# Patient Record
Sex: FEMALE | Race: WHITE | HISPANIC OR LATINO | Employment: FULL TIME | URBAN - METROPOLITAN AREA
[De-identification: names, ages, dates, MRNs, and addresses within clinical notes are randomized per-mention and may not be internally consistent; named-entity substitution may affect disease eponyms.]

---

## 2017-01-16 ENCOUNTER — APPOINTMENT (OUTPATIENT)
Dept: MAMMOGRAPHY | Facility: CLINIC | Age: 62
End: 2017-01-16
Payer: COMMERCIAL

## 2017-01-25 ENCOUNTER — HOSPITAL ENCOUNTER (OUTPATIENT)
Dept: ULTRASOUND IMAGING | Facility: HOSPITAL | Age: 62
Discharge: HOME | End: 2017-01-25
Payer: COMMERCIAL

## 2017-01-25 ENCOUNTER — ALLSCRIPTS OFFICE VISIT (OUTPATIENT)
Dept: OTHER | Facility: OTHER | Age: 62
End: 2017-01-25

## 2017-01-25 DIAGNOSIS — M25.50 PAIN IN JOINT: ICD-10-CM

## 2017-01-25 DIAGNOSIS — R10.11 RUQ PAIN: ICD-10-CM

## 2017-01-25 PROCEDURE — 76705 ECHO EXAM OF ABDOMEN: CPT

## 2017-01-30 ENCOUNTER — TRANSCRIBE ORDERS (OUTPATIENT)
Dept: LAB | Facility: OTHER | Age: 62
End: 2017-01-30

## 2017-01-30 ENCOUNTER — APPOINTMENT (OUTPATIENT)
Dept: LAB | Facility: OTHER | Age: 62
End: 2017-01-30
Payer: COMMERCIAL

## 2017-01-30 DIAGNOSIS — M25.50 PAIN IN JOINT: ICD-10-CM

## 2017-01-30 LAB
ALBUMIN SERPL BCP-MCNC: 3.6 G/DL (ref 3.5–5)
ALP SERPL-CCNC: 102 U/L (ref 46–116)
ALT SERPL W P-5'-P-CCNC: 45 U/L (ref 12–78)
AMYLASE SERPL-CCNC: 44 IU/L (ref 25–115)
ANION GAP SERPL CALCULATED.3IONS-SCNC: 8 MMOL/L (ref 4–13)
AST SERPL W P-5'-P-CCNC: 23 U/L (ref 5–45)
BASOPHILS # BLD AUTO: 0.04 THOUSANDS/ΜL (ref 0–0.1)
BASOPHILS NFR BLD AUTO: 1 % (ref 0–1)
BILIRUB SERPL-MCNC: 0.46 MG/DL (ref 0.2–1)
BUN SERPL-MCNC: 17 MG/DL (ref 5–25)
CALCIUM SERPL-MCNC: 8.8 MG/DL (ref 8.3–10.1)
CHLORIDE SERPL-SCNC: 106 MMOL/L (ref 100–108)
CO2 SERPL-SCNC: 25 MMOL/L (ref 21–32)
CREAT SERPL-MCNC: 0.75 MG/DL (ref 0.6–1.3)
EOSINOPHIL # BLD AUTO: 0.12 THOUSAND/ΜL (ref 0–0.61)
EOSINOPHIL NFR BLD AUTO: 2 % (ref 0–6)
ERYTHROCYTE [DISTWIDTH] IN BLOOD BY AUTOMATED COUNT: 14.2 % (ref 11.6–15.1)
GFR SERPL CREATININE-BSD FRML MDRD: >60 ML/MIN/1.73SQ M
GLUCOSE SERPL-MCNC: 97 MG/DL (ref 65–140)
HCT VFR BLD AUTO: 41.7 % (ref 34.8–46.1)
HGB BLD-MCNC: 13.6 G/DL (ref 11.5–15.4)
LIPASE SERPL-CCNC: 78 U/L (ref 73–393)
LYMPHOCYTES # BLD AUTO: 2.17 THOUSANDS/ΜL (ref 0.6–4.47)
LYMPHOCYTES NFR BLD AUTO: 37 % (ref 14–44)
MCH RBC QN AUTO: 26.9 PG (ref 26.8–34.3)
MCHC RBC AUTO-ENTMCNC: 32.6 G/DL (ref 31.4–37.4)
MCV RBC AUTO: 82 FL (ref 82–98)
MONOCYTES # BLD AUTO: 0.38 THOUSAND/ΜL (ref 0.17–1.22)
MONOCYTES NFR BLD AUTO: 7 % (ref 4–12)
NEUTROPHILS # BLD AUTO: 3.16 THOUSANDS/ΜL (ref 1.85–7.62)
NEUTS SEG NFR BLD AUTO: 53 % (ref 43–75)
NRBC BLD AUTO-RTO: 0 /100 WBCS
PLATELET # BLD AUTO: 271 THOUSANDS/UL (ref 149–390)
PMV BLD AUTO: 10.2 FL (ref 8.9–12.7)
POTASSIUM SERPL-SCNC: 4 MMOL/L (ref 3.5–5.3)
PROT SERPL-MCNC: 7.2 G/DL (ref 6.4–8.2)
RBC # BLD AUTO: 5.06 MILLION/UL (ref 3.81–5.12)
SODIUM SERPL-SCNC: 139 MMOL/L (ref 136–145)
WBC # BLD AUTO: 5.89 THOUSAND/UL (ref 4.31–10.16)

## 2017-01-30 PROCEDURE — 82150 ASSAY OF AMYLASE: CPT

## 2017-01-30 PROCEDURE — 80053 COMPREHEN METABOLIC PANEL: CPT

## 2017-01-30 PROCEDURE — 36415 COLL VENOUS BLD VENIPUNCTURE: CPT

## 2017-01-30 PROCEDURE — 83690 ASSAY OF LIPASE: CPT

## 2017-01-30 PROCEDURE — 85025 COMPLETE CBC W/AUTO DIFF WBC: CPT

## 2017-01-30 PROCEDURE — 86430 RHEUMATOID FACTOR TEST QUAL: CPT

## 2017-01-30 PROCEDURE — 86617 LYME DISEASE ANTIBODY: CPT

## 2017-01-31 LAB
B BURGDOR IGG PATRN SER IB-IMP: NEGATIVE
B BURGDOR IGM PATRN SER IB-IMP: NEGATIVE
B BURGDOR18KD IGG SER QL IB: ABNORMAL
B BURGDOR23KD IGG SER QL IB: ABNORMAL
B BURGDOR23KD IGM SER QL IB: ABNORMAL
B BURGDOR28KD IGG SER QL IB: PRESENT
B BURGDOR30KD IGG SER QL IB: ABNORMAL
B BURGDOR39KD IGG SER QL IB: ABNORMAL
B BURGDOR39KD IGM SER QL IB: ABNORMAL
B BURGDOR41KD IGG SER QL IB: ABNORMAL
B BURGDOR41KD IGM SER QL IB: PRESENT
B BURGDOR45KD IGG SER QL IB: PRESENT
B BURGDOR58KD IGG SER QL IB: PRESENT
B BURGDOR66KD IGG SER QL IB: PRESENT
B BURGDOR93KD IGG SER QL IB: ABNORMAL
RHEUMATOID FACT SER QL LA: NEGATIVE

## 2017-02-02 ENCOUNTER — ALLSCRIPTS OFFICE VISIT (OUTPATIENT)
Dept: OTHER | Facility: OTHER | Age: 62
End: 2017-02-02

## 2017-02-13 ENCOUNTER — GENERIC CONVERSION - ENCOUNTER (OUTPATIENT)
Dept: OTHER | Facility: OTHER | Age: 62
End: 2017-02-13

## 2017-03-02 ENCOUNTER — ALLSCRIPTS OFFICE VISIT (OUTPATIENT)
Dept: OTHER | Facility: OTHER | Age: 62
End: 2017-03-02

## 2017-05-03 ENCOUNTER — ALLSCRIPTS OFFICE VISIT (OUTPATIENT)
Dept: OTHER | Facility: OTHER | Age: 62
End: 2017-05-03

## 2017-05-30 ENCOUNTER — ALLSCRIPTS OFFICE VISIT (OUTPATIENT)
Dept: OTHER | Facility: OTHER | Age: 62
End: 2017-05-30

## 2017-06-30 ENCOUNTER — ALLSCRIPTS OFFICE VISIT (OUTPATIENT)
Dept: OTHER | Facility: OTHER | Age: 62
End: 2017-06-30

## 2017-06-30 DIAGNOSIS — Z00.00 ENCOUNTER FOR GENERAL ADULT MEDICAL EXAMINATION WITHOUT ABNORMAL FINDINGS: ICD-10-CM

## 2017-06-30 DIAGNOSIS — M25.512 PAIN IN LEFT SHOULDER: ICD-10-CM

## 2017-06-30 DIAGNOSIS — G43.909 MIGRAINE WITHOUT STATUS MIGRAINOSUS, NOT INTRACTABLE: ICD-10-CM

## 2017-06-30 DIAGNOSIS — Z86.39 PERSONAL HISTORY OF OTHER ENDOCRINE, NUTRITIONAL AND METABOLIC DISEASE: ICD-10-CM

## 2017-06-30 DIAGNOSIS — M25.50 PAIN IN JOINT: ICD-10-CM

## 2017-07-03 ENCOUNTER — APPOINTMENT (OUTPATIENT)
Dept: RADIOLOGY | Facility: MEDICAL CENTER | Age: 62
End: 2017-07-03
Payer: COMMERCIAL

## 2017-07-03 DIAGNOSIS — G43.909 MIGRAINE WITHOUT STATUS MIGRAINOSUS, NOT INTRACTABLE: ICD-10-CM

## 2017-07-03 DIAGNOSIS — M25.512 PAIN IN LEFT SHOULDER: ICD-10-CM

## 2017-07-03 DIAGNOSIS — M25.50 PAIN IN JOINT: ICD-10-CM

## 2017-07-03 PROCEDURE — 73030 X-RAY EXAM OF SHOULDER: CPT

## 2017-07-03 PROCEDURE — 72040 X-RAY EXAM NECK SPINE 2-3 VW: CPT

## 2017-07-06 ENCOUNTER — GENERIC CONVERSION - ENCOUNTER (OUTPATIENT)
Dept: OTHER | Facility: OTHER | Age: 62
End: 2017-07-06

## 2017-07-06 ENCOUNTER — LAB REQUISITION (OUTPATIENT)
Dept: LAB | Facility: HOSPITAL | Age: 62
End: 2017-07-06
Payer: COMMERCIAL

## 2017-07-06 DIAGNOSIS — J02.9 ACUTE PHARYNGITIS: ICD-10-CM

## 2017-07-06 LAB — S PYO AG THROAT QL: NEGATIVE

## 2017-07-06 PROCEDURE — 87070 CULTURE OTHR SPECIMN AEROBIC: CPT | Performed by: PHYSICAL MEDICINE & REHABILITATION

## 2017-07-07 ENCOUNTER — GENERIC CONVERSION - ENCOUNTER (OUTPATIENT)
Dept: OTHER | Facility: OTHER | Age: 62
End: 2017-07-07

## 2017-07-07 ENCOUNTER — APPOINTMENT (OUTPATIENT)
Dept: PHYSICAL THERAPY | Facility: CLINIC | Age: 62
End: 2017-07-07
Payer: COMMERCIAL

## 2017-07-07 PROCEDURE — 97162 PT EVAL MOD COMPLEX 30 MIN: CPT

## 2017-07-07 PROCEDURE — 97110 THERAPEUTIC EXERCISES: CPT

## 2017-07-08 LAB — BACTERIA THROAT CULT: NORMAL

## 2017-07-10 ENCOUNTER — APPOINTMENT (OUTPATIENT)
Dept: PHYSICAL THERAPY | Facility: CLINIC | Age: 62
End: 2017-07-10
Payer: COMMERCIAL

## 2017-07-10 PROCEDURE — 97110 THERAPEUTIC EXERCISES: CPT

## 2017-07-10 PROCEDURE — 97014 ELECTRIC STIMULATION THERAPY: CPT

## 2017-07-10 PROCEDURE — 97140 MANUAL THERAPY 1/> REGIONS: CPT

## 2017-07-14 ENCOUNTER — APPOINTMENT (OUTPATIENT)
Dept: PHYSICAL THERAPY | Facility: CLINIC | Age: 62
End: 2017-07-14
Payer: COMMERCIAL

## 2017-07-14 PROCEDURE — 97014 ELECTRIC STIMULATION THERAPY: CPT

## 2017-07-14 PROCEDURE — 97110 THERAPEUTIC EXERCISES: CPT

## 2017-07-14 PROCEDURE — 97140 MANUAL THERAPY 1/> REGIONS: CPT

## 2017-07-17 ENCOUNTER — APPOINTMENT (OUTPATIENT)
Dept: PHYSICAL THERAPY | Facility: CLINIC | Age: 62
End: 2017-07-17
Payer: COMMERCIAL

## 2017-07-17 PROCEDURE — 97140 MANUAL THERAPY 1/> REGIONS: CPT

## 2017-07-17 PROCEDURE — 97014 ELECTRIC STIMULATION THERAPY: CPT

## 2017-07-17 PROCEDURE — 97110 THERAPEUTIC EXERCISES: CPT

## 2017-07-21 ENCOUNTER — APPOINTMENT (OUTPATIENT)
Dept: PHYSICAL THERAPY | Facility: CLINIC | Age: 62
End: 2017-07-21
Payer: COMMERCIAL

## 2017-07-21 PROCEDURE — 97110 THERAPEUTIC EXERCISES: CPT

## 2017-07-21 PROCEDURE — 97140 MANUAL THERAPY 1/> REGIONS: CPT

## 2017-07-21 PROCEDURE — 97014 ELECTRIC STIMULATION THERAPY: CPT

## 2017-07-24 ENCOUNTER — APPOINTMENT (OUTPATIENT)
Dept: NUTRITION | Facility: HOSPITAL | Age: 62
End: 2017-07-24
Payer: COMMERCIAL

## 2017-07-24 ENCOUNTER — APPOINTMENT (OUTPATIENT)
Dept: PHYSICAL THERAPY | Facility: CLINIC | Age: 62
End: 2017-07-24
Payer: COMMERCIAL

## 2017-07-24 DIAGNOSIS — E66.3 SEVERELY OVERWEIGHT: ICD-10-CM

## 2017-07-24 PROCEDURE — 97140 MANUAL THERAPY 1/> REGIONS: CPT

## 2017-07-24 PROCEDURE — 97014 ELECTRIC STIMULATION THERAPY: CPT

## 2017-07-24 PROCEDURE — 97110 THERAPEUTIC EXERCISES: CPT

## 2017-07-24 PROCEDURE — 97802 MEDICAL NUTRITION INDIV IN: CPT

## 2017-07-28 ENCOUNTER — APPOINTMENT (OUTPATIENT)
Dept: PHYSICAL THERAPY | Facility: CLINIC | Age: 62
End: 2017-07-28
Payer: COMMERCIAL

## 2017-07-28 PROCEDURE — 97140 MANUAL THERAPY 1/> REGIONS: CPT

## 2017-07-28 PROCEDURE — 97014 ELECTRIC STIMULATION THERAPY: CPT

## 2017-07-28 PROCEDURE — 97110 THERAPEUTIC EXERCISES: CPT

## 2017-07-31 ENCOUNTER — APPOINTMENT (OUTPATIENT)
Dept: PHYSICAL THERAPY | Facility: CLINIC | Age: 62
End: 2017-07-31
Payer: COMMERCIAL

## 2017-07-31 PROCEDURE — 97140 MANUAL THERAPY 1/> REGIONS: CPT

## 2017-07-31 PROCEDURE — 97110 THERAPEUTIC EXERCISES: CPT

## 2017-08-03 ENCOUNTER — TRANSCRIBE ORDERS (OUTPATIENT)
Dept: ADMINISTRATIVE | Facility: HOSPITAL | Age: 62
End: 2017-08-03

## 2017-08-03 DIAGNOSIS — E04.2 NONTOXIC MULTINODULAR GOITER: Primary | ICD-10-CM

## 2017-08-04 ENCOUNTER — APPOINTMENT (OUTPATIENT)
Dept: LAB | Facility: MEDICAL CENTER | Age: 62
End: 2017-08-04
Payer: COMMERCIAL

## 2017-08-04 ENCOUNTER — TRANSCRIBE ORDERS (OUTPATIENT)
Dept: ADMINISTRATIVE | Facility: HOSPITAL | Age: 62
End: 2017-08-04

## 2017-08-04 ENCOUNTER — HOSPITAL ENCOUNTER (OUTPATIENT)
Dept: RADIOLOGY | Facility: MEDICAL CENTER | Age: 62
Discharge: HOME | End: 2017-08-04
Payer: COMMERCIAL

## 2017-08-04 DIAGNOSIS — E04.2 NONTOXIC MULTINODULAR GOITER: ICD-10-CM

## 2017-08-04 DIAGNOSIS — Z86.39 PERSONAL HISTORY OF OTHER ENDOCRINE, NUTRITIONAL AND METABOLIC DISEASE: ICD-10-CM

## 2017-08-04 DIAGNOSIS — Z00.00 ENCOUNTER FOR GENERAL ADULT MEDICAL EXAMINATION WITHOUT ABNORMAL FINDINGS: ICD-10-CM

## 2017-08-04 DIAGNOSIS — Z86.39 PERSONAL HISTORY OF NUTRITIONAL DEFICIENCY: ICD-10-CM

## 2017-08-04 DIAGNOSIS — Z00.8 ENCOUNTER FOR OTHER GENERAL EXAMINATION: Primary | ICD-10-CM

## 2017-08-04 LAB
ANION GAP SERPL CALCULATED.3IONS-SCNC: 8 MMOL/L (ref 4–13)
BUN SERPL-MCNC: 14 MG/DL (ref 5–25)
CALCIUM SERPL-MCNC: 9.1 MG/DL (ref 8.3–10.1)
CHLORIDE SERPL-SCNC: 105 MMOL/L (ref 100–108)
CHOLEST SERPL-MCNC: 171 MG/DL (ref 50–200)
CO2 SERPL-SCNC: 27 MMOL/L (ref 21–32)
CREAT SERPL-MCNC: 0.67 MG/DL (ref 0.6–1.3)
EST. AVERAGE GLUCOSE BLD GHB EST-MCNC: 117 MG/DL
GFR SERPL CREATININE-BSD FRML MDRD: 94 ML/MIN/1.73SQ M
GLUCOSE P FAST SERPL-MCNC: 91 MG/DL (ref 65–99)
HBA1C MFR BLD: 5.7 % (ref 4.2–6.3)
HDLC SERPL-MCNC: 42 MG/DL (ref 40–60)
LDLC SERPL CALC-MCNC: 69 MG/DL (ref 0–100)
POTASSIUM SERPL-SCNC: 4 MMOL/L (ref 3.5–5.3)
SODIUM SERPL-SCNC: 140 MMOL/L (ref 136–145)
TRIGL SERPL-MCNC: 302 MG/DL

## 2017-08-04 PROCEDURE — 76536 US EXAM OF HEAD AND NECK: CPT

## 2017-08-04 PROCEDURE — 80048 BASIC METABOLIC PNL TOTAL CA: CPT

## 2017-08-04 PROCEDURE — 36415 COLL VENOUS BLD VENIPUNCTURE: CPT

## 2017-08-04 PROCEDURE — 80061 LIPID PANEL: CPT

## 2017-08-04 PROCEDURE — 83036 HEMOGLOBIN GLYCOSYLATED A1C: CPT

## 2017-08-08 ENCOUNTER — GENERIC CONVERSION - ENCOUNTER (OUTPATIENT)
Dept: OTHER | Facility: OTHER | Age: 62
End: 2017-08-08

## 2017-08-11 ENCOUNTER — ALLSCRIPTS OFFICE VISIT (OUTPATIENT)
Dept: OTHER | Facility: OTHER | Age: 62
End: 2017-08-11

## 2017-08-11 DIAGNOSIS — M25.512 PAIN IN LEFT SHOULDER: ICD-10-CM

## 2017-08-11 DIAGNOSIS — E04.1 NONTOXIC SINGLE THYROID NODULE: ICD-10-CM

## 2017-08-14 ENCOUNTER — TRANSCRIBE ORDERS (OUTPATIENT)
Dept: ADMINISTRATIVE | Facility: HOSPITAL | Age: 62
End: 2017-08-14

## 2017-08-14 ENCOUNTER — ALLSCRIPTS OFFICE VISIT (OUTPATIENT)
Dept: OTHER | Facility: OTHER | Age: 62
End: 2017-08-14

## 2017-08-14 DIAGNOSIS — M25.512 LEFT SHOULDER PAIN, UNSPECIFIED CHRONICITY: Primary | ICD-10-CM

## 2017-08-16 ENCOUNTER — GENERIC CONVERSION - ENCOUNTER (OUTPATIENT)
Dept: OTHER | Facility: OTHER | Age: 62
End: 2017-08-16

## 2017-08-18 ENCOUNTER — HOSPITAL ENCOUNTER (OUTPATIENT)
Dept: MRI IMAGING | Facility: HOSPITAL | Age: 62
Discharge: HOME | End: 2017-08-18
Attending: ORTHOPAEDIC SURGERY
Payer: COMMERCIAL

## 2017-08-18 DIAGNOSIS — M25.512 PAIN IN LEFT SHOULDER: ICD-10-CM

## 2017-08-18 PROCEDURE — 73221 MRI JOINT UPR EXTREM W/O DYE: CPT

## 2017-08-21 ENCOUNTER — ALLSCRIPTS OFFICE VISIT (OUTPATIENT)
Dept: OTHER | Facility: OTHER | Age: 62
End: 2017-08-21

## 2017-08-28 ENCOUNTER — GENERIC CONVERSION - ENCOUNTER (OUTPATIENT)
Dept: OTHER | Facility: OTHER | Age: 62
End: 2017-08-28

## 2017-09-11 ENCOUNTER — ALLSCRIPTS OFFICE VISIT (OUTPATIENT)
Dept: OTHER | Facility: OTHER | Age: 62
End: 2017-09-11

## 2017-09-11 DIAGNOSIS — R41.3 OTHER AMNESIA: ICD-10-CM

## 2017-09-11 DIAGNOSIS — M75.42 IMPINGEMENT SYNDROME OF LEFT SHOULDER: ICD-10-CM

## 2017-09-25 ENCOUNTER — APPOINTMENT (OUTPATIENT)
Dept: PHYSICAL THERAPY | Facility: CLINIC | Age: 62
End: 2017-09-25
Payer: COMMERCIAL

## 2017-09-25 ENCOUNTER — GENERIC CONVERSION - ENCOUNTER (OUTPATIENT)
Dept: OTHER | Facility: OTHER | Age: 62
End: 2017-09-25

## 2017-09-25 DIAGNOSIS — M75.42 IMPINGEMENT SYNDROME OF LEFT SHOULDER: ICD-10-CM

## 2017-09-25 PROCEDURE — 97140 MANUAL THERAPY 1/> REGIONS: CPT

## 2017-09-25 PROCEDURE — G8990 OTHER PT/OT CURRENT STATUS: HCPCS

## 2017-09-25 PROCEDURE — 97162 PT EVAL MOD COMPLEX 30 MIN: CPT

## 2017-09-25 PROCEDURE — 97110 THERAPEUTIC EXERCISES: CPT

## 2017-09-25 PROCEDURE — G8991 OTHER PT/OT GOAL STATUS: HCPCS

## 2017-09-28 ENCOUNTER — APPOINTMENT (OUTPATIENT)
Dept: PHYSICAL THERAPY | Facility: CLINIC | Age: 62
End: 2017-09-28
Payer: COMMERCIAL

## 2017-09-28 PROCEDURE — 97140 MANUAL THERAPY 1/> REGIONS: CPT

## 2017-09-28 PROCEDURE — 97014 ELECTRIC STIMULATION THERAPY: CPT

## 2017-09-28 PROCEDURE — 97110 THERAPEUTIC EXERCISES: CPT

## 2017-10-03 ENCOUNTER — APPOINTMENT (OUTPATIENT)
Dept: PHYSICAL THERAPY | Facility: CLINIC | Age: 62
End: 2017-10-03
Payer: COMMERCIAL

## 2017-10-06 ENCOUNTER — APPOINTMENT (OUTPATIENT)
Dept: PHYSICAL THERAPY | Facility: CLINIC | Age: 62
End: 2017-10-06
Payer: COMMERCIAL

## 2017-10-13 ENCOUNTER — APPOINTMENT (OUTPATIENT)
Dept: PHYSICAL THERAPY | Facility: CLINIC | Age: 62
End: 2017-10-13
Payer: COMMERCIAL

## 2017-10-13 PROCEDURE — 97110 THERAPEUTIC EXERCISES: CPT

## 2017-10-13 PROCEDURE — 97140 MANUAL THERAPY 1/> REGIONS: CPT

## 2017-10-20 ENCOUNTER — APPOINTMENT (OUTPATIENT)
Dept: PHYSICAL THERAPY | Facility: CLINIC | Age: 62
End: 2017-10-20
Payer: COMMERCIAL

## 2017-10-20 PROCEDURE — 97110 THERAPEUTIC EXERCISES: CPT

## 2017-10-20 PROCEDURE — 97140 MANUAL THERAPY 1/> REGIONS: CPT

## 2017-10-27 ENCOUNTER — APPOINTMENT (OUTPATIENT)
Dept: PHYSICAL THERAPY | Facility: CLINIC | Age: 62
End: 2017-10-27
Payer: COMMERCIAL

## 2017-10-27 PROCEDURE — 97140 MANUAL THERAPY 1/> REGIONS: CPT

## 2017-10-27 PROCEDURE — 97110 THERAPEUTIC EXERCISES: CPT

## 2017-10-30 ENCOUNTER — APPOINTMENT (OUTPATIENT)
Dept: LAB | Facility: AMBULARY SURGERY CENTER | Age: 62
End: 2017-10-30
Payer: COMMERCIAL

## 2017-10-30 ENCOUNTER — TRANSCRIBE ORDERS (OUTPATIENT)
Dept: ADMINISTRATIVE | Facility: HOSPITAL | Age: 62
End: 2017-10-30

## 2017-10-30 DIAGNOSIS — R41.3 MEMORY LOSS: Primary | ICD-10-CM

## 2017-10-30 DIAGNOSIS — J30.9 ALLERGIC RHINITIS, UNSPECIFIED CHRONICITY, UNSPECIFIED SEASONALITY, UNSPECIFIED TRIGGER: ICD-10-CM

## 2017-10-30 DIAGNOSIS — E04.1 NONTOXIC SINGLE THYROID NODULE: ICD-10-CM

## 2017-10-30 LAB
T4 FREE SERPL-MCNC: 1.04 NG/DL (ref 0.76–1.46)
TSH SERPL DL<=0.05 MIU/L-ACNC: 1.69 UIU/ML (ref 0.36–3.74)

## 2017-10-30 PROCEDURE — 84439 ASSAY OF FREE THYROXINE: CPT

## 2017-10-30 PROCEDURE — 84443 ASSAY THYROID STIM HORMONE: CPT

## 2017-10-30 PROCEDURE — 36415 COLL VENOUS BLD VENIPUNCTURE: CPT

## 2017-11-01 ENCOUNTER — GENERIC CONVERSION - ENCOUNTER (OUTPATIENT)
Dept: OTHER | Facility: OTHER | Age: 62
End: 2017-11-01

## 2017-11-02 ENCOUNTER — ALLSCRIPTS OFFICE VISIT (OUTPATIENT)
Dept: OTHER | Facility: OTHER | Age: 62
End: 2017-11-02

## 2017-11-02 DIAGNOSIS — N39.0 URINARY TRACT INFECTION: ICD-10-CM

## 2017-11-03 NOTE — PROGRESS NOTES
Assessment  1  Acute suppurative otitis media (382 00) (H66 009)   2  History of allergic rhinitis (V12 69) (Z87 09)   3  Allergic rhinitis (477 9) (J30 9)   4  Acute UTI (599 0) (N39 0)   5  Overweight (278 02) (E66 3)    Plan  Acute UTI    · Amoxicillin 500 MG Oral Capsule; TAKE 1 CAPSULE TWICE DAILY UNTIL GONE   · Montelukast Sodium 10 MG Oral Tablet (Singulair); take 1 tablet by mouth once daily as  directed   · (1) URINALYSIS (will reflex a microscopy if leukocytes, occult blood, protein or nitrites are not within  normal limits); Status:Active; Requested [id number];    · (1) URINE CULTURE; Source:Urine, Clean Catch; Status:Active; Requested [id number]; Allergic rhinitis    · Complete PFT with [facility]; Status:Hold For - Scheduling; Requested for:2017;    · Follow-up visit in 2 weeks Evaluation and Treatment  Follow-up  Status: Hold For - Scheduling   Requested for: 92IIK2560    Discussion/Summary  Discussion Summary:   She has multiple issues  Regarding the otitis media she was given amoxicillin  She is going to continue her nasal spray and Claritin  She is going to add Singulair to the regimen  her shortness of breath she is going to have pulmonary function test   did ask about hepatitis a and B and she is going to take care that at her own office  the suprapubic discomfort she is going to see her gynecologist   will see her back here in several weeks after she has had her pulmonary function tests done  Chief Complaint  Chief Complaint Free Text Note Form: Problems are 1  Allergic rhinitis 2  Upper respiratory infection 3  Suprapubic pain 4  Weight gain      History of Present Illness  HPI: She has several issues going on  The most pertinent issue is that she has a sinusitis and otitis media  tried Claritin and fluticasone  has also been having some suprapubic discomfort discomfort  She recently saw her gynecologist but these to return  is seeing a therapist for her weight gain  Review of Systems  Complete-Female:   Constitutional: feeling poorly,-- chills-- and-- feeling tired, but-- as noted in HPI-- and-- no fever  ENT: earache, but-- no hearing loss  Cardiovascular: No complaints of slow heart rate, no fast heart rate, no chest pain, no palpitations, no leg claudication, no lower extremity edema  Respiratory: wheezing,-- shortness of breath during exertion-- and-- She has intermittent shortness of breath  She feels she probably has wheezing, but-- as noted in HPI  Gastrointestinal: She has weight gain, but-- No complaints of abdominal pain, no constipation, no nausea or vomiting, no diarrhea, no bloody stools  Genitourinary: She, but-- as noted in HPI  Musculoskeletal: arthralgias-- and-- joint stiffness  Has suprapubic discomfort   Integumentary: No complaints of skin rash or lesions, no itching, no skin wounds, no breast pain or lump  Neurological: No complaints of headache, no confusion, no convulsions, no numbness, no dizziness or fainting, no tingling, no limb weakness, no difficulty walking  Psychiatric: Not suicidal, no sleep disturbance, no anxiety or depression, no change in personality, no emotional problems  Endocrine: No complaints of proptosis, no hot flashes, no muscle weakness, no deepening of the voice, no feelings of weakness  Hematologic/Lymphatic: No complaints of swollen glands, no swollen glands in the neck, does not bleed easily, does not bruise easily  Active Problems  1  Acute pharyngitis, unspecified etiology (462) (J02 9)   2  Acute sinusitis (461 9) (J01 90)   3  Adult BMI > 30 (V85 30)   4  Arthralgia of hip, left (719 45) (M25 552)   5  Arthralgia Of The Head / Neck / Trunk (719 48)   6  Arthritis of left knee (716 96) (M17 12)   7  Common migraine without aura (346 10) (G43 009)   8  Contusion of shoulder, left (923 00) (S40 012A)   9  Dry eyes, bilateral (375 15) (H04 123)   10   DVT of leg (deep venous thrombosis) (453 40) (I82 409)   11  Encounter for gynecological examination without abnormal finding (V72 31) (Z01 419)   12  Encounter for mammogram to establish baseline mammogram (V76 12) (Z12 31)   13  Esophagitis, reflux (530 11) (K21 0)   14  Fall down steps, initial encounter (E880 9) (W10 8XXA)   15  History of hyperlipidemia (V12 29) (Z86 39)   16  Joint pain (719 40) (M25 50)   17  Left shoulder pain (719 41) (M25 512)   18  Memory difficulty (780 93) (R41 3)   19  Migraine, unspecified, not intractable, without status migrainosus (346 90) (G43 909)   20  Mild intermittent asthma (493 90) (J45 20)   21  Obesity (BMI 30 0-34 9) (278 00) (E66 9)   22  Osteopenia of multiple sites (733 90) (M85 89)   23  Overweight (278 02) (E66 3)   24  Posterior tibial tendinitis (726 72) (M76 829)   25  Postoperative visit (V58 49) (Z48 89)   26  Prediabetes (790 29) (R73 03)   27  Subacromial impingement of left shoulder (726 19) (M75 42)   28  Thyroid nodule (241 0) (E04 1)   29  Thyromegaly (240 9) (E04 9)    Past Medical History  1  History of allergic rhinitis (V12 69) (Z87 09)   2  Denied: History of depression   3  History of knee replacement, total (V43 65) (Z96 659)   4  Denied: History of substance abuse   5  History of Pes planus, unspecified laterality (734) (M21 40)  Active Problems And Past Medical History Reviewed: The active problems and past medical history were reviewed and updated today  Surgical History  1  History of Appendectomy   2  History of  Section   3  History of  Section   4  History of Cholecystectomy   5  History of Hysteroscopy With Resection For Intrauterine Polyp Removal   6  History of Knee Surgery   7  History of Laparoscopy With Total Hysterectomy   8  History of Salpingo-oophorectomy Bilateral  Surgical History Reviewed: The surgical history was reviewed and updated today  Family History  Mother    1  Family history of Endometrial cancer   2   Denied: Family history of mental disorder   3  Denied: Family history of substance abuse  Father    4  Family history of diabetes mellitus (V18 0) (Z83 3)   5  Denied: Family history of mental disorder   6  Denied: Family history of substance abuse   7  Family history of Heart disease  Daughter    6  Family history of Growth hormone insufficiency  Brother    9  Family history of malignant neoplasm of thyroid (V16 8) (Z80 8)   10  Family history of type 2 diabetes mellitus (V18 0) (Z83 3)  Maternal Aunt    6  Family history of Alzheimer's disease (V17 2) (Z82 0)   12  Family history of malignant neoplasm of thyroid (V16 8) (Z80 8)  Paternal Aunt    15  Family history of diabetes mellitus (V18 0) (Z83 3)   14  Family history of Hashimoto thyroiditis (V18 19) (Z83 49)   15  Family history of Thyroid Surgery Total Thyroidectomy  Maternal Uncle    16  Family history of Brain tumor   17  Family history of Alzheimer's disease (V17 2) (Z82 0)  Family History    18  Family history of Cancer  Family History Reviewed: The family history was reviewed and updated today  Social History   · Being A Social Drinker   · Former smoker (P99 25) (V98 673)  Social History Reviewed: The social history was reviewed and updated today  The social history was reviewed and is unchanged  Current Meds   1  ALPRAZolam 0 25 MG Oral Tablet; TAKE 1 TABLET 3 TIMES DAILY AS NEEDED; Therapy: 31ERG8539 to (Evaluate:10Adv5255); Last Rx:30Jun2017 Ordered   2  Benadryl 25 MG CAPS; TAKE 1 CAPSULE DAILY  AS NEEDED; Therapy: (Recorded:15Jun2015) to Recorded   3  Claritin TABS; Therapy: [phone]) to Recorded   4  DiazePAM 5 MG Oral Tablet; Take 1 tablet once daily; Therapy: 33RTC1299 to (Evaluate:25Scn6492); Last Rx:27Mar2017 Ordered   5  Evening Primrose OIL; Therapy: (Daiva Cyphers) to Recorded   6  Fioricet TABS; TAKE 1 TABLET 3 TIMES DAILY AS NEEDED FOR HEADACHE; Therapy: (Recorded:10Nov2014) to Recorded   7   Fluticasone Propionate 50 MCG/ACT Nasal Suspension; USE 1 TO 2 SPRAYS IN EACH NOSTRIL   ONCE DAILY; Therapy: 99UVY9184 to (Last Rx:89Tjt3300)  Requested for: 18UMO3435 Ordered   8  Magnesium 200 MG Oral Tablet; Therapy: (Recorded:80Auo5904) to Recorded   9  Pantoprazole Sodium 40 MG Oral Tablet Delayed Release; TAKE ONE (1) TABLET(S) TWICE DAILY; Therapy: 21GIZ5115 to (Last Rx:27Mar2017)  Requested for: 27Mar2017 Ordered   10  Pantoprazole Sodium 40 MG Oral Tablet Delayed Release; TAKE ONE (1) TABLET(S) TWICE DAILY; Therapy: 28RIS4400 to (Evaluate:42Jfu8626)  Requested for: 01MRO4166; Last Rx:27Mar2017    Ordered   11  Restasis 0 05 % Ophthalmic Emulsion; INSTILL 1 DROP IN [facility] EYE TWICE DAILY; Therapy: (Recorded:36Wkk1924) to Recorded   12  Ventolin  (90 Base) MCG/ACT Inhalation Aerosol Solution; INHALE 1 TO 2 PUFFS EVERY 4    TO 6 HOURS AS NEEDED  Requested for: 62Cfx8069; Last Rx:25Urb4603 Ordered   13  ZOLMitriptan 5 MG Oral Tablet; TAKE 1 TABLET AT ONSET OF MIGRAINE  MAY REPEAT ONCE AFTER    2 HOURS  MAX 10 MG/DAY; Therapy: 27Apr2017 to (Last Charlotte Grandchild)  Requested for: 30Jun2017 Ordered  Medication List Reviewed: The medication list was reviewed and updated today  Allergies  1  Naproxen TABS  2  Seasonal    Vitals  Vital Signs    Recorded: 11FDQ6035 05:33PM   Heart Rate 76   Systolic 045   Diastolic 68   Height 5 ft 5 in   Weight 199 lb    BMI Calculated 33 12   BSA Calculated 1 97   O2 Saturation 98     Physical Exam    Constitutional   General appearance: No acute distress, well appearing and well nourished  -- She is afebrile  She is moderately overweight  Eyes   Conjunctiva and lids: No swelling, erythema or discharge  Pupils and irises: Equal, round and reactive to light  Ears, Nose, Mouth, and Throat   External inspection of ears and nose: Normal     Otoscopic examination: Abnormal  -- She has erythema and bulging of the left  Nasal mucosa, septum, and turbinates: Normal without edema or erythema  Oropharynx: Abnormal  -- Mildly hyperemic  Pulmonary   Respiratory effort: No increased work of breathing or signs of respiratory distress  Auscultation of lungs: Clear to auscultation  -- No wheezes  Cardiovascular   Palpation of heart: Normal PMI, no thrills  Auscultation of heart: Normal rate and rhythm, normal S1 and S2, without murmurs  Examination of extremities for edema and/or varicosities: Abnormal  -- Positive venous varicosities  Carotid pulses: Normal     Abdomen   Abdomen: Abnormal  -- Moderately overweight  Liver and spleen: No hepatomegaly or splenomegaly  Lymphatic   Palpation of lymph nodes in neck: No lymphadenopathy  Musculoskeletal   Gait and station: Normal     Digits and nails: Normal without clubbing or cyanosis  Inspection/palpation of joints, bones, and muscles: Normal     Skin   Skin and subcutaneous tissue: Normal without rashes or lesions  Neurologic   Cranial nerves: Cranial nerves 2-12 intact  Reflexes: 2+ and symmetric  Sensation: No sensory loss  Psychiatric   Orientation to person, place, and time: Normal     Mood and affect: Normal          Health Management  Health Maintenance   COLONOSCOPY; every 1 year; Last 86JPE3082; Next Due: 81AVW9868; Overdue    Future Appointments    Date/Time Provider Specialty Site   11/13/2017 01:50 PM MARLO Smith  Orthopedic Surgery [facility]   08/13/2018 09:20 AM MARLO Cueva   Endocrinology [facility] ENDOCRINOLOGY   12/22/2017 09:00 AM Hannah March MD [facility]   11/27/2017 04:15 PM Emma Porter DO Internal Medicine [facility]     Signatures   Electronically signed by : Jaya Contreras DO; Nov 2 2017  7:43PM EST                       (Author)

## 2017-11-04 ENCOUNTER — HOSPITAL ENCOUNTER (OUTPATIENT)
Dept: MRI IMAGING | Facility: HOSPITAL | Age: 62
Discharge: HOME | End: 2017-11-04
Attending: PSYCHIATRY & NEUROLOGY
Payer: COMMERCIAL

## 2017-11-04 DIAGNOSIS — R41.3 MEMORY LOSS: ICD-10-CM

## 2017-11-04 PROCEDURE — 70551 MRI BRAIN STEM W/O DYE: CPT

## 2017-11-07 ENCOUNTER — GENERIC CONVERSION - ENCOUNTER (OUTPATIENT)
Dept: OTHER | Facility: OTHER | Age: 62
End: 2017-11-07

## 2017-11-09 ENCOUNTER — APPOINTMENT (OUTPATIENT)
Dept: LAB | Facility: HOSPITAL | Age: 62
End: 2017-11-09
Payer: COMMERCIAL

## 2017-11-09 DIAGNOSIS — N39.0 URINARY TRACT INFECTION: ICD-10-CM

## 2017-11-09 LAB
BILIRUB UR QL STRIP: NEGATIVE
CLARITY UR: CLEAR
COLOR UR: YELLOW
GLUCOSE UR STRIP-MCNC: NEGATIVE MG/DL
HGB UR QL STRIP.AUTO: NEGATIVE
KETONES UR STRIP-MCNC: NEGATIVE MG/DL
LEUKOCYTE ESTERASE UR QL STRIP: NEGATIVE
NITRITE UR QL STRIP: NEGATIVE
PH UR STRIP.AUTO: 6 [PH] (ref 4.5–8)
PROT UR STRIP-MCNC: NEGATIVE MG/DL
SP GR UR STRIP.AUTO: 1.02 (ref 1–1.03)
UROBILINOGEN UR QL STRIP.AUTO: 0.2 E.U./DL

## 2017-11-09 PROCEDURE — 87086 URINE CULTURE/COLONY COUNT: CPT

## 2017-11-09 PROCEDURE — 81003 URINALYSIS AUTO W/O SCOPE: CPT

## 2017-11-10 ENCOUNTER — GENERIC CONVERSION - ENCOUNTER (OUTPATIENT)
Dept: OTHER | Facility: OTHER | Age: 62
End: 2017-11-10

## 2017-11-10 LAB — BACTERIA UR CULT: NORMAL

## 2017-11-13 ENCOUNTER — GENERIC CONVERSION - ENCOUNTER (OUTPATIENT)
Dept: OTHER | Facility: OTHER | Age: 62
End: 2017-11-13

## 2017-11-13 ENCOUNTER — ALLSCRIPTS OFFICE VISIT (OUTPATIENT)
Dept: OTHER | Facility: OTHER | Age: 62
End: 2017-11-13

## 2017-11-14 DIAGNOSIS — Z12.31 ENCOUNTER FOR SCREENING MAMMOGRAM FOR MALIGNANT NEOPLASM OF BREAST: ICD-10-CM

## 2017-11-14 NOTE — PROGRESS NOTES
Assessment    1  Subacromial impingement of left shoulder (726 19) (M75 42)    Plan  Subacromial impingement of left shoulder    · Follow-up PRN Evaluation and Treatment  Follow-up  Status: Complete  Done:13Nov2017 02:28PM    Discussion/Summary    The patient looks well and is functioning with only minimal symptoms at this time, aggressive intervention is not required but I did review again the use of a subacromial injection if her symptoms become worse  We will see her back here on an as-needed basis for an injection if she requires  The patient was counseled regarding diagnostic results,-- prognosis,-- risks and benefits of treatment options  The patient has the current Goals: Pain-free function left shoulder  The patent has the current Barriers: No barriers  Patient is able to Self-Care  Educational resources provided: Corticosteroid injection handout if she requires 1 in the future  Possible side effects of new medications were reviewed with the patient/guardian today  The treatment plan was reviewed with the patient/guardian  The patient/guardian understands and agrees with the treatment plan     Self Referrals: No      History of Present Illness  Patient returns with her left shoulder subacromial impingement syndrome  She is doing well, has only mild symptoms in certain positions but at rest she is functioning without pain      Active Problems  1  Acute pharyngitis, unspecified etiology (462) (J02 9)   2  Acute sinusitis (461 9) (J01 90)   3  Acute suppurative otitis media (382 00) (H66 009)   4  Acute UTI (599 0) (N39 0)   5  Adult BMI > 30 (V85 30)   6  Allergic rhinitis (477 9) (J30 9)   7  Arthralgia of hip, left (719 45) (M25 552)   8  Arthralgia Of The Head / Neck / Trunk (719 48)   9  Arthritis of left knee (716 96) (M17 12)   10  Common migraine without aura (346 10) (G43 009)   11  Contusion of shoulder, left (923 00) (S40 012A)   12  Dry eyes, bilateral (375 15) (H04 123)   13   DVT of leg (deep venous thrombosis) (453 40) (I82 409)   14  Encounter for gynecological examination without abnormal finding (V72 31) (Z01 419)   15  Encounter for mammogram to establish baseline mammogram (V76 12) (Z12 31)   16  Esophagitis, reflux (530 11) (K21 0)   17  Fall down steps, initial encounter (E880 9) (W10 8XXA)   18  History of hyperlipidemia (V12 29) (Z86 39)   19  Joint pain (719 40) (M25 50)   20  Left shoulder pain (719 41) (M25 512)   21  Memory difficulty (780 93) (R41 3)   22  Migraine, unspecified, not intractable, without status migrainosus (346 90) (G43 909)   23  Mild intermittent asthma (493 90) (J45 20)   24  Obesity (BMI 30 0-34 9) (278 00) (E66 9)   25  Osteopenia of multiple sites (733 90) (M85 89)   26  Overweight (278 02) (E66 3)   27  Posterior tibial tendinitis (726 72) (M76 829)   28  Postoperative visit (V58 49) (Z48 89)   29  Prediabetes (790 29) (R73 03)   30  Subacromial impingement of left shoulder (726 19) (M75 42)   31  Thyroid nodule (241 0) (E04 1)   32   Thyromegaly (240 9) (E04 9)    Past Medical History   · History of allergic rhinitis (V12 69) (Z87 09)   · Denied: History of depression   · History of knee replacement, total (V43 65) (Z96 659)   · Denied: History of substance abuse   · History of Pes planus, unspecified laterality (734) (M21 40)    Surgical History     · History of Appendectomy   · History of  Section   · History of  Section   · History of Cholecystectomy   · History of Hysteroscopy With Resection For Intrauterine Polyp Removal   · History of Knee Surgery   · History of Laparoscopy With Total Hysterectomy   · History of Salpingo-oophorectomy Bilateral    Family History  Mother    · Family history of Endometrial cancer   · Denied: Family history of mental disorder   · Denied: Family history of substance abuse  Father    · Family history of diabetes mellitus (V18 0) (Z83 3)   · Denied: Family history of mental disorder   · Denied: Family history of substance abuse   · Family history of Heart disease  Daughter    · Family history of Growth hormone insufficiency  Brother    · Family history of malignant neoplasm of thyroid (V16 8) (Z80 8)   · Family history of type 2 diabetes mellitus (V18 0) (Z83 3)  Maternal Aunt    · Family history of Alzheimer's disease (V17 2) (Z82 0)   · Family history of malignant neoplasm of thyroid (V16 8) (Z80 8)  Paternal Aunt    · Family history of diabetes mellitus (V18 0) (Z83 3)   · Family history of Hashimoto thyroiditis (V18 19) (Z83 49)   · Family history of Thyroid Surgery Total Thyroidectomy  Maternal Uncle    · Family history of Brain tumor   · Family history of Alzheimer's disease (V17 2) (Z82 0)  Family History    · Family history of Cancer    Social History     · Being A Social Drinker   · Former smoker (P14 93) (O81 393)    Current Meds   1  ALPRAZolam 0 25 MG Oral Tablet (Xanax); TAKE 1 TABLET 3 TIMES DAILY AS NEEDED; Therapy: 48NLQ6471 to (Evaluate:88Rdt9911); Last Rx:30Jun2017 Ordered   2  Benadryl 25 MG CAPS (DiphenhydrAMINE HCl); TAKE 1 CAPSULE DAILY  AS NEEDED; Therapy: (Recorded:15Jun2015) to Recorded   3  Claritin TABS; Therapy: (450 45 295) to Recorded   4  DiazePAM 5 MG Oral Tablet; Take 1 tablet once daily; Therapy: 52IUL5054 to (Evaluate:98Avz0547); Last Rx:27Mar2017 Ordered   5  Evening Primrose OIL; Therapy: (Elier Buckner) to Recorded   6  Fioricet TABS (Butalbital-APAP-Caffeine); TAKE 1 TABLET 3 TIMES DAILY AS NEEDED FOR HEADACHE; Therapy: (Recorded:10Nov2014) to Recorded   7  Fluticasone Propionate 50 MCG/ACT Nasal Suspension; USE 1 TO 2 SPRAYS IN EACH NOSTRIL ONCE DAILY; Therapy: 71THM2956 to (Last Rx:02Jov6960)  Requested for: 75QDH0512 Ordered   8  Magnesium 200 MG Oral Tablet; Therapy: (Recorded:04Apr2016) to Recorded   9  Montelukast Sodium 10 MG Oral Tablet (Singulair); take 1 tablet by mouth once daily as directed;  Therapy: 81NJJ0682 to (Evaluate:01Jan2018)  Requested for: 04UFI2108; Last Rx:02Nov2017 Ordered   10  Pantoprazole Sodium 40 MG Oral Tablet Delayed Release; TAKE ONE (1) TABLET(S)  TWICE DAILY; Therapy: 67NPH7020 to (Last Rx:27Mar2017)  Requested for: 27Mar2017 Ordered   11  Pantoprazole Sodium 40 MG Oral Tablet Delayed Release; TAKE ONE (1) TABLET(S)  TWICE DAILY; Therapy: 98YJA7755 to (Evaluate:35Eqi9202)  Requested for: 87MBY3068; Last  Rx:27Mar2017 Ordered   12  Restasis 0 05 % Ophthalmic Emulsion; INSTILL 1 DROP IN [facility] EYE TWICE DAILY; Therapy: (Recorded:15Jun2015) to Recorded   13  Ventolin  (90 Base) MCG/ACT Inhalation Aerosol Solution; INHALE 1 TO 2  PUFFS EVERY 4 TO 6 HOURS AS NEEDED  Requested for: 12Sep2016; Last  Rx:12Sep2016 Ordered   14  ZOLMitriptan 5 MG Oral Tablet (Zomig); TAKE 1 TABLET AT ONSET OF MIGRAINE  MAY  REPEAT ONCE AFTER 2 HOURS  MAX 10 MG/DAY; Therapy: 80TRE3316 to (Last Rx:30Jun2017)  Requested for: 30Jun2017 Ordered    Allergies  1  Naproxen TABS  2  Seasonal    Vitals   Recorded: 97GEK7781 02:09PM   Heart Rate 67   Systolic 641, Sitting   Diastolic 74, Sitting   Weight 199 lb 6 oz   BMI Calculated 33 18   BSA Calculated 1 98     Physical Exam    not the bicipital groove not the deltoid ROM: equivalent both sides  Motor: Normal 5/5 abduction-- and-- 5/5 external rotation  Special Tests: equivocal Painful Arc,-- equivocal Beltrán test-- and-- equivocal Neer test, but-- negative Drop Arm test,-- negative Empty Can test,-- negative Lift Off test,-- negative Yergason's test,-- negative Speed's test,-- negative Belly Press test-- and-- negative Cross Body Adduction test  not the coracoid Left Shoulder Appearance[de-identified] no AC joint hypertrophy,-- no AC joint step-off,-- no belly of the biceps christa deformity,-- no deltoid atrophy-- and-- no trapezius atrophy  Tenderness: not the AC joint-- and-- not the axillary  Future Appointments    Date/Time Provider Specialty Site   08/13/2018 09:20 MARLO Bashir   Endocrinology [facility] 12/22/2017 09:00 AM Sangita Reddy MD Neurology [facility]   11/27/2017 04:15 PM Camron Cantu DO Internal Medicine [facility]       Signatures   Electronically signed by : MARLO Hernandez ; Nov 13 2017  2:29PM EST                       (Author)

## 2017-11-20 ENCOUNTER — ALLSCRIPTS OFFICE VISIT (OUTPATIENT)
Dept: OTHER | Facility: OTHER | Age: 62
End: 2017-11-20

## 2017-11-23 NOTE — PROGRESS NOTES
Assessment    1  Atrophic vulvitis (616 10) (N76 2)   2  Vulvovaginal candidiasis (112 1) (B37 3)    Plan  Atrophic vulvitis    · Clobetasol Propionate 0 05 % External Cream; Apply to affected area twice daily forone week then once daily for one week   Rx By: Tess Grady; Dispense: 0 Days ; #:1 X 15 GM Tube; Refill: 0;Atrophic vulvitis; YAMILEX = N; Verified Transmission to [facility]/PHARMACY #[id number] Last Updated By: System, SureScripts; 11/20/2017 12:40:57 PM  Vulvovaginal candidiasis    · Fluconazole 150 MG Oral Tablet (Diflucan); TAKE 1 TABLET NOW, AND REPEAT IN4 DAYS   Rx By: Tess Grady; Dispense: 2 Days ; #:2 Tablet; Refill: 0;Vulvovaginal candidiasis; YAMILEX = N; Verified Transmission to [facility]/PHARMACY #[id number] Last Updated By: System, SureScripts; 11/20/2017 12:14:03 PM    Discussion/Summary  Discussion Summary:   On exam surgeon mucosa is erythematous and atrophic  Scant white discharge  At this time will treat with Diflucan due to recent antibiotic use  Also recommend beginning vaginal estrogen pea-sized amount 2 times per week to treat vaginal atrophy if symptoms worsen call back for further evaluation  Counseling Documentation With Imm: The patient was counseled regarding prognosis,-- risks and benefits of treatment options  total time of encounter was 15 minutes-- and-- 9 minutes was spent counseling  Goals and Barriers: The patient has the current Goals: Treatment pain  The patent has the current Barriers: Would like to avoid medication for treatment  Patient's Capacity to Self-Care: Patient is able to Self-Care  Medication SE Review and Pt Understands Tx: Possible side effects of new medications were reviewed with the patient/guardian today  The treatment plan was reviewed with the patient/guardian   The patient/guardian understands and agrees with the treatment plan   Self Referrals:   Self Referrals: No      Chief Complaint  Chief Complaint Free Text Note Form: Patient c/o bladder pain, denies UTI sxs, had neg UA and culture 11/8/17 with Dr Hunter Martinez  History of Present Illness  HPI: 57year old female with months long symptoms of bladder irritation  just recently finished ABX for treatment of ear infection  Review of Systems   Female ROS: bladder pain,-- urinary frequency-- and-- feelings of urinary urgency, but-- no urinary loss of control  Focused-Female:  Constitutional: No fever, no chills, feels well, no tiredness, no recent weight gain or loss  ENT: no ear ache, no loss of hearing, no nosebleeds or nasal discharge, no sore throat or hoarseness  Cardiovascular: no complaints of slow or fast heart rate, no chest pain, no palpitations, no leg claudication or lower extremity edema  Respiratory: no complaints of shortness of breath, no wheezing, no dyspnea on exertion, no orthopnea or PND  Breasts: no complaints of breast pain, breast lump or nipple discharge  Gastrointestinal: no complaints of abdominal pain, no constipation, no nausea or diarrhea, no vomiting, no bloody stools  Genitourinary: no complaints of dysuria, no incontinence, no pelvic pain, no dysmenorrhea, no vaginal discharge or abnormal vaginal bleeding  Musculoskeletal: no complaints of arthralgia, no myalgia, no joint swelling or stiffness, no limb pain or swelling  Integumentary: no complaints of skin rash or lesion, no itching or dry skin, no skin wounds  Neurological: no complaints of headache, no confusion, no numbness or tingling, no dizziness or fainting  Active Problems  1  Acute pharyngitis, unspecified etiology (462) (J02 9)   2  Acute sinusitis (461 9) (J01 90)   3  Acute suppurative otitis media (382 00) (H66 009)   4  Acute UTI (599 0) (N39 0)   5  Adult BMI > 30 (V85 30)   6  Allergic rhinitis (477 9) (J30 9)   7  Arthralgia of hip, left (719 45) (M25 552)   8  Arthralgia Of The Head / Neck / Trunk (719 48)   9  Arthritis of left knee (716 96) (M17 12)   10   Common migraine without aura (346 10) (G43 009)   11  Contusion of shoulder, left (923 00) (S40 012A)   12  Dry eyes, bilateral (375 15) (H04 123)   13  DVT of leg (deep venous thrombosis) (453 40) (I82 409)   14  Encounter for gynecological examination without abnormal finding (V72 31) (Z01 419)   15  Encounter for mammogram to establish baseline mammogram (V76 12) (Z12 31)   16  Esophagitis, reflux (530 11) (K21 0)   17  Fall down steps, initial encounter (E880 9) (W10 8XXA)   18  History of hyperlipidemia (V12 29) (Z86 39)   19  Joint pain (719 40) (M25 50)   20  Left shoulder pain (719 41) (M25 512)   21  Memory difficulty (780 93) (R41 3)   22  Migraine, unspecified, not intractable, without status migrainosus (346 90) (G43 909)   23  Mild intermittent asthma (493 90) (J45 20)   24  Obesity (BMI 30 0-34 9) (278 00) (E66 9)   25  Osteopenia of multiple sites (733 90) (M85 89)   26  Overweight (278 02) (E66 3)   27  Posterior tibial tendinitis (726 72) (M76 829)   28  Postoperative visit (V58 49) (Z48 89)   29  Prediabetes (790 29) (R73 03)   30  Subacromial impingement of left shoulder (726 19) (M75 42)   31  Thyroid nodule (241 0) (E04 1)   32  Thyromegaly (240 9) (E04 9)    Past Medical History  1  History of allergic rhinitis (V12 69) (Z87 09)   2  Denied: History of depression   3  History of knee replacement, total (V43 65) (Z96 659)   4  Denied: History of substance abuse   5  History of Pes planus, unspecified laterality (734) (M21 40)    Surgical History    1  History of Appendectomy   2  History of  Section   3  History of  Section   4  History of Cholecystectomy   5  History of Hysteroscopy With Resection For Intrauterine Polyp Removal   6  History of Knee Surgery   7  History of Laparoscopy With Total Hysterectomy   8  History of Salpingo-oophorectomy Bilateral    Family History  Mother    1  Family history of Endometrial cancer   2  Denied: Family history of mental disorder   3   Denied: Family history of substance abuse  Father    4  Family history of diabetes mellitus (V18 0) (Z83 3)   5  Denied: Family history of mental disorder   6  Denied: Family history of substance abuse   7  Family history of Heart disease  Daughter    6  Family history of Growth hormone insufficiency  Brother    9  Family history of malignant neoplasm of thyroid (V16 8) (Z80 8)   10  Family history of type 2 diabetes mellitus (V18 0) (Z83 3)  Maternal Aunt    6  Family history of Alzheimer's disease (V17 2) (Z82 0)   12  Family history of malignant neoplasm of thyroid (V16 8) (Z80 8)  Paternal Aunt    15  Family history of diabetes mellitus (V18 0) (Z83 3)   14  Family history of Hashimoto thyroiditis (V18 19) (Z83 49)   15  Family history of Thyroid Surgery Total Thyroidectomy  Maternal Uncle    16  Family history of Brain tumor   17  Family history of Alzheimer's disease (V17 2) (Z82 0)  Family History    18  Family history of Cancer    Social History     · Being A Social Drinker   · Former smoker (L60 44) (G11 602)    Current Meds   1  ALPRAZolam 0 25 MG Oral Tablet; TAKE 1 TABLET 3 TIMES DAILY AS NEEDED; Therapy: 99YLT0881 to (Evaluate:41Gru9009); Last Rx:30Jun2017 Ordered   2  Benadryl 25 MG CAPS; TAKE 1 CAPSULE DAILY  AS NEEDED; Therapy: (Recorded:15Jun2015) to Recorded   3  Claritin TABS; Therapy: [phone]) to Recorded   4  DiazePAM 5 MG Oral Tablet; Take 1 tablet once daily; Therapy: 67ICA5673 to (Evaluate:44Enf1887); Last Rx:27Mar2017 Ordered   5  Evening Primrose OIL; Therapy: (Rob Viramontes) to Recorded   6  Fioricet TABS; TAKE 1 TABLET 3 TIMES DAILY AS NEEDED FOR HEADACHE; Therapy: (Recorded:10Nov2014) to Recorded   7  Fluticasone Propionate 50 MCG/ACT Nasal Suspension; USE 1 TO 2 SPRAYS IN EACH NOSTRIL ONCE DAILY; Therapy: 02VCG9066 to (Last Rx:62Uaz2367)  Requested for: 19VIR1373 Ordered   8  Magnesium 200 MG Oral Tablet; Therapy: (Recorded:04Apr2016) to Recorded   9   Montelukast Sodium 10 MG Oral Tablet; take 1 tablet by mouth once daily as directed; Therapy: 40GLK8499 to (Evaluate:01Jan2018)  Requested for: 84CHX1561; Last Rx:02Nov2017 Ordered   10  Pantoprazole Sodium 40 MG Oral Tablet Delayed Release; TAKE ONE (1) TABLET(S)  TWICE DAILY; Therapy: 07FLV5261 to (Last Rx:27Mar2017)  Requested for: 27Mar2017 Ordered   11  Pantoprazole Sodium 40 MG Oral Tablet Delayed Release; TAKE ONE (1) TABLET(S)  TWICE DAILY; Therapy: 44HBE3486 to (Evaluate:33Qlw0897)  Requested for: 62NDG2447; Last  Rx:27Mar2017 Ordered   12  Restasis 0 05 % Ophthalmic Emulsion; INSTILL 1 DROP IN [facility] EYE TWICE DAILY; Therapy: (Recorded:15Jun2015) to Recorded   13  Ventolin  (90 Base) MCG/ACT Inhalation Aerosol Solution; INHALE 1 TO 2 PUFFS  EVERY 4 TO 6 HOURS AS NEEDED  Requested for: 73Rlj9332; Last Rx:12Sep2016  Ordered   14  ZOLMitriptan 5 MG Oral Tablet; TAKE 1 TABLET AT ONSET OF MIGRAINE  MAY REPEAT  ONCE AFTER 2 HOURS  MAX 10 MG/DAY; Therapy: 16ERR1749 to (Last Rx:30Jun2017)  Requested for: 30Jun2017 Ordered    Allergies  1  Naproxen TABS  2  Seasonal    Vitals  Vital Signs    Recorded: 16TIJ7337 95:36PQ   Systolic 806   Diastolic 80   Height 5 ft 5 in   Weight 194 lb    BMI Calculated 32 28   BSA Calculated 1 95   LMP TLH BSO       Physical Exam   Genitourinary  External genitalia: Normal and no lesions appreciated  Examination of the external genitalia showed no vulvitis  No lesions were seen on the external genitalia  The labia majora were normal  The labia minora were normal   Vagina: Abnormal   Vagina: atrophy,-- erythema,-- tenderness,-- dry mucosa-- and-- cystocele, but-- no rectocele  Urethral meatus: Normal    Bladder: Normal, soft, non-tender and no prolapse or masses appreciated  Cervix: Surgically absent  Uterus: Surgically absent  Adnexa/parametria: Surgically absent  Health Management  Health Maintenance   COLONOSCOPY; every 1 year; Last 09DMY5352; Next Due: 34UXL9495;  Overdue    Future Appointments    Date/Time Provider Specialty Site   08/13/2018 09:20 MARLO Orellana   Endocrinology [facility] ENDOCRINOLOGY   12/22/2017 09:00 AM Mckay Schumacher MD [facility]   11/27/2017 04:15 PM Bhanu Theodore DO Internal Medicine [address]       Signatures   Electronically signed by : Christiano Trinh MD; Nov 22 2017 12:41PM EST                       (Author)

## 2017-12-07 ENCOUNTER — GENERIC CONVERSION - ENCOUNTER (OUTPATIENT)
Dept: OTHER | Facility: OTHER | Age: 62
End: 2017-12-07

## 2017-12-22 ENCOUNTER — ALLSCRIPTS OFFICE VISIT (OUTPATIENT)
Dept: OTHER | Facility: OTHER | Age: 62
End: 2017-12-22

## 2017-12-22 ENCOUNTER — GENERIC CONVERSION - ENCOUNTER (OUTPATIENT)
Dept: OTHER | Facility: OTHER | Age: 62
End: 2017-12-22

## 2017-12-23 NOTE — PROGRESS NOTES
Assessment   1  Memory difficulty (780 93) (R41 3)    Plan   Memory difficulty    · Follow-up visit in 6 months Evaluation and Treatment  Follow-up  Status: Hold For -    Scheduling  Requested for: 68Etf4529   Ordered; For: Memory difficulty; Ordered By: Lucy Tello Performed:  Due: 19GKT5099    Discussion/Summary   Discussion Summary:    Ed Carrington does not demonstrate any evidence of dementia  Suspect many her family members diagnosis of dementia was incorrect and may be related to chronic substance abuse  She does have early changes on MRI and anticipate monitoring her closely  I will see her back in follow-up in 6 months  Chief Complaint   Chief Complaint Free Text Note Form: Patient returns in follow up for memory difficulty and migraine  History of Present Illness   HPI: Ed Carrington returns in follow-up today  She admits to being very stressed at work  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  She denies any new neurologic symptoms  She reports that her brother recently told her that he was diagnosed with Alzheimer's disease but had a history of substance abuse  She reports that many of her family members who were diagnosed with Alzheimer's disease also had substance abuse and remained functional for 10 or 15 years after diagnosis      Review of Systems   Neurological ROS:      Constitutional: no fever, no chills, no recent weight gain, no recent weight loss, no complaints of feeling tired, no changes in appetite  HEENT:  no sinus problems, not feeling congested, no blurred vision, no dryness of the eyes, no eye pain, no hearing loss, no tinnitus, no mouth sores, no sore throat, no hoarseness, no dysphagia, no masses, no bleeding  Cardiovascular:  no chest pain or pressure, no palpitations present, the heart rate was not rapid or irregular, no swelling in the arms or legs, no poor circulation        Respiratory:  no unusual or persistant cough, no shortness of breath with or without exertion  Gastrointestinal:  no nausea, no vomiting, no diarrhea, no abdominal pain, no changes in bowel habits, no melena, no loss of bowel control  Genitourinary:  no incontinence, no feelings of urinary urgency, no increase in frequency, no urinary hesitancy, no dysuria, no hematuria  Musculoskeletal: myalgias-- and-- head/neck/back pain  Integumentary  no masses, no rash, no skin lesions, no livedo reticularis  Psychiatric: anxiety  Endocrine  no unusual weight loss or gain, no excessive urination, no excessive thirst, no hair loss or gain, no hot or cold intolerance, no menstrual period change or irregularity, no loss of sexual ability or drive, no erection difficulty, no nipple discharge  Hematologic/Lymphatic:  no unusual bleeding, no tendency for easy bruising, no clotting skin or lumps  Neurological General:  no headache, no nausea or vomiting, no lightheadedness, no convulsions, no blackouts, no syncope, no trauma, no photopsia, no increased sleepiness, no trouble falling asleep, no snoring, no awakening at night  Neurological Mental Status:  no confusion, no mood swings, no alteration or loss of consciousness, no difficulty expressing/understanding speech, no memory problems  Neurological Cranial Nerves: taste or smell loss/changes  Neurological Motor findings include:  no tremor, no twitching, no cramping(pre/post exercise), no atrophy  Neurological Coordination:  no unsteadiness, no vertigo or dizziness, no clumsiness, no problems reaching for objects  Neurological Sensory:  no numbness, no pain, no tingling, does not fall when eyes closed or taking a shower  Neurological Gait:  no difficulty walking, not falling to one side, no sensation of being pushed, has not had falls  ROS Reviewed:    ROS reviewed  Active Problems   1   Acute pharyngitis, unspecified etiology (462) (J02 9) 2  Acute sinusitis (461 9) (J01 90)   3  Acute suppurative otitis media (382 00) (H66 009)   4  Acute UTI (599 0) (N39 0)   5  Adult BMI > 30 (V85 30)   6  Allergic rhinitis (477 9) (J30 9)   7  Arthralgia of hip, left (719 45) (M25 552)   8  Arthralgia Of The Head / Neck / Trunk (719 48)   9  Arthritis of left knee (716 96) (M17 12)   10  Atrophic vulvitis (616 10) (N76 2)   11  Common migraine without aura (346 10) (G43 009)   12  Contusion of shoulder, left (923 00) (S40 012A)   13  Dry eyes, bilateral (375 15) (H04 123)   14  DVT of leg (deep venous thrombosis) (453 40) (I82 409)   15  Encounter for gynecological examination without abnormal finding (V72 31) (Z01 419)   16  Encounter for mammogram to establish baseline mammogram (V76 12) (Z12 31)   17  Esophagitis, reflux (530 11) (K21 0)   18  Fall down steps, initial encounter (E880 9) (W10 8XXA)   19  History of hyperlipidemia (V12 29) (Z86 39)   20  Joint pain (719 40) (M25 50)   21  Left shoulder pain (719 41) (M25 512)   22  Memory difficulty (780 93) (R41 3)   23  Migraine, unspecified, not intractable, without status migrainosus (346 90) (G43 909)   24  Mild intermittent asthma (493 90) (J45 20)   25  Obesity (BMI 30 0-34 9) (278 00) (E66 9)   26  Osteopenia of multiple sites (733 90) (M85 89)   27  Overweight (278 02) (E66 3)   28  Posterior tibial tendinitis (726 72) (M76 829)   29  Postoperative visit (V58 49) (Z48 89)   30  Prediabetes (790 29) (R73 03)   31  Subacromial impingement of left shoulder (726 19) (M75 42)   32  Thyroid nodule (241 0) (E04 1)   33  Thyromegaly (240 9) (E04 9)   34  Vulvovaginal candidiasis (112 1) (B37 3)    Past Medical History   1  History of allergic rhinitis (V12 69) (Z87 09)   2  Denied: History of depression   3  History of knee replacement, total (V43 65) (Z96 659)   4  Denied: History of substance abuse   5   History of Pes planus, unspecified laterality (734) (M21 40)  Active Problems And Past Medical History Reviewed: The active problems and past medical history were reviewed and updated today  Surgical History   1  History of Appendectomy   2  History of  Section   3  History of  Section   4  History of Cholecystectomy   5  History of Hysteroscopy With Resection For Intrauterine Polyp Removal   6  History of Knee Surgery   7  History of Laparoscopy With Total Hysterectomy   8  History of Salpingo-oophorectomy Bilateral  Surgical History Reviewed: The surgical history was reviewed and updated today  Family History   Mother    1  Family history of Endometrial cancer   2  Denied: Family history of mental disorder   3  Denied: Family history of substance abuse  Father    4  Family history of diabetes mellitus (V18 0) (Z83 3)   5  Denied: Family history of mental disorder   6  Denied: Family history of substance abuse   7  Family history of Heart disease  Daughter    6  Family history of Growth hormone insufficiency  Brother    9  Family history of malignant neoplasm of thyroid (V16 8) (Z80 8)   10  Family history of type 2 diabetes mellitus (V18 0) (Z83 3)  Maternal Aunt    6  Family history of Alzheimer's disease (V17 2) (Z82 0)   12  Family history of malignant neoplasm of thyroid (V16 8) (Z80 8)  Paternal Aunt    15  Family history of diabetes mellitus (V18 0) (Z83 3)   14  Family history of Hashimoto thyroiditis (V18 19) (Z83 49)   15  Family history of Thyroid Surgery Total Thyroidectomy  Maternal Uncle    16  Family history of Brain tumor   17  Family history of Alzheimer's disease (V17 2) (Z82 0)  Family History    18  Family history of Cancer  Family History Reviewed: The family history was reviewed and updated today  Social History    · Being A Social Drinker   · Employed   · Former smoker (O50 14) (T22 872)   ·    · No illicit drug use  Social History Reviewed: The social history was reviewed and updated today  Current Meds    1   ALPRAZolam 0 25 MG Oral Tablet; TAKE 1 TABLET 3 TIMES DAILY AS NEEDED; Therapy: 22SOK4129 to (Evaluate:10Jdk6387); Last Rx:30Jun2017 Ordered   2  Benadryl 25 MG CAPS; TAKE 1 CAPSULE DAILY  AS NEEDED; Therapy: (Recorded:15Jun2015) to Recorded   3  Claritin TABS; TAKE 1 TABLET DAILY AS NEEDED; Therapy: (Julio Cesar Rinaldi) to Recorded   4  Clobetasol Propionate 0 05 % External Cream; Apply to affected area twice daily for one     week then once daily for one week; Therapy: 31KVN6874 to (Last Rx:20Nov2017)  Requested for: 20Nov2017 Ordered   5  DiazePAM 5 MG Oral Tablet; Take 1 tablet once daily; Therapy: 38PBG2871 to (Evaluate:23Sep2017) Recorded   6  Evening Primrose OIL; USE AS DIRECTED; Therapy: (Julio Cesar Rinaldi) to Recorded   7  Fioricet TABS; TAKE 1 TABLET 3 TIMES DAILY AS NEEDED FOR HEADACHE; Therapy: (Recorded:10Nov2014) to Recorded   8  Fish Oil CAPS; take 1 capsule daily; Therapy: (Julio Cesar Rinaldi) to Recorded   9  Fluticasone Propionate 50 MCG/ACT Nasal Suspension; USE 1 TO 2 SPRAYS IN EACH     NOSTRIL ONCE DAILY; Therapy: 37KTF0515 to (Last Rx:38Gtd6105)  Requested for: 14EQL1140 Ordered   10  Magnesium 200 MG Oral Tablet; TAKE 1 TABLET DAILY AS DIRECTED; Therapy: (Julio Cesar Rinaldi) to Recorded   11  Montelukast Sodium 10 MG Oral Tablet; take 1 tablet by mouth once daily as directed; Therapy: 46RDR6798 to (Evaluate:01Jan2018)  Requested for: 89KLD2181; Last      Rx:02Nov2017 Ordered   12  Pantoprazole Sodium 40 MG Oral Tablet Delayed Release; TAKE ONE (1) TABLET(S)      TWICE DAILY; Therapy: 40YXJ0804 to (Gali East)  Requested for: 71Pzj5149 Recorded   13  Restasis 0 05 % Ophthalmic Emulsion; INSTILL 1 DROP IN [facility] EYE TWICE DAILY; Therapy: (Recorded:15Jun2015) to Recorded   14  Ventolin  (90 Base) MCG/ACT Inhalation Aerosol Solution; INHALE 1 TO 2 PUFFS      EVERY 4 TO 6 HOURS AS NEEDED  Requested for: 62Vkn1603; Last Rx:12Sep2016      Ordered   15  ZOLMitriptan 5 MG Oral Tablet; TAKE 1 TABLET AT ONSET OF MIGRAINE  MAY REPEAT      ONCE AFTER 2 HOURS  MAX 10 MG/DAY; Therapy: 27Apr2017 to (Last Campbell Quinn)  Requested for: 30Jun2017 Ordered  Medication List Reviewed: The medication list was reviewed and updated today  Allergies   1  Naproxen TABS  2  Seasonal    Vitals   Signs   Recorded: 22Dec2017 09:23AM   Heart Rate: 68  Systolic: 560, LUE, Sitting  Diastolic: 72, LUE, Sitting  Height: 5 ft 3 5 in  Weight: 198 lb 2 oz  BMI Calculated: 34 55  BSA Calculated: 1 94  Pain Scale: 0    Physical Exam        Constitutional      General appearance: No acute distress, well appearing and well nourished  HEENT/NECK: Head is atraumatic normocephalic, neck is supple     NEUROLOGIC:     Mental Status: Awake and alert without aphasia  She scored a 30/30 on MOCA     Cranial Nerves: Extraocular movements are full  Face is symmetrical     Coordination:  Gait is stable          Results/Data   MRI of the brain demonstrated no active disease  Neuroquant analysis demonstrated ânot path a pneumonic however changes in the parenchyma trending toward inframedial temporal dementiaâ         Future Appointments      Date/Time Provider Specialty Site   08/13/2018 09:20 MARLO Mercado   Endocrinology [address] [facility] ENDOCRINOLOGY     Signatures    Electronically signed by : Gianfranco Whitfield MD; Dec 22 2017  9:56AM EST                       (Author)

## 2018-01-09 NOTE — RESULT NOTES
Discussion/Summary   thyroid function tests within normal range      Verified Results  (1) TSH 30Oct2017 07:50AM Gema Willett   TW Order Number: [id number]     Test Name Result Flag Reference   TSH 1 690 uIU/mL  0 358-3 740   Patients undergoing fluorescein dye angiography may retain small amounts of fluorescein in the body for 48-72 hours post procedure  Samples containing fluorescein can produce falsely depressed TSH values  If the patient had this procedure,a specimen should be resubmitted post fluorescein clearance  The recommended reference ranges for TSH during pregnancy are as follows:  First trimester 0 1 to 2 5 uIU/mL  Second trimester  0 2 to 3 0 uIU/mL  Third trimester 0 3 to 3 0 uIU/m     (1) T4, FREE 30Oct2017 07:50AM Gema Willett   TW Order Number: [id number]     Test Name Result Flag Reference   T4,FREE 1 04 ng/dL  0 76-1 46   Specimen collection should occur prior to Sulfasalazine administration due to the potential for falsely elevated results

## 2018-01-10 NOTE — MISCELLANEOUS
Message  I left a message for the patient with the results of her cervical spine and shoulder film      Plan  Mild intermittent asthma    · Fluticasone Propionate 50 MCG/ACT Nasal Suspension; USE 1 TO 2 SPRAYS  IN EACH NOSTRIL ONCE DAILY    Signatures   Electronically signed by : Razia Cheng DO; Jul 6 2017  3:28PM EST                       (Author)
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
## 2018-01-10 NOTE — RESULT NOTES
Message   Labs okay to start new direction(alternate)  Please schedule a new start visit 12 week follow-up     Verified Results  (1) COMPREHENSIVE METABOLIC PANEL [id number] 58:44FT Stivne Murphy     Test Name Result Flag Reference   GLUCOSE,RANDM 89 mg/dL     If the patient is fasting, the ADA then defines impaired fasting glucose as > 100 mg/dL and diabetes as > or equal to 123 mg/dL  SODIUM 143 mmol/L  136-145   POTASSIUM 3 9 mmol/L  3 5-5 3   CHLORIDE 108 mmol/L  100-108   CARBON DIOXIDE 25 mmol/L  21-32   ANION GAP (CALC) 10 mmol/L  4-13   BLOOD UREA NITROGEN 12 mg/dL  5-25   CREATININE 0 71 mg/dL  0 60-1 30   Standardized to IDMS reference method   CALCIUM 8 4 mg/dL  8 3-10 1   BILI, TOTAL 0 37 mg/dL  0 20-1 00   ALK PHOSPHATAS 94 U/L     ALT (SGPT) 60 U/L  12-78   AST(SGOT) 32 U/L  5-45   ALBUMIN 3 5 g/dL  3 5-5 0   TOTAL PROTEIN 7 0 g/dL  6 4-8 2   eGFR Non-African American      >60 0 ml/min/1 73sq m   - Patient Instructions: This is a fasting blood test  Water,black tea or black  coffee only after 9:00pm the night before test Drink 2 glasses of water the morning of test   National Kidney Disease Education Program recommendations are as follows:  GFR calculation is accurate only with a steady state creatinine  Chronic Kidney disease less than 60 ml/min/1 73 sq  meters  Kidney failure less than 15 ml/min/1 73 sq  meters  (1) INSULIN, FASTING [id number] 08:45AM Jammcardtoni Murphy     Test Name Result Flag Reference   INSULIN, FASTING 13 6 mU/L  3 0-25 0     (1) HEMOGLOBIN A1C [id number] 08:45AM Jammcardtoni Murphy     Test Name Result Flag Reference   HEMOGLOBIN A1C 5 6 %  4 2-6 3   EST  AVG   GLUCOSE 114 mg/dl

## 2018-01-10 NOTE — CONSULTS
Therapy  Rehabilitation Services Referral:   Patient Status: routine  Diagnosis: Left subacromial impingement syndrome  Rehabilitation Services: evaluate and treat patient as needed and initiate a home exercise program  Exercise/Treatment: AROM/PROM, stretching, shoulder, eccentric, stabilization and strengthening/PRE  Program: home program    Frequency: 1-3 times per week, for 8 weeks  Please send progress report  I hereby certify that the services indicated above are medically necessary        Future Appointments    Signatures   Electronically signed by : MARLO Romo ; Sep 11 2017  2:34PM EST                       (Author)

## 2018-01-11 NOTE — MISCELLANEOUS
Message  Results of labs given to patient  She didn't  the phone  I left a message  She narrates to work harder on her triglycerides   She will call me tomorrow if she has any questions      Signatures   Electronically signed by : Aicha Castano DO; Dec 22 2016  6:58PM EST                       (Author)

## 2018-01-12 VITALS
SYSTOLIC BLOOD PRESSURE: 126 MMHG | BODY MASS INDEX: 32.35 KG/M2 | DIASTOLIC BLOOD PRESSURE: 80 MMHG | HEART RATE: 68 BPM | HEIGHT: 65 IN | WEIGHT: 194.13 LBS | OXYGEN SATURATION: 98 %

## 2018-01-12 VITALS
DIASTOLIC BLOOD PRESSURE: 73 MMHG | WEIGHT: 195.5 LBS | HEART RATE: 67 BPM | SYSTOLIC BLOOD PRESSURE: 113 MMHG | BODY MASS INDEX: 32.53 KG/M2

## 2018-01-12 VITALS
BODY MASS INDEX: 32.18 KG/M2 | SYSTOLIC BLOOD PRESSURE: 105 MMHG | HEART RATE: 73 BPM | DIASTOLIC BLOOD PRESSURE: 65 MMHG | WEIGHT: 193.38 LBS

## 2018-01-12 VITALS
WEIGHT: 191 LBS | HEIGHT: 65 IN | DIASTOLIC BLOOD PRESSURE: 74 MMHG | BODY MASS INDEX: 31.82 KG/M2 | SYSTOLIC BLOOD PRESSURE: 112 MMHG

## 2018-01-12 VITALS
HEART RATE: 81 BPM | SYSTOLIC BLOOD PRESSURE: 132 MMHG | BODY MASS INDEX: 32.78 KG/M2 | HEIGHT: 64 IN | DIASTOLIC BLOOD PRESSURE: 76 MMHG | WEIGHT: 192 LBS

## 2018-01-12 VITALS
BODY MASS INDEX: 33.46 KG/M2 | OXYGEN SATURATION: 99 % | HEIGHT: 64 IN | SYSTOLIC BLOOD PRESSURE: 120 MMHG | DIASTOLIC BLOOD PRESSURE: 70 MMHG | TEMPERATURE: 98 F | HEART RATE: 70 BPM | WEIGHT: 196 LBS

## 2018-01-12 NOTE — RESULT NOTES
Verified Results  (1) URINALYSIS (will reflex a microscopy if leukocytes, occult blood, protein or nitrites are not within normal limits) [id number] 06:37PM Verito Reyes Order Number: [id number]     Test Name Result Flag Reference   COLOR Yellow     CLARITY Clear     SPECIFIC GRAVITY UA 1 017  1 003-1 030   PH UA 6 0  4 5-8 0   LEUKOCYTE ESTERASE UA Negative  Negative   NITRITE UA Negative  Negative   PROTEIN UA Negative mg/dl  Negative   GLUCOSE UA Negative mg/dl  Negative   KETONES UA Negative mg/dl  Negative   UROBILINOGEN UA 0 2 E U /dl  0 2, 1 0 E U /dl   BILIRUBIN UA Negative  Negative   BLOOD UA Negative  Negative

## 2018-01-12 NOTE — RESULT NOTES
Discussion/Summary   stable      Verified Results  US THYROID [id number] 07:47AM Danyel Tejada     Test Name Result Flag Reference   US THYROID (Report)     THYROID ULTRASOUND     INDICATION: Follow-up nodule  COMPARISON: 11/14/2016  TECHNIQUE:  Ultrasound of the thyroid was performed with a high frequency linear transducer in transverse and sagittal planes including volumetric imaging sweeps as well as traditional still imaging technique  FINDINGS:   Normal homogeneous smooth echotexture  Right gland: 4 7 x 1 4 x 1 1 cm  No dominant nodules  Left gland: 4 4 x 1 0 x 1 1 cm  Stable 6 x 8 x 5 mm left lower pole nodule is noted  Isthmus: The isthmus is 0 3 cm in AP dimension  IMPRESSION:      Stable subcentimeter left lobe nodule  Workstation performed: [id number]     Signed by:    Scott Bishop MD   8/8/17

## 2018-01-12 NOTE — MISCELLANEOUS
Message  Discuss results of MRI with and that who requested that I also discussed some with her   I spoke with Vasyl Dean reports that he has not noticed any change in her cognitively over the years and she is doing her job well and has little suspicion for any active underlying dementing process   Agreed that we will plan to repeat the MRI as recommended and 9-12 months      Signatures   Electronically signed by : Earl Pitt MD; Nov 8 2017 12:58PM EST                       (Author)

## 2018-01-13 VITALS
WEIGHT: 194 LBS | DIASTOLIC BLOOD PRESSURE: 80 MMHG | HEIGHT: 65 IN | SYSTOLIC BLOOD PRESSURE: 122 MMHG | BODY MASS INDEX: 32.32 KG/M2

## 2018-01-13 VITALS
BODY MASS INDEX: 33.18 KG/M2 | SYSTOLIC BLOOD PRESSURE: 115 MMHG | WEIGHT: 199.38 LBS | HEART RATE: 67 BPM | DIASTOLIC BLOOD PRESSURE: 74 MMHG

## 2018-01-13 VITALS
SYSTOLIC BLOOD PRESSURE: 128 MMHG | DIASTOLIC BLOOD PRESSURE: 80 MMHG | BODY MASS INDEX: 31.82 KG/M2 | HEIGHT: 65 IN | RESPIRATION RATE: 16 BRPM | WEIGHT: 191 LBS | HEART RATE: 70 BPM

## 2018-01-14 VITALS
WEIGHT: 192.38 LBS | SYSTOLIC BLOOD PRESSURE: 110 MMHG | HEART RATE: 70 BPM | DIASTOLIC BLOOD PRESSURE: 68 MMHG | HEIGHT: 64 IN | BODY MASS INDEX: 32.84 KG/M2 | TEMPERATURE: 98.6 F | OXYGEN SATURATION: 98 %

## 2018-01-14 VITALS
DIASTOLIC BLOOD PRESSURE: 74 MMHG | BODY MASS INDEX: 31.86 KG/M2 | OXYGEN SATURATION: 97 % | HEART RATE: 85 BPM | HEIGHT: 65 IN | SYSTOLIC BLOOD PRESSURE: 118 MMHG | WEIGHT: 191.25 LBS | TEMPERATURE: 98.2 F

## 2018-01-14 VITALS
SYSTOLIC BLOOD PRESSURE: 100 MMHG | OXYGEN SATURATION: 98 % | BODY MASS INDEX: 33.15 KG/M2 | DIASTOLIC BLOOD PRESSURE: 68 MMHG | HEART RATE: 76 BPM | WEIGHT: 199 LBS | HEIGHT: 65 IN

## 2018-01-14 VITALS
BODY MASS INDEX: 32.36 KG/M2 | HEIGHT: 65 IN | SYSTOLIC BLOOD PRESSURE: 102 MMHG | WEIGHT: 194.25 LBS | DIASTOLIC BLOOD PRESSURE: 70 MMHG | HEART RATE: 64 BPM

## 2018-01-14 VITALS
SYSTOLIC BLOOD PRESSURE: 120 MMHG | BODY MASS INDEX: 33.02 KG/M2 | HEART RATE: 72 BPM | WEIGHT: 192.38 LBS | DIASTOLIC BLOOD PRESSURE: 70 MMHG

## 2018-01-14 NOTE — CONSULTS
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  Stop bang 1/8  She stated she was taking Phentermine before and discontinued it because of palpations  History of Present Illness  Free Text HPI: Obesity-  Severity: Mild  Onset: Past few years, since 2010  Modifiers:Has tried physician directed weight loss with meal replacements and phentermine, but was not sustained, worsened w/ prolonged immobilization after knee surgery at the end of 2014   Associated Symptom: worsening joint pain, worsening reflux  Past Medical History    1  History of Ankle Sprain (845 00)   2  History of DVT of leg (deep venous thrombosis) (453 40) (I82 409)   3  History of Endometrial thickening on ultra sound (793 5) (R93 8)   4  History of gastroesophageal reflux (GERD) (V12 79) (Z87 19)   5  History of Left knee pain (719 46) (M25 562)   6  History of Primary osteoarthritis of left knee (715 16) (M17 12)   7  History of Simple endometrial hyperplasia without atypia (621 31) (N85 01)   8  History of Superficial thrombophlebitis of leg (451 0) (I80 00)  Active Problems And Past Medical History Reviewed: The active problems and past medical history were reviewed and updated today  Assessment    1  Weight gain (783 1) (R63 5)   2  Thyroid nodule (241 0) (E04 1)   3  Esophagitis, reflux (530 11) (K21 0)   4  Adult BMI > 30 (V85 30) (E66 8)    Plan   1  (1) COMPREHENSIVE METABOLIC PANEL; Status:Active; Requested for:04Apr2016;    2  (1) HEMOGLOBIN A1C; Status:Canceled;    3  (1) INSULIN, FASTING; Status:Canceled; Discussion/Summary  Discussion Summary:   55 yo female w/ thyroid nodule, anxiety, GERD and obesity here to pursue medical weight management to improve weight and health      Obesity class 1:  -discussed options of conservative vs ADRIANNA program +/- meal replacement  -initial focus of 5-10% weight loss over 3-6 mos for improved health  -screening labs-need CMP    GERD:  -on PPI    Migraines/HA:  -prn meds    Anxiety:  -prn meds    Knee pain:  -weight loss may help    Thyroid nodule:  -hormone leves WNL  -to see endocrinology      RTC for NS visit(alternate) if labs ok, and 3 mos follow up w/ me     Review of Systems  Focused-Female:   ENT: sore throat  Cardiovascular: palpitations, but no chest pain  Respiratory: cough, but no shortness of breath  Gastrointestinal: no abdominal pain  Genitourinary: no dysuria  Musculoskeletal: arthralgias  Integumentary: no rashes  Neurological: headache  Other Symptoms: Psych: Denies depression, mild anxiety  Active Problems    1  Acute bronchitis (466 0) (J20 9)   2  Acute knee pain, left (719 46) (M25 562)   3  Acute pain of right hip (719 45) (M25 551)   4  Acute reaction to stress (308 9) (F43 0)   5  Arthralgia of hip, left (719 45) (M25 552)   6  Arthralgia Of The Head / Neck / Trunk (719 48)   7  Arthritis of left knee (716 96) (M19 90)   8  Asthma (493 90) (J45 909)   9  Bigeminy (427 89) (I49 9)   10  Bronchitis (490) (J40)   11  Bursitis of right hip (726 5) (M70 71)   12  Chest pain (786 50) (R07 9)   13  Chronic LBP (724 2,338 29) (M54 5,G89 29)   14  Dry eyes, bilateral (375 15) (H04 123)   15  Encounter for cervical Pap smear with pelvic exam (V76 2,V72 31) (Z01 419)   16  Endometrial polyp (621 0) (N84 0)   17  Enthesopathy Of The Left Knee (726 60)   18  Esophagitis, reflux (530 11) (K21 0)   19  Flat foot (734) (M21 40)   20  History of knee replacement, total (V43 65) (Z96 659)   21  Intermittent Hydroarthrosis Of Knee (719 06)   22  Left knee pain (719 46) (M25 562)   23  Left lumbar radiculitis (724 4) (M54 16)   24  Migraine headache (346 90) (G43 909)   25  Neck pain (723 1) (M54 2)   26  Orthopedic aftercare for joint replacement (V54 81) (Z47 1)   27  Other muscle spasm (728 85) (M62 838)   28  Overweight (278 02) (E66 3)   29  Pelvic cramping (625 9) (N94 9)   30   Pes planus, unspecified laterality (734) (M21 40)   31  Pharyngitis (462) (J02 9)   32  Posterior tibial tendinitis (726 72) (M76 829)   33  Preoperative clearance (V72 84) (Z01 818)   34  Shoulder joint pain (719 41) (M25 519)   35  Strain of neck muscle, initial encounter (847 0) (S16 1XXA)   36  Thyroid nodule (241 0) (E04 1)   37  Thyromegaly (240 9) (E04 9)    Surgical History    1  History of Appendectomy   2  History of  Section   3  History of  Section   4  History of Cholecystectomy   5  History of Knee Surgery  Surgical History Reviewed: The surgical history was reviewed and updated today  Family History    1  Family history of Endometrial cancer    2  Family history of Heart disease    3  Family history of Cancer  Family History Reviewed: The family history was reviewed and updated today  Social History    · Being A Social Drinker  Social History Reviewed: The social history was reviewed and updated today  Current Meds   1  ALPRAZolam 0 25 MG Oral Tablet; TAKE 1 TABLET 3 TIMES DAILY AS NEEDED; Therapy: 42TXD8551 to (Evaluate:2016); Last Rx:2016 Ordered   2  Benadryl 25 MG Oral Capsule; TAKE 1 CAPSULE DAILY  AS NEEDED; Therapy: (Recorded:2015) to Recorded   3  Claritin 10 MG Oral Tablet; TAKE 1 TABLET DAILY; Therapy: (Recorded:2015) to Recorded   4  Diazepam 5 MG Oral Tablet; Take 1 tablet once daily; Therapy: 96USI7115 to (Evaluate:26Yaj5242); Last Rx:69Fgi7883 Ordered   5  Fioricet TABS; TAKE 1 TABLET 3 TIMES DAILY AS NEEDED FOR HEADACHE; Therapy: (Recorded:2014) to Recorded   6  Magnesium 200 MG Oral Tablet; Therapy: (Recorded:2016) to Recorded   7  Methocarbamol 500 MG Oral Tablet; 1 QID PRN; Therapy: 44HHK5422 to (Last Rx:21Ktb3076)  Requested for: 39IGF8642 Ordered   8  Pantoprazole Sodium 40 MG Oral Tablet Delayed Release; TAKE 1 TABLET DAILY;    Therapy: 13LEI5874 to (Evaluate:2017)  Requested for: 54RSU1545; Last Rx:30Jan2016 Ordered   9  Pantoprazole Sodium 40 MG Oral Tablet Delayed Release; TAKE 1 TABLET DAILY; Therapy: 76FRO7117 to (Zena Elder)  Requested for: 95CSJ8989; Last   Rx:30Jan2016 Ordered   10  Restasis 0 05 % Ophthalmic Emulsion; INSTILL 1 DROP IN [facility] EYE TWICE DAILY; Therapy: (Recorded:38Mbp4976) to Recorded   11  Valium 5 MG Oral Tablet; TAKE 1 TABLET  AS NEEDED  For Muscle Spasm; Therapy: (476 1626) to Recorded   12  Ventolin  (90 Base) MCG/ACT Inhalation Aerosol Solution; INHALE 1 TO 2 PUFFS    EVERY 4 TO 6 HOURS AS NEEDED  Requested for: 02ANH6836; Last Rx:11Nov2014    Ordered   13  ZOLMitriptan 5 MG Oral Tablet Dispersible; DISSOLVE 1 TAB ON TONGUE AND    SWALLOW AT ONSET OF MIGRAINE  MAY REPEAT ONCE AFTER 2 HOURS  MAX 10    MG/DAY; Therapy: 79BEF2857 to (Last Rx:30Oct2015)  Requested for: 30Oct2015 Ordered  Medication List Reviewed: The medication list was reviewed and updated today  Allergies    1  Naproxen TABS    Vitals  Vital Signs [Data Includes: Current Encounter]    Recorded: 04Apr2016 01:37PM   Temperature 97 2 F    Heart Rate 72    Respiration 16    Systolic 792    Diastolic 78    Height 5 ft 4 in    Weight 186 lb 6 4 oz    BMI Calculated 32    BSA Calculated 1 91    Waist Circumference  39   Neck Circumference  14     Physical Exam    Constitutional   General appearance: No acute distress, well appearing and well nourished  well developed and obese  Eyes No conjunctival pallor  Ears, Nose, Mouth, and Throat Moist oral mucosa  Pulmonary   Respiratory effort: No increased work of breathing or signs of respiratory distress  Auscultation of lungs: Clear to auscultation  Cardiovascular   Auscultation of heart: Normal rate and rhythm, normal S1 and S2, without murmurs  Abdomen   Abdomen: Non-tender, no masses  The abdomen was soft and nontender     Musculoskeletal   Gait and station: Normal     Psychiatric   Orientation to person, place, and time: Normal     Mood and affect: Normal          Results/Data  Encounter Results   STOP BANG Questionnaire 04Apr2016 02:02PM User, Ahs     Test Name Result Flag Reference   STOP BANG Questionnaire Risk of EUNICE Low Risk     Snoring: No  Tired: No  Observed: No  Blood Pressure: No  BMI: No  Age: Yes  Neck Circumference: No  Gender: No   STOP BANG Questionnaire EUNICE Total Score 1     Snoring: No  Tired: No  Observed: No  Blood Pressure: No  BMI: No  Age: Yes  Neck Circumference: No  Gender: No       Future Appointments    Date/Time Provider Specialty Site   05/16/2016 01:30 PM Raymon Gardner MD Gynecological Oncology [facility]     Signatures   Electronically signed by : MARLO Sagastume ; Apr 4 2016  2:33PM EST                       (Author)    Electronically signed by : MARLO Sagastume ; Apr 4 2016  3:03PM EST                       (Author)

## 2018-01-14 NOTE — MISCELLANEOUS
Message  Message Free Text Note Form: I spoke to Toy Delaney today regarding the HNT Program  she expressed interest for herself and her daughter  I will contact her when I schedule an information session  Active Problems    1  Acute pharyngitis, unspecified etiology (462) (J02 9)   2  Adult BMI > 30 (V85 30) (E66 8)   3  Allergic rhinitis (477 9) (J30 9)   4  Arthralgia of hip, left (719 45) (M25 552)   5  Arthralgia Of The Head / Neck / Trunk (719 48)   6  Arthritis of left knee (716 96) (M17 12)   7  Contusion of shoulder, left (923 00) (S40 012A)   8  Dry eyes, bilateral (375 15) (H04 123)   9  DVT of leg (deep venous thrombosis) (453 40) (I82 409)   10  Encounter for cervical Pap smear with pelvic exam (V76 2,V72 31) (Z01 419)   11  Encounter for mammogram to establish baseline mammogram (V76 12) (Z12 31)   12  History of Endometrial polyp (621 0) (N84 0)   13  Esophagitis, reflux (530 11) (K21 0)   14  Fall down steps, initial encounter (E880 9) (W10 8XXA)   15  History of hyperlipidemia (V12 29) (Z86 39)   16  Joint pain (719 40) (M25 50)   17  Left shoulder pain (719 41) (M25 512)   18  Migraine, unspecified, not intractable, without status migrainosus (346 90) (G43 909)   19  Mild intermittent asthma (493 90) (J45 20)   20  Obesity (BMI 30 0-34 9) (278 00) (E66 9)   21  Osteopenia of multiple sites (733 90) (M85 89)   22  Overweight (278 02) (E66 3)   23  Posterior tibial tendinitis (726 72) (M76 829)   24  Postoperative visit (V58 49) (Z48 89)   25  Prediabetes (790 29) (R73 03)   26  Thyroid nodule (241 0) (E04 1)   27  Thyromegaly (240 9) (E04 9)    Current Meds   1  ALPRAZolam 0 25 MG Oral Tablet (Xanax); TAKE 1 TABLET 3 TIMES DAILY AS NEEDED; Therapy: 65XGE2939 to (Evaluate:61Utu7655); Last Rx:30Jun2017 Ordered   2  Benadryl 25 MG CAPS (DiphenhydrAMINE HCl); TAKE 1 CAPSULE DAILY  AS NEEDED; Therapy: (Recorded:15Jun2015) to Recorded   3  DiazePAM 5 MG Oral Tablet;  Take 1 tablet once daily; Therapy: 52PEM9701 to (Evaluate:55Zyd2835); Last Rx:27Mar2017 Ordered   4  Fioricet TABS (Butalbital-APAP-Caffeine); TAKE 1 TABLET 3 TIMES DAILY AS NEEDED   FOR HEADACHE; Therapy: (Recorded:10Nov2014) to Recorded   5  Fluticasone Propionate 50 MCG/ACT Nasal Suspension; USE 1 TO 2 SPRAYS IN EACH   NOSTRIL ONCE DAILY; Therapy: 20RHR4530 to (Last Rx:62Uok6168)  Requested for: 61UVR6727 Ordered   6  Magnesium 200 MG Oral Tablet; Therapy: (Recorded:65Rff2168) to Recorded   7  Pantoprazole Sodium 40 MG Oral Tablet Delayed Release; TAKE ONE (1) TABLET(S)   TWICE DAILY; Therapy: 31WHL5320 to (Last Rx:27Mar2017)  Requested for: 27Mar2017 Ordered   8  Pantoprazole Sodium 40 MG Oral Tablet Delayed Release; TAKE ONE (1) TABLET(S)   TWICE DAILY; Therapy: 24AUF5495 to (Evaluate:23Sep2017)  Requested for: 18DZN0186; Last   Rx:27Mar2017 Ordered   9  Restasis 0 05 % Ophthalmic Emulsion; INSTILL 1 DROP IN [facility] EYE TWICE DAILY; Therapy: (Recorded:15Jun2015) to Recorded   10  Ventolin  (90 Base) MCG/ACT Inhalation Aerosol Solution; INHALE 1 TO 2 PUFFS    EVERY 4 TO 6 HOURS AS NEEDED  Requested for: 39Hjt1535; Last Rx:12Sep2016    Ordered   11  ZOLMitriptan 5 MG Oral Tablet (Zomig); TAKE 1 TABLET AT ONSET OF MIGRAINE  MAY    REPEAT ONCE AFTER 2 HOURS  MAX 10 MG/DAY; Therapy: 96DFD1055 to (Last Rx:30Jun2017)  Requested for: 30Jun2017 Ordered    Allergies    1   Naproxen TABS    2  Seasonal    Signatures   Electronically signed by : Pily Plummer, ; Aug 16 2017  3:06PM EST                       (Author)

## 2018-01-17 NOTE — MISCELLANEOUS
Message    To whom it may concern,    Bob Macdonald had a knee replacement with Dr Michel Abdul in 2014  Dr Michel Abdul only requires premedication with antibiotics prior to dental procedures for 2 years  Per Dr Clementina Peñaloza does not require premedication for the left total knee arthroplasty  ThanksGage PA-C under the supervision of Nura Guillen MD        Signatures   Electronically signed by : Gage John, [facility];  Aug 28 2017  4:18PM EST                       (Author)

## 2018-01-17 NOTE — RESULT NOTES
Message  Dear     We have been trying to reach out to you regarding your recent lab work     Please call the office at [phone] to discuss your recent labs and scheduling for the [facility] Program      Thank You    Sincerely,   Sujata Zhong [facility]          Signatures   Electronically signed by : Leta Marrero, ; Dec 22 2016 11:27AM EST                       (Author)

## 2018-01-18 NOTE — PROGRESS NOTES
Assessment    1  Chest pain (786 50) (R07 9)   2  Esophagitis, reflux (530 11) (K21 0)    Plan  Acute reaction to stress    · ALPRAZolam 0 25 MG Oral Tablet (Xanax); TAKE 1 TABLET 3 TIMES DAILY AS  NEEDED  Chest pain    · EKG/ECG- POC; Status:Active; Requested GFR:[id number];   Esophagitis, reflux    · Pantoprazole Sodium 40 MG Oral Tablet Delayed Release; TAKE 1 TABLET DAILY   · Pantoprazole Sodium 40 MG Oral Tablet Delayed Release; TAKE 1 TABLET DAILY    Discussion/Summary    Chest pain- her symptoms are likely related to esophageal reflux and esophageal spasm  nonetheless, It is recommended that the patient have a cardiac workup and be referred to Dr He Melissa who she has seen in the past  Dr Maribel Dozier is going to speak with Dr He Melissa and have his office contact her to make her an appointment  Esophageal reflux--we will change the patient's PPI from omeprazole to pantoprazole 40 mg daily  The case was discussed with the patient's ,  Juan Samayoa, via telephone by Dr Maribel Dozier  The case is discussed with Dr Maribel Dozier who examined the patient and agrees with the assessment and plan  Chief Complaint  chest pain/heartburn      History of Present Illness  HPI: patient comes in reporting an episode that occurred yesterday while she was sitting in a doctor's office waiting for her mother-in-law  She developed a pain in the Center of her chest that radiated up toward her throat and into her jaw on both sides  she does suffer from reflux and esophagitis and takes omeprazole daily  he felt like her usual reflux symptoms but more severe  When this occurred she took some over-the-counter "Thelma-Elbert", that she carries with her for such symptoms  she did gain some relief from this medication  Since this occurred yesterday she has had repeated episodes  She has used antacids including Gaviscon and Pepcid a  c which gave her some relief   This morning she was able to eat some oatmeal and when these symptoms recurred she had 2 slices of toast     She reports increased stress at her job and she has been using more of her alprazolam than usual  She also reports a history of bigeminy for which she has gotten palpitations from in the past  This condition has since gone away  She denies any associated nausea, diaphoresis, shortness of breath, dizziness or vision changes  Her symptoms were at rest while she was sitting in the chair in a waiting room  She denies any history of diabetes, hypertension or CAD  Chest Pain (Brief): The patient is being seen for an initial evaluation of chest pain  Symptoms:  chest pain, but no shortness of breath, no cough, no fever, no orthopnea, no peripheral edema and no palpitations  Associated symptoms:  anxiety and heartburn, but no sweating, no dizziness, no lightheadedness, no syncope, no nausea, no vomiting, no leg pain, no leg swelling, no fatigue, no weakness and no weight gain  Gastroesophageal Reflux Disease (Follow-Up): The patient reports doing poorly  Interval symptoms:  worsened heartburn, worsened chest pain, denies abdominal pain, worsened acid regurgitation and denies dysphagia  No associated symptoms are reported  Medications:  the patient is adherent to her medication regimen  Review of Systems    Constitutional: No fever, no chills, feels well, no tiredness, no recent weight gain or loss  Cardiovascular: chest pain, but the heart rate was not slow, no intermittent leg claudication, the heart rate was not fast, no palpitations and no lower extremity edema  Respiratory: no complaints of shortness of breath, no wheezing, no dyspnea on exertion, no orthopnea or PND  Gastrointestinal: no abdominal pain, no nausea, no vomiting, no constipation, no diarrhea and no blood in stools  ROS reviewed  Active Problems    1  Acute bronchitis (466 0) (J20 9)   2  Acute knee pain, left (129 46) (M25 562)   3   Acute pain of right hip (249 45) (M25 551)   4  Acute reaction to stress (308 9) (F43 0)   5  Arthralgia of hip, left (719 45) (M25 552)   6  Arthralgia Of Shoulder Region (719 41)   7  Arthralgia Of The Head / Neck / Trunk (719 48)   8  Arthritis of left knee (716 96) (M19 90)   9  Asthma (493 90) (J45 909)   10  Bigeminy (427 89) (I49 9)   11  Bronchitis (490) (J40)   12  Bursitis of right hip (726 5) (M70 71)   13  Chest pain (786 50) (R07 9)   14  Chronic LBP (724 2,338 29) (M54 5,G89 29)   15  Dry eyes, bilateral (375 15) (H04 123)   16  Encounter for cervical Pap smear with pelvic exam (V76 2,V72 31) (Z12 4)   17  Endometrial polyp (621 0) (N84 0)   18  Enthesopathy Of The Left Knee (726 60)   19  Esophagitis, reflux (530 11) (K21 0)   20  Flat foot (734) (M21 40)   21  History of knee replacement, total (V43 65) (Z96 659)   22  Intermittent Hydroarthrosis Of Knee (719 06)   23  Left knee pain (719 46) (M25 562)   24  Left lumbar radiculitis (724 4) (M54 16)   25  Migraine headache (346 90) (G43 909)   26  Neck pain (723 1) (M54 2)   27  Orthopedic aftercare for joint replacement (V54 81) (Z47 1)   28  Other muscle spasm (728 85) (M62 838)   29  Pelvic cramping (625 9) (N94 9)   30  Pes planus, unspecified laterality (734) (M21 40)   31  Pharyngitis (462) (J02 9)   32  Posterior tibial tendinitis (726 72) (M76 829)   33  Preoperative clearance (V72 84) (Z01 818)   34  Strain of neck muscle, initial encounter (847 0) (W63 9KQP)    Past Medical History  Active Problems And Past Medical History Reviewed: The active problems and past medical history were reviewed and updated today  Social History    · Being A Social Drinker   · Never A Smoker   · Denied: History of Tobacco Use  The social history was reviewed and updated today  Family History  Family History Reviewed: The family history was reviewed and updated today  Current Meds   1  ALPRAZolam 0 25 MG Oral Tablet; TAKE 1 TABLET 3 TIMES DAILY AS NEEDED;    Therapy: 01JSZ2705 to (Evaluate:13Oct2015); Last Rx:[id number] Ordered   2  Benadryl 25 MG Oral Capsule (DiphenhydrAMINE HCl); TAKE 1 CAPSULE DAILY  AS   NEEDED; Therapy: (Recorded:15Jun2015) to Recorded   3  Claritin 10 MG Oral Tablet; TAKE 1 TABLET DAILY; Therapy: (Recorded:15Jun2015) to Recorded   4  Fioricet TABS (APAP-Butalbital-Caffeine); TAKE 1 TABLET 3 TIMES DAILY AS NEEDED   FOR HEADACHE; Therapy: (Recorded:10Nov2014) to Recorded   5  Methocarbamol 500 MG Oral Tablet; 1 QID PRN; Therapy: 12ZYM9064 to (Last Rx:97Jkj8929)  Requested for: 83ADB0349 Ordered   6  Promethazine-Codeine 6 25-10 MG/5ML Oral Syrup; TAKE 5 ML EVERY 4 HOURS AS   NEEDED; Therapy: 52GGC3246 to (Evaluate:62Yea2165); Last Rx:[id number] Ordered   7  Restasis 0 05 % Ophthalmic Emulsion; INSTILL 1 DROP IN [facility] EYE TWICE DAILY; Therapy: (Recorded:15Jun2015) to Recorded   8  Valium 5 MG Oral Tablet (Diazepam); TAKE 1 TABLET  AS NEEDED  For Muscle Spasm; Therapy: (476 1626) to Recorded   9  Ventolin  (90 Base) MCG/ACT Inhalation Aerosol Solution; INHALE 1 TO 2 PUFFS   EVERY 4 TO 6 HOURS AS NEEDED  Requested for: 27GOZ2292; Last Rx:11Nov2014   Ordered   10  ZOLMitriptan 5 MG Oral Tablet Dispersible; DISSOLVE 1 TAB ON TONGUE AND    SWALLOW AT ONSET OF MIGRAINE  MAY REPEAT ONCE AFTER 2 HOURS  MAX 10    MG/DAY; Therapy: 07REX5624 to (Last Rx:30Oct2015)  Requested for: 30Oct2015 Ordered    The medication list was reviewed and updated today  Allergies    1  Naproxen TABS    Vitals   Recorded: 71ECQ0855 87:79TL   Systolic 412   Diastolic 78   Weight 160 lb 2 08 oz     Physical Exam    Constitutional   General appearance: No acute distress, well appearing and well nourished  Eyes   Conjunctiva and lids: No swelling, erythema or discharge  Pupils and irises: Equal, round and reactive to light      Ears, Nose, Mouth, and Throat   External inspection of ears and nose: Normal     Otoscopic examination: Tympanic membranes translucent with normal light reflex  Canals patent without erythema  Nasal mucosa, septum, and turbinates: Normal without edema or erythema  Oropharynx: Normal with no erythema, edema, exudate or lesions  Pulmonary   Respiratory effort: No increased work of breathing or signs of respiratory distress  Auscultation of lungs: Clear to auscultation  Cardiovascular   Auscultation of heart: Normal rate and rhythm, normal S1 and S2, without murmurs  Examination of extremities for edema and/or varicosities: Normal     Abdomen   Abdomen: Non-tender, no masses  Liver and spleen: No hepatomegaly or splenomegaly  Lymphatic   Palpation of lymph nodes in neck: No lymphadenopathy  Results/Data    QRS: unchanged from 11/2014 Q-waves are present in lead(s) III  Comparison to prior ECGs: read and reviewed by dr Aki Saavedra no interval change        Future Appointments    Date/Time Provider Specialty Site   02/08/2016 09:00 AM Alysa Magaña  Internal Medicine [address]   05/16/2016 01:30 PM Adithya Meléndez MD Gynecological Oncology [facility]     Signatures   Electronically signed by : Nubia De La Torre, [facility]; Jan 30 2016  2:33PM EST                       (Author)    Electronically signed by : MARLO Egan ; Feb 1 2016  4:50PM EST

## 2018-01-18 NOTE — MISCELLANEOUS
Message  Return to work or school:  02/02/2017   She is able to return to work on  02/20/2017   She is not able to return to work until 02/20/2017     Please excuse patient from 02/13/2017 to 02/17/2017  Dr Charlie Lemus        Signatures   Electronically signed by : Arleth Mckinley DO; Feb 20 2017  1:40PM EST                       (Author)

## 2018-01-22 VITALS
HEIGHT: 64 IN | BODY MASS INDEX: 33.83 KG/M2 | SYSTOLIC BLOOD PRESSURE: 134 MMHG | HEART RATE: 68 BPM | WEIGHT: 198.13 LBS | DIASTOLIC BLOOD PRESSURE: 72 MMHG

## 2018-01-24 VITALS
BODY MASS INDEX: 32.99 KG/M2 | WEIGHT: 198 LBS | DIASTOLIC BLOOD PRESSURE: 70 MMHG | HEART RATE: 70 BPM | SYSTOLIC BLOOD PRESSURE: 110 MMHG | HEIGHT: 65 IN

## 2018-01-27 NOTE — RESULT NOTES
Verified Results  * MRI BRAIN WO CONTRAST [id number] 12:55PM [facility]     Test Name Result Flag Reference   MRI BRAIN WO CONTRAST (Report)     MRI BRAIN WITHOUT CONTRAST, NeuroQuant IMAGING     INDICATION: Deterioration in the memory, headache     COMPARISON:  CT 6/15/2013     TECHNIQUE: Sagittal T1, axial T2, axial [place], axial T1, axial FLAIR, axial diffusion imaging  Sagittal 3D volumetric imaging processed by Angie sanchez [facility] Morphology and Age Related Atrophy reports  IMAGE QUALITY: Diagnostic  FINDINGS:     BRAIN PARENCHYMA: No acute disease  There is no acute ischemic comment cranial mass, mass effect or edema  Scattered parenchymal changes consistent with a mild degree of somewhat precocious chronic small vessel disease  No hemosiderin deposition  QUANTITATIVE:      Exam Quality: Adequate for volumetric analysis  Hippocampus     Total hippocampal volume (cc):  7 13    Percentile for similar age:   32th      Lateral ventricular volume (cc):   9 2    Percentile for similar age:   6th      Inferior lateral vent volume (cc):  2 50    Percentile for similar age:   78th        Quantitative conclusions:      Hippocampi:             Diminished volume      Lateral Ventricle Volume:   Normal Volume     Temporal Horn Volume:    Increased volume     Concordance between qualitative and quantitative hippocampal volume assessment: Concordant  Change in brain volumes: No previous volumetric study for comparison     Mean hippocampal volume loss among normal elderly: 0 7% per year, (-0 3 to 1 7; Cecil 2008; also Reji 2010)  VENTRICLES: Temporal horns are prominent  SELLA AND PITUITARY GLAND: Normal      ORBITS: Normal      PARANASAL SINUSES: Normal      VASCULATURE: Evaluation of the major intracranial vasculature demonstrates appropriate flow voids  CALVARIUM AND SKULL BASE: Normal      EXTRACRANIAL SOFT TISSUES: Normal            IMPRESSION:     1   No acute disease  2  NeuroQuant analysis was performed: Although not pathognomonic, changes in the parenchyma are trending toward the inferomedial temporal lobe type dementia  Repeat neuro Quant MR in 9-12 months is suggested to evaluate for further trending  Workstation performed: [id number]     Signed by:    Kristen Lubin MD   11/6/17

## 2018-02-03 ENCOUNTER — TELEPHONE (OUTPATIENT)
Dept: INTERNAL MEDICINE CLINIC | Facility: CLINIC | Age: 63
End: 2018-02-03

## 2018-02-03 DIAGNOSIS — J45.21 MILD INTERMITTENT ASTHMA WITH ACUTE EXACERBATION: Primary | ICD-10-CM

## 2018-02-03 RX ORDER — FLUTICASONE PROPIONATE 220 UG/1
1 AEROSOL, METERED RESPIRATORY (INHALATION) 2 TIMES DAILY
Qty: 1 INHALER | Refills: 0 | Status: SHIPPED | OUTPATIENT
Start: 2018-02-03 | End: 2018-03-31 | Stop reason: SDUPTHER

## 2018-02-03 NOTE — TELEPHONE ENCOUNTER
PT  JUSTA  CALLED  SAID  SHE  IS  SOB  TODAY  WANTS  TO KNOW  IF  SHE  SHOULD  GO  TO ER  TODAY   ITS    [address] [facility] [place]  [phone]

## 2018-02-12 DIAGNOSIS — J45.909 MODERATE ASTHMA, UNSPECIFIED WHETHER COMPLICATED, UNSPECIFIED WHETHER PERSISTENT: Primary | ICD-10-CM

## 2018-02-12 RX ORDER — ALBUTEROL SULFATE 90 UG/1
AEROSOL, METERED RESPIRATORY (INHALATION)
Qty: 18 INHALER | Refills: 0 | Status: SHIPPED | OUTPATIENT
Start: 2018-02-12 | End: 2019-01-17 | Stop reason: SDUPTHER

## 2018-02-22 DIAGNOSIS — Z86.69 HX OF MIGRAINE HEADACHES: Primary | ICD-10-CM

## 2018-02-22 RX ORDER — ZOLMITRIPTAN 5 MG/1
5 TABLET, FILM COATED ORAL DAILY PRN
Qty: 8 TABLET | Refills: 0 | Status: SHIPPED | OUTPATIENT
Start: 2018-02-22 | End: 2018-02-23 | Stop reason: SDUPTHER

## 2018-02-23 DIAGNOSIS — Z86.69 HX OF MIGRAINE HEADACHES: ICD-10-CM

## 2018-02-23 RX ORDER — ZOLMITRIPTAN 5 MG/1
5 TABLET, FILM COATED ORAL DAILY PRN
Qty: 8 TABLET | Refills: 0 | Status: SHIPPED | OUTPATIENT
Start: 2018-02-23 | End: 2018-09-07 | Stop reason: SDUPTHER

## 2018-03-01 DIAGNOSIS — J45.21 MILD INTERMITTENT ASTHMA WITH ACUTE EXACERBATION: ICD-10-CM

## 2018-03-02 ENCOUNTER — TELEPHONE (OUTPATIENT)
Dept: INTERNAL MEDICINE CLINIC | Facility: CLINIC | Age: 63
End: 2018-03-02

## 2018-03-02 RX ORDER — FLUTICASONE PROPIONATE 220 UG/1
1 AEROSOL, METERED RESPIRATORY (INHALATION) 2 TIMES DAILY
Qty: 12 INHALER | Refills: 0 | OUTPATIENT
Start: 2018-03-02

## 2018-03-05 ENCOUNTER — TELEPHONE (OUTPATIENT)
Dept: INTERNAL MEDICINE CLINIC | Facility: CLINIC | Age: 63
End: 2018-03-05

## 2018-03-05 NOTE — TELEPHONE ENCOUNTER
Pt called asking if Nina could please call her about her meds being refilled  She would not give me any info, insisted on talking to a nurse  I told her I would send a note back  Please call pt at [phone]

## 2018-03-06 ENCOUNTER — APPOINTMENT (EMERGENCY)
Dept: CT IMAGING | Facility: HOSPITAL | Age: 63
End: 2018-03-06
Payer: COMMERCIAL

## 2018-03-06 ENCOUNTER — APPOINTMENT (EMERGENCY)
Dept: ULTRASOUND IMAGING | Facility: HOSPITAL | Age: 63
End: 2018-03-06
Payer: COMMERCIAL

## 2018-03-06 ENCOUNTER — HOSPITAL ENCOUNTER (EMERGENCY)
Facility: HOSPITAL | Age: 63
Discharge: HOME | End: 2018-03-06
Attending: EMERGENCY MEDICINE | Admitting: EMERGENCY MEDICINE
Payer: COMMERCIAL

## 2018-03-06 VITALS
RESPIRATION RATE: 20 BRPM | SYSTOLIC BLOOD PRESSURE: 142 MMHG | DIASTOLIC BLOOD PRESSURE: 70 MMHG | BODY MASS INDEX: 35.1 KG/M2 | OXYGEN SATURATION: 97 % | TEMPERATURE: 97.5 F | WEIGHT: 201.28 LBS | HEART RATE: 71 BPM

## 2018-03-06 DIAGNOSIS — J45.909 ALLERGIC BRONCHITIS: ICD-10-CM

## 2018-03-06 DIAGNOSIS — J30.2 SEASONAL ALLERGIES: ICD-10-CM

## 2018-03-06 DIAGNOSIS — R06.00 DYSPNEA: Primary | ICD-10-CM

## 2018-03-06 LAB
ALBUMIN SERPL BCP-MCNC: 3.8 G/DL (ref 3.5–5)
ALP SERPL-CCNC: 115 U/L (ref 46–116)
ALT SERPL W P-5'-P-CCNC: 56 U/L (ref 12–78)
ANION GAP SERPL CALCULATED.3IONS-SCNC: 8 MMOL/L (ref 4–13)
AST SERPL W P-5'-P-CCNC: 30 U/L (ref 5–45)
BASOPHILS # BLD AUTO: 0.08 THOUSANDS/ΜL (ref 0–0.1)
BASOPHILS NFR BLD AUTO: 1 % (ref 0–1)
BILIRUB SERPL-MCNC: 0.3 MG/DL (ref 0.2–1)
BUN SERPL-MCNC: 13 MG/DL (ref 5–25)
CALCIUM SERPL-MCNC: 9.3 MG/DL (ref 8.3–10.1)
CHLORIDE SERPL-SCNC: 104 MMOL/L (ref 100–108)
CHOLEST SERPL-MCNC: 186 MG/DL (ref 50–200)
CO2 SERPL-SCNC: 28 MMOL/L (ref 21–32)
CREAT SERPL-MCNC: 0.72 MG/DL (ref 0.6–1.3)
EOSINOPHIL # BLD AUTO: 0.16 THOUSAND/ΜL (ref 0–0.61)
EOSINOPHIL NFR BLD AUTO: 2 % (ref 0–6)
ERYTHROCYTE [DISTWIDTH] IN BLOOD BY AUTOMATED COUNT: 14.1 % (ref 11.6–15.1)
GFR SERPL CREATININE-BSD FRML MDRD: 89 ML/MIN/1.73SQ M
GLUCOSE SERPL-MCNC: 94 MG/DL (ref 65–140)
HCT VFR BLD AUTO: 41.7 % (ref 34.8–46.1)
HDLC SERPL-MCNC: 46 MG/DL (ref 40–60)
HGB BLD-MCNC: 13.6 G/DL (ref 11.5–15.4)
INR PPP: 0.98 (ref 0.86–1.16)
LYMPHOCYTES # BLD AUTO: 2.4 THOUSANDS/ΜL (ref 0.6–4.47)
LYMPHOCYTES NFR BLD AUTO: 35 % (ref 14–44)
MCH RBC QN AUTO: 27.2 PG (ref 26.8–34.3)
MCHC RBC AUTO-ENTMCNC: 32.6 G/DL (ref 31.4–37.4)
MCV RBC AUTO: 83 FL (ref 82–98)
MONOCYTES # BLD AUTO: 0.47 THOUSAND/ΜL (ref 0.17–1.22)
MONOCYTES NFR BLD AUTO: 7 % (ref 4–12)
NEUTROPHILS # BLD AUTO: 3.72 THOUSANDS/ΜL (ref 1.85–7.62)
NEUTS SEG NFR BLD AUTO: 54 % (ref 43–75)
NONHDLC SERPL-MCNC: 140 MG/DL
NRBC BLD AUTO-RTO: 0 /100 WBCS
NT-PROBNP SERPL-MCNC: 124 PG/ML
PLATELET # BLD AUTO: 267 THOUSANDS/UL (ref 149–390)
PMV BLD AUTO: 9.5 FL (ref 8.9–12.7)
POTASSIUM SERPL-SCNC: 3.6 MMOL/L (ref 3.5–5.3)
PROT SERPL-MCNC: 7.4 G/DL (ref 6.4–8.2)
PROTHROMBIN TIME: 13.2 SECONDS (ref 12.1–14.4)
RBC # BLD AUTO: 5 MILLION/UL (ref 3.81–5.12)
SODIUM SERPL-SCNC: 140 MMOL/L (ref 136–145)
TROPONIN I SERPL-MCNC: <0.02 NG/ML
TSH SERPL DL<=0.05 MIU/L-ACNC: 1.58 UIU/ML (ref 0.36–3.74)
WBC # BLD AUTO: 6.86 THOUSAND/UL (ref 4.31–10.16)

## 2018-03-06 PROCEDURE — 83718 ASSAY OF LIPOPROTEIN: CPT | Performed by: EMERGENCY MEDICINE

## 2018-03-06 PROCEDURE — 85025 COMPLETE CBC W/AUTO DIFF WBC: CPT | Performed by: EMERGENCY MEDICINE

## 2018-03-06 PROCEDURE — 82465 ASSAY BLD/SERUM CHOLESTEROL: CPT | Performed by: EMERGENCY MEDICINE

## 2018-03-06 PROCEDURE — 93971 EXTREMITY STUDY: CPT | Performed by: SURGERY

## 2018-03-06 PROCEDURE — 99285 EMERGENCY DEPT VISIT HI MDM: CPT

## 2018-03-06 PROCEDURE — 84484 ASSAY OF TROPONIN QUANT: CPT | Performed by: EMERGENCY MEDICINE

## 2018-03-06 PROCEDURE — 71275 CT ANGIOGRAPHY CHEST: CPT

## 2018-03-06 PROCEDURE — 36415 COLL VENOUS BLD VENIPUNCTURE: CPT | Performed by: EMERGENCY MEDICINE

## 2018-03-06 PROCEDURE — 85610 PROTHROMBIN TIME: CPT | Performed by: EMERGENCY MEDICINE

## 2018-03-06 PROCEDURE — 93005 ELECTROCARDIOGRAM TRACING: CPT

## 2018-03-06 PROCEDURE — 80053 COMPREHEN METABOLIC PANEL: CPT | Performed by: EMERGENCY MEDICINE

## 2018-03-06 PROCEDURE — 93971 EXTREMITY STUDY: CPT

## 2018-03-06 PROCEDURE — 83880 ASSAY OF NATRIURETIC PEPTIDE: CPT | Performed by: EMERGENCY MEDICINE

## 2018-03-06 PROCEDURE — 84443 ASSAY THYROID STIM HORMONE: CPT | Performed by: EMERGENCY MEDICINE

## 2018-03-06 RX ORDER — MONTELUKAST SODIUM 10 MG/1
10 TABLET ORAL
Qty: 30 TABLET | Refills: 0 | Status: SHIPPED | OUTPATIENT
Start: 2018-03-06 | End: 2020-06-25 | Stop reason: SDUPTHER

## 2018-03-06 RX ORDER — DIPHENHYDRAMINE HYDROCHLORIDE 50 MG/ML
INJECTION INTRAMUSCULAR; INTRAVENOUS
Status: COMPLETED
Start: 2018-03-06 | End: 2018-03-06

## 2018-03-06 RX ORDER — LORATADINE 10 MG/1
10 TABLET ORAL DAILY
Qty: 30 TABLET | Refills: 0 | Status: SHIPPED | OUTPATIENT
Start: 2018-03-06 | End: 2020-07-15 | Stop reason: CLARIF

## 2018-03-06 RX ORDER — ALBUTEROL SULFATE 90 UG/1
2 AEROSOL, METERED RESPIRATORY (INHALATION) EVERY 4 HOURS PRN
Qty: 1 INHALER | Refills: 0 | Status: SHIPPED | OUTPATIENT
Start: 2018-03-06 | End: 2018-03-09 | Stop reason: ALTCHOICE

## 2018-03-06 RX ADMIN — DIPHENHYDRAMINE HYDROCHLORIDE 50 MG: 50 INJECTION, SOLUTION INTRAMUSCULAR; INTRAVENOUS at 14:50

## 2018-03-06 RX ADMIN — IOHEXOL 85 ML: 350 INJECTION, SOLUTION INTRAVENOUS at 14:42

## 2018-03-06 NOTE — ED NOTES
Discharge instructions reviewed, patient has no new complaints or concerns at time of discharge  Patient ambulated out of department, steady gait, vss  Patient accompanied out of department by her        Eliazar Blum RN  03/06/18 [id number]

## 2018-03-06 NOTE — ED PROVIDER NOTES
History  Chief Complaint   Patient presents with    Shortness of Breath     Pt c/o SOB x 3 days  Pt has hx of asthma and DVT's     57year old female patient presents to the ED with cc of dyspnea for two weeks, worsening, present at rest and with any exertion  She states chest discomfort as well  She currently is speaking in full uninterrupted sentences without pausing to breathe  She is utilizing no accessory muscles  The patient has clear equal breath sounds bilaterally  Patient does have a history of DVT, was on Coumadin for three years, taking off the bed hematology oncology at [facility]  Currently my differential diagnosis for the patient fluids but is not limited to acute coronary syndrome, pulmonary embolism which is of high suspicion with the patient's history, the patient has two weeks of shortness of breath, to the point where I am going to send the patient CT scan prior to doing labs seven like an early diagnosis with early intervention as she has already had two weeks of symptoms in a overtly concerned for this is the diagnosis, pneumonia, viral syndrome  History provided by:  Patient   used: No    Shortness of Breath   Severity:  Moderate  Onset quality:  Gradual  Timing:  Constant  Progression:  Worsening  Chronicity:  New  Context: activity    Relieved by:  Nothing  Worsened by:  Nothing  Ineffective treatments:  None tried  Associated symptoms: chest pain    Associated symptoms: no cough, no diaphoresis, no headaches and no neck pain    Risk factors: no hx of cancer, no hx of PE/DVT, no obesity and no recent surgery        Prior to Admission Medications   Prescriptions Last Dose Informant Patient Reported? Taking? ALPRAZolam (XANAX) 0 25 mg tablet   Yes No   Sig: Take 0 25 mg by mouth daily at bedtime as needed for anxiety  Butalbital-APAP-Caffeine (FIORICET PO)   Yes No   Sig: Take 1-2 tablets by mouth daily as needed       Ergocalciferol (VITAMIN D2) 2000 UNITS TABS   Yes No   Sig: Take 2,000 Units by mouth daily  Magnesium 400 MG CAPS   Yes No   Sig: Take 2 capsules by mouth daily  Pt not taking    Multiple Vitamin (MULTIVITAMIN) capsule   Yes No   Sig: Take 1 capsule by mouth daily  Omega-3 Fatty Acids (OMEGA 3 PO)   Yes No   Sig: Take 2 capsules by mouth 2 (two) times a day  ZOLMitriptan (ZOMIG) 5 MG tablet   No No   Sig: Take 1 tablet (5 mg total) by mouth daily as needed (1 daily p r n  headache)   albuterol (VENTOLIN HFA) 90 mcg/act inhaler   No No   Si-2 puffs 4 times a day as needed   cycloSPORINE (RESTASIS) 0 05 % ophthalmic emulsion   Yes No   Sig: Administer 1 drop to both eyes 2 (two) times a day  diazepam (VALIUM) 5 mg tablet   Yes No   Sig: Take 5 mg by mouth every 8 (eight) hours as needed Indications: Muscle Spasm  diphenhydrAMINE (BENADRYL) 25 mg tablet   Yes No   Sig: Benadryl 25 MG Oral Capsule  TAKE 1 CAPSULE DAILY  AS NEEDED   Refills: 0      , M D ; Active   fluticasone (FLOVENT HFA) 220 mcg/act inhaler   No No   Sig: Inhale 1 puff 2 (two) times a day   loteprednol etabonate (LOTEMAX) 0 5 % ophthalmic suspension   Yes No   Sig: Administer 1 drop to both eyes 3 (three) times a day as needed  methocarbamol (ROBAXIN) 500 mg tablet   Yes No   Sig: Take 500 mg by mouth 2 (two) times a day as needed  mometasone (NASONEX) 50 mcg/act nasal spray   Yes No   Si spray into each nostril daily  pantoprazole (PROTONIX) 40 mg tablet   Yes No   Sig: Take 40 mg by mouth daily        Facility-Administered Medications: None       Past Medical History:   Diagnosis Date    Acid reflux     Anesthesia complication     HYPOTENSION    Arthritis     Asthma     Bigeminy     history    DVT of leg (deep venous thrombosis) ([facility] [address] )     left    Female pelvic pain     Intermittent palpitations     Irregular heart beat     Migraines     Thyroid nodule     Trigeminy     history    Wears glasses        Past Surgical History: Procedure Laterality Date    APPENDECTOMY       SECTION      X 2    CHOLECYSTECTOMY      COLONOSCOPY      DILATION AND CURETTAGE OF UTERUS      JOINT REPLACEMENT      left TKR    KNEE SURGERY Left     X 3    WY ESOPHAGOGASTRODUODENOSCOPY TRANSORAL DIAGNOSTIC N/A 3/14/2016    Procedure: ESOPHAGOGASTRODUODENOSCOPY (EGD); Surgeon: Sia Manriquez MD;  Location: [facility]; Service: Gastroenterology    WY LAPAROSCOPY W TOT HYSTERECTUTERUS <=[address] TUBE/OVARY Bilateral 2016    Procedure: HYSTERECTOMY LAPAROSCOPIC TOTAL ,BSO;  Surgeon: Juany Fernandez MD;  Location: [facility];  Service: Gynecology Oncology    REPLACEMENT TOTAL KNEE  2014    WISDOM TOOTH EXTRACTION         History reviewed  No pertinent family history  I have reviewed and agree with the history as documented  Social History   Substance Use Topics    Smoking status: Former Smoker     Quit date: 1978    Smokeless tobacco: Never Used      Comment: as teenager    Alcohol use Yes      Comment: socially        Review of Systems   Constitutional: Negative for diaphoresis  Respiratory: Positive for shortness of breath  Negative for cough  Cardiovascular: Positive for chest pain  Musculoskeletal: Negative for neck pain  Neurological: Negative for headaches  All other systems reviewed and are negative        Physical Exam  ED Triage Vitals   Temperature Pulse Respirations Blood Pressure SpO2   18 1310 18 1301 18 1301 18 1301 18 1301   97 5 °F (36 4 °C) 63 22 146/69 100 %      Temp Source Heart Rate Source Patient Position - Orthostatic VS BP Location FiO2 (%)   18 1310 18 1301 18 1301 18 1301 --   Oral Monitor Sitting Right arm       Pain Score       18 1301       No Pain           Orthostatic Vital Signs  Vitals:    18 1301 18 1453   BP: 146/69 142/70   Pulse: 63 71   Patient Position - Orthostatic VS: Sitting Lying       Physical Exam Constitutional: She is oriented to person, place, and time  She appears well-developed and well-nourished  HENT:   Head: Normocephalic and atraumatic  Right Ear: External ear normal    Left Ear: External ear normal    Eyes: Conjunctivae and EOM are normal    Neck: No JVD present  No tracheal deviation present  No thyromegaly present  Cardiovascular: Normal rate  Pulmonary/Chest: Effort normal and breath sounds normal  No stridor  Abdominal: Soft  She exhibits no distension and no mass  There is no tenderness  There is no guarding  No hernia  Musculoskeletal: Normal range of motion  She exhibits no edema, tenderness or deformity  Lymphadenopathy:     She has no cervical adenopathy  Neurological: She is alert and oriented to person, place, and time  Skin: Skin is warm  No rash noted  No erythema  No pallor  Psychiatric: She has a normal mood and affect  Her behavior is normal    Nursing note and vitals reviewed  ED Medications  Medications   iohexol (OMNIPAQUE) 350 MG/ML injection (MULTI-DOSE) 85 mL (85 mL Intravenous Given 3/6/18 1442)   diphenhydrAMINE (BENADRYL) 50 mg/mL injection **AcuDose Override Pull** (50 mg  Given 3/6/18 1450)       Diagnostic Studies  Results Reviewed     Procedure Component Value Units Date/Time    BNP [[id number]]  (Normal) Collected:  03/06/18 1339    Lab Status:  Final result Specimen:  Blood from Arm, Left Updated:  03/06/18 1409     NT-proBNP 124 pg/mL     TSH, 3rd generation with T4 reflex [[id number]]  (Normal) Collected:  03/06/18 1339    Lab Status:  Final result Specimen:  Blood from Arm, Left Updated:  03/06/18 1409     TSH 3RD GENERATON 1 581 uIU/mL     Narrative:         Patients undergoing fluorescein dye angiography may retain small amounts of fluorescein in the body for 48-72 hours post procedure  Samples containing fluorescein can produce falsely depressed TSH values   If the patient had this procedure,a specimen should be resubmitted post fluorescein clearance  The recommended reference ranges for TSH during pregnancy are as follows:  First trimester 0 1 to 2 5 uIU/mL  Second trimester  0 2 to 3 0 uIU/mL  Third trimester 0 3 to 3 0 uIU/m      Non-Fasting Lipid Panel with out Triglycerides [[id number]] Collected:  03/06/18 1339    Lab Status:  Final result Specimen:  Blood from Arm, Left Updated:  03/06/18 1409     Cholesterol 186 mg/dL      HDL, Direct 46 mg/dL      Non-HDL-Chol (CHOL-HDL) 140 mg/dl     Narrative:         Cholesterol:         Desirable         <200 mg/dl      Borderline High   200-239 mg/dl      High              >239 mg/dl  HDL Cholesterol:        High    >59 mg/dL      Low     <41 mg/dL  NON-HDL-CHOLESTEROL:          Target Range  <=130mg/dl    Troponin I [[id number]]  (Normal) Collected:  03/06/18 1339    Lab Status:  Final result Specimen:  Blood from Arm, Left Updated:  03/06/18 1404     Troponin I <0 02 ng/mL     Narrative:         [employer] Chemistry analyzer 99% cutoff is > 0 04 ng/mL in network labs    o cTnI 99% cutoff is useful only when applied to patients in the clinical setting of myocardial ischemia  o cTnI 99% cutoff should be interpreted in the context of clinical history, ECG findings and possibly cardiac imaging to establish correct diagnosis  o cTnI 99% cutoff may be suggestive but clearly not indicative of a coronary event without the clinical setting of myocardial ischemia      Comprehensive metabolic panel [[id number]] Collected:  03/06/18 1339    Lab Status:  Final result Specimen:  Blood from Arm, Left Updated:  03/06/18 1402     Sodium 140 mmol/L      Potassium 3 6 mmol/L      Chloride 104 mmol/L      CO2 28 mmol/L      Anion Gap 8 mmol/L      BUN 13 mg/dL      Creatinine 0 72 mg/dL      Glucose 94 mg/dL      Calcium 9 3 mg/dL      AST 30 U/L      ALT 56 U/L      Alkaline Phosphatase 115 U/L      Total Protein 7 4 g/dL      Albumin 3 8 g/dL      Total Bilirubin 0 30 mg/dL      eGFR 89 ml/min/1 73sq m     Narrative: National Kidney Disease Education Program recommendations are as follows:  GFR calculation is accurate only with a steady state creatinine  Chronic Kidney disease less than 60 ml/min/1 73 sq  meters  Kidney failure less than 15 ml/min/1 73 sq  meters  Protime-INR [[id number]]  (Normal) Collected:  03/06/18 1339    Lab Status:  Final result Specimen:  Blood from Arm, Left Updated:  03/06/18 1356     Protime 13 2 seconds      INR 0 98    CBC and differential [[id number]]  (Normal) Collected:  03/06/18 1339    Lab Status:  Final result Specimen:  Blood from Arm, Left Updated:  03/06/18 1346     WBC 6 86 Thousand/uL      RBC 5 00 Million/uL      Hemoglobin 13 6 g/dL      Hematocrit 41 7 %      MCV 83 fL      MCH 27 2 pg      MCHC 32 6 g/dL      RDW 14 1 %      MPV 9 5 fL      Platelets 707 Thousands/uL      nRBC 0 /100 WBCs      Neutrophils Relative 54 %      Lymphocytes Relative 35 %      Monocytes Relative 7 %      Eosinophils Relative 2 %      Basophils Relative 1 %      Neutrophils Absolute 3 72 Thousands/µL      Lymphocytes Absolute 2 40 Thousands/µL      Monocytes Absolute 0 47 Thousand/µL      Eosinophils Absolute 0 16 Thousand/µL      Basophils Absolute 0 08 Thousands/µL                  VAS lower limb venous duplex study, unilateral/limited   Final Result by Sravan Calderon MD (03/06 1605)      CTA ED chest PE study   Final Result by Pretty Gordon MD (03/06 1535)      1  No evidence of pulmonary embolism  Clear lungs  2   Aberrant origin of the right subclavian artery  3   Fatty liver suggested                    Workstation performed: [id number]                    Procedures  Procedures       Phone Contacts  ED Phone Contact    ED Course  ED Course                                MDM  Number of Diagnoses or Management Options  Allergic bronchitis: new and requires workup  Dyspnea: new and requires workup  Seasonal allergies: new and requires workup     Amount and/or Complexity of Data Reviewed  Clinical lab tests: ordered and reviewed  Tests in the radiology section of CPT®: reviewed and ordered  Decide to obtain previous medical records or to obtain history from someone other than the patient: yes  Review and summarize past medical records: yes    Patient Progress  Patient progress: stable    CritCare Time    Disposition  Final diagnoses:   Dyspnea   Seasonal allergies   Allergic bronchitis     Time reflects when diagnosis was documented in both MDM as applicable and the Disposition within this note     Time User Action Codes Description Comment    3/6/2018  3:45 PM Lawayne Ang Add [R06 00] Dyspnea     3/6/2018  3:46 PM Lawayne Ang Add [J30 2] Seasonal allergies     3/6/2018  3:47 PM Lawayne Ang Add [J45 909] Allergic bronchitis       ED Disposition     ED Disposition Condition Comment    Discharge  [facility] discharge to home/self care  Condition at discharge: Stable        Follow-up Information     Follow up With Specialties Details Why Contact Lucho Lyman, DO Internal Medicine  As needed [address]  [phone]          Discharge Medication List as of 3/6/2018  3:50 PM      START taking these medications    Details   !! albuterol (PROVENTIL HFA,VENTOLIN HFA) 90 mcg/act inhaler Inhale 2 puffs every 4 (four) hours as needed for wheezing, Starting Tue 3/6/2018, Normal      loratadine (CLARITIN) 10 mg tablet Take 1 tablet (10 mg total) by mouth daily, Starting Tue 3/6/2018, Normal      montelukast (SINGULAIR) 10 mg tablet Take 1 tablet (10 mg total) by mouth daily at bedtime, Starting Tue 3/6/2018, Normal       !! - Potential duplicate medications found  Please discuss with provider  CONTINUE these medications which have NOT CHANGED    Details   !! albuterol (VENTOLIN HFA) 90 mcg/act inhaler 1-2 puffs 4 times a day as needed, Normal      ALPRAZolam (XANAX) 0 25 mg tablet Take 0 25 mg by mouth daily at bedtime as needed for anxiety  , Until Discontinued, Historical Med      Butalbital-APAP-Caffeine (FIORICET PO) Take 1-2 tablets by mouth daily as needed  , Until Discontinued, Historical Med      cycloSPORINE (RESTASIS) 0 05 % ophthalmic emulsion Administer 1 drop to both eyes 2 (two) times a day , Until Discontinued, Historical Med      diazepam (VALIUM) 5 mg tablet Take 5 mg by mouth every 8 (eight) hours as needed Indications: Muscle Spasm , Until Discontinued, Historical Med      diphenhydrAMINE (BENADRYL) 25 mg tablet Benadryl 25 MG Oral Capsule  TAKE 1 CAPSULE DAILY  AS NEEDED   Refills: 0      , M D ; Active, Historical Med      Ergocalciferol (VITAMIN D2) 2000 UNITS TABS Take 2,000 Units by mouth daily  , Until Discontinued, Historical Med      fluticasone (FLOVENT HFA) 220 mcg/act inhaler Inhale 1 puff 2 (two) times a day, Starting Sat 2/3/2018, Normal      loteprednol etabonate (LOTEMAX) 0 5 % ophthalmic suspension Administer 1 drop to both eyes 3 (three) times a day as needed  , Until Discontinued, Historical Med      Magnesium 400 MG CAPS Take 2 capsules by mouth daily  Pt not taking , Until Discontinued, Historical Med      methocarbamol (ROBAXIN) 500 mg tablet Take 500 mg by mouth 2 (two) times a day as needed  , Until Discontinued, Historical Med      mometasone (NASONEX) 50 mcg/act nasal spray 1 spray into each nostril daily  , Until Discontinued, Historical Med      Multiple Vitamin (MULTIVITAMIN) capsule Take 1 capsule by mouth daily  , Until Discontinued, Historical Med      Omega-3 Fatty Acids (OMEGA 3 PO) Take 2 capsules by mouth 2 (two) times a day   , Until Discontinued, Historical Med      pantoprazole (PROTONIX) 40 mg tablet Take 40 mg by mouth daily  , Until Discontinued, Historical Med      ZOLMitriptan (ZOMIG) 5 MG tablet Take 1 tablet (5 mg total) by mouth daily as needed (1 daily p r n  headache), Starting Fri 2/23/2018, Print       !! - Potential duplicate medications found  Please discuss with provider          No discharge procedures on file      ED Provider  Electronically Signed by           Nathaniel Varela,   03/09/18 [address] [facility], DO  03/09/18 1799

## 2018-03-06 NOTE — DISCHARGE INSTRUCTIONS
Allergies, Ambulatory Care   GENERAL INFORMATION:   Allergies  are an immune system reaction to a substance called an allergen  Your immune system sees the allergen as harmful and attacks it  Common symptoms include the following:   · Sneezing and runny, itchy, or stuffy nose    · Swollen, watery, or itchy eyes    · Itchy skin, mouth, ears, or throat    · Swelling, pain, or itch at the site of an insect sting  Seek immediate care for the following symptoms:   · Trouble swallowing or your throat or tongue is swollen    · Wheezing or trouble breathing    · Dizziness or feeling faint    · Chest pain or your heart is fluttering  Treatment for allergies  may include medicines to slow a serious allergic reaction  You may be given medicines that help decrease itching, sneezing, and swelling or help your nose feel less stuffy  Your healthcare provider may give you several different medicines to help decrease swelling, redness, and itching  Medicines may be given as pills, shots, or put directly on your skin  Nasal sprays or eye drops may also be used  Desensitization treatment may get your body used to allergens you cannot avoid  Your healthcare provider will give you a shot that contains a small amount of an allergen, giving a little more each time until your body gets used to it  Your healthcare provider will watch you closely and treat any allergic reaction you have  Your reaction to the allergen may be less serious after this treatment  Ask your healthcare provider how long you need to get the shots  Manage allergies:   · Use nasal rinses  Healthcare providers may suggest that you rinse your nasal passages with a saline solution  Daily rinsing may help clear your nose of allergens  · Do not smoke  Your allergy symptoms may decrease if you are not around smoke  If you smoke, it is never too late to quit  Ask your healthcare provider for information about how to stop if you need help quitting      · Carry medical alert identification  You may want to wear medical alert jewelry or carry a card that says you have an allergy  Ask your healthcare provider where to get medical alert identification  Prevent allergic reactions:   · Avoid seasonal allergic reactions  Do not go outside when pollen counts are high  Your symptoms may be better if you go outside only in the morning or evening  Use your air conditioner and change air filters often  · Dust and vacuum your home often  to avoid allergic reactions to dust, fur, or mold  You may want to wear a mask when you vacuum  Keep pets in certain rooms and bathe them often  Use a dehumidifier (machine that decreases moisture) to help prevent mold  · Do not use products that contain latex  if you have a latex allergy  Use nonlatex gloves if you work in healthcare or in food preparation  Always tell healthcare providers if you have a latex allergy  · Avoid insect stings  Stay away from areas or activities that increase your risk for being stung  These include trash cans, gardening, and picnics  Do not wear bright clothing or strong scents when you will be outside  Follow up with your healthcare provider as directed:  Write down your questions so you remember to ask them during your visits  CARE AGREEMENT:   You have the right to help plan your care  Learn about your health condition and how it may be treated  Discuss treatment options with your caregivers to decide what care you want to receive  You always have the right to refuse treatment  The above information is an  only  It is not intended as medical advice for individual conditions or treatments  Talk to your doctor, nurse or pharmacist before following any medical regimen to see if it is safe and effective for you  © 2014 [address] is for End User's use only and may not be sold, redistributed or otherwise used for commercial purposes   All illustrations and images included in [facility] are the copyrighted property of A D A M , Inc  or Joss De Leon  Asthma, Ambulatory Care   GENERAL INFORMATION:   Asthma  is a lung disease that makes breathing difficult  Chronic inflammation and reactions to triggers narrow the airways in your lungs  Asthma can become life-threatening if it is not managed  Common symptoms include the following:   · Coughing     · Wheezing     · Shortness of breath     · Chest tightness  Seek immediate care for the following symptoms:   · Severe shortness of breath    · Blue or gray lips or nails    · Skin around your neck and ribs pulls in with each breath    · Shortness of breath, even after you take your short-term medicine as directed     · Peak flow numbers in the red zone of your asthma action plan  Treatment for asthma  will depend on how severe it is  Medicine may decrease inflammation, open airways, and make it easier to breathe  Medicines may be inhaled, taken as a pill, or injected  Short-term medicines relieve your symptoms quickly  Long-term medicines are used to prevent future attacks  You may also need medicine to help control your allergies  Manage and prevent future asthma attacks:   · Follow your asthma action pan  This is a written plan that you and your healthcare provider create  It explains which medicine you need and when to change doses if necessary  It also explains how you can monitor symptoms and use a peak flow meter  The meter measures how well your lungs are working  · Manage other health conditions , such as allergies, acid reflux, and sleep apnea  · Identify and avoid triggers  These may include pets, dust mites, mold, and cockroaches  · Do not smoke and avoid others who smoke  If you smoke, it is never too late to quit  Ask your healthcare provider if you need help quitting  · Ask about a flu vaccine  The flu can make your asthma worse  You may need a yearly flu shot    Follow up with your healthcare provider as directed: You will need to return to make sure your medicine is working and your symptoms are controlled  You may be referred to an asthma or allergy specialist  Pk Mae may be asked to keep a record of your peak flow values and bring it with you to your appointments  Write down your questions so you remember to ask them during your visits  CARE AGREEMENT:   You have the right to help plan your care  Learn about your health condition and how it may be treated  Discuss treatment options with your caregivers to decide what care you want to receive  You always have the right to refuse treatment  The above information is an  only  It is not intended as medical advice for individual conditions or treatments  Talk to your doctor, nurse or pharmacist before following any medical regimen to see if it is safe and effective for you  © 2014 [address] is for End User's use only and may not be sold, redistributed or otherwise used for commercial purposes  All illustrations and images included in CareNotes® are the copyrighted property of A [employer] A [employer]  or Joss De Leon

## 2018-03-07 LAB
ATRIAL RATE: 65 BPM
P AXIS: 40 DEGREES
PR INTERVAL: 194 MS
QRS AXIS: 12 DEGREES
QRSD INTERVAL: 82 MS
QT INTERVAL: 428 MS
QTC INTERVAL: 445 MS
T WAVE AXIS: 21 DEGREES
VENTRICULAR RATE: 65 BPM

## 2018-03-07 PROCEDURE — 93010 ELECTROCARDIOGRAM REPORT: CPT | Performed by: INTERNAL MEDICINE

## 2018-03-09 ENCOUNTER — OFFICE VISIT (OUTPATIENT)
Dept: INTERNAL MEDICINE CLINIC | Facility: CLINIC | Age: 63
End: 2018-03-09
Payer: COMMERCIAL

## 2018-03-09 VITALS
OXYGEN SATURATION: 98 % | HEART RATE: 70 BPM | BODY MASS INDEX: 33.63 KG/M2 | WEIGHT: 197 LBS | DIASTOLIC BLOOD PRESSURE: 74 MMHG | SYSTOLIC BLOOD PRESSURE: 121 MMHG | HEIGHT: 64 IN

## 2018-03-09 DIAGNOSIS — R06.00 EXERTIONAL DYSPNEA: ICD-10-CM

## 2018-03-09 DIAGNOSIS — F41.9 ANXIETY: Primary | ICD-10-CM

## 2018-03-09 PROCEDURE — 99213 OFFICE O/P EST LOW 20 MIN: CPT | Performed by: INTERNAL MEDICINE

## 2018-03-09 RX ORDER — DIPHENHYDRAMINE HCL 25 MG
CAPSULE ORAL
COMMUNITY

## 2018-03-09 RX ORDER — ALPRAZOLAM 0.25 MG/1
0.25 TABLET ORAL
Qty: 30 TABLET | Refills: 2 | Status: SHIPPED | OUTPATIENT
Start: 2018-03-09 | End: 2018-12-31 | Stop reason: SDUPTHER

## 2018-03-09 RX ORDER — ESCITALOPRAM OXALATE 10 MG/1
5 TABLET ORAL DAILY
Qty: 30 TABLET | Refills: 5 | Status: SHIPPED | OUTPATIENT
Start: 2018-03-09 | End: 2018-07-09

## 2018-03-09 NOTE — PROGRESS NOTES
Assessment/Plan:  She has shortness of breath on exertion  She may be having an anginal equivalent  If she had a CT scan which did not show any pulmonary emboli  She had an ultrasound of her legs which were negative  She needs a stress test but is unable to walk a treadmill because of severe knee injury in the past   Will schedule a Lexiscan  I suggested that she consider something such as Lexapro low dose to see if that smooth out her anxiety  She will think about that  I will see her back here and a month  No problem-specific Assessment & Plan notes found for this encounter  Diagnoses and all orders for this visit:    Anxiety  -     ALPRAZolam (XANAX) 0 25 mg tablet; Take 1 tablet (0 25 mg total) by mouth daily at bedtime as needed for anxiety  -     escitalopram (LEXAPRO) 10 mg tablet; Take 0 5 tablets (5 mg total) by mouth daily    Exertional dyspnea  -     NM myocardial perfusion spect (rx stress and/or rest); Future    Other orders  -     diphenhydrAMINE (BENADRYL) 25 mg capsule; Take by mouth          Subjective:  She has not been feeling well  She has been having episodes of shortness of breath  It sometimes exertional     There is an element of anxiety  She does see a therapist     She has not had recent stress test   She does have intermittent wheezing  She uses an inhaler with partial relief  She has a lot of stress with her job  Patient ID: Sandra Singh is a 57 y o  female  HPI    The following portions of the patient's history were reviewed and updated as appropriate: allergies, current medications, past family history, past medical history, past social history, past surgical history and problem list     Review of Systems   Constitutional:        She is under a tremendous amount of stress  It is mostly work related  Eyes: Negative for pain and redness  Endocrine: Negative  Genitourinary: Negative      Neurological:        Is seeing a neurologist for Elaine thinks her memory isn't quite as good as it used to be  She   Psychiatric/Behavioral:        She does have some anxiety  Objective:     Physical Exam   Constitutional: She is oriented to person, place, and time  She appears well-developed and well-nourished  HENT:   Head: Normocephalic and atraumatic  Eyes: Conjunctivae and EOM are normal    Neck: Normal range of motion  Pulmonary/Chest: Effort normal  No respiratory distress  Neurological: She is alert and oriented to person, place, and time  She has normal reflexes

## 2018-03-13 ENCOUNTER — TELEPHONE (OUTPATIENT)
Dept: OBGYN CLINIC | Facility: CLINIC | Age: 63
End: 2018-03-13

## 2018-03-13 NOTE — TELEPHONE ENCOUNTER
Dr Isaiah Herrmann called this afternoon stating she's experiencing extreme pelvic pain and cramping  In addition, she has a little mass on her genitalia area and would like to discuss with you  Please advise  Pt's cell phone number is [phone]

## 2018-03-14 NOTE — TELEPHONE ENCOUNTER
Please call to schedule appt for next week (requests lunchtime) for vulvar biopsy/evaluation    She can be double booked for 0369

## 2018-03-19 ENCOUNTER — PROCEDURE VISIT (OUTPATIENT)
Dept: OBGYN CLINIC | Age: 63
End: 2018-03-19
Payer: COMMERCIAL

## 2018-03-19 VITALS
WEIGHT: 197 LBS | SYSTOLIC BLOOD PRESSURE: 138 MMHG | DIASTOLIC BLOOD PRESSURE: 76 MMHG | HEIGHT: 65 IN | BODY MASS INDEX: 32.82 KG/M2

## 2018-03-19 DIAGNOSIS — R10.2 SUPRAPUBIC ABDOMINAL PAIN: Primary | ICD-10-CM

## 2018-03-19 DIAGNOSIS — R10.2 SUPRAPUBIC ABDOMINAL PAIN: ICD-10-CM

## 2018-03-19 DIAGNOSIS — N90.89 VULVAR LUMP: Primary | ICD-10-CM

## 2018-03-19 DIAGNOSIS — N90.89 VULVAR LESION: ICD-10-CM

## 2018-03-19 PROBLEM — R41.3 MEMORY DIFFICULTY: Status: ACTIVE | Noted: 2017-09-11

## 2018-03-19 PROBLEM — E66.811 OBESITY (BMI 30.0-34.9): Status: ACTIVE | Noted: 2017-08-11

## 2018-03-19 PROBLEM — R73.03 PREDIABETES: Status: ACTIVE | Noted: 2017-08-11

## 2018-03-19 PROBLEM — E66.9 OBESITY (BMI 30.0-34.9): Status: ACTIVE | Noted: 2017-08-11

## 2018-03-19 PROBLEM — J45.20 MILD INTERMITTENT ASTHMA: Status: ACTIVE | Noted: 2017-05-30

## 2018-03-19 PROCEDURE — 87086 URINE CULTURE/COLONY COUNT: CPT | Performed by: OBSTETRICS & GYNECOLOGY

## 2018-03-19 PROCEDURE — 11400 EXC TR-EXT B9+MARG 0.5 CM<: CPT | Performed by: OBSTETRICS & GYNECOLOGY

## 2018-03-19 PROCEDURE — 56605 BIOPSY OF VULVA/PERINEUM: CPT | Performed by: OBSTETRICS & GYNECOLOGY

## 2018-03-19 PROCEDURE — 88305 TISSUE EXAM BY PATHOLOGIST: CPT | Performed by: PATHOLOGY

## 2018-03-19 PROCEDURE — 81001 URINALYSIS AUTO W/SCOPE: CPT | Performed by: OBSTETRICS & GYNECOLOGY

## 2018-03-19 PROCEDURE — 88344 IMHCHEM/IMCYTCHM EA MLT ANTB: CPT | Performed by: PATHOLOGY

## 2018-03-19 PROCEDURE — 99214 OFFICE O/P EST MOD 30 MIN: CPT | Performed by: OBSTETRICS & GYNECOLOGY

## 2018-03-19 RX ORDER — BUTALBITAL, ACETAMINOPHEN AND CAFFEINE 50; 325; 40 MG/1; MG/1; MG/1
TABLET ORAL
Qty: 100 TABLET | Refills: 1 | OUTPATIENT
Start: 2018-03-19

## 2018-03-19 RX ORDER — PHENAZOPYRIDINE HYDROCHLORIDE 100 MG/1
100 TABLET, FILM COATED ORAL 3 TIMES DAILY PRN
Qty: 10 TABLET | Refills: 0 | Status: SHIPPED | OUTPATIENT
Start: 2018-03-19 | End: 2018-07-09

## 2018-03-19 NOTE — PROGRESS NOTES
Assessment/Plan:    Suprapubic abdominal pain  S/p OWEN-BSO  No evidence of pelvic floor prolapse  Consider UTI, interstitial cystitis, abdominal wall/pelvic floor muscle weakness  May need urology referral to evaluate for IC  At this time no GI symptoms  Diagnoses and all orders for this visit:    Suprapubic abdominal pain  -     UA w Reflex to Microscopic w Reflex to Culture  -     Ambulatory referral to Physical Therapy; Future  -     phenazopyridine (PYRIDIUM) 100 mg tablet; Take 1 tablet (100 mg total) by mouth 3 (three) times a day as needed for bladder spasms    Vulvar lesion  Comments:  Removed  Will call with pathology results  Subjective:      Patient ID: Deepa De Leon is a 57 y o  female  PT here today for pelvic pain pt had to leave work on Thursday, She states that it is a constant pain and has urine frequency , Pt also states that she has a small pea size lump inside vagina    1  Pain - started last week  Suprapubic as well as bilateral LQ  No change in bladder or bowel habits  No change in symptoms after flatus, BM or emptying of bladder  So severe needed to leave work  After ibuprofen and Excedrin minimal improvement  2  Vulvar lesion -  Present for 1-2 weeks  Would like it removed  Not painful  Pelvic Pain   The patient's primary symptoms include pelvic pain  This is a new problem  The current episode started in the past 7 days  The problem occurs constantly  The problem has been unchanged  The pain is severe  The problem affects both sides  She is not pregnant  Associated symptoms include abdominal pain, diarrhea (1 week ago) and joint pain  Pertinent negatives include no back pain, chills, constipation, discolored urine, dysuria, fever, flank pain, frequency, hematuria, nausea, rash, sore throat, urgency or vomiting  Nothing aggravates the symptoms  She has tried NSAIDs for the symptoms  The treatment provided no relief  She is not sexually active   She is postmenopausal  Her past medical history is significant for a  section  Difficulty Urinating    Pertinent negatives include no chills, flank pain, frequency, hematuria, nausea, urgency or vomiting  The following portions of the patient's history were reviewed and updated as appropriate:   She  has a past medical history of Acid reflux; Anesthesia complication; Arthritis; Asthma; Bigeminy; DVT of leg (deep venous thrombosis) ([facility] [address] ) (); Female pelvic pain; Intermittent palpitations; Irregular heart beat; Migraines; Thyroid nodule; Trigeminy; and Wears glasses  She   Patient Active Problem List    Diagnosis Date Noted    Suprapubic abdominal pain 2018    Memory difficulty 2017    Obesity (BMI 30 0-34 9) 2017    Prediabetes 2017    Mild intermittent asthma 2017    Osteopenia of multiple sites 2016    Arthralgia of hip, left 2014    Migraine, unspecified, not intractable, without status migrainosus 11/10/2014    DVT of leg (deep venous thrombosis) ([facility] [address] ) 2014    Arthritis of left knee 10/03/2014    Pain in joint involving other specified sites 2013     She  has a past surgical history that includes Replacement total knee (); Appendectomy; Knee surgery (Left);  section; Joint replacement; Dilation and curettage of uterus; Colonoscopy; Irwin tooth extraction; Cholecystectomy; pr laparoscopy w tot hysterectuterus <=250 gram  w tube/ovary (Bilateral, 2016); and pr esophagogastroduodenoscopy transoral diagnostic (N/A, 3/14/2016)  Her family history is not on file  She  reports that she quit smoking about 39 years ago  She has never used smokeless tobacco  She reports that she drinks alcohol  She reports that she does not use drugs    Current Outpatient Prescriptions   Medication Sig Dispense Refill    albuterol (VENTOLIN HFA) 90 mcg/act inhaler 1-2 puffs 4 times a day as needed 18 Inhaler 0    ALPRAZolam (XANAX) 0 25 mg tablet Take 1 tablet (0 25 mg total) by mouth daily at bedtime as needed for anxiety 30 tablet 2    Butalbital-APAP-Caffeine (FIORICET PO) Take 1-2 tablets by mouth daily as needed   cycloSPORINE (RESTASIS) 0 05 % ophthalmic emulsion Administer 1 drop to both eyes 2 (two) times a day   diazepam (VALIUM) 5 mg tablet Take 5 mg by mouth every 8 (eight) hours as needed Indications: Muscle Spasm   diphenhydrAMINE (BENADRYL) 25 mg capsule Take by mouth      diphenhydrAMINE (BENADRYL) 25 mg tablet Benadryl 25 MG Oral Capsule  TAKE 1 CAPSULE DAILY  AS NEEDED   Refills: 0      , M D ; Active      Ergocalciferol (VITAMIN D2) 2000 UNITS TABS Take 2,000 Units by mouth daily   escitalopram (LEXAPRO) 10 mg tablet Take 0 5 tablets (5 mg total) by mouth daily 30 tablet 5    fluticasone (FLOVENT HFA) 220 mcg/act inhaler Inhale 1 puff 2 (two) times a day 1 Inhaler 0    loratadine (CLARITIN) 10 mg tablet Take 1 tablet (10 mg total) by mouth daily 30 tablet 0    loteprednol etabonate (LOTEMAX) 0 5 % ophthalmic suspension Administer 1 drop to both eyes 3 (three) times a day as needed   Magnesium 400 MG CAPS Take 2 capsules by mouth daily  Pt not taking       mometasone (NASONEX) 50 mcg/act nasal spray 1 spray into each nostril daily   montelukast (SINGULAIR) 10 mg tablet Take 1 tablet (10 mg total) by mouth daily at bedtime 30 tablet 0    Multiple Vitamin (MULTIVITAMIN) capsule Take 1 capsule by mouth daily   Omega-3 Fatty Acids (OMEGA 3 PO) Take 2 capsules by mouth 2 (two) times a day   pantoprazole (PROTONIX) 40 mg tablet Take 40 mg by mouth daily   phenazopyridine (PYRIDIUM) 100 mg tablet Take 1 tablet (100 mg total) by mouth 3 (three) times a day as needed for bladder spasms 10 tablet 0    ZOLMitriptan (ZOMIG) 5 MG tablet Take 1 tablet (5 mg total) by mouth daily as needed (1 daily p r n  headache) 8 tablet 0     No current facility-administered medications for this visit  Current Outpatient Prescriptions on File Prior to Visit   Medication Sig    albuterol (VENTOLIN HFA) 90 mcg/act inhaler 1-2 puffs 4 times a day as needed    ALPRAZolam (XANAX) 0 25 mg tablet Take 1 tablet (0 25 mg total) by mouth daily at bedtime as needed for anxiety    Butalbital-APAP-Caffeine (FIORICET PO) Take 1-2 tablets by mouth daily as needed   cycloSPORINE (RESTASIS) 0 05 % ophthalmic emulsion Administer 1 drop to both eyes 2 (two) times a day   diazepam (VALIUM) 5 mg tablet Take 5 mg by mouth every 8 (eight) hours as needed Indications: Muscle Spasm   diphenhydrAMINE (BENADRYL) 25 mg capsule Take by mouth    diphenhydrAMINE (BENADRYL) 25 mg tablet Benadryl 25 MG Oral Capsule  TAKE 1 CAPSULE DAILY  AS NEEDED   Refills: 0      , M D ; Active    Ergocalciferol (VITAMIN D2) 2000 UNITS TABS Take 2,000 Units by mouth daily   escitalopram (LEXAPRO) 10 mg tablet Take 0 5 tablets (5 mg total) by mouth daily    fluticasone (FLOVENT HFA) 220 mcg/act inhaler Inhale 1 puff 2 (two) times a day    loratadine (CLARITIN) 10 mg tablet Take 1 tablet (10 mg total) by mouth daily    loteprednol etabonate (LOTEMAX) 0 5 % ophthalmic suspension Administer 1 drop to both eyes 3 (three) times a day as needed   Magnesium 400 MG CAPS Take 2 capsules by mouth daily  Pt not taking     mometasone (NASONEX) 50 mcg/act nasal spray 1 spray into each nostril daily   montelukast (SINGULAIR) 10 mg tablet Take 1 tablet (10 mg total) by mouth daily at bedtime    Multiple Vitamin (MULTIVITAMIN) capsule Take 1 capsule by mouth daily   Omega-3 Fatty Acids (OMEGA 3 PO) Take 2 capsules by mouth 2 (two) times a day   pantoprazole (PROTONIX) 40 mg tablet Take 40 mg by mouth daily   ZOLMitriptan (ZOMIG) 5 MG tablet Take 1 tablet (5 mg total) by mouth daily as needed (1 daily p r n  headache)     No current facility-administered medications on file prior to visit        She is allergic to naproxen       Review of Systems   Constitutional: Negative for activity change, appetite change, chills, fatigue and fever  HENT: Negative for rhinorrhea, sneezing and sore throat  Eyes: Negative for visual disturbance  Respiratory: Negative for cough, shortness of breath and wheezing  Cardiovascular: Negative for chest pain, palpitations and leg swelling  Gastrointestinal: Positive for abdominal pain and diarrhea (1 week ago)  Negative for abdominal distention, constipation, nausea and vomiting  Genitourinary: Positive for pelvic pain  Negative for difficulty urinating, dysuria, flank pain, frequency, hematuria and urgency  Musculoskeletal: Positive for joint pain  Negative for back pain  Skin: Negative for rash  Neurological: Negative for syncope and light-headedness  Objective:      /76   Ht 5' 5" (1 651 m)   Wt 89 4 kg (197 lb)   BMI 32 78 kg/m²            Physical Exam   Abdominal: Soft  Normal appearance  There is tenderness in the right lower quadrant, suprapubic area and left lower quadrant  There is no rigidity, no rebound and no guarding  Genitourinary: Vagina normal    Genitourinary Comments: Vulvar lesion - perinuem base of right labia majora  3mm in size, no erythema    Vagina - no prolapse  Bladder - tender           Biopsy  Date/Time: 3/19/2018 12:33 PM  Performed by: Zaira Childs  Authorized by: Zaira Childs     Procedure Details - Lesion Biopsy: Body area: Anogenital    Anogenital location:  Vulva    Vaginal Lesion: No      Initial size (mm):  3    Malignancy: benign lesion       Anesthesia: 1% lidocaine with epinephrine  Skin cleansed with betadine  Specimen sent to pathology      Counseling / Coordination of Care  Total floor / unit time spent today 30 minutes  Greater than 50% of total time was spent with the patient and / or family counseling and / or coordination of care    A description of the counseling / coordination of care: causes of suprapubic pain, diagnostic tests, possible treatments  Removal of lesion, care for wound

## 2018-03-19 NOTE — ASSESSMENT & PLAN NOTE
S/p OWEN-BSO  No evidence of pelvic floor prolapse  Consider UTI, interstitial cystitis, abdominal wall/pelvic floor muscle weakness  May need urology referral to evaluate for IC  At this time no GI symptoms

## 2018-03-19 NOTE — PATIENT INSTRUCTIONS
Interstitial Cystitis   WHAT YOU NEED TO KNOW:   Interstitial cystitis (IC) is also called painful bladder syndrome  IC is a condition that causes pain in your bladder and pelvic area  You may also have ulcers in your bladder  The cause of IC is unknown  DISCHARGE INSTRUCTIONS:   Contact your healthcare provider if:   · Your symptoms get worse, or you develop new symptoms  · You have questions or concerns about your condition or care  Medicines:   · Medicines  may be given to decrease symptoms such as pain, urinary urgency, and frequency  · Take your medicine as directed  Contact your healthcare provider if you think your medicine is not helping or if you have side effects  Tell him of her if you are allergic to any medicine  Keep a list of the medicines, vitamins, and herbs you take  Include the amounts, and when and why you take them  Bring the list or the pill bottles to follow-up visits  Carry your medicine list with you in case of an emergency  Manage your symptoms:   · Do Kegel exercises as directed  Kegel exercises will help strengthen the muscles that control bowel movements and urination  Ask your healthcare provider for more information on Kegel exercises  Tighten your pelvic muscles slowly  It may feel like you are trying to hold back urine or gas  Hold these muscles and count to 3  Relax, tighten them quickly, and release  Repeat the cycle 10 times  Do 10 sets of Kegel exercises, 5 times a day  Do not hold your breath when you do Kegel exercises  Keep your stomach, back, and leg muscles relaxed  · Do not smoke  Smoking may worsen your symptoms  Nicotine and other chemicals in cigarettes and cigars can also cause lung damage  Ask your healthcare provider for information if you currently smoke and need help to quit  E-cigarettes or smokeless tobacco still contain nicotine  Talk to your healthcare provider before you use these products  · Train your bladder to urinate less often  This can be done by going to the bathroom at scheduled times  Try to hold your urine when you feel the urge to go  For example, hold your urine for 5 minutes when you feel the urge to go  As that becomes easier, hold your urine for 10 minutes  · Manage stress  Stress may worsen symptoms  Your healthcare provider may also recommend that you find ways to manage stress, such as relaxation techniques  Follow up with your healthcare provider as directed:  Write down your questions so you remember to ask them during your visits  © 2017 [facility] Information is for End User's use only and may not be sold, redistributed or otherwise used for commercial purposes  All illustrations and images included in CareNotes® are the copyrighted property of A D A M [employer]  or Joss De Leon  The above information is an  only  It is not intended as medical advice for individual conditions or treatments  Talk to your doctor, nurse or pharmacist before following any medical regimen to see if it is safe and effective for you

## 2018-03-20 LAB
BACTERIA UR QL AUTO: ABNORMAL /HPF
BILIRUB UR QL STRIP: NEGATIVE
CLARITY UR: CLEAR
COLOR UR: YELLOW
GLUCOSE UR STRIP-MCNC: NEGATIVE MG/DL
HGB UR QL STRIP.AUTO: NEGATIVE
HYALINE CASTS #/AREA URNS LPF: ABNORMAL /LPF
KETONES UR STRIP-MCNC: NEGATIVE MG/DL
LEUKOCYTE ESTERASE UR QL STRIP: NEGATIVE
NITRITE UR QL STRIP: NEGATIVE
NON-SQ EPI CELLS URNS QL MICRO: ABNORMAL /HPF
PH UR STRIP.AUTO: 5.5 [PH] (ref 4.5–8)
PROT UR STRIP-MCNC: NEGATIVE MG/DL
RBC #/AREA URNS AUTO: ABNORMAL /HPF
SP GR UR STRIP.AUTO: 1.02 (ref 1–1.03)
UROBILINOGEN UR QL STRIP.AUTO: 0.2 E.U./DL
WBC #/AREA URNS AUTO: ABNORMAL /HPF

## 2018-03-21 ENCOUNTER — TELEPHONE (OUTPATIENT)
Dept: OBGYN CLINIC | Age: 63
End: 2018-03-21

## 2018-03-21 DIAGNOSIS — R10.2 PELVIC PAIN: Primary | ICD-10-CM

## 2018-03-21 LAB
BACTERIA UR CULT: ABNORMAL
BACTERIA UR CULT: ABNORMAL

## 2018-03-21 NOTE — TELEPHONE ENCOUNTER
Dr Ozzy Klein - Dr Leroy Hopper called the office stating she is still experiencing pelvic pain and wants to know if maybe she should have an ultrasound  If you could please call her she would appreciate it  She did not even go to work today  Best contact # is [phone]

## 2018-03-21 NOTE — TELEPHONE ENCOUNTER
Patient is requesting ultrasound  Since she has had a hysterectomy w BSO  I would recommend a CT scan with contrast     Please order- for pelvic pain  She will need a creatinine if none recently

## 2018-03-22 NOTE — TELEPHONE ENCOUNTER
Per Dr Chris Templeton orders,  CT of pelvis  with contrast & Bun/creatinine ordered  Pt called to inform -states "had a CMP on 3/6/18, which includes a BUN /creatinine  Results were WNL  Pt therefore, declines the bldwk at this time, stating, " I am also a hard stick " routed to [employer] for prior auth

## 2018-03-23 ENCOUNTER — TELEPHONE (OUTPATIENT)
Dept: OBGYN CLINIC | Facility: CLINIC | Age: 63
End: 2018-03-23

## 2018-03-23 ENCOUNTER — HOSPITAL ENCOUNTER (OUTPATIENT)
Dept: CT IMAGING | Facility: HOSPITAL | Age: 63
Discharge: HOME | End: 2018-03-23
Attending: OBSTETRICS & GYNECOLOGY
Payer: COMMERCIAL

## 2018-03-23 DIAGNOSIS — R10.2 PELVIC PAIN: Primary | ICD-10-CM

## 2018-03-23 DIAGNOSIS — R10.2 PELVIC PAIN: ICD-10-CM

## 2018-03-23 PROCEDURE — 74176 CT ABD & PELVIS W/O CONTRAST: CPT

## 2018-03-23 RX ORDER — BUTALBITAL, ACETAMINOPHEN AND CAFFEINE 50; 325; 40 MG/1; MG/1; MG/1
TABLET ORAL
Qty: 100 TABLET | OUTPATIENT
Start: 2018-03-23

## 2018-03-23 RX ADMIN — IOHEXOL 50 ML: 240 INJECTION, SOLUTION INTRATHECAL; INTRAVASCULAR; INTRAVENOUS; ORAL at 14:00

## 2018-03-23 NOTE — TELEPHONE ENCOUNTER
Order placed in epic for ct scan abd/ pelvis wo contrast  pts instructions for be do  not require  Bowel prep    Only damian brink gi doc     States bowel prep not needed

## 2018-03-23 NOTE — TELEPHONE ENCOUNTER
Spoke with pt re ct navarro    Due to severe allergic reaction to dye on recent lung scan, she is req ct scan be done wo contrast  Radiology says she needs a 24 hr prep prior which includes prednisone, but pt allergic to prednisone    She took of  Work next week, but would like to have done before wed  Pt req response from [facility] asap

## 2018-03-23 NOTE — TELEPHONE ENCOUNTER
Please call lizy at radiology pt needs special prep/ meds ordered for her dye allergy prior to apt call [phone] or [phone]

## 2018-03-23 NOTE — TELEPHONE ENCOUNTER
Please order the CT without contrast   IF unable to take predinisone will have to proceed without it  For 24hour prer did they give her instructions? NPO? Clears? Bowel prep? Does she need a script for a bowel prep? Either way please order

## 2018-03-25 DIAGNOSIS — J45.21 MILD INTERMITTENT ASTHMA WITH ACUTE EXACERBATION: ICD-10-CM

## 2018-03-25 RX ORDER — FLUTICASONE PROPIONATE 220 UG/1
1 AEROSOL, METERED RESPIRATORY (INHALATION) 2 TIMES DAILY
Qty: 1 INHALER | Refills: 0 | Status: CANCELLED | OUTPATIENT
Start: 2018-03-25

## 2018-03-27 ENCOUNTER — TELEPHONE (OUTPATIENT)
Dept: OBGYN CLINIC | Age: 63
End: 2018-03-27

## 2018-03-31 DIAGNOSIS — J45.21 MILD INTERMITTENT ASTHMA WITH ACUTE EXACERBATION: ICD-10-CM

## 2018-04-02 RX ORDER — FLUTICASONE PROPIONATE 220 UG/1
1 AEROSOL, METERED RESPIRATORY (INHALATION) 2 TIMES DAILY
Qty: 12 INHALER | Refills: 0 | Status: SHIPPED | OUTPATIENT
Start: 2018-04-02 | End: 2018-04-29 | Stop reason: SDUPTHER

## 2018-04-05 DIAGNOSIS — K21.9 GASTROESOPHAGEAL REFLUX DISEASE WITHOUT ESOPHAGITIS: Primary | ICD-10-CM

## 2018-04-05 RX ORDER — PANTOPRAZOLE SODIUM 40 MG/1
TABLET, DELAYED RELEASE ORAL
Qty: 60 TABLET | Refills: 1 | Status: SHIPPED | OUTPATIENT
Start: 2018-04-05 | End: 2019-06-03 | Stop reason: SDUPTHER

## 2018-04-29 DIAGNOSIS — J45.21 MILD INTERMITTENT ASTHMA WITH ACUTE EXACERBATION: ICD-10-CM

## 2018-04-30 RX ORDER — FLUTICASONE PROPIONATE 220 UG/1
1 AEROSOL, METERED RESPIRATORY (INHALATION) 2 TIMES DAILY
Qty: 12 INHALER | Refills: 0 | Status: SHIPPED | OUTPATIENT
Start: 2018-04-30 | End: 2019-01-17 | Stop reason: SDUPTHER

## 2018-05-23 ENCOUNTER — TELEPHONE (OUTPATIENT)
Dept: INTERNAL MEDICINE CLINIC | Facility: CLINIC | Age: 63
End: 2018-05-23

## 2018-05-23 DIAGNOSIS — R06.02 EXERTIONAL SHORTNESS OF BREATH: Primary | ICD-10-CM

## 2018-05-23 NOTE — TELEPHONE ENCOUNTER
Pressure stockings 20-30 mm compression open toe knee highs  Usually she gets them from [employer]  Need 2 pair  Please call pt [phone]  Pt has a NM stress test open order, but she would is wondering if she should have a regular stress test   The nuclear last time she needed benadril 50 mg IV  Lips and nose was itchey  Should she have nuclear done again  Talk to her about this also when you call

## 2018-05-24 DIAGNOSIS — R60.9 EDEMA, UNSPECIFIED TYPE: Primary | ICD-10-CM

## 2018-06-05 DIAGNOSIS — M25.50 ARTHRALGIA, UNSPECIFIED JOINT: Primary | ICD-10-CM

## 2018-06-05 RX ORDER — DIAZEPAM 5 MG/1
5 TABLET ORAL EVERY 8 HOURS PRN
Qty: 30 TABLET | Refills: 0 | Status: SHIPPED | OUTPATIENT
Start: 2018-06-05 | End: 2018-07-27

## 2018-07-09 ENCOUNTER — OFFICE VISIT (OUTPATIENT)
Dept: NEUROLOGY | Facility: CLINIC | Age: 63
End: 2018-07-09
Payer: COMMERCIAL

## 2018-07-09 VITALS
BODY MASS INDEX: 33.97 KG/M2 | HEIGHT: 64 IN | DIASTOLIC BLOOD PRESSURE: 76 MMHG | WEIGHT: 199 LBS | HEART RATE: 66 BPM | SYSTOLIC BLOOD PRESSURE: 114 MMHG

## 2018-07-09 DIAGNOSIS — G43.009 MIGRAINE WITHOUT AURA AND WITHOUT STATUS MIGRAINOSUS, NOT INTRACTABLE: ICD-10-CM

## 2018-07-09 DIAGNOSIS — M54.2 CERVICALGIA: ICD-10-CM

## 2018-07-09 DIAGNOSIS — R41.3 MEMORY DIFFICULTY: Primary | ICD-10-CM

## 2018-07-09 PROCEDURE — 99213 OFFICE O/P EST LOW 20 MIN: CPT | Performed by: PSYCHIATRY & NEUROLOGY

## 2018-07-09 RX ORDER — METHOCARBAMOL 500 MG/1
500 TABLET, FILM COATED ORAL AS NEEDED
COMMUNITY
End: 2018-11-17 | Stop reason: SDUPTHER

## 2018-07-09 NOTE — PROGRESS NOTES
Digna Mena is a 57 y o  female with history of memory difficulty, migraine headaches and neck pain    Assessment:  1  Memory difficulty    2  Migraine without aura and without status migrainosus, not intractable    3  Cervicalgia        Plan:  Initiate physical therapy  Re-evaluate 3 months    Discussion:  Papi Hankins reports that she is quite fatigued in is a result did not want to participate in the [address] test today  She is having issues with neck pain and occasional headaches  Will initiate physical therapy and re-evaluate her in a few months      Subjective:    HPI  Papi Hankins reports that she does not feel that she has changed much cognitively  She does find that when she is stressed and fatigued she has more difficulty focusing  She states that family members sometimes report that she repeats things but this does not happen frequently  More recently she has been having increased neck pain symptoms radiate into her shoulders and medial scapular border area  She denies any radicular pain in the upper extremities  She finds that when her neck is bothering her she is more prone to getting headaches  She does not get the severe migraine headaches that she experienced in the perimenstrual   When she was younger very often  She usually finds that Excedrin nerve Fioricet gets rid of her headache but sometimes she does rely on the Zomig      Past Medical History:   Diagnosis Date    Acid reflux     Allergic rhinitis     Last Assessed: 11/2/2017    Anesthesia complication     HYPOTENSION    Arthritis     Asthma     Bigeminy     history    DVT of leg (deep venous thrombosis) ([facility] [address] ) 2001    left    Female pelvic pain     Intermittent palpitations     Irregular heart beat     Migraines     Palpitations     Pes planus     unspecified laterality;  Last Assessed: 4/25/2014    Thyroid nodule     Trigeminy     history    Wears glasses        Family History:  Family History   Problem Relation Age of Onset  Endometrial cancer Mother     Migraines Mother     Diabetes Father     Heart disease Father     Heart attack Father     Thyroid cancer Brother     Diabetes type II Brother     Growth hormone deficiency Daughter     Alzheimer's disease Maternal Aunt     Thyroid cancer Maternal Aunt     Diabetes Paternal Aunt     Hashimoto's thyroiditis Paternal Aunt         Total Thyroidectomy    Other Maternal Uncle         Brain Tumor    Alzheimer's disease Maternal Uncle     Cancer Family        Past Surgical History:  Past Surgical History:   Procedure Laterality Date    APPENDECTOMY       SECTION      X 2    CHOLECYSTECTOMY      COLONOSCOPY      DILATION AND CURETTAGE OF UTERUS      JOINT REPLACEMENT      left TKR    KNEE SURGERY Left     X 3    IL ESOPHAGOGASTRODUODENOSCOPY TRANSORAL DIAGNOSTIC N/A 3/14/2016    Procedure: ESOPHAGOGASTRODUODENOSCOPY (EGD); Surgeon: Patrizia Briones MD;  [facility]; Service: Gastroenterology    [place] LAPAROSCOPY W TOT HYSTERECTUTERUS <=[address] TUBE/OVARY Bilateral 2016    Procedure: HYSTERECTOMY LAPAROSCOPIC TOTAL ,BSO;  Surgeon: Michela Gamez MD;  [facility];  Service: Gynecology Oncology    REPLACEMENT TOTAL KNEE  2014    WISDOM TOOTH EXTRACTION         Social History:   reports that she quit smoking about 40 years ago  She has never used smokeless tobacco  She reports that she drinks alcohol  She reports that she does not use drugs      Allergies:  Naproxen      Current Outpatient Prescriptions:     albuterol (VENTOLIN HFA) 90 mcg/act inhaler, 1-2 puffs 4 times a day as needed, Disp: 18 Inhaler, Rfl: 0    ALPRAZolam (XANAX) 0 25 mg tablet, Take 1 tablet (0 25 mg total) by mouth daily at bedtime as needed for anxiety, Disp: 30 tablet, Rfl: 2    butalbital-acetaminophen-caffeine (FIORICET,ESGIC) -40 mg per tablet, Take 1 tablet by mouth daily as needed, Disp: , Rfl: 3    clobetasol (TEMOVATE) 0 05 % cream, Apply topically, Disp: , Rfl:     cycloSPORINE (RESTASIS) 0 05 % ophthalmic emulsion, Apply 1 drop to eye 2 (two) times a day, Disp: , Rfl:     diazepam (VALIUM) 5 mg tablet, Take 1 tablet (5 mg total) by mouth every 8 (eight) hours as needed (as needed) for up to 30 days, Disp: 30 tablet, Rfl: 0    diphenhydrAMINE (BENADRYL) 25 mg capsule, Take by mouth daily at bedtime as needed  , Disp: , Rfl:     Ergocalciferol (VITAMIN D2) 2000 UNITS TABS, Take 2,000 Units by mouth daily  , Disp: , Rfl:     Evening Primrose Oil 1000 MG CAPS, Take 1 capsule by mouth daily, Disp: , Rfl:     FLOVENT  MCG/ACT inhaler, INHALE 1 PUFF 2 (TWO) TIMES A DAY, Disp: 12 Inhaler, Rfl: 0    fluticasone (FLONASE) 50 mcg/act nasal spray, 2 sprays into each nostril daily, Disp: , Rfl:     loratadine (CLARITIN) 10 mg tablet, Take 1 tablet (10 mg total) by mouth daily, Disp: 30 tablet, Rfl: 0    loteprednol etabonate (LOTEMAX) 0 5 % ophthalmic suspension, Administer 1 drop to both eyes 3 (three) times a day as needed  , Disp: , Rfl:     Magnesium 200 MG TABS, Take 1 tablet by mouth daily, Disp: , Rfl:     methocarbamol (ROBAXIN) 500 mg tablet, Take 500 mg by mouth as needed for muscle spasms, Disp: , Rfl:     montelukast (SINGULAIR) 10 mg tablet, Take 1 tablet (10 mg total) by mouth daily at bedtime, Disp: 30 tablet, Rfl: 0    Multiple Vitamin (MULTIVITAMIN) capsule, Take 1 capsule by mouth daily  , Disp: , Rfl:     Omega-3 Fatty Acids (OMEGA 3 PO), Take 2 capsules by mouth 2 (two) times a day   , Disp: , Rfl:     pantoprazole (PROTONIX) 40 mg tablet, TAKE 1 TABLET TWICE DAILY, Disp: 60 tablet, Rfl: 1    ZOLMitriptan (ZOMIG) 5 MG tablet, Take 1 tablet (5 mg total) by mouth daily as needed (1 daily p r n  headache), Disp: 8 tablet, Rfl: 0    I have reviewed the past medical, social and family history, current medications, allergies, vitals, review of systems and updated this information as appropriate today     Objective:    Vitals:  Blood pressure 114/76, pulse 66, height 5' 4" (1 626 m), weight 90 3 kg (199 lb), not currently breastfeeding  Physical Exam    Neurological Exam  GENERAL:  Well-developed well-nourished woman in no acute distress  HEENT/NECK: Head is atraumatic normocephalic, neck is supple with restricted range of motion more prominently to left than right rotation  Tenderness is noted in the lateral cervical regions with spasm noted in the trapezius areas bilaterally  CARDIOVASCULAR:  No Carotid bruit  NEUROLOGIC:  Mental Status: Awake and alert without aphasia  Cranial Nerves: Extraocular movements are full  Face is symmetrical  Motor:  No drift is noted on arm extension  Coordination:  Finger-to-nose testing is performed accurately  Romberg is negative  Gait is stable            ROS:    Review of Systems   Constitutional: Negative  HENT: Negative  Eyes: Negative  Respiratory: Negative  Cardiovascular: Negative  Gastrointestinal: Negative  Endocrine: Negative  Genitourinary: Negative  Musculoskeletal: Positive for joint swelling and neck pain  Skin: Negative  Allergic/Immunologic: Negative  Neurological: Positive for headaches  Hematological: Negative  Psychiatric/Behavioral: Positive for confusion

## 2018-07-20 ENCOUNTER — APPOINTMENT (OUTPATIENT)
Dept: PHYSICAL THERAPY | Facility: CLINIC | Age: 63
End: 2018-07-20
Payer: COMMERCIAL

## 2018-07-27 ENCOUNTER — OFFICE VISIT (OUTPATIENT)
Dept: OBGYN CLINIC | Facility: MEDICAL CENTER | Age: 63
End: 2018-07-27
Payer: COMMERCIAL

## 2018-07-27 ENCOUNTER — EVALUATION (OUTPATIENT)
Dept: PHYSICAL THERAPY | Facility: CLINIC | Age: 63
End: 2018-07-27
Payer: COMMERCIAL

## 2018-07-27 ENCOUNTER — APPOINTMENT (OUTPATIENT)
Dept: RADIOLOGY | Facility: MEDICAL CENTER | Age: 63
End: 2018-07-27
Payer: COMMERCIAL

## 2018-07-27 VITALS — BODY MASS INDEX: 33.97 KG/M2 | HEIGHT: 64 IN | WEIGHT: 199 LBS

## 2018-07-27 DIAGNOSIS — M17.11 PRIMARY OSTEOARTHRITIS OF RIGHT KNEE: ICD-10-CM

## 2018-07-27 DIAGNOSIS — M54.2 CERVICALGIA: Primary | ICD-10-CM

## 2018-07-27 DIAGNOSIS — M25.561 RIGHT KNEE PAIN, UNSPECIFIED CHRONICITY: ICD-10-CM

## 2018-07-27 DIAGNOSIS — G89.29 CHRONIC PAIN OF RIGHT KNEE: Primary | ICD-10-CM

## 2018-07-27 DIAGNOSIS — M25.461 EFFUSION OF RIGHT KNEE: ICD-10-CM

## 2018-07-27 DIAGNOSIS — Z00.00 HEALTH CARE MAINTENANCE: ICD-10-CM

## 2018-07-27 DIAGNOSIS — M25.561 CHRONIC PAIN OF RIGHT KNEE: Primary | ICD-10-CM

## 2018-07-27 PROCEDURE — G8979 MOBILITY GOAL STATUS: HCPCS | Performed by: PHYSICAL THERAPIST

## 2018-07-27 PROCEDURE — 99213 OFFICE O/P EST LOW 20 MIN: CPT | Performed by: ORTHOPAEDIC SURGERY

## 2018-07-27 PROCEDURE — G8978 MOBILITY CURRENT STATUS: HCPCS | Performed by: PHYSICAL THERAPIST

## 2018-07-27 PROCEDURE — 97161 PT EVAL LOW COMPLEX 20 MIN: CPT | Performed by: PHYSICAL THERAPIST

## 2018-07-27 PROCEDURE — 73560 X-RAY EXAM OF KNEE 1 OR 2: CPT

## 2018-07-27 PROCEDURE — 97014 ELECTRIC STIMULATION THERAPY: CPT | Performed by: PHYSICAL THERAPIST

## 2018-07-27 PROCEDURE — 97140 MANUAL THERAPY 1/> REGIONS: CPT | Performed by: PHYSICAL THERAPIST

## 2018-07-27 PROCEDURE — 20610 DRAIN/INJ JOINT/BURSA W/O US: CPT | Performed by: ORTHOPAEDIC SURGERY

## 2018-07-27 RX ORDER — BETAMETHASONE SODIUM PHOSPHATE AND BETAMETHASONE ACETATE 3; 3 MG/ML; MG/ML
12 INJECTION, SUSPENSION INTRA-ARTICULAR; INTRALESIONAL; INTRAMUSCULAR; SOFT TISSUE
Status: COMPLETED | OUTPATIENT
Start: 2018-07-27 | End: 2018-07-27

## 2018-07-27 RX ORDER — LIDOCAINE HYDROCHLORIDE 10 MG/ML
2 INJECTION, SOLUTION INFILTRATION; PERINEURAL
Status: COMPLETED | OUTPATIENT
Start: 2018-07-27 | End: 2018-07-27

## 2018-07-27 RX ORDER — BUPIVACAINE HYDROCHLORIDE 2.5 MG/ML
2 INJECTION, SOLUTION INFILTRATION; PERINEURAL
Status: COMPLETED | OUTPATIENT
Start: 2018-07-27 | End: 2018-07-27

## 2018-07-27 RX ORDER — DIAZEPAM 5 MG/1
5 TABLET ORAL EVERY 8 HOURS PRN
COMMUNITY
End: 2018-08-16 | Stop reason: SDUPTHER

## 2018-07-27 RX ADMIN — BUPIVACAINE HYDROCHLORIDE 2 ML: 2.5 INJECTION, SOLUTION INFILTRATION; PERINEURAL at 13:29

## 2018-07-27 RX ADMIN — LIDOCAINE HYDROCHLORIDE 2 ML: 10 INJECTION, SOLUTION INFILTRATION; PERINEURAL at 13:29

## 2018-07-27 RX ADMIN — BETAMETHASONE SODIUM PHOSPHATE AND BETAMETHASONE ACETATE 12 MG: 3; 3 INJECTION, SUSPENSION INTRA-ARTICULAR; INTRALESIONAL; INTRAMUSCULAR; SOFT TISSUE at 13:29

## 2018-07-27 NOTE — PROGRESS NOTES
PT Evaluation     Today's date: 2018  Patient name: Radha Perry  : 1955  MRN: [id number]  Referring provider: Sivakumar De Santiago MD  Dx:   Encounter Diagnosis     ICD-10-CM    1  Cervicalgia M54 2 Ambulatory referral to Physical Therapy                  Assessment  Impairments: abnormal or restricted ROM, activity intolerance, impaired physical strength, lacks appropriate home exercise program, pain with function, poor posture  and poor body mechanics    Assessment details: Radha Perry is a 57 y o  female who presents with pain, decreased strength, decreased ROM and decreased joint mobility  Due to these impairments, Patient has difficulty performing a/iadls, recreational activities and work-related activities  Patient's clinical presentation is consistent with their referring diagnosis of cervicalgia  Patient would benefit from skilled physical therapy to address their aforementioned impairments, improve their level of function and to improve their overall quality of life  Understanding of Dx/Px/POC: excellent  Goals  ST-3 WEEKS  1  Decrease pain by 2 points on VAS at its worst   2   Increase ROM by > 5 deg in all deficients planes  3   Increase core/postural strength by 1/2 MMT grade in all deficient planes  LT-6 WEEKS  1  Patient to be independent with a/iadls  2  Increase functional activities for leisure and home activities to previous LOF    3  Independent with HEP and/or fitness program     Plan  Patient would benefit from: skilled physical therapy  Planned modality interventions: cryotherapy, thermotherapy: hydrocollator packs and unattended electrical stimulation  Planned therapy interventions: behavior modification, body mechanics training, aquatic therapy, home exercise program, joint mobilization, manual therapy, neuromuscular re-education, patient education, postural training, strengthening, stretching, therapeutic activities, therapeutic exercise and massage  Frequency: 3x week (2-3x week)  Duration in visits: 3  Duration in weeks: 12  Plan of Care beginning date: 2018  Plan of Care expiration date: 10/25/2018  Treatment plan discussed with: patient        Subjective Evaluation    History of Present Illness  Date of onset: 2018  Mechanism of injury: Neck pain and headaches recently increased over the past few months  Patient relates pain increase to work related activities and stress  Patient notes an ongoing history of neck and back pain over the years for which she has received PT and massage therapy in the past with good management of symptoms  She was recently seen by neurologist and is now referred for out patient PT  Quality of life: good    Pain  Current pain ratin  At best pain rating: 3  At worst pain ratin  Location: Bilateral trap, paracerv, scapular area with headaches  Quality: burning, cramping, pressure, radiating and throbbing  Aggravating factors: sitting, overhead activity and lifting (computer work)  Progression: no change    Hand dominance: right    Treatments  Previous treatment: physical therapy and massage  Patient Goals  Patient goals for therapy: decreased pain, increased motion, independence with ADLs/IADLs and increased strength          Objective     Postural Observations  Seated posture: fair  Standing posture: fair    Additional Postural Observation Details  Forward head with anterior rounding of both shoulders  Left shoulder elevated compared to right  Palpation     Additional Palpation Details  Moderate tightness with tenderness bilateral trapezius, paracervicals, suboccipital and scapular musculature      Neurological Testing     Additional Neurological Details  Intact to light touch bilateral upper extremities    Active Range of Motion   Cervical/Thoracic Spine   Cervical    Flexion: WFL and with pain  Extension: WFL  Left lateral flexion: 25 degrees with pain  Right lateral flexion: 20 degrees with pain  Left rotation: 50 degrees with pain  Right rotation: 45 degrees with pain    Strength/Myotome Testing     Additional Strength Details  Strength WFLs bilateral upper extremities      Tests     Additional Tests Details  NDI: 30%      Flowsheet Rows      Most Recent Value   PT/OT G-Codes   Assessment Type  Evaluation   G code set  Mobility: Walking & Moving Around   Mobility: Walking and Moving Around Current Status ()  CJ   Mobility: Walking and Moving Around Goal Status ()  CI          Precautions: LBP, B knee pain, Left TKR    Daily Treatment Diary     Manual  7/27            cervical 10            Upper trap 10            Arm pulls 10                                          Exercise Diary                                                                                                                                                                                                                                                                                      Modalities  7/27            MH/ES trap/scap HT 10'

## 2018-07-27 NOTE — PROGRESS NOTES
Assessment:  1  Chronic pain of right knee  XR knee 1 or 2 vw right   2  Primary osteoarthritis of right knee     3  Effusion of right knee     4  Health care maintenance  Ambulatory referral for Orthotics       Plan:  Right knee x-rays show OA, aspiration with cortisone injection performed today  The cortisone injection can be repeated every 3 months as needed  WBAT  Activities as tolerated  ICE and elevate as needed  Post-injection protocol advised  Referral for orthotics with Gisselle Bean     To do next visit:  Return if symptoms worsen or fail to improve  Scribe Attestation    I,:   Luis Presley am acting as a scribe while in the presence of the attending physician :        I,:   Kade Lagunas MD personally performed the services described in this documentation    as scribed in my presence :              Subjective: Miah Rocha is a 57 y o  female who presents for evaluation of her right knee due to pain and varying stiffness  Her pain is diffuse throughout her knee  She uses a [address] for ambulatory assistance  Review of systems negative unless otherwise specified in HPI      Past Medical History:   Diagnosis Date    Acid reflux     Allergic rhinitis     Last Assessed: 2017    Anesthesia complication     HYPOTENSION    Arthritis     Asthma     Bigeminy     history    DVT of leg (deep venous thrombosis) ([facility] [address] )     left    Female pelvic pain     Intermittent palpitations     Irregular heart beat     Migraines     Palpitations     Pes planus     unspecified laterality;  Last Assessed: 2014    Thyroid nodule     Trigeminy     history    Wears glasses        Past Surgical History:   Procedure Laterality Date    APPENDECTOMY       SECTION      X 2    CHOLECYSTECTOMY      COLONOSCOPY      DILATION AND CURETTAGE OF UTERUS      JOINT REPLACEMENT      left TKR    KNEE SURGERY Left     X 3    NM ESOPHAGOGASTRODUODENOSCOPY TRANSORAL DIAGNOSTIC N/A 3/14/2016 Procedure: ESOPHAGOGASTRODUODENOSCOPY (EGD); Surgeon: Kaitlin Roberts MD;  [facility];   Service: Gastroenterology    [place] LAPAROSCOPY W TOT HYSTERECTUTERUS <=[address] [place] TUBE/OVARY Bilateral 7/8/2016    Procedure: HYSTERECTOMY LAPAROSCOPIC TOTAL ,BSO;  Surgeon: Deepa Caruso MD;  [facility];  Service: Gynecology Oncology    REPLACEMENT TOTAL KNEE  2014    WISDOM TOOTH EXTRACTION         Family History   Problem Relation Age of Onset    Endometrial cancer Mother     Migraines Mother     Diabetes Father     Heart disease Father     Heart attack Father     Thyroid cancer Brother     Diabetes type II Brother     Growth hormone deficiency Daughter     Alzheimer's disease Maternal Aunt     Thyroid cancer Maternal Aunt     Diabetes Paternal Aunt     Hashimoto's thyroiditis Paternal Aunt         Total Thyroidectomy    Other Maternal Uncle         Brain Tumor    Alzheimer's disease Maternal Uncle     Cancer Family        Social History     Occupational History    Pediatrics      Social History Main Topics    Smoking status: Former Smoker     Quit date: 6/20/1978    Smokeless tobacco: Never Used      Comment: as teenager    Alcohol use Yes      Comment: socially    Drug use: No    Sexual activity: Not Currently         Current Outpatient Prescriptions:     albuterol (VENTOLIN HFA) 90 mcg/act inhaler, 1-2 puffs 4 times a day as needed, Disp: 18 Inhaler, Rfl: 0    ALPRAZolam (XANAX) 0 25 mg tablet, Take 1 tablet (0 25 mg total) by mouth daily at bedtime as needed for anxiety, Disp: 30 tablet, Rfl: 2    butalbital-acetaminophen-caffeine (FIORICET,ESGIC) -40 mg per tablet, Take 1 tablet by mouth daily as needed, Disp: , Rfl: 3    clobetasol (TEMOVATE) 0 05 % cream, Apply topically, Disp: , Rfl:     cycloSPORINE (RESTASIS) 0 05 % ophthalmic emulsion, Apply 1 drop to eye 2 (two) times a day, Disp: , Rfl:     diazepam (VALIUM) 5 mg tablet, Take 5 mg by mouth every 8 (eight) hours as needed for anxiety, Disp: , Rfl:     diphenhydrAMINE (BENADRYL) 25 mg capsule, Take by mouth daily at bedtime as needed  , Disp: , Rfl:     Ergocalciferol (VITAMIN D2) 2000 UNITS TABS, Take 2,000 Units by mouth daily  , Disp: , Rfl:     Evening Primrose Oil 1000 MG CAPS, Take 1 capsule by mouth daily, Disp: , Rfl:     FLOVENT  MCG/ACT inhaler, INHALE 1 PUFF 2 (TWO) TIMES A DAY, Disp: 12 Inhaler, Rfl: 0    fluticasone (FLONASE) 50 mcg/act nasal spray, 2 sprays into each nostril daily, Disp: , Rfl:     loratadine (CLARITIN) 10 mg tablet, Take 1 tablet (10 mg total) by mouth daily, Disp: 30 tablet, Rfl: 0    loteprednol etabonate (LOTEMAX) 0 5 % ophthalmic suspension, Administer 1 drop to both eyes 3 (three) times a day as needed  , Disp: , Rfl:     Magnesium 200 MG TABS, Take 1 tablet by mouth daily, Disp: , Rfl:     methocarbamol (ROBAXIN) 500 mg tablet, Take 500 mg by mouth as needed for muscle spasms, Disp: , Rfl:     montelukast (SINGULAIR) 10 mg tablet, Take 1 tablet (10 mg total) by mouth daily at bedtime, Disp: 30 tablet, Rfl: 0    Multiple Vitamin (MULTIVITAMIN) capsule, Take 1 capsule by mouth daily  , Disp: , Rfl:     Omega-3 Fatty Acids (OMEGA 3 PO), Take 2 capsules by mouth 2 (two) times a day   , Disp: , Rfl:     pantoprazole (PROTONIX) 40 mg tablet, TAKE 1 TABLET TWICE DAILY, Disp: 60 tablet, Rfl: 1    ZOLMitriptan (ZOMIG) 5 MG tablet, Take 1 tablet (5 mg total) by mouth daily as needed (1 daily p r n  headache), Disp: 8 tablet, Rfl: 0    Allergies   Allergen Reactions    Naproxen      Heart palpitations            Vitals:       Objective:          Physical Exam                    Right Knee Exam     Tenderness   The patient is experiencing tenderness in the medial joint line (patellar facets)  Range of Motion   Extension: 0   Flexion: 110 (with stiffness and crepitus)     Muscle Strength     The patient has normal right knee strength      Tests   Lachman:  Anterior - negative      Drawer:       Anterior - negative    Posterior - negative  Varus: negative  Valgus: negative    Other   Erythema: absent  Sensation: normal    Comments:    Valgus alignment  No palpable masses posteriorly  Trace effusion  Mild swelling  NVID          Left Knee Exam     Other   Erythema: absent  Sensation: normal  Swelling: none  Effusion: no effusion present    Comments:    Neutral alignment  Well healed anterior incision            Diagnostics, reviewed and taken today if performed as documented: The attending physician has personally reviewed the pertinent films in PACS and interpretation is as follows:  Right knee: no fracture or dislocation, moderate medial joint space narrowing with mild to moderate patellofemoral DJD with subchondral sclerosis and osteophyte formation present       Procedures, if performed today:  Large joint arthrocentesis  Date/Time: 7/27/2018 1:29 PM  Consent given by: patient  Site marked: site marked  Supporting Documentation  Indications: pain   Procedure Details  Location: knee - R knee  Preparation: Patient was prepped and draped in the usual sterile fashion  Needle size: 22 G  Ultrasound guidance: no  Medications administered: 2 mL bupivacaine 0 25 %; 2 mL lidocaine 1 %; 12 mg betamethasone acetate-betamethasone sodium phosphate 6 (3-3) mg/mL    Aspirate amount: 5 mL  Aspirate: clear and yellow  Patient tolerance: patient tolerated the procedure well with no immediate complications  Dressing:  Sterile dressing applied

## 2018-07-30 ENCOUNTER — OFFICE VISIT (OUTPATIENT)
Dept: PHYSICAL THERAPY | Facility: CLINIC | Age: 63
End: 2018-07-30
Payer: COMMERCIAL

## 2018-07-30 DIAGNOSIS — M54.2 CERVICALGIA: Primary | ICD-10-CM

## 2018-07-30 PROCEDURE — 97014 ELECTRIC STIMULATION THERAPY: CPT | Performed by: PHYSICAL THERAPIST

## 2018-07-30 PROCEDURE — 97140 MANUAL THERAPY 1/> REGIONS: CPT | Performed by: PHYSICAL THERAPIST

## 2018-07-30 NOTE — PROGRESS NOTES
Daily Note     Today's date: 2018  Patient name: Ajith Abarca  : 1955  MRN: [id number]  Referring provider: Tonja Pacheco MD  Dx:   Encounter Diagnosis     ICD-10-CM    1  Cervicalgia M54 2        Start Time: 46  Stop Time: 830  Total time in clinic (min): 45 minutes    Subjective: Reports she is able to turn her head a little more  Attributes muscle tightness to stress  Objective: See treatment diary below    Continue to work on posture  Precautions: LBP, B knee pain, Left TKR    Daily Treatment Diary     Manual             cervical 10 10           Upper trap 10 10           Arm pulls/MFR 10 10                                         Exercise Diary                                                                                                                                                                                                                                                                                      Modalities             MH/ES trap/scap HT 10' 10                              Assessment: Tolerated treatment well  Patient would benefit from continued PT      Plan: Continue per plan of care

## 2018-08-01 ENCOUNTER — OFFICE VISIT (OUTPATIENT)
Dept: PHYSICAL THERAPY | Facility: CLINIC | Age: 63
End: 2018-08-01
Payer: COMMERCIAL

## 2018-08-01 DIAGNOSIS — M54.2 CERVICALGIA: Primary | ICD-10-CM

## 2018-08-01 DIAGNOSIS — E04.1 NONTOXIC SINGLE THYROID NODULE: ICD-10-CM

## 2018-08-01 PROCEDURE — 97140 MANUAL THERAPY 1/> REGIONS: CPT | Performed by: PHYSICAL THERAPIST

## 2018-08-01 PROCEDURE — 97110 THERAPEUTIC EXERCISES: CPT | Performed by: PHYSICAL THERAPIST

## 2018-08-01 PROCEDURE — 97014 ELECTRIC STIMULATION THERAPY: CPT | Performed by: PHYSICAL THERAPIST

## 2018-08-01 NOTE — PROGRESS NOTES
Daily Note     Today's date: 2018  Patient name: Jackie Griffin  : 1955  MRN: [id number]  Referring provider: Jeanne Rosales MD  Dx:   Encounter Diagnosis     ICD-10-CM    1  Cervicalgia M54 2                   Subjective: Patient reports mobility in cervical area has improved especially with driving  Pain scale 4/10  Objective: See treatment diary below    Precautions: LBP, B knee pain, Left TKR    Daily Treatment Diary     Manual            cervical 10 10 10          Upper trap 10 10 10          Arm pulls/MFR 10 10 10                                        Exercise Diary              shld shrugs   20                                                                                                                                                                                                                                                                     Modalities            MH/ES trap/scap HT 10' 10 10                             Assessment: Tolerated treatment well  Patient would benefit from continued PT      Plan: Continue per plan of care  Progress treatment as tolerated

## 2018-08-06 ENCOUNTER — OFFICE VISIT (OUTPATIENT)
Dept: PHYSICAL THERAPY | Facility: CLINIC | Age: 63
End: 2018-08-06
Payer: COMMERCIAL

## 2018-08-06 VITALS — SYSTOLIC BLOOD PRESSURE: 122 MMHG | HEART RATE: 70 BPM | DIASTOLIC BLOOD PRESSURE: 68 MMHG

## 2018-08-06 DIAGNOSIS — M54.2 CERVICALGIA: Primary | ICD-10-CM

## 2018-08-06 PROCEDURE — 97110 THERAPEUTIC EXERCISES: CPT | Performed by: PHYSICAL THERAPIST

## 2018-08-06 PROCEDURE — 97014 ELECTRIC STIMULATION THERAPY: CPT | Performed by: PHYSICAL THERAPIST

## 2018-08-06 PROCEDURE — 97140 MANUAL THERAPY 1/> REGIONS: CPT | Performed by: PHYSICAL THERAPIST

## 2018-08-06 NOTE — PROGRESS NOTES
Daily Note     Today's date: 2018  Patient name: Eliceo Arreola  : 1955  MRN: [id number]  Referring provider: Lissy Vega MD  Dx:   Encounter Diagnosis     ICD-10-CM    1  Cervicalgia M54 2                   Subjective: No new complaints over the weekend  Objective: See treatment diary below    Still has muscle spasms over the para-cervicals and upper traps  Precautions: LBP, B knee pain, Left TKR    Daily Treatment Diary     Manual           cervical 10 10 10 10         Upper trap 10 10 10 10         Arm pulls/MFR 10 10 10 10                                       Exercise Diary             shld shrugs   20 20         Cable Post Row    15         Cybex:             Post row    20/20                                                                                                                                                                                                                             Modalities           MH/ES trap/scap HT 10' 10 10 10                                Assessment: Tolerated treatment well  Patient would benefit from continued PT and began TE today in gym to increase strength in cervical and scapular areas and improve posture       Plan: Continue per plan of care  Progress treatment as tolerated

## 2018-08-10 ENCOUNTER — OFFICE VISIT (OUTPATIENT)
Dept: PHYSICAL THERAPY | Facility: CLINIC | Age: 63
End: 2018-08-10
Payer: COMMERCIAL

## 2018-08-10 DIAGNOSIS — M54.2 CERVICALGIA: Primary | ICD-10-CM

## 2018-08-10 PROCEDURE — 97014 ELECTRIC STIMULATION THERAPY: CPT | Performed by: PHYSICAL THERAPIST

## 2018-08-10 PROCEDURE — 97110 THERAPEUTIC EXERCISES: CPT | Performed by: PHYSICAL THERAPIST

## 2018-08-10 PROCEDURE — 97140 MANUAL THERAPY 1/> REGIONS: CPT | Performed by: PHYSICAL THERAPIST

## 2018-08-10 NOTE — PROGRESS NOTES
Daily Note     Today's date: 8/10/2018  Patient name: Vamshi Hoyos  : 1955  MRN: [id number]  Referring provider: Pavithra Canchola MD  Dx:   Encounter Diagnosis     ICD-10-CM    1  Cervicalgia M54 2                   Subjective: Continues to report she attributes most cervical pain to stress  Objective: See treatment diary below    Cervical rotation has improved especially left  Lateral bending also has increased  Precautions: LBP, B knee pain, Left TKR    Daily Treatment Diary     Manual  7/27 7/30 8/1 8/6 8/10        cervical 10 10 10 10 10        Upper trap 10 10 10 10 10        Arm pulls/MFR 10 10 10 10 10                                      Exercise Diary    8/1 8/6 8/10        shld shrugs   20 20 20        Cable Post Row    15/30 15/30        Cybex:             Post row    20 25/20                                                                                                                                                                                                                            Modalities  7/27 7/30 8/1 8/6 8/10        MH/ES trap/scap HT 10' 10 10 10 10                             Assessment: Tolerated treatment well  Patient exhibited good technique with therapeutic exercises and would benefit from continued PT      Plan: Continue per plan of care  Progress treatment as tolerated  Will continue to increase scapular and cervical exercises as she can tolerate

## 2018-08-13 ENCOUNTER — OFFICE VISIT (OUTPATIENT)
Dept: PHYSICAL THERAPY | Facility: CLINIC | Age: 63
End: 2018-08-13
Payer: COMMERCIAL

## 2018-08-13 DIAGNOSIS — M54.2 CERVICALGIA: Primary | ICD-10-CM

## 2018-08-13 PROCEDURE — 97014 ELECTRIC STIMULATION THERAPY: CPT | Performed by: PHYSICAL THERAPIST

## 2018-08-13 PROCEDURE — 97110 THERAPEUTIC EXERCISES: CPT | Performed by: PHYSICAL THERAPIST

## 2018-08-13 PROCEDURE — 97140 MANUAL THERAPY 1/> REGIONS: CPT | Performed by: PHYSICAL THERAPIST

## 2018-08-13 NOTE — PROGRESS NOTES
Daily Note     Today's date: 2018  Patient name: Xavi Martinez  : 1955  MRN: [id number]  Referring provider: Heide Arredondo MD  Dx:   Encounter Diagnosis     ICD-10-CM    1  Cervicalgia M54 2        Start Time: 1350  Stop Time: 6024  Total time in clinic (min): 62 minutes    Subjective: Patient notes left shoulder pain over the weekend following performing yoga exercises  Today presents with trap and scapular pain and soreness with mild left shoulder pain  6/10 -8/10      Objective: See treatment diary below  Precautions: LBP, B knee pain, Left TKR    Daily Treatment Diary     Manual  7/27 7/30 8/1 8/6 8/10 8/13       cervical 10 10 10 10 10 10       Upper trap 10 10 10 10 10 10       Arm pulls/MFR 10 10 10 10 10 10                                     Exercise Diary    8/1 8/6 8/10 8/13       shld shrugs   20 20 20 20       Cable Post Row    15/30 15/30 15/30       Cybex:             Post row    2020 25/20 25/20                                                                                                                                                                                                                           Modalities  7/27 7/30 8/1 8/6 8/10 8/13       MH/ES trap/scap HT 10' 10 10 10 10 10                                Assessment: Tolerated treatment well  Patient would benefit from continued PT Responds well to manual therapy with decreased pain post       Plan: Continue per plan of care

## 2018-08-16 ENCOUNTER — OFFICE VISIT (OUTPATIENT)
Dept: INTERNAL MEDICINE CLINIC | Facility: CLINIC | Age: 63
End: 2018-08-16
Payer: COMMERCIAL

## 2018-08-16 VITALS
BODY MASS INDEX: 33.73 KG/M2 | WEIGHT: 197.6 LBS | DIASTOLIC BLOOD PRESSURE: 74 MMHG | HEART RATE: 64 BPM | OXYGEN SATURATION: 98 % | HEIGHT: 64 IN | SYSTOLIC BLOOD PRESSURE: 130 MMHG

## 2018-08-16 DIAGNOSIS — M62.838 MUSCLE SPASM: ICD-10-CM

## 2018-08-16 DIAGNOSIS — Z00.00 HEALTHCARE MAINTENANCE: ICD-10-CM

## 2018-08-16 DIAGNOSIS — R00.2 PALPITATIONS: ICD-10-CM

## 2018-08-16 DIAGNOSIS — E04.1 THYROID NODULE: ICD-10-CM

## 2018-08-16 DIAGNOSIS — R06.02 SHORTNESS OF BREATH: Primary | ICD-10-CM

## 2018-08-16 PROCEDURE — 99213 OFFICE O/P EST LOW 20 MIN: CPT | Performed by: INTERNAL MEDICINE

## 2018-08-16 RX ORDER — DIAZEPAM 5 MG/1
5 TABLET ORAL EVERY 8 HOURS PRN
Qty: 60 TABLET | Refills: 0 | Status: SHIPPED | OUTPATIENT
Start: 2018-08-16 | End: 2019-05-06 | Stop reason: SDUPTHER

## 2018-08-16 NOTE — PROGRESS NOTES
Assessment/Plan:  Have skin some concerns about cardiac status  She will complete her workup by cysts getting a stress test in seen the cardiologist     She is going to have an A1c and lipid profile done  She was having some problems with interstitial cystitis  She stills feels some discomfort and will see her gynecologist     She has a lot of muscle a paravertebral musculature  She has used Valium in the past   I gave her 60 to use 1 1 or 2 times per day  No refills  She is advised not to take Xanax while taking Valium  She should not take the Valium for prolonged periods and should not drive or operate machinery or drink alcohol  I will see her back here in 4 months or so  In the meantime she will follow up with endocrinology  No results found for this or any previous visit (from the past 1008 hour(s))  1  Shortness of breath  Echo stress test w contrast if indicated    Ambulatory referral to Cardiology   2  Palpitations  Echo stress test w contrast if indicated    Ambulatory referral to Cardiology   3  Muscle spasm  diazepam (VALIUM) 5 mg tablet   4  Healthcare maintenance  Lipid panel    HEMOGLOBIN A1C W/ EAG ESTIMATION       Orders Placed This Encounter   Procedures    Lipid panel    HEMOGLOBIN A1C W/ EAG ESTIMATION    Ambulatory referral to Cardiology    Echo stress test w contrast if indicated         Subjective:  She is in for follow-up  Has multiple problems including neck pain cervical spine discomfort  She had a history of dyspnea on exertion shortness of breath and intermittent palpitations  She did not yet do her stress test   Needs to do so  Also has allergic rhinitis  Has a lot of muscle spasm  Regarding her thyroid nodule she is going to an endocrinologist      Patient ID: Katie Garcia is a 57 y o  female      HPI as above    The following portions of the patient's history were reviewed and updated as appropriate:   She has a past medical history of Acid reflux; Allergic rhinitis; Anesthesia complication; Arthritis; Asthma; Bigeminy; DVT of leg (deep venous thrombosis) ([address] ) (); Female pelvic pain; Intermittent palpitations; Irregular heart beat; Migraines; Palpitations; Pes planus; Thyroid nodule; Trigeminy; and Wears glasses  ,   does not have any pertinent problems on file  ,   has a past surgical history that includes Replacement total knee (); Appendectomy; Knee surgery (Left);  section; Joint replacement; Dilation and curettage of uterus; Colonoscopy; [place] tooth extraction; Cholecystectomy; pr laparoscopy w tot hysterectuterus <=250 gram  w tube/ovary (Bilateral, 2016); and pr esophagogastroduodenoscopy transoral diagnostic (N/A, 3/14/2016)  ,  family history includes Alzheimer's disease in her maternal aunt and maternal uncle; Cancer in her family; Diabetes in her father and paternal aunt; Diabetes type II in her brother; Endometrial cancer in her mother; Growth hormone deficiency in her daughter; Hashimoto's thyroiditis in her paternal aunt; Heart attack in her father; Heart disease in her father; Migraines in her mother; Other in her maternal uncle; Thyroid cancer in her brother and maternal aunt  ,   reports that she quit smoking about 40 years ago  She has never used smokeless tobacco  She reports that she drinks alcohol  She reports that she does not use drugs  ,  is allergic to naproxen       Current Outpatient Prescriptions:     albuterol (VENTOLIN HFA) 90 mcg/act inhaler, 1-2 puffs 4 times a day as needed, Disp: 18 Inhaler, Rfl: 0    ALPRAZolam (XANAX) 0 25 mg tablet, Take 1 tablet (0 25 mg total) by mouth daily at bedtime as needed for anxiety, Disp: 30 tablet, Rfl: 2    butalbital-acetaminophen-caffeine (FIORICET,ESGIC) -40 mg per tablet, Take 1 tablet by mouth daily as needed, Disp: , Rfl: 3    clobetasol (TEMOVATE) 0 05 % cream, Apply topically, Disp: , Rfl:     cycloSPORINE (RESTASIS) 0 05 % ophthalmic emulsion, Apply 1 drop to eye 2 (two) times a day, Disp: , Rfl:     diazepam (VALIUM) 5 mg tablet, Take 1 tablet (5 mg total) by mouth every 8 (eight) hours as needed for anxiety, Disp: 60 tablet, Rfl: 0    diphenhydrAMINE (BENADRYL) 25 mg capsule, Take by mouth daily at bedtime as needed  , Disp: , Rfl:     Ergocalciferol (VITAMIN D2) 2000 UNITS TABS, Take 2,000 Units by mouth daily  , Disp: , Rfl:     Evening Primrose Oil 1000 MG CAPS, Take 1 capsule by mouth daily, Disp: , Rfl:     FLOVENT  MCG/ACT inhaler, INHALE 1 PUFF 2 (TWO) TIMES A DAY, Disp: 12 Inhaler, Rfl: 0    fluticasone (FLONASE) 50 mcg/act nasal spray, 2 sprays into each nostril daily, Disp: , Rfl:     loratadine (CLARITIN) 10 mg tablet, Take 1 tablet (10 mg total) by mouth daily, Disp: 30 tablet, Rfl: 0    loteprednol etabonate (LOTEMAX) 0 5 % ophthalmic suspension, Administer 1 drop to both eyes 3 (three) times a day as needed  , Disp: , Rfl:     Magnesium 200 MG TABS, Take 1 tablet by mouth daily, Disp: , Rfl:     methocarbamol (ROBAXIN) 500 mg tablet, Take 500 mg by mouth as needed for muscle spasms, Disp: , Rfl:     montelukast (SINGULAIR) 10 mg tablet, Take 1 tablet (10 mg total) by mouth daily at bedtime, Disp: 30 tablet, Rfl: 0    Multiple Vitamin (MULTIVITAMIN) capsule, Take 1 capsule by mouth daily  , Disp: , Rfl:     Omega-3 Fatty Acids (OMEGA 3 PO), Take 2 capsules by mouth 2 (two) times a day   , Disp: , Rfl:     pantoprazole (PROTONIX) 40 mg tablet, TAKE 1 TABLET TWICE DAILY, Disp: 60 tablet, Rfl: 1    ZOLMitriptan (ZOMIG) 5 MG tablet, Take 1 tablet (5 mg total) by mouth daily as needed (1 daily p r n  headache), Disp: 8 tablet, Rfl: 0    Review of Systems   Constitutional: Positive for activity change and fatigue  Negative for appetite change, chills, diaphoresis, fever and unexpected weight change     HENT: Negative for congestion, ear pain, hearing loss, mouth sores, nosebleeds, postnasal drip, sinus pain, sinus pressure, sore throat and trouble swallowing  Eyes: Negative for pain, discharge and visual disturbance  Respiratory: Negative for apnea, cough, chest tightness, shortness of breath and wheezing  Cardiovascular: Negative for chest pain, palpitations and leg swelling  Gastrointestinal: Negative for abdominal pain, anal bleeding, blood in stool, constipation, diarrhea, nausea and vomiting  Endocrine: Negative for polydipsia and polyphagia  Has a thyroid nodule  Genitourinary: Negative for decreased urine volume, dysuria, flank pain, frequency, hematuria and urgency  Musculoskeletal: Positive for arthralgias, back pain, gait problem, neck pain and neck stiffness  Negative for joint swelling and myalgias  Skin: Negative for rash and wound  Allergic/Immunologic: Negative for environmental allergies and food allergies  Neurological: Negative for dizziness, tremors, seizures, syncope, speech difficulty, light-headedness, numbness and headaches  Hematological: Negative for adenopathy  Does not bruise/bleed easily  Psychiatric/Behavioral: Positive for sleep disturbance  Negative for agitation, confusion, hallucinations and suicidal ideas  The patient is not nervous/anxious  Objective:  /74 (BP Location: Right arm, Patient Position: Sitting)   Pulse 64   Ht 5' 4" (1 626 m)   Wt 89 6 kg (197 lb 9 6 oz)   SpO2 98%   BMI 33 92 kg/m²      Physical Exam   Constitutional: She appears well-developed  No distress  Moderately overweight  HENT:   Head: Normocephalic  Right Ear: External ear normal    Left Ear: External ear normal    Nose: Nose normal    Mouth/Throat: Oropharynx is clear and moist  No oropharyngeal exudate  Eyes: Conjunctivae and EOM are normal  Pupils are equal, round, and reactive to light  Right eye exhibits no discharge  Left eye exhibits no discharge  Neck: Normal range of motion  Neck supple  No thyromegaly present     Cardiovascular: Normal rate, regular rhythm, normal heart sounds and intact distal pulses  Exam reveals no gallop and no friction rub  No murmur heard  Pulmonary/Chest: Effort normal and breath sounds normal  No respiratory distress  She has no wheezes  She has no rales  Abdominal: Soft  Bowel sounds are normal  She exhibits no distension and no mass  There is no tenderness  There is no rebound and no guarding  Moderately overweight  Musculoskeletal: Normal range of motion  She exhibits no edema, tenderness or deformity  Tenderness and muscle spasm in the paracervical musculature  Lymphadenopathy:     She has no cervical adenopathy  Neurological: She is alert  She has normal reflexes  No cranial nerve deficit  Coordination normal    Skin: Skin is warm and dry  No rash noted  No erythema  Psychiatric: She has a normal mood and affect  Her behavior is normal  Judgment and thought content normal    Nursing note and vitals reviewed

## 2018-08-17 ENCOUNTER — OFFICE VISIT (OUTPATIENT)
Dept: PHYSICAL THERAPY | Facility: CLINIC | Age: 63
End: 2018-08-17
Payer: COMMERCIAL

## 2018-08-17 ENCOUNTER — OFFICE VISIT (OUTPATIENT)
Dept: ENDOCRINOLOGY | Facility: CLINIC | Age: 63
End: 2018-08-17
Payer: COMMERCIAL

## 2018-08-17 VITALS
WEIGHT: 198 LBS | DIASTOLIC BLOOD PRESSURE: 80 MMHG | HEIGHT: 64 IN | SYSTOLIC BLOOD PRESSURE: 132 MMHG | BODY MASS INDEX: 33.8 KG/M2 | HEART RATE: 67 BPM

## 2018-08-17 DIAGNOSIS — M54.2 CERVICALGIA: Primary | ICD-10-CM

## 2018-08-17 DIAGNOSIS — R73.03 PREDIABETES: Primary | ICD-10-CM

## 2018-08-17 DIAGNOSIS — E04.1 THYROID NODULE: ICD-10-CM

## 2018-08-17 PROCEDURE — 97014 ELECTRIC STIMULATION THERAPY: CPT | Performed by: PHYSICAL THERAPIST

## 2018-08-17 PROCEDURE — 99213 OFFICE O/P EST LOW 20 MIN: CPT | Performed by: NURSE PRACTITIONER

## 2018-08-17 PROCEDURE — 97140 MANUAL THERAPY 1/> REGIONS: CPT | Performed by: PHYSICAL THERAPIST

## 2018-08-17 PROCEDURE — 97110 THERAPEUTIC EXERCISES: CPT | Performed by: PHYSICAL THERAPIST

## 2018-08-17 NOTE — PROGRESS NOTES
Established Patient Progress Note       Chief Complaint   Patient presents with    Thyroid Problem        History of Present Illness:     Zaynab Hurt is a 57 y o  female with a history of thyroid nodules and prediabetes  For the thyroid nodule she did not complete US for this year  Previous US showed small stable left thyroid nodule  She denies neck pain, dysphonia, dysphagia, or dyspnea  She denies symptoms of hypo or hyperthyroidism  She reports family history of thyroid cancer  For the prediabetes she is not currently on medication  Patient Active Problem List   Diagnosis    Arthralgia of hip, left    Pain in joint involving other specified sites    Arthritis of left knee    DVT of leg (deep venous thrombosis) ([facility] [address] )    Memory difficulty    Migraine, unspecified, not intractable, without status migrainosus    Mild intermittent asthma    Obesity (BMI 30 0-34  9)    Osteopenia of multiple sites    Prediabetes    Suprapubic abdominal pain    Chronic pain of right knee    Primary osteoarthritis of right knee    Effusion of right knee    Shortness of breath    Thyroid nodule      Past Medical History:   Diagnosis Date    Acid reflux     Allergic rhinitis     Last Assessed: 2017    Anesthesia complication     HYPOTENSION    Arthritis     Asthma     Bigeminy     history    DVT of leg (deep venous thrombosis) ([facility] [address] )     left    Female pelvic pain     Intermittent palpitations     Irregular heart beat     Migraines     Palpitations     Pes planus     unspecified laterality;  Last Assessed: 2014    Thyroid nodule     Trigeminy     history    Wears glasses       Past Surgical History:   Procedure Laterality Date    APPENDECTOMY       SECTION      X 2    CHOLECYSTECTOMY      COLONOSCOPY      DILATION AND CURETTAGE OF UTERUS      JOINT REPLACEMENT      left TKR    KNEE SURGERY Left     X 3    NE ESOPHAGOGASTRODUODENOSCOPY TRANSORAL DIAGNOSTIC N/A 3/14/2016    Procedure: ESOPHAGOGASTRODUODENOSCOPY (EGD); Surgeon: Jabari Chavez MD;  Location: [facility];   Service: Gastroenterology    PA LAPAROSCOPY W TOT HYSTERECTUTERUS <=250 Sharalyn Pummel TUBE/OVARY Bilateral 7/8/2016    Procedure: HYSTERECTOMY LAPAROSCOPIC TOTAL ,BSO;  Surgeon: Archie Sampson MD;  Location: [facility];  Service: Gynecology Oncology    REPLACEMENT TOTAL KNEE  2014    WISDOM TOOTH EXTRACTION        Family History   Problem Relation Age of Onset    Endometrial cancer Mother     Migraines Mother     Diabetes Father     Heart disease Father     Heart attack Father     Thyroid cancer Brother     Diabetes type II Brother     Growth hormone deficiency Daughter     Alzheimer's disease Maternal Aunt     Thyroid cancer Maternal Aunt     Diabetes Paternal Aunt     Hashimoto's thyroiditis Paternal Aunt         Total Thyroidectomy    Other Maternal Uncle         Brain Tumor    Alzheimer's disease Maternal Uncle     Cancer Family      Social History   Substance Use Topics    Smoking status: Former Smoker     Quit date: 6/20/1978    Smokeless tobacco: Never Used      Comment: as teenager    Alcohol use Yes      Comment: socially     Allergies   Allergen Reactions    Naproxen      Heart palpitations       Current Outpatient Prescriptions:     albuterol (VENTOLIN HFA) 90 mcg/act inhaler, 1-2 puffs 4 times a day as needed, Disp: 18 Inhaler, Rfl: 0    ALPRAZolam (XANAX) 0 25 mg tablet, Take 1 tablet (0 25 mg total) by mouth daily at bedtime as needed for anxiety, Disp: 30 tablet, Rfl: 2    butalbital-acetaminophen-caffeine (FIORICET,ESGIC) -40 mg per tablet, Take 1 tablet by mouth daily as needed, Disp: , Rfl: 3    clobetasol (TEMOVATE) 0 05 % cream, Apply topically, Disp: , Rfl:     cycloSPORINE (RESTASIS) 0 05 % ophthalmic emulsion, Apply 1 drop to eye 2 (two) times a day, Disp: , Rfl:     diazepam (VALIUM) 5 mg tablet, Take 1 tablet (5 mg total) by mouth every 8 (eight) hours as needed for anxiety, Disp: 60 tablet, Rfl: 0    diphenhydrAMINE (BENADRYL) 25 mg capsule, Take by mouth daily at bedtime as needed  , Disp: , Rfl:     Ergocalciferol (VITAMIN D2) 2000 UNITS TABS, Take 2,000 Units by mouth daily  , Disp: , Rfl:     Evening Primrose Oil 1000 MG CAPS, Take 1 capsule by mouth daily, Disp: , Rfl:     FLOVENT  MCG/ACT inhaler, INHALE 1 PUFF 2 (TWO) TIMES A DAY, Disp: 12 Inhaler, Rfl: 0    fluticasone (FLONASE) 50 mcg/act nasal spray, 2 sprays into each nostril daily, Disp: , Rfl:     loratadine (CLARITIN) 10 mg tablet, Take 1 tablet (10 mg total) by mouth daily, Disp: 30 tablet, Rfl: 0    loteprednol etabonate (LOTEMAX) 0 5 % ophthalmic suspension, Administer 1 drop to both eyes 3 (three) times a day as needed  , Disp: , Rfl:     Magnesium 200 MG TABS, Take 1 tablet by mouth daily, Disp: , Rfl:     methocarbamol (ROBAXIN) 500 mg tablet, Take 500 mg by mouth as needed for muscle spasms, Disp: , Rfl:     montelukast (SINGULAIR) 10 mg tablet, Take 1 tablet (10 mg total) by mouth daily at bedtime, Disp: 30 tablet, Rfl: 0    Multiple Vitamin (MULTIVITAMIN) capsule, Take 1 capsule by mouth daily  , Disp: , Rfl:     Omega-3 Fatty Acids (OMEGA 3 PO), Take 2 capsules by mouth 2 (two) times a day   , Disp: , Rfl:     pantoprazole (PROTONIX) 40 mg tablet, TAKE 1 TABLET TWICE DAILY, Disp: 60 tablet, Rfl: 1    ZOLMitriptan (ZOMIG) 5 MG tablet, Take 1 tablet (5 mg total) by mouth daily as needed (1 daily p r n  headache), Disp: 8 tablet, Rfl: 0    Review of Systems   Constitutional: Negative for chills and fever  HENT: Negative for sore throat and trouble swallowing  Eyes: Negative for visual disturbance  Respiratory: Negative for shortness of breath  Cardiovascular: Negative for chest pain and palpitations  Gastrointestinal: Negative for abdominal pain, constipation and diarrhea     Endocrine: Negative for cold intolerance, heat intolerance, polydipsia, polyphagia and polyuria  Genitourinary: Negative for frequency  Musculoskeletal: Negative for arthralgias and myalgias  Skin: Negative for rash  Neurological: Negative for dizziness and tremors  Hematological: Negative for adenopathy  Psychiatric/Behavioral: Negative for sleep disturbance  All other systems reviewed and are negative  Physical Exam:  Body mass index is 33 99 kg/m²  /80   Pulse 67   Ht 5' 4" (1 626 m)   Wt 89 8 kg (198 lb)   BMI 33 99 kg/m²    Wt Readings from Last 3 Encounters:   08/17/18 89 8 kg (198 lb)   08/16/18 89 6 kg (197 lb 9 6 oz)   07/27/18 90 3 kg (199 lb)       Physical Exam   Constitutional: She is oriented to person, place, and time  She appears well-developed and well-nourished  No distress  HENT:   Head: Normocephalic and atraumatic  Eyes: Conjunctivae are normal  Pupils are equal, round, and reactive to light  Neck: Normal range of motion  Neck supple  No thyromegaly present  Cardiovascular: Normal rate, regular rhythm and normal heart sounds  Pulmonary/Chest: Effort normal and breath sounds normal  No respiratory distress  She has no wheezes  She has no rales  Abdominal: Soft  Bowel sounds are normal  She exhibits no distension  There is no tenderness  Musculoskeletal: Normal range of motion  She exhibits no edema  Neurological: She is alert and oriented to person, place, and time  Skin: Skin is warm and dry  Psychiatric: She has a normal mood and affect  Vitals reviewed        Labs:     Component      Latest Ref Rng & Units 8/4/2017   Hemoglobin A1C      4 2 - 6 3 % 5 7     Component      Latest Ref Rng & Units 3/6/2018   TSH 3RD GENERATON      0 358 - 3 740 uIU/mL 1 581       THYROID ULTRASOUND 8/04/17     INDICATION: Follow-up nodule      COMPARISON: 11/14/2016      TECHNIQUE:   Ultrasound of the thyroid was performed with a high frequency linear transducer in transverse and sagittal planes including volumetric imaging sweeps as well as traditional still imaging technique      FINDINGS:  Normal homogeneous smooth echotexture      Right gland:  4 7 x 1 4 x 1 1 cm  No dominant nodules           Left gland:  4 4 x 1 0 x 1 1 cm  Stable 6 x 8 x 5 mm left lower pole nodule is noted           Isthmus: The isthmus is 0 3 cm in AP dimension           IMPRESSION:     Stable subcentimeter left lobe nodule  Impression & Plan:    Problem List Items Addressed This Visit     Prediabetes - Primary     She will be going soon for A1C and fasting glucose ordered by PCP  Educated on importance of diet, exercise, and weight loss  Thyroid nodule     Overdue for thyroid US, order placed  Check TSH and T4 to monitor thyroid function  Relevant Orders    US thyroid    T4, free    TSH, 3rd generation          Orders Placed This Encounter   Procedures    US thyroid     Standing Status:   Future     Standing Expiration Date:   8/17/2019     Scheduling Instructions:      No prep required  Please bring your physician order, insurance cards, a form of photo ID and a list of your medications with you  Arrive 15 minutes prior to your appointment time in order to register  To schedule appointment please call Central Scheduling at [phone]  Order Specific Question:   Reason for Exam:     Answer:   thyroid nodule    T4, free    TSH, 3rd generation     This is a patient instruction: This test is non-fasting  Please drink two glasses of water morning of bloodwork  Discussed with the patient and all questioned fully answered  She will call me if any problems arise  Follow-up appointment in 1 year       Counseled patient on diagnostic results, prognosis, risk and benefit of treatment options, instruction for management, importance of treatment compliance, Risk  factor reduction and impressions      Tess Sanchez

## 2018-08-17 NOTE — PROGRESS NOTES
Daily Note     Today's date: 2018  Patient name: Jackie Griffin  : 1955  MRN: [id number]  Referring provider: Jeanne Rosales MD  Dx:   Encounter Diagnosis     ICD-10-CM    1  Cervicalgia M54 2        Start Time: 820  Stop Time:   Total time in clinic (min): 59 minutes    Subjective: Patient notes migraine headache yesterday, had to leave work secondary to pain  Objective: See treatment diary below  Precautions: LBP, B knee pain, Left TKR    Daily Treatment Diary     Manual  7/27 7/30 8/1 8/6 8/10 8/13 8/17      cervical 10 10 10 10 10 10 10      Upper trap 10 10 10 10 10 10 10      Arm pulls/MFR 10 10 10 10 10        SO      10 10                       Exercise Diary    8/1 8/6 8/10 8/13 8/17      shld shrugs   20 20 20 20 20      Cable Post Row    15/30 15/30 15/30 15/30      Cybex:             Post row    20 25/20 25/20                                                                                                                                                                                                                           Modalities  7/27 7/30 8/1 8/6 8/10 8/13 8/17      MH/ES trap/scap HT 10' 10 10 10 10 10 10                                   Assessment: Tolerated treatment well  Patient would benefit from continued PT Decreased pain post manual therapy  Modified TE secondary to pain  Plan: Continue per plan of care

## 2018-08-20 ENCOUNTER — TRANSCRIBE ORDERS (OUTPATIENT)
Dept: ADMINISTRATIVE | Facility: HOSPITAL | Age: 63
End: 2018-08-20

## 2018-08-20 ENCOUNTER — EVALUATION (OUTPATIENT)
Dept: PHYSICAL THERAPY | Facility: CLINIC | Age: 63
End: 2018-08-20
Payer: COMMERCIAL

## 2018-08-20 DIAGNOSIS — M79.671 RIGHT FOOT PAIN: Primary | ICD-10-CM

## 2018-08-20 DIAGNOSIS — M76.829 POSTERIOR TIBIALIS MUSCLE DYSFUNCTION: ICD-10-CM

## 2018-08-20 DIAGNOSIS — Z12.39 SCREENING BREAST EXAMINATION: Primary | ICD-10-CM

## 2018-08-20 PROCEDURE — L3010 FOOT LONGITUDINAL ARCH SUPPO: HCPCS | Performed by: PHYSICAL THERAPIST

## 2018-08-20 PROCEDURE — G8978 MOBILITY CURRENT STATUS: HCPCS | Performed by: PHYSICAL THERAPIST

## 2018-08-20 PROCEDURE — 97161 PT EVAL LOW COMPLEX 20 MIN: CPT | Performed by: PHYSICAL THERAPIST

## 2018-08-20 PROCEDURE — G8979 MOBILITY GOAL STATUS: HCPCS | Performed by: PHYSICAL THERAPIST

## 2018-08-20 NOTE — PROGRESS NOTES
PT Evaluation     Today's date: 2018  Patient name: Mitesh Álvarez  : 1955  MRN: [id number]  Referring provider: Justa Caro MD  Dx: No diagnosis found  Assessment  Impairments: pain with function    Assessment details: Pt should benefit from custom orthotics due to post tib dysfunction and restricted LOF due to pain     Prognosis: good    Goals  Fit orthotics  Decrease pain    Plan  Patient would benefit from: orthotics  Planned therapy interventions: orthotic fitting/training  Frequency: 1x week  Duration in visits: 1  Treatment plan discussed with: patient        Subjective Evaluation    History of Present Illness  Mechanism of injury: Hx of L plantar fasciitis  Chronic R foot pain, worsened after TKA (with subsequent wt gain)  Pain is in R MLA and 1st MTP  Had custom orthotics made a few yrs ago with good success but needs them replaced  Presently in PT for neck pain    Recurrent probem    Quality of life: good    Pain  Current pain rating: 3  At best pain ratin  At worst pain ratin    Patient Goals  Patient goals for therapy: decreased pain          Objective  Severe pes planus  + Too many toes  Gait: antalgic, ER   calcaneal valgus bilat: 10 deg ev  Inversion noted with heel raise  Unable to perform single leg heel raise  Hallux DF PROM wnl  TTP met heads    Casted for custom orthotics    Precautions: none    Daily Treatment Diary     Manual                                                                                   Exercise Diary                                                                                                                                                                                                                                                                                      Modalities

## 2018-08-22 ENCOUNTER — TRANSCRIBE ORDERS (OUTPATIENT)
Dept: ADMINISTRATIVE | Facility: HOSPITAL | Age: 63
End: 2018-08-22

## 2018-08-22 ENCOUNTER — OFFICE VISIT (OUTPATIENT)
Dept: PHYSICAL THERAPY | Facility: CLINIC | Age: 63
End: 2018-08-22
Payer: COMMERCIAL

## 2018-08-22 ENCOUNTER — APPOINTMENT (OUTPATIENT)
Dept: LAB | Facility: CLINIC | Age: 63
End: 2018-08-22
Payer: COMMERCIAL

## 2018-08-22 DIAGNOSIS — M54.2 CERVICALGIA: Primary | ICD-10-CM

## 2018-08-22 DIAGNOSIS — E04.1 NONTOXIC UNINODULAR GOITER: Primary | ICD-10-CM

## 2018-08-22 DIAGNOSIS — Z00.00 ROUTINE GENERAL MEDICAL EXAMINATION AT A HEALTH CARE FACILITY: ICD-10-CM

## 2018-08-22 DIAGNOSIS — E04.1 NONTOXIC UNINODULAR GOITER: ICD-10-CM

## 2018-08-22 DIAGNOSIS — Z00.00 ROUTINE GENERAL MEDICAL EXAMINATION AT A HEALTH CARE FACILITY: Primary | ICD-10-CM

## 2018-08-22 LAB
CHOLEST SERPL-MCNC: 203 MG/DL (ref 50–200)
EST. AVERAGE GLUCOSE BLD GHB EST-MCNC: 120 MG/DL
HBA1C MFR BLD: 5.8 % (ref 4.2–6.3)
HDLC SERPL-MCNC: 43 MG/DL (ref 40–60)
LDLC SERPL CALC-MCNC: 90 MG/DL (ref 0–100)
NONHDLC SERPL-MCNC: 160 MG/DL
T4 FREE SERPL-MCNC: 1.01 NG/DL (ref 0.76–1.46)
TRIGL SERPL-MCNC: 350 MG/DL
TSH SERPL DL<=0.05 MIU/L-ACNC: 2 UIU/ML (ref 0.36–3.74)

## 2018-08-22 PROCEDURE — 80061 LIPID PANEL: CPT

## 2018-08-22 PROCEDURE — 84439 ASSAY OF FREE THYROXINE: CPT | Performed by: NURSE PRACTITIONER

## 2018-08-22 PROCEDURE — 84443 ASSAY THYROID STIM HORMONE: CPT | Performed by: NURSE PRACTITIONER

## 2018-08-22 PROCEDURE — 97110 THERAPEUTIC EXERCISES: CPT | Performed by: PHYSICAL THERAPIST

## 2018-08-22 PROCEDURE — 83036 HEMOGLOBIN GLYCOSYLATED A1C: CPT

## 2018-08-22 PROCEDURE — 97140 MANUAL THERAPY 1/> REGIONS: CPT | Performed by: PHYSICAL THERAPIST

## 2018-08-22 PROCEDURE — 36415 COLL VENOUS BLD VENIPUNCTURE: CPT

## 2018-08-22 PROCEDURE — 97014 ELECTRIC STIMULATION THERAPY: CPT | Performed by: PHYSICAL THERAPIST

## 2018-08-22 NOTE — PROGRESS NOTES
Daily Note     Today's date: 2018  Patient name: Tree Kaminski  : 1955  MRN: [id number]  Referring provider: Margarito Alexander MD  Dx:   Encounter Diagnosis     ICD-10-CM    1  Cervicalgia M54 2                   Subjective: Reports she is having more stress at work which is causing more stiffness and spasms in the cervical and trap areas  She did report having a migraine yesterday and took meds to help sleep last night  Objective: See treatment diary below    Cervical ROM has improved especially in rotation  Scapular strength is increasing with the Domingo    Precautions: LBP, B knee pain, Left TKR    Daily Treatment Diary     Manual  7/27 7/30 8/1 8/6 8/10 8/13 8/17 8/22     cervical 10 10 10 10 10 10 10 10     Upper trap 10 10 10 10 10 10 10 10     Arm pulls/MFR 10 10 10 10 10        SO      10 10 10                      Exercise Diary    8/1 8/6 8/10 8/13 8/17 8/22     shld shrugs   20 20 20 20 20 20     Cable Post Row    15/30 15/30 15/30 15/30 15/30     Cybex:             Post row    2020 25/20 2520  25                                                                                                                                                                                                                         Modalities  7/27 7/30 8/1 8/6 8/10 8/13 8/17 8/22     MH/ES trap/scap HT 10' 10 10 10 10 10 10 10                        Assessment: Tolerated treatment well  Patient exhibited good technique with therapeutic exercises and would benefit from continued PT      Plan: Continue per plan of care  Progress treatment as tolerated

## 2018-08-23 ENCOUNTER — TELEPHONE (OUTPATIENT)
Dept: ENDOCRINOLOGY | Facility: CLINIC | Age: 63
End: 2018-08-23

## 2018-08-23 NOTE — PROGRESS NOTES
Daily Note     Today's date: 2018  Patient name: Mercy Bowden  : 1955  MRN: [id number]  Referring provider: Angel Powers MD  Dx:   Encounter Diagnosis     ICD-10-CM    1  Cervicalgia M54 2        Start Time: 0800  Stop Time: 4450  Total time in clinic (min): 50 minutes    Subjective: Moderate tightness in cervical area, 6/10 per patient  No headache today  "Getting better "      Objective: See treatment diary below      Assessment: Tolerated treatment well  Patient would benefit from continued PT      Plan: Continue per plan of care   Prep for discharge to self care over the next 2 visits  Precautions: LBP, B knee pain, Left TKR    Daily Treatment Diary     Manual  7/27 7/30 8/1 8/6 8/10 8/13 8/17 8/22 8/24    cervical 10 10 10 10 10 10 10 10 10    Upper trap 10 10 10 10 10 10 10 10 10    Arm pulls/MFR 10 10 10 10 10        SO      10 10 10 10                     Exercise Diary    8/1 8/6 8/10 8/13 8/17 8/22 8/24    shld shrugs   20 20 20 20 20 20 20    Cable Post Row    15/30 15/30 15/30 15/30 15/30 15/30    Cybex:             Post row     2520 25/20  25 25                                                                                                                                                                                                                        Modalities  7/27 7/30 8/1 8/6 8/10 8/13 8/17 8/22 8/24    MH/ES trap/scap HT 10' 10 10 10 10 10 10 10 10

## 2018-08-24 ENCOUNTER — OFFICE VISIT (OUTPATIENT)
Dept: PHYSICAL THERAPY | Facility: CLINIC | Age: 63
End: 2018-08-24
Payer: COMMERCIAL

## 2018-08-24 DIAGNOSIS — M54.2 CERVICALGIA: Primary | ICD-10-CM

## 2018-08-24 PROCEDURE — 97110 THERAPEUTIC EXERCISES: CPT | Performed by: PHYSICAL THERAPIST

## 2018-08-24 PROCEDURE — 97014 ELECTRIC STIMULATION THERAPY: CPT | Performed by: PHYSICAL THERAPIST

## 2018-08-24 PROCEDURE — 97140 MANUAL THERAPY 1/> REGIONS: CPT | Performed by: PHYSICAL THERAPIST

## 2018-08-27 ENCOUNTER — OFFICE VISIT (OUTPATIENT)
Dept: PHYSICAL THERAPY | Facility: CLINIC | Age: 63
End: 2018-08-27
Payer: COMMERCIAL

## 2018-08-27 DIAGNOSIS — M54.2 CERVICALGIA: Primary | ICD-10-CM

## 2018-08-27 PROCEDURE — 97140 MANUAL THERAPY 1/> REGIONS: CPT | Performed by: PHYSICAL THERAPIST

## 2018-08-27 PROCEDURE — 97110 THERAPEUTIC EXERCISES: CPT | Performed by: PHYSICAL THERAPIST

## 2018-08-27 PROCEDURE — 97014 ELECTRIC STIMULATION THERAPY: CPT | Performed by: PHYSICAL THERAPIST

## 2018-08-27 NOTE — PROGRESS NOTES
Daily Note     Today's date: 2018  Patient name: Radha Perry  : 1955  MRN: [id number]  Referring provider: Sivakumar De Santiago MD  Dx:   Encounter Diagnosis     ICD-10-CM    1  Cervicalgia M54 2        Start Time: 0700  Stop Time: 0800  Total time in clinic (min): 60 minutes    Subjective: 6/10 cerv pain today  Stress is a big contributor per patient  Objective: See treatment diary below      Assessment: Tolerated treatment well  Patient responds well to manual therapy  Decreased pain post treatment  Plan: Potential discharge next visit    Precautions: LBP, B knee pain, Left TKR    Daily Treatment Diary     Manual  7/27 7/30 8/1 8/6 8/10 8/13 8/17 8/22 8/24 8/27   cervical 10 10 10 10 10 10 10 10 10 10   Upper trap 10 10 10 10 10 10 10 10 10 10   Arm pulls/MFR 10 10 10 10 10        SO      10 10 10 10 10                    Exercise Diary    8/1 8/6 8/10 8/13 8/17 8/22 8/24 8/27   shld shrugs   20 20 20 20 20 20 20 20   Cable Post Row    15/30 15/30 15/30 15/30 15/30 1530 x   Cybex:             Post row    20 25/20 25/20  25 2530 x                                                                                                                                                                                                                       Modalities   8/10 8/13 8/17 8/22 8/24 8/27   MH/ES trap/scap HT 10' 10 10 10 10 10 10 10 10 10

## 2018-08-30 NOTE — PROGRESS NOTES
Daily Note/Discharge    Today's date: 2018  Patient name: Radha Perry  : 1955  MRN: [id number]  Referring provider: Sivakumar De Santiago MD  Dx:   Encounter Diagnosis     ICD-10-CM    1  Cervicalgia M54 2        Start Time: 830  Stop Time:   Total time in clinic (min): 55 minutes    Subjective: Variable neck and shoulder pain that seems to be dictated by stress  Notes relief with PT treatments that returns with life stresses and activity  Objective: See treatment diary below      Assessment: Tolerated treatment well  Patient with maximum benefit from current PT  Treatments manage pain however temporary in nature  She is independent in self stretching and belongs to a gym where she will continue with strengthening exercises  PT goals met  Goals from Initial Evaluation  ST-3 WEEKS  1  Decrease pain by 2 points on VAS at its worst  - met  2  Increase ROM by > 5 deg in all deficients planes  - met  3  Increase core/postural strength by 1/2 MMT grade in all deficient planes  - met    LT-6 WEEKS  1  Patient to be independent with a/iadls  - met  2  Increase functional activities for leisure and home activities to previous LOF  - met  3   Independent with HEP and/or fitness program  - met          Plan: Discharge PT to self care and HEP   Precautions: LBP, B knee pain, Left TKR    Daily Treatment Diary     Manual  8/31 7/30 8/1 8/6 8/10 8/13 8/17 8/22 8/24 8/27   cervical 10 10 10 10 10 10 10 10 10 10   Upper trap 10 10 10 10 10 10 10 10 10 10   Arm pulls/MFR 10 10 10 10 10        SO      10 10 10 10 10                    Exercise Diary  8/31  8/1 8/6 8/10 8/13 8/17 8/22 8/24 8/27   shld shrugs 20  20 20 20 20 20 20 20 20   Cable Post Row x   15/30 15/30 15/30 1530 15 1530 x   Cybex:             Post row x   20/20 25/20 25/20  25/ x Modalities  7/27 7/30 8/1 8/6 8/10 8/13 8/17 8/22 8/24 8/27   MH/ES trap/scap HT 10' 10 10 10 10 10 10 10 10 10

## 2018-08-31 ENCOUNTER — OFFICE VISIT (OUTPATIENT)
Dept: PHYSICAL THERAPY | Facility: CLINIC | Age: 63
End: 2018-08-31
Payer: COMMERCIAL

## 2018-08-31 DIAGNOSIS — M54.2 CERVICALGIA: Primary | ICD-10-CM

## 2018-08-31 PROCEDURE — 97110 THERAPEUTIC EXERCISES: CPT | Performed by: PHYSICAL THERAPIST

## 2018-08-31 PROCEDURE — G8980 MOBILITY D/C STATUS: HCPCS | Performed by: PHYSICAL THERAPIST

## 2018-08-31 PROCEDURE — 97140 MANUAL THERAPY 1/> REGIONS: CPT | Performed by: PHYSICAL THERAPIST

## 2018-08-31 PROCEDURE — G8979 MOBILITY GOAL STATUS: HCPCS | Performed by: PHYSICAL THERAPIST

## 2018-08-31 PROCEDURE — 97014 ELECTRIC STIMULATION THERAPY: CPT | Performed by: PHYSICAL THERAPIST

## 2018-09-07 ENCOUNTER — OFFICE VISIT (OUTPATIENT)
Dept: CARDIOLOGY CLINIC | Facility: CLINIC | Age: 63
End: 2018-09-07
Payer: COMMERCIAL

## 2018-09-07 ENCOUNTER — OFFICE VISIT (OUTPATIENT)
Dept: PHYSICAL THERAPY | Facility: CLINIC | Age: 63
End: 2018-09-07

## 2018-09-07 VITALS
DIASTOLIC BLOOD PRESSURE: 86 MMHG | WEIGHT: 199.8 LBS | OXYGEN SATURATION: 98 % | BODY MASS INDEX: 34.11 KG/M2 | HEIGHT: 64 IN | HEART RATE: 68 BPM | SYSTOLIC BLOOD PRESSURE: 116 MMHG

## 2018-09-07 DIAGNOSIS — M79.671 RIGHT FOOT PAIN: Primary | ICD-10-CM

## 2018-09-07 DIAGNOSIS — M76.829 POSTERIOR TIBIALIS MUSCLE DYSFUNCTION: ICD-10-CM

## 2018-09-07 DIAGNOSIS — Z86.69 HX OF MIGRAINE HEADACHES: ICD-10-CM

## 2018-09-07 DIAGNOSIS — E78.1 HYPERTRIGLYCERIDEMIA: Primary | ICD-10-CM

## 2018-09-07 DIAGNOSIS — R06.02 SHORTNESS OF BREATH: ICD-10-CM

## 2018-09-07 DIAGNOSIS — R00.2 PALPITATIONS: ICD-10-CM

## 2018-09-07 PROCEDURE — 93000 ELECTROCARDIOGRAM COMPLETE: CPT | Performed by: INTERNAL MEDICINE

## 2018-09-07 PROCEDURE — 99243 OFF/OP CNSLTJ NEW/EST LOW 30: CPT | Performed by: INTERNAL MEDICINE

## 2018-09-07 RX ORDER — ZOLMITRIPTAN 5 MG/1
5 TABLET, FILM COATED ORAL DAILY PRN
Qty: 8 TABLET | Refills: 0 | Status: SHIPPED | OUTPATIENT
Start: 2018-09-07 | End: 2018-11-20 | Stop reason: SDUPTHER

## 2018-09-07 NOTE — LETTER
September 7, 2018     Kennethosbaldo Mebrendon, DO  [address] [address]    Patient: Digna Lesch   YOB: 1955   Date of Visit: 9/7/2018       Dear Dr Mcghee Fly: Thank you for referring Digna Lesch to me for evaluation  Below are my notes for this consultation  If you have questions, please do not hesitate to call me  I look forward to following your patient along with you  Sincerely,        Sajan Sanchez MD        CC: No Recipients  Sajan Sanchez MD  9/7/2018  4:03 PM  Sign at close encounter                                             Cardiology Consultation     Digna Lesch  [id number]  1955  [facility] [address]    1  Shortness of breath  Ambulatory referral to Cardiology    POCT ECG   2  Palpitations  Ambulatory referral to Cardiology    POCT ECG       C/C:SHORTNESS OF BREATH AND PREOP EVALUATION PRIOR TO CATARACT SURGERY    HPI: 59-YEAR-OLD FEMALE PATIENT WITH PAST MEDICAL HISTORY of DVT (2001),obesity,  Hypertriglyceridemia is here for preop evaluation prior to cataract surgery  Patient had episode of shortness of breath in March which lasted for few days which prompted her to go to ER  She had CT angiogram of the chest which was unremarkable  Patient's symptoms improved after she valve and rule in ER and was discharged  He has not made any recurrence of  Shortness of breath since then  Patient is active and works with pediatrician at [employer]  She denies having any exertional chest tightness  She has history of frequent PVCs and palpitations in the past when she was taking phentermine but the  Palpitations have resolved  Patient does have mild lower extremity edema which is chronic, left leg worse than right leg  She denies having any orthopnea paroxysmal nocturnal dyspnea    Patient did have exercise stress echocardiogram in 2016 which was normal     Triglyceride was significantly elevated at 350, LDL was normal    Patient Active Problem List   Diagnosis    Arthralgia of hip, left    Pain in joint involving other specified sites    Arthritis of left knee    DVT of leg (deep venous thrombosis) ([facility] [address] )    Memory difficulty    Migraine, unspecified, not intractable, without status migrainosus    Mild intermittent asthma    Obesity (BMI 30 0-34  9)    Osteopenia of multiple sites    Prediabetes    Suprapubic abdominal pain    Chronic pain of right knee    Primary osteoarthritis of right knee    Effusion of right knee    Shortness of breath    Thyroid nodule     Past Medical History:   Diagnosis Date    Acid reflux     Allergic rhinitis     Last Assessed: 11/2/2017    Anesthesia complication     HYPOTENSION    Arthritis     Asthma     Bigeminy     history    [facility]     DVT of leg (deep venous thrombosis) ([facility] [address] ) 2001    left    Female pelvic pain     Hyperlipidemia     Intermittent palpitations     Irregular heart beat     Migraines     Palpitations     Pes planus     unspecified laterality;  Last Assessed: 4/25/2014    Thyroid nodule     Trigeminy     history    Wears glasses      Social History     Social History    Marital status: /Civil Union     Spouse name: N/A    Number of children: N/A    Years of education: N/A     Occupational History    Pediatrics      Social History Main Topics    Smoking status: Former Smoker     Quit date: 6/20/1978    Smokeless tobacco: Never Used      Comment: as teenager    Alcohol use Yes      Comment: socially    Drug use: No    Sexual activity: Not Currently     Other Topics Concern    Not on file     Social History Narrative    No narrative on file      Family History   Problem Relation Age of Onset    Endometrial cancer Mother     Migraines Mother     Diabetes Father     Heart disease Father     Heart attack Father     Thyroid cancer Brother     Diabetes type II Brother     Growth hormone deficiency Daughter     Alzheimer's disease Maternal Aunt     Thyroid cancer Maternal Aunt     Diabetes Paternal Aunt     Hashimoto's thyroiditis Paternal Aunt         Total Thyroidectomy    Other Maternal Uncle         Brain Tumor    Alzheimer's disease Maternal Uncle     Cancer Family      Past Surgical History:   Procedure Laterality Date    APPENDECTOMY       SECTION      X 2    CHOLECYSTECTOMY      COLONOSCOPY      DILATION AND CURETTAGE OF UTERUS      JOINT REPLACEMENT      left TKR    KNEE SURGERY Left     X 3    IA ESOPHAGOGASTRODUODENOSCOPY TRANSORAL DIAGNOSTIC N/A 3/14/2016    Procedure: ESOPHAGOGASTRODUODENOSCOPY (EGD); Surgeon: Binu Jackson MD;  Location: [facility]; Service: Gastroenterology    IA LAPAROSCOPY W TOT HYSTERECTUTERUS <=250 [address] [place] TUBE/OVARY Bilateral 2016    Procedure: HYSTERECTOMY LAPAROSCOPIC TOTAL ,BSO;  Surgeon: Joseluis Krishna MD;  Location: [facility];  Service: Gynecology Oncology    REPLACEMENT TOTAL KNEE  2014    WISDOM TOOTH EXTRACTION         Current Outpatient Prescriptions:     albuterol (VENTOLIN HFA) 90 mcg/act inhaler, 1-2 puffs 4 times a day as needed, Disp: 18 Inhaler, Rfl: 0    ALPRAZolam (XANAX) 0 25 mg tablet, Take 1 tablet (0 25 mg total) by mouth daily at bedtime as needed for anxiety, Disp: 30 tablet, Rfl: 2    butalbital-acetaminophen-caffeine (FIORICET,ESGIC) -40 mg per tablet, Take 1 tablet by mouth daily as needed, Disp: , Rfl: 3    cycloSPORINE (RESTASIS) 0 05 % ophthalmic emulsion, Apply 1 drop to eye 2 (two) times a day, Disp: , Rfl:     diazepam (VALIUM) 5 mg tablet, Take 1 tablet (5 mg total) by mouth every 8 (eight) hours as needed for anxiety, Disp: 60 tablet, Rfl: 0    Ergocalciferol (VITAMIN D2) 2000 UNITS TABS, Take 2,000 Units by mouth daily    , Disp: , Rfl:     Evening Primrose Oil 1000 MG CAPS, Take 1 capsule by mouth daily, Disp: , Rfl:   loratadine (CLARITIN) 10 mg tablet, Take 1 tablet (10 mg total) by mouth daily, Disp: 30 tablet, Rfl: 0    loteprednol etabonate (LOTEMAX) 0 5 % ophthalmic suspension, Administer 1 drop to both eyes 3 (three) times a day as needed  , Disp: , Rfl:     Magnesium 200 MG TABS, Take 1 tablet by mouth daily, Disp: , Rfl:     methocarbamol (ROBAXIN) 500 mg tablet, Take 500 mg by mouth as needed for muscle spasms, Disp: , Rfl:     Multiple Vitamin (MULTIVITAMIN) capsule, Take 1 capsule by mouth daily  , Disp: , Rfl:     Omega-3 Fatty Acids (OMEGA 3 PO), Take 2 capsules by mouth 2 (two) times a day   , Disp: , Rfl:     pantoprazole (PROTONIX) 40 mg tablet, TAKE 1 TABLET TWICE DAILY, Disp: 60 tablet, Rfl: 1    ZOLMitriptan (ZOMIG) 5 MG tablet, Take 1 tablet (5 mg total) by mouth daily as needed (1 daily p r n  headache), Disp: 8 tablet, Rfl: 0    clobetasol (TEMOVATE) 0 05 % cream, Apply topically, Disp: , Rfl:     diphenhydrAMINE (BENADRYL) 25 mg capsule, Take by mouth daily at bedtime as needed  , Disp: , Rfl:     FLOVENT  MCG/ACT inhaler, INHALE 1 PUFF 2 (TWO) TIMES A DAY (Patient not taking: Reported on 9/7/2018 ), Disp: 12 Inhaler, Rfl: 0    fluticasone (FLONASE) 50 mcg/act nasal spray, 2 sprays into each nostril daily, Disp: , Rfl:     montelukast (SINGULAIR) 10 mg tablet, Take 1 tablet (10 mg total) by mouth daily at bedtime (Patient not taking: Reported on 9/7/2018 ), Disp: 30 tablet, Rfl: 0  Allergies   Allergen Reactions    Naproxen      Heart palpitations     Vitals:    09/07/18 1526   BP: 116/86   Pulse: 68   SpO2: 98%   Weight: 90 6 kg (199 lb 12 8 oz)   Height: 5' 4" (1 626 m)       Labs:  Appointment on 08/22/2018   Component Date Value    Cholesterol 08/22/2018 203*    Triglycerides 08/22/2018 350*    HDL, Direct 08/22/2018 43     LDL Calculated 08/22/2018 90     Non-HDL-Chol (CHOL-HDL) 08/22/2018 160     Hemoglobin A1C 08/22/2018 5 8     EAG 08/22/2018 120    Office Visit on 08/17/2018   Component Date Value    Free T4 08/22/2018 1 01     TSH 3RD GENERATON 08/22/2018 2 000      Imaging: No results found  Review of Systems:  Review of Systems   Constitutional: Positive for fatigue  Negative for diaphoresis  HENT: Negative for congestion and facial swelling  Eyes: Negative for photophobia and visual disturbance  Respiratory: Positive for shortness of breath  Negative for chest tightness  Cardiovascular: Negative for chest pain, palpitations and leg swelling  Gastrointestinal: Negative for abdominal pain and nausea  Endocrine: Negative for cold intolerance and heat intolerance  Musculoskeletal: Negative for arthralgias and myalgias  Skin: Negative for pallor and rash  Neurological: Negative for dizziness and tremors  Psychiatric/Behavioral: Negative for sleep disturbance  The patient is not nervous/anxious  Physical Exam:  Physical Exam   Constitutional: She is oriented to person, place, and time  She appears well-developed and well-nourished  HENT:   Head: Normocephalic and atraumatic  Eyes: Conjunctivae and EOM are normal  Pupils are equal, round, and reactive to light  Neck: Normal range of motion  Neck supple  No JVD present  No thyromegaly present  Cardiovascular: Normal rate, regular rhythm, S1 normal, S2 normal, normal heart sounds and intact distal pulses  Exam reveals no gallop and no friction rub  No murmur heard  Pulses:       Carotid pulses are 2+ on the right side, and 2+ on the left side  1+ edema, left leg trace edema right leg   Pulmonary/Chest: Effort normal and breath sounds normal  No respiratory distress  She has no wheezes  She has no rales  Abdominal: Soft  Bowel sounds are normal  She exhibits no distension  There is no tenderness  There is no rebound and no guarding  Musculoskeletal: Normal range of motion  She exhibits no edema  Neurological: She is alert and oriented to person, place, and time   She has normal reflexes  No cranial nerve deficit  Skin: Skin is warm and dry  Psychiatric: She has a normal mood and affect  EKG:  Normal sinus rhythm, normal    Discussion/Summary:   1  preop evaluation:   if the cataract surgeries done in the local anesthesia patient should be at low risk for cardiac events     2  Shortness of breath   I would get Lexiscan stress test to rule out ischemia  Patient had left knee replacement and may not be able to run on the treadmill      Patient has uncontrolled hyperlipidemia including triglycerides more than 300  She has probable family history of premature coronary disease in her father  3  Hypertriglyceridemia :    Patient would like to try diet and exercise prior to getting on medications  I will recheck triglycerides in 6 months, if it is still elevated I will consider adding low-dose statins     4  PVCs/palpitations   resolved     5   DVT /left lower extremity edema   patient will continue to wear compression stockings     follow up with me in 6 months with lipid panel

## 2018-09-07 NOTE — PROGRESS NOTES
Cardiology Consultation     Tarah Saxena  [id number]  1955  [address] [facility] [address]    1  Shortness of breath  Ambulatory referral to Cardiology    POCT ECG   2  Palpitations  Ambulatory referral to Cardiology    POCT ECG       C/C:SHORTNESS OF BREATH AND PREOP EVALUATION PRIOR TO CATARACT SURGERY    HPI: 59-YEAR-OLD FEMALE PATIENT WITH PAST MEDICAL HISTORY of DVT (2001),obesity,  Hypertriglyceridemia is here for preop evaluation prior to cataract surgery  Patient had episode of shortness of breath in March which lasted for few days which prompted her to go to ER  She had CT angiogram of the chest which was unremarkable  Patient's symptoms improved after she valve and rule in ER and was discharged  He has not made any recurrence of  Shortness of breath since then  Patient is active and works with pediatrician at [employer]  She denies having any exertional chest tightness  She has history of frequent PVCs and palpitations in the past when she was taking phentermine but the  Palpitations have resolved  Patient does have mild lower extremity edema which is chronic, left leg worse than right leg  She denies having any orthopnea paroxysmal nocturnal dyspnea  Patient did have exercise stress echocardiogram in 2016 which was normal     Triglyceride was significantly elevated at 350, LDL was normal    Patient Active Problem List   Diagnosis    Arthralgia of hip, left    Pain in joint involving other specified sites    Arthritis of left knee    [facility] thrombosis) [facility] [address] [facility]    Memory difficulty    Migraine, unspecified, not intractable, without status migrainosus    Mild intermittent asthma    Obesity (BMI 30 0-34  9)    Osteopenia of multiple sites    Prediabetes    Suprapubic abdominal pain    Chronic pain of right knee    Primary osteoarthritis of right knee    Effusion of right knee    Shortness of breath    Thyroid nodule     Past Medical History:   Diagnosis Date    Acid reflux     Allergic rhinitis     Last Assessed: 2017    Anesthesia complication     HYPOTENSION    Arthritis     Asthma     Bigeminy     history    [facility]     DVT of leg (deep venous thrombosis) ([facility] [address] [facility]     left    Female pelvic pain     Hyperlipidemia     Intermittent palpitations     Irregular heart beat     Migraines     Palpitations     Pes planus     unspecified laterality;  Last Assessed: 2014    Thyroid nodule     Trigeminy     history    Wears glasses      Social History     Social History    Marital status: /Civil Union     Spouse name: N/A    Number of children: N/A    Years of education: N/A     Occupational History    Pediatrics      Social History Main Topics    Smoking status: Former Smoker     Quit date: 1978    Smokeless tobacco: Never Used      Comment: as teenager    Alcohol use Yes      Comment: socially    Drug use: No    Sexual activity: Not Currently     Other Topics Concern    Not on file     Social History Narrative    No narrative on file      Family History   Problem Relation Age of Onset    Endometrial cancer Mother     Migraines Mother     Diabetes Father     Heart disease Father     Heart attack Father     Thyroid cancer Brother     Diabetes type II Brother     Growth hormone deficiency Daughter     Alzheimer's disease Maternal Aunt     Thyroid cancer Maternal Aunt     Diabetes Paternal Aunt     Hashimoto's thyroiditis Paternal Aunt         Total Thyroidectomy    Other Maternal Uncle         Brain Tumor    Alzheimer's disease Maternal Uncle     Cancer Family      Past Surgical History:   Procedure Laterality Date    APPENDECTOMY       SECTION      X 2    CHOLECYSTECTOMY      COLONOSCOPY      DILATION AND CURETTAGE OF UTERUS      JOINT REPLACEMENT left TKR    KNEE SURGERY Left     X 3    PA ESOPHAGOGASTRODUODENOSCOPY TRANSORAL DIAGNOSTIC N/A 3/14/2016    Procedure: ESOPHAGOGASTRODUODENOSCOPY (EGD); Surgeon: Geoffrey Moncada MD;  Location: [facility]; Service: Gastroenterology    PA LAPAROSCOPY W TOT HYSTERECTUTERUS <=250 Lyndol Tamra TUBE/OVARY Bilateral 7/8/2016    Procedure: HYSTERECTOMY LAPAROSCOPIC TOTAL ,BSO;  Surgeon: Hermilo Garcia MD;  Location: [facility];  Service: Gynecology Oncology    REPLACEMENT TOTAL KNEE  2014    WISDOM TOOTH EXTRACTION         Current Outpatient Prescriptions:     albuterol (VENTOLIN HFA) 90 mcg/act inhaler, 1-2 puffs 4 times a day as needed, Disp: 18 Inhaler, Rfl: 0    ALPRAZolam (XANAX) 0 25 mg tablet, Take 1 tablet (0 25 mg total) by mouth daily at bedtime as needed for anxiety, Disp: 30 tablet, Rfl: 2    butalbital-acetaminophen-caffeine (FIORICET,ESGIC) -40 mg per tablet, Take 1 tablet by mouth daily as needed, Disp: , Rfl: 3    cycloSPORINE (RESTASIS) 0 05 % ophthalmic emulsion, Apply 1 drop to eye 2 (two) times a day, Disp: , Rfl:     diazepam (VALIUM) 5 mg tablet, Take 1 tablet (5 mg total) by mouth every 8 (eight) hours as needed for anxiety, Disp: 60 tablet, Rfl: 0    Ergocalciferol (VITAMIN D2) 2000 UNITS TABS, Take 2,000 Units by mouth daily  , Disp: , Rfl:     Evening Primrose Oil 1000 MG CAPS, Take 1 capsule by mouth daily, Disp: , Rfl:     loratadine (CLARITIN) 10 mg tablet, Take 1 tablet (10 mg total) by mouth daily, Disp: 30 tablet, Rfl: 0    loteprednol etabonate (LOTEMAX) 0 5 % ophthalmic suspension, Administer 1 drop to both eyes 3 (three) times a day as needed  , Disp: , Rfl:     Magnesium 200 MG TABS, Take 1 tablet by mouth daily, Disp: , Rfl:     methocarbamol (ROBAXIN) 500 mg tablet, Take 500 mg by mouth as needed for muscle spasms, Disp: , Rfl:     Multiple Vitamin (MULTIVITAMIN) capsule, Take 1 capsule by mouth daily  , Disp: , Rfl:     Omega-3 Fatty Acids (OMEGA 3 PO), Take 2 capsules by mouth 2 (two) times a day   , Disp: , Rfl:     pantoprazole (PROTONIX) 40 mg tablet, TAKE 1 TABLET TWICE DAILY, Disp: 60 tablet, Rfl: 1    ZOLMitriptan (ZOMIG) 5 MG tablet, Take 1 tablet (5 mg total) by mouth daily as needed (1 daily p r n  headache), Disp: 8 tablet, Rfl: 0    clobetasol (TEMOVATE) 0 05 % cream, Apply topically, Disp: , Rfl:     diphenhydrAMINE (BENADRYL) 25 mg capsule, Take by mouth daily at bedtime as needed  , Disp: , Rfl:     FLOVENT  MCG/ACT inhaler, INHALE 1 PUFF 2 (TWO) TIMES A DAY (Patient not taking: Reported on 9/7/2018 ), Disp: 12 Inhaler, Rfl: 0    fluticasone (FLONASE) 50 mcg/act nasal spray, 2 sprays into each nostril daily, Disp: , Rfl:     montelukast (SINGULAIR) 10 mg tablet, Take 1 tablet (10 mg total) by mouth daily at bedtime (Patient not taking: Reported on 9/7/2018 ), Disp: 30 tablet, Rfl: 0  Allergies   Allergen Reactions    Naproxen      Heart palpitations     Vitals:    09/07/18 1526   BP: 116/86   Pulse: 68   SpO2: 98%   Weight: 90 6 kg (199 lb 12 8 oz)   Height: 5' 4" (1 626 m)       Labs:  Appointment on 08/22/2018   Component Date Value    Cholesterol 08/22/2018 203*    Triglycerides 08/22/2018 350*    HDL, Direct 08/22/2018 43     LDL Calculated 08/22/2018 90     Non-HDL-Chol (CHOL-HDL) 08/22/2018 160     Hemoglobin A1C 08/22/2018 5 8     EAG 08/22/2018 120    Office Visit on 08/17/2018   Component Date Value    Free T4 08/22/2018 1 01     TSH 3RD GENERATON 08/22/2018 2 000      Imaging: No results found  Review of Systems:  Review of Systems   Constitutional: Positive for fatigue  Negative for diaphoresis  HENT: Negative for congestion and facial swelling  Eyes: Negative for photophobia and visual disturbance  Respiratory: Positive for shortness of breath  Negative for chest tightness  Cardiovascular: Negative for chest pain, palpitations and leg swelling  Gastrointestinal: Negative for abdominal pain and nausea  Endocrine: Negative for cold intolerance and heat intolerance  Musculoskeletal: Negative for arthralgias and myalgias  Skin: Negative for pallor and rash  Neurological: Negative for dizziness and tremors  Psychiatric/Behavioral: Negative for sleep disturbance  The patient is not nervous/anxious  Physical Exam:  Physical Exam   Constitutional: She is oriented to person, place, and time  She appears well-developed and well-nourished  HENT:   Head: Normocephalic and atraumatic  Eyes: Conjunctivae and EOM are normal  Pupils are equal, round, and reactive to light  Neck: Normal range of motion  Neck supple  No JVD present  No thyromegaly present  Cardiovascular: Normal rate, regular rhythm, S1 normal, S2 normal, normal heart sounds and intact distal pulses  Exam reveals no gallop and no friction rub  No murmur heard  Pulses:       Carotid pulses are 2+ on the right side, and 2+ on the left side  1+ edema, left leg trace edema right leg   Pulmonary/Chest: Effort normal and breath sounds normal  No respiratory distress  She has no wheezes  She has no rales  Abdominal: Soft  Bowel sounds are normal  She exhibits no distension  There is no tenderness  There is no rebound and no guarding  Musculoskeletal: Normal range of motion  She exhibits no edema  Neurological: She is alert and oriented to person, place, and time  She has normal reflexes  No cranial nerve deficit  Skin: Skin is warm and dry  Psychiatric: She has a normal mood and affect  EKG:  Normal sinus rhythm, normal    Discussion/Summary:   1  preop evaluation:   if the cataract surgeries done in the local anesthesia patient should be at low risk for cardiac events     2  Shortness of breath   I would get Lexiscan stress test to rule out ischemia  Patient had left knee replacement and may not be able to run on the treadmill      Patient has uncontrolled hyperlipidemia including triglycerides more than 300    She has probable family history of premature coronary disease in her father  3  Hypertriglyceridemia :    Patient would like to try diet and exercise prior to getting on medications  I will recheck triglycerides in 6 months, if it is still elevated I will consider adding low-dose statins     4  PVCs/palpitations   resolved     5   DVT /left lower extremity edema   patient will continue to wear compression stockings     follow up with me in 6 months with lipid panel

## 2018-09-07 NOTE — TELEPHONE ENCOUNTER
Pt is having bad migraines, and is on call this weekend  would like a weeks supply rx for zomig sent to [facility]     And then would like a 90 day sent rx through her mail order    Please advise

## 2018-09-10 ENCOUNTER — HOSPITAL ENCOUNTER (OUTPATIENT)
Dept: RADIOLOGY | Facility: MEDICAL CENTER | Age: 63
Discharge: HOME | End: 2018-09-10
Payer: COMMERCIAL

## 2018-09-10 ENCOUNTER — HOSPITAL ENCOUNTER (OUTPATIENT)
Dept: RADIOLOGY | Facility: MEDICAL CENTER | Age: 63
End: 2018-09-10
Payer: COMMERCIAL

## 2018-09-10 DIAGNOSIS — E04.1 THYROID NODULE: ICD-10-CM

## 2018-09-10 PROCEDURE — 76536 US EXAM OF HEAD AND NECK: CPT

## 2018-09-11 ENCOUNTER — TELEPHONE (OUTPATIENT)
Dept: ENDOCRINOLOGY | Facility: CLINIC | Age: 63
End: 2018-09-11

## 2018-09-24 DIAGNOSIS — S09.90XS HEADACHES DUE TO OLD HEAD INJURY: Primary | ICD-10-CM

## 2018-09-24 DIAGNOSIS — G44.309 HEADACHES DUE TO OLD HEAD INJURY: Primary | ICD-10-CM

## 2018-09-27 RX ORDER — BUTALBITAL, ACETAMINOPHEN AND CAFFEINE 50; 325; 40 MG/1; MG/1; MG/1
TABLET ORAL
Qty: 60 TABLET | Refills: 0 | Status: SHIPPED | OUTPATIENT
Start: 2018-09-27 | End: 2018-11-17 | Stop reason: SDUPTHER

## 2018-10-08 ENCOUNTER — TELEPHONE (OUTPATIENT)
Dept: CARDIOLOGY CLINIC | Facility: CLINIC | Age: 63
End: 2018-10-08

## 2018-10-08 NOTE — TELEPHONE ENCOUNTER
DR Aguila Roles WOULD LIKE TO SPEAK TO DR BLUNT ABOUT WHAT KIND OF ASTHMA MED SHE CAN TAKE BEFORE HER ECHO AND STRESS TEST  PLEASE ASK DR BLUNT TO CALL   [place] [id number]

## 2018-10-12 ENCOUNTER — HOSPITAL ENCOUNTER (OUTPATIENT)
Dept: NON INVASIVE DIAGNOSTICS | Facility: CLINIC | Age: 63
Discharge: HOME | End: 2018-10-12
Payer: COMMERCIAL

## 2018-10-12 DIAGNOSIS — R06.02 SHORTNESS OF BREATH: ICD-10-CM

## 2018-10-12 PROCEDURE — 93306 TTE W/DOPPLER COMPLETE: CPT

## 2018-10-15 PROCEDURE — 93306 TTE W/DOPPLER COMPLETE: CPT | Performed by: INTERNAL MEDICINE

## 2018-10-16 ENCOUNTER — TELEPHONE (OUTPATIENT)
Dept: INTERNAL MEDICINE CLINIC | Facility: CLINIC | Age: 63
End: 2018-10-16

## 2018-10-16 NOTE — TELEPHONE ENCOUNTER
Patient is having left sided sinus pain, body aches, post nasal drip  No cough   Can dr call in and antibiotic for the patient  Having cataract surgery towards the end of month    Please call her at [phone]

## 2018-10-16 NOTE — TELEPHONE ENCOUNTER
Patient called back  Said that it's to late now-it's getting dark out now and she doesn't want to go anywhere  She stated our phones have to many prompts to reach a person and she will just write herself an antibiotic

## 2018-10-19 ENCOUNTER — HOSPITAL ENCOUNTER (OUTPATIENT)
Dept: NON INVASIVE DIAGNOSTICS | Facility: CLINIC | Age: 63
Discharge: HOME | End: 2018-10-19
Payer: COMMERCIAL

## 2018-10-19 DIAGNOSIS — R06.02 SHORTNESS OF BREATH: ICD-10-CM

## 2018-10-19 LAB
ARRHY DURING EX: NORMAL
CHEST PAIN STATEMENT: NORMAL
MAX DIASTOLIC BP: 60 MMHG
MAX HEART RATE: 122 BPM
MAX PREDICTED HEART RATE: 156 BPM
MAX. SYSTOLIC BP: 146 MMHG
PROTOCOL NAME: NORMAL
REASON FOR TERMINATION: NORMAL
TARGET HR FORMULA: NORMAL
TEST INDICATION: NORMAL
TIME IN EXERCISE PHASE: NORMAL

## 2018-10-19 PROCEDURE — 93016 CV STRESS TEST SUPVJ ONLY: CPT | Performed by: INTERNAL MEDICINE

## 2018-10-19 PROCEDURE — 93018 CV STRESS TEST I&R ONLY: CPT | Performed by: INTERNAL MEDICINE

## 2018-10-19 PROCEDURE — 78452 HT MUSCLE IMAGE SPECT MULT: CPT

## 2018-10-19 PROCEDURE — A9502 TC99M TETROFOSMIN: HCPCS

## 2018-10-19 PROCEDURE — 93017 CV STRESS TEST TRACING ONLY: CPT

## 2018-10-19 PROCEDURE — 78452 HT MUSCLE IMAGE SPECT MULT: CPT | Performed by: INTERNAL MEDICINE

## 2018-10-19 RX ADMIN — REGADENOSON 0.4 MG: 0.08 INJECTION, SOLUTION INTRAVENOUS at 09:05

## 2018-10-22 ENCOUNTER — APPOINTMENT (EMERGENCY)
Dept: ULTRASOUND IMAGING | Facility: HOSPITAL | Age: 63
End: 2018-10-22
Payer: COMMERCIAL

## 2018-10-22 ENCOUNTER — HOSPITAL ENCOUNTER (EMERGENCY)
Facility: HOSPITAL | Age: 63
Discharge: HOME | End: 2018-10-22
Attending: EMERGENCY MEDICINE | Admitting: EMERGENCY MEDICINE
Payer: COMMERCIAL

## 2018-10-22 ENCOUNTER — TELEPHONE (OUTPATIENT)
Dept: INTERNAL MEDICINE CLINIC | Facility: CLINIC | Age: 63
End: 2018-10-22

## 2018-10-22 VITALS
SYSTOLIC BLOOD PRESSURE: 127 MMHG | RESPIRATION RATE: 18 BRPM | WEIGHT: 196 LBS | OXYGEN SATURATION: 97 % | TEMPERATURE: 97.7 F | HEIGHT: 64 IN | HEART RATE: 66 BPM | BODY MASS INDEX: 33.46 KG/M2 | DIASTOLIC BLOOD PRESSURE: 59 MMHG

## 2018-10-22 DIAGNOSIS — I82.401 ACUTE DEEP VEIN THROMBOSIS (DVT) OF RIGHT LOWER EXTREMITY (HCC): Primary | ICD-10-CM

## 2018-10-22 LAB
ALBUMIN SERPL BCP-MCNC: 3.8 G/DL (ref 3.5–5)
ALP SERPL-CCNC: 131 U/L (ref 46–116)
ALT SERPL W P-5'-P-CCNC: 69 U/L (ref 12–78)
ANION GAP SERPL CALCULATED.3IONS-SCNC: 8 MMOL/L (ref 4–13)
APTT PPP: 28 SECONDS (ref 24–36)
AST SERPL W P-5'-P-CCNC: 40 U/L (ref 5–45)
BACTERIA UR QL AUTO: ABNORMAL /HPF
BASOPHILS # BLD AUTO: 0.06 THOUSANDS/ΜL (ref 0–0.1)
BASOPHILS NFR BLD AUTO: 1 % (ref 0–1)
BILIRUB SERPL-MCNC: 0.3 MG/DL (ref 0.2–1)
BILIRUB UR QL STRIP: NEGATIVE
BUN SERPL-MCNC: 15 MG/DL (ref 5–25)
CALCIUM SERPL-MCNC: 9.2 MG/DL (ref 8.3–10.1)
CHLORIDE SERPL-SCNC: 106 MMOL/L (ref 100–108)
CLARITY UR: CLEAR
CO2 SERPL-SCNC: 30 MMOL/L (ref 21–32)
COLOR UR: YELLOW
CREAT SERPL-MCNC: 0.77 MG/DL (ref 0.6–1.3)
DEPRECATED D DIMER PPP: 2297 NG/ML (FEU) (ref 0–424)
EOSINOPHIL # BLD AUTO: 0.2 THOUSAND/ΜL (ref 0–0.61)
EOSINOPHIL NFR BLD AUTO: 3 % (ref 0–6)
ERYTHROCYTE [DISTWIDTH] IN BLOOD BY AUTOMATED COUNT: 14 % (ref 11.6–15.1)
GFR SERPL CREATININE-BSD FRML MDRD: 82 ML/MIN/1.73SQ M
GLUCOSE SERPL-MCNC: 91 MG/DL (ref 65–140)
GLUCOSE UR STRIP-MCNC: NEGATIVE MG/DL
HCT VFR BLD AUTO: 45.6 % (ref 34.8–46.1)
HGB BLD-MCNC: 14.8 G/DL (ref 11.5–15.4)
HGB UR QL STRIP.AUTO: NEGATIVE
IMM GRANULOCYTES # BLD AUTO: 0.04 THOUSAND/UL (ref 0–0.2)
IMM GRANULOCYTES NFR BLD AUTO: 1 % (ref 0–2)
INR PPP: 0.99 (ref 0.86–1.17)
KETONES UR STRIP-MCNC: NEGATIVE MG/DL
LEUKOCYTE ESTERASE UR QL STRIP: ABNORMAL
LYMPHOCYTES # BLD AUTO: 2.05 THOUSANDS/ΜL (ref 0.6–4.47)
LYMPHOCYTES NFR BLD AUTO: 28 % (ref 14–44)
MCH RBC QN AUTO: 27.1 PG (ref 26.8–34.3)
MCHC RBC AUTO-ENTMCNC: 32.5 G/DL (ref 31.4–37.4)
MCV RBC AUTO: 84 FL (ref 82–98)
MONOCYTES # BLD AUTO: 0.57 THOUSAND/ΜL (ref 0.17–1.22)
MONOCYTES NFR BLD AUTO: 8 % (ref 4–12)
NEUTROPHILS # BLD AUTO: 4.51 THOUSANDS/ΜL (ref 1.85–7.62)
NEUTS SEG NFR BLD AUTO: 59 % (ref 43–75)
NITRITE UR QL STRIP: NEGATIVE
NON-SQ EPI CELLS URNS QL MICRO: ABNORMAL /HPF
NRBC BLD AUTO-RTO: 0 /100 WBCS
PH UR STRIP.AUTO: 6 [PH] (ref 4.5–8)
PLATELET # BLD AUTO: 268 THOUSANDS/UL (ref 149–390)
PMV BLD AUTO: 9.3 FL (ref 8.9–12.7)
POTASSIUM SERPL-SCNC: 3.8 MMOL/L (ref 3.5–5.3)
PROT SERPL-MCNC: 7.4 G/DL (ref 6.4–8.2)
PROT UR STRIP-MCNC: NEGATIVE MG/DL
PROTHROMBIN TIME: 13 SECONDS (ref 11.8–14.2)
RBC # BLD AUTO: 5.46 MILLION/UL (ref 3.81–5.12)
RBC #/AREA URNS AUTO: ABNORMAL /HPF
SODIUM SERPL-SCNC: 144 MMOL/L (ref 136–145)
SP GR UR STRIP.AUTO: 1.01 (ref 1–1.03)
UROBILINOGEN UR QL STRIP.AUTO: 0.2 E.U./DL
WBC # BLD AUTO: 7.43 THOUSAND/UL (ref 4.31–10.16)
WBC #/AREA URNS AUTO: ABNORMAL /HPF

## 2018-10-22 PROCEDURE — 93970 EXTREMITY STUDY: CPT

## 2018-10-22 PROCEDURE — 80053 COMPREHEN METABOLIC PANEL: CPT | Performed by: EMERGENCY MEDICINE

## 2018-10-22 PROCEDURE — 85025 COMPLETE CBC W/AUTO DIFF WBC: CPT | Performed by: EMERGENCY MEDICINE

## 2018-10-22 PROCEDURE — 99284 EMERGENCY DEPT VISIT MOD MDM: CPT

## 2018-10-22 PROCEDURE — 81001 URINALYSIS AUTO W/SCOPE: CPT | Performed by: EMERGENCY MEDICINE

## 2018-10-22 PROCEDURE — 85610 PROTHROMBIN TIME: CPT | Performed by: EMERGENCY MEDICINE

## 2018-10-22 PROCEDURE — 93970 EXTREMITY STUDY: CPT | Performed by: SURGERY

## 2018-10-22 PROCEDURE — 36415 COLL VENOUS BLD VENIPUNCTURE: CPT | Performed by: EMERGENCY MEDICINE

## 2018-10-22 PROCEDURE — 85730 THROMBOPLASTIN TIME PARTIAL: CPT | Performed by: EMERGENCY MEDICINE

## 2018-10-22 PROCEDURE — 96374 THER/PROPH/DIAG INJ IV PUSH: CPT

## 2018-10-22 PROCEDURE — 85379 FIBRIN DEGRADATION QUANT: CPT | Performed by: EMERGENCY MEDICINE

## 2018-10-22 RX ORDER — MORPHINE SULFATE 4 MG/ML
4 INJECTION, SOLUTION INTRAMUSCULAR; INTRAVENOUS ONCE
Status: COMPLETED | OUTPATIENT
Start: 2018-10-22 | End: 2018-10-22

## 2018-10-22 RX ORDER — OXYCODONE HYDROCHLORIDE AND ACETAMINOPHEN 5; 325 MG/1; MG/1
1 TABLET ORAL EVERY 6 HOURS PRN
Qty: 11 TABLET | Refills: 0 | Status: SHIPPED | OUTPATIENT
Start: 2018-10-22 | End: 2018-10-25 | Stop reason: SDUPTHER

## 2018-10-22 RX ADMIN — RIVAROXABAN 15 MG: 15 TABLET, FILM COATED ORAL at 11:18

## 2018-10-22 RX ADMIN — MORPHINE SULFATE 4 MG: 4 INJECTION INTRAVENOUS at 09:35

## 2018-10-22 NOTE — ED PROVIDER NOTES
Pt Name: Araceli Ko  MRN: [id number]  [place] 1955  Age/Sex: 57 y o  female  Date of evaluation: 10/22/2018  PCP: Galo Quispe, [address] [place] [address]    Chief Complaint   Patient presents with    Leg Pain     pain that begins to medial right ankle and shoots up medial lower leg that began on Sat          HPI    57 y o  female presenting with right lower leg pain times 6 days  Patient states that she had massage on Tuesday and had some pain in the right calf that she noticed shortly after that  The pain worsened but then improved over the next couple of days but got substantially worse on Saturday  Pain is sharp, near the right medial ankle and throughout the posterior calf, worse with walking and better at rest   Patient states that she she has been taking Tylenol with some relief of pain all the Tylenol she took prior to arrival has not helped  She also notes trying her inserts for plantar fasciitis well as support hose with no relief  Over the past week, patient was treated with Keflex for sinus infection that has resolved and has also notes some shortness of breath, cough, congestion which is felt to be viral after initial workup, she notes that she has been lying in bed and been less active over the last several days due to this  Patient has a history of a DVT in the left lower extremity, was treated with warfarin without was since stopped as the clot was felt to be provoked by bad circulation due to several prior knee surgeries  She denies trauma, fever, twisting her ankle, nausea, vomiting, diarrhea shortness of breath, other symptoms      HPI      Past Medical and Surgical History    Past Medical History:   Diagnosis Date    Acid reflux     Allergic rhinitis     Last Assessed: 11/2/2017    Anesthesia complication     HYPOTENSION    Arthritis     Asthma     Bigeminy     history    DVT (deep venous thrombosis) ([facility])     DVT of leg (deep venous thrombosis) ([facility] [address] ) 2001    left    Female pelvic pain     Hyperlipidemia     Intermittent palpitations     Irregular heart beat     Migraines     Palpitations     Pes planus     unspecified laterality; Last Assessed: 2014    Thyroid nodule     Trigeminy     history    Wears glasses        Past Surgical History:   Procedure Laterality Date    APPENDECTOMY       SECTION      X 2    CHOLECYSTECTOMY      COLONOSCOPY      DILATION AND CURETTAGE OF UTERUS      JOINT REPLACEMENT      left TKR    KNEE SURGERY Left     X 3    VA ESOPHAGOGASTRODUODENOSCOPY TRANSORAL DIAGNOSTIC N/A 3/14/2016    Procedure: ESOPHAGOGASTRODUODENOSCOPY (EGD); Surgeon: Douglas Mir MD;  Location: [facility]; Service: Gastroenterology    VA LAPAROSCOPY W TOT HYSTERECTUTERUS <=250 [address] TUBE/OVARY Bilateral 2016    Procedure: HYSTERECTOMY LAPAROSCOPIC TOTAL ,BSO;  Surgeon: Naa Green MD;  Location: [facility];  Service: Gynecology Oncology    REPLACEMENT TOTAL KNEE  2014    WISDOM TOOTH EXTRACTION         Family History   Problem Relation Age of Onset    Endometrial cancer Mother     Migraines Mother     Diabetes Father     Heart disease Father     Heart attack Father     Thyroid cancer Brother     Diabetes type II Brother     Growth hormone deficiency Daughter     Alzheimer's disease Maternal Aunt     Thyroid cancer Maternal Aunt     Diabetes Paternal Aunt     Hashimoto's thyroiditis Paternal Aunt         Total Thyroidectomy    Other Maternal Uncle         Brain Tumor    Alzheimer's disease Maternal Uncle     Cancer Family        Social History   Substance Use Topics    Smoking status: Former Smoker     Quit date: 1978    Smokeless tobacco: Never Used      Comment: as teenager    Alcohol use Yes      Comment: socially           Allergies    Allergies   Allergen Reactions    Naproxen      Heart palpitations       Home Medications    Prior to Admission medications    Medication Sig Start Date End Date Taking? Authorizing Provider   albuterol (VENTOLIN HFA) 90 mcg/act inhaler 1-2 puffs 4 times a day as needed 2/12/18   Ellis Slipper, DO   ALPRAZolam [employer]) 0 25 mg tablet Take 1 tablet (0 25 mg total) by mouth daily at bedtime as needed for anxiety 3/9/18   Ellis Slipper, DO   butalbital-acetaminophen-caffeine (FIORICET,ESGIC) -40 mg per tablet TAKE 1 OR 2 TABLETS EVERY DAY AS NEEDED 9/27/18   Ellis Slipper, DO   clobetasol (TEMOVATE) 0 05 % cream Apply topically 11/20/17   Historical Provider, MD   cycloSPORINE (RESTASIS) 0 05 % ophthalmic emulsion Apply 1 drop to eye 2 (two) times a day    Historical Provider, MD   diazepam (VALIUM) 5 mg tablet Take 1 tablet (5 mg total) by mouth every 8 (eight) hours as needed for anxiety 8/16/18   Ellis Slipper, DO   diphenhydrAMINE (BENADRYL) 25 mg capsule Take by mouth daily at bedtime as needed      Historical Provider, MD   Ergocalciferol (VITAMIN D2) 2000 UNITS TABS Take 2,000 Units by mouth daily  Historical Provider, MD   Evening Primrose Oil 1000 MG CAPS Take 1 capsule by mouth daily    Historical Provider, MD   FLOVENT  MCG/ACT inhaler INHALE 1 PUFF 2 (TWO) TIMES A DAY  Patient not taking: Reported on 9/7/2018 4/30/18   Lenny García MD   fluticasone [facility]) 50 mcg/act nasal spray 2 sprays into each nostril daily 12/14/16   Historical Provider, MD   loratadine (CLARITIN) 10 mg tablet Take 1 tablet (10 mg total) by mouth daily 3/6/18   Gracie Duster,    loteprednol etabonate (LOTEMAX) 0 5 % ophthalmic suspension Administer 1 drop to both eyes 3 (three) times a day as needed      Historical Provider, MD   Magnesium 200 MG TABS Take 1 tablet by mouth daily    Historical Provider, MD   methocarbamol (ROBAXIN) 500 mg tablet Take 500 mg by mouth as needed for muscle spasms    Historical Provider, MD   montelukast (SINGULAIR) 10 mg tablet Take 1 tablet (10 mg total) by mouth daily at bedtime  Patient not taking: Reported on 9/7/2018  3/6/18   Edel Hardy DO Angel Luis   Multiple Vitamin (MULTIVITAMIN) capsule Take 1 capsule by mouth daily  Historical Provider, MD   Omega-3 Fatty Acids (OMEGA 3 PO) Take 2 capsules by mouth 2 (two) times a day  Historical Provider, MD   pantoprazole (PROTONIX) 40 mg tablet TAKE 1 TABLET TWICE DAILY 4/5/18   Ra Dyson DO   ZOLMitriptan (ZOMIG) 5 MG tablet Take 1 tablet (5 mg total) by mouth daily as needed (1 daily p r n  headache) 9/7/18   Ra Dyson DO           Review of Systems    Review of Systems   Constitutional: Positive for chills  Negative for activity change and fever  HENT: Positive for congestion  Negative for drooling and facial swelling  Eyes: Negative for pain, discharge and visual disturbance  Respiratory: Positive for cough and shortness of breath  Negative for apnea, chest tightness and wheezing  Cardiovascular: Negative for chest pain and leg swelling  Gastrointestinal: Negative for abdominal pain, constipation, diarrhea, nausea and vomiting  Genitourinary: Negative for difficulty urinating, dysuria and urgency  Musculoskeletal: Positive for myalgias  Negative for arthralgias, back pain and gait problem  Skin: Negative for color change and rash  Neurological: Negative for dizziness, speech difficulty, weakness and headaches  Psychiatric/Behavioral: Negative for agitation, behavioral problems and confusion  All other systems reviewed and negative  Physical Exam      ED Triage Vitals [10/22/18 0841]   Temperature Pulse Respirations Blood Pressure SpO2   97 7 °F (36 5 °C) 80 18 153/67 99 %      Temp Source Heart Rate Source Patient Position - Orthostatic VS BP Location FiO2 (%)   Oral Monitor Sitting Right arm --      Pain Score       7               Physical Exam   Constitutional: She is oriented to person, place, and time  She appears well-developed and well-nourished  HENT:   Head: Normocephalic and atraumatic     Nose: Nose normal    Mouth/Throat: Oropharynx is clear and moist    Eyes: Pupils are equal, round, and reactive to light  Conjunctivae and EOM are normal    Neck: Normal range of motion  Neck supple  Cardiovascular: Normal rate, regular rhythm, normal heart sounds and intact distal pulses  Pulmonary/Chest: Effort normal and breath sounds normal  No respiratory distress  She has no wheezes  She has no rales  Abdominal: Soft  She exhibits no distension  There is no tenderness  There is no rebound and no guarding  Musculoskeletal: Normal range of motion  She exhibits no edema or deformity  Neurological: She is alert and oriented to person, place, and time  Skin: Skin is warm and dry  No rash noted  No erythema  Psychiatric: She has a normal mood and affect   Her behavior is normal  Judgment and thought content normal             Diagnostic Results      Labs:    Results for orders placed or performed during the hospital encounter of 10/22/18   CBC and differential   Result Value Ref Range    WBC 7 43 4 31 - 10 16 Thousand/uL    RBC 5 46 (H) 3 81 - 5 12 Million/uL    Hemoglobin 14 8 11 5 - 15 4 g/dL    Hematocrit 45 6 34 8 - 46 1 %    MCV 84 82 - 98 fL    MCH 27 1 26 8 - 34 3 pg    MCHC 32 5 31 4 - 37 4 g/dL    RDW 14 0 11 6 - 15 1 %    MPV 9 3 8 9 - 12 7 fL    Platelets 296 920 - 400 Thousands/uL    nRBC 0 /100 WBCs    Neutrophils Relative 59 43 - 75 %    Immat GRANS % 1 0 - 2 %    Lymphocytes Relative 28 14 - 44 %    Monocytes Relative 8 4 - 12 %    Eosinophils Relative 3 0 - 6 %    Basophils Relative 1 0 - 1 %    Neutrophils Absolute 4 51 1 85 - 7 62 Thousands/µL    Immature Grans Absolute 0 04 0 00 - 0 20 Thousand/uL    Lymphocytes Absolute 2 05 0 60 - 4 47 Thousands/µL    Monocytes Absolute 0 57 0 17 - 1 22 Thousand/µL    Eosinophils Absolute 0 20 0 00 - 0 61 Thousand/µL    Basophils Absolute 0 06 0 00 - 0 10 Thousands/µL   Protime-INR   Result Value Ref Range    Protime 13 0 11 8 - 14 2 seconds    INR 0 99 0 86 - 1 17   APTT   Result Value Ref Range PTT 28 24 - 36 seconds   Comprehensive metabolic panel   Result Value Ref Range    Sodium 144 136 - 145 mmol/L    Potassium 3 8 3 5 - 5 3 mmol/L    Chloride 106 100 - 108 mmol/L    CO2 30 21 - 32 mmol/L    ANION GAP 8 4 - 13 mmol/L    BUN 15 5 - 25 mg/dL    Creatinine 0 77 0 60 - 1 30 mg/dL    Glucose 91 65 - 140 mg/dL    Calcium 9 2 8 3 - 10 1 mg/dL    AST 40 5 - 45 U/L    ALT 69 12 - 78 U/L    Alkaline Phosphatase 131 (H) 46 - 116 U/L    Total Protein 7 4 6 4 - 8 2 g/dL    Albumin 3 8 3 5 - 5 0 g/dL    Total Bilirubin 0 30 0 20 - 1 00 mg/dL    eGFR 82 ml/min/1 73sq m   D-Dimer   Result Value Ref Range    D-Dimer, Quant 2,297 (H) 0 - 424 ng/ml (FEU)   Urinalysis with microscopic   Result Value Ref Range    Clarity, UA Clear     Color, UA Yellow     Specific [place], UA 1 010 1 003 - 1 030    pH, UA 6 0 4 5 - 8 0    Glucose, UA Negative Negative mg/dl    Ketones, UA Negative Negative mg/dl    Blood, UA Negative Negative    Protein, UA Negative Negative mg/dl    Nitrite, UA Negative Negative    Bilirubin, UA Negative Negative    Urobilinogen, UA 0 2 0 2, 1 0 E U /dl E U /dl    Leukocytes, UA Small (A) Negative    WBC, UA 1-2 (A) None Seen, 0-5, 5-55, 5-65 /hpf    RBC, UA 0-1 (A) None Seen, 0-5 /hpf    Bacteria, UA Occasional None Seen, Occasional /hpf    Epithelial Cells Occasional None Seen, Occasional /hpf       All labs reviewed and utilized in the medical decision making process    Radiology:    VAS lower limb venous duplex study, complete bilateral    (Results Pending)       All radiology studies independently viewed by me and interpreted by the radiologist     Procedure    Procedures    CritCare Time      ED Course of Care and Re-Assessments      Discussed case with internal medicine to confirm out patient Xarelto pathway for DVT    Patient offered admission versus outpatient treatment sterile to opted for the latter after discussion of risks and benefits, to include discussion of risk of bleeding, as well as difficulty reversing Xarelto verses alternate anticoagulants  Patient opted for the latter,  noting that she has plenty of help at home between her , brother, other family members  Patient's  called from the ER and updated at her request, primary care doctor also contacted updated per patient request   Medications   morphine (PF) 4 mg/mL injection 4 mg (4 mg Intravenous Given 10/22/18 4572)   rivaroxaban ([place] M[place]am) tablet 15 mg (15 mg Oral Given 10/22/18 0258)           FINAL IMPRESSION    Final diagnoses:   Acute deep vein thrombosis (DVT) of right lower extremity [facility])         DISPOSITION/PLAN    69-year-old female with history and symptoms above  Vital signs reassuring, examination concerning for tenderness to palpation right lower extremity  Although no swelling noted, is based on pain as well as history of prior DVT in left lower extremity, some concern for clot at this point  Lab sent returned remarkable for elevated D-dimer, DVT ultrasound positive for DVT  After discussion of risks and benefits, patient opted for care at home with Xarelto, started on a medication in the ER and given short course of Percocet for breakthrough pain as she is currently taking Tylenol and unable to take NSAIDs  Discharged strict return precautions, follow up primary care doctor  Primary care doctor also contacted to help ensure follow-up  Time reflects when diagnosis was documented in both MDM as applicable and the Disposition within this note     Time User Action Codes Description Comment    10/22/2018 10:56 AM Taya WOOD Add [I82 [id number]] Acute deep vein thrombosis (DVT) of right lower extremity [facility])       ED Disposition     ED Disposition Condition Comment    Discharge  [facility] discharge to home/self care      Condition at discharge: Good        Follow-up Information     Follow up With Specialties Details Why Contact Info Additional [address], DO Internal Medicine Go in 1 day To recheck your symptoms [address] [facility] [address] Occupational [facility] Call today At [phone] to schedule close followup and discuss your DVT [address] [facility] [address] [facility], [address] [place] [address], [address], [place], [address]            PATIENT REFERRED TO:    Galo Quispe, DO  [address] [facility] [address]    Go in 1 day  To recheck your symptoms    [address] [facility] [address]  [address] [facility] [address]  Call today  At [phone] to schedule close followup and discuss your DVT      DISCHARGE MEDICATIONS:    Discharge Medication List as of 10/22/2018 11:15 AM      START taking these medications    Details   oxyCODONE-acetaminophen (PERCOCET) 5-325 mg per tablet Take 1 tablet by mouth every 6 (six) hours as needed for moderate pain for up to 11 doses Max Daily Amount: 4 tablets, Starting Mon 10/22/2018, Print      rivaroxaban (XARELTO) 15 & 20 MG starter pack Take 15 mg by mouth twice daily for 21 days, then 20 mg once daily thereafter , Print         CONTINUE these medications which have NOT CHANGED    Details   albuterol (VENTOLIN HFA) 90 mcg/act inhaler 1-2 puffs 4 times a day as needed, Normal      ALPRAZolam (XANAX) 0 25 mg tablet Take 1 tablet (0 25 mg total) by mouth daily at bedtime as needed for anxiety, Starting Fri 3/9/2018, Print      butalbital-acetaminophen-caffeine (FIORICET,ESGIC) -40 mg per tablet TAKE 1 OR 2 TABLETS EVERY DAY AS NEEDED, Normal      clobetasol (TEMOVATE) 0 05 % cream Apply topically, Starting Mon 11/20/2017, Historical Med      cycloSPORINE (RESTASIS) 0 05 % ophthalmic emulsion Apply 1 drop to eye 2 (two) times a day, Historical Med      diazepam (VALIUM) 5 mg tablet Take 1 tablet (5 mg total) by mouth every 8 (eight) hours as needed for anxiety, Starting u 8/16/2018, Print diphenhydrAMINE (BENADRYL) 25 mg capsule Take by mouth daily at bedtime as needed  , Historical Med      Ergocalciferol (VITAMIN D2) 2000 UNITS TABS Take 2,000 Units by mouth daily  , Historical Med      Evening Primrose Oil 1000 MG CAPS Take 1 capsule by mouth daily, Historical Med      FLOVENT  MCG/ACT inhaler INHALE 1 PUFF 2 (TWO) TIMES A DAY, Starting Mon 4/30/2018, Normal      fluticasone (FLONASE) 50 mcg/act nasal spray 2 sprays into each nostril daily, Starting Wed 12/14/2016, Historical Med      loratadine (CLARITIN) 10 mg tablet Take 1 tablet (10 mg total) by mouth daily, Starting Tue 3/6/2018, Normal      loteprednol etabonate (LOTEMAX) 0 5 % ophthalmic suspension Administer 1 drop to both eyes 3 (three) times a day as needed , Historical Med      Magnesium 200 MG TABS Take 1 tablet by mouth daily, Historical Med      methocarbamol (ROBAXIN) 500 mg tablet Take 500 mg by mouth as needed for muscle spasms, Historical Med      montelukast (SINGULAIR) 10 mg tablet Take 1 tablet (10 mg total) by mouth daily at bedtime, Starting Tue 3/6/2018, Normal      Multiple Vitamin (MULTIVITAMIN) capsule Take 1 capsule by mouth daily  , Historical Med      Omega-3 Fatty Acids (OMEGA 3 PO) Take 2 capsules by mouth 2 (two) times a day   , Historical Med      pantoprazole (PROTONIX) 40 mg tablet TAKE 1 TABLET TWICE DAILY, Normal      ZOLMitriptan (ZOMIG) 5 MG tablet Take 1 tablet (5 mg total) by mouth daily as needed (1 daily p r n  headache), Starting Fri 9/7/2018, Print             No discharge procedures on file           MD Elisa Martinez MD  10/22/18 3095

## 2018-10-22 NOTE — DISCHARGE INSTRUCTIONS
Deep Venous Thrombosis   WHAT YOU NEED TO KNOW:   Deep venous thrombosis (DVT) is a blood clot that forms in a deep vein of the body  The deep veins in the legs, thighs, and hips are the most common sites for DVT  DVT can also occur in a deep vein within your arms  The clot prevents the normal flow of blood in the vein  The blood backs up and causes pain and swelling  The DVT can break into smaller pieces and travel to your lungs and cause a blockage called a pulmonary embolism  A pulmonary embolism can become life-threatening  DISCHARGE INSTRUCTIONS:   Call 911 for any of the following:   · You feel lightheaded, short of breath, and have chest pain  · You cough up blood  Return to the emergency department if:   · Your symptoms get worse  · You develop new DVT symptoms in another leg or arm  Contact your healthcare provider if:   · Your gums or nose bleed  · You see blood in your urine or bowel movements  · Your bowel movements are black or darker than normal     · You have questions or concerns about your conditions or care  Medicines:   · Blood thinners  help prevent the DVT from getting bigger and prevent new clots from forming  Examples of blood thinners include heparin, rivaroxaban, apixiban, and warfarin  The following are general safety guidelines to follow while you are taking a blood thinner:     ¨ Watch for bleeding and bruising  Watch for bleeding from your gums or nose  Watch for blood in your urine and bowel movements  Use a soft washcloth on your skin, and a soft toothbrush to brush your teeth  This can keep your skin and gums from bleeding  If you shave, use an electric shaver  Do not play contact sports  ¨ Tell your dentist and other healthcare providers that you take a blood thinner  Wear a bracelet or necklace that says you take this medicine  ¨ Do not start or stop any medicines unless your healthcare provider tells you to   Many medicines cannot be used with blood thinners  ¨ Tell your healthcare provider right away if you forget to take the blood thinner , or if you take too much  ¨ Warfarin  is a blood thinner that you may need to take  The following are additional things you should be aware of if you take warfarin:    § Foods and medicines can affect the amount of warfarin in your blood  Do not make major changes to your diet  Warfarin works best when you eat about the same amount of vitamin K every day  Vitamin K is found in green leafy vegetables and certain other foods  Ask for more information about what to eat or not to eat  § You will need to see your healthcare provider for follow-up visits  You will need regular blood tests to decide how much warfarin you need  · Take your medicine as directed  Contact your healthcare provider if you think your medicine is not helping or if you have side effects  Tell him or her if you are allergic to any medicine  Keep a list of the medicines, vitamins, and herbs you take  Include the amounts, and when and why you take them  Bring the list or the pill bottles to follow-up visits  Carry your medicine list with you in case of an emergency  Manage your DVT:   · Wear pressure stockings  The stockings are tight and put pressure on your legs  This improves blood flow and helps prevent clots  Wear the stockings during the day  Do not wear them when you sleep  · Elevate your legs  above the level of your heart as often as you can  This will help decrease swelling and pain  Prop your legs on pillows or blankets to keep them elevated comfortably  · Exercise regularly  to help increase your blood flow  Walking is a good low-impact exercise  Talk to your healthcare provider about the best exercise plan for you  · Change body positions often  If you travel by car or work at a desk, move and stretch in your seat several times each hour  In an airplane, get up and walk every hour   If you are bedridden, ask for help to change your position every 1 to 2 hours  · Maintain a healthy weight  Ask your healthcare provider how much you should weigh  Ask him to help you create a weight loss plan if you are overweight  · Do not smoke  Nicotine and other chemicals in cigarettes and cigars can damage blood vessels and make it more difficult to manage your DVT  Ask your healthcare provider for information if you currently smoke and need help to quit  E-cigarettes or smokeless tobacco still contain nicotine  Talk to your healthcare provider before you use these products  Follow up with your healthcare provider as directed:  Write down your questions so you remember to ask them during your visits  © 2017 [employer] [employer] Information is for End User's use only and may not be sold, redistributed or otherwise used for commercial purposes  All illustrations and images included in CareNotes® are the copyrighted property of [employer] , [employer]  or [employer]  The above information is an  only  It is not intended as medical advice for individual conditions or treatments  Talk to your doctor, nurse or pharmacist before following any medical regimen to see if it is safe and effective for you

## 2018-10-23 ENCOUNTER — OFFICE VISIT (OUTPATIENT)
Dept: INTERNAL MEDICINE CLINIC | Facility: CLINIC | Age: 63
End: 2018-10-23
Payer: COMMERCIAL

## 2018-10-23 VITALS
WEIGHT: 195 LBS | HEART RATE: 70 BPM | SYSTOLIC BLOOD PRESSURE: 108 MMHG | HEIGHT: 64 IN | DIASTOLIC BLOOD PRESSURE: 68 MMHG | BODY MASS INDEX: 33.29 KG/M2 | OXYGEN SATURATION: 98 %

## 2018-10-23 DIAGNOSIS — Z23 ENCOUNTER FOR IMMUNIZATION: ICD-10-CM

## 2018-10-23 DIAGNOSIS — I82.441 ACUTE DEEP VEIN THROMBOSIS (DVT) OF TIBIAL VEIN OF RIGHT LOWER EXTREMITY (HCC): Primary | ICD-10-CM

## 2018-10-23 PROCEDURE — 99213 OFFICE O/P EST LOW 20 MIN: CPT | Performed by: INTERNAL MEDICINE

## 2018-10-23 PROCEDURE — 90471 IMMUNIZATION ADMIN: CPT | Performed by: INTERNAL MEDICINE

## 2018-10-23 PROCEDURE — 90682 RIV4 VACC RECOMBINANT DNA IM: CPT | Performed by: INTERNAL MEDICINE

## 2018-10-23 NOTE — PROGRESS NOTES
Assessment/Plan:  She has a DVT of the right leg below the knee  It involves the posterior tibial and the staff in his  It appears to be fresh  The emergency room elected to place her on anticoagulation in the form of Xarelto  Since she is already on Xarelto and this is her 2nd DVT she will continue Xarelto 15 milligrams b i d  For 21 days then switch over to 20 milligrams once per day  She will have a repeat ultrasound in 3 weeks and see me at that time  Decision is in at that time as to whether to continue the Xarelto or not  She was warned about side effects of the medication that she should go to the emergency room if any trauma or signs of bleeding  Recent Results (from the past 1008 hour(s))   Stress strip    Collection Time: 10/19/18  9:03 AM   Result Value Ref Range    Protocol Name LEXISCAN-SIT     Time In Exercise Phase 00:03:00     MAX   SYSTOLIC  mmHg    Max Diastolic Bp 60 mmHg    Max Heart Rate 122 BPM    Max Predicted Heart Rate 156 BPM    Reason for Termination Protocol Complete     Test Indication SOB     Target Hr Formular (220 - Age)*85%     Arrhy During Ex none     ECG Interp Before Ex      ECG Interp during Ex      Ex Summary Comment      Chest Pain Statement none     Overall Hr Response To Exercise      Overall BP Response To Exercise     CBC and differential    Collection Time: 10/22/18  9:37 AM   Result Value Ref Range    WBC 7 43 4 31 - 10 16 Thousand/uL    RBC 5 46 (H) 3 81 - 5 12 Million/uL    Hemoglobin 14 8 11 5 - 15 4 g/dL    Hematocrit 45 6 34 8 - 46 1 %    MCV 84 82 - 98 fL    MCH 27 1 26 8 - 34 3 pg    MCHC 32 5 31 4 - 37 4 g/dL    RDW 14 0 11 6 - 15 1 %    MPV 9 3 8 9 - 12 7 fL    Platelets 897 405 - 582 Thousands/uL    nRBC 0 /100 WBCs    Neutrophils Relative 59 43 - 75 %    Immat GRANS % 1 0 - 2 %    Lymphocytes Relative 28 14 - 44 %    Monocytes Relative 8 4 - 12 %    Eosinophils Relative 3 0 - 6 %    Basophils Relative 1 0 - 1 %    Neutrophils Absolute 4 51 1 85 - 7 62 Thousands/µL    Immature Grans Absolute 0 04 0 00 - 0 20 Thousand/uL    Lymphocytes Absolute 2 05 0 60 - 4 47 Thousands/µL    Monocytes Absolute 0 57 0 17 - 1 22 Thousand/µL    Eosinophils Absolute 0 20 0 00 - 0 61 Thousand/µL    Basophils Absolute 0 06 0 00 - 0 10 Thousands/µL   Protime-INR    Collection Time: 10/22/18  9:37 AM   Result Value Ref Range    Protime 13 0 11 8 - 14 2 seconds    INR 0 99 0 86 - 1 17   APTT    Collection Time: 10/22/18  9:37 AM   Result Value Ref Range    PTT 28 24 - 36 seconds   Comprehensive metabolic panel    Collection Time: 10/22/18  9:37 AM   Result Value Ref Range    Sodium 144 136 - 145 mmol/L    Potassium 3 8 3 5 - 5 3 mmol/L    Chloride 106 100 - 108 mmol/L    CO2 30 21 - 32 mmol/L    ANION GAP 8 4 - 13 mmol/L    BUN 15 5 - 25 mg/dL    Creatinine 0 77 0 60 - 1 30 mg/dL    Glucose 91 65 - 140 mg/dL    Calcium 9 2 8 3 - 10 1 mg/dL    AST 40 5 - 45 U/L    ALT 69 12 - 78 U/L    Alkaline Phosphatase 131 (H) 46 - 116 U/L    Total Protein 7 4 6 4 - 8 2 g/dL    Albumin 3 8 3 5 - 5 0 g/dL    Total Bilirubin 0 30 0 20 - 1 00 mg/dL    eGFR 82 ml/min/1 73sq m   D-Dimer    Collection Time: 10/22/18  9:37 AM   Result Value Ref Range    D-Dimer, Quant 2,297 (H) 0 - 424 ng/ml (FEU)   Urinalysis with microscopic    Collection Time: 10/22/18  9:39 AM   Result Value Ref Range    Clarity, UA Clear     Color, UA Yellow     Specific [place], UA 1 010 1 003 - 1 030    pH, UA 6 0 4 5 - 8 0    Glucose, UA Negative Negative mg/dl    Ketones, UA Negative Negative mg/dl    Blood, UA Negative Negative    Protein, UA Negative Negative mg/dl    Nitrite, UA Negative Negative    Bilirubin, UA Negative Negative    Urobilinogen, UA 0 2 0 2, 1 0 E U /dl E U /dl    Leukocytes, UA Small (A) Negative    WBC, UA 1-2 (A) None Seen, 0-5, 5-55, 5-65 /hpf    RBC, UA 0-1 (A) None Seen, 0-5 /hpf    Bacteria, UA Occasional None Seen, Occasional /hpf    Epithelial Cells Occasional None Seen, Occasional /hpf 1  Acute deep vein thrombosis (DVT) of tibial vein of right lower extremity (HCC)  VAS lower limb venous duplex study, unilateral/limited   2  Encounter for immunization  influenza vaccine, [id number], quadrivalent, recombinant, PF, 0 5 mL, for patients 18 yr+ ([facility])       Orders Placed This Encounter   Procedures    influenza vaccine, [id number], quadrivalent, recombinant, PF, 0 5 mL, for patients 18 yr+ ([facility])         Subjective:  Right leg pain   Patient ID: Braeden Aparicio is a 57 y o  female  HPI she has a history of DVT of the left leg  She had some right leg discomfort and then yesterday had a lot of right lower leg discomfort  She went to the emergency room  An ultrasound revealed DVT of the posterior tibial in saphenous vein below the knee from the calf down  She was placed on Xarelto  She still has discomfort  No chest pain or shortness of breath    The following portions of the patient's history were reviewed and updated as appropriate:   She has a past medical history of Acid reflux; Allergic rhinitis; Anesthesia complication; Arthritis; Asthma; Bigeminy; DVT (deep venous thrombosis) ([facility] [address] ); DVT of leg (deep venous thrombosis) ([facility] [address] ) (); Female pelvic pain; Hyperlipidemia; Intermittent palpitations; Irregular heart beat; Migraines; Palpitations; Pes planus; Thyroid nodule; Trigeminy; and Wears glasses  ,   does not have any pertinent problems on file  ,   has a past surgical history that includes Replacement total knee (); Appendectomy; Knee surgery (Left);  section; Joint replacement; Dilation and curettage of uterus; Colonoscopy; [place] tooth extraction; Cholecystectomy; pr laparoscopy w tot hysterectuterus <=250 gram  w tube/ovary (Bilateral, 2016); and pr esophagogastroduodenoscopy transoral diagnostic (N/A, 3/14/2016)  ,  family history includes Alzheimer's disease in her maternal aunt and maternal uncle;  Cancer in her family; Diabetes in her father and paternal aunt; Diabetes type II in her brother; Endometrial cancer in her mother; Growth hormone deficiency in her daughter; Hashimoto's thyroiditis in her paternal aunt; Heart attack in her father; Heart disease in her father; Migraines in her mother; Other in her maternal uncle; Thyroid cancer in her brother and maternal aunt  ,   reports that she quit smoking about 40 years ago  She has never used smokeless tobacco  She reports that she drinks alcohol  She reports that she does not use drugs  ,  is allergic to naproxen       Current Outpatient Prescriptions:     albuterol (VENTOLIN HFA) 90 mcg/act inhaler, 1-2 puffs 4 times a day as needed, Disp: 18 Inhaler, Rfl: 0    ALPRAZolam (XANAX) 0 25 mg tablet, Take 1 tablet (0 25 mg total) by mouth daily at bedtime as needed for anxiety, Disp: 30 tablet, Rfl: 2    butalbital-acetaminophen-caffeine (FIORICET,ESGIC) -40 mg per tablet, TAKE 1 OR 2 TABLETS EVERY DAY AS NEEDED, Disp: 60 tablet, Rfl: 0    clobetasol (TEMOVATE) 0 05 % cream, Apply topically, Disp: , Rfl:     cycloSPORINE (RESTASIS) 0 05 % ophthalmic emulsion, Apply 1 drop to eye 2 (two) times a day, Disp: , Rfl:     diazepam (VALIUM) 5 mg tablet, Take 1 tablet (5 mg total) by mouth every 8 (eight) hours as needed for anxiety, Disp: 60 tablet, Rfl: 0    diphenhydrAMINE (BENADRYL) 25 mg capsule, Take by mouth daily at bedtime as needed  , Disp: , Rfl:     Ergocalciferol (VITAMIN D2) 2000 UNITS TABS, Take 2,000 Units by mouth daily    , Disp: , Rfl:     Evening Primrose Oil 1000 MG CAPS, Take 1 capsule by mouth daily, Disp: , Rfl:     FLOVENT  MCG/ACT inhaler, INHALE 1 PUFF 2 (TWO) TIMES A DAY, Disp: 12 Inhaler, Rfl: 0    fluticasone (FLONASE) 50 mcg/act nasal spray, 2 sprays into each nostril daily, Disp: , Rfl:     loratadine (CLARITIN) 10 mg tablet, Take 1 tablet (10 mg total) by mouth daily, Disp: 30 tablet, Rfl: 0    loteprednol etabonate (LOTEMAX) 0 5 % ophthalmic suspension, Administer 1 drop to both eyes 3 (three) times a day as needed  , Disp: , Rfl:     Magnesium 200 MG TABS, Take 1 tablet by mouth daily, Disp: , Rfl:     methocarbamol (ROBAXIN) 500 mg tablet, Take 500 mg by mouth as needed for muscle spasms, Disp: , Rfl:     montelukast (SINGULAIR) 10 mg tablet, Take 1 tablet (10 mg total) by mouth daily at bedtime, Disp: 30 tablet, Rfl: 0    Multiple Vitamin (MULTIVITAMIN) capsule, Take 1 capsule by mouth daily  , Disp: , Rfl:     Omega-3 Fatty Acids (OMEGA 3 PO), Take 2 capsules by mouth 2 (two) times a day   , Disp: , Rfl:     oxyCODONE-acetaminophen (PERCOCET) 5-325 mg per tablet, Take 1 tablet by mouth every 6 (six) hours as needed for moderate pain for up to 11 doses Max Daily Amount: 4 tablets, Disp: 11 tablet, Rfl: 0    pantoprazole (PROTONIX) 40 mg tablet, TAKE 1 TABLET TWICE DAILY, Disp: 60 tablet, Rfl: 1    rivaroxaban (XARELTO) 15 & 20 MG starter pack, Take 15 mg by mouth twice daily for 21 days, then 20 mg once daily thereafter , Disp: 51 each, Rfl: 0    ZOLMitriptan (ZOMIG) 5 MG tablet, Take 1 tablet (5 mg total) by mouth daily as needed (1 daily p r n  headache), Disp: 8 tablet, Rfl: 0    Review of Systems   Constitutional: Positive for fatigue  HENT: Negative  Respiratory: Negative for cough, choking, chest tightness and shortness of breath  Cardiovascular: Negative for chest pain and leg swelling  She has no leg swelling  Musculoskeletal:        She has discomfort in the media 0 anterior aspect of the right lower leg  Also some calf tenderness  Objective:  /68   Pulse 70   Ht 5' 4" (1 626 m)   Wt 88 5 kg (195 lb)   SpO2 98%   BMI 33 47 kg/m²      Physical Exam   Constitutional: She appears well-nourished  Moderately overweight  Blood pressure is 110/70  HENT:   Head: Normocephalic  Eyes: Pupils are equal, round, and reactive to light  Neck: Normal range of motion  Cardiovascular: Normal rate    Exam reveals no gallop and no friction rub  No murmur heard  Pulmonary/Chest: Effort normal and breath sounds normal    Abdominal: Soft     Musculoskeletal:   She has a little bit of tenderness in the right calf and medial aspect of the right mid lower leg

## 2018-10-24 ENCOUNTER — TELEPHONE (OUTPATIENT)
Dept: INTERNAL MEDICINE CLINIC | Facility: CLINIC | Age: 63
End: 2018-10-24

## 2018-10-24 NOTE — TELEPHONE ENCOUNTER
Patient is having cataract surgery on Monday 10/29 at [facility]  Pre-op visit is scheduled for Friday at 3:45 pm   [facility] would like a copy of the Pre-op note, H&P and the EKG faxed to them at [phone] and to the [address] [place] [address] at [phone] and a copy of the pre-op note to be given to the patient  All information will need to be faxed before Monday  If there is a problem that this cannot be done, please advise them at [phone] because they will have to cancel and reschedule

## 2018-10-25 ENCOUNTER — TELEPHONE (OUTPATIENT)
Dept: INTERNAL MEDICINE CLINIC | Facility: CLINIC | Age: 63
End: 2018-10-25

## 2018-10-25 ENCOUNTER — CONSULT (OUTPATIENT)
Dept: INTERNAL MEDICINE CLINIC | Facility: CLINIC | Age: 63
End: 2018-10-25
Payer: COMMERCIAL

## 2018-10-25 VITALS
HEIGHT: 64 IN | OXYGEN SATURATION: 96 % | DIASTOLIC BLOOD PRESSURE: 68 MMHG | BODY MASS INDEX: 33.47 KG/M2 | HEART RATE: 75 BPM | SYSTOLIC BLOOD PRESSURE: 118 MMHG

## 2018-10-25 DIAGNOSIS — I82.4Z1 ACUTE DEEP VEIN THROMBOSIS (DVT) OF DISTAL VEIN OF RIGHT LOWER EXTREMITY (HCC): Primary | ICD-10-CM

## 2018-10-25 PROCEDURE — 99213 OFFICE O/P EST LOW 20 MIN: CPT | Performed by: INTERNAL MEDICINE

## 2018-10-25 RX ORDER — OXYCODONE HYDROCHLORIDE AND ACETAMINOPHEN 5; 325 MG/1; MG/1
1 TABLET ORAL EVERY 6 HOURS PRN
Qty: 11 TABLET | Refills: 0 | Status: SHIPPED | OUTPATIENT
Start: 2018-10-25 | End: 2018-11-17 | Stop reason: ALTCHOICE

## 2018-10-25 NOTE — TELEPHONE ENCOUNTER
Yes P, she should keep pressure on and continue her medication    Let me know if it is more than just a small amount of bleeding

## 2018-10-25 NOTE — TELEPHONE ENCOUNTER
Pt cld stating she saw Dr Kobe Melendez today but had a cut that reopened and is worried about the amount of bleeding for a little cut  So she is wondering if she should continue taking RIVAROXABAN  Please call pt and advise   [phone]

## 2018-10-26 ENCOUNTER — OFFICE VISIT (OUTPATIENT)
Dept: OBGYN CLINIC | Facility: MEDICAL CENTER | Age: 63
End: 2018-10-26
Payer: COMMERCIAL

## 2018-10-26 VITALS
WEIGHT: 193 LBS | SYSTOLIC BLOOD PRESSURE: 133 MMHG | DIASTOLIC BLOOD PRESSURE: 74 MMHG | HEIGHT: 64 IN | HEART RATE: 75 BPM | BODY MASS INDEX: 32.95 KG/M2

## 2018-10-26 DIAGNOSIS — M76.821 POSTERIOR TIBIAL TENDINITIS OF RIGHT LOWER EXTREMITY: ICD-10-CM

## 2018-10-26 DIAGNOSIS — M25.471 PAIN AND SWELLING OF RIGHT ANKLE: ICD-10-CM

## 2018-10-26 DIAGNOSIS — I80.9 THROMBOPHLEBITIS: Primary | ICD-10-CM

## 2018-10-26 DIAGNOSIS — M25.571 PAIN AND SWELLING OF RIGHT ANKLE: ICD-10-CM

## 2018-10-26 DIAGNOSIS — Z86.718 HISTORY OF DEEP VENOUS THROMBOSIS (DVT) OF DISTAL VEIN OF RIGHT LOWER EXTREMITY: ICD-10-CM

## 2018-10-26 PROCEDURE — 99213 OFFICE O/P EST LOW 20 MIN: CPT | Performed by: ORTHOPAEDIC SURGERY

## 2018-10-26 NOTE — PROGRESS NOTES
Assessment/Plan:  She has a superficial phlebitis of the right saphenous vein below the mid calf  She has a deep vein thrombosis of the tibial vein posteriorly  She has discomfort which is extraordinary considering the limited area of thrombosis  On questioning whether there could be some bony abnormality and I have asked her to see her orthopedist which she will arrange for this week  Otherwise the plan is to keep her on anticoagulation repeat her ultrasound in about 3 weeks and see her at that time  Recent Results (from the past 1008 hour(s))   Stress strip    Collection Time: 10/19/18  9:03 AM   Result Value Ref Range    Protocol Name LEXISCAN-SIT     Time In Exercise Phase 00:03:00     MAX   SYSTOLIC  mmHg    Max Diastolic Bp 60 mmHg    Max Heart Rate 122 BPM    Max Predicted Heart Rate 156 BPM    Reason for Termination Protocol Complete     Test Indication SOB     Target Hr Formular (220 - Age)*85%     Arrhy During Ex none     ECG Interp Before Ex      ECG Interp during Ex      Ex Summary Comment      Chest Pain Statement none     Overall Hr Response To Exercise      Overall BP Response To Exercise     CBC and differential    Collection Time: 10/22/18  9:37 AM   Result Value Ref Range    WBC 7 43 4 31 - 10 16 Thousand/uL    RBC 5 46 (H) 3 81 - 5 12 Million/uL    Hemoglobin 14 8 11 5 - 15 4 g/dL    Hematocrit 45 6 34 8 - 46 1 %    MCV 84 82 - 98 fL    MCH 27 1 26 8 - 34 3 pg    MCHC 32 5 31 4 - 37 4 g/dL    RDW 14 0 11 6 - 15 1 %    MPV 9 3 8 9 - 12 7 fL    Platelets 175 784 - 603 Thousands/uL    nRBC 0 /100 WBCs    Neutrophils Relative 59 43 - 75 %    Immat GRANS % 1 0 - 2 %    Lymphocytes Relative 28 14 - 44 %    Monocytes Relative 8 4 - 12 %    Eosinophils Relative 3 0 - 6 %    Basophils Relative 1 0 - 1 %    Neutrophils Absolute 4 51 1 85 - 7 62 Thousands/µL    Immature Grans Absolute 0 04 0 00 - 0 20 Thousand/uL    Lymphocytes Absolute 2 05 0 60 - 4 47 Thousands/µL    Monocytes Absolute 0  57 0 17 - 1 22 Thousand/µL    Eosinophils Absolute 0 20 0 00 - 0 61 Thousand/µL    Basophils Absolute 0 06 0 00 - 0 10 Thousands/µL   Protime-INR    Collection Time: 10/22/18  9:37 AM   Result Value Ref Range    Protime 13 0 11 8 - 14 2 seconds    INR 0 99 0 86 - 1 17   APTT    Collection Time: 10/22/18  9:37 AM   Result Value Ref Range    PTT 28 24 - 36 seconds   Comprehensive metabolic panel    Collection Time: 10/22/18  9:37 AM   Result Value Ref Range    Sodium 144 136 - 145 mmol/L    Potassium 3 8 3 5 - 5 3 mmol/L    Chloride 106 100 - 108 mmol/L    CO2 30 21 - 32 mmol/L    ANION GAP 8 4 - 13 mmol/L    BUN 15 5 - 25 mg/dL    Creatinine 0 77 0 60 - 1 30 mg/dL    Glucose 91 65 - 140 mg/dL    Calcium 9 2 8 3 - 10 1 mg/dL    AST 40 5 - 45 U/L    ALT 69 12 - 78 U/L    Alkaline Phosphatase 131 (H) 46 - 116 U/L    Total Protein 7 4 6 4 - 8 2 g/dL    Albumin 3 8 3 5 - 5 0 g/dL    Total Bilirubin 0 30 0 20 - 1 00 mg/dL    eGFR 82 ml/min/1 73sq m   D-Dimer    Collection Time: 10/22/18  9:37 AM   Result Value Ref Range    D-Dimer, Quant 2,297 (H) 0 - 424 ng/ml (FEU)   Urinalysis with microscopic    Collection Time: 10/22/18  9:39 AM   Result Value Ref Range    Clarity, UA Clear     Color, UA Yellow     Specific [place], UA 1 010 1 003 - 1 030    pH, UA 6 0 4 5 - 8 0    Glucose, UA Negative Negative mg/dl    Ketones, UA Negative Negative mg/dl    Blood, UA Negative Negative    Protein, UA Negative Negative mg/dl    Nitrite, UA Negative Negative    Bilirubin, UA Negative Negative    Urobilinogen, UA 0 2 0 2, 1 0 E U /dl E U /dl    Leukocytes, UA Small (A) Negative    WBC, UA 1-2 (A) None Seen, 0-5, 5-55, 5-65 /hpf    RBC, UA 0-1 (A) None Seen, 0-5 /hpf    Bacteria, UA Occasional None Seen, Occasional /hpf    Epithelial Cells Occasional None Seen, Occasional /hpf       1   Acute deep vein thrombosis (DVT) of distal vein of right lower extremity (HCC)  oxyCODONE-acetaminophen (PERCOCET) 5-325 mg per tablet       No orders of the defined types were placed in this encounter  Subjective:  Right leg pain  Patient ID: Mary Beth Hinojosa is a 57 y o  female  HPI she has decided not to have her eye surgery done for several weeks  She has a superficial phlebitis of the saphenous vein in the right lower leg and a deep vein thrombosis of the tibial vein from the calf below  She has no extension above the knee  She complains of pain to palpation  The following portions of the patient's history were reviewed and updated as appropriate:   She has a past medical history of Acid reflux; Allergic rhinitis; Anesthesia complication; Arthritis; Asthma; Bigeminy; DVT (deep venous thrombosis) ([facility] [address] ); DVT of leg (deep venous thrombosis) ([facility] [address] ) (); Female pelvic pain; Hyperlipidemia; Intermittent palpitations; Irregular heart beat; Migraines; Palpitations; Pes planus; Thyroid nodule; Trigeminy; and Wears glasses  ,   does not have any pertinent problems on file  ,   has a past surgical history that includes Replacement total knee (); Appendectomy; Knee surgery (Left);  section; Joint replacement; Dilation and curettage of uterus; Colonoscopy; [place] tooth extraction; Cholecystectomy; pr laparoscopy w tot hysterectuterus <=250 gram  w tube/ovary (Bilateral, 2016); and pr esophagogastroduodenoscopy transoral diagnostic (N/A, 3/14/2016)  ,  family history includes Alzheimer's disease in her maternal aunt and maternal uncle; Cancer in her family; Diabetes in her father and paternal aunt; Diabetes type II in her brother; Endometrial cancer in her mother; Growth hormone deficiency in her daughter; Hashimoto's thyroiditis in her paternal aunt; Heart attack in her father; Heart disease in her father; Migraines in her mother; Other in her maternal uncle; Thyroid cancer in her brother and maternal aunt  ,   reports that she quit smoking about 40 years ago  She has never used smokeless tobacco  She reports that she drinks alcohol  She reports that she does not use drugs  ,  is allergic to naproxen       Current Outpatient Prescriptions:     albuterol (VENTOLIN HFA) 90 mcg/act inhaler, 1-2 puffs 4 times a day as needed, Disp: 18 Inhaler, Rfl: 0    ALPRAZolam (XANAX) 0 25 mg tablet, Take 1 tablet (0 25 mg total) by mouth daily at bedtime as needed for anxiety, Disp: 30 tablet, Rfl: 2    butalbital-acetaminophen-caffeine (FIORICET,ESGIC) -40 mg per tablet, TAKE 1 OR 2 TABLETS EVERY DAY AS NEEDED, Disp: 60 tablet, Rfl: 0    clobetasol (TEMOVATE) 0 05 % cream, Apply topically, Disp: , Rfl:     cycloSPORINE (RESTASIS) 0 05 % ophthalmic emulsion, Apply 1 drop to eye 2 (two) times a day, Disp: , Rfl:     diazepam (VALIUM) 5 mg tablet, Take 1 tablet (5 mg total) by mouth every 8 (eight) hours as needed for anxiety, Disp: 60 tablet, Rfl: 0    diphenhydrAMINE (BENADRYL) 25 mg capsule, Take by mouth daily at bedtime as needed  , Disp: , Rfl:     Ergocalciferol (VITAMIN D2) 2000 UNITS TABS, Take 2,000 Units by mouth daily  , Disp: , Rfl:     Evening Primrose Oil 1000 MG CAPS, Take 1 capsule by mouth daily, Disp: , Rfl:     FLOVENT  MCG/ACT inhaler, INHALE 1 PUFF 2 (TWO) TIMES A DAY, Disp: 12 Inhaler, Rfl: 0    fluticasone (FLONASE) 50 mcg/act nasal spray, 2 sprays into each nostril daily, Disp: , Rfl:     loratadine (CLARITIN) 10 mg tablet, Take 1 tablet (10 mg total) by mouth daily, Disp: 30 tablet, Rfl: 0    loteprednol etabonate (LOTEMAX) 0 5 % ophthalmic suspension, Administer 1 drop to both eyes 3 (three) times a day as needed  , Disp: , Rfl:     Magnesium 200 MG TABS, Take 1 tablet by mouth daily, Disp: , Rfl:     methocarbamol (ROBAXIN) 500 mg tablet, Take 500 mg by mouth as needed for muscle spasms, Disp: , Rfl:     montelukast (SINGULAIR) 10 mg tablet, Take 1 tablet (10 mg total) by mouth daily at bedtime, Disp: 30 tablet, Rfl: 0    Multiple Vitamin (MULTIVITAMIN) capsule, Take 1 capsule by mouth daily  , Disp: , Rfl:   Omega-3 Fatty Acids (OMEGA 3 PO), Take 2 capsules by mouth 2 (two) times a day   , Disp: , Rfl:     oxyCODONE-acetaminophen (PERCOCET) 5-325 mg per tablet, Take 1 tablet by mouth every 6 (six) hours as needed for moderate pain for up to 11 doses Max Daily Amount: 4 tablets, Disp: 11 tablet, Rfl: 0    pantoprazole (PROTONIX) 40 mg tablet, TAKE 1 TABLET TWICE DAILY, Disp: 60 tablet, Rfl: 1    rivaroxaban (XARELTO) 15 & 20 MG starter pack, Take 15 mg by mouth twice daily for 21 days, then 20 mg once daily thereafter , Disp: 51 each, Rfl: 0    ZOLMitriptan (ZOMIG) 5 MG tablet, Take 1 tablet (5 mg total) by mouth daily as needed (1 daily p r n  headache), Disp: 8 tablet, Rfl: 0    Review of Systems   Constitutional: Positive for appetite change  She complains of considerable pain in the right leg both in the dorsal aspect as well as the medial aspect of the right lower leg  Musculoskeletal:        She has palpable tenderness in the dorsal aspect of the right lower leg  The intact peripheral pulses are present  She also has discomfort posteriorly  There is no real swelling of the leg               Objective:  /68 (BP Location: Left arm, Patient Position: Sitting)   Pulse 75   Ht 5' 4" (1 626 m)   SpO2 96%   BMI 33 47 kg/m²      Physical Exam

## 2018-10-26 NOTE — PROGRESS NOTES
62 y o female presents for right lower leg pain  Patient was seen in the ED on 10/22/2018 was diagnosed with thrombophlebitis as well as DVT in the great saphenous as well as the posterior tibial veins  She has since been placed on Xarelto 50 mg b i d  She had seen a primary care physician since her ED visit, but due to excessive pain she was recommended for orthopedic evaluation  Patient states that her plantar fasciitis has been exacerbated and she has tenderness palpation over the medial aspect of the ankle  She has not taken any anti-inflammatories as of yet because she is cautioned against them  She has no complaints regarding her left knee during this visit  Review of Systems  Review of systems negative unless otherwise specified in HPI    Past Medical History  Past Medical History:   Diagnosis Date    Acid reflux     Allergic rhinitis     Last Assessed: 2017    Anesthesia complication     HYPOTENSION    Arthritis     Asthma     Bigeminy     history    [facility]     DVT of leg (deep venous thrombosis) ([facility] [address] )     left    Female pelvic pain     Hyperlipidemia     Intermittent palpitations     Irregular heart beat     Migraines     Palpitations     Pes planus     unspecified laterality; Last Assessed: 2014    Thyroid nodule     Trigeminy     history    Wears glasses        Past Surgical History  Past Surgical History:   Procedure Laterality Date    APPENDECTOMY       SECTION      X 2    CHOLECYSTECTOMY      COLONOSCOPY      DILATION AND CURETTAGE OF UTERUS      JOINT REPLACEMENT      left TKR    KNEE SURGERY Left     X 3    AK ESOPHAGOGASTRODUODENOSCOPY TRANSORAL DIAGNOSTIC N/A 3/14/2016    Procedure: ESOPHAGOGASTRODUODENOSCOPY (EGD); Surgeon: Genie Newton MD;  Location: [facility];   Service: Gastroenterology    AK LAPAROSCOPY W TOT HYSTERECTUTERUS <=250 Eleonore Deann TUBE/OVARY Bilateral 2016    Procedure: HYSTERECTOMY LAPAROSCOPIC TOTAL ,BSO;  Surgeon: Adelia Diaz MD;  Location: [facility];  Service: Gynecology Oncology    REPLACEMENT TOTAL KNEE  2014    WISDOM TOOTH EXTRACTION         Current Medications  Current Outpatient Prescriptions on File Prior to Visit   Medication Sig Dispense Refill    albuterol (VENTOLIN HFA) 90 mcg/act inhaler 1-2 puffs 4 times a day as needed 18 Inhaler 0    ALPRAZolam (XANAX) 0 25 mg tablet Take 1 tablet (0 25 mg total) by mouth daily at bedtime as needed for anxiety 30 tablet 2    butalbital-acetaminophen-caffeine (FIORICET,ESGIC) -40 mg per tablet TAKE 1 OR 2 TABLETS EVERY DAY AS NEEDED 60 tablet 0    clobetasol (TEMOVATE) 0 05 % cream Apply topically      cycloSPORINE (RESTASIS) 0 05 % ophthalmic emulsion Apply 1 drop to eye 2 (two) times a day      diazepam (VALIUM) 5 mg tablet Take 1 tablet (5 mg total) by mouth every 8 (eight) hours as needed for anxiety 60 tablet 0    diphenhydrAMINE (BENADRYL) 25 mg capsule Take by mouth daily at bedtime as needed        Ergocalciferol (VITAMIN D2) 2000 UNITS TABS Take 2,000 Units by mouth daily   Evening Primrose Oil 1000 MG CAPS Take 1 capsule by mouth daily      FLOVENT  MCG/ACT inhaler INHALE 1 PUFF 2 (TWO) TIMES A DAY 12 Inhaler 0    fluticasone (FLONASE) 50 mcg/act nasal spray 2 sprays into each nostril daily      loratadine (CLARITIN) 10 mg tablet Take 1 tablet (10 mg total) by mouth daily 30 tablet 0    loteprednol etabonate (LOTEMAX) 0 5 % ophthalmic suspension Administer 1 drop to both eyes 3 (three) times a day as needed   Magnesium 200 MG TABS Take 1 tablet by mouth daily      methocarbamol (ROBAXIN) 500 mg tablet Take 500 mg by mouth as needed for muscle spasms      montelukast (SINGULAIR) 10 mg tablet Take 1 tablet (10 mg total) by mouth daily at bedtime 30 tablet 0    Multiple Vitamin (MULTIVITAMIN) capsule Take 1 capsule by mouth daily        Omega-3 Fatty Acids (OMEGA 3 PO) Take 2 capsules by mouth 2 (two) times a day   oxyCODONE-acetaminophen (PERCOCET) 5-325 mg per tablet Take 1 tablet by mouth every 6 (six) hours as needed for moderate pain for up to 11 doses Max Daily Amount: 4 tablets 11 tablet 0    pantoprazole (PROTONIX) 40 mg tablet TAKE 1 TABLET TWICE DAILY 60 tablet 1    rivaroxaban (XARELTO) 15 & 20 MG starter pack Take 15 mg by mouth twice daily for 21 days, then 20 mg once daily thereafter  51 each 0    ZOLMitriptan (ZOMIG) 5 MG tablet Take 1 tablet (5 mg total) by mouth daily as needed (1 daily p r n  headache) 8 tablet 0     No current facility-administered medications on file prior to visit          Recent Labs [facility])    0  Lab Value Date/Time   HCT 45 6 10/22/2018 0937   HCT 42 6 08/20/2015 0828   HGB 14 8 10/22/2018 0937   HGB 14 2 08/20/2015 0828   WBC 7 43 10/22/2018 0937   WBC 5 6 08/20/2015 0828   INR 0 99 10/22/2018 0937   INR 1 05 11/10/2014 1148   ESR 9 12/20/2016 0845   ESR 7 08/20/2015 0828   GLUCOSE 88 08/20/2015 0828   HGBA1C 5 8 08/22/2018 0810   HGBA1C 5 3 08/20/2015 0828         Physical exam  · General: Awake, Alert, Oriented  · Eyes: Pupils equal, round and reactive to light  · Heart: regular rate and rhythm  · Lungs: No audible wheezing  · Abdomen: soft  left Ankle  · Mild edema appreciated on the medial aspect of the lower leg tracking down the medial malleolus  · Tenderness palpation in the distribution of the posterior tibial tendon  · Patient able to actively chief full range of motion in plantar flexion, dorsiflexion, inversion and eversion about the ankle  · Patient has warmth appreciated over the distribution of the great saphenous vein  · Tenderness to palpation over the plantar fascia  · Right lower extremity warm well-perfused    Procedure  No procedures performed during this visit    Imaging  No new radiographs obtained during this visit    49-year-old female with right lower leg pain secondary to edema and thrombophlebitis  · Weightbearing as tolerated to the right lower extremity in a Cam boot walker to allow for rest of the ankle  · Local modalities:  Rest, ice, compression, elevation  · Patient may resume the use compressive stockings to the right lower extremity to assist in edema control  · She was instructed to use anti-inflammatories with the caveot that any GI discomfort should prompt her to proceed with caution  · Patient to continue her Xarelto as prescribed  · She is instructed to follow up in 4 weeks for continued evaluation of right lower extremity pain  · Patient understands and agrees with the plan above

## 2018-11-08 ENCOUNTER — TELEPHONE (OUTPATIENT)
Dept: INTERNAL MEDICINE CLINIC | Facility: CLINIC | Age: 63
End: 2018-11-08

## 2018-11-08 DIAGNOSIS — I82.4Y1 ACUTE DEEP VEIN THROMBOSIS (DVT) OF PROXIMAL VEIN OF RIGHT LOWER EXTREMITY (HCC): ICD-10-CM

## 2018-11-08 NOTE — TELEPHONE ENCOUNTER
She's on anti coagulant & has some questions and wants call back from either the covering Dr for Dr Alfred Carr or his nurse    She didn't want to give any further info, wanted to give it to the Dr or nurse  Jaylon Burris And wants a call back LORENA Burris Please  Jaylon Burris

## 2018-11-12 ENCOUNTER — HOSPITAL ENCOUNTER (OUTPATIENT)
Dept: ULTRASOUND IMAGING | Facility: HOSPITAL | Age: 63
Discharge: HOME | End: 2018-11-12
Attending: INTERNAL MEDICINE
Payer: COMMERCIAL

## 2018-11-12 DIAGNOSIS — I82.441 ACUTE DEEP VEIN THROMBOSIS (DVT) OF TIBIAL VEIN OF RIGHT LOWER EXTREMITY (HCC): ICD-10-CM

## 2018-11-12 PROCEDURE — 93971 EXTREMITY STUDY: CPT | Performed by: SURGERY

## 2018-11-12 PROCEDURE — 93971 EXTREMITY STUDY: CPT

## 2018-11-14 DIAGNOSIS — N76.2 ATROPHIC VULVITIS: Primary | ICD-10-CM

## 2018-11-14 RX ORDER — CLOBETASOL PROPIONATE 0.5 MG/G
CREAM TOPICAL
Qty: 30 G | Refills: 0 | Status: SHIPPED | OUTPATIENT
Start: 2018-11-14 | End: 2019-04-01 | Stop reason: SDUPTHER

## 2018-11-17 ENCOUNTER — TELEPHONE (OUTPATIENT)
Dept: INTERNAL MEDICINE CLINIC | Facility: CLINIC | Age: 63
End: 2018-11-17

## 2018-11-17 ENCOUNTER — OFFICE VISIT (OUTPATIENT)
Dept: INTERNAL MEDICINE CLINIC | Facility: CLINIC | Age: 63
End: 2018-11-17
Payer: COMMERCIAL

## 2018-11-17 VITALS
WEIGHT: 196 LBS | DIASTOLIC BLOOD PRESSURE: 70 MMHG | HEART RATE: 73 BPM | BODY MASS INDEX: 33.64 KG/M2 | SYSTOLIC BLOOD PRESSURE: 130 MMHG | OXYGEN SATURATION: 97 %

## 2018-11-17 DIAGNOSIS — Z86.718 HISTORY OF DEEP VENOUS THROMBOSIS (DVT) OF DISTAL VEIN OF RIGHT LOWER EXTREMITY: Primary | ICD-10-CM

## 2018-11-17 DIAGNOSIS — H25.9 AGE-RELATED CATARACT OF BOTH EYES, UNSPECIFIED AGE-RELATED CATARACT TYPE: ICD-10-CM

## 2018-11-17 DIAGNOSIS — M17.12 ARTHRITIS OF LEFT KNEE: ICD-10-CM

## 2018-11-17 DIAGNOSIS — I82.4Y1 ACUTE DEEP VEIN THROMBOSIS (DVT) OF PROXIMAL VEIN OF RIGHT LOWER EXTREMITY (HCC): ICD-10-CM

## 2018-11-17 DIAGNOSIS — G44.309 HEADACHES DUE TO OLD HEAD INJURY: ICD-10-CM

## 2018-11-17 DIAGNOSIS — S09.90XS HEADACHES DUE TO OLD HEAD INJURY: ICD-10-CM

## 2018-11-17 PROCEDURE — 99214 OFFICE O/P EST MOD 30 MIN: CPT | Performed by: INTERNAL MEDICINE

## 2018-11-17 RX ORDER — BUTALBITAL, ACETAMINOPHEN AND CAFFEINE 50; 325; 40 MG/1; MG/1; MG/1
1-2 TABLET ORAL DAILY PRN
Qty: 60 TABLET | Refills: 0 | Status: SHIPPED | OUTPATIENT
Start: 2018-11-17 | End: 2019-01-11

## 2018-11-17 RX ORDER — METHOCARBAMOL 750 MG/1
750 TABLET, FILM COATED ORAL AS NEEDED
Qty: 100 TABLET | Refills: 2 | Status: SHIPPED | OUTPATIENT
Start: 2018-11-17 | End: 2019-06-21 | Stop reason: ALTCHOICE

## 2018-11-17 NOTE — PROGRESS NOTES
Assessment/Plan:  The patient has bilateral cataracts  She has no significant cardiopulmonary complaints at the present time  She had a recent negative stress test     Regarding her DVT of her right calf she is currently anticoagulated  She is taking Xarelto 20 mg per day  If there is no contraindication from a surgical standpoint she tells me that she will continue this through her surgery  I will see her in approximately 1 month  At that time discussed the duration of the anticoagulation  Of note is that her recent repeat ultrasound showed just a superficial phlebitis of the greater saphenous vein on the right  Recent Results (from the past 1008 hour(s))   Stress strip    Collection Time: 10/19/18  9:03 AM   Result Value Ref Range    Protocol Name LEXISCAN-SIT     Time In Exercise Phase 00:03:00     MAX   SYSTOLIC  mmHg    Max Diastolic Bp 60 mmHg    Max Heart Rate 122 BPM    Max Predicted Heart Rate 156 BPM    Reason for Termination Protocol Complete     Test Indication SOB     Target Hr Formular (220 - Age)*85%     Arrhy During Ex none     ECG Interp Before Ex      ECG Interp during Ex      Ex Summary Comment      Chest Pain Statement none     Overall Hr Response To Exercise      Overall BP Response To Exercise     CBC and differential    Collection Time: 10/22/18  9:37 AM   Result Value Ref Range    WBC 7 43 4 31 - 10 16 Thousand/uL    RBC 5 46 (H) 3 81 - 5 12 Million/uL    Hemoglobin 14 8 11 5 - 15 4 g/dL    Hematocrit 45 6 34 8 - 46 1 %    MCV 84 82 - 98 fL    MCH 27 1 26 8 - 34 3 pg    MCHC 32 5 31 4 - 37 4 g/dL    RDW 14 0 11 6 - 15 1 %    MPV 9 3 8 9 - 12 7 fL    Platelets 199 239 - 099 Thousands/uL    nRBC 0 /100 WBCs    Neutrophils Relative 59 43 - 75 %    Immat GRANS % 1 0 - 2 %    Lymphocytes Relative 28 14 - 44 %    Monocytes Relative 8 4 - 12 %    Eosinophils Relative 3 0 - 6 %    Basophils Relative 1 0 - 1 %    Neutrophils Absolute 4 51 1 85 - 7 62 Thousands/µL    Immature Grans Absolute 0 04 0 00 - 0 20 Thousand/uL    Lymphocytes Absolute 2 05 0 60 - 4 47 Thousands/µL    Monocytes Absolute 0 57 0 17 - 1 22 Thousand/µL    Eosinophils Absolute 0 20 0 00 - 0 61 Thousand/µL    Basophils Absolute 0 06 0 00 - 0 10 Thousands/µL   Protime-INR    Collection Time: 10/22/18  9:37 AM   Result Value Ref Range    Protime 13 0 11 8 - 14 2 seconds    INR 0 99 0 86 - 1 17   APTT    Collection Time: 10/22/18  9:37 AM   Result Value Ref Range    PTT 28 24 - 36 seconds   Comprehensive metabolic panel    Collection Time: 10/22/18  9:37 AM   Result Value Ref Range    Sodium 144 136 - 145 mmol/L    Potassium 3 8 3 5 - 5 3 mmol/L    Chloride 106 100 - 108 mmol/L    CO2 30 21 - 32 mmol/L    ANION GAP 8 4 - 13 mmol/L    BUN 15 5 - 25 mg/dL    Creatinine 0 77 0 60 - 1 30 mg/dL    Glucose 91 65 - 140 mg/dL    Calcium 9 2 8 3 - 10 1 mg/dL    AST 40 5 - 45 U/L    ALT 69 12 - 78 U/L    Alkaline Phosphatase 131 (H) 46 - 116 U/L    Total Protein 7 4 6 4 - 8 2 g/dL    Albumin 3 8 3 5 - 5 0 g/dL    Total Bilirubin 0 30 0 20 - 1 00 mg/dL    eGFR 82 ml/min/1 73sq m   D-Dimer    Collection Time: 10/22/18  9:37 AM   Result Value Ref Range    D-Dimer, Quant 2,297 (H) 0 - 424 ng/ml (FEU)   Urinalysis with microscopic    Collection Time: 10/22/18  9:39 AM   Result Value Ref Range    Clarity, UA Clear     Color, UA Yellow     Specific [place], UA 1 010 1 003 - 1 030    pH, UA 6 0 4 5 - 8 0    Glucose, UA Negative Negative mg/dl    Ketones, UA Negative Negative mg/dl    Blood, UA Negative Negative    Protein, UA Negative Negative mg/dl    Nitrite, UA Negative Negative    Bilirubin, UA Negative Negative    Urobilinogen, UA 0 2 0 2, 1 0 E U /dl E U /dl    Leukocytes, UA Small (A) Negative    WBC, UA 1-2 (A) None Seen, 0-5, 5-55, 5-65 /hpf    RBC, UA 0-1 (A) None Seen, 0-5 /hpf    Bacteria, UA Occasional None Seen, Occasional /hpf    Epithelial Cells Occasional None Seen, Occasional /hpf       1   Headaches due to old head injury butalbital-acetaminophen-caffeine (FIORICET,ESGIC) -40 mg per tablet    methocarbamol (ROBAXIN) 750 mg tablet   2  Acute deep vein thrombosis (DVT) of proximal vein of right lower extremity (HCC)  rivaroxaban (XARELTO) 20 mg tablet       No orders of the defined types were placed in this encounter  Subjective:  Cataracts of both eyes  Patient ID: Elier Bonilla is a 70 y o  female  HPI she comes in for evaluation prior to anticipated surgery regarding cataracts of both eyes  She was blood to have surgery several weeks ago but she developed a DVT of her right posterior tibial vein  She was treated with Xarelto  She remains on 20 mg of Xarelto  The patient's past medical history is positive for surgery of her right knee    She has also had a cholecystectomy, hysterectomy and a total left knee replacement  She has had an appendectomy as well  Her other medical problems include 1  Asthma 2  History of DVT of the left leg 3  Chronic intermittent headaches   4  Allergic rhinitis 5  Cervical spine discomfort       The patient has no significant cardiac history and recently had a negative stress test     The following portions of the patient's history were reviewed and updated as appropriate:   She has a past medical history of Acid reflux; Allergic rhinitis; Anesthesia complication; Arthritis; Asthma; Bigeminy; DVT (deep venous thrombosis) ([facility] [address] ); DVT of leg (deep venous thrombosis) ([facility] [address] ) (); Female pelvic pain; Hyperlipidemia; Intermittent palpitations; Irregular heart beat; Migraines; Palpitations; Pes planus; Thyroid nodule; Trigeminy; and Wears glasses  ,   does not have any pertinent problems on file  ,   has a past surgical history that includes Replacement total knee (); Appendectomy; Knee surgery (Left);  section; Joint replacement; Dilation and curettage of uterus; Colonoscopy; McLeod tooth extraction;  Cholecystectomy; pr laparoscopy w tot hysterectuterus <=250 gram  w tube/ovary (Bilateral, 7/8/2016); and pr esophagogastroduodenoscopy transoral diagnostic (N/A, 3/14/2016)  ,  family history includes Alzheimer's disease in her maternal aunt and maternal uncle; Cancer in her family; Diabetes in her father and paternal aunt; Diabetes type II in her brother; Endometrial cancer in her mother; Growth hormone deficiency in her daughter; Hashimoto's thyroiditis in her paternal aunt; Heart attack in her father; Heart disease in her father; Migraines in her mother; Other in her maternal uncle; Thyroid cancer in her brother and maternal aunt  ,   reports that she quit smoking about 40 years ago  She has never used smokeless tobacco  She reports that she drinks alcohol  She reports that she does not use drugs  ,  is allergic to naproxen       Current Outpatient Prescriptions:     albuterol (VENTOLIN HFA) 90 mcg/act inhaler, 1-2 puffs 4 times a day as needed, Disp: 18 Inhaler, Rfl: 0    ALPRAZolam (XANAX) 0 25 mg tablet, Take 1 tablet (0 25 mg total) by mouth daily at bedtime as needed for anxiety, Disp: 30 tablet, Rfl: 2    butalbital-acetaminophen-caffeine (FIORICET,ESGIC) -40 mg per tablet, Take 1-2 tablets by mouth daily as needed (Headache), Disp: 60 tablet, Rfl: 0    clobetasol (TEMOVATE) 0 05 % cream, Apply to affected area once daily prn, Disp: 30 g, Rfl: 0    cycloSPORINE (RESTASIS) 0 05 % ophthalmic emulsion, Apply 1 drop to eye 2 (two) times a day, Disp: , Rfl:     diazepam (VALIUM) 5 mg tablet, Take 1 tablet (5 mg total) by mouth every 8 (eight) hours as needed for anxiety, Disp: 60 tablet, Rfl: 0    diphenhydrAMINE (BENADRYL) 25 mg capsule, Take by mouth daily at bedtime as needed  , Disp: , Rfl:     Ergocalciferol (VITAMIN D2) 2000 UNITS TABS, Take 2,000 Units by mouth daily    , Disp: , Rfl:     Evening Primrose Oil 1000 MG CAPS, Take 1 capsule by mouth daily, Disp: , Rfl:     FLOVENT  MCG/ACT inhaler, INHALE 1 PUFF 2 (TWO) TIMES A DAY, Disp: 12 Inhaler, Rfl: 0    fluticasone (FLONASE) 50 mcg/act nasal spray, 2 sprays into each nostril daily, Disp: , Rfl:     loratadine (CLARITIN) 10 mg tablet, Take 1 tablet (10 mg total) by mouth daily, Disp: 30 tablet, Rfl: 0    loteprednol etabonate (LOTEMAX) 0 5 % ophthalmic suspension, Administer 1 drop to both eyes 3 (three) times a day as needed  , Disp: , Rfl:     Magnesium 200 MG TABS, Take 1 tablet by mouth daily, Disp: , Rfl:     methocarbamol (ROBAXIN) 750 mg tablet, Take 1 tablet (750 mg total) by mouth as needed for muscle spasms, Disp: 100 tablet, Rfl: 2    montelukast (SINGULAIR) 10 mg tablet, Take 1 tablet (10 mg total) by mouth daily at bedtime, Disp: 30 tablet, Rfl: 0    Multiple Vitamin (MULTIVITAMIN) capsule, Take 1 capsule by mouth daily  , Disp: , Rfl:     pantoprazole (PROTONIX) 40 mg tablet, TAKE 1 TABLET TWICE DAILY, Disp: 60 tablet, Rfl: 1    rivaroxaban (XARELTO) 20 mg tablet, Take 1 tablet (20 mg total) by mouth daily with breakfast, Disp: 21 tablet, Rfl: 0    ZOLMitriptan (ZOMIG) 5 MG tablet, Take 1 tablet (5 mg total) by mouth daily as needed (1 daily p r n  headache), Disp: 8 tablet, Rfl: 0    Review of Systems   Constitutional: Positive for fatigue  Negative for activity change, appetite change, chills, diaphoresis, fever and unexpected weight change  HENT: Negative for congestion, ear pain, hearing loss, mouth sores, nosebleeds, postnasal drip, sinus pain, sinus pressure, sore throat and trouble swallowing  Eyes: Negative for pain, discharge and visual disturbance  She has bilateral cataracts  Respiratory: Negative for apnea, cough, chest tightness, shortness of breath and wheezing  She has a history of asthma stable at the present time  Cardiovascular: Negative for chest pain, palpitations and leg swelling  Gastrointestinal: Negative for abdominal pain, anal bleeding, blood in stool, constipation, diarrhea, nausea and vomiting          She has had weight gain    Endocrine: Negative for polydipsia and polyphagia  Genitourinary: Negative for decreased urine volume, dysuria, flank pain, frequency, hematuria and urgency  Musculoskeletal: Negative for arthralgias, back pain, gait problem, joint swelling and myalgias  Skin: Negative for rash and wound  Allergic/Immunologic: Negative for environmental allergies and food allergies  Neurological: Negative for dizziness, tremors, seizures, syncope, speech difficulty, light-headedness, numbness and headaches  Hematological: Negative for adenopathy  Does not bruise/bleed easily  She has a recent DVT of the right posterior tibial be low the knee  She has a superficial phlebitis of the greater saphenous vein as well  She is on Xarelto 20 mg per day  Psychiatric/Behavioral: Negative for agitation, confusion, hallucinations, sleep disturbance and suicidal ideas  The patient is not nervous/anxious  Objective:  /70 (BP Location: Left arm, Patient Position: Sitting)   Pulse 73   Wt 88 9 kg (196 lb)   SpO2 97%   BMI 33 64 kg/m²      Physical Exam   Constitutional: She appears well-developed  No distress  She is mildly overweight  HENT:   Head: Normocephalic  Right Ear: External ear normal    Left Ear: External ear normal    Nose: Nose normal    Mouth/Throat: Oropharynx is clear and moist  No oropharyngeal exudate  Eyes: Pupils are equal, round, and reactive to light  Conjunctivae and EOM are normal  Right eye exhibits no discharge  Left eye exhibits no discharge  She has bilateral cataracts  Neck: Normal range of motion  Neck supple  No thyromegaly present  Cardiovascular: Normal rate, regular rhythm, normal heart sounds and intact distal pulses  Exam reveals no gallop and no friction rub  No murmur heard  Pulmonary/Chest: Effort normal and breath sounds normal  No respiratory distress  She has no wheezes  She has no rales  Abdominal: Soft   Bowel sounds are normal  She exhibits no distension and no mass  There is no tenderness  There is no rebound and no guarding  She is moderately overweight  Musculoskeletal: Normal range of motion  She exhibits no edema, tenderness or deformity  She has scarring about the left knee  She has no significant tenderness of the right calf  Lymphadenopathy:     She has no cervical adenopathy  Neurological: She is alert  She has normal reflexes  No cranial nerve deficit  Coordination normal    Skin: Skin is warm and dry  No rash noted  No erythema  Psychiatric: She has a normal mood and affect  Her behavior is normal  Judgment and thought content normal    Nursing note and vitals reviewed

## 2018-11-20 ENCOUNTER — TELEPHONE (OUTPATIENT)
Dept: INTERNAL MEDICINE CLINIC | Facility: CLINIC | Age: 63
End: 2018-11-20

## 2018-11-20 DIAGNOSIS — Z86.69 HX OF MIGRAINE HEADACHES: ICD-10-CM

## 2018-11-20 RX ORDER — ZOLMITRIPTAN 5 MG/1
TABLET, FILM COATED ORAL
Qty: 8 TABLET | Refills: 0 | Status: SHIPPED | OUTPATIENT
Start: 2018-11-20 | End: 2019-04-11 | Stop reason: SDUPTHER

## 2018-11-21 ENCOUNTER — OFFICE VISIT (OUTPATIENT)
Dept: OBGYN CLINIC | Facility: MEDICAL CENTER | Age: 63
End: 2018-11-21
Payer: COMMERCIAL

## 2018-11-21 ENCOUNTER — TELEPHONE (OUTPATIENT)
Dept: OBGYN CLINIC | Facility: CLINIC | Age: 63
End: 2018-11-21

## 2018-11-21 ENCOUNTER — TELEPHONE (OUTPATIENT)
Dept: OBGYN CLINIC | Age: 63
End: 2018-11-21

## 2018-11-21 VITALS
BODY MASS INDEX: 33.46 KG/M2 | SYSTOLIC BLOOD PRESSURE: 128 MMHG | HEIGHT: 64 IN | DIASTOLIC BLOOD PRESSURE: 74 MMHG | WEIGHT: 196 LBS

## 2018-11-21 DIAGNOSIS — L30.9 DERMATITIS OF VULVA: ICD-10-CM

## 2018-11-21 DIAGNOSIS — N76.0 INFECTION OF LABIA: Primary | ICD-10-CM

## 2018-11-21 PROCEDURE — 99213 OFFICE O/P EST LOW 20 MIN: CPT | Performed by: OBSTETRICS & GYNECOLOGY

## 2018-11-21 NOTE — TELEPHONE ENCOUNTER
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  She uses clobetesol

## 2018-11-21 NOTE — PROGRESS NOTES
Assessment/Plan: This 52-year-old patient has noticed an about last 24 hr a rash in her inguinal area bilaterally outside the vulva  There are no diagnoses linked to this encounter  Subjective:     Patient ID: Jose Moreno is a 70 y o  female  HPI this 52-year-old patient has noticed a rash which is irritating in the inguinal area bilaterally  She has used clobetasol with him some relief  She will have cataract surgery in 5 days  Review of Systems      Objective:     Physical Exam  there is a bright red rash bilaterally outside the labia on both sides

## 2018-11-21 NOTE — PATIENT INSTRUCTIONS
This 53-year-old patient was told that her rash appears to be Monilia  I did prescribe Mycolog cream to be applied 3 times a day 30 g with the 2 refills  She will call in 72 hr to tell us if there is improvement

## 2018-11-21 NOTE — TELEPHONE ENCOUNTER
Pt is calling she states she has swelling and pain in left inguinal area and left labia majora  pelvic area  She states she has cataract surgery on Monday and wants to speak with someone today regarding her symptoms  As per pt this is urgent she is anticoagulants

## 2018-11-30 ENCOUNTER — OFFICE VISIT (OUTPATIENT)
Dept: OBGYN CLINIC | Facility: MEDICAL CENTER | Age: 63
End: 2018-11-30
Payer: COMMERCIAL

## 2018-11-30 VITALS
HEIGHT: 64 IN | SYSTOLIC BLOOD PRESSURE: 139 MMHG | DIASTOLIC BLOOD PRESSURE: 81 MMHG | WEIGHT: 196 LBS | TEMPERATURE: 98.8 F | HEART RATE: 66 BPM | BODY MASS INDEX: 33.46 KG/M2

## 2018-11-30 DIAGNOSIS — M25.571 PAIN AND SWELLING OF RIGHT ANKLE: Primary | ICD-10-CM

## 2018-11-30 DIAGNOSIS — M25.471 PAIN AND SWELLING OF RIGHT ANKLE: Primary | ICD-10-CM

## 2018-11-30 DIAGNOSIS — M76.821 POSTERIOR TIBIAL TENDINITIS OF RIGHT LOWER EXTREMITY: ICD-10-CM

## 2018-11-30 PROCEDURE — 99213 OFFICE O/P EST LOW 20 MIN: CPT | Performed by: ORTHOPAEDIC SURGERY

## 2018-11-30 RX ORDER — BESIFLOXACIN 6 MG/ML
SUSPENSION OPHTHALMIC
Refills: 1 | COMMUNITY
Start: 2018-11-25 | End: 2019-01-11 | Stop reason: ALTCHOICE

## 2018-11-30 RX ORDER — KETOROLAC TROMETHAMINE 5 MG/ML
SOLUTION OPHTHALMIC
Refills: 2 | COMMUNITY
Start: 2018-09-14 | End: 2019-06-21 | Stop reason: ALTCHOICE

## 2018-11-30 RX ORDER — PREDNISOLONE ACETATE 10 MG/ML
SUSPENSION/ DROPS OPHTHALMIC
Refills: 1 | COMMUNITY
Start: 2018-09-14 | End: 2019-06-21 | Stop reason: ALTCHOICE

## 2018-11-30 NOTE — PROGRESS NOTES
70 y o female presents for continuing treatment of her right lower extermity pain and dysfunction  She had a known DVT in her right leg that she is currently on xarelto for as treatment  She underwent a repeat doppler study since previous visit  She has pain currently in the area of her posterior tibial tendon and the area of the GSV where she has been diagnosed with thrombophlebitis as well as the plantar aspect of her foot in the area of the plantar fascia  She knows that she has a diagnosis of plantar fasciitis and has braces at home that she doesn't always wear  Review of Systems  Review of systems negative unless otherwise specified in HPI    Past Medical History  Past Medical History:   Diagnosis Date    Acid reflux     Allergic rhinitis     Last Assessed: 2017    Anesthesia complication     HYPOTENSION    Arthritis     Asthma     Bigeminy     history    [facility]VT [facility]deep venous thrombosis) ([facility])     DVT of leg (deep venous thrombosis) ([facility] [address] )     left    Female pelvic pain     Hyperlipidemia     Intermittent palpitations     Irregular heart beat     Migraines     Palpitations     Pes planus     unspecified laterality; Last Assessed: 2014    Thyroid nodule     Trigeminy     history    Wears glasses        Past Surgical History  Past Surgical History:   Procedure Laterality Date    APPENDECTOMY      CATARACT EXTRACTION       SECTION      X 2    CHOLECYSTECTOMY      COLONOSCOPY      DILATION AND CURETTAGE OF UTERUS      JOINT REPLACEMENT      left TKR    KNEE SURGERY Left     X 3    HI ESOPHAGOGASTRODUODENOSCOPY TRANSORAL DIAGNOSTIC N/A 3/14/2016    Procedure: ESOPHAGOGASTRODUODENOSCOPY (EGD); Surgeon: Genie Newton MD;  Location: [facility];   Service: Gastroenterology    HI LAPAROSCOPY W TOT HYSTERECTUTERUS <=250 Eleonore Deann TUBE/OVARY Bilateral 2016    Procedure: HYSTERECTOMY LAPAROSCOPIC TOTAL ,BSO;  Surgeon: Kings Lindsay MD;  Location: [facility];  Service: Gynecology Oncology    REPLACEMENT TOTAL KNEE  2014    WISDOM TOOTH EXTRACTION         Current Medications  Current Outpatient Prescriptions on File Prior to Visit   Medication Sig Dispense Refill    albuterol (VENTOLIN HFA) 90 mcg/act inhaler 1-2 puffs 4 times a day as needed 18 Inhaler 0    ALPRAZolam (XANAX) 0 25 mg tablet Take 1 tablet (0 25 mg total) by mouth daily at bedtime as needed for anxiety 30 tablet 2    butalbital-acetaminophen-caffeine (FIORICET,ESGIC) -40 mg per tablet Take 1-2 tablets by mouth daily as needed (Headache) 60 tablet 0    clobetasol (TEMOVATE) 0 05 % cream Apply to affected area once daily prn 30 g 0    cycloSPORINE (RESTASIS) 0 05 % ophthalmic emulsion Apply 1 drop to eye 2 (two) times a day      diazepam (VALIUM) 5 mg tablet Take 1 tablet (5 mg total) by mouth every 8 (eight) hours as needed for anxiety 60 tablet 0    diphenhydrAMINE (BENADRYL) 25 mg capsule Take by mouth daily at bedtime as needed        Ergocalciferol (VITAMIN D2) 2000 UNITS TABS Take 2,000 Units by mouth daily   Evening Primrose Oil 1000 MG CAPS Take 1 capsule by mouth daily      FLOVENT  MCG/ACT inhaler INHALE 1 PUFF 2 (TWO) TIMES A DAY 12 Inhaler 0    fluticasone (FLONASE) 50 mcg/act nasal spray 2 sprays into each nostril daily      loratadine (CLARITIN) 10 mg tablet Take 1 tablet (10 mg total) by mouth daily 30 tablet 0    loteprednol etabonate (LOTEMAX) 0 5 % ophthalmic suspension Administer 1 drop to both eyes 3 (three) times a day as needed   Magnesium 200 MG TABS Take 1 tablet by mouth daily      methocarbamol (ROBAXIN) 750 mg tablet Take 1 tablet (750 mg total) by mouth as needed for muscle spasms 100 tablet 2    montelukast (SINGULAIR) 10 mg tablet Take 1 tablet (10 mg total) by mouth daily at bedtime 30 tablet 0    Multiple Vitamin (MULTIVITAMIN) capsule Take 1 capsule by mouth daily        pantoprazole (PROTONIX) 40 mg tablet TAKE 1 TABLET TWICE DAILY 60 tablet 1  rivaroxaban (XARELTO) 20 mg tablet Take 1 tablet (20 mg total) by mouth daily with breakfast 21 tablet 0    ZOLMitriptan (ZOMIG) 5 MG tablet TAKE 1 TABLET BY MOUTH DAILY AS NEEDED FOR HEADACHE 8 tablet 0     No current facility-administered medications on file prior to visit          Recent Labs [facility])    0  Lab Value Date/Time   HCT 45 6 10/22/2018 0937   HCT 42 6 08/20/2015 0828   HGB 14 8 10/22/2018 0937   HGB 14 2 08/20/2015 0828   WBC 7 43 10/22/2018 0937   WBC 5 6 08/20/2015 0828   INR 0 99 10/22/2018 0937   INR 1 05 11/10/2014 1148   ESR 9 12/20/2016 0845   ESR 7 08/20/2015 0828   GLUCOSE 88 08/20/2015 0828   HGBA1C 5 8 08/22/2018 0810   HGBA1C 5 3 08/20/2015 0310         Physical exam  General: Awake, Alert, Oriented  HEENT: No scleral injection, no evidence of facial trauma  Heart: Extremities warm and well perfused  Lungs: No audible wheezing  ·   right Ankle  · Tenderness along posterior tibial tendon sheath/muscle body   · Loss of arch in foot  · Tenderness along plantar fascia at plantar aspect of foot  · Extremity well perufsed    Procedure  None    Imaging  None    A/P:63F with R ankle PTTD and plantar fasciitis with continuing symptoms  Recommend arch support orthotics/braces that she already has at home   May do morning stretches for plantar fasciitis, does not desire injection today  Follow-up ADRIN    Manuel Dewey  11/30/18

## 2018-12-14 ENCOUNTER — OFFICE VISIT (OUTPATIENT)
Dept: INTERNAL MEDICINE CLINIC | Facility: CLINIC | Age: 63
End: 2018-12-14
Payer: COMMERCIAL

## 2018-12-14 VITALS
WEIGHT: 197.2 LBS | HEART RATE: 73 BPM | BODY MASS INDEX: 33.67 KG/M2 | DIASTOLIC BLOOD PRESSURE: 72 MMHG | HEIGHT: 64 IN | SYSTOLIC BLOOD PRESSURE: 142 MMHG | OXYGEN SATURATION: 98 %

## 2018-12-14 DIAGNOSIS — E66.3 OVERWEIGHT: ICD-10-CM

## 2018-12-14 DIAGNOSIS — I82.441 ACUTE DEEP VEIN THROMBOSIS (DVT) OF TIBIAL VEIN OF RIGHT LOWER EXTREMITY (HCC): Primary | ICD-10-CM

## 2018-12-14 PROCEDURE — 99213 OFFICE O/P EST LOW 20 MIN: CPT | Performed by: INTERNAL MEDICINE

## 2018-12-14 NOTE — PROGRESS NOTES
Assessment/Plan:  The DVT of the tibial vein resolved  She has had 2 months of anticoagulation  Probably she should have 3 months of anticoagulation however will get an opinion from Hematology  The good news is that this was below the knee  It was however unprovoked  Therefore will ask Hematology for an opinion regarding duration of anticoagulation  I will see her back in a month    No results found for this or any previous visit (from the past 1008 hour(s))  1  Acute deep vein thrombosis (DVT) of tibial vein of right lower extremity [facility])  Coagulation Profile    Ambulatory referral to Hematology / Oncology   2  Overweight  Ambulatory referral to Weight Management       Orders Placed This Encounter   Procedures    Ambulatory referral to Hematology / Oncology    Ambulatory referral to Weight Management         Subjective:  She has a tibial DVT  She had a previous DVT of the left leg after surgery  Therefore she has had 2 DVTs 1 above the knee the other below the knee  Repeat ultrasound showed resolution of the tibial DVT  She was placed by the emergency room on anticoagulation  She remains on it  She has been on it for 2 months  Patient ID: Lashell Khoury is a 70 y o  female  HPI    The following portions of the patient's history were reviewed and updated as appropriate:   She has a past medical history of Acid reflux; Allergic rhinitis; Anesthesia complication; Arthritis; Asthma; Bigeminy; DVT (deep venous thrombosis) ([address] ); DVT of leg (deep venous thrombosis) ([address] ) (); Female pelvic pain; Hyperlipidemia; Intermittent palpitations; Irregular heart beat; Migraines; Palpitations; Pes planus; Thyroid nodule; Trigeminy; and Wears glasses  ,   does not have any pertinent problems on file  ,   has a past surgical history that includes Replacement total knee (); Appendectomy; Knee surgery (Left);   section; Joint replacement; Dilation and curettage of uterus; Colonoscopy; [place] tooth extraction; Cholecystectomy; pr laparoscopy w tot hysterectuterus <=250 gram  w tube/ovary (Bilateral, 7/8/2016); pr esophagogastroduodenoscopy transoral diagnostic (N/A, 3/14/2016); and Cataract extraction  ,  family history includes Alzheimer's disease in her maternal aunt and maternal uncle; Cancer in her family; Diabetes in her father and paternal aunt; Diabetes type II in her brother; Endometrial cancer in her mother; Growth hormone deficiency in her daughter; Hashimoto's thyroiditis in her paternal aunt; Heart attack in her father; Heart disease in her father; Migraines in her mother; Other in her maternal uncle; Thyroid cancer in her brother and maternal aunt  ,   reports that she quit smoking about 40 years ago  She has never used smokeless tobacco  She reports that she drinks alcohol  She reports that she does not use drugs  ,  is allergic to naproxen       Current Outpatient Prescriptions:     albuterol (VENTOLIN HFA) 90 mcg/act inhaler, 1-2 puffs 4 times a day as needed, Disp: 18 Inhaler, Rfl: 0    ALPRAZolam (XANAX) 0 25 mg tablet, Take 1 tablet (0 25 mg total) by mouth daily at bedtime as needed for anxiety, Disp: 30 tablet, Rfl: 2    BESIVANCE 0 6 % SUSP, INSTILL 1 DROP 4 TIMES A DAY START 3 DAYS BEFORE SURGERY TO SURGICAL EYE, Disp: , Rfl: 1    butalbital-acetaminophen-caffeine (FIORICET,ESGIC) -40 mg per tablet, Take 1-2 tablets by mouth daily as needed (Headache), Disp: 60 tablet, Rfl: 0    clobetasol (TEMOVATE) 0 05 % cream, Apply to affected area once daily prn, Disp: 30 g, Rfl: 0    cycloSPORINE (RESTASIS) 0 05 % ophthalmic emulsion, Apply 1 drop to eye 2 (two) times a day, Disp: , Rfl:     diazepam (VALIUM) 5 mg tablet, Take 1 tablet (5 mg total) by mouth every 8 (eight) hours as needed for anxiety, Disp: 60 tablet, Rfl: 0    diphenhydrAMINE (BENADRYL) 25 mg capsule, Take by mouth daily at bedtime as needed  , Disp: , Rfl:     Ergocalciferol (VITAMIN D2) 2000 UNITS TABS, Take 2,000 Units by mouth daily  , Disp: , Rfl:     Evening Primrose Oil 1000 MG CAPS, Take 1 capsule by mouth daily, Disp: , Rfl:     FLOVENT  MCG/ACT inhaler, INHALE 1 PUFF 2 (TWO) TIMES A DAY, Disp: 12 Inhaler, Rfl: 0    fluticasone (FLONASE) 50 mcg/act nasal spray, 2 sprays into each nostril daily, Disp: , Rfl:     ketorolac (ACULAR) 0 5 % ophthalmic solution, INSERT 1 DROP OPHTHALMICALLY 4 TIMES A DAY IN SURGICAL EYE START 3 DAYS PRIOR SURGERY THIS REPLACES, Disp: , Rfl: 2    loratadine (CLARITIN) 10 mg tablet, Take 1 tablet (10 mg total) by mouth daily, Disp: 30 tablet, Rfl: 0    loteprednol etabonate (LOTEMAX) 0 5 % ophthalmic suspension, Administer 1 drop to both eyes 3 (three) times a day as needed  , Disp: , Rfl:     Magnesium 200 MG TABS, Take 1 tablet by mouth daily, Disp: , Rfl:     methocarbamol (ROBAXIN) 750 mg tablet, Take 1 tablet (750 mg total) by mouth as needed for muscle spasms, Disp: 100 tablet, Rfl: 2    montelukast (SINGULAIR) 10 mg tablet, Take 1 tablet (10 mg total) by mouth daily at bedtime, Disp: 30 tablet, Rfl: 0    Multiple Vitamin (MULTIVITAMIN) capsule, Take 1 capsule by mouth daily  , Disp: , Rfl:     pantoprazole (PROTONIX) 40 mg tablet, TAKE 1 TABLET TWICE DAILY, Disp: 60 tablet, Rfl: 1    prednisoLONE acetate (PRED FORTE) 1 % ophthalmic suspension, INSERT 1 DROP OPHTHALMICALLY 4 TIMES A DAY IN SURGICAL EYE, START AFTER SURGERY THIS REPLACES DUREZO, Disp: , Rfl: 1    rivaroxaban (XARELTO) 20 mg tablet, Take 1 tablet (20 mg total) by mouth daily with breakfast, Disp: 21 tablet, Rfl: 0    ZOLMitriptan (ZOMIG) 5 MG tablet, TAKE 1 TABLET BY MOUTH DAILY AS NEEDED FOR HEADACHE, Disp: 8 tablet, Rfl: 0    Review of Systems   Constitutional: Negative for appetite change and fatigue  HENT: Negative  Respiratory: Negative  Negative for choking and shortness of breath  Cardiovascular: Negative for chest pain and leg swelling           Objective:  /72 (BP Location: Left arm, Patient Position: Sitting)   Pulse 73   Ht 5' 4" (1 626 m)   Wt 89 4 kg (197 lb 3 2 oz)   SpO2 98%   BMI 33 85 kg/m²      Physical Exam   Constitutional: She appears well-developed  Moderately overweight  Cardiovascular: Normal rate and regular rhythm  Pulmonary/Chest: Effort normal and breath sounds normal  No respiratory distress  Abdominal:   She is moderately overweight  Musculoskeletal: She exhibits edema  She has a trace of edema of the right ankle  No tenderness

## 2018-12-21 ENCOUNTER — APPOINTMENT (OUTPATIENT)
Dept: LAB | Facility: MEDICAL CENTER | Age: 63
End: 2018-12-21
Payer: COMMERCIAL

## 2018-12-21 DIAGNOSIS — Z00.00 HEALTHCARE MAINTENANCE: ICD-10-CM

## 2018-12-21 DIAGNOSIS — R10.2 SUPRAPUBIC ABDOMINAL PAIN: ICD-10-CM

## 2018-12-21 DIAGNOSIS — I82.441 ACUTE DEEP VEIN THROMBOSIS (DVT) OF TIBIAL VEIN OF RIGHT LOWER EXTREMITY (HCC): ICD-10-CM

## 2018-12-21 DIAGNOSIS — E78.1 HYPERTRIGLYCERIDEMIA: ICD-10-CM

## 2018-12-21 LAB
APTT PPP: 31 SECONDS (ref 26–38)
BACTERIA UR QL AUTO: ABNORMAL /HPF
BILIRUB UR QL STRIP: NEGATIVE
CLARITY UR: CLEAR
COLOR UR: ABNORMAL
FIBRINOGEN PPP-MCNC: 383 MG/DL (ref 227–495)
GLUCOSE UR STRIP-MCNC: NEGATIVE MG/DL
HGB UR QL STRIP.AUTO: NEGATIVE
HYALINE CASTS #/AREA URNS LPF: ABNORMAL /LPF
INR PPP: 1.06 (ref 0.86–1.17)
KETONES UR STRIP-MCNC: NEGATIVE MG/DL
LEUKOCYTE ESTERASE UR QL STRIP: NEGATIVE
NITRITE UR QL STRIP: NEGATIVE
NON-SQ EPI CELLS URNS QL MICRO: ABNORMAL /HPF
PH UR STRIP.AUTO: 6 [PH] (ref 4.5–8)
PLATELET # BLD AUTO: 261 THOUSANDS/UL (ref 149–390)
PMV BLD AUTO: 10.8 FL (ref 8.9–12.7)
PROT UR STRIP-MCNC: ABNORMAL MG/DL
PROTHROMBIN TIME: 13.9 SECONDS (ref 11.8–14.2)
RBC #/AREA URNS AUTO: ABNORMAL /HPF
SP GR UR STRIP.AUTO: 1.03 (ref 1–1.03)
THROMBIN TIME: 16.8 SECONDS (ref 14.7–18.4)
UROBILINOGEN UR QL STRIP.AUTO: 0.2 E.U./DL
WBC #/AREA URNS AUTO: ABNORMAL /HPF

## 2018-12-21 PROCEDURE — 85384 FIBRINOGEN ACTIVITY: CPT

## 2018-12-21 PROCEDURE — 85049 AUTOMATED PLATELET COUNT: CPT

## 2018-12-21 PROCEDURE — 85670 THROMBIN TIME PLASMA: CPT

## 2018-12-21 PROCEDURE — 36415 COLL VENOUS BLD VENIPUNCTURE: CPT

## 2018-12-21 PROCEDURE — 85730 THROMBOPLASTIN TIME PARTIAL: CPT

## 2018-12-21 PROCEDURE — 81001 URINALYSIS AUTO W/SCOPE: CPT

## 2018-12-21 PROCEDURE — 87077 CULTURE AEROBIC IDENTIFY: CPT

## 2018-12-21 PROCEDURE — 85610 PROTHROMBIN TIME: CPT

## 2018-12-21 PROCEDURE — 87086 URINE CULTURE/COLONY COUNT: CPT

## 2018-12-23 LAB
BACTERIA UR CULT: ABNORMAL
BACTERIA UR CULT: ABNORMAL

## 2018-12-24 DIAGNOSIS — N39.0 URINARY TRACT INFECTION WITHOUT HEMATURIA, SITE UNSPECIFIED: Primary | ICD-10-CM

## 2018-12-24 DIAGNOSIS — N30.00 ACUTE CYSTITIS WITHOUT HEMATURIA: Primary | ICD-10-CM

## 2018-12-24 RX ORDER — NITROFURANTOIN 25; 75 MG/1; MG/1
100 CAPSULE ORAL 2 TIMES DAILY
Qty: 14 CAPSULE | Refills: 0 | Status: SHIPPED | OUTPATIENT
Start: 2018-12-24 | End: 2018-12-31

## 2018-12-24 RX ORDER — SULFAMETHOXAZOLE AND TRIMETHOPRIM 800; 160 MG/1; MG/1
1 TABLET ORAL EVERY 12 HOURS SCHEDULED
Qty: 6 TABLET | Refills: 0 | Status: SHIPPED | OUTPATIENT
Start: 2018-12-24 | End: 2018-12-27

## 2018-12-24 NOTE — TELEPHONE ENCOUNTER
Script changed to Bactrim DS BID x 3 days  Called in to [facility] aware

## 2018-12-24 NOTE — TELEPHONE ENCOUNTER
Pt is concerned that the medication she is on for her cataracts (Besivance) is contraindicated with Macrobid  Is there anything else that can be prescribed?

## 2018-12-24 NOTE — TELEPHONE ENCOUNTER
----- Message from Molina Troncoso MD sent at 12/24/2018 10:24 AM EST -----  Please call Gabriele- her culture shows very low colony count of staph  Is she having any symptoms? If she is having symptoms can prescribe macrobid 100bid

## 2018-12-24 NOTE — TELEPHONE ENCOUNTER
Called pt -pt is" having some burning with urination " macrobid 100 mg bid x 7 days ordered in epic -per Dr Baldemar Dc  -ordred in epic  Pt will wait until wed to  medication  (she has a post-op appt for cataract surgery & would like to talk to her opthalmologist prior to taking med

## 2018-12-28 ENCOUNTER — OFFICE VISIT (OUTPATIENT)
Dept: OBGYN CLINIC | Facility: CLINIC | Age: 63
End: 2018-12-28
Payer: COMMERCIAL

## 2018-12-28 ENCOUNTER — APPOINTMENT (OUTPATIENT)
Dept: RADIOLOGY | Facility: CLINIC | Age: 63
End: 2018-12-28
Payer: COMMERCIAL

## 2018-12-28 VITALS
HEIGHT: 64 IN | SYSTOLIC BLOOD PRESSURE: 97 MMHG | BODY MASS INDEX: 33.63 KG/M2 | WEIGHT: 197 LBS | HEART RATE: 69 BPM | DIASTOLIC BLOOD PRESSURE: 65 MMHG

## 2018-12-28 DIAGNOSIS — M79.671 PAIN IN RIGHT FOOT: ICD-10-CM

## 2018-12-28 DIAGNOSIS — M79.671 PAIN IN RIGHT FOOT: Primary | ICD-10-CM

## 2018-12-28 DIAGNOSIS — M25.571 RIGHT ANKLE PAIN, UNSPECIFIED CHRONICITY: ICD-10-CM

## 2018-12-28 DIAGNOSIS — M76.829 PTTD (POSTERIOR TIBIAL TENDON DYSFUNCTION): ICD-10-CM

## 2018-12-28 PROCEDURE — 73600 X-RAY EXAM OF ANKLE: CPT

## 2018-12-28 PROCEDURE — 73630 X-RAY EXAM OF FOOT: CPT

## 2018-12-28 PROCEDURE — 99213 OFFICE O/P EST LOW 20 MIN: CPT | Performed by: ORTHOPAEDIC SURGERY

## 2018-12-28 RX ORDER — SULFAMETHOXAZOLE AND TRIMETHOPRIM 800; 160 MG/1; MG/1
1 TABLET ORAL EVERY 12 HOURS SCHEDULED
Qty: 6 TABLET | Refills: 0 | Status: CANCELLED | OUTPATIENT
Start: 2018-12-28 | End: 2018-12-31

## 2018-12-28 NOTE — PATIENT INSTRUCTIONS
Acquired Adult Flatfoot Deformity     What is acquired adult flatfoot deformity? Acquired adult flatfoot deformity (AAFD) is a progressive flattening of the arch of the foot that occurs as the posterior tibial tendon becomes insufficient  It has many other names such posterior tibial tendon dysfunction, posterior tibial tendon insufficiency and dorsolateral peritalar subluxation  This problem may progress from early stages with pain along the posterior tibial tendon to advanced deformity and arthritis throughout the hindfoot and ankle  What are the symptoms of AAFD? Patients often experience pain and/or deformity at the ankle or hindfoot  When the posterior tibial tendon does not work properly, a number of changes can occur to the foot and ankle  In the earlier stages, symptoms often include pain and tenderness along the posterior tibial tendon behind the inside of the ankle  As the tendon progressively fails, deformity of the foot and ankle may occur  This deformity can include progressive flattening of the arch, shifting of the heel so that it no longer is aligned underneath the rest of the leg, rotation and deformity of the forefoot, tightening of the heel cord, development of arthritis, and deformity of the ankle joint  At certain stages of this disorder, pain may shift from the inside to the outside aspect of the ankle as the heel shifts outward and structures are pinched laterally     What causes AAFD?   Posterior tibial tendon dysfunction is the most common cause of acquired adult flatfoot deformity  There is often no specific event that starts the problem, such as a sudden tendon injury  More commonly, the tendon becomes injured from cumulative wear and tear  Posterior tibial tendon dysfunction occurs more commonly in patients who already have a flat foot for other reasons   As the arch flattens, more stress is placed on the posterior tibial tendon and also on the ligaments on the inside of the foot and ankle  The result is a progressive disorder    Anatomy   The posterior tibialis muscle originates on the bones of the leg (tibia and fibula)  This muscle then passes behind the medial (inside) aspect of the ankle and attaches to the medial midfoot as the posterior tibial tendon  The posterior tibial tendon serves to invert (roll inward) the foot and maintain the arch of the foot  This tendon plays a central role in maintaining the normal alignment of the foot and also in enabling normal gait (walking)  In addition to tendons running across the ankle and foot joints, a number of ligaments span and stabilize these joints  The ligaments at the medial ankle can become stretched and contribute to the progressive flattening of the arch  Several muscles and tendons around the ankle and foot act to counter-balance the action of the posterior tibial tendon  Under normal circumstances, the result is a balanced ankle and foot with normal motion  When the posterior tibial tendon fails, the other muscles and tendons become relatively over-powering  These muscles then contribute to the progressive deformity seen with this disorder    How is AAFD diagnosed? The diagnosis of posterior tibial tendon dysfunction and AAFD is usually made from a combination of symptoms, physical exam and x-ray imaging  The location of pain, shape of the foot, flexibility of the hindfoot joints and gait all may help your physician make the diagnosis and also assess how advanced the problem is    What are treatment options?    Treatment depends very much upon a patients symptoms, functional goals, degree and specifics of deformity, and the presence of arthritis  Some patients get better without surgery  Rest and immobilization, orthotics, braces and physical therapy all may be appropriate   With early-stage disease that involves pain along the tendon, immobilization with a boot for a period of time can relieve stress on the tendon and reduce the inflammation and pain  Once these symptoms have resolved, patients are often transitioned into an orthotic that supports the inside aspect of the hindfoot  For patients with more significant deformity, a larger ankle brace may be necessary  If surgery is necessary, a number of different procedures may be considered  The specifics of the planned surgery depend upon the stage of the disorder and the patients specific goals  Procedures may include ligament and muscle lengthening, removal of the inflamed tendon lining, tendon transfers, cutting and realigning bones, placement of implants to realign the foot and joint fusions  In general, early stage disease may be treated with tendon and ligament (soft-tissue) procedures with the addition of osteotomies to realign the foot  Later stage disease with either a rigidly fixed deformity or with arthritis is often treated with fusion procedures  If you are considering surgery, your doctor will speak with about the specifics of the planned procedure    How long is recovery?   Anticipated recovery after treatment for AAFD varies considerably depending on the treatment  Non-operative treatments usually involve use of a boot until symptoms subside and then an orthotic or brace  Almost all surgical treatments require a period of immobilization and restricted weightbearing that can range from several weeks to several months  More involved procedures that include a tendon transfer, osteotomy or fusion may require a longer period of recovery    Some studies have shown good outcomes with non-surgical treatment of early-stage AAFD  In the appropriate patient using a brace and structured physical therapy, studies have shown success without surgery in a high percentage of patients  Non-surgical treatments for more advanced stages of AAFD may slow the progression of the disorder and limit symptoms       Modern surgical approaches typically involve a combination of procedures to realign the bone deformity, lengthen contracted muscles, substitute for the deficient posterior tibial tendon or perform joint fusions  Most modern surgical treatments exceed 80 percent success rate    Potential Complications  In addition to standard surgical risks such as infection, bleeding and nerve injury, additional risks may accompany particular procedures  For example, procedures that require bone healing (osteotomy, fusion) can result in a failure to properly heal the bones  The overall complication rates for these procedures are low     Frequently Asked Jori Sarah  ? If I am being treated without surgery, will I have to wear the orthotic or brace for the rest of my life? Traditionally, it has been thought that orthotics and/or braces will need to be used by a patient for the rest of his or her life  This device serves to supplement for the dysfunctional posterior tibial tendon and help realign the foot  Some studies, though, have suggested that adequate bracing and strengthening may allow the posterior tibial tendon to heal and therefore avoid permanent brace use  This may be the case in select situations

## 2018-12-28 NOTE — PROGRESS NOTES
MARLO Cortes  Attending, Orthopaedic Surgery  Foot and [facility] [address] [facility]        ORTHOPAEDIC FOOT AND ANKLE CLINIC VISIT     Assessment:     Encounter Diagnoses   Name Primary?  Pain in right foot Yes    Right ankle pain, unspecified chronicity     PTTD (posterior tibial tendon dysfunction)               Plan:   · The patient verbalized understanding of exam findings and treatment plan  We engaged in the shared decision-making process and treatment options were discussed at length with the patient  Surgical and conservative management discussed today along with risks and benefits  · She was referred to physical therapy for a UCBL orthosis only  · Activity to tolerance  · She may take OTCs for pain control  · I will see her back in the office to evaluated progress with the use of the UCBL orthosis  · We discussed that she has tried multiple braces and orthotics with no improvements in her symptoms  This means she will likely need a flatfoot reconstruction which would involve a 5 and 1 flatfoot correction surgery  Return in about 2 months (around 2/28/2019) for Recheck right ankle  [place] History of Present Illness:   Chief Complaint:   Chief Complaint   Patient presents with   [address] is a 70 y o  female who is being seen for right medial ankle pain x 3 months  She states she has seen multiple physicians and tried multiple bracing  Pain is localized at medial ankle with minimal radiating and described as sharp and severe  Patient denies numbness, tingling or radicular pain  Denies history of neuropathy  Patient does not smoke, does not have diabetes and does not take blood thinners  Patient denies family history of anesthesia complications and has not had any complications with anesthesia       Pain/symptom timing:  Worse during the day when active  Pain/symptom context:  Worse with activites and work  Pain/symptom modifying factors:  Rest makes better, activities make worse  Pain/symptom associated signs/symptoms: none    Prior treatment   · NSAIDsYes    · Injections No   · Bracing/Orthotics Yes   · Physical Therapy No     Orthopedic Surgical History:   No previous surgery of the right foot / ankle  Past Medical, Surgical and Social History:  Past Medical History:  has a past medical history of Acid reflux; Allergic rhinitis; Anesthesia complication; Arthritis; Asthma; Bigeminy; DVT (deep venous thrombosis) ([facility] ); DVT of leg (deep venous thrombosis) ([facility] ) (); Female pelvic pain; Hyperlipidemia; Intermittent palpitations; Irregular heart beat; Migraines; Palpitations; Pes planus; Thyroid nodule; Trigeminy; and Wears glasses  Problem List:  does not have any pertinent problems on file  Past Surgical History:  has a past surgical history that includes Replacement total knee (); Appendectomy; Knee surgery (Left);  section; Joint replacement; Dilation and curettage of uterus; Colonoscopy; Majestic tooth extraction; Cholecystectomy; pr laparoscopy w tot hysterectuterus <=250 gram  w tube/ovary (Bilateral, 2016); pr esophagogastroduodenoscopy transoral diagnostic (N/A, 3/14/2016); and Cataract extraction  Family History: family history includes Alzheimer's disease in her maternal aunt and maternal uncle; Cancer in her family; Diabetes in her father and paternal aunt; Diabetes type II in her brother; Endometrial cancer in her mother; Growth hormone deficiency in her daughter; Hashimoto's thyroiditis in her paternal aunt; Heart attack in her father; Heart disease in her father; Migraines in her mother; Other in her maternal uncle; Thyroid cancer in her brother and maternal aunt  Social History:  reports that she quit smoking about 40 years ago  She has never used smokeless tobacco  She reports that she drinks alcohol  She reports that she does not use drugs    Current Medications: has a current medication list which includes the following prescription(s): albuterol, alprazolam, besivance, butalbital-acetaminophen-caffeine, clobetasol, cyclosporine, diazepam, diphenhydramine, vitamin d2, evening primrose oil, flovent hfa, fluticasone, ketorolac, loratadine, loteprednol etabonate, magnesium, methocarbamol, montelukast, multivitamin, nitrofurantoin, pantoprazole, prednisolone acetate, rivaroxaban, and zolmitriptan  Allergies: is allergic to naproxen  Review of Systems:  General- denies fever/chills  HEENT- denies hearing loss or sore throat  Eyes- denies eye pain or visual disturbances, denies red eyes  Respiratory- denies cough or SOB  Cardio- denies chest pain or palpitations  GI- denies abdominal pain  Endocrine- denies urinary frequency  Urinary- denies pain with urination  Musculoskeletal- Negative except noted above  Skin- denies rashes or wounds  Neurological- denies dizziness or headache  Psychiatric- denies anxiety or difficulty concentrating    Physical Exam:   BP 97/65 (BP Location: Left arm, Patient Position: Sitting, Cuff Size: Adult)   Pulse 69   Ht 5' 4" (1 626 m)   Wt 89 4 kg (197 lb)   BMI 33 81 kg/m²   General/Constitutional: No apparent distress: well-nourished and well developed  Eyes: normal ocular motion  Lymphatic: No appreciable lymphadenopathy  Respiratory: Non-labored breathing  Vascular: No edema, swelling or tenderness, except as noted in detailed exam   Integumentary: No impressive skin lesions present, except as noted in detailed exam   Neuro: No ataxia or tremors noted  Psych: Normal mood and affect, oriented to person, place and time  Appropriate affect  Musculoskeletal: Normal, except as noted in detailed exam and in HPI      Examination    Right    Gait Normal   Musculoskeletal Tender to palpation at medial ankle , ptt    Skin Normal        Nails Normal    Range of Motion  15 degrees dorsiflexion, 40 degrees plantarflexion  Subtalar motion: normal , valgus hind foot fully correctable, pes planus foot structure    Stability Stable    Muscle Strength 5/5 tibialis anterior  5/5 gastrocnemius-soleus  3/5 posterior tibialis  5/5 peroneal/eversion strength  5/5 EHL  5/5 FHL    Neurologic Normal    Sensation Intact to light touch throughout sural, saphenous, superficial peroneal, deep peroneal and medial/lateral plantar nerve distributions  Pearl-Milly 5 07 filament (10g) testing deferred  Cardiovascular Brisk capillary refill < 2 seconds,intact DP and PT pulses    Special Tests Single Limb Heel Raise:  Unable to perform heel raise  Anterior Drawer: Negative  Imaging Studies:   3 views of the right foot and 2 views of the ankl were taken, reviewed and interpreted independently that demonstrate Mearys Line 17 6 with talar head uncovering of 40%  Reviewed by me personally  Scribe Attestation    I,:   Miriam Cordon am acting as a scribe while in the presence of the attending physician :        I,:   Georgette Oloms MD personally performed the services described in this documentation    as scribed in my presence :            Newt Adas Lachman, MD  Foot & Ankle Surgery   Department of [address] [place] [address]      I personally performed the service  Newt Adas Lachman, MD

## 2018-12-31 DIAGNOSIS — F41.9 ANXIETY: ICD-10-CM

## 2018-12-31 RX ORDER — ALPRAZOLAM 0.25 MG/1
TABLET ORAL
Qty: 30 TABLET | Refills: 1 | Status: SHIPPED | OUTPATIENT
Start: 2018-12-31 | End: 2019-03-13 | Stop reason: SDUPTHER

## 2019-01-07 ENCOUNTER — EVALUATION (OUTPATIENT)
Dept: PHYSICAL THERAPY | Facility: CLINIC | Age: 64
End: 2019-01-07
Payer: COMMERCIAL

## 2019-01-07 DIAGNOSIS — M76.829 PTTD (POSTERIOR TIBIAL TENDON DYSFUNCTION): ICD-10-CM

## 2019-01-07 PROCEDURE — L3010 FOOT LONGITUDINAL ARCH SUPPO: HCPCS | Performed by: PHYSICAL THERAPIST

## 2019-01-07 PROCEDURE — 97164 PT RE-EVAL EST PLAN CARE: CPT | Performed by: PHYSICAL THERAPIST

## 2019-01-07 PROCEDURE — G8978 MOBILITY CURRENT STATUS: HCPCS | Performed by: PHYSICAL THERAPIST

## 2019-01-07 PROCEDURE — G8979 MOBILITY GOAL STATUS: HCPCS | Performed by: PHYSICAL THERAPIST

## 2019-01-07 NOTE — PROGRESS NOTES
Re-evaluation  This pt presents for replacement of custom orthotics  She lost the last pair that was made (only wore them a few times but when she did wear them she felt discomfort in her arch)  She feels that a softer orthotic would be more tolerable  She has used a Eleazar brace in the past with good success  She is wondering if she should go back to using it (did not bring it with her)  Recent Dx of DVT  Presently taking Xarelto    Present pain is 4/10  Objective:  Severe pes planus R > L with mild med ankle/foot swelling  PROM wfl  Gait: antalgic, excessive STJ pronation  RCSP R 10 deg ev  Unable to perform single leg heel raise    Pt should benefit from custom orthotics and HEP to improve gait and LOF    Casted for custom orthotics    Plan:  Fit orthotics,  IP in HEP (stretching, balance, strengthening)

## 2019-01-11 ENCOUNTER — OFFICE VISIT (OUTPATIENT)
Dept: NEUROLOGY | Facility: CLINIC | Age: 64
End: 2019-01-11
Payer: COMMERCIAL

## 2019-01-11 VITALS
WEIGHT: 198.2 LBS | DIASTOLIC BLOOD PRESSURE: 66 MMHG | SYSTOLIC BLOOD PRESSURE: 128 MMHG | HEIGHT: 64 IN | HEART RATE: 66 BPM | BODY MASS INDEX: 33.84 KG/M2

## 2019-01-11 DIAGNOSIS — M54.2 CERVICALGIA: ICD-10-CM

## 2019-01-11 DIAGNOSIS — R41.3 MEMORY DIFFICULTY: Primary | ICD-10-CM

## 2019-01-11 DIAGNOSIS — G43.009 MIGRAINE WITHOUT AURA AND WITHOUT STATUS MIGRAINOSUS, NOT INTRACTABLE: ICD-10-CM

## 2019-01-11 PROCEDURE — 99213 OFFICE O/P EST LOW 20 MIN: CPT | Performed by: PSYCHIATRY & NEUROLOGY

## 2019-01-11 RX ORDER — BUTALBITAL, ACETAMINOPHEN AND CAFFEINE 50; 325; 40 MG/1; MG/1; MG/1
1 TABLET ORAL EVERY 4 HOURS PRN
Qty: 20 TABLET | Refills: 0 | Status: SHIPPED | OUTPATIENT
Start: 2019-01-11 | End: 2022-03-21 | Stop reason: SDUPTHER

## 2019-01-11 NOTE — PROGRESS NOTES
Edmundo Waddell is a 70 y o  female returns in follow-up today with history of memory difficulty, migraine and neck pain    Assessment:  1  Memory difficulty    2  Migraine without aura and without status migrainosus, not intractable    3  Cervicalgia        Plan:  Physical therapy  Follow-up 3 months    Discussion:  Ayesha's cognitive issues have remained stable  She continues to have issues with neck pain, stiffness and spasm  Have recommended physical therapy  She continues to have migraine headaches and finds that Zomig makes her sleepy  She had been taking Excedrin but since she is on Xarelto she cannot use this because of aspirin  She has been using Fioricet  We did discuss concerns regarding medication overuse headache and to limit the use of this  I will re-evaluate her after physical therapy      Subjective:    NAHID  Coretta Cheadle reports overall she feels her memory issues have been stable  She reports no problems with her job  She states that she does note difficulty remembering names but she states this has been a chronic issue  She continues to have neck pain symptoms lateralized more to the left  She states at times her trapezius areas are in spasm  She occasionally uses Valium at night  During the day she uses Robaxin and gets some relief  She has no radicular symptoms  She continues to have intermittent migraine headaches  She states sometimes the headaches begin in the neck and she is not sure whether the neck is causing her headaches or vice versa  She reports that Zomig makes her sleepy so she avoids using this when she is working  She states she had been using Excedrin but developed a DVT and is currently on Xarelto  She has been using Fioricet more  She states that she has periods of time where she has frequent headaches and then may go for weeks without a headache  She has numbness in the bottom of the right foot was told she had tarsal tunnel syndrome    She is trying a lift in her shoe to see if this will help  She is not interested in surgery she was told it may take 9 months to recover      Past Medical History:   Diagnosis Date    Acid reflux     Allergic rhinitis     Last Assessed: 2017    Anesthesia complication     HYPOTENSION    Arthritis     Asthma     Bigeminy     history    [facility]VT [facility] thrombosis) ([facility] ) 10/23/2018    [facility]VT of leg (deep [facility] thrombosis) ([facility] )     left    Female pelvic pain     Hyperlipidemia     Intermittent palpitations     Irregular heart beat     Migraines     Palpitations     Pes planus     unspecified laterality; Last Assessed: 2014    Thyroid nodule     Trigeminy     history    Wears glasses        Family History:  Family History   Problem Relation Age of Onset    Endometrial cancer Mother     Migraines Mother     Diabetes Father     Heart disease Father     Heart attack Father     Thyroid cancer Brother     Diabetes type II Brother     Growth hormone deficiency Daughter     Alzheimer's disease Maternal Aunt     Thyroid cancer Maternal Aunt     Diabetes Paternal Aunt     Hashimoto's thyroiditis Paternal Aunt         Total Thyroidectomy    Other Maternal Uncle         Brain Tumor    Alzheimer's disease Maternal Uncle     Cancer Family        Past Surgical History:  Past Surgical History:   Procedure Laterality Date    APPENDECTOMY      CATARACT EXTRACTION       SECTION      X 2    CHOLECYSTECTOMY      COLONOSCOPY      DILATION AND CURETTAGE OF UTERUS      JOINT REPLACEMENT      left TKR    KNEE SURGERY Left     X 3    CO ESOPHAGOGASTRODUODENOSCOPY TRANSORAL DIAGNOSTIC N/A 3/14/2016    Procedure: ESOPHAGOGASTRODUODENOSCOPY (EGD); Surgeon: Junior Gamble MD;  Location: [facility];   Service: Gastroenterology    CO LAPAROSCOPY W TOT HYSTERECTUTERUS <=250 Bonnetta Gazella TUBE/OVARY Bilateral 2016    Procedure: HYSTERECTOMY LAPAROSCOPIC TOTAL ,BSO;  Surgeon: Radha John MD;  Location: [facility];  Service: Gynecology Oncology    REPLACEMENT TOTAL KNEE  2014    WISDOM TOOTH EXTRACTION         Social History:   reports that she quit smoking about 40 years ago  She has never used smokeless tobacco  She reports that she drinks alcohol  She reports that she does not use drugs  Allergies:  Naproxen and Seasonal ic [cholestatin]      Current Outpatient Prescriptions:     albuterol (VENTOLIN HFA) 90 mcg/act inhaler, 1-2 puffs 4 times a day as needed, Disp: 18 Inhaler, Rfl: 0    ALPRAZolam (XANAX) 0 25 mg tablet, TAKE 1 TABLET BY MOUTH DAILY AT BEDTIME AS NEEDED FOR ANXIETY, Disp: 30 tablet, Rfl: 1    butalbital-acetaminophen-caffeine (FIORICET,ESGIC) -40 mg per tablet, Take 1-2 tablets by mouth daily as needed (Headache), Disp: 60 tablet, Rfl: 0    clobetasol (TEMOVATE) 0 05 % cream, Apply to affected area once daily prn, Disp: 30 g, Rfl: 0    diazepam (VALIUM) 5 mg tablet, Take 1 tablet (5 mg total) by mouth every 8 (eight) hours as needed for anxiety, Disp: 60 tablet, Rfl: 0    diphenhydrAMINE (BENADRYL) 25 mg capsule, Take by mouth daily at bedtime as needed  , Disp: , Rfl:     Ergocalciferol (VITAMIN D2) 2000 UNITS TABS, Take 2,000 Units by mouth daily  , Disp: , Rfl:     Evening Primrose Oil 1000 MG CAPS, Take 1 capsule by mouth daily, Disp: , Rfl:     FLOVENT  MCG/ACT inhaler, INHALE 1 PUFF 2 (TWO) TIMES A DAY (Patient taking differently: Inhale 1 puff 2 (two) times a day as needed  ), Disp: 12 Inhaler, Rfl: 0    fluticasone (FLONASE) 50 mcg/act nasal spray, 2 sprays into each nostril daily as needed  , Disp: , Rfl:     ketorolac (ACULAR) 0 5 % ophthalmic solution, INSERT 1 DROP OPHTHALMICALLY 3 TIMES A DAY IN SURGICAL EYE START 3 DAYS PRIOR SURGERY THIS REPLACES, Disp: , Rfl: 2    loteprednol etabonate (LOTEMAX) 0 5 % ophthalmic suspension, Administer 1 drop to both eyes 3 (three) times a day as needed  , Disp: , Rfl:     methocarbamol (ROBAXIN) 750 mg tablet, Take 1 tablet (750 mg total) by mouth as needed for muscle spasms, Disp: 100 tablet, Rfl: 2    Multiple Vitamin (MULTIVITAMIN) capsule, Take 1 capsule by mouth daily  , Disp: , Rfl:     pantoprazole (PROTONIX) 40 mg tablet, TAKE 1 TABLET TWICE DAILY, Disp: 60 tablet, Rfl: 1    prednisoLONE acetate (PRED FORTE) 1 % ophthalmic suspension, INSERT 1 DROP OPHTHALMICALLY daily  IN SURGICAL EYE, START AFTER SURGERY THIS REPLACES DUREZO, Disp: , Rfl: 1    rivaroxaban (XARELTO) 20 mg tablet, Take 1 tablet (20 mg total) by mouth daily with breakfast, Disp: 21 tablet, Rfl: 0    ZOLMitriptan (ZOMIG) 5 MG tablet, TAKE 1 TABLET BY MOUTH DAILY AS NEEDED FOR HEADACHE, Disp: 8 tablet, Rfl: 0    cycloSPORINE (RESTASIS) 0 05 % ophthalmic emulsion, Apply 1 drop to eye 2 (two) times a day, Disp: , Rfl:     loratadine (CLARITIN) 10 mg tablet, Take 1 tablet (10 mg total) by mouth daily (Patient not taking: Reported on 1/11/2019 ), Disp: 30 tablet, Rfl: 0    Magnesium 200 MG TABS, Take 1 tablet by mouth daily, Disp: , Rfl:     montelukast (SINGULAIR) 10 mg tablet, Take 1 tablet (10 mg total) by mouth daily at bedtime (Patient not taking: Reported on 1/11/2019 ), Disp: 30 tablet, Rfl: 0    I have reviewed the past medical, social and family history, current medications, allergies, vitals, review of systems and updated this information as appropriate today     Objective:    Vitals:  Blood pressure 128/66, pulse 66, height 5' 4" (1 626 m), weight 89 9 kg (198 lb 3 2 oz), not currently breastfeeding  Physical Exam    Neurological Exam  GENERAL:  Well-developed well-nourished woman in no acute distress  HEENT/NECK: Head is atraumatic normocephalic, neck is supple with restricted range of motion left greater than right to lateral rotation  There is tenderness along the Erb's point region on the left as well as the left lateral cervical region  CARDIOVASCULAR:  No Carotid bruit  NEUROLOGIC:  Mental Status: Awake and alert without aphasia  She scored 27/30 on [place]  Cranial Nerves: Extraocular movements are full  Face is symmetrical  Motor:  No drift is noted on arm extension  Coordination:  Finger-to-nose testing is performed accurately  Romberg is negative  Gait is stable  Reflexes:  1/4 in the biceps and brachioradialis regions, 1-in the triceps  ROS:    Review of Systems   Constitutional: Negative  Negative for appetite change and fever  HENT: Positive for tinnitus  Negative for hearing loss, trouble swallowing and voice change  Eyes: Positive for pain (right)  Negative for photophobia  History of dry eye   Respiratory: Negative  Negative for shortness of breath  Cardiovascular: Negative  Negative for palpitations  Gastrointestinal: Negative  Negative for nausea and vomiting  Endocrine: Negative  Negative for cold intolerance and heat intolerance  Genitourinary: Negative  Negative for dysuria, frequency and urgency  Musculoskeletal: Positive for neck pain and neck stiffness  Negative for myalgias  Skin: Negative  Negative for rash  Neurological: Positive for numbness (right foot and toes) and headaches  Negative for dizziness, tremors, seizures, syncope, facial asymmetry, speech difficulty, weakness and light-headedness  Hematological: Negative  Does not bruise/bleed easily  Psychiatric/Behavioral: Negative  Negative for confusion, hallucinations and sleep disturbance  All other systems reviewed and are negative

## 2019-01-14 ENCOUNTER — TELEPHONE (OUTPATIENT)
Dept: NEUROLOGY | Facility: CLINIC | Age: 64
End: 2019-01-14

## 2019-01-14 NOTE — TELEPHONE ENCOUNTER
fyi:  Pt called to f/u on fioricet rx  Pt was seen 1/11/19 and fioricet was ordered  It looks like rx was sent to print  Per pt, she was not given any script  Amy Evans @[facility] unable to locate the rx      Rx called in ([address] [place] [address] pharmacist) to [facility] pharmacy per pt's request

## 2019-01-17 ENCOUNTER — OFFICE VISIT (OUTPATIENT)
Dept: INTERNAL MEDICINE CLINIC | Facility: CLINIC | Age: 64
End: 2019-01-17
Payer: COMMERCIAL

## 2019-01-17 VITALS
HEIGHT: 64 IN | WEIGHT: 199.2 LBS | DIASTOLIC BLOOD PRESSURE: 80 MMHG | SYSTOLIC BLOOD PRESSURE: 120 MMHG | HEART RATE: 64 BPM | RESPIRATION RATE: 16 BRPM | BODY MASS INDEX: 34.01 KG/M2

## 2019-01-17 DIAGNOSIS — I82.441 ACUTE DEEP VEIN THROMBOSIS (DVT) OF TIBIAL VEIN OF RIGHT LOWER EXTREMITY (HCC): Primary | ICD-10-CM

## 2019-01-17 DIAGNOSIS — J45.21 MILD INTERMITTENT ASTHMA WITH ACUTE EXACERBATION: ICD-10-CM

## 2019-01-17 DIAGNOSIS — Z12.39 SCREENING FOR BREAST CANCER: ICD-10-CM

## 2019-01-17 DIAGNOSIS — Z86.718 HISTORY OF DEEP VENOUS THROMBOSIS (DVT) OF DISTAL VEIN OF RIGHT LOWER EXTREMITY: ICD-10-CM

## 2019-01-17 DIAGNOSIS — J45.909 MODERATE ASTHMA, UNSPECIFIED WHETHER COMPLICATED, UNSPECIFIED WHETHER PERSISTENT: ICD-10-CM

## 2019-01-17 PROCEDURE — 99213 OFFICE O/P EST LOW 20 MIN: CPT | Performed by: INTERNAL MEDICINE

## 2019-01-17 RX ORDER — ALBUTEROL SULFATE 90 UG/1
AEROSOL, METERED RESPIRATORY (INHALATION)
Qty: 18 INHALER | Refills: 0 | Status: SHIPPED | OUTPATIENT
Start: 2019-01-17 | End: 2020-03-23 | Stop reason: SDUPTHER

## 2019-01-17 RX ORDER — FLUTICASONE PROPIONATE 220 UG/1
1 AEROSOL, METERED RESPIRATORY (INHALATION) 2 TIMES DAILY
Qty: 12 INHALER | Refills: 0 | Status: SHIPPED | OUTPATIENT
Start: 2019-01-17 | End: 2019-04-11 | Stop reason: SDUPTHER

## 2019-01-17 NOTE — PROGRESS NOTES
Assessment/Plan:  Regarding the DVT the question is how long she should be treated  It was below the knee but unprovoked  She has been on Xarelto since it was started by the emergency room  I have decided to get her another opinion from Hematology as to whether we can stop the Xarelto which I think we can or whether is since this was unprovoked she should continue some sort of anticoagulation  In any case I am going to see her back in 2 months  In the meantime she will see Hematology for another opinion       Of note is that she had numerous lesions on the skin of her face including 1 on the bridge of her nose on the left side  She is going to see her dermatologist as soon as possible  The importance of this was stressed to the patient  In addition she was advised to get a mammogram as soon as possible as she is more than a year since her last 1       Recent Results (from the past 1008 hour(s))   Platelet count    Collection Time: 12/21/18 11:08 AM   Result Value Ref Range    Platelets 710 007 - 314 Thousands/uL    MPV 10 8 8 9 - 12 7 fL   Protime-INR    Collection Time: 12/21/18 11:08 AM   Result Value Ref Range    Protime 13 9 11 8 - 14 2 seconds    INR 1 06 0 86 - 1 17   APTT    Collection Time: 12/21/18 11:08 AM   Result Value Ref Range    PTT 31 26 - 38 seconds   Thrombin time    Collection Time: 12/21/18 11:08 AM   Result Value Ref Range    Thrombin Time 16 8 14 7 - 18 4 seconds   Fibrinogen    Collection Time: 12/21/18 11:08 AM   Result Value Ref Range    Fibrinogen 383 227 - 495 mg/dL   Urine culture    Collection Time: 12/21/18 11:38 AM   Result Value Ref Range    Urine Culture 60,000-69,000 cfu/ml Lactobacillus species (A)     Urine Culture 5846-0570 cfu/ml Staphylococcus species (A)        Susceptibility    Lactobacillus species - JOSH     ZID Performed Yes     Urinalysis with microscopic    Collection Time: 12/21/18 11:38 AM   Result Value Ref Range    Clarity, UA Clear     Color, UA Dk Yellow Specific [place], UA 1 030 1 003 - 1 030    pH, UA 6 0 4 5 - 8 0    Glucose, UA Negative Negative mg/dl    Ketones, UA Negative Negative mg/dl    Blood, UA Negative Negative    Protein, UA Trace (A) Negative mg/dl    Nitrite, UA Negative Negative    Bilirubin, UA Negative Negative    Urobilinogen, UA 0 2 0 2, 1 0 E U /dl E U /dl    Leukocytes, UA Negative Negative    WBC, UA None Seen None Seen, 0-5, 5-55, 5-65 /hpf    RBC, UA None Seen None Seen, 0-5 /hpf    Hyaline Casts, UA 3-5 (A) None Seen /lpf    Bacteria, UA Occasional None Seen, Occasional /hpf    Epithelial Cells Occasional None Seen, Occasional /hpf       1  Acute deep vein thrombosis (DVT) of tibial vein of right lower extremity (HCC)     2  Moderate asthma, unspecified whether complicated, unspecified whether persistent  albuterol (VENTOLIN HFA) 90 mcg/act inhaler   3  Mild intermittent asthma with acute exacerbation  fluticasone (FLOVENT HFA) 220 mcg/act inhaler   4  History of deep venous thrombosis (DVT) of distal vein of right lower extremity     5  Screening for breast cancer  Mammo screening bilateral w 3d & cad       Orders Placed This Encounter   Procedures    Mammo screening bilateral w 3d & cad         Subjective:  She has no further calf pain  She has a DVT of the right leg below the knee  She was placed by the emergency room on Xarelto and has remain on that for last 3 months  This DVT though below-the-knee was unprovoked  She has had previous DVTs on the left leg which were provoked  Patient ID: Jose Escalante is a 70 y o  female  HPI    The following portions of the patient's history were reviewed and updated as appropriate:   She has a past medical history of Acid reflux; Allergic rhinitis; Anesthesia complication; Arthritis; Asthma; Bigeminy; DVT (deep venous thrombosis) ([facility] ) (10/23/2018); DVT of leg (deep venous thrombosis) ([facility] ) (2001); Female pelvic pain; Hyperlipidemia;  Intermittent palpitations; Irregular heart beat; Migraines; Palpitations; Pes planus; Thyroid nodule; Trigeminy; and Wears glasses  ,   does not have any pertinent problems on file  ,   has a past surgical history that includes Replacement total knee (2014); Appendectomy; Knee surgery (Left);  section; Joint replacement; Dilation and curettage of uterus; Colonoscopy; [place] tooth extraction; Cholecystectomy; pr laparoscopy w tot hysterectuterus <=250 gram  w tube/ovary (Bilateral, 2016); pr esophagogastroduodenoscopy transoral diagnostic (N/A, 3/14/2016); and Cataract extraction  ,  family history includes Alzheimer's disease in her maternal aunt and maternal uncle; Cancer in her family; Diabetes in her father and paternal aunt; Diabetes type II in her brother; Endometrial cancer in her mother; Growth hormone deficiency in her daughter; Hashimoto's thyroiditis in her paternal aunt; Heart attack in her father; Heart disease in her father; Migraines in her mother; Other in her maternal uncle; Thyroid cancer in her brother and maternal aunt  ,   reports that she quit smoking about 40 years ago  Her smoking use included Cigarettes  She smoked 0 50 packs per day  She has never used smokeless tobacco  She reports that she drinks alcohol  She reports that she does not use drugs  ,  is allergic to naproxen and seasonal ic [cholestatin]       Current Outpatient Prescriptions:     albuterol (VENTOLIN HFA) 90 mcg/act inhaler, 1-2 puffs 4 times a day as needed, Disp: 18 Inhaler, Rfl: 0    ALPRAZolam (XANAX) 0 25 mg tablet, TAKE 1 TABLET BY MOUTH DAILY AT BEDTIME AS NEEDED FOR ANXIETY, Disp: 30 tablet, Rfl: 1    butalbital-acetaminophen-caffeine (FIORICET,ESGIC) -40 mg per tablet, Take 1 tablet by mouth every 4 (four) hours as needed for headaches, Disp: 20 tablet, Rfl: 0    diazepam (VALIUM) 5 mg tablet, Take 1 tablet (5 mg total) by mouth every 8 (eight) hours as needed for anxiety, Disp: 60 tablet, Rfl: 0    diphenhydrAMINE (BENADRYL) 25 mg capsule, Take by mouth daily at bedtime as needed  , Disp: , Rfl:     Ergocalciferol (VITAMIN D2) 2000 UNITS TABS, Take 2,000 Units by mouth daily  , Disp: , Rfl:     Evening Primrose Oil 1000 MG CAPS, Take 1 capsule by mouth daily, Disp: , Rfl:     fluticasone (FLOVENT HFA) 220 mcg/act inhaler, Inhale 1 puff 2 (two) times a day, Disp: 12 Inhaler, Rfl: 0    loteprednol etabonate (LOTEMAX) 0 5 % ophthalmic suspension, Administer 1 drop to both eyes 3 (three) times a day as needed  , Disp: , Rfl:     methocarbamol (ROBAXIN) 750 mg tablet, Take 1 tablet (750 mg total) by mouth as needed for muscle spasms, Disp: 100 tablet, Rfl: 2    Multiple Vitamin (MULTIVITAMIN) capsule, Take 1 capsule by mouth daily  , Disp: , Rfl:     pantoprazole (PROTONIX) 40 mg tablet, TAKE 1 TABLET TWICE DAILY, Disp: 60 tablet, Rfl: 1    rivaroxaban (XARELTO) 20 mg tablet, Take 1 tablet (20 mg total) by mouth daily with breakfast, Disp: 21 tablet, Rfl: 0    ZOLMitriptan (ZOMIG) 5 MG tablet, TAKE 1 TABLET BY MOUTH DAILY AS NEEDED FOR HEADACHE, Disp: 8 tablet, Rfl: 0    clobetasol (TEMOVATE) 0 05 % cream, Apply to affected area once daily prn (Patient not taking: Reported on 1/17/2019 ), Disp: 30 g, Rfl: 0    cycloSPORINE (RESTASIS) 0 05 % ophthalmic emulsion, Apply 1 drop to eye 2 (two) times a day, Disp: , Rfl:     fluticasone (FLONASE) 50 mcg/act nasal spray, 2 sprays into each nostril daily as needed  , Disp: , Rfl:     ketorolac (ACULAR) 0 5 % ophthalmic solution, INSERT 1 DROP OPHTHALMICALLY 3 TIMES A DAY IN SURGICAL EYE START 3 DAYS PRIOR SURGERY THIS REPLACES, Disp: , Rfl: 2    loratadine (CLARITIN) 10 mg tablet, Take 1 tablet (10 mg total) by mouth daily (Patient not taking: Reported on 1/11/2019 ), Disp: 30 tablet, Rfl: 0    Magnesium 200 MG TABS, Take 1 tablet by mouth daily, Disp: , Rfl:     montelukast (SINGULAIR) 10 mg tablet, Take 1 tablet (10 mg total) by mouth daily at bedtime (Patient not taking: Reported on 1/11/2019 ), Disp: 30 tablet, Rfl: 0    prednisoLONE acetate (PRED FORTE) 1 % ophthalmic suspension, INSERT 1 DROP OPHTHALMICALLY daily  IN SURGICAL EYE, START AFTER SURGERY THIS REPLACES SUMAN, Disp: , Rfl: 1    Review of Systems   Constitutional: Negative for activity change, appetite change, chills, diaphoresis, fatigue, fever and unexpected weight change  HENT: Negative for congestion, ear pain, hearing loss, mouth sores, nosebleeds, postnasal drip, sinus pain, sinus pressure, sore throat and trouble swallowing  Eyes: Negative for pain, discharge and visual disturbance  Respiratory: Negative for apnea, cough, chest tightness, shortness of breath and wheezing  Cardiovascular: Negative for chest pain, palpitations and leg swelling  Gastrointestinal: Negative for abdominal pain, anal bleeding, blood in stool, constipation, diarrhea, nausea and vomiting  Endocrine: Negative for polydipsia and polyphagia  Genitourinary: Negative for decreased urine volume, dysuria, flank pain, frequency, hematuria and urgency  Musculoskeletal: Negative for arthralgias, back pain, gait problem, joint swelling and myalgias  Skin: Negative for rash and wound  Allergic/Immunologic: Negative for environmental allergies and food allergies  Neurological: Negative for dizziness, tremors, seizures, syncope, speech difficulty, light-headedness, numbness and headaches  Hematological: Negative for adenopathy  Does not bruise/bleed easily  Psychiatric/Behavioral: Negative for agitation, confusion, hallucinations, sleep disturbance and suicidal ideas  The patient is not nervous/anxious  Objective:  /80 (BP Location: Left arm, Patient Position: Sitting, Cuff Size: Standard)   Pulse 64   Resp 16   Ht 5' 4" (1 626 m)   Wt 90 4 kg (199 lb 3 2 oz)   BMI 34 19 kg/m²      Physical Exam   Constitutional: She appears well-developed and well-nourished  No distress  HENT:   Head: Normocephalic     Right Ear: External ear normal    Left Ear: External ear normal    Nose: Nose normal    Mouth/Throat: Oropharynx is clear and moist  No oropharyngeal exudate  Eyes: Pupils are equal, round, and reactive to light  Conjunctivae and EOM are normal  Right eye exhibits no discharge  Left eye exhibits no discharge  Neck: Normal range of motion  Neck supple  No thyromegaly present  Cardiovascular: Normal rate, regular rhythm, normal heart sounds and intact distal pulses  Exam reveals no gallop and no friction rub  No murmur heard  Pulmonary/Chest: Effort normal and breath sounds normal  No respiratory distress  She has no wheezes  She has no rales  Abdominal: Soft  Bowel sounds are normal  She exhibits no distension and no mass  There is no tenderness  There is no rebound and no guarding  Musculoskeletal: Normal range of motion  She exhibits edema  She exhibits no tenderness or deformity  She has a trace of peripheral edema  She has deformity of the left knee  Lymphadenopathy:     She has no cervical adenopathy  Neurological: She is alert  She has normal reflexes  No cranial nerve deficit  Coordination normal    Skin: Skin is warm and dry  No rash noted  No erythema  Psychiatric: She has a normal mood and affect  Her behavior is normal  Judgment and thought content normal    Nursing note and vitals reviewed

## 2019-01-18 ENCOUNTER — EVALUATION (OUTPATIENT)
Dept: PHYSICAL THERAPY | Facility: CLINIC | Age: 64
End: 2019-01-18
Payer: COMMERCIAL

## 2019-01-18 ENCOUNTER — TELEPHONE (OUTPATIENT)
Dept: BARIATRICS | Facility: CLINIC | Age: 64
End: 2019-01-18

## 2019-01-18 DIAGNOSIS — M54.2 CERVICALGIA: Primary | ICD-10-CM

## 2019-01-18 PROCEDURE — 97110 THERAPEUTIC EXERCISES: CPT | Performed by: PHYSICAL THERAPIST

## 2019-01-18 PROCEDURE — 97014 ELECTRIC STIMULATION THERAPY: CPT | Performed by: PHYSICAL THERAPIST

## 2019-01-18 PROCEDURE — 97162 PT EVAL MOD COMPLEX 30 MIN: CPT | Performed by: PHYSICAL THERAPIST

## 2019-01-18 PROCEDURE — G8984 CARRY CURRENT STATUS: HCPCS | Performed by: PHYSICAL THERAPIST

## 2019-01-18 PROCEDURE — G8985 CARRY GOAL STATUS: HCPCS | Performed by: PHYSICAL THERAPIST

## 2019-01-18 PROCEDURE — 97140 MANUAL THERAPY 1/> REGIONS: CPT | Performed by: PHYSICAL THERAPIST

## 2019-01-18 NOTE — PROGRESS NOTES
PT Evaluation     Today's date: 2019  Patient name: Araceli Ko  : 1955  MRN: [id number]  Referring provider: Abigail Joyce MD  Dx:   Encounter Diagnosis     ICD-10-CM    1  Cervicalgia M54 2 Ambulatory referral to Physical Therapy       Start Time: 840  Stop Time: 8690  Total time in clinic (min): 70 minutes    Assessment  Assessment details: 71 y/o WF with the diagnosis of cervical pain  She reports increased cervical pain and spasm over the past few months which she attributes to increased computer work  She has a history of cervical DJD/OA in the cervical area  She is now referred for PT  Patient presents with spasms in bilateral traps and para-cervicals, decreased cervical ROM, weakness, and poor posture  Impairments: abnormal or restricted ROM, impaired physical strength, lacks appropriate home exercise program, pain with function, poor posture  and poor body mechanics  Understanding of Dx/Px/POC: excellent   Prognosis: good    Goals  Short Term Goals: 2-3 weeks    1  Decrease pain 2-3 points on pain scale in cervical area  2  Increase ROM by 5% in all cervical motions that are limited  3  Increase strength by  5-1 grade in all deficient areas  Long Term Goals:  4-8 weeks    1  Increase functional ability to perform iadls and adls independently  2   Return to previous level of activity without restrictions  3   Independent HEP    Plan  Plan details: Will perform land PT 1x/week and aquatic PT 1x/week  Will work on flexibility, strengthening, postural education, and spinal and scapular stabilization      Planned modality interventions: unattended electrical stimulation and hydrotherapy  Planned therapy interventions: aquatic therapy, postural training, flexibility, functional ROM exercises, home exercise program, therapeutic exercise, stretching, strengthening, manual therapy and joint mobilization  Frequency: 2x week  Duration in weeks: 12  Treatment plan discussed with: patient        Subjective Evaluation    History of Present Illness  Mechanism of injury: 71 y/o WF who reports increased cervical pain and spasm over the past few months which she attributes to increased computer work  She has a history of cervical DJD/OA in the cervical area  She is now referred for PT  Recurrent probem    Quality of life: good    Pain  Current pain ratin  At best pain ratin  At worst pain ratin  Quality: radiating, throbbing, discomfort, sharp and tight  Relieving factors: medications, rest and change in position  Aggravating factors: overhead activity and lifting  Progression: worsening    Treatments  Previous treatment: physical therapy, massage and medication  Current treatment: medication and physical therapy  Patient Goals  Patient goals for therapy: decreased pain, increased motion, increased strength, independence with ADLs/IADLs and return to sport/leisure activities  Patient goal: Improve posture         Objective     Postural Observations  Seated posture: poor  Standing posture: poor  Correction of posture: makes symptoms better        Palpation   Left   Muscle spasm in the levator scapulae and upper trapezius  Tenderness of the scalenes and sternocleidomastoid  Right   Muscle spasm in the levator scapulae and upper trapezius  Tenderness of the scalenes and sternocleidomastoid       Neurological Testing     Sensation   Cervical/Thoracic   Left   Intact: light touch, pin prick and sharp/dull discrimination    Right   Intact: light touch, pin prick and sharp/dull discrimination    Active Range of Motion   Cervical/Thoracic Spine   Cervical    Flexion: WFL  Extension: 15 degrees   Left lateral flexion: 20 degrees   Right lateral flexion: 15 degrees   Left rotation: 45 degrees   Right rotation: 45 degrees     Strength/Myotome Testing   Cervical Spine   Neck extension: 3  Neck flexion: 3    Additional Strength Details  Bilateral UE strength 3/5  Cervical core-(-)3/5      Flowsheet Rows      Most Recent Value   PT/OT G-Codes   Current Score  52   Projected Score  65   Assessment Type  Evaluation   G code set  Carrying, Moving & Handling Objects   Carrying, Moving and Handling Objects Current Status ()  CK   Carrying, Moving and Handling Objects Goal Status ()  CJ          Precautions: Standard, Left Knee TKR, DJD/OA  Daily Treatment Diary     Manual  1/18 2019            MFR 10            MT 10            Postural stretching 5                                          Exercise Diary  1/18 2019            Corner Pushup 15            Cervical iso 20            Cervical ROM                                                                                                                                                                                                   HEP Postural ed 15                                          Modalities  1/18/ 2019            Heat/estim 15

## 2019-01-23 ENCOUNTER — OFFICE VISIT (OUTPATIENT)
Dept: PHYSICAL THERAPY | Facility: CLINIC | Age: 64
End: 2019-01-23
Payer: COMMERCIAL

## 2019-01-23 DIAGNOSIS — M54.2 CERVICALGIA: Primary | ICD-10-CM

## 2019-01-23 PROCEDURE — 97110 THERAPEUTIC EXERCISES: CPT | Performed by: PHYSICAL THERAPIST

## 2019-01-23 PROCEDURE — 97014 ELECTRIC STIMULATION THERAPY: CPT | Performed by: PHYSICAL THERAPIST

## 2019-01-23 PROCEDURE — 97140 MANUAL THERAPY 1/> REGIONS: CPT | Performed by: PHYSICAL THERAPIST

## 2019-01-23 NOTE — PROGRESS NOTES
Daily Note     Today's date: 2019  Patient name: Jeniffer Cabrera  : 1955  MRN: [id number]  Referring provider: Hayley Clinton MD  Dx:   Encounter Diagnosis     ICD-10-CM    1  Cervicalgia M54 2        Start Time: 0700  Stop Time: 0800  Total time in clinic (min): 60 minutes    Subjective: Patient complains of severe pain since last visit  Most pain is reported in anterior cervical and pectoral area from stretching  Objective: See treatment diary below  Precautions: Standard, Left Knee TKR, DJD/OA  Daily Treatment Diary     Manual             MFR 10 10           MT 10 10           Postural stretching 5 5                                         Exercise Diary             Corner Pushup 15 15           Cervical iso 20 20           Cervical ROM  5                        Deltoid Row  15/20                                                                                                                                                                       HEP Postural ed 15                                          Modalities             Heat/estim 15 15                                             Assessment: Tolerated treatment well  Patient would benefit from continued PT      Plan: Continue per plan of care  Progress treatment as tolerated

## 2019-01-25 ENCOUNTER — APPOINTMENT (OUTPATIENT)
Dept: PHYSICAL THERAPY | Facility: CLINIC | Age: 64
End: 2019-01-25
Payer: COMMERCIAL

## 2019-01-28 ENCOUNTER — OFFICE VISIT (OUTPATIENT)
Dept: PHYSICAL THERAPY | Facility: CLINIC | Age: 64
End: 2019-01-28
Payer: COMMERCIAL

## 2019-01-28 ENCOUNTER — CONSULT (OUTPATIENT)
Dept: HEMATOLOGY ONCOLOGY | Facility: CLINIC | Age: 64
End: 2019-01-28
Payer: COMMERCIAL

## 2019-01-28 ENCOUNTER — OFFICE VISIT (OUTPATIENT)
Dept: PHYSICAL THERAPY | Facility: CLINIC | Age: 64
End: 2019-01-28

## 2019-01-28 VITALS
RESPIRATION RATE: 16 BRPM | BODY MASS INDEX: 33.72 KG/M2 | OXYGEN SATURATION: 98 % | WEIGHT: 197.5 LBS | DIASTOLIC BLOOD PRESSURE: 84 MMHG | SYSTOLIC BLOOD PRESSURE: 132 MMHG | HEART RATE: 74 BPM | HEIGHT: 64 IN

## 2019-01-28 DIAGNOSIS — M76.829 PTTD (POSTERIOR TIBIAL TENDON DYSFUNCTION): ICD-10-CM

## 2019-01-28 DIAGNOSIS — R42 DIZZINESS: Primary | ICD-10-CM

## 2019-01-28 DIAGNOSIS — Z91.89 AT HIGH RISK FOR DEEP VENOUS THROMBOSIS: ICD-10-CM

## 2019-01-28 DIAGNOSIS — M79.606 PAIN OF LOWER EXTREMITY, UNSPECIFIED LATERALITY: ICD-10-CM

## 2019-01-28 DIAGNOSIS — Z86.718 HISTORY OF DEEP VENOUS THROMBOSIS (DVT) OF DISTAL VEIN OF LEFT LOWER EXTREMITY: ICD-10-CM

## 2019-01-28 DIAGNOSIS — Z86.718 HISTORY OF DEEP VENOUS THROMBOSIS (DVT) OF DISTAL VEIN OF RIGHT LOWER EXTREMITY: Primary | ICD-10-CM

## 2019-01-28 DIAGNOSIS — Z91.89 AT HIGH RISK FOR BLEEDING: ICD-10-CM

## 2019-01-28 DIAGNOSIS — I82.403 RECURRENT ACUTE DEEP VEIN THROMBOSIS (DVT) OF BOTH LOWER EXTREMITIES (HCC): ICD-10-CM

## 2019-01-28 DIAGNOSIS — M54.2 CERVICALGIA: ICD-10-CM

## 2019-01-28 PROCEDURE — 99243 OFF/OP CNSLTJ NEW/EST LOW 30: CPT | Performed by: INTERNAL MEDICINE

## 2019-01-28 PROCEDURE — 97113 AQUATIC THERAPY/EXERCISES: CPT | Performed by: PHYSICAL THERAPIST

## 2019-01-28 NOTE — LETTER
2019     Sarah Padgett DO  [address] [address]    Patient: Edmundo Waddell   YOB: 1955   Date of Visit: 2019       Dear Dr Leidy Guevara: Thank you for referring Edmundo Waddell to me for evaluation  Below are my notes for this consultation  If you have questions, please do not hesitate to call me  I look forward to following your patient along with you  Sincerely,        Matt Henderson MD PhD        CC: No Recipients  Matt Henderson MD PhD  2019  7:32 PM  Sign at close encounter  [address] / [facility] NOTE    Primary Care Provider: Sarah Padgett DO  Referring Provider:    MRN: [id number]  : 1955    Reason for Encounter:  Chief Complaint   Patient presents with   [address] [place] [address] Patient Consult - Hx of DVT's         Hematology / Oncology History:     Edmundo Waddell is a 70 y o  female who came in  To [address] [place] [address] with hematology    1, left lower ext DVT  - , unprovoked, was on coumadin x 3 years    2, right lower ext DVT  - 10/22/2018, unprovoked, on xarelto      Interval History:     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, who knee surgeries, with history of obesity, multiple hip and knee surgeries, two deliveries, thyroid nodule, referred to Hematology for DVT as summarized above  Patient reported overall at baseline health status, no constitutional symptoms  Has been on Xarelto for about 3 months, doing well no bleeding  For both DVT events, there is no clear trigger identified  Patient has no history of malignancy, chronic inflammation, autoimmune disease, nonsmoker, not on hormone replacement surgery, no recent travel surgeries, no family history of thrombosis, no prior history of bleeding disorders, no uterine fibroid    Patient had a repeat Doppler a month after on Xarelto, showed new DVT resolved  Nonsmoker  Again working as a physician            Problem list:     Patient Active Problem List   Diagnosis    Arthralgia of hip, left    Pain in joint involving other specified sites    Arthritis of left knee    DVT of leg (deep venous thrombosis) ([facility] )    Memory difficulty    Migraine, unspecified, not intractable, without status migrainosus    Mild intermittent asthma    Obesity (BMI 30 0-34  9)    Osteopenia of multiple sites    Prediabetes    Suprapubic abdominal pain    Chronic pain of right knee    Primary osteoarthritis of right knee    Effusion of right knee    Shortness of breath    Thyroid nodule    Pain and swelling of right ankle    Posterior tibial tendinitis of right lower extremity    History of deep venous thrombosis (DVT) of distal vein of right lower extremity       Assessment / Plan:       1  History of deep venous thrombosis (DVT) of distal vein of right lower extremity  - recurrent DVT, total to events, both were non provoked  Suspecting underlying thrombosis panel, patient will stop Xarelto from today and check labs in 3 days, then restart Xarelto, and follow-up in 1 week  Will discuss with patient regarding the risk for recurrent DVT and bleeding based on finding in next visit in 1 week  - initially presented with leg pain, completely resolved in days after Xarelto started  - Thrombosis Panel; Future  - D-dimer, quantitative; Future        2, superficial vein chronic thrombosis  45    minutes were spent face to face with patient with greater than 50% of the time spent in counseling or coordination of care including discussions of treatment instructions  All of the patient's questions were answered to their satisfactory during this discussion  Advised pt to call if there is any further questions  PHYSICIAL EXAMINATION:       Vital Signs:   [unfilled]  Body mass index is 33 9 kg/m²  Body surface area is 1 95 meters squared      GEN: Alert, awake oriented x3, in no acute distress  HEENT- No pallor, icterus, cyanosis, no oral mucosal lesions,   LAD - no palpable cervical, clavicle, axillary, inguinal LAD  Heart- normal S1 S2, regular rate and rhythm, No murmur, rubs  Lungs- decreased breathing sound bilateral    Abdomen- soft, Non tender, bowel sounds present  Extremities- No cyanosis, clubbing, edema  Neuro- No focal neurological deficit           PAST MEDICAL HISTORY:   has a past medical history of Acid reflux; Allergic rhinitis; Anesthesia complication; Arthritis; Asthma; Bigeminy; DVT (deep venous thrombosis) ([facility] ) (10/23/2018); DVT of leg (deep venous thrombosis) ([facility] ) (); Female pelvic pain; Hyperlipidemia; Intermittent palpitations; Irregular heart beat; Migraines; Palpitations; Pes planus; Thyroid nodule; Trigeminy; and Wears glasses  PAST SURGICAL HISTORY:   has a past surgical history that includes Replacement total knee (); Appendectomy; Knee surgery (Left);  section; Joint replacement; Dilation and curettage of uterus; Colonoscopy; [place] tooth extraction; Cholecystectomy; pr laparoscopy w tot hysterectuterus <=250 gram  w tube/ovary (Bilateral, 2016); pr esophagogastroduodenoscopy transoral diagnostic (N/A, 3/14/2016); and Cataract extraction      CURRENT MEDICATIONS:   Current Outpatient Prescriptions   Medication Sig Dispense Refill    albuterol (VENTOLIN HFA) 90 mcg/act inhaler 1-2 puffs 4 times a day as needed 18 Inhaler 0    ALPRAZolam (XANAX) 0 25 mg tablet TAKE 1 TABLET BY MOUTH DAILY AT BEDTIME AS NEEDED FOR ANXIETY 30 tablet 1    butalbital-acetaminophen-caffeine (FIORICET,ESGIC) -40 mg per tablet Take 1 tablet by mouth every 4 (four) hours as needed for headaches 20 tablet 0    clobetasol (TEMOVATE) 0 05 % cream Apply to affected area once daily prn 30 g 0    cycloSPORINE (RESTASIS) 0 05 % ophthalmic emulsion Apply 1 drop to eye 2 (two) times a day      diazepam (VALIUM) 5 mg tablet Take 1 tablet (5 mg total) by mouth every 8 (eight) hours as needed for anxiety 60 tablet 0    diphenhydrAMINE (BENADRYL) 25 mg capsule Take by mouth daily at bedtime as needed        Ergocalciferol (VITAMIN D2) 2000 UNITS TABS Take 2,000 Units by mouth daily   Evening Primrose Oil 1000 MG CAPS Take 1 capsule by mouth daily      fluticasone (FLONASE) 50 mcg/act nasal spray 2 sprays into each nostril daily as needed        fluticasone (FLOVENT HFA) 220 mcg/act inhaler Inhale 1 puff 2 (two) times a day 12 Inhaler 0    ketorolac (ACULAR) 0 5 % ophthalmic solution INSERT 1 DROP OPHTHALMICALLY 3 TIMES A DAY IN SURGICAL EYE START 3 DAYS PRIOR SURGERY THIS REPLACES  2    loratadine (CLARITIN) 10 mg tablet Take 1 tablet (10 mg total) by mouth daily 30 tablet 0    loteprednol etabonate (LOTEMAX) 0 5 % ophthalmic suspension Administer 1 drop to both eyes 3 (three) times a day as needed   Magnesium 200 MG TABS Take 1 tablet by mouth daily      methocarbamol (ROBAXIN) 750 mg tablet Take 1 tablet (750 mg total) by mouth as needed for muscle spasms 100 tablet 2    montelukast (SINGULAIR) 10 mg tablet Take 1 tablet (10 mg total) by mouth daily at bedtime 30 tablet 0    Multiple Vitamin (MULTIVITAMIN) capsule Take 1 capsule by mouth daily   pantoprazole (PROTONIX) 40 mg tablet TAKE 1 TABLET TWICE DAILY 60 tablet 1    prednisoLONE acetate (PRED FORTE) 1 % ophthalmic suspension INSERT 1 DROP OPHTHALMICALLY daily  IN SURGICAL EYE, START AFTER SURGERY THIS REPLACES DUREZO  1    rivaroxaban (XARELTO) 20 mg tablet Take 1 tablet (20 mg total) by mouth daily with breakfast 21 tablet 0    ZOLMitriptan (ZOMIG) 5 MG tablet TAKE 1 TABLET BY MOUTH DAILY AS NEEDED FOR HEADACHE 8 tablet 0     No current facility-administered medications for this visit  [unfilled]    SOCIAL HISTORY:   reports that she quit smoking about 40 years ago  Her smoking use included Cigarettes  She smoked 0 50 packs per day  She has never used smokeless tobacco  She reports that she drinks alcohol   She reports that she does not use drugs      FAMILY HISTORY:  family history includes Alzheimer's disease in her maternal aunt and maternal uncle; Cancer in her family; Diabetes in her father and paternal aunt; Diabetes type II in her brother; Endometrial cancer in her mother; Growth hormone deficiency in her daughter; Hashimoto's thyroiditis in her paternal aunt; Heart attack in her father; Heart disease in her father; Migraines in her mother; Other in her maternal uncle; Thyroid cancer in her brother and maternal aunt  ALLERGIES:  is allergic to naproxen and seasonal ic [cholestatin]  REVIEW OF SYSTEMS:  Please note that a 14-point review of systems was performed to include Constitutional, HEENT, Respiratory, CVS, GI, , Musculoskeletal, Integumentary, Neurologic, Rheumatologic, Endocrinologic, Psychiatric, Lymphatic, and Hematologic/Oncologic systems were reviewed and are negative unless otherwise stated in HPI  Positive and negative findings pertinent to this evaluation are incorporated into the history of present illness  LAB:  Lab Results   Component Value Date    WBC 7 43 10/22/2018    HGB 14 8 10/22/2018    HCT 45 6 10/22/2018    MCV 84 10/22/2018     12/21/2018     Lab Results   Component Value Date     08/20/2015    K 3 8 10/22/2018     10/22/2018    CO2 30 10/22/2018    ANIONGAP 8 6 08/20/2015    BUN 15 10/22/2018    CREATININE 0 77 10/22/2018    GLUCOSE 88 08/20/2015    GLUF 91 08/04/2017    CALCIUM 9 2 10/22/2018    AST 40 10/22/2018    ALT 69 10/22/2018    ALKPHOS 131 (H) 10/22/2018    PROT 7 1 08/20/2015    BILITOT 0 3 08/20/2015    EGFR 82 10/22/2018       IMAGING:  No orders to display     No results found

## 2019-01-28 NOTE — LETTER
2019     Jamee Armijo DO  [address] [place] [id number]    Patient: Roseann Kim   YOB: 1955   Date of Visit: 2019       Dear Dr Madhu Butler Recipients: Thank you for referring Roseann Kim to me for evaluation  Below are my notes for this consultation  If you have questions, please do not hesitate to call me  I look forward to following your patient along with you  Sincerely,        Maryam Iverson MD PhD        CC: No Recipients  Maryam Iverson MD PhD  2019  7:32 PM  Sign at close encounter  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, who knee surgeries, with history of obesity, multiple hip and knee surgeries, two deliveries, thyroid nodule, referred to Hematology for DVT as summarized above  Patient reported overall at baseline health status, no constitutional symptoms  Has been on Xarelto for about 3 months, doing well no bleeding  For both DVT events, there is no clear trigger identified  Patient has no history of malignancy, chronic inflammation, autoimmune disease, nonsmoker, not on hormone replacement surgery, no recent travel surgeries, no family history of thrombosis, no prior history of bleeding disorders, no uterine fibroid    Patient had a repeat Doppler a month after on Xarelto, showed new DVT resolved  Nonsmoker  Again working as a physician            Problem list:     Patient Active Problem List   Diagnosis    Arthralgia of hip, left    Pain in joint involving other specified sites    Arthritis of left knee    DVT of leg (deep venous thrombosis) ([address] )    Memory difficulty    Migraine, unspecified, not intractable, without status migrainosus    Mild intermittent asthma    Obesity (BMI 30 0-34  9)    Osteopenia of multiple sites    Prediabetes    Suprapubic abdominal pain    Chronic pain of right knee    Primary osteoarthritis of right knee    Effusion of right knee    Shortness of breath    Thyroid nodule    Pain and swelling of right ankle    Posterior tibial tendinitis of right lower extremity    History of deep venous thrombosis (DVT) of distal vein of right lower extremity       Assessment / Plan:       1  History of deep venous thrombosis (DVT) of distal vein of right lower extremity  - recurrent DVT, total to events, both were non provoked  Suspecting underlying thrombosis panel, patient will stop Xarelto from today and check labs in 3 days, then restart Xarelto, and follow-up in 1 week  Will discuss with patient regarding the risk for recurrent DVT and bleeding based on finding in next visit in 1 week  - initially presented with leg pain, completely resolved in days after Xarelto started  - Thrombosis Panel; Future  - D-dimer, quantitative; Future        2, superficial vein chronic thrombosis  45    minutes were spent face to face with patient with greater than 50% of the time spent in counseling or coordination of care including discussions of treatment instructions  All of the patient's questions were answered to their satisfactory during this discussion  Advised pt to call if there is any further questions  PHYSICIAL EXAMINATION:       Vital Signs:   [unfilled]  Body mass index is 33 9 kg/m²  Body surface area is 1 95 meters squared      GEN: Alert, awake oriented x3, in no acute distress  HEENT- No pallor, icterus, cyanosis, no oral mucosal lesions,   LAD - no palpable cervical, clavicle, axillary, inguinal LAD  Heart- normal S1 S2, regular rate and rhythm, No murmur, rubs  Lungs- decreased breathing sound bilateral    Abdomen- soft, Non tender, bowel sounds present  Extremities- No cyanosis, clubbing, edema  Neuro- No focal neurological deficit           PAST MEDICAL HISTORY:   has a past medical history of Acid reflux; Allergic rhinitis; Anesthesia complication; Arthritis; Asthma; Bigeminy; DVT (deep venous thrombosis) ([facility] ) (10/23/2018); DVT of leg (deep venous thrombosis) ([facility] ) (); Female pelvic pain; Hyperlipidemia; Intermittent palpitations; Irregular heart beat; Migraines; Palpitations; Pes planus; Thyroid nodule; Trigeminy; and Wears glasses  PAST SURGICAL HISTORY:   has a past surgical history that includes Replacement total knee (); Appendectomy; Knee surgery (Left);  section; Joint replacement; Dilation and curettage of uterus; Colonoscopy; [place] tooth extraction; Cholecystectomy; pr laparoscopy w tot hysterectuterus <=250 gram  w tube/ovary (Bilateral, 2016); pr esophagogastroduodenoscopy transoral diagnostic (N/A, 3/14/2016); and Cataract extraction      CURRENT MEDICATIONS:   Current Outpatient Prescriptions   Medication Sig Dispense Refill    albuterol (VENTOLIN HFA) 90 mcg/act inhaler 1-2 puffs 4 times a day as needed 18 Inhaler 0    ALPRAZolam (XANAX) 0 25 mg tablet TAKE 1 TABLET BY MOUTH DAILY AT BEDTIME AS NEEDED FOR ANXIETY 30 tablet 1    butalbital-acetaminophen-caffeine (FIORICET,ESGIC) -40 mg per tablet Take 1 tablet by mouth every 4 (four) hours as needed for headaches 20 tablet 0    clobetasol (TEMOVATE) 0 05 % cream Apply to affected area once daily prn 30 g 0    cycloSPORINE (RESTASIS) 0 05 % ophthalmic emulsion Apply 1 drop to eye 2 (two) times a day      diazepam (VALIUM) 5 mg tablet Take 1 tablet (5 mg total) by mouth every 8 (eight) hours as needed for anxiety 60 tablet 0  diphenhydrAMINE (BENADRYL) 25 mg capsule Take by mouth daily at bedtime as needed        Ergocalciferol (VITAMIN D2) 2000 UNITS TABS Take 2,000 Units by mouth daily   Evening Primrose Oil 1000 MG CAPS Take 1 capsule by mouth daily      fluticasone (FLONASE) 50 mcg/act nasal spray 2 sprays into each nostril daily as needed        fluticasone (FLOVENT HFA) 220 mcg/act inhaler Inhale 1 puff 2 (two) times a day 12 Inhaler 0    ketorolac (ACULAR) 0 5 % ophthalmic solution INSERT 1 DROP OPHTHALMICALLY 3 TIMES A DAY IN SURGICAL EYE START 3 DAYS PRIOR SURGERY THIS REPLACES  2    loratadine (CLARITIN) 10 mg tablet Take 1 tablet (10 mg total) by mouth daily 30 tablet 0    loteprednol etabonate (LOTEMAX) 0 5 % ophthalmic suspension Administer 1 drop to both eyes 3 (three) times a day as needed   Magnesium 200 MG TABS Take 1 tablet by mouth daily      methocarbamol (ROBAXIN) 750 mg tablet Take 1 tablet (750 mg total) by mouth as needed for muscle spasms 100 tablet 2    montelukast (SINGULAIR) 10 mg tablet Take 1 tablet (10 mg total) by mouth daily at bedtime 30 tablet 0    Multiple Vitamin (MULTIVITAMIN) capsule Take 1 capsule by mouth daily   pantoprazole (PROTONIX) 40 mg tablet TAKE 1 TABLET TWICE DAILY 60 tablet 1    prednisoLONE acetate (PRED FORTE) 1 % ophthalmic suspension INSERT 1 DROP OPHTHALMICALLY daily  IN SURGICAL EYE, START AFTER SURGERY THIS REPLACES DUREZO  1    rivaroxaban (XARELTO) 20 mg tablet Take 1 tablet (20 mg total) by mouth daily with breakfast 21 tablet 0    ZOLMitriptan (ZOMIG) 5 MG tablet TAKE 1 TABLET BY MOUTH DAILY AS NEEDED FOR HEADACHE 8 tablet 0     No current facility-administered medications for this visit  [unfilled]    SOCIAL HISTORY:   reports that she quit smoking about 40 years ago  Her smoking use included Cigarettes  She smoked 0 50 packs per day  She has never used smokeless tobacco  She reports that she drinks alcohol   She reports that she does not use drugs  FAMILY HISTORY:  family history includes Alzheimer's disease in her maternal aunt and maternal uncle; Cancer in her family; Diabetes in her father and paternal aunt; Diabetes type II in her brother; Endometrial cancer in her mother; Growth hormone deficiency in her daughter; Hashimoto's thyroiditis in her paternal aunt; Heart attack in her father; Heart disease in her father; Migraines in her mother; Other in her maternal uncle; Thyroid cancer in her brother and maternal aunt  ALLERGIES:  is allergic to naproxen and seasonal ic [cholestatin]  REVIEW OF SYSTEMS:  Please note that a 14-point review of systems was performed to include Constitutional, HEENT, Respiratory, CVS, GI, , Musculoskeletal, Integumentary, Neurologic, Rheumatologic, Endocrinologic, Psychiatric, Lymphatic, and Hematologic/Oncologic systems were reviewed and are negative unless otherwise stated in HPI  Positive and negative findings pertinent to this evaluation are incorporated into the history of present illness  LAB:  Lab Results   Component Value Date    WBC 7 43 10/22/2018    HGB 14 8 10/22/2018    HCT 45 6 10/22/2018    MCV 84 10/22/2018     12/21/2018     Lab Results   Component Value Date     08/20/2015    K 3 8 10/22/2018     10/22/2018    CO2 30 10/22/2018    ANIONGAP 8 6 08/20/2015    BUN 15 10/22/2018    CREATININE 0 77 10/22/2018    GLUCOSE 88 08/20/2015    GLUF 91 08/04/2017    CALCIUM 9 2 10/22/2018    AST 40 10/22/2018    ALT 69 10/22/2018    ALKPHOS 131 (H) 10/22/2018    PROT 7 1 08/20/2015    BILITOT 0 3 08/20/2015    EGFR 82 10/22/2018       IMAGING:  No orders to display     No results found

## 2019-01-28 NOTE — PROGRESS NOTES
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, who knee surgeries, with history of obesity, multiple hip and knee surgeries, two deliveries, thyroid nodule, referred to Hematology for DVT as summarized above  Patient reported overall at baseline health status, no constitutional symptoms  Has been on Xarelto for about 3 months, doing well no bleeding  For both DVT events, there is no clear trigger identified  Patient has no history of malignancy, chronic inflammation, autoimmune disease, nonsmoker, not on hormone replacement surgery, no recent travel surgeries, no family history of thrombosis, no prior history of bleeding disorders, no uterine fibroid    Patient had a repeat Doppler a month after on Xarelto, showed new DVT resolved  Nonsmoker  Again working as a physician  Problem list:     Patient Active Problem List   Diagnosis    Arthralgia of hip, left    Pain in joint involving other specified sites    Arthritis of left knee    DVT of leg (deep venous thrombosis) ([facility] [address] )    Memory difficulty    Migraine, unspecified, not intractable, without status migrainosus    Mild intermittent asthma    Obesity (BMI 30 0-34  9)    Osteopenia of multiple sites    Prediabetes    Suprapubic abdominal pain    Chronic pain of right knee    Primary osteoarthritis of right knee    Effusion of right knee    Shortness of breath    Thyroid nodule    Pain and swelling of right ankle    Posterior tibial tendinitis of right lower extremity    History of deep venous thrombosis (DVT) of distal vein of right lower extremity       Assessment / Plan:       1  History of deep venous thrombosis (DVT) of distal vein of right lower extremity  - recurrent DVT, total to events, both were non provoked  Suspecting underlying thrombosis panel, patient will stop Xarelto from today and check labs in 3 days, then restart Xarelto, and follow-up in 1 week  Will discuss with patient regarding the risk for recurrent DVT and bleeding based on finding in next visit in 1 week  - initially presented with leg pain, completely resolved in days after Xarelto started  - Thrombosis Panel; Future  - D-dimer, quantitative; Future        2, superficial vein chronic thrombosis  45    minutes were spent face to face with patient with greater than 50% of the time spent in counseling or coordination of care including discussions of treatment instructions  All of the patient's questions were answered to their satisfactory during this discussion  Advised pt to call if there is any further questions  PHYSICIAL EXAMINATION:       Vital Signs:   [unfilled]  Body mass index is 33 9 kg/m²  Body surface area is 1 95 meters squared  GEN: Alert, awake oriented x3, in no acute distress  HEENT- No pallor, icterus, cyanosis, no oral mucosal lesions,   LAD - no palpable cervical, clavicle, axillary, inguinal LAD  Heart- normal S1 S2, regular rate and rhythm, No murmur, rubs  Lungs- decreased breathing sound bilateral    Abdomen- soft, Non tender, bowel sounds present  Extremities- No cyanosis, clubbing, edema  Neuro- No focal neurological deficit           PAST MEDICAL HISTORY:   has a past medical history of Acid reflux; Allergic rhinitis; Anesthesia complication; Arthritis; Asthma; Bigeminy; DVT (deep venous thrombosis) ([facility] [address] ) (10/23/2018);  DVT of leg (deep venous thrombosis) ([facility] [address] ) (); Female pelvic pain; Hyperlipidemia; Intermittent palpitations; Irregular heart beat; Migraines; Palpitations; Pes planus; Thyroid nodule; Trigeminy; and Wears glasses  PAST SURGICAL HISTORY:   has a past surgical history that includes Replacement total knee (2014); Appendectomy; Knee surgery (Left);  section; Joint replacement; Dilation and curettage of uterus; Colonoscopy; [place] tooth extraction; Cholecystectomy; pr laparoscopy w tot hysterectuterus <=250 gram  w tube/ovary (Bilateral, 2016); pr esophagogastroduodenoscopy transoral diagnostic (N/A, 3/14/2016); and Cataract extraction  CURRENT MEDICATIONS:   Current Outpatient Prescriptions   Medication Sig Dispense Refill    albuterol (VENTOLIN HFA) 90 mcg/act inhaler 1-2 puffs 4 times a day as needed 18 Inhaler 0    ALPRAZolam (XANAX) 0 25 mg tablet TAKE 1 TABLET BY MOUTH DAILY AT BEDTIME AS NEEDED FOR ANXIETY 30 tablet 1    butalbital-acetaminophen-caffeine (FIORICET,ESGIC) -40 mg per tablet Take 1 tablet by mouth every 4 (four) hours as needed for headaches 20 tablet 0    clobetasol (TEMOVATE) 0 05 % cream Apply to affected area once daily prn 30 g 0    cycloSPORINE (RESTASIS) 0 05 % ophthalmic emulsion Apply 1 drop to eye 2 (two) times a day      diazepam (VALIUM) 5 mg tablet Take 1 tablet (5 mg total) by mouth every 8 (eight) hours as needed for anxiety 60 tablet 0    diphenhydrAMINE (BENADRYL) 25 mg capsule Take by mouth daily at bedtime as needed        Ergocalciferol (VITAMIN D2) 2000 UNITS TABS Take 2,000 Units by mouth daily          Evening Primrose Oil 1000 MG CAPS Take 1 capsule by mouth daily      fluticasone (FLONASE) 50 mcg/act nasal spray 2 sprays into each nostril daily as needed        fluticasone (FLOVENT HFA) 220 mcg/act inhaler Inhale 1 puff 2 (two) times a day 12 Inhaler 0    ketorolac (ACULAR) 0 5 % ophthalmic solution INSERT 1 DROP OPHTHALMICALLY 3 TIMES A DAY IN SURGICAL EYE START 3 DAYS PRIOR SURGERY THIS REPLACES  2    loratadine (CLARITIN) 10 mg tablet Take 1 tablet (10 mg total) by mouth daily 30 tablet 0    loteprednol etabonate (LOTEMAX) 0 5 % ophthalmic suspension Administer 1 drop to both eyes 3 (three) times a day as needed   Magnesium 200 MG TABS Take 1 tablet by mouth daily      methocarbamol (ROBAXIN) 750 mg tablet Take 1 tablet (750 mg total) by mouth as needed for muscle spasms 100 tablet 2    montelukast (SINGULAIR) 10 mg tablet Take 1 tablet (10 mg total) by mouth daily at bedtime 30 tablet 0    Multiple Vitamin (MULTIVITAMIN) capsule Take 1 capsule by mouth daily   pantoprazole (PROTONIX) 40 mg tablet TAKE 1 TABLET TWICE DAILY 60 tablet 1    prednisoLONE acetate (PRED FORTE) 1 % ophthalmic suspension INSERT 1 DROP OPHTHALMICALLY daily  IN SURGICAL EYE, START AFTER SURGERY THIS REPLACES DUREZO  1    rivaroxaban (XARELTO) 20 mg tablet Take 1 tablet (20 mg total) by mouth daily with breakfast 21 tablet 0    ZOLMitriptan (ZOMIG) 5 MG tablet TAKE 1 TABLET BY MOUTH DAILY AS NEEDED FOR HEADACHE 8 tablet 0     No current facility-administered medications for this visit  [unfilled]    SOCIAL HISTORY:   reports that she quit smoking about 40 years ago  Her smoking use included Cigarettes  She smoked 0 50 packs per day  She has never used smokeless tobacco  She reports that she drinks alcohol  She reports that she does not use drugs  FAMILY HISTORY:  family history includes Alzheimer's disease in her maternal aunt and maternal uncle; Cancer in her family; Diabetes in her father and paternal aunt; Diabetes type II in her brother; Endometrial cancer in her mother; Growth hormone deficiency in her daughter; Hashimoto's thyroiditis in her paternal aunt; Heart attack in her father; Heart disease in her father; Migraines in her mother; Other in her maternal uncle; Thyroid cancer in her brother and maternal aunt       ALLERGIES:  is allergic to naproxen and seasonal ic [cholestatin]  REVIEW OF SYSTEMS:  Please note that a 14-point review of systems was performed to include Constitutional, HEENT, Respiratory, CVS, GI, , Musculoskeletal, Integumentary, Neurologic, Rheumatologic, Endocrinologic, Psychiatric, Lymphatic, and Hematologic/Oncologic systems were reviewed and are negative unless otherwise stated in HPI  Positive and negative findings pertinent to this evaluation are incorporated into the history of present illness  LAB:  Lab Results   Component Value Date    WBC 7 43 10/22/2018    HGB 14 8 10/22/2018    HCT 45 6 10/22/2018    MCV 84 10/22/2018     12/21/2018     Lab Results   Component Value Date     08/20/2015    K 3 8 10/22/2018     10/22/2018    CO2 30 10/22/2018    ANIONGAP 8 6 08/20/2015    BUN 15 10/22/2018    CREATININE 0 77 10/22/2018    GLUCOSE 88 08/20/2015    GLUF 91 08/04/2017    CALCIUM 9 2 10/22/2018    AST 40 10/22/2018    ALT 69 10/22/2018    ALKPHOS 131 (H) 10/22/2018    PROT 7 1 08/20/2015    BILITOT 0 3 08/20/2015    EGFR 82 10/22/2018       IMAGING:  No orders to display     No results found

## 2019-01-28 NOTE — LETTER
January 28, 2019     No Recipients    Patient: Lashell Khoury   YOB: 1955   Date of Visit: 1/28/2019     Dear Dr Kenan Gomez Recipients      Thank you for referring Lashell Khoury to me for evaluation  Below are the relevant portions of my assessment and plan of care  If you have questions, please do not hesitate to call me  I look forward to following Ayesha along with you           Sincerely,        Mik Goldman MD PhD        CC: No Recipients    Progress Notes:

## 2019-01-28 NOTE — PROGRESS NOTES
Daily Note     Today's date: 2019  Patient name: Yocasta Blanco  : 1955  MRN: [id number]  Referring provider: Regulo Keita MD  Dx:   Encounter Diagnosis     ICD-10-CM    1  Dizziness R42    2  PTTD (posterior tibial tendon dysfunction) M76 829    3  Cervicalgia M54 2        Start Time: 1300  Stop Time: 0994  Total time in clinic (min): 65 minutes    Subjective: pt reports she is very sore from land PT  Today she notes minimal HA but took some Tylenol and now feels better  Cervical pain today 3/10 w/ complaints of soreness and stiffness in c/s and B shoulders  Objective: See treatment diary below      Assessment: Tolerated treatment fairly well  Pt did well w/ initial ex's  Pt was able to correct posture w/ VCing  Slow and steady gait in the water  Pt did have some relief after session today  Postural awareness during session  Increase ex's as tolerated  Patient would benefit from continued PT      Plan: Continue per plan of care          Precautions: DJD, OA    Daily Treatment Diary       Exercise Diary              Water walking 10            Postural training 5            Gait training             Home exercise pgm/patient education             Wall: t/h raises 1            Hip abd/add 2            Marching 1            squats             Knee flex/ext 1            Step-ups (fwd/bkwd/ss)             SLS (eyes open/closed)             pec stretch 1'            Shoulder rolls/ shoulder pinches 2'            UE Noodle work x 4  4            UE AROM 1'            Resistive UE work (paddles, bells, TB) Rows red tband 1'            Core work on noodle (sitting/stdg)             Sit on noodle with movement             Seated on pool bench w proper posture             Ankle df/pf             marching             Hip Ab/add             Knee flex/ext             Deep water mvmt 5' bike            Deep water tx/stretching 10            Specific self - stretches wall/steps 3 Modalities  1/28            whirlpool 10

## 2019-01-29 ENCOUNTER — APPOINTMENT (OUTPATIENT)
Dept: PHYSICAL THERAPY | Facility: CLINIC | Age: 64
End: 2019-01-29
Payer: COMMERCIAL

## 2019-01-30 ENCOUNTER — APPOINTMENT (OUTPATIENT)
Dept: PHYSICAL THERAPY | Facility: CLINIC | Age: 64
End: 2019-01-30
Payer: COMMERCIAL

## 2019-02-01 ENCOUNTER — HOSPITAL ENCOUNTER (OUTPATIENT)
Dept: RADIOLOGY | Facility: MEDICAL CENTER | Age: 64
Discharge: HOME | End: 2019-02-01
Payer: COMMERCIAL

## 2019-02-01 ENCOUNTER — OFFICE VISIT (OUTPATIENT)
Dept: PHYSICAL THERAPY | Age: 64
End: 2019-02-01
Payer: COMMERCIAL

## 2019-02-01 ENCOUNTER — TELEPHONE (OUTPATIENT)
Dept: OBGYN CLINIC | Facility: CLINIC | Age: 64
End: 2019-02-01

## 2019-02-01 ENCOUNTER — APPOINTMENT (OUTPATIENT)
Dept: LAB | Facility: MEDICAL CENTER | Age: 64
End: 2019-02-01
Payer: COMMERCIAL

## 2019-02-01 VITALS — HEIGHT: 64 IN | WEIGHT: 197 LBS | BODY MASS INDEX: 33.63 KG/M2

## 2019-02-01 DIAGNOSIS — R92.8 ABNORMAL MAMMOGRAM: ICD-10-CM

## 2019-02-01 DIAGNOSIS — Z86.718 HISTORY OF DEEP VENOUS THROMBOSIS (DVT) OF DISTAL VEIN OF RIGHT LOWER EXTREMITY: ICD-10-CM

## 2019-02-01 DIAGNOSIS — Z12.39 SCREENING BREAST EXAMINATION: ICD-10-CM

## 2019-02-01 DIAGNOSIS — M54.2 CERVICALGIA: Primary | ICD-10-CM

## 2019-02-01 DIAGNOSIS — R92.2 DENSE BREAST TISSUE ON MAMMOGRAM: Primary | ICD-10-CM

## 2019-02-01 LAB
DEPRECATED AT III PPP: 106 % OF NORMAL (ref 92–136)
DEPRECATED D DIMER PPP: 748 NG/ML (FEU)

## 2019-02-01 PROCEDURE — 85613 RUSSELL VIPER VENOM DILUTED: CPT

## 2019-02-01 PROCEDURE — 85306 CLOT INHIBIT PROT S FREE: CPT

## 2019-02-01 PROCEDURE — 85705 THROMBOPLASTIN INHIBITION: CPT

## 2019-02-01 PROCEDURE — 85732 THROMBOPLASTIN TIME PARTIAL: CPT

## 2019-02-01 PROCEDURE — 36415 COLL VENOUS BLD VENIPUNCTURE: CPT

## 2019-02-01 PROCEDURE — 85300 ANTITHROMBIN III ACTIVITY: CPT

## 2019-02-01 PROCEDURE — 77067 SCR MAMMO BI INCL CAD: CPT

## 2019-02-01 PROCEDURE — 85305 CLOT INHIBIT PROT S TOTAL: CPT

## 2019-02-01 PROCEDURE — 97140 MANUAL THERAPY 1/> REGIONS: CPT | Performed by: PHYSICAL THERAPIST

## 2019-02-01 PROCEDURE — 81240 F2 GENE: CPT

## 2019-02-01 PROCEDURE — 97110 THERAPEUTIC EXERCISES: CPT | Performed by: PHYSICAL THERAPIST

## 2019-02-01 PROCEDURE — 86146 BETA-2 GLYCOPROTEIN ANTIBODY: CPT

## 2019-02-01 PROCEDURE — 85379 FIBRIN DEGRADATION QUANT: CPT

## 2019-02-01 PROCEDURE — 86147 CARDIOLIPIN ANTIBODY EA IG: CPT

## 2019-02-01 PROCEDURE — 85303 CLOT INHIBIT PROT C ACTIVITY: CPT

## 2019-02-01 PROCEDURE — 97014 ELECTRIC STIMULATION THERAPY: CPT | Performed by: PHYSICAL THERAPIST

## 2019-02-01 PROCEDURE — 77063 BREAST TOMOSYNTHESIS BI: CPT

## 2019-02-01 PROCEDURE — 81241 F5 GENE: CPT

## 2019-02-01 PROCEDURE — 85670 THROMBIN TIME PLASMA: CPT

## 2019-02-01 NOTE — TELEPHONE ENCOUNTER
----- Message from Dianelys Barahona MD sent at 2/1/2019  1:16 PM EST -----  I called and left message for Herrera Nguyen, she may call back to the office or contact me directly  Please order required imaging

## 2019-02-01 NOTE — PROGRESS NOTES
Daily Note     Today's date: 2019  Patient name: Blanche Smith  : 1955  MRN: [id number]  Referring provider: Naomi Templeton MD  Dx:   Encounter Diagnosis     ICD-10-CM    1  Cervicalgia M54 2        Start Time: 715  Stop Time: 08  Total time in clinic (min): 65 minutes    Subjective: Patient reports decreased pain in cervical area today  Objective: See treatment diary below    Has increased ROM in cervical rotation and lateral bending  Muscle spasms are slightly less in bilateral trap  Precautions: Standard, Left Knee TKR, DJD/OA  Daily Treatment Diary     Manual            MFR 10 10 10          MT 10 10 10          Postural stretching 5 5 5                                        Exercise Diary            Corner Pushup 15 15 15          Cervical iso 20 20 20          Cervical ROM  5 5                       Deltoid Row  15/20 15/30          TBand Row   B 20                                                                                                                                                         HEP Postural ed 15                                          Modalities            Heat/estim 15 15 15                                            Assessment: Tolerated treatment well  Patient would benefit from continued PT      Plan: Continue per plan of care  Progress treatment as tolerated

## 2019-02-02 LAB
CARDIOLIPIN IGA SER IA-ACNC: <9 APL U/ML (ref 0–11)
CARDIOLIPIN IGG SER IA-ACNC: <9 GPL U/ML (ref 0–14)
CARDIOLIPIN IGM SER IA-ACNC: 21 MPL U/ML (ref 0–12)

## 2019-02-04 ENCOUNTER — OFFICE VISIT (OUTPATIENT)
Dept: PHYSICAL THERAPY | Age: 64
End: 2019-02-04
Payer: COMMERCIAL

## 2019-02-04 DIAGNOSIS — M54.2 CERVICALGIA: Primary | ICD-10-CM

## 2019-02-04 LAB — PROT C AG ACT/NOR PPP IA: 138 % OF NORMAL (ref 60–150)

## 2019-02-04 PROCEDURE — 97014 ELECTRIC STIMULATION THERAPY: CPT | Performed by: PHYSICAL THERAPIST

## 2019-02-04 PROCEDURE — 97140 MANUAL THERAPY 1/> REGIONS: CPT | Performed by: PHYSICAL THERAPIST

## 2019-02-04 PROCEDURE — 97110 THERAPEUTIC EXERCISES: CPT | Performed by: PHYSICAL THERAPIST

## 2019-02-04 NOTE — PROGRESS NOTES
Daily Note     Today's date: 2019  Patient name: Susannah Stiles  : 1955  MRN: [id number]  Referring provider: Lili Tse MD  Dx:   Encounter Diagnosis     ICD-10-CM    1  Cervicalgia M54 2        Start Time: 230  Stop Time: 8190  Total time in clinic (min): 60 minutes    Subjective: Moderate bilateral cervical and upper shoulder pain with low back pain today  5/10 overall  Notes decreasing pain overall with PT treatments  Objective: See treatment diary below      Assessment: Tolerated treatment well  Patient would benefit from continued PT Positive response to MFR with decreased muscle tenderness and trigger points post manual therapy  Encouraged daily stretching for home exercise  Plan: Continue per plan of care           Precautions: Standard, Left Knee TKR, DJD/OA  Daily Treatment Diary     Manual   2/4         MFR 10 10 10 10         MT 10 10 10 10         Postural stretching 5 5 5 5                                       Exercise Diary   2/4         Corner Pushup 15 15 15 15         Cervical iso 20 20 20 20         Cervical ROM  5 5 5                      Deltoid Row  15/20 15/30 15/30         TBand Row   B 20 20                                                                                                                                                        HEP Postural ed 15                                          Modalities   2/4         Heat/estim 15 15 15 10

## 2019-02-05 ENCOUNTER — HOSPITAL ENCOUNTER (OUTPATIENT)
Dept: ULTRASOUND IMAGING | Facility: CLINIC | Age: 64
Discharge: HOME | End: 2019-02-05
Payer: COMMERCIAL

## 2019-02-05 ENCOUNTER — HOSPITAL ENCOUNTER (OUTPATIENT)
Dept: MAMMOGRAPHY | Facility: CLINIC | Age: 64
Discharge: HOME | End: 2019-02-05
Payer: COMMERCIAL

## 2019-02-05 VITALS — WEIGHT: 197 LBS | BODY MASS INDEX: 33.63 KG/M2 | HEIGHT: 64 IN

## 2019-02-05 DIAGNOSIS — R92.8 ABNORMAL MAMMOGRAM: ICD-10-CM

## 2019-02-05 DIAGNOSIS — R92.2 DENSE BREAST TISSUE ON MAMMOGRAM: ICD-10-CM

## 2019-02-05 LAB
APTT SCREEN TO CONFIRM RATIO: 1.03 RATIO (ref 0–1.4)
B2 GLYCOPROT1 IGA SER-ACNC: <9 GPI IGA UNITS (ref 0–25)
B2 GLYCOPROT1 IGG SER-ACNC: <9 GPI IGG UNITS (ref 0–20)
B2 GLYCOPROT1 IGM SER-ACNC: <9 GPI IGM UNITS (ref 0–32)
CONFIRM APTT/NORMAL: 46.9 SEC (ref 0–55)
DRVVT IMM 1:2 NP PPP: 43.2 SEC (ref 0–47)
LA PPP-IMP: ABNORMAL
PROT S ACT/NOR PPP: 145 % (ref 57–157)
PROT S ACT/NOR PPP: 98 % (ref 63–140)
PROT S PPP-ACNC: 137 % (ref 60–150)
SCREEN APTT: 50.6 SEC (ref 0–51.9)
SCREEN DRVVT: 51.5 SEC (ref 0–47)
THROMBIN TIME: 17.7 SEC (ref 0–23)

## 2019-02-05 PROCEDURE — G0279 TOMOSYNTHESIS, MAMMO: HCPCS

## 2019-02-05 PROCEDURE — 76642 ULTRASOUND BREAST LIMITED: CPT

## 2019-02-05 PROCEDURE — 77065 DX MAMMO INCL CAD UNI: CPT

## 2019-02-05 NOTE — PROGRESS NOTES
Met with patient and Dr Alex Gutierrez regarding recommendation for;    _____ RIGHT ___X___LEFT      __X___Ultrasound guided  ______Stereotactic breast biopsy  __X___Verbalized understanding        Blood thinners:  __X___yes _____no (Xarelto 20 mg)    Date stopped: ___N/A____    Biopsy teaching sheet given:  __X___yes ____no    Pt given contact information and adv to call with any questions/needs

## 2019-02-06 ENCOUNTER — HOSPITAL ENCOUNTER (OUTPATIENT)
Dept: ULTRASOUND IMAGING | Facility: CLINIC | Age: 64
Discharge: HOME | End: 2019-02-06
Payer: COMMERCIAL

## 2019-02-06 ENCOUNTER — HOSPITAL ENCOUNTER (OUTPATIENT)
Dept: MAMMOGRAPHY | Facility: CLINIC | Age: 64
Discharge: HOME | End: 2019-02-06

## 2019-02-06 VITALS — HEART RATE: 67 BPM | DIASTOLIC BLOOD PRESSURE: 70 MMHG | SYSTOLIC BLOOD PRESSURE: 118 MMHG

## 2019-02-06 DIAGNOSIS — R92.8 ABNORMAL MAMMOGRAM: ICD-10-CM

## 2019-02-06 LAB
F2 GENE MUT ANL BLD/T: NORMAL
F5 GENE MUT ANL BLD/T: NORMAL

## 2019-02-06 PROCEDURE — 88342 IMHCHEM/IMCYTCHM 1ST ANTB: CPT | Performed by: PATHOLOGY

## 2019-02-06 PROCEDURE — 88305 TISSUE EXAM BY PATHOLOGIST: CPT | Performed by: PATHOLOGY

## 2019-02-06 PROCEDURE — 88341 IMHCHEM/IMCYTCHM EA ADD ANTB: CPT | Performed by: PATHOLOGY

## 2019-02-06 PROCEDURE — 19083 BX BREAST 1ST LESION US IMAG: CPT

## 2019-02-06 RX ORDER — LIDOCAINE HYDROCHLORIDE 10 MG/ML
5 INJECTION, SOLUTION INFILTRATION; PERINEURAL ONCE
Status: COMPLETED | OUTPATIENT
Start: 2019-02-06 | End: 2019-02-06

## 2019-02-06 RX ADMIN — LIDOCAINE HYDROCHLORIDE 5 ML: 10 INJECTION, SOLUTION INFILTRATION; PERINEURAL at 09:15

## 2019-02-06 NOTE — PROGRESS NOTES
Patient arrived via:    ___x__ambulatory    _____wheelchair    _____stretcher      Domestic violence screen    ____x__negative______positive    Breast Implants:    _______yes _____x___no

## 2019-02-06 NOTE — DISCHARGE INSTR - OTHER ORDERS
POST LARGE CORE BREAST BIOPSY PATIENT INFORMATION      1  Place an ice pack inside your bra over the top of the dressing every hour for 20 minutes (20 minutes on, 60 minutes off)  Do this until bedtime  2  Do not shower or bathe until the following morning  3  You may bathe your breast carefully with the steri-strips in place  Be careful    Not to loosen them  The steri-strips will fall off in 3-5 days  4  You may have mild discomfort, and you may have some bruising where the   Needle entered the skin  This should clear within 5-7 days  5  If you need medicine for discomfort, take acetaminophen products such as   Tylenol  You may also take Advil or Motrin products  6  Do not participate in strenuous activities such as-tennis, aerobics, skiing,  Weight lifting, etc  for 24 hours  Refrain from swimming/soaking for 72 hours  7  Wearing a bra for sleeping may be more comfortable for the first 24-48 hours  8  Watch for continued bleeding, pain or fever over 101; please call with any questions or concerns  For procedures done at the [facility] [place]  [facility] call:  Smiley Ervin RN at [phone]  Hector Stewart RN at [phone]                    *After 4 PM call the Interventional Radiology Department                    [phone] and ask to speak with the nurse on call  For procedures done at the [address] [place] [address] call:         Shaheen Fenton RN at   *After 4 PM call the Interventional Radiology Department   [phone] and ask to speak with the nurse on call  For procedures done at [address] [place] [address] call: The Radiology Nurse at [phone]  *After 4 PM call your physician, or go to the Emergency Department  9          The final results of your biopsy are usually available within one week

## 2019-02-06 NOTE — PROGRESS NOTES
Ice pack given:    ___x__yes _____no    Discharge instructions signed by patient:    ___x__yes _____no    Discharge instructions given to patient:    __x___yes _____no    Discharged via:    ___x__amulatory    _____wheelchair    _____stretcher    Stable on discharge:    ___x__yes ____no  Patient would like results over the phone

## 2019-02-06 NOTE — PROGRESS NOTES
Procedure type:    ___x__ultrasound guided _____stereotactic    Breast:    ___x__Left _____Right    Location: 4 o'clock 4 CMFN    Needle: 12 Gauge jacob    # of passes: 3     Clip: Ribbon    Performed by: Dr Tomy Loyd held for 5 minutes by: Ema Gleason Strips:    ___x__yes _____no    Band aid:    ___x__yes_____no    Tape and guaze:    _____yes ___x__no    Tolerated procedure:    ___x__yes _____no

## 2019-02-07 NOTE — PROGRESS NOTES
Post procedure call completed    Bleeding: _____yes __X___no    Pain: _____yes ___X___no (Pt with minor discomfort, no pain)    Redness/Swelling: ______yes ___X___no (Pt states she has a small bruise)    Band aid removed: __X___yes _____no    Steri-Strips intact: ___X___yes _____no    Pt with no questions at this time, adv will call when results available, adv to call with any questions or concerns, has name/# for contact

## 2019-02-08 ENCOUNTER — TELEPHONE (OUTPATIENT)
Dept: MAMMOGRAPHY | Facility: CLINIC | Age: 64
End: 2019-02-08

## 2019-02-08 ENCOUNTER — APPOINTMENT (OUTPATIENT)
Dept: PHYSICAL THERAPY | Age: 64
End: 2019-02-08
Payer: COMMERCIAL

## 2019-02-11 ENCOUNTER — OFFICE VISIT (OUTPATIENT)
Dept: PHYSICAL THERAPY | Age: 64
End: 2019-02-11
Payer: COMMERCIAL

## 2019-02-11 ENCOUNTER — OFFICE VISIT (OUTPATIENT)
Dept: HEMATOLOGY ONCOLOGY | Facility: CLINIC | Age: 64
End: 2019-02-11
Payer: COMMERCIAL

## 2019-02-11 VITALS
SYSTOLIC BLOOD PRESSURE: 122 MMHG | TEMPERATURE: 98.3 F | HEART RATE: 67 BPM | BODY MASS INDEX: 32.98 KG/M2 | HEIGHT: 64 IN | OXYGEN SATURATION: 97 % | RESPIRATION RATE: 18 BRPM | DIASTOLIC BLOOD PRESSURE: 60 MMHG | WEIGHT: 193.2 LBS

## 2019-02-11 DIAGNOSIS — M54.2 CERVICALGIA: Primary | ICD-10-CM

## 2019-02-11 DIAGNOSIS — I82.441 ACUTE DEEP VEIN THROMBOSIS (DVT) OF TIBIAL VEIN OF RIGHT LOWER EXTREMITY (HCC): Primary | ICD-10-CM

## 2019-02-11 DIAGNOSIS — I82.4Y1 ACUTE DEEP VEIN THROMBOSIS (DVT) OF PROXIMAL VEIN OF RIGHT LOWER EXTREMITY (HCC): ICD-10-CM

## 2019-02-11 PROCEDURE — 97140 MANUAL THERAPY 1/> REGIONS: CPT | Performed by: PHYSICAL THERAPIST

## 2019-02-11 PROCEDURE — 97014 ELECTRIC STIMULATION THERAPY: CPT | Performed by: PHYSICAL THERAPIST

## 2019-02-11 PROCEDURE — 97110 THERAPEUTIC EXERCISES: CPT | Performed by: PHYSICAL THERAPIST

## 2019-02-11 PROCEDURE — 99214 OFFICE O/P EST MOD 30 MIN: CPT | Performed by: INTERNAL MEDICINE

## 2019-02-11 NOTE — PROGRESS NOTES
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, who knee surgeries, with history of obesity, multiple hip and knee surgeries, two deliveries, thyroid nodule, referred to Hematology for DVT as summarized above  Patient reported overall at baseline health status, no constitutional symptoms  Has been on Xarelto for about 3 months, doing well no bleeding  For both DVT events, there is no clear trigger identified  Patient has no history of malignancy, chronic inflammation, autoimmune disease, nonsmoker, not on hormone replacement surgery, no recent travel surgeries, no family history of thrombosis, no prior history of bleeding disorders, no uterine fibroid    Patient had a repeat Doppler a month after on Xarelto, showed new DVT resolved  Nonsmoker  Again working as a physician  2019: came in for F/u  doing ok  No bleeding  Had a left breast biopsy  Problem list:       Patient Active Problem List   Diagnosis    Arthralgia of hip, left    Pain in joint involving other specified sites    Arthritis of left knee    DVT of leg (deep venous thrombosis) ([facility] )    Memory difficulty    Migraine, unspecified, not intractable, without status migrainosus    Mild intermittent asthma    Obesity (BMI 30 0-34  9)    Osteopenia of multiple sites    Prediabetes    Suprapubic abdominal pain    Chronic pain of right knee    Primary osteoarthritis of right knee    Effusion of right knee    Shortness of breath  Thyroid nodule    Pain and swelling of right ankle    Posterior tibial tendinitis of right lower extremity    History of deep venous thrombosis (DVT) of distal vein of right lower extremity       Assessment / Plan:       1  History of deep venous thrombosis (DVT) of distal vein of right lower extremity  - recurrent DVT, total two events, both were non provoked  - initially presented with leg pain, completely resolved in days after Xarelto started  - repeated doppler showed chronic right superficial DVT  Previous identified posterior  tibial veins at mid calf resolved  - thrombosis panel is negative  - discussed risk / benefit for options include low dose maintenance xarelto vs ASA vs observation, decided to start Xarelto 10 mg po daily for one year  We will then repeat another D-dimer for risk stratification and decided whether to continue Xarelto for a longer time or no  - D-dimer, quantitative; Future  - rivaroxaban (XARELTO) 10 mg tablet; Take 1 tablet (10 mg total) by mouth daily  Dispense: 30 tablet; Refill: 11                  2, superficial vein chronic thrombosis  25   minutes were spent face to face with patient with greater than 50% of the time spent in counseling or coordination of care including discussions of treatment instructions  All of the patient's questions were answered to their satisfactory during this discussion  Advised pt to call if there is any further questions  PHYSICIAL EXAMINATION:       Vital Signs:   [unfilled]  Body mass index is 33 16 kg/m²  Body surface area is 1 93 meters squared  GEN: Alert, awake oriented x3, in no acute distress  HEENT- No pallor, icterus, cyanosis, no oral mucosal lesions,   LAD - no palpable cervical, clavicle, axillary, inguinal LAD  Heart- normal S1 S2, regular rate and rhythm, No murmur, rubs     Lungs- decreased breathing sound bilateral    Abdomen- soft, Non tender, bowel sounds present  Extremities- No cyanosis, clubbing, edema  Neuro- No focal neurological deficit           PAST MEDICAL HISTORY:   has a past medical history of Acid reflux, Allergic rhinitis, Anesthesia complication, Arthritis, Asthma, Bigeminy, DVT (deep venous thrombosis) ([facility] ) (10/23/2018), DVT of leg (deep venous thrombosis) ([facility] ) (), Female pelvic pain, Hyperlipidemia, Intermittent palpitations, Irregular heart beat, Migraines, Palpitations, Pes planus, Thyroid nodule, Trigeminy, and Wears glasses  PAST SURGICAL HISTORY:   has a past surgical history that includes Replacement total knee (); Appendectomy; Knee surgery (Left);  section; Joint replacement; Dilation and curettage of uterus; Colonoscopy; [place] tooth extraction; Cholecystectomy; pr laparoscopy w tot hysterectuterus <=250 gram  w tube/ovary (Bilateral, 2016); pr esophagogastroduodenoscopy transoral diagnostic (N/A, 3/14/2016); Cataract extraction; and US guided breast biopsy left complete (Left, 2019)  CURRENT MEDICATIONS:     Current Outpatient Medications   Medication Sig Dispense Refill    albuterol (VENTOLIN HFA) 90 mcg/act inhaler 1-2 puffs 4 times a day as needed 18 Inhaler 0    ALPRAZolam (XANAX) 0 25 mg tablet TAKE 1 TABLET BY MOUTH DAILY AT BEDTIME AS NEEDED FOR ANXIETY 30 tablet 1    butalbital-acetaminophen-caffeine (FIORICET,ESGIC) -40 mg per tablet Take 1 tablet by mouth every 4 (four) hours as needed for headaches 20 tablet 0    clobetasol (TEMOVATE) 0 05 % cream Apply to affected area once daily prn 30 g 0    cycloSPORINE (RESTASIS) 0 05 % ophthalmic emulsion Apply 1 drop to eye 2 (two) times a day      diazepam (VALIUM) 5 mg tablet Take 1 tablet (5 mg total) by mouth every 8 (eight) hours as needed for anxiety 60 tablet 0    diphenhydrAMINE (BENADRYL) 25 mg capsule Take by mouth daily at bedtime as needed        Ergocalciferol (VITAMIN D2) 2000 UNITS TABS Take 2,000 Units by mouth daily          Evening Primrose Oil 1000 MG CAPS Take 1 capsule by mouth daily      fluticasone (FLONASE) 50 mcg/act nasal spray 2 sprays into each nostril daily as needed        fluticasone (FLOVENT HFA) 220 mcg/act inhaler Inhale 1 puff 2 (two) times a day 12 Inhaler 0    ketorolac (ACULAR) 0 5 % ophthalmic solution INSERT 1 DROP OPHTHALMICALLY 3 TIMES A DAY IN SURGICAL EYE START 3 DAYS PRIOR SURGERY THIS REPLACES  2    loratadine (CLARITIN) 10 mg tablet Take 1 tablet (10 mg total) by mouth daily 30 tablet 0    loteprednol etabonate (LOTEMAX) 0 5 % ophthalmic suspension Administer 1 drop to both eyes 3 (three) times a day as needed   Magnesium 200 MG TABS Take 1 tablet by mouth daily      methocarbamol (ROBAXIN) 750 mg tablet Take 1 tablet (750 mg total) by mouth as needed for muscle spasms 100 tablet 2    montelukast (SINGULAIR) 10 mg tablet Take 1 tablet (10 mg total) by mouth daily at bedtime 30 tablet 0    Multiple Vitamin (MULTIVITAMIN) capsule Take 1 capsule by mouth daily   pantoprazole (PROTONIX) 40 mg tablet TAKE 1 TABLET TWICE DAILY 60 tablet 1    prednisoLONE acetate (PRED FORTE) 1 % ophthalmic suspension INSERT 1 DROP OPHTHALMICALLY daily  IN SURGICAL EYE, START AFTER SURGERY THIS REPLACES DUREZO  1    ZOLMitriptan (ZOMIG) 5 MG tablet TAKE 1 TABLET BY MOUTH DAILY AS NEEDED FOR HEADACHE 8 tablet 0    rivaroxaban (XARELTO) 10 mg tablet Take 1 tablet (10 mg total) by mouth daily 30 tablet 11     No current facility-administered medications for this visit  [unfilled]    SOCIAL HISTORY:   reports that she quit smoking about 40 years ago  Her smoking use included cigarettes  She smoked 0 50 packs per day  She has never used smokeless tobacco  She reports that she drinks alcohol  She reports that she does not use drugs  FAMILY HISTORY:  family history includes Alzheimer's disease in her maternal aunt and maternal uncle; Breast cancer in her maternal aunt;  Cancer in her family; Diabetes in her father and paternal aunt; Diabetes type II in her brother; Endometrial cancer (age of onset: 76) in her mother; Growth hormone deficiency in her daughter; Hashimoto's thyroiditis in her paternal aunt; Heart attack in her father; Heart disease in her father; Migraines in her mother; Other in her maternal uncle; Prostate cancer in her father; Thyroid cancer in her brother and maternal aunt  ALLERGIES:  is allergic to naproxen and seasonal ic [cholestatin]  REVIEW OF SYSTEMS:  Please note that a 14-point review of systems was performed to include Constitutional, HEENT, Respiratory, CVS, GI, , Musculoskeletal, Integumentary, Neurologic, Rheumatologic, Endocrinologic, Psychiatric, Lymphatic, and Hematologic/Oncologic systems were reviewed and are negative unless otherwise stated in HPI  Positive and negative findings pertinent to this evaluation are incorporated into the history of present illness  LAB:    Lab Results   Component Value Date    WBC 7 43 10/22/2018    HGB 14 8 10/22/2018    HCT 45 6 10/22/2018    MCV 84 10/22/2018     12/21/2018       Lab Results   Component Value Date     08/20/2015    K 3 8 10/22/2018     10/22/2018    CO2 30 10/22/2018    ANIONGAP 8 6 08/20/2015    BUN 15 10/22/2018    CREATININE 0 77 10/22/2018    GLUCOSE 88 08/20/2015    GLUF 91 08/04/2017    CALCIUM 9 2 10/22/2018    AST 40 10/22/2018    ALT 69 10/22/2018    ALKPHOS 131 (H) 10/22/2018    PROT 7 1 08/20/2015    BILITOT 0 3 08/20/2015    EGFR 82 10/22/2018       IMAGING:    No orders to display     No results found

## 2019-02-11 NOTE — PROGRESS NOTES
Daily Note     Today's date: 2019  Patient name: Lillie Silva  : 1955  MRN: [id number]  Referring provider: Makenna Davis MD  Dx:   Encounter Diagnosis     ICD-10-CM    1  Cervicalgia M54 2        Start Time: 730  Stop Time: 08  Total time in clinic (min): 60 minutes    Subjective: Patient reports she had a left breast biopsy last week and is still swollen and bruised  Objective: See treatment diary below  Cervical ROM has improved especially rotation and lateral bending  Precautions: Standard, Left Knee TKR, DJD/OA  Daily Treatment Diary     Manual          MFR 10 10 10 10 10        MT 10 10 10 10 10        Postural stretching 5 5 5 5 5                                      Exercise Diary          Corner Pushup 15 15 15 15 nt        Cervical iso 20 20 20 20 20        Cervical ROM  5 5 5 5                     Deltoid Row  15/20 15/30 15/30 nt        TBand Row   B 20 20 nt                                                                                                                                                       HEP Postural ed 15    10                                      Modalities          Heat/estim 15 15 15 10 15                                              Assessment: Tolerated treatment well  Patient would benefit from continued PT      Plan: Continue per plan of care  Progress treatment as tolerated    Modified exercise program due to soreness of left breast

## 2019-02-15 ENCOUNTER — OFFICE VISIT (OUTPATIENT)
Dept: PHYSICAL THERAPY | Age: 64
End: 2019-02-15
Payer: COMMERCIAL

## 2019-02-15 DIAGNOSIS — M54.2 CERVICALGIA: Primary | ICD-10-CM

## 2019-02-15 PROCEDURE — 97113 AQUATIC THERAPY/EXERCISES: CPT

## 2019-02-15 NOTE — PROGRESS NOTES
Daily Note     Today's date: 2/15/2019  Patient name: Nataliya Albarran  : 1955  MRN: [id number]  Referring provider: Isaac Arteaga MD  Dx:   Encounter Diagnosis     ICD-10-CM    1  Cervicalgia M54 2        Start Time: 1310  Stop Time: 1405  Total time in clinic (min): 55 minutes    Subjective: pt notes she had a rough morning  No complaints of pain in C/S today but notes tightness  Objective: See treatment diary below      Assessment: Tolerated treatment fairly  well  Pt has stiff movements in the water today, moving slow today w/ ex's  VCing for posture throughout session  Patient would benefit from continued PT      Plan: Continue per plan of care         Precautions: Standard, Left Knee TKR, DJD/OA  Daily Treatment Diary     Exercise Diary  1/28 2/15           Water walking 10 10           Postural training 5 5           Gait training             Home exercise pgm/patient education             Wall: t/h raises 1 1           Hip abd/add 2 2           Marching 1 1           squats             Knee flex/ext 1 1           Step-ups (fwd/bkwd/ss)             SLS (eyes open/closed)             pec stretch 1' 1           Shoulder rolls/ shoulder pinches 2' 2           UE Noodle work x 4  4 4           UE AROM 1' 1           Resistive UE work (paddles, bells, TB) Rows red tband 1' 1           Core work on noodle (sitting/stdg)             Sit on noodle with movement             Seated on pool bench w proper posture             Ankle df/pf             marching             Hip Ab/add             Knee flex/ext             Deep water mvmt 5' bike 5           Deep water tx/stretching 10 10           Specific self - stretches wall/steps 3 3               Modalities  1/28 2/15           whirlpool 10 10

## 2019-02-18 ENCOUNTER — OFFICE VISIT (OUTPATIENT)
Dept: PHYSICAL THERAPY | Age: 64
End: 2019-02-18
Payer: COMMERCIAL

## 2019-02-18 DIAGNOSIS — M54.2 CERVICALGIA: Primary | ICD-10-CM

## 2019-02-18 PROCEDURE — 97140 MANUAL THERAPY 1/> REGIONS: CPT | Performed by: PHYSICAL THERAPIST

## 2019-02-18 PROCEDURE — 97014 ELECTRIC STIMULATION THERAPY: CPT | Performed by: PHYSICAL THERAPIST

## 2019-02-18 PROCEDURE — 97110 THERAPEUTIC EXERCISES: CPT | Performed by: PHYSICAL THERAPIST

## 2019-02-18 NOTE — PROGRESS NOTES
Daily Note     Today's date: 2019  Patient name: Sree Rider  : 1955  MRN: [id number]  Referring provider: Isaac Arteaga MD  Dx:   Encounter Diagnosis     ICD-10-CM    1  Cervicalgia M54 2        Start Time: 0900  Stop Time: 2591  Total time in clinic (min): 55 minutes    Subjective: Patient notes moderate cervical pain and stiffness with tightness  Objective: See treatment diary below      Assessment: Tolerated treatment well  Patient would benefit from continued PT Responds well to manual therapy with decreased pain reported post treatment  Moderate trapezius tenderness with tightness, also relieved post treatment  Fair postural awareness  Plan: Continue per plan of care       Precautions: Standard, Left Knee TKR, DJD/OA  Daily Treatment Diary     Manual         MFR 10 10 10 10 10 10       MT 10 10 10 10 10 10       Postural stretching 5 5 5 5 5 5                                     Exercise Diary         Corner Pushup 15 15 15 15 nt        Cervical iso 20 20 20 20 20 20       Cervical ROM  5 5 5 5 5                    Deltoid Row  15/20 15/30 15/30 nt        TBand Row   B 20 20 nt                                                                                                                                                       HEP Postural ed 15    10                                      Modalities         Heat/estim 15 15 15 10 15 15

## 2019-02-22 ENCOUNTER — OFFICE VISIT (OUTPATIENT)
Dept: PHYSICAL THERAPY | Age: 64
End: 2019-02-22
Payer: COMMERCIAL

## 2019-02-22 DIAGNOSIS — M76.829 PTTD (POSTERIOR TIBIAL TENDON DYSFUNCTION): ICD-10-CM

## 2019-02-22 DIAGNOSIS — M54.2 CERVICALGIA: Primary | ICD-10-CM

## 2019-02-22 PROCEDURE — 97113 AQUATIC THERAPY/EXERCISES: CPT

## 2019-02-22 NOTE — PROGRESS NOTES
Daily Note     Today's date: 2019  Patient name: Susannah Stiles  : 1955  MRN: [id number]  Referring provider: Lili Tse MD  Dx:   Encounter Diagnosis     ICD-10-CM    1  Cervicalgia M54 2    2  PTTD (posterior tibial tendon dysfunction) M76 829        Start Time: 0930  Stop Time: 1030  Total time in clinic (min): 60 minutes    Subjective: pt notes she is feeling much better in her neck today  Objective: See treatment diary below      Assessment: Tolerated treatment   well  Added UE's today for AROM  Patient would benefit from continued PT      Plan: Continue per plan of care         Precautions: Standard, Left Knee TKR, DJD/OA  Daily Treatment Diary     Exercise Diary  1/28 2/15 2/22          Water walking 10 10 10          Postural training 5 5           Gait training             Home exercise pgm/patient education   5          Wall: t/h raises 1 1 1          Hip abd/add 2 2 2          Marching 1 1 1          squats   1          Knee flex/ext 1 1 1          Step-ups (fwd/bkwd/ss)             SLS (eyes open/closed)             pec stretch 1' 1 1          Shoulder rolls/ shoulder pinches 2' 2 2          UE Noodle work x 4  4 4 4          UE AROM 1' 1 5          Resistive UE work (paddles, bells, TB) Rows red tband 1' 1 1          Core work on noodle (sitting/stdg)             Sit on noodle with movement             Seated on pool bench w proper posture             Ankle df/pf             marching             Hip Ab/add             Knee flex/ext             Deep water mvmt 5' bike 5 5          Deep water tx/stretching 10 10 10          Specific self - stretches wall/steps 3 3 3              Modalities  1/28 2/15 2/22          whirlpool 10 10 10

## 2019-02-25 ENCOUNTER — EVALUATION (OUTPATIENT)
Dept: PHYSICAL THERAPY | Age: 64
End: 2019-02-25
Payer: COMMERCIAL

## 2019-02-25 DIAGNOSIS — M54.2 CERVICALGIA: Primary | ICD-10-CM

## 2019-02-25 PROCEDURE — 97164 PT RE-EVAL EST PLAN CARE: CPT | Performed by: PHYSICAL THERAPIST

## 2019-02-25 PROCEDURE — G8984 CARRY CURRENT STATUS: HCPCS | Performed by: PHYSICAL THERAPIST

## 2019-02-25 PROCEDURE — G8985 CARRY GOAL STATUS: HCPCS | Performed by: PHYSICAL THERAPIST

## 2019-02-25 PROCEDURE — 97113 AQUATIC THERAPY/EXERCISES: CPT | Performed by: PHYSICAL THERAPIST

## 2019-02-25 NOTE — PROGRESS NOTES
PT Re-Evaluation     Today's date: 2019  Patient name: Rahul Laguerre  : 1955  MRN: [id number]  Referring provider: Elinor Olivas MD  Dx:   Encounter Diagnosis     ICD-10-CM    1  Cervicalgia M54 2        Start Time:   Stop Time: 144  Total time in clinic (min): 65 minutes    Assessment  Assessment details: 69 y/o WF with the diagnosis of cervical pain  She reports increased cervical pain and spasm over the past several months which she attributes to increased computer work  She has a history of cervical DJD/OA in the cervical area  She was referred for PT  Patient had pain and spasms in bilateral traps and para-cervicals, decreased cervical ROM, weakness, and poor posture  Impairments: abnormal or restricted ROM, impaired physical strength, lacks appropriate home exercise program, pain with function, poor posture  and poor body mechanics  Understanding of Dx/Px/POC: excellent   Prognosis: good      Goals  Short Term Goals: 2-3 weeks    1  Decrease pain 2-3 points on pain scale in cervical area  -Partially Met  2  Increase ROM by 5% in all cervical motions that are limited  - Partially Met  3  Increase strength by  5-1 grade in all deficient areas  Partially Met    Long Term Goals:  4-8 weeks    1  Increase functional ability to perform iadls and adls independently  Partially Met  2  Return to previous level of activity without restrictions  - Partially Met  3  Independent HEP- Partially Met    Plan  Plan details: Continue PT 2x/week with one aquatic and one land  She will increase her HEP    Planned therapy interventions: aquatic therapy, body mechanics training, functional ROM exercises, home exercise program, therapeutic exercise, strengthening, neuromuscular re-education, manual therapy and joint mobilization  Frequency: 2x week  Duration in weeks: 12        Subjective Evaluation    History of Present Illness  Mechanism of injury: 69 y/o WF who reports increased cervical pain and spasm over the past several months which she attributes to increased computer work  She has a history of cervical DJD/OA in the cervical area  She has been showing slow steady progress with her PT program              Recurrent probem    Quality of life: good    Pain  Current pain ratin  At best pain ratin  At worst pain ratin  Quality: dull ache, radiating, throbbing and discomfort  Relieving factors: change in position, medications and rest  Aggravating factors: sitting, overhead activity and lifting  Progression: improved    Treatments  Current treatment: physical therapy  Patient Goals  Patient goals for therapy: decreased pain, increased motion, increased strength, independence with ADLs/IADLs, return to sport/leisure activities and return to work          Objective     Static Posture     Comments  Postural Observations  Seated posture: fair+  Standing posture: fair+  Correction of posture: makes symptoms better        Palpation   Left   Muscle spasm in the levator scapulae and upper trapezius  Tenderness of the scalenes and sternocleidomastoid  Right   Muscle spasm in the levator scapulae and upper trapezius  Tenderness of the scalenes and sternocleidomastoid       Neurological Testing     Sensation   Cervical/Thoracic   Left   Intact: light touch, pin prick and sharp/dull discrimination    Right   Intact: light touch, pin prick and sharp/dull discrimination    Active Range of Motion   Cervical/Thoracic Spine   Cervical    Flexion: WFL  Extension: 15 degrees   Left lateral flexion: 30 degrees   Right lateral flexion: 25 degrees   Left rotation: 55 degrees   Right rotation: 55 degrees     Strength/Myotome Testing   Cervical Spine   Neck extension: 3+  Neck flexion: 3+    Additional Strength Details  Bilateral UE strength 4-/5  Cervical core   3/5        Flowsheet Rows      Most Recent Value   PT/OT G-Codes   Current Score  61   Projected Score  65   Assessment Type  Re-evaluation   G code set Carrying, Moving & Handling Objects   Carrying, Moving and Handling Objects Current Status ()  CJ   Carrying, Moving and Handling Objects Goal Status ()  CJ          Precautions: Standard, Left Knee TKR, DJD/OA  Daily Treatment Diary     Exercise Diary  1/28 2/15 2/22 2/25         Water walking 10 10 10 10         Postural training 5 5           Gait training             Home exercise pgm/patient education   5          Wall: t/h raises 1 1 1 1         Hip abd/add 2 2 2 2         Marching 1 1 1 1         squats   1 1         Knee flex/ext 1 1 1 1         Step-ups (fwd/bkwd/ss)             SLS (eyes open/closed)             pec stretch 1' 1 1 1         Shoulder rolls/ shoulder pinches 2' 2 2 2         UE Noodle work x 4  4 4 4 4         UE AROM 1' 1 5 5         Resistive UE work (paddles, bells, TB) Rows red tband 1' 1 1 1         Core work on noodle (sitting/stdg)             Sit on noodle with movement             Seated on pool bench w proper posture             Ankle df/pf             marching             Hip Ab/add             Knee flex/ext             Deep water mvmt 5' bike 5 5 5         Deep water tx/stretching 10 10 10 10         Specific self - stretches wall/steps 3 3 3 3             Modalities  1/28 2/15 2/22 2/25         whirlpool 10 10 10 10

## 2019-02-27 ENCOUNTER — OFFICE VISIT (OUTPATIENT)
Dept: PHYSICAL THERAPY | Age: 64
End: 2019-02-27
Payer: COMMERCIAL

## 2019-02-27 DIAGNOSIS — M54.2 CERVICALGIA: Primary | ICD-10-CM

## 2019-02-27 PROCEDURE — 97110 THERAPEUTIC EXERCISES: CPT | Performed by: PHYSICAL THERAPIST

## 2019-02-27 PROCEDURE — 97014 ELECTRIC STIMULATION THERAPY: CPT | Performed by: PHYSICAL THERAPIST

## 2019-02-27 PROCEDURE — 97112 NEUROMUSCULAR REEDUCATION: CPT | Performed by: PHYSICAL THERAPIST

## 2019-02-27 PROCEDURE — 97140 MANUAL THERAPY 1/> REGIONS: CPT | Performed by: PHYSICAL THERAPIST

## 2019-02-27 NOTE — PROGRESS NOTES
Daily Note     Today's date: 2019  Patient name: Montana Ceron  : 1955  MRN: [id number]  Referring provider: Rodney Adkins MD  Dx:   Encounter Diagnosis     ICD-10-CM    1  Cervicalgia M54 2        Start Time: 715  Stop Time: 66  Total time in clinic (min): 60 minutes    Subjective: Patient reports improvement in cervical area  Pain level is 3/10 and reports it is getting easier to turn her head while driving  Objective: See treatment diary below    Posture is better with increased cervical ROM noted  Precautions: Standard, Left Knee TKR, DJD/OA  Daily Treatment Diary     Manual        MFR 10 10 10 10 10 10 10      MT 10 10 10 10 10 10 10      Postural stretching 5 5 5 5 5 5 5                                    Exercise Diary        Corner Pushup 15 15 15 15 nt        Cervical iso 20 20 20 20 20 20 20      Cervical ROM  5 5 5 5 5 5                   Deltoid Row  15/20 15/30 15/30 nt        TBand Row   B 20 20 nt                                                                                                                                                       HEP Postural ed 15    10                                      Modalities        Heat/estim 15 15 15 10 15 15 15                                          Assessment: Tolerated treatment well  Patient exhibited good technique with therapeutic exercises and would benefit from continued PT      Plan: Continue per plan of care  Progress treatment as tolerated

## 2019-03-01 ENCOUNTER — APPOINTMENT (OUTPATIENT)
Dept: PHYSICAL THERAPY | Age: 64
End: 2019-03-01
Payer: COMMERCIAL

## 2019-03-01 NOTE — MISCELLANEOUS
Message  I left a message regarding her urinalysis and culture which was negative   I also advised her to see her gynecologist for her suprapubic discomfort      Signatures   Electronically signed by : Kanika Pressley DO; Nov 13 2017  6:49PM EST                       (Author) No

## 2019-03-04 ENCOUNTER — APPOINTMENT (OUTPATIENT)
Dept: PHYSICAL THERAPY | Age: 64
End: 2019-03-04
Payer: COMMERCIAL

## 2019-03-08 ENCOUNTER — APPOINTMENT (OUTPATIENT)
Dept: PHYSICAL THERAPY | Age: 64
End: 2019-03-08
Payer: COMMERCIAL

## 2019-03-13 DIAGNOSIS — F41.9 ANXIETY: ICD-10-CM

## 2019-03-14 RX ORDER — ALPRAZOLAM 0.25 MG/1
0.25 TABLET ORAL 2 TIMES DAILY PRN
Qty: 30 TABLET | Refills: 0 | Status: SHIPPED | OUTPATIENT
Start: 2019-03-14 | End: 2019-04-11 | Stop reason: SDUPTHER

## 2019-03-15 ENCOUNTER — OFFICE VISIT (OUTPATIENT)
Dept: PHYSICAL THERAPY | Age: 64
End: 2019-03-15
Payer: COMMERCIAL

## 2019-03-15 DIAGNOSIS — M76.829 PTTD (POSTERIOR TIBIAL TENDON DYSFUNCTION): ICD-10-CM

## 2019-03-15 DIAGNOSIS — M54.2 CERVICALGIA: Primary | ICD-10-CM

## 2019-03-15 PROCEDURE — 97113 AQUATIC THERAPY/EXERCISES: CPT | Performed by: PHYSICAL THERAPIST

## 2019-03-15 NOTE — PROGRESS NOTES
Daily Note     Today's date: 3/15/2019  Patient name: Roseann Kim  : 1955  MRN: [id number]  Referring provider: Candie Ureña MD  Dx:   Encounter Diagnosis     ICD-10-CM    1  Cervicalgia M54 2    2  PTTD (posterior tibial tendon dysfunction) M76 829        Start Time: 1000  Stop Time: 1040  Total time in clinic (min): 40 minutes    Subjective: pt notes she isn't feeling well today  Objective: See treatment diary below      Assessment: Tolerated treatment fair  Pt didn't complete her full program today due to not feeling well  Patient would benefit from continued PT      Plan: Continue per plan of care         Precautions: Standard, Left Knee TKR, DJD/OA  Daily Treatment Diary     Exercise Diary  1/28 2/15 2/22 2/25 3/15        Water walking 10 10 10 10 10        Postural training 5 5   10        Gait training             Home exercise pgm/patient education   5          Wall: t/h raises 1 1 1 1 1        Hip abd/add 2 2 2 2 2        Marching 1 1 1 1 1        squats   1 1 1        Knee flex/ext 1 1 1 1 1        Step-ups (fwd/bkwd/ss)             SLS (eyes open/closed)             pec stretch 1' 1 1 1         Shoulder rolls/ shoulder pinches 2' 2 2 2         UE Noodle work x 4  4 4 4 4         UE AROM 1' 1 5 5         Resistive UE work (paddles, bells, TB) Rows red tband 1' 1 1 1         Core work on noodle (sitting/stdg)             Sit on noodle with movement             Seated on pool bench w proper posture             Ankle df/pf             marching             Hip Ab/add             Knee flex/ext             Deep water mvmt 5' bike 5 5 5         Deep water tx/stretching 10 10 10 10         Specific self - stretches wall/steps 3 3 3 3             Modalities  1/28 2/15 2/22 2/25 3/15        whirlpool 10 10 10 10 10

## 2019-03-18 ENCOUNTER — APPOINTMENT (OUTPATIENT)
Dept: PHYSICAL THERAPY | Age: 64
End: 2019-03-18
Payer: COMMERCIAL

## 2019-03-20 ENCOUNTER — OFFICE VISIT (OUTPATIENT)
Dept: PHYSICAL THERAPY | Age: 64
End: 2019-03-20
Payer: COMMERCIAL

## 2019-03-20 DIAGNOSIS — M54.2 CERVICALGIA: Primary | ICD-10-CM

## 2019-03-20 PROCEDURE — 97014 ELECTRIC STIMULATION THERAPY: CPT | Performed by: PHYSICAL THERAPIST

## 2019-03-20 PROCEDURE — 97140 MANUAL THERAPY 1/> REGIONS: CPT | Performed by: PHYSICAL THERAPIST

## 2019-03-20 PROCEDURE — 97110 THERAPEUTIC EXERCISES: CPT | Performed by: PHYSICAL THERAPIST

## 2019-03-20 NOTE — PROGRESS NOTES
Daily Note     Today's date: 3/20/2019  Patient name: Robby Whitley  : 1955  MRN: [id number]  Referring provider: Scar Holguin MD  Dx:   Encounter Diagnosis     ICD-10-CM    1  Cervicalgia M54 2                   Subjective: Massage yesterday was helpful, but sore again today      Objective: See treatment diary below    Precautions: Standard, Left Knee TKR, DJD/OA  Daily Treatment Diary     Manual  1/18  2019 1/23 2/1 2/4 2/11 2/18 2/27 3/20     MFR 10 10 10 10 10 10 10 10     MT 10 10 10 10 10 10 10 10     Postural stretching 5 5 5 5 5 5 5 5                                   Exercise Diary  /4 2/11 2/18 2/27 3/20     Corner Pushup 15 15 15 15 nt        Cervical iso 20 20 20 20 20 20 20 20     Cervical ROM  5 5 5 5 5 5 5                  Deltoid Row  15/20 15/30 15/30 nt        TBand Row   B 20 20 nt                                                                                                                                                       HEP Postural ed 15    10                                      Modalities  /4 2/11 2/18 2/27 3/20     Heat/estim 15 15 15 10 15 15 15 15                                   Assessment: Tolerated treatment well  Patient exhibited good technique with therapeutic exercises and would benefit from continued PT      Plan: Progress treatment as tolerated

## 2019-03-22 ENCOUNTER — OFFICE VISIT (OUTPATIENT)
Dept: CARDIOLOGY CLINIC | Facility: CLINIC | Age: 64
End: 2019-03-22
Payer: COMMERCIAL

## 2019-03-22 ENCOUNTER — OFFICE VISIT (OUTPATIENT)
Dept: PHYSICAL THERAPY | Age: 64
End: 2019-03-22
Payer: COMMERCIAL

## 2019-03-22 VITALS
BODY MASS INDEX: 32.61 KG/M2 | WEIGHT: 191 LBS | HEART RATE: 63 BPM | HEIGHT: 64 IN | OXYGEN SATURATION: 98 % | SYSTOLIC BLOOD PRESSURE: 122 MMHG | DIASTOLIC BLOOD PRESSURE: 78 MMHG

## 2019-03-22 DIAGNOSIS — I82.441 ACUTE DEEP VEIN THROMBOSIS (DVT) OF TIBIAL VEIN OF RIGHT LOWER EXTREMITY (HCC): Primary | ICD-10-CM

## 2019-03-22 DIAGNOSIS — M54.2 CERVICALGIA: Primary | ICD-10-CM

## 2019-03-22 DIAGNOSIS — R00.2 PALPITATIONS: ICD-10-CM

## 2019-03-22 DIAGNOSIS — E78.1 HYPERTRIGLYCERIDEMIA: ICD-10-CM

## 2019-03-22 PROCEDURE — 97014 ELECTRIC STIMULATION THERAPY: CPT | Performed by: PHYSICAL THERAPIST

## 2019-03-22 PROCEDURE — 99214 OFFICE O/P EST MOD 30 MIN: CPT | Performed by: INTERNAL MEDICINE

## 2019-03-22 PROCEDURE — 97110 THERAPEUTIC EXERCISES: CPT | Performed by: PHYSICAL THERAPIST

## 2019-03-22 PROCEDURE — 97140 MANUAL THERAPY 1/> REGIONS: CPT | Performed by: PHYSICAL THERAPIST

## 2019-03-22 NOTE — PROGRESS NOTES
Cardiology Consultation     Azalea Méndez  [id number]  1955  [facility]  [address] [place] [address]    C/C:  Follow-up of palpitations, shortness of breath, triglyceridemia, DVT      HPI: 59-YEAR-OLD FEMALE PATIENT WITH PAST MEDICAL HISTORY of DVT (2001),obesity,  Hypertriglyceridemia is here for follow-up  Patient was recently diagnosed with DVT and is currently on Xarelto  Patient continues to have palpitations occasionally especially when she is anxious  Shortness of breath has resolved  Serum triglycerides remain high  She has started physical therapy and is working on losing weight  She is also working on diet  Patient does not want to start statins at this time  Patient Active Problem List   Diagnosis    Arthralgia of hip, left    Pain in joint involving other specified sites    Arthritis of left knee    DVT of leg (deep venous thrombosis) ([facility] [address] )    Memory difficulty    Migraine, unspecified, not intractable, without status migrainosus    Mild intermittent asthma    Obesity (BMI 30 0-34  9)    Osteopenia of multiple sites    Prediabetes    Suprapubic abdominal pain    Chronic pain of right knee    Primary osteoarthritis of right knee    Effusion of right knee    Shortness of breath    Thyroid nodule    Pain and swelling of right ankle    Posterior tibial tendinitis of right lower extremity    History of deep venous thrombosis (DVT) of distal vein of right lower extremity     Past Medical History:   Diagnosis Date    Acid reflux     Allergic rhinitis     Last Assessed: 11/2/2017    Anesthesia complication     HYPOTENSION    Arthritis     Asthma     Bigeminy     history    DVT (deep venous thrombosis) ([facility] [address] ) 10/23/2018    DVT of leg (deep venous thrombosis) ([facility] [address] ) 2001    left    Female pelvic pain     Hyperlipidemia     Intermittent palpitations     Irregular heart beat     Migraines     Palpitations     Pes planus     unspecified laterality;  Last Assessed: 2014    Thyroid nodule     Trigeminy     history    Wears glasses      Social History     Socioeconomic History    Marital status: /Civil Union     Spouse name: Not on file    Number of children: Not on file    Years of education: Not on file    Highest education level: Not on file   Occupational History    Occupation: Pediatrics   Social Needs    Financial resource strain: Not on file    Food insecurity:     Worry: Not on file     Inability: Not on file    Transportation needs:     Medical: Not on file     Non-medical: Not on file   Tobacco Use    Smoking status: Former Smoker     Packs/day: 0 50     Types: Cigarettes     Last attempt to quit: 1978     Years since quittin 7    Smokeless tobacco: Never Used    Tobacco comment: as teenager   Substance and Sexual Activity    Alcohol use: Yes     Comment: socially    Drug use: No    Sexual activity: Not Currently   Lifestyle    Physical activity:     Days per week: Not on file     Minutes per session: Not on file    Stress: Not on file   Relationships    Social connections:     Talks on phone: Not on file     Gets together: Not on file     Attends Zoroastrianism service: Not on file     Active member of club or organization: Not on file     Attends meetings of clubs or organizations: Not on file     Relationship status: Not on file    Intimate partner violence:     Fear of current or ex partner: Not on file     Emotionally abused: Not on file     Physically abused: Not on file     Forced sexual activity: Not on file   Other Topics Concern    Not on file   Social History Narrative    Not on file      Family History   Problem Relation Age of Onset    Endometrial cancer Mother 76    Migraines Mother     Diabetes Father     Heart disease Father     Heart attack Father     Prostate cancer Father     Thyroid cancer Brother     Diabetes type II Brother     Growth hormone deficiency Daughter     Alzheimer's disease Maternal Aunt     Thyroid cancer Maternal Aunt     Breast cancer Maternal Aunt     Diabetes Paternal Aunt     Hashimoto's thyroiditis Paternal Aunt         Total Thyroidectomy    Other Maternal Uncle         Brain Tumor    Alzheimer's disease Maternal Uncle     Cancer Family      Past Surgical History:   Procedure Laterality Date    APPENDECTOMY      CATARACT EXTRACTION       SECTION      X 2    CHOLECYSTECTOMY      COLONOSCOPY      DILATION AND CURETTAGE OF UTERUS      JOINT REPLACEMENT      left TKR    KNEE SURGERY Left     X 3    PA ESOPHAGOGASTRODUODENOSCOPY TRANSORAL DIAGNOSTIC N/A 3/14/2016    Procedure: ESOPHAGOGASTRODUODENOSCOPY (EGD); Surgeon: Jeniffer Zayas MD;  Location: [facility]; Service: Gastroenterology    PA LAPAROSCOPY W TOT HYSTERECTUTERUS <=250 Cherylene Freud TUBE/OVARY Bilateral 2016    Procedure: HYSTERECTOMY LAPAROSCOPIC TOTAL ,BSO;  Surgeon: Isai Julien MD;  Location: [facility];  Service: Gynecology Oncology    REPLACEMENT TOTAL KNEE      US GUIDED BREAST BIOPSY LEFT COMPLETE Left 2019    WISDOM TOOTH EXTRACTION         Current Outpatient Medications:     cycloSPORINE (RESTASIS) 0 05 % ophthalmic emulsion, Apply 1 drop to eye 2 (two) times a day, Disp: , Rfl:     Ergocalciferol (VITAMIN D2) 2000 UNITS TABS, Take 2,000 Units by mouth daily  , Disp: , Rfl:     Evening Primrose Oil 1000 MG CAPS, Take 1 capsule by mouth daily, Disp: , Rfl:     ketorolac (ACULAR) 0 5 % ophthalmic solution, INSERT 1 DROP OPHTHALMICALLY 3 TIMES A DAY IN SURGICAL EYE START 3 DAYS PRIOR SURGERY THIS REPLACES, Disp: , Rfl: 2    loteprednol etabonate (LOTEMAX) 0 5 % ophthalmic suspension, Administer 1 drop to both eyes 3 (three) times a day as needed  , Disp: , Rfl:     Multiple Vitamin (MULTIVITAMIN) capsule, Take 1 capsule by mouth daily  , Disp: , Rfl:     pantoprazole (PROTONIX) 40 mg tablet, TAKE 1 TABLET TWICE DAILY, Disp: 60 tablet, Rfl: 1    prednisoLONE acetate (PRED FORTE) 1 % ophthalmic suspension, INSERT 1 DROP OPHTHALMICALLY daily  IN SURGICAL EYE, START AFTER SURGERY THIS REPLACES DUREZO, Disp: , Rfl: 1    rivaroxaban (XARELTO) 10 mg tablet, Take 1 tablet (10 mg total) by mouth daily, Disp: 30 tablet, Rfl: 11    albuterol (VENTOLIN HFA) 90 mcg/act inhaler, 1-2 puffs 4 times a day as needed, Disp: 18 Inhaler, Rfl: 0    ALPRAZolam (XANAX) 0 25 mg tablet, Take 1 tablet (0 25 mg total) by mouth 2 (two) times a day as needed for anxiety, Disp: 30 tablet, Rfl: 0    butalbital-acetaminophen-caffeine (FIORICET,ESGIC) -40 mg per tablet, Take 1 tablet by mouth every 4 (four) hours as needed for headaches (Patient not taking: Reported on 3/22/2019), Disp: 20 tablet, Rfl: 0    clobetasol (TEMOVATE) 0 05 % cream, Apply to affected area once daily prn (Patient not taking: Reported on 3/22/2019), Disp: 30 g, Rfl: 0    diazepam (VALIUM) 5 mg tablet, Take 1 tablet (5 mg total) by mouth every 8 (eight) hours as needed for anxiety, Disp: 60 tablet, Rfl: 0    diphenhydrAMINE (BENADRYL) 25 mg capsule, Take by mouth daily at bedtime as needed  , Disp: , Rfl:     fluticasone (FLONASE) 50 mcg/act nasal spray, 2 sprays into each nostril daily as needed  , Disp: , Rfl:     fluticasone (FLOVENT HFA) 220 mcg/act inhaler, Inhale 1 puff 2 (two) times a day (Patient not taking: Reported on 3/22/2019), Disp: 12 Inhaler, Rfl: 0    loratadine (CLARITIN) 10 mg tablet, Take 1 tablet (10 mg total) by mouth daily (Patient not taking: Reported on 3/22/2019), Disp: 30 tablet, Rfl: 0    Magnesium 200 MG TABS, Take 1 tablet by mouth daily, Disp: , Rfl:     methocarbamol (ROBAXIN) 750 mg tablet, Take 1 tablet (750 mg total) by mouth as needed for muscle spasms, Disp: 100 tablet, Rfl: 2    montelukast (SINGULAIR) 10 mg tablet, Take 1 tablet (10 mg total) by mouth daily at bedtime (Patient not taking: Reported on 3/22/2019), Disp: 30 tablet, Rfl: 0    ZOLMitriptan (ZOMIG) 5 MG tablet, TAKE 1 TABLET BY MOUTH DAILY AS NEEDED FOR HEADACHE, Disp: 8 tablet, Rfl: 0  Allergies   Allergen Reactions    Naproxen      Heart palpitations    Seasonal Ic [Cholestatin]      Vitals:    03/22/19 1326   BP: 122/78   BP Location: Left arm   Patient Position: Sitting   Cuff Size: Adult   Pulse: 63   SpO2: 98%   Weight: 86 6 kg (191 lb)   Height: 5' 4" (1 626 m)       Labs:  Hospital Outpatient Visit on 02/06/2019   Component Date Value    Case Report 02/06/2019                      Value:Surgical Pathology Report                         Case: [id number]                                   Authorizing Provider:  Florentino Polanco MD   Collected:           02/06/2019 0915              Ordering Location:     [address] Received:            02/06/2019 [address]                                                              Pathologist:           Kelsi Pemberton MD                                                   Specimen:    Breast, Left Lower Outer, US BX LEFT BREAST 400 4CFN 3 PASSES 12G MARQUEE                  Final Diagnosis 02/06/2019                      Value: This result contains rich text formatting which cannot be displayed here   Additional Information 02/06/2019                      Value: This result contains rich text formatting which cannot be displayed here  Bree Leisure Gross Description 02/06/2019                      Value: This result contains rich text formatting which cannot be displayed here      Clinical Information 02/06/2019                      Value:Fixed in formalin - [lease forward all positive results to nurse claudia yancey    Appointment on 02/01/2019   Component Date Value    D-Dimer, Quant 02/01/2019 748*    AntiThrombIN III Activity 02/01/2019 106     Anticardiolipin IgA 02/01/2019 <9     Anticardiolipin IgG 02/01/2019 <9     Anticardiolipin IgM 02/01/2019 21*    Factor V Leiden 02/01/2019 Comment     PTT Lupus Anticoagulant 02/01/2019 50 6     Dilute Viper Venom Time 02/01/2019 51 5*    DILUTE PROTHROMBIN TIME(* 02/01/2019 46 9     THROMBIN TIME (DRVW) 02/01/2019 17 7     DPT CONFIRM RATIO 02/01/2019 1 03     LUPUS REFLEX INTERPRETAT* 02/01/2019 Comment:     Protein C Activity 02/01/2019 138     Protein S Activity 02/01/2019 98     Protein S Ag, Total 02/01/2019 137     Protein S Ag, Free 02/01/2019 145     Prothrombin Mutation 02/01/2019 Comment     Beta-2 Glyco 1 IgG 02/01/2019 <9     Beta-2 Glyco 1 IgA 02/01/2019 <9     Beta-2 Glyco 1 IgM 02/01/2019 <9     dRVVT Mix Interp  02/01/2019 43 2      Imaging: No results found  Review of Systems:  Review of Systems   Constitutional: Negative for diaphoresis and fatigue  HENT: Negative for congestion and facial swelling  Eyes: Negative for photophobia and visual disturbance  Respiratory: Negative for chest tightness and shortness of breath  Cardiovascular: Positive for palpitations  Negative for chest pain and leg swelling  Gastrointestinal: Negative for abdominal pain and nausea  Endocrine: Negative for cold intolerance and heat intolerance  Musculoskeletal: Negative for arthralgias and myalgias  Skin: Negative for pallor and rash  Neurological: Negative for dizziness and tremors  Psychiatric/Behavioral: Negative for sleep disturbance  The patient is not nervous/anxious  Physical Exam:  Physical Exam   Constitutional: She is oriented to person, place, and time  She appears well-developed and well-nourished  HENT:   Head: Normocephalic and atraumatic  Eyes: Pupils are equal, round, and reactive to light  Conjunctivae and EOM are normal    Neck: Normal range of motion  Neck supple  No JVD present  No thyromegaly present     Cardiovascular: Normal rate, regular rhythm, S1 normal, S2 normal, normal heart sounds and intact distal pulses  Exam reveals no gallop and no friction rub  No murmur heard  Pulses:       Carotid pulses are 2+ on the right side, and 2+ on the left side  Pulmonary/Chest: Effort normal and breath sounds normal  No respiratory distress  She has no wheezes  She has no rales  Abdominal: Soft  Bowel sounds are normal  She exhibits no distension  There is no tenderness  There is no rebound and no guarding  Musculoskeletal: Normal range of motion  She exhibits no edema  Neurological: She is alert and oriented to person, place, and time  She has normal reflexes  No cranial nerve deficit  Skin: Skin is warm and dry  Psychiatric: She has a normal mood and affect  Discussion/Summary:  1  Shortness of breath  Resolved      2  Hypertriglyceridemia :  Patient wants to try diet and exercise 1st   Will recheck triglycerides in 3 months and if it remains high will consider adding statins       4  PVCs/palpitations:  Probably due to anxiety      5   DVT /left lower extremity edema:  Continue Xarelto  patient will continue to wear compression stockings      follow up with me in 6 months with lipid panel

## 2019-03-22 NOTE — PROGRESS NOTES
Daily Note     Today's date: 3/22/2019  Patient name: Zohaib Galan  : 1955  MRN: [id number]  Referring provider: Rhoderick Sandhoff, MD  Dx:   Encounter Diagnosis     ICD-10-CM    1  Cervicalgia M54 2        Start Time: 0700  Stop Time: 0800  Total time in clinic (min): 60 minutes    Subjective: Patient complains of a slight headache today which she attributes to stress  Objective: See treatment diary below    Increased muscle spasms in traps and para-cervicals today  ROM in cervical area is unchanged  Precautions: Standard, Left Knee TKR, DJD/OA  Daily Treatment Diary     Manual   2/4 2/11 2/18 2/27 3/20 3/22    MFR 10 10 10 10 10 10 10 10 10    MT 10 10 10 10 10 10 10 10 10    Postural stretching 5 5 5 5 5 5 5 5 5                                  Exercise Diary   2/4 2/11 2/18 2/27 3/20 3/22    Corner Pushup 15 15 15 15 nt        Cervical iso 20 20 20 20 20 20 20 20 20    Cervical ROM  5 5 5 5 5 5 5 5                 Deltoid Row  15/20 15/30 15/30 nt        TBand Row   B 20 20 nt                                                                                                                                                       HEP Postural ed 15    10                                      Modalities   2/4 2/11 2/18 2/27 3/20 3/22    Heat/estim 15 15 15 10 15 15 15 15 15                                         Assessment: Tolerated treatment well  Patient exhibited good technique with therapeutic exercises and would benefit from continued PT      Plan: Continue per plan of care  Progress treatment as tolerated

## 2019-03-27 ENCOUNTER — OFFICE VISIT (OUTPATIENT)
Dept: PHYSICAL THERAPY | Age: 64
End: 2019-03-27
Payer: COMMERCIAL

## 2019-03-27 DIAGNOSIS — M54.2 CERVICALGIA: Primary | ICD-10-CM

## 2019-03-27 PROCEDURE — 97140 MANUAL THERAPY 1/> REGIONS: CPT | Performed by: PHYSICAL THERAPIST

## 2019-03-27 PROCEDURE — 97014 ELECTRIC STIMULATION THERAPY: CPT | Performed by: PHYSICAL THERAPIST

## 2019-03-27 PROCEDURE — 97110 THERAPEUTIC EXERCISES: CPT | Performed by: PHYSICAL THERAPIST

## 2019-03-27 NOTE — PROGRESS NOTES
Daily Note     Today's date: 3/27/2019  Patient name: Claribel Salcedo  : 1955  MRN: [id number]  Referring provider: Naomi Templeton MD  Dx:   Encounter Diagnosis     ICD-10-CM    1  Cervicalgia M54 2        Start Time: 0700  Stop Time: 0800  Total time in clinic (min): 60 minutes    Subjective: Patient reports she had increased symptoms in both hands this morning and a slight headache  She took OTC meds today and felt better after treatment  Objective: See treatment diary below    Precautions: Standard, Left Knee TKR, DJD/OA  Daily Treatment Diary     Manual   2/4 2/11 2/18 2/27 3/20 3/22 3/27   MFR 10 10 10 10 10 10 10 10 10 10   MT 10 10 10 10 10 10 10 10 10 10   Postural stretching 5 5 5 5 5 5 5 5 5 5                                 Exercise Diary  /4 2/11 2/18 2/27 3/20 3/22 3/27   Corner Pushup 15 15 15 15 nt        Cervical iso 20 20 20 20 20 20 20 20 20 20   Cervical ROM  5 5 5 5 5 5 5 5 5                Deltoid Row  15/20 15/30 15/30 nt        TBand Row   B 20 20 nt                                                                                                                                                       HEP Postural ed 15    10     10                                 Modalities   2/4 2/11 2/18 2/27 3/20 3/22 3/27   Heat/estim 15 15 15 10 15 15 15 15 15 15                                     Assessment: Tolerated treatment well  Patient would benefit from continued PT      Plan: Continue per plan of care  Progress treatment as tolerated

## 2019-03-29 ENCOUNTER — OFFICE VISIT (OUTPATIENT)
Dept: PHYSICAL THERAPY | Age: 64
End: 2019-03-29
Payer: COMMERCIAL

## 2019-03-29 DIAGNOSIS — M54.2 CERVICALGIA: Primary | ICD-10-CM

## 2019-03-29 PROCEDURE — G8985 CARRY GOAL STATUS: HCPCS | Performed by: PHYSICAL THERAPIST

## 2019-03-29 PROCEDURE — G8986 CARRY D/C STATUS: HCPCS | Performed by: PHYSICAL THERAPIST

## 2019-03-29 PROCEDURE — 97113 AQUATIC THERAPY/EXERCISES: CPT | Performed by: PHYSICAL THERAPIST

## 2019-03-29 NOTE — PROGRESS NOTES
PT Discharge    Today's date: 3/29/2019  Patient name: Sandra Singh  : 1955  MRN: [id number]  Referring provider: Pb Bay MD  Dx:   Encounter Diagnosis     ICD-10-CM    1  Cervicalgia M54 2        Start Time: 930  Stop Time:   Total time in clinic (min): 60 minutes    Assessment  Assessment details: 69 y/o WF with the diagnosis of cervical pain  She reported increased cervical pain and spasm over the past several months which she attributes to increased computer work  She has a history of cervical DJD/OA in the cervical area  She was referred for PT  Patient had pain and spasms in bilateral traps and para-cervicals, decreased cervical ROM, weakness, and poor posture  Impairments: abnormal or restricted ROM, impaired physical strength, lacks appropriate home exercise program, pain with function, poor posture  and poor body mechanics  Understanding of Dx/Px/POC: excellent   Prognosis: good  She responded well to PT and had significant progress  Will discharge formal PT to a HEP/Pool Fitness program       Goals  Short Term Goals: 2-3 weeks    1  Decrease pain 2-3 points on pain scale in cervical area  - Met  2  Increase ROM by 5% in all cervical motions that are limited  - Met  3  Increase strength by  5-1 grade in all deficient areas  Met    Long Term Goals:  4-8 weeks    1  Increase functional ability to perform iadls and adls independently  Met  2  Return to previous level of activity without restrictions  - Met  3  Independent HEP- Met        Plan  Plan details:   Will discharge formal PT to a HEP/Pool Fitness program             Subjective Evaluation    Pain  Current pain ratin  At best pain ratin  At worst pain ratin  Quality: discomfort and throbbing  Relieving factors: change in position, medications and rest  Progression: resolved          Objective     Static Posture     Comments  Static Posture     Comments  Postural Observations  Seated posture: good  Standing posture: good          Active Range of Motion   Cervical/Thoracic Spine   Cervical    Flexion: WFL  Extension: 15 degrees   Left lateral flexion: 40 degrees   Right lateral flexion: 40 degrees   Left rotation: 55 degrees   Right rotation: 55 degrees     Strength/Myotome Testing   Cervical Spine   Neck extension: 4  Neck flexion: 4    Additional Strength Details  Bilateral UE strength 4-/5  Cervical core   4-/5        Precautions: Standard, Left Knee TKR, DJD/OA  Daily Treatment Diary     Exercise Diary  1/28 2/15 2/22 2/25 3/15 3/29       Water walking 10 10 10 10 10 10       Postural training 5 5   10        Gait training             Home exercise pgm/patient education   5   5       Wall: t/h raises 1 1 1 1 1 1       Hip abd/add 2 2 2 2 2 2       Marching 1 1 1 1 1 1       squats   1 1 1 1       Knee flex/ext 1 1 1 1 1 1       Step-ups (fwd/bkwd/ss)             SLS (eyes open/closed)             pec stretch 1' 1 1 1  1       Shoulder rolls/ shoulder pinches 2' 2 2 2  2       UE Noodle work x 4  4 4 4 4  4       UE AROM 1' 1 5 5  5       Resistive UE work (paddles, bells, TB) Rows red tband 1' 1 1 1  1       Core work on noodle (sitting/stdg)             Sit on noodle with movement             Seated on pool bench w proper posture             Ankle df/pf             marching             Hip Ab/add             Knee flex/ext             Deep water mvmt 5' bike 5 5 5  5       Deep water tx/stretching 10 10 10 10  10       Specific self - stretches wall/steps 3 3 3 3  3           Modalities  1/28 2/15 2/22 2/25 3/15 3/29       whirlpool 10 10 10 10 10 10               Flowsheet Rows      Most Recent Value   PT/OT G-Codes   Current Score  63   Projected Score  65   Assessment Type  Discharge   G code set  Carrying, Moving & Handling Objects   Carrying, Moving and Handling Objects Goal Status ()  CI   Carrying, Moving and Handling Objects Discharge Status ()  CI

## 2019-04-01 ENCOUNTER — ANNUAL EXAM (OUTPATIENT)
Dept: OBGYN CLINIC | Facility: CLINIC | Age: 64
End: 2019-04-01
Payer: COMMERCIAL

## 2019-04-01 VITALS
WEIGHT: 192.5 LBS | HEIGHT: 64 IN | DIASTOLIC BLOOD PRESSURE: 76 MMHG | BODY MASS INDEX: 32.87 KG/M2 | SYSTOLIC BLOOD PRESSURE: 108 MMHG

## 2019-04-01 DIAGNOSIS — Z12.31 ENCOUNTER FOR SCREENING MAMMOGRAM FOR MALIGNANT NEOPLASM OF BREAST: ICD-10-CM

## 2019-04-01 DIAGNOSIS — N76.2 ATROPHIC VULVITIS: ICD-10-CM

## 2019-04-01 DIAGNOSIS — M81.0 OSTEOPOROSIS, UNSPECIFIED OSTEOPOROSIS TYPE, UNSPECIFIED PATHOLOGICAL FRACTURE PRESENCE: ICD-10-CM

## 2019-04-01 DIAGNOSIS — Z01.419 ENCOUNTER FOR GYNECOLOGICAL EXAMINATION WITHOUT ABNORMAL FINDING: Primary | ICD-10-CM

## 2019-04-01 PROCEDURE — 99397 PER PM REEVAL EST PAT 65+ YR: CPT | Performed by: OBSTETRICS & GYNECOLOGY

## 2019-04-01 RX ORDER — CLOBETASOL PROPIONATE 0.5 MG/G
CREAM TOPICAL
Qty: 30 G | Refills: 0 | Status: SHIPPED | OUTPATIENT
Start: 2019-04-01 | End: 2019-06-21 | Stop reason: ALTCHOICE

## 2019-04-11 DIAGNOSIS — Z86.69 HX OF MIGRAINE HEADACHES: ICD-10-CM

## 2019-04-11 DIAGNOSIS — F41.9 ANXIETY: ICD-10-CM

## 2019-04-11 DIAGNOSIS — M62.838 MUSCLE SPASM: ICD-10-CM

## 2019-04-11 DIAGNOSIS — J45.21 MILD INTERMITTENT ASTHMA WITH ACUTE EXACERBATION: ICD-10-CM

## 2019-04-12 ENCOUNTER — OFFICE VISIT (OUTPATIENT)
Dept: URGENT CARE | Facility: CLINIC | Age: 64
End: 2019-04-12
Payer: COMMERCIAL

## 2019-04-12 VITALS
HEART RATE: 97 BPM | SYSTOLIC BLOOD PRESSURE: 141 MMHG | HEIGHT: 64 IN | TEMPERATURE: 98.7 F | DIASTOLIC BLOOD PRESSURE: 69 MMHG | BODY MASS INDEX: 32.1 KG/M2 | WEIGHT: 188 LBS

## 2019-04-12 DIAGNOSIS — J02.9 SORE THROAT: Primary | ICD-10-CM

## 2019-04-12 LAB — S PYO AG THROAT QL: NEGATIVE

## 2019-04-12 PROCEDURE — 99203 OFFICE O/P NEW LOW 30 MIN: CPT | Performed by: PHYSICIAN ASSISTANT

## 2019-04-12 PROCEDURE — 87070 CULTURE OTHR SPECIMN AEROBIC: CPT | Performed by: PHYSICIAN ASSISTANT

## 2019-04-12 PROCEDURE — S9088 SERVICES PROVIDED IN URGENT: HCPCS | Performed by: PHYSICIAN ASSISTANT

## 2019-04-12 RX ORDER — FLUTICASONE PROPIONATE 220 UG/1
1 AEROSOL, METERED RESPIRATORY (INHALATION) 2 TIMES DAILY
Qty: 12 INHALER | Refills: 2 | Status: SHIPPED | OUTPATIENT
Start: 2019-04-12 | End: 2020-04-27

## 2019-04-12 RX ORDER — ALPRAZOLAM 0.25 MG/1
0.25 TABLET ORAL 2 TIMES DAILY PRN
Qty: 30 TABLET | Refills: 0 | Status: SHIPPED | OUTPATIENT
Start: 2019-04-12 | End: 2019-08-02 | Stop reason: SDUPTHER

## 2019-04-12 RX ORDER — ZOLMITRIPTAN 5 MG/1
5 TABLET, FILM COATED ORAL ONCE AS NEEDED
Qty: 8 TABLET | Refills: 0 | Status: SHIPPED | OUTPATIENT
Start: 2019-04-12 | End: 2019-12-10 | Stop reason: SDUPTHER

## 2019-04-12 RX ORDER — DIAZEPAM 5 MG/1
5 TABLET ORAL EVERY 8 HOURS PRN
Qty: 60 TABLET | Refills: 0 | Status: CANCELLED | OUTPATIENT
Start: 2019-04-12

## 2019-04-12 RX ORDER — CEPHALEXIN 500 MG/1
500 CAPSULE ORAL EVERY 12 HOURS SCHEDULED
Qty: 20 CAPSULE | Refills: 0 | Status: SHIPPED | OUTPATIENT
Start: 2019-04-12 | End: 2019-04-22

## 2019-04-14 LAB — BACTERIA THROAT CULT: NORMAL

## 2019-04-20 DIAGNOSIS — I82.441 ACUTE DEEP VEIN THROMBOSIS (DVT) OF TIBIAL VEIN OF RIGHT LOWER EXTREMITY (HCC): ICD-10-CM

## 2019-05-05 DIAGNOSIS — M62.838 MUSCLE SPASM: ICD-10-CM

## 2019-05-05 RX ORDER — DIAZEPAM 5 MG/1
5 TABLET ORAL EVERY 8 HOURS PRN
Qty: 60 TABLET | Refills: 0 | Status: CANCELLED | OUTPATIENT
Start: 2019-05-05

## 2019-05-06 DIAGNOSIS — G89.29 CHRONIC LOW BACK PAIN, UNSPECIFIED BACK PAIN LATERALITY, WITH SCIATICA PRESENCE UNSPECIFIED: Primary | ICD-10-CM

## 2019-05-06 DIAGNOSIS — M62.838 MUSCLE SPASM: ICD-10-CM

## 2019-05-06 DIAGNOSIS — M54.5 CHRONIC LOW BACK PAIN, UNSPECIFIED BACK PAIN LATERALITY, WITH SCIATICA PRESENCE UNSPECIFIED: Primary | ICD-10-CM

## 2019-05-06 RX ORDER — DIAZEPAM 5 MG/1
5 TABLET ORAL EVERY 8 HOURS PRN
Qty: 60 TABLET | Refills: 0 | Status: SHIPPED | OUTPATIENT
Start: 2019-05-06 | End: 2020-07-16 | Stop reason: SDUPTHER

## 2019-05-06 RX ORDER — METAXALONE 800 MG/1
800 TABLET ORAL 3 TIMES DAILY
Qty: 90 TABLET | Refills: 0 | Status: CANCELLED | OUTPATIENT
Start: 2019-05-06 | End: 2019-06-05

## 2019-05-28 PROCEDURE — 88305 TISSUE EXAM BY PATHOLOGIST: CPT | Performed by: PATHOLOGY

## 2019-05-29 ENCOUNTER — LAB REQUISITION (OUTPATIENT)
Dept: LAB | Facility: HOSPITAL | Age: 64
End: 2019-05-29
Payer: COMMERCIAL

## 2019-05-29 DIAGNOSIS — D48.5 NEOPLASM OF UNCERTAIN BEHAVIOR OF SKIN: ICD-10-CM

## 2019-06-03 DIAGNOSIS — K21.9 GASTROESOPHAGEAL REFLUX DISEASE WITHOUT ESOPHAGITIS: ICD-10-CM

## 2019-06-03 RX ORDER — PANTOPRAZOLE SODIUM 40 MG/1
TABLET, DELAYED RELEASE ORAL
Qty: 180 TABLET | Refills: 1 | Status: SHIPPED | OUTPATIENT
Start: 2019-06-03 | End: 2019-10-22

## 2019-06-10 PROCEDURE — 88342 IMHCHEM/IMCYTCHM 1ST ANTB: CPT | Performed by: PATHOLOGY

## 2019-06-10 PROCEDURE — 88341 IMHCHEM/IMCYTCHM EA ADD ANTB: CPT | Performed by: PATHOLOGY

## 2019-06-10 PROCEDURE — 88305 TISSUE EXAM BY PATHOLOGIST: CPT | Performed by: PATHOLOGY

## 2019-06-11 ENCOUNTER — LAB REQUISITION (OUTPATIENT)
Dept: LAB | Facility: HOSPITAL | Age: 64
End: 2019-06-11
Payer: COMMERCIAL

## 2019-06-11 DIAGNOSIS — D48.5 NEOPLASM OF UNCERTAIN BEHAVIOR OF SKIN: ICD-10-CM

## 2019-06-13 RX ORDER — AZITHROMYCIN 250 MG/1
TABLET, FILM COATED ORAL
Refills: 0 | COMMUNITY
Start: 2019-06-01 | End: 2019-06-21 | Stop reason: ALTCHOICE

## 2019-06-13 RX ORDER — HYDROQUINONE 40 MG/G
CREAM TOPICAL
Refills: 0 | COMMUNITY
Start: 2019-05-30 | End: 2019-06-21 | Stop reason: ALTCHOICE

## 2019-06-21 ENCOUNTER — OFFICE VISIT (OUTPATIENT)
Dept: BARIATRICS | Facility: CLINIC | Age: 64
End: 2019-06-21
Payer: COMMERCIAL

## 2019-06-21 ENCOUNTER — APPOINTMENT (OUTPATIENT)
Dept: LAB | Facility: HOSPITAL | Age: 64
End: 2019-06-21
Payer: COMMERCIAL

## 2019-06-21 VITALS
BODY MASS INDEX: 32.71 KG/M2 | TEMPERATURE: 98.5 F | DIASTOLIC BLOOD PRESSURE: 76 MMHG | SYSTOLIC BLOOD PRESSURE: 102 MMHG | HEIGHT: 64 IN | WEIGHT: 191.6 LBS | HEART RATE: 80 BPM

## 2019-06-21 DIAGNOSIS — E66.9 OBESITY (BMI 30.0-34.9): ICD-10-CM

## 2019-06-21 DIAGNOSIS — E66.9 OBESITY (BMI 30.0-34.9): Primary | ICD-10-CM

## 2019-06-21 DIAGNOSIS — G43.009 MIGRAINE WITHOUT AURA AND WITHOUT STATUS MIGRAINOSUS, NOT INTRACTABLE: ICD-10-CM

## 2019-06-21 LAB
ALBUMIN SERPL BCP-MCNC: 3.7 G/DL (ref 3.5–5)
ALP SERPL-CCNC: 113 U/L (ref 46–116)
ALT SERPL W P-5'-P-CCNC: 42 U/L (ref 12–78)
ANION GAP SERPL CALCULATED.3IONS-SCNC: 8 MMOL/L (ref 4–13)
AST SERPL W P-5'-P-CCNC: 26 U/L (ref 5–45)
BILIRUB SERPL-MCNC: 0.3 MG/DL (ref 0.2–1)
BUN SERPL-MCNC: 14 MG/DL (ref 5–25)
CALCIUM SERPL-MCNC: 9.1 MG/DL (ref 8.3–10.1)
CHLORIDE SERPL-SCNC: 106 MMOL/L (ref 100–108)
CO2 SERPL-SCNC: 30 MMOL/L (ref 21–32)
CREAT SERPL-MCNC: 0.75 MG/DL (ref 0.6–1.3)
GFR SERPL CREATININE-BSD FRML MDRD: 84 ML/MIN/1.73SQ M
GLUCOSE SERPL-MCNC: 99 MG/DL (ref 65–140)
POTASSIUM SERPL-SCNC: 4.1 MMOL/L (ref 3.5–5.3)
PROT SERPL-MCNC: 7.4 G/DL (ref 6.4–8.2)
SODIUM SERPL-SCNC: 144 MMOL/L (ref 136–145)

## 2019-06-21 PROCEDURE — 36415 COLL VENOUS BLD VENIPUNCTURE: CPT

## 2019-06-21 PROCEDURE — 80053 COMPREHEN METABOLIC PANEL: CPT

## 2019-06-21 PROCEDURE — 99203 OFFICE O/P NEW LOW 30 MIN: CPT | Performed by: PHYSICIAN ASSISTANT

## 2019-06-24 ENCOUNTER — OFFICE VISIT (OUTPATIENT)
Dept: BARIATRICS | Facility: CLINIC | Age: 64
End: 2019-06-24

## 2019-06-24 DIAGNOSIS — R63.5 ABNORMAL WEIGHT GAIN: ICD-10-CM

## 2019-06-24 PROCEDURE — RECHECK

## 2019-06-24 PROCEDURE — VLCD

## 2019-07-02 ENCOUNTER — TELEPHONE (OUTPATIENT)
Dept: BARIATRICS | Facility: CLINIC | Age: 64
End: 2019-07-02

## 2019-07-03 ENCOUNTER — TELEPHONE (OUTPATIENT)
Dept: INTERNAL MEDICINE CLINIC | Facility: CLINIC | Age: 64
End: 2019-07-03

## 2019-07-03 ENCOUNTER — CLINICAL SUPPORT (OUTPATIENT)
Dept: INTERNAL MEDICINE CLINIC | Facility: CLINIC | Age: 64
End: 2019-07-03
Payer: COMMERCIAL

## 2019-07-03 DIAGNOSIS — Z23 ENCOUNTER FOR IMMUNIZATION: Primary | ICD-10-CM

## 2019-07-03 DIAGNOSIS — Z23 IMMUNIZATION DUE: Primary | ICD-10-CM

## 2019-07-03 PROCEDURE — 90471 IMMUNIZATION ADMIN: CPT

## 2019-07-03 PROCEDURE — 90632 HEPA VACCINE ADULT IM: CPT

## 2019-07-03 PROCEDURE — 90472 IMMUNIZATION ADMIN EACH ADD: CPT

## 2019-07-03 PROCEDURE — 90715 TDAP VACCINE 7 YRS/> IM: CPT

## 2019-07-03 NOTE — TELEPHONE ENCOUNTER
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

## 2019-07-03 NOTE — TELEPHONE ENCOUNTER
PER LINDSEY THIS CAN BE DONE AND SHE WILL BE ADDED TO NURSE SCHEDULE AND THEY WILL NOTIFY WHEN PT  ARRIVES BETWEEN 12-1

## 2019-07-10 ENCOUNTER — OFFICE VISIT (OUTPATIENT)
Dept: BARIATRICS | Facility: CLINIC | Age: 64
End: 2019-07-10

## 2019-07-10 VITALS
HEIGHT: 64 IN | SYSTOLIC BLOOD PRESSURE: 102 MMHG | BODY MASS INDEX: 31.65 KG/M2 | DIASTOLIC BLOOD PRESSURE: 60 MMHG | WEIGHT: 185.4 LBS

## 2019-07-10 DIAGNOSIS — E78.1 HYPERTRIGLYCERIDEMIA: ICD-10-CM

## 2019-07-10 DIAGNOSIS — R63.5 ABNORMAL WEIGHT GAIN: ICD-10-CM

## 2019-07-10 DIAGNOSIS — E66.9 OBESITY (BMI 30.0-34.9): Primary | ICD-10-CM

## 2019-07-10 PROCEDURE — VLCD: Performed by: DIETITIAN, REGISTERED

## 2019-07-10 PROCEDURE — RECHECK: Performed by: DIETITIAN, REGISTERED

## 2019-07-10 NOTE — PROGRESS NOTES
Weight Management Medical Nutrition Assessment   Toy Delaney presented for a [facility] follow-up session  Today's weight is 185 4#  She has lost 9 2# in the past 2 weeks  Tolerating meal replacements without difficulty  Consuming at least 80oz of water in addition to the the water used to mix replacements  Patient not experiencing constipation  We discussed her upcoming travel plans starting Aug 9th and how she would transition to a low calorie diet using partial meal replacements  Anthropometric Measurements  Start Weight (lbs): 194 6#  Current Weight (lbs): 185 4#  TBW % Change from start weight:5%  Ideal Body Weight (lbs):117 5#  Goal Weight (lbs):160#    Weight Loss History  Previous weight loss attempts: Counseling with  MD  Self Created Diets (Portion Control, Healthy Food Choices, etc )  [facility]     Food and Nutrition Related History    Food Recall  Breakfast:Shake   Snack:cheese stick   Lunch:Soup  Snack:bar  Dinner:Pudding   Snack:      Beverages: water  Volume of beverage intake: 80oz    Weekends: Same  Cravings: none reported  Trouble area of day:none reported    Frequency of Eating out: irregularly  Food restrictions:none reported  Cooking: self   Food Shopping: self    Physical Activity Intake  Activity:Swimming   Frequency:infrequently  Physical limitations/barriers to exercise: none reported    Estimated Needs  Energy  [employer] Needs: BMR : 1270 calories  1-2# loss weekly sedentary:  525-1025 calories             1-2# loss weekly lightly active: 747-1247 calories   Protein:64-80gm      (1 2-1 5g/kg IBW)  Fluid: 62oz     (35mL/kg IBW)    Nutrition Diagnosis  Yes; Overweight/obesity  related to Excess energy intake as evidenced by  BMI more than normative standard for age and sex (obesity-grade I 26-30  9)       Nutrition Intervention    Nutrition Prescription  Calories: 760 calories/ 43 net carbs using 3 meal replacements and 1 bar daily   Protein:96gm   Fluid:80oz    Meal Plan  [facility] Diet Meal Plan : 3 meal replacements and 1 bar    Nutrition Education:    Calorie controlled menu  Lean protein food choices  Healthy snack options  Food journaling tips      Nutrition Counseling:  Strategies: meal planning, portion sizes, healthy snack choices, hydration, fiber intake, protein intake, exercise, food journal      Monitoring and Evaluation:  Evaluation criteria:  Energy Intake  Meet protein needs  Maintain adequate hydration  Monitor weekly weight  Meal planning/preparation  Food journal   Decreased portions at mealtimes and snacks  Physical activity     Barriers to learning:none  Readiness to change: Action  Comprehension: very good  Expected Compliance: very good

## 2019-07-12 ENCOUNTER — TELEPHONE (OUTPATIENT)
Dept: HEMATOLOGY ONCOLOGY | Facility: CLINIC | Age: 64
End: 2019-07-12

## 2019-07-12 NOTE — TELEPHONE ENCOUNTER
Patient called stating that she will be traveling to [facility] and would like to know if she should she increase her dosage of Xarelto prior to the trip   Best call back [phone]

## 2019-07-12 NOTE — TELEPHONE ENCOUNTER
Called patient back and left voice message  Explained there is no benefit in increasing patients Xarelto and patient needs to take it as prescribe

## 2019-07-19 NOTE — PROGRESS NOTES
Weight Management Medical Nutrition Assessment   Tito Middleton presented for a 4 week VLCD follow-up session  Today's weight 183 6 lbs giving her a loss of 1 8 lbs in the past 2 weeks  She continues to consume 80 oz of water and is having no complaints of constipation  Unfortunately, she did go off of the plan reporting that she has pasta and a 4 oz meatball with sauce, and also at one point a canoli and did have additional snacks from the list   She will not be continuing with VLCD, and is going to continue as a partial meal plan having 2 meal replacements and for dinner have a lean protein and non-starchy vegetables  Reviewed my fitness pal  She is going on vacation on August 9th and reports that she would like to restart VLCD when she is back  Explained the protocol and reiterated that she woulc not be able to do that - she was not happy with that answer       Anthropometric Measurements  Start Weight (lbs): 194 6, lbs  Current Weight (lbs): 183 6 lbs  TBW % Change from start weight:  6%  Ideal Body Weight (lbs):117 5 lbs  Goal Weight (lbs):160 lbs     Weight Loss History  Previous weight loss attempts: Counseling with  MD  Self Created Diets (Portion Control, Healthy Food Choices, etc )  VLCD      Food and Nutrition Related History     Food Recall  Breakfast:Shake           Snack:cheese stick   Lunch:Soup  Snack:bar  Dinner:Pudding   Snack:        Beverages: water  Volume of beverage intake: 80oz     Weekends: Same  Cravings: none reported  Trouble area of day:none reported     Frequency of Eating out: irregularly  Food restrictions:none reported  Cooking: self   Food Shopping: self     Physical Activity Intake  Activity:Swimming   Frequency:infrequently  Physical limitations/barriers to exercise: none reported     Estimated Needs  Energy  [employer] Needs:  BMR : 1355 calories  1# loss weekly sedentary:  1126 calories             1-2# loss weekly lightly active: 863-1363 calories   Protein:64-80gm (1 2-1 5g/kg IBW)  Fluid: 62oz     (35mL/kg IBW)     Nutrition Diagnosis  Yes; Overweight/obesity  related to Excess energy intake as evidenced by  BMI more than normative standard for age and sex (obesity-grade I 26-30  9)     Nutrition Intervention     Nutrition Prescription  Calories: 1000 calories   Protein: 75 gm   Fluid:62oz     Meal Plan  2 meal replacements, a bar, a food snack (string cheese or hard boiled egg) and a lean protein with non-starchy vegetables       Nutrition Education:    Calorie controlled menu  Lean protein food choices  Healthy snack options  Food journaling tips        Nutrition Counseling:  Strategies: meal planning, portion sizes, healthy snack choices, hydration, fiber intake, protein intake, exercise, food journal        Monitoring and Evaluation:  Evaluation criteria:  Energy Intake  Meet protein needs  Maintain adequate hydration  Monitor weekly weight  Meal planning/preparation  Food journal   Decreased portions at mealtimes and snacks  Physical activity      Barriers to learning:none  Readiness to change: Action  Comprehension: very good  Expected Compliance: very good

## 2019-07-20 ENCOUNTER — APPOINTMENT (OUTPATIENT)
Dept: LAB | Facility: HOSPITAL | Age: 64
End: 2019-07-20
Payer: COMMERCIAL

## 2019-07-20 DIAGNOSIS — E78.1 HYPERTRIGLYCERIDEMIA: ICD-10-CM

## 2019-07-20 DIAGNOSIS — E66.9 OBESITY (BMI 30.0-34.9): ICD-10-CM

## 2019-07-20 LAB
ANION GAP SERPL CALCULATED.3IONS-SCNC: 7 MMOL/L (ref 4–13)
BUN SERPL-MCNC: 29 MG/DL (ref 5–25)
CALCIUM SERPL-MCNC: 9.4 MG/DL (ref 8.3–10.1)
CHLORIDE SERPL-SCNC: 103 MMOL/L (ref 100–108)
CO2 SERPL-SCNC: 30 MMOL/L (ref 21–32)
CREAT SERPL-MCNC: 0.67 MG/DL (ref 0.6–1.3)
GFR SERPL CREATININE-BSD FRML MDRD: 93 ML/MIN/1.73SQ M
GLUCOSE SERPL-MCNC: 105 MG/DL (ref 65–140)
MAGNESIUM SERPL-MCNC: 2.3 MG/DL (ref 1.6–2.6)
POTASSIUM SERPL-SCNC: 4.1 MMOL/L (ref 3.5–5.3)
SODIUM SERPL-SCNC: 140 MMOL/L (ref 136–145)

## 2019-07-20 PROCEDURE — 80048 BASIC METABOLIC PNL TOTAL CA: CPT

## 2019-07-20 PROCEDURE — 83735 ASSAY OF MAGNESIUM: CPT

## 2019-07-20 PROCEDURE — 36415 COLL VENOUS BLD VENIPUNCTURE: CPT

## 2019-07-21 DIAGNOSIS — R79.9 ELEVATED BUN: Primary | ICD-10-CM

## 2019-07-22 ENCOUNTER — OFFICE VISIT (OUTPATIENT)
Dept: BARIATRICS | Facility: CLINIC | Age: 64
End: 2019-07-22

## 2019-07-22 ENCOUNTER — TELEPHONE (OUTPATIENT)
Dept: BARIATRICS | Facility: CLINIC | Age: 64
End: 2019-07-22

## 2019-07-22 VITALS
HEIGHT: 64 IN | SYSTOLIC BLOOD PRESSURE: 106 MMHG | BODY MASS INDEX: 31.34 KG/M2 | WEIGHT: 183.6 LBS | DIASTOLIC BLOOD PRESSURE: 78 MMHG

## 2019-07-22 DIAGNOSIS — R63.5 ABNORMAL WEIGHT GAIN: ICD-10-CM

## 2019-07-22 DIAGNOSIS — E78.1 HYPERTRIGLYCERIDEMIA: Primary | ICD-10-CM

## 2019-07-22 PROCEDURE — RECHECK

## 2019-07-22 PROCEDURE — VLCD

## 2019-07-22 NOTE — TELEPHONE ENCOUNTER
----- Message from Tiburcio Huertas PA-C sent at 7/21/2019  8:18 AM EDT -----  Please call patient and inform her that her magnesium is within normal range  Her bmp is within normal range except for her BUN is elevated  This could be due to dehydration  Can continue vlcd but would like her BMP repeated in two weeks  Order in chart

## 2019-07-23 ENCOUNTER — TELEPHONE (OUTPATIENT)
Dept: BARIATRICS | Facility: CLINIC | Age: 64
End: 2019-07-23

## 2019-07-23 NOTE — TELEPHONE ENCOUNTER
Patient called into the office for lab results and LATRELL Bustos shared her lab results with her, explaining she could continue w/VLCD, but should get her BMP labs repeated in two weeks  ----- Message from Rossana Blevins PA-C sent at 7/21/2019  8:18 AM EDT -----  Please call patient and inform her that her magnesium is within normal range  Her bmp is within normal range except for her BUN is elevated  This could be due to dehydration  Can continue vlcd but would like her BMP repeated in two weeks  Order in chart

## 2019-08-02 DIAGNOSIS — F41.9 ANXIETY: ICD-10-CM

## 2019-08-02 RX ORDER — ALPRAZOLAM 0.25 MG/1
0.25 TABLET ORAL 2 TIMES DAILY PRN
Qty: 30 TABLET | Refills: 0 | Status: SHIPPED | OUTPATIENT
Start: 2019-08-02 | End: 2019-10-20

## 2019-08-03 ENCOUNTER — APPOINTMENT (OUTPATIENT)
Dept: LAB | Facility: HOSPITAL | Age: 64
End: 2019-08-03
Payer: COMMERCIAL

## 2019-08-03 DIAGNOSIS — R79.9 ELEVATED BUN: ICD-10-CM

## 2019-08-03 DIAGNOSIS — E78.1 HYPERTRIGLYCERIDEMIA: ICD-10-CM

## 2019-08-03 LAB
ANION GAP SERPL CALCULATED.3IONS-SCNC: 8 MMOL/L (ref 4–13)
BUN SERPL-MCNC: 27 MG/DL (ref 5–25)
CALCIUM SERPL-MCNC: 9.2 MG/DL (ref 8.3–10.1)
CHLORIDE SERPL-SCNC: 102 MMOL/L (ref 100–108)
CHOLEST SERPL-MCNC: 193 MG/DL (ref 50–200)
CO2 SERPL-SCNC: 29 MMOL/L (ref 21–32)
CREAT SERPL-MCNC: 0.6 MG/DL (ref 0.6–1.3)
GFR SERPL CREATININE-BSD FRML MDRD: 96 ML/MIN/1.73SQ M
GLUCOSE P FAST SERPL-MCNC: 82 MG/DL (ref 65–99)
HDLC SERPL-MCNC: 42 MG/DL (ref 40–60)
LDLC SERPL CALC-MCNC: 116 MG/DL (ref 0–100)
NONHDLC SERPL-MCNC: 151 MG/DL
POTASSIUM SERPL-SCNC: 4.1 MMOL/L (ref 3.5–5.3)
SODIUM SERPL-SCNC: 139 MMOL/L (ref 136–145)
TRIGL SERPL-MCNC: 177 MG/DL

## 2019-08-03 PROCEDURE — 80061 LIPID PANEL: CPT

## 2019-08-03 PROCEDURE — 80048 BASIC METABOLIC PNL TOTAL CA: CPT

## 2019-08-03 PROCEDURE — 36415 COLL VENOUS BLD VENIPUNCTURE: CPT

## 2019-08-05 ENCOUNTER — OFFICE VISIT (OUTPATIENT)
Dept: BARIATRICS | Facility: CLINIC | Age: 64
End: 2019-08-05

## 2019-08-05 ENCOUNTER — TELEPHONE (OUTPATIENT)
Dept: BARIATRICS | Facility: CLINIC | Age: 64
End: 2019-08-05

## 2019-08-05 VITALS — HEIGHT: 64 IN | WEIGHT: 182.2 LBS | BODY MASS INDEX: 31.1 KG/M2

## 2019-08-05 DIAGNOSIS — R63.5 ABNORMAL WEIGHT GAIN: ICD-10-CM

## 2019-08-05 PROCEDURE — WMDI30

## 2019-08-05 PROCEDURE — RECHECK

## 2019-08-05 NOTE — TELEPHONE ENCOUNTER
----- Message from Leoncio Estevez PA-C sent at 8/5/2019  8:14 AM EDT -----  Please call patient and inform her that her BUN is still elevated however it is lower than her last BMP  Her lipid panel shows elevated triglycerides and ldl  Recommend the patient follow a low fat, low cholesterol diet, limiting refined carbohydrates like white bread, white rice, white pasta, chips/pretzels, sweets/desserts, and sugary beverages  Increase fruits/vegetables  Increase exercise as tolerated

## 2019-08-05 NOTE — TELEPHONE ENCOUNTER
Patient returned my telephone call  I shared her lab results and LATRELL Bustos's recommendations  Patient verbalized understanding     ----- Message from Kevin Hopson PA-C sent at 8/5/2019  8:14 AM EDT -----  Please call patient and inform her that her BUN is still elevated however it is lower than her last BMP  Her lipid panel shows elevated triglycerides and ldl  Recommend the patient follow a low fat, low cholesterol diet, limiting refined carbohydrates like white bread, white rice, white pasta, chips/pretzels, sweets/desserts, and sugary beverages  Increase fruits/vegetables  Increase exercise as tolerated

## 2019-08-05 NOTE — PROGRESS NOTES
Weight Management Medical Nutrition Assessment   Ayesha presented for a meal planning session  Today's weight 182 2 lbs giving her a loss of 1 4 lbs in the past 2 weeks  She has been eating 2 meal replacements, a bar and a sensible dinner  She admits that she has not been drinking as much water as she was on VLCD, but is trying to consume at least 60 oz of water  We reviewed portion sizes which seem to be a little difficult for her sometimes  She is not currently exercising but plans to start going back to the gym  Moreno Diaz is going to [facility] on vacation for 2 1/2 weeks starting next week  She plans to take product with her so she can have 2 calorie controlled meals (breakfast and lunch) and she will have dinner out  We discussed looking ahead to see what she wants to eat so that she can make a healthier choice    She also plans on splitting entrees and desserts with her  so that can experience the local food without going overboard on calories       Anthropometric Measurements  Start Weight (lbs): 194 6, lbs  Current Weight (lbs): 182 2 lbs  TBW % Change from start weight:  6%  Ideal Body Weight (lbs):117 5 lbs  Goal Weight (lbs):160 lbs     Weight Loss History  Previous weight loss attempts: Counseling with  MD  Self Created Diets (Portion Control, Healthy Food Choices, etc )  VLCD      Food and Nutrition Related History     Food Recall  Breakfast:Shake           Snack:cheese stick   Lunch:Soup  Snack:bar  Dinner: chicken, mixed vegetables, small amount of potatoes  Snack:        Beverages: water  Volume of beverage intake: 60oz     Weekends: Same  Cravings: none reported  Trouble area of day:none reported     Frequency of Eating out: irregularly  Food restrictions:none reported  Cooking: self   Food Shopping: self     Physical Activity Intake  Activity:Swimming   Frequency:infrequently  Physical limitations/barriers to exercise: none reported     Estimated Needs  Energy  [employer] Needs: BMR : 8669  calories  1# loss weekly sedentary:  9986    calories             1-2# loss weekly lightly active: 854 -1354 calories   Protein:64-80gm      (1 2-1 5g/kg IBW)  Fluid: 62oz     (35mL/kg IBW)     Nutrition Diagnosis  Yes;    Overweight/obesity  related to Excess energy intake as evidenced by  BMI more than normative standard for age and sex (obesity-grade I 30-34  9)     Nutrition Intervention     Nutrition Prescription  Calories: 1000 calories   Protein: 75 gm   Fluid:62oz     Meal Plan  2 meal replacements, a bar, a food snack (string cheese or hard boiled egg) and a lean protein with non-starchy vegetables       Nutrition Education:    Calorie controlled menu  Lean protein food choices  Healthy snack options  Food journaling tips        Nutrition Counseling:  Strategies: meal planning, portion sizes, healthy snack choices, hydration, fiber intake, protein intake, exercise, food journal        Monitoring and Evaluation:  Evaluation criteria:  Energy Intake  Meet protein needs  Maintain adequate hydration  Monitor weekly weight  Meal planning/preparation  Food journal   Decreased portions at mealtimes and snacks  Physical activity      Barriers to learning:none  Readiness to change: Action  Comprehension: very good  Expected Compliance: very good

## 2019-08-26 ENCOUNTER — TELEPHONE (OUTPATIENT)
Dept: ENDOCRINOLOGY | Facility: CLINIC | Age: 64
End: 2019-08-26

## 2019-08-27 NOTE — TELEPHONE ENCOUNTER
She has a very small thyroid nodule-which has remained stable so she does not need any further follow-up unless anything changes  She can follow up with her primary care for from here onwards if she wants  Thyroid function tests should be checked on a yearly basis along with her fasting glucose and A1c    She can follow up with her primary care regarding that as well

## 2019-08-30 ENCOUNTER — OFFICE VISIT (OUTPATIENT)
Dept: BARIATRICS | Facility: CLINIC | Age: 64
End: 2019-08-30

## 2019-08-30 VITALS — BODY MASS INDEX: 30.93 KG/M2 | HEIGHT: 64 IN | WEIGHT: 181.2 LBS

## 2019-08-30 DIAGNOSIS — R63.5 ABNORMAL WEIGHT GAIN: ICD-10-CM

## 2019-08-30 PROCEDURE — RECHECK

## 2019-08-30 PROCEDURE — DB3PK

## 2019-08-30 NOTE — PROGRESS NOTES
Weight Management Medical Nutrition Assessment   Ayesha presented for 1 of 3 RD visit bundles  Today's weight 181 2 lbs giving her a loss of 1 lbs in the past 4 weeks  She just got back from a 2 1/2 week trip to [facility]  She walked everyday and would split entrees with her   She also took bars with her so that she could have a healthy snack during the day  Now that she is back, she plans to start tracking her food and going back to a partial meal plan  She will go back to having 2 meal replacements a bar, a low carb food snack, and then a sensible dinner (lean protein, vegetables and limited starches)     Anthropometric Measurements  Start Weight (lbs): 194 6, lbs  Current Weight (lbs): 181 2 lbs  TBW % Change from start weight:  7%  Ideal Body Weight (lbs):117 5 lbs  Goal Weight (lbs):160 lbs     Weight Loss History  Previous weight loss attempts: Counseling with  MD  Self Created Diets (Portion Control, Healthy Food Choices, etc )  VLCD      Food and Nutrition Related History     Food Recall  Breakfast: coffee, shake           Snack:cheese stick   Lunch: pudding  Snack: string cheese  Dinner: meatloaf, vegetables  Snack:        Beverages: water  Volume of beverage intake: 60 to 80 oz     Weekends: Same  Cravings: none reported  Trouble area of day:none reported     Frequency of Eating out: irregularly  Food restrictions:none reported  Cooking: self   Food Shopping: self     Physical Activity Intake  Activity:Swimming   Frequency:infrequently  Physical limitations/barriers to exercise: none reported     Estimated Needs  Energy  [employer] Energy Needs:  BMR : 7076  calories  1# loss weekly sedentary:  1853    calories             1-2# loss weekly lightly active: 848 -1348 calories  Protein:64-80gm      (1 2-1 5g/kg IBW)  Fluid: 62oz     (35mL/kg IBW)     Nutrition Diagnosis  Yes;    Overweight/obesity  related to Excess energy intake as evidenced by  BMI more than normative standard for age and sex (obesity-grade I 30-34  9)     Nutrition Intervention     Nutrition Prescription  Calories: 1000 calories   Protein: 75 gm   Fluid:62oz     Meal Plan  2 meal replacements, a bar, a food snack (string cheese or hard boiled egg) and a lean protein with non-starchy vegetables       Nutrition Education:    Calorie controlled menu  Lean protein food choices  Healthy snack options  Food journaling tips        Nutrition Counseling:  Strategies: meal planning, portion sizes, healthy snack choices, hydration, fiber intake, protein intake, exercise, food journal        Monitoring and Evaluation:  Evaluation criteria:  Energy Intake  Meet protein needs  Maintain adequate hydration  Monitor weekly weight  Meal planning/preparation  Food journal   Decreased portions at mealtimes and snacks  Physical activity      Barriers to learning:none  Readiness to change: Action  Comprehension: very good  Expected Compliance: very good

## 2019-09-11 ENCOUNTER — OFFICE VISIT (OUTPATIENT)
Dept: INTERNAL MEDICINE CLINIC | Facility: CLINIC | Age: 64
End: 2019-09-11
Payer: COMMERCIAL

## 2019-09-11 VITALS
HEIGHT: 64 IN | OXYGEN SATURATION: 99 % | BODY MASS INDEX: 31.21 KG/M2 | WEIGHT: 182.8 LBS | HEART RATE: 70 BPM | TEMPERATURE: 98.7 F | DIASTOLIC BLOOD PRESSURE: 68 MMHG | SYSTOLIC BLOOD PRESSURE: 110 MMHG

## 2019-09-11 DIAGNOSIS — J06.9 VIRAL UPPER RESPIRATORY TRACT INFECTION: Primary | ICD-10-CM

## 2019-09-11 DIAGNOSIS — M54.2 NECK PAIN: ICD-10-CM

## 2019-09-11 LAB — S PYO AG THROAT QL: NEGATIVE

## 2019-09-11 PROCEDURE — 87880 STREP A ASSAY W/OPTIC: CPT | Performed by: INTERNAL MEDICINE

## 2019-09-11 PROCEDURE — 1101F PT FALLS ASSESS-DOCD LE1/YR: CPT | Performed by: INTERNAL MEDICINE

## 2019-09-11 PROCEDURE — 99213 OFFICE O/P EST LOW 20 MIN: CPT | Performed by: INTERNAL MEDICINE

## 2019-09-11 RX ORDER — PROMETHAZINE HYDROCHLORIDE AND CODEINE PHOSPHATE 6.25; 1 MG/5ML; MG/5ML
5 SYRUP ORAL EVERY 4 HOURS PRN
Qty: 240 ML | Refills: 0 | Status: SHIPPED | OUTPATIENT
Start: 2019-09-11 | End: 2019-10-04 | Stop reason: SDUPTHER

## 2019-09-11 NOTE — PROGRESS NOTES
Assessment/Plan:  It appears to be a viral syndrome  Will use over-the-counter preparations  Phenergan with codeine as needed  Side effects explained  Return if not completely improved  1  Viral upper respiratory tract infection  promethazine-codeine (PHENERGAN WITH CODEINE) 6 25-10 mg/5 mL syrup    POCT rapid strepA   2  Neck pain  Ambulatory referral to Physical Therapy       Orders Placed This Encounter   Procedures    Ambulatory referral to Physical Therapy    POCT rapid strepA         Subjective:  Sore throat and rhinitis     Patient ID: Srinivas Keith is a 70 y o  female  HPI she has felt sick for a couple of days  Temperature to 99-,1/2  Positive aches positive chills some cough  Not much production  A little bit of wheezing  The following portions of the patient's history were reviewed and updated as appropriate:   She has a past medical history of Acid reflux, Allergic rhinitis, Anesthesia complication, Arthritis, Asthma, Bigeminy, DVT (deep venous thrombosis) ([facility] [address] ) (10/23/2018), DVT (deep venous thrombosis) ([facility] [address] ), DVT of leg (deep venous thrombosis) ([facility] [address] ) (), Female pelvic pain, Hyperlipidemia, Intermittent palpitations, Irregular heart beat, Migraines, Obesity (BMI 30 0-34 9), Palpitations, Pes planus, Thyroid nodule, Trigeminy, and Wears glasses  ,  does not have any pertinent problems on file  ,   has a past surgical history that includes Replacement total knee (); Appendectomy; Knee surgery (Left);  section; Joint replacement; Dilation and curettage of uterus; Colonoscopy; Gravelly tooth extraction; Cholecystectomy; pr laparoscopy w tot hysterectuterus <=250 gram  w tube/ovary (Bilateral, 2016); pr esophagogastroduodenoscopy transoral diagnostic (N/A, 3/14/2016); Cataract extraction; and US guided breast biopsy left complete (Left, 2019)  ,  family history includes Alzheimer's disease in her maternal aunt and maternal uncle; Breast cancer in her maternal aunt; Cancer in her family; Diabetes in her father and paternal aunt; Diabetes type II in her brother; Endometrial cancer (age of onset: 76) in her mother; Growth hormone deficiency in her daughter; Hashimoto's thyroiditis in her paternal aunt; Heart attack in her father; Heart disease in her father; Hypertension in her father and paternal aunt; Migraines in her mother; Other in her maternal uncle; Prostate cancer in her father; Stroke in her paternal uncle; Thyroid cancer in her brother and maternal aunt  ,   reports that she quit smoking about 41 years ago  Her smoking use included cigarettes  She smoked 0 50 packs per day  She has never used smokeless tobacco  She reports that she drinks alcohol  She reports that she does not use drugs  ,  is allergic to naproxen and seasonal ic [cholestatin]       Current Outpatient Medications:     albuterol (VENTOLIN HFA) 90 mcg/act inhaler, 1-2 puffs 4 times a day as needed, Disp: 18 Inhaler, Rfl: 0    ALPRAZolam (XANAX) 0 25 mg tablet, Take 1 tablet (0 25 mg total) by mouth 2 (two) times a day as needed for anxiety for up to 30 days, Disp: 30 tablet, Rfl: 0    butalbital-acetaminophen-caffeine (FIORICET,ESGIC) -40 mg per tablet, Take 1 tablet by mouth every 4 (four) hours as needed for headaches, Disp: 20 tablet, Rfl: 0    cycloSPORINE (RESTASIS) 0 05 % ophthalmic emulsion, Apply 1 drop to eye 2 (two) times a day, Disp: , Rfl:     diazepam (VALIUM) 5 mg tablet, Take 1 tablet (5 mg total) by mouth every 8 (eight) hours as needed for anxiety, Disp: 60 tablet, Rfl: 0    diphenhydrAMINE (BENADRYL) 25 mg capsule, Take by mouth daily at bedtime as needed  , Disp: , Rfl:     Evening Primrose Oil 1000 MG CAPS, Take 1 capsule by mouth daily, Disp: , Rfl:     fluticasone (FLONASE) 50 mcg/act nasal spray, 2 sprays into each nostril daily as needed  , Disp: , Rfl:     loratadine (CLARITIN) 10 mg tablet, Take 1 tablet (10 mg total) by mouth daily, Disp: 30 tablet, Rfl: 0   loteprednol etabonate (LOTEMAX) 0 5 % ophthalmic suspension, Administer 1 drop to both eyes 3 (three) times a day as needed  , Disp: , Rfl:     montelukast (SINGULAIR) 10 mg tablet, Take 1 tablet (10 mg total) by mouth daily at bedtime, Disp: 30 tablet, Rfl: 0    Multiple Vitamin (MULTIVITAMIN) capsule, Take 1 capsule by mouth daily  , Disp: , Rfl:     pantoprazole (PROTONIX) 40 mg tablet, PT  TO TAKE 2 TABS DAILY, Disp: 180 tablet, Rfl: 1    rivaroxaban (XARELTO) 10 mg tablet, Take 1 tablet (10 mg total) by mouth daily, Disp: 14 tablet, Rfl: 0    ZOLMitriptan (ZOMIG) 5 MG tablet, Take 1 tablet (5 mg total) by mouth once as needed for migraine for up to 1 dose, Disp: 8 tablet, Rfl: 0    fluticasone (FLOVENT HFA) 220 mcg/act inhaler, Inhale 1 puff 2 (two) times a day, Disp: 12 Inhaler, Rfl: 2    promethazine-codeine (PHENERGAN WITH CODEINE) 6 25-10 mg/5 mL syrup, Take 5 mL by mouth every 4 (four) hours as needed for cough, Disp: 240 mL, Rfl: 0    Review of Systems  some ear stuffiness  Decreased hearing  Positive sore throat  Positive cough  Occasional wheezing  No chest pain pressure squeezing or tightness  No GI complaints  Objective:  /68 (BP Location: Left arm, Patient Position: Sitting, Cuff Size: Standard)   Pulse 70   Temp 98 7 °F (37 1 °C)   Ht 5' 3 5" (1 613 m)   Wt 82 9 kg (182 lb 12 8 oz)   LMP  (LMP Unknown)   SpO2 99%   BMI 31 87 kg/m²      Physical Exam  temperature is 98 7°  Blood pressure is 110/68  Both tympanic membranes not visible  The left is visible and shows signs of fluid  No erythema  The right is blocked by cerumen  Posterior pharynx is hyperemic  Throat culture will be done if strep screen was negative  She had a strep rapid strep yesterday which was negative          Recent Results (from the past 1008 hour(s))   Basic metabolic panel    Collection Time: 08/03/19  8:28 AM   Result Value Ref Range    Sodium 139 136 - 145 mmol/L    Potassium 4 1 3 5 - 5 3 mmol/L    Chloride 102 100 - 108 mmol/L    CO2 29 21 - 32 mmol/L    ANION GAP 8 4 - 13 mmol/L    BUN 27 (H) 5 - 25 mg/dL    Creatinine 0 60 0 60 - 1 30 mg/dL    Glucose, Fasting 82 65 - 99 mg/dL    Calcium 9 2 8 3 - 10 1 mg/dL    eGFR 96 ml/min/1 73sq m   Lipid panel    Collection Time: 08/03/19  8:28 AM   Result Value Ref Range    Cholesterol 193 50 - 200 mg/dL    Triglycerides 177 (H) <=150 mg/dL    HDL, Direct 42 40 - 60 mg/dL    LDL Calculated 116 (H) 0 - 100 mg/dL    Non-HDL-Chol (CHOL-HDL) 151 mg/dl

## 2019-09-11 NOTE — LETTER
September 11, 2019     Patient: Hyacinth Patel   YOB: 1955   Date of Visit: 9/11/2019       To Whom it May Concern:    Hyacinth Patel is under my professional care  She was seen in my office on 9/11/2019  She may return to work on 09/17/2019  If you have any questions or concerns, please don't hesitate to call           Sincerely,          Harvey Morgan DO        CC: Hyacinth Patel

## 2019-09-16 ENCOUNTER — EVALUATION (OUTPATIENT)
Dept: PHYSICAL THERAPY | Age: 64
End: 2019-09-16
Payer: COMMERCIAL

## 2019-09-16 VITALS — DIASTOLIC BLOOD PRESSURE: 72 MMHG | SYSTOLIC BLOOD PRESSURE: 115 MMHG | HEART RATE: 68 BPM

## 2019-09-16 DIAGNOSIS — M54.2 NECK PAIN: ICD-10-CM

## 2019-09-16 DIAGNOSIS — M54.12 CERVICAL RADICULOPATHY: Primary | ICD-10-CM

## 2019-09-16 PROCEDURE — 97140 MANUAL THERAPY 1/> REGIONS: CPT | Performed by: PHYSICAL THERAPIST

## 2019-09-16 PROCEDURE — 97162 PT EVAL MOD COMPLEX 30 MIN: CPT | Performed by: PHYSICAL THERAPIST

## 2019-09-16 PROCEDURE — G8984 CARRY CURRENT STATUS: HCPCS | Performed by: PHYSICAL THERAPIST

## 2019-09-16 PROCEDURE — 97110 THERAPEUTIC EXERCISES: CPT | Performed by: PHYSICAL THERAPIST

## 2019-09-16 PROCEDURE — G8985 CARRY GOAL STATUS: HCPCS | Performed by: PHYSICAL THERAPIST

## 2019-09-16 PROCEDURE — 97014 ELECTRIC STIMULATION THERAPY: CPT | Performed by: PHYSICAL THERAPIST

## 2019-09-16 NOTE — PROGRESS NOTES
PT Evaluation     Today's date: 2019  Patient name: Zina Landry  : 1955  MRN: [id number]  Referring provider: Israel Gordon DO  Dx:   Encounter Diagnosis     ICD-10-CM    1  Cervical radiculopathy M54 12    2  Neck pain M54 2 Ambulatory referral to Physical Therapy       Start Time: 0600  Stop Time: 0715  Total time in clinic (min): 75 minutes    Assessment  Assessment details: Zina Landry is a 70 y o  female who presents with pain, decreased strength, decreased ROM and postural  dysfunction  Due to these impairments, Patient has difficulty performing a/iadls, recreational activities, work-related activities and engaging in social activities  Patient's clinical presentation is consistent with their referring diagnosis of Cervical Pain and muscle spasms  Patient would benefit from skilled physical therapy to address their aforementioned impairments, improve their level of function and to improve their overall quality of life  Impairments: abnormal or restricted ROM, activity intolerance, impaired physical strength, lacks appropriate home exercise program, pain with function, poor posture  and poor body mechanics  Understanding of Dx/Px/POC: excellent  Goals  ST-3 WEEKS  1  Decrease pain by 2 points on VAS at its worst in cervical area  2   Increase ROM by > 5 deg in all deficients planes in cervical area  3   Increase UE and cervical core strength by 1/2 MMT grade in all deficient planes  LT-6 WEEKS  1  Patient to be independent with a/iadls  2  Increase functional activities for leisure and home activities to previous LOF    3  Independent with HEP and/or fitness program     Plan  Patient would benefit from: skilled physical therapy  Planned modality interventions: cryotherapy, electrical stimulation/Russian stimulation, thermotherapy: hydrocollator packs and unattended electrical stimulation  Planned therapy interventions: activity modification, behavior modification, body mechanics training, aquatic therapy, flexibility, functional ROM exercises, home exercise program, IADL retraining, joint mobilization, manual therapy, neuromuscular re-education, patient education, postural training, strengthening, stretching, therapeutic activities and therapeutic exercise  Frequency: 2x week (2-3x week)  Duration in weeks: 12  Treatment plan discussed with: patient        Subjective Evaluation    History of Present Illness  Mechanism of injury: 69 y/o WF who reports increased symptoms in the cervical area radiating into both shoulders several months ago which she attributes to  issues at work, increased computer work and stress  She is now referred to PT  Recurrent probem    Quality of life: fair    Pain  Current pain ratin  At best pain rating: 3  At worst pain ratin  Quality: radiating, throbbing, discomfort and sharp  Relieving factors: rest, change in position and medications  Aggravating factors: overhead activity and lifting  Progression: worsening    Treatments  Previous treatment: physical therapy and medication  Current treatment: medication and physical therapy  Patient Goals  Patient goals for therapy: decreased pain, increased motion, increased strength, independence with ADLs/IADLs and return to sport/leisure activities          Objective     Postural Observations  Seated posture: poor  Standing posture: poor  Correction of posture: makes symptoms better    Additional Postural Observation Details  Forward head with rounded shoulders and a decreased cervical lordotic curve  Palpation   Left   Muscle spasm in the rhomboids, scalenes and upper trapezius  Tenderness of the cervical paraspinals, pectoralis major, pectoralis minor and sternocleidomastoid  Right   Muscle spasm in the rhomboids, scalenes and upper trapezius  Tenderness of the cervical paraspinals, pectoralis major, pectoralis minor and sternocleidomastoid       Neurological Testing     Sensation   Cervical/Thoracic   Left   Intact: light touch, pin prick and sharp/dull discrimination    Right   Intact: light touch, pin prick and sharp/dull discrimination    Active Range of Motion   Cervical/Thoracic Spine       Cervical    Flexion:  Restriction level: minimal  Extension:  Restriction level: moderate  Left lateral flexion:  Restriction level: moderate  Right lateral flexion:  Restriction level moderate  Left rotation:  Restriction level: moderate  Right rotation:  Restriction level: maximal    Strength/Myotome Testing   Cervical Spine   Neck extension: 3-  Neck flexion: 3-    Left   Neck lateral flexion (C3): 3-    Right   Neck lateral flexion (C3): 3-    Left Shoulder     Planes of Motion   Flexion: 3   Abduction: 3     Right Shoulder     Planes of Motion   Flexion: 3   Abduction: 3       Flowsheet Rows      Most Recent Value   PT/OT G-Codes   Current Score  41   Projected Score  55   Assessment Type  Evaluation   G code set  Carrying, Moving & Handling Objects   Carrying, Moving and Handling Objects Current Status ()  CK   Carrying, Moving and Handling Objects Goal Status ()  CJ             Precautions: DJD/OA, Migraines, TKR      Manual  9/16/ 2019            MT Cervical: S/O area 15            MFR 10            Arm Pulls 5                                          Exercise Diary  9/16/ 2019            Cervical ROM/Isometrics 15            Postural Ed  5                                                                                                                                                                                                               HEP Stretches/  Postural ex 5                                          Modalities  9/16/ 2019            HT with e/stim 15

## 2019-09-18 ENCOUNTER — OFFICE VISIT (OUTPATIENT)
Dept: PHYSICAL THERAPY | Age: 64
End: 2019-09-18
Payer: COMMERCIAL

## 2019-09-18 DIAGNOSIS — M54.12 CERVICAL RADICULOPATHY: Primary | ICD-10-CM

## 2019-09-18 PROCEDURE — 97110 THERAPEUTIC EXERCISES: CPT | Performed by: PHYSICAL THERAPIST

## 2019-09-18 PROCEDURE — 97140 MANUAL THERAPY 1/> REGIONS: CPT | Performed by: PHYSICAL THERAPIST

## 2019-09-18 PROCEDURE — 97014 ELECTRIC STIMULATION THERAPY: CPT | Performed by: PHYSICAL THERAPIST

## 2019-09-18 NOTE — PROGRESS NOTES
Daily Note     Today's date: 2019  Patient name: Duane Alvarez  : 1955  MRN: [id number]  Referring provider: Matt Dunne DO  Dx:   Encounter Diagnosis     ICD-10-CM    1  Cervical radiculopathy M54 12        Start Time: 0700  Stop Time: 0800  Total time in clinic (min): 60 minutes    Subjective: Patient complains of soreness in the cervical area from the stretching on first visit but she reports her ROM is much improved  Objective: See treatment diary below      Assessment: Tolerated treatment well  Patient would benefit from continued PT      Plan: Continue per plan of care  Progress treatment as tolerated         Precautions: DJD/OA, Migraines, TKR      Manual             MT Cervical: S/O area 15 10           MFR 10 10           Arm Pulls 5 5                                         Exercise Diary             Cervical ROM/Isometric 15 15           Postural Ed  5 5           Cervical AROM  10           Corner Push up  15                                                                                                                                                                                    HEP Stretches/  Postural ex 5                                          Modalities             HT with e/stim 15 15

## 2019-09-23 ENCOUNTER — OFFICE VISIT (OUTPATIENT)
Dept: PHYSICAL THERAPY | Age: 64
End: 2019-09-23
Payer: COMMERCIAL

## 2019-09-23 DIAGNOSIS — M54.12 CERVICAL RADICULOPATHY: Primary | ICD-10-CM

## 2019-09-23 PROCEDURE — 97140 MANUAL THERAPY 1/> REGIONS: CPT | Performed by: PHYSICAL THERAPIST

## 2019-09-23 PROCEDURE — 97014 ELECTRIC STIMULATION THERAPY: CPT | Performed by: PHYSICAL THERAPIST

## 2019-09-23 PROCEDURE — 97110 THERAPEUTIC EXERCISES: CPT | Performed by: PHYSICAL THERAPIST

## 2019-09-23 NOTE — PROGRESS NOTES
Daily Note     Today's date: 2019  Patient name: Ousmane Price  : 1955  MRN: [id number]  Referring provider: Bhanu Theodore DO  Dx:   Encounter Diagnosis     ICD-10-CM    1  Cervical radiculopathy M54 12        Start Time: 0700  Stop Time: 0800  Total time in clinic (min): 60 minutes    Subjective: Patient reports pain level is 5/10 in cervical area  Has seen a difference in ROM especially when driving  Objective: See treatment diary below    ROM has improved  WFLs in right side rotation and lateral bending and shows moderate deficit in left rotation  Assessment: Tolerated treatment well  Patient would benefit from continued PT      Plan: Continue per plan of care  Progress treatment as tolerated         Precautions: DJD/OA, Migraines, TKR      Manual            MT Cervical: S/O area 15 10 10          MFR 10 10 10          Arm Pulls 5 5 5                                        Exercise Diary            Cervical ROM/Isometric 15 15 15          Postural Ed  5 5 5          Cervical AROM  10 10          Corner Push up  15 15                                                                                                                                                                                   HEP Stretches/  Postural ex 5                                          Modalities            HT with e/stim 15 15 15

## 2019-09-27 ENCOUNTER — OFFICE VISIT (OUTPATIENT)
Dept: PHYSICAL THERAPY | Age: 64
End: 2019-09-27
Payer: COMMERCIAL

## 2019-09-27 DIAGNOSIS — M54.12 CERVICAL RADICULOPATHY: Primary | ICD-10-CM

## 2019-09-27 DIAGNOSIS — M54.2 NECK PAIN: ICD-10-CM

## 2019-09-27 PROCEDURE — 97110 THERAPEUTIC EXERCISES: CPT | Performed by: PHYSICAL THERAPIST

## 2019-09-27 PROCEDURE — 97014 ELECTRIC STIMULATION THERAPY: CPT | Performed by: PHYSICAL THERAPIST

## 2019-09-27 PROCEDURE — 97140 MANUAL THERAPY 1/> REGIONS: CPT | Performed by: PHYSICAL THERAPIST

## 2019-09-27 NOTE — PROGRESS NOTES
Daily Note     Today's date: 2019  Patient name: Talha Bradshaw  : 1955  MRN: [id number]  Referring provider: Haylie Knowles DO  Dx:   Encounter Diagnosis     ICD-10-CM    1  Cervical radiculopathy M54 12    2  Neck pain M54 2        Start Time: 0800  Stop Time: 8658  Total time in clinic (min): 50 minutes    Subjective: Complains of moderate neck and scapular pain with headache  Noted significant relief following last treatment  Reported decreased pain and decreased muscle tension post manual therapy today  Objective: See treatment diary below      Assessment: Tolerated treatment well  Patient would benefit from continued PT Responds well to manual therapy and stretching  Increased body and postural awareness  Verbal cues for breathing to promote relaxation  Plan: Continue per plan of care        Precautions: DJD/OA, Migraines, TKR      Manual           MT Cervical: S/O area 15 10 10 10         MFR 10 10 10 10         Arm Pulls 5 5 5 5                                       Exercise Diary           Cervical ROM/Isometric 15 15 15 15         Postural Ed  5 5 5 5         Cervical AROM  10 10 10         Corner Push up  15 15 15                                                                                                                                                                                  HEP Stretches/  Postural ex 5                                          Modalities           HT with e/stim 15 15 15 15

## 2019-09-29 ENCOUNTER — OFFICE VISIT (OUTPATIENT)
Dept: URGENT CARE | Facility: CLINIC | Age: 64
End: 2019-09-29
Payer: COMMERCIAL

## 2019-09-29 VITALS
DIASTOLIC BLOOD PRESSURE: 70 MMHG | BODY MASS INDEX: 30.73 KG/M2 | HEIGHT: 64 IN | RESPIRATION RATE: 18 BRPM | TEMPERATURE: 97.3 F | WEIGHT: 180 LBS | HEART RATE: 73 BPM | SYSTOLIC BLOOD PRESSURE: 112 MMHG | OXYGEN SATURATION: 98 %

## 2019-09-29 DIAGNOSIS — J02.9 SORE THROAT: Primary | ICD-10-CM

## 2019-09-29 LAB — S PYO AG THROAT QL: NEGATIVE

## 2019-09-29 PROCEDURE — 87070 CULTURE OTHR SPECIMN AEROBIC: CPT | Performed by: PHYSICIAN ASSISTANT

## 2019-09-29 PROCEDURE — S9088 SERVICES PROVIDED IN URGENT: HCPCS | Performed by: PHYSICIAN ASSISTANT

## 2019-09-29 PROCEDURE — 99213 OFFICE O/P EST LOW 20 MIN: CPT | Performed by: PHYSICIAN ASSISTANT

## 2019-09-29 PROCEDURE — 87880 STREP A ASSAY W/OPTIC: CPT | Performed by: PHYSICIAN ASSISTANT

## 2019-09-29 NOTE — PROGRESS NOTES
[facility]        NAME: Jeny Jones is a 70 y o  female  : 1955    MRN: [id number]  DATE: 2019  TIME: 10:00 AM    Assessment and Plan   Sore throat [J02 9]  1  Sore throat  POCT rapid strepA    Throat culture    Mononucleosis screen     Rapid strep negative; will follow up with throat culture  Will order Mono and follow up when results are available    Patient Instructions       Follow up with PCP in 3-5 days  Proceed to  ER if symptoms worsen  Chief Complaint     Chief Complaint   Patient presents with    Sore Throat     started           History of Present Illness       68-year-old female presents for evaluation sore throat  Patient states symptoms began approximately on   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  Patient states symptoms began with a sore throat, body aches and a fever  Patient states she was initially swabbed for strep and were negative  Patient states symptoms seem to improve until last night  States last night she had a low-grade fever, sore throat and body aches again  Patient has been using Ventolin more frequently for shortness of breath  Patient states the cough has improved  Denies recent travel  Review of Systems   Review of Systems   Constitutional: Negative for chills, fatigue and fever  HENT: Positive for sore throat  Negative for congestion, ear pain, sinus pain and trouble swallowing  Eyes: Negative for pain, discharge and redness  Respiratory: Negative for cough, chest tightness, shortness of breath and wheezing  Cardiovascular: Negative for chest pain, palpitations and leg swelling  Gastrointestinal: Negative for abdominal pain, diarrhea, nausea and vomiting  Musculoskeletal: Negative for arthralgias, joint swelling and myalgias  Skin: Negative for rash  Neurological: Negative for dizziness, weakness, numbness and headaches           Current Medications       Current Outpatient Medications:     albuterol (VENTOLIN HFA) 90 mcg/act inhaler, 1-2 puffs 4 times a day as needed, Disp: 18 Inhaler, Rfl: 0    butalbital-acetaminophen-caffeine (FIORICET,ESGIC) -40 mg per tablet, Take 1 tablet by mouth every 4 (four) hours as needed for headaches, Disp: 20 tablet, Rfl: 0    cycloSPORINE (RESTASIS) 0 05 % ophthalmic emulsion, Apply 1 drop to eye 2 (two) times a day, Disp: , Rfl:     diazepam (VALIUM) 5 mg tablet, Take 1 tablet (5 mg total) by mouth every 8 (eight) hours as needed for anxiety, Disp: 60 tablet, Rfl: 0    diphenhydrAMINE (BENADRYL) 25 mg capsule, Take by mouth daily at bedtime as needed  , Disp: , Rfl:     Evening Primrose Oil 1000 MG CAPS, Take 1 capsule by mouth daily, Disp: , Rfl:     fluticasone (FLOVENT HFA) 220 mcg/act inhaler, Inhale 1 puff 2 (two) times a day, Disp: 12 Inhaler, Rfl: 2    loratadine (CLARITIN) 10 mg tablet, Take 1 tablet (10 mg total) by mouth daily, Disp: 30 tablet, Rfl: 0    loteprednol etabonate (LOTEMAX) 0 5 % ophthalmic suspension, Administer 1 drop to both eyes 3 (three) times a day as needed  , Disp: , Rfl:     montelukast (SINGULAIR) 10 mg tablet, Take 1 tablet (10 mg total) by mouth daily at bedtime, Disp: 30 tablet, Rfl: 0    Multiple Vitamin (MULTIVITAMIN) capsule, Take 1 capsule by mouth daily  , Disp: , Rfl:     pantoprazole (PROTONIX) 40 mg tablet, PT  TO TAKE 2 TABS DAILY, Disp: 180 tablet, Rfl: 1    promethazine-codeine (PHENERGAN WITH CODEINE) 6 25-10 mg/5 mL syrup, Take 5 mL by mouth every 4 (four) hours as needed for cough, Disp: 240 mL, Rfl: 0    rivaroxaban (XARELTO) 10 mg tablet, Take 1 tablet (10 mg total) by mouth daily, Disp: 14 tablet, Rfl: 0    ZOLMitriptan (ZOMIG) 5 MG tablet, Take 1 tablet (5 mg total) by mouth once as needed for migraine for up to 1 dose, Disp: 8 tablet, Rfl: 0    ALPRAZolam (XANAX) 0 25 mg tablet, Take 1 tablet (0 25 mg total) by mouth 2 (two) times a day as needed for anxiety for up to 30 days, Disp: 30 tablet, Rfl: 0    fluticasone (FLONASE) 50 mcg/act nasal spray, 2 sprays into each nostril daily as needed  , Disp: , Rfl:     Current Allergies     Allergies as of 2019 - Reviewed 2019   Allergen Reaction Noted    Naproxen  2016    Seasonal ic [cholestatin]  2019            The following portions of the patient's history were reviewed and updated as appropriate: allergies, current medications, past family history, past medical history, past social history, past surgical history and problem list      Past Medical History:   Diagnosis Date    Acid reflux     Allergic rhinitis     Last Assessed: 2017    Anesthesia complication     HYPOTENSION    Arthritis     Asthma     Bigeminy     history    [facility]    [facility]     DVT of leg (deep venous thrombosis) ([facility] )     left    Female pelvic pain     Hyperlipidemia     Intermittent palpitations     Irregular heart beat     Migraines     Obesity (BMI 30 0-34  9)     Palpitations     Pes planus     unspecified laterality; Last Assessed: 2014    Thyroid nodule     Trigeminy     history    Wears glasses        Past Surgical History:   Procedure Laterality Date    APPENDECTOMY      CATARACT EXTRACTION       SECTION      X 2    CHOLECYSTECTOMY      COLONOSCOPY      DILATION AND CURETTAGE OF UTERUS      JOINT REPLACEMENT      left TKR    KNEE SURGERY Left     X 3    WY ESOPHAGOGASTRODUODENOSCOPY TRANSORAL DIAGNOSTIC N/A 3/14/2016    Procedure: ESOPHAGOGASTRODUODENOSCOPY (EGD); Surgeon: Gómez Perez MD;  Location: [facility];   Service: Gastroenterology    W[place] LAPAROSCOPY W TOT HYSTERECTUTERUS <=[address] TUBE/OVARY Bilateral 2016    Procedure: HYSTERECTOMY LAPAROSCOPIC TOTAL ,BSO;  Surgeon: Waleska Cooney MD;  Location: [facility];  Service: Gynecology Oncology    REPLACEMENT TOTAL KNEE      US GUIDED BREAST BIOPSY LEFT COMPLETE Left 2/6/2019    WISDOM TOOTH EXTRACTION         Family History   Problem Relation Age of Onset    Endometrial cancer Mother 76    Migraines Mother     Diabetes Father     Heart disease Father     Heart attack Father     Prostate cancer Father     Hypertension Father     Thyroid cancer Brother         medullary    Diabetes type II Brother     Growth hormone deficiency Daughter     Alzheimer's disease Maternal Aunt     Thyroid cancer Maternal Aunt     Breast cancer Maternal Aunt     Diabetes Paternal Aunt     Hashimoto's thyroiditis Paternal Aunt         Total Thyroidectomy    Hypertension Paternal Aunt     Other Maternal Uncle         Brain Tumor    Alzheimer's disease Maternal Uncle     Cancer Family     Stroke Paternal Uncle          Medications have been verified  Objective   /70 (BP Location: Left arm, Patient Position: Sitting)   Pulse 73   Temp (!) 97 3 °F (36 3 °C) (Tympanic)   Resp 18   Ht 5' 4" (1 626 m)   Wt 81 6 kg (180 lb)   LMP  (LMP Unknown)   SpO2 98%   BMI 30 90 kg/m²        Physical Exam     Physical Exam   Constitutional: She appears well-developed and well-nourished  HENT:   Head: Normocephalic  Right Ear: Hearing and tympanic membrane normal    Left Ear: Hearing and tympanic membrane normal    Nose: No mucosal edema  Mouth/Throat: Uvula is midline and mucous membranes are normal  Posterior oropharyngeal erythema present  Cardiovascular: Normal rate and regular rhythm  Pulmonary/Chest: Effort normal and breath sounds normal    Nursing note and vitals reviewed

## 2019-09-30 ENCOUNTER — OFFICE VISIT (OUTPATIENT)
Dept: PHYSICAL THERAPY | Age: 64
End: 2019-09-30
Payer: COMMERCIAL

## 2019-09-30 DIAGNOSIS — M54.12 CERVICAL RADICULOPATHY: Primary | ICD-10-CM

## 2019-09-30 PROCEDURE — 97140 MANUAL THERAPY 1/> REGIONS: CPT | Performed by: PHYSICAL THERAPIST

## 2019-09-30 PROCEDURE — 97014 ELECTRIC STIMULATION THERAPY: CPT | Performed by: PHYSICAL THERAPIST

## 2019-09-30 PROCEDURE — 97110 THERAPEUTIC EXERCISES: CPT | Performed by: PHYSICAL THERAPIST

## 2019-10-01 LAB — BACTERIA THROAT CULT: NORMAL

## 2019-10-02 DIAGNOSIS — J30.1 ALLERGIC RHINITIS DUE TO POLLEN, UNSPECIFIED SEASONALITY: Primary | ICD-10-CM

## 2019-10-02 RX ORDER — MONTELUKAST SODIUM 10 MG/1
TABLET ORAL
Qty: 90 TABLET | Refills: 0 | Status: SHIPPED | OUTPATIENT
Start: 2019-10-02 | End: 2019-10-25 | Stop reason: SDUPTHER

## 2019-10-03 DIAGNOSIS — J06.9 VIRAL UPPER RESPIRATORY TRACT INFECTION: ICD-10-CM

## 2019-10-04 ENCOUNTER — OFFICE VISIT (OUTPATIENT)
Dept: PHYSICAL THERAPY | Age: 64
End: 2019-10-04
Payer: COMMERCIAL

## 2019-10-04 DIAGNOSIS — J06.9 VIRAL UPPER RESPIRATORY TRACT INFECTION: ICD-10-CM

## 2019-10-04 DIAGNOSIS — M54.12 CERVICAL RADICULOPATHY: ICD-10-CM

## 2019-10-04 DIAGNOSIS — M54.2 NECK PAIN: Primary | ICD-10-CM

## 2019-10-04 PROCEDURE — 97110 THERAPEUTIC EXERCISES: CPT | Performed by: PHYSICAL THERAPIST

## 2019-10-04 PROCEDURE — 97140 MANUAL THERAPY 1/> REGIONS: CPT | Performed by: PHYSICAL THERAPIST

## 2019-10-04 PROCEDURE — 97014 ELECTRIC STIMULATION THERAPY: CPT | Performed by: PHYSICAL THERAPIST

## 2019-10-04 RX ORDER — PROMETHAZINE HYDROCHLORIDE AND CODEINE PHOSPHATE 6.25; 1 MG/5ML; MG/5ML
5 SYRUP ORAL EVERY 4 HOURS PRN
Qty: 240 ML | Refills: 0 | OUTPATIENT
Start: 2019-10-04

## 2019-10-04 RX ORDER — PROMETHAZINE HYDROCHLORIDE AND CODEINE PHOSPHATE 6.25; 1 MG/5ML; MG/5ML
5 SYRUP ORAL EVERY 4 HOURS PRN
Qty: 240 ML | Refills: 0 | Status: SHIPPED | OUTPATIENT
Start: 2019-10-04 | End: 2019-12-10 | Stop reason: CLARIF

## 2019-10-04 NOTE — PROGRESS NOTES
Daily Note     Today's date: 10/4/2019  Patient name: Baudilio Huertas  : 1955  MRN: [id number]  Referring provider: Rory Wu DO  Dx:   Encounter Diagnosis     ICD-10-CM    1  Neck pain M54 2    2  Cervical radiculopathy M54 12        Start Time: 1300  Stop Time: 1350  Total time in clinic (min): 50 minutes    Subjective: Patient complains of cervical tightness today  Objective: See treatment diary below      Assessment: Tolerated treatment well  Patient demonstrated fatigue post treatment, exhibited good technique with therapeutic exercises and would benefit from continued PT, hypertonicity noted to bilateral upper traps  Plan: Progress treatment as tolerated         Precautions: DJD/OA, Migraines, TKR      Manual   10/4       MT Cervical: S/O area 15 10 10 10 10 10         MFR 10 10 10 10 10 10         Arm Pulls 5 5 5 5 5 5                                     Exercise Diary   10/4       Cervical ROM/Isometric 15 15 15 15 15 15       Postural Ed  5 5 5 5 5 5       Cervical AROM  10 10 10 10 10       Corner Push up  15 15 15 15 15                                                                                                                                                                                HEP Stretches/  Postural ex 5                                          Modalities   10/4       HT with e/stim 15 15 15 15 15 15

## 2019-10-07 ENCOUNTER — TELEPHONE (OUTPATIENT)
Dept: INTERNAL MEDICINE CLINIC | Facility: CLINIC | Age: 64
End: 2019-10-07

## 2019-10-07 ENCOUNTER — OFFICE VISIT (OUTPATIENT)
Dept: PHYSICAL THERAPY | Age: 64
End: 2019-10-07
Payer: COMMERCIAL

## 2019-10-07 DIAGNOSIS — M54.12 CERVICAL RADICULOPATHY: Primary | ICD-10-CM

## 2019-10-07 PROCEDURE — 97140 MANUAL THERAPY 1/> REGIONS: CPT | Performed by: PHYSICAL THERAPIST

## 2019-10-07 PROCEDURE — 97014 ELECTRIC STIMULATION THERAPY: CPT | Performed by: PHYSICAL THERAPIST

## 2019-10-07 PROCEDURE — 97110 THERAPEUTIC EXERCISES: CPT | Performed by: PHYSICAL THERAPIST

## 2019-10-07 NOTE — PROGRESS NOTES
Daily Note     Today's date: 10/7/2019  Patient name: Elian Santos  : 1955  MRN: [id number]  Referring provider: Yuridia Seals DO  Dx:   Encounter Diagnosis     ICD-10-CM    1  Cervical radiculopathy M54 12        Start Time: 0710  Stop Time: 0800  Total time in clinic (min): 50 minutes    Subjective: Patient reports she still has a cough which is aggravating her cervical symptoms  She had a migraine headache over the weekend  Objective: See treatment diary below      Assessment: Tolerated treatment well  Patient would benefit from continued PT      Plan: Continue per plan of care  Progress treatment as tolerated         Precautions: DJD/OA, Migraines, TKR      Manual  9/16/ 2019 9/18 9/23 9/27 9/30 10/4 10/7      MT Cervical: S/O area 15 10 10 10 10 10   10      MFR 10 10 10 10 10 10   10      Arm Pulls 5 5 5 5 5 5 5                                    Exercise Diary  9/16/ 2019 9/18 9/25 9/27 9/30 10/4 10/7      Cervical ROM/Isometric 15 15 15 15 15 15 15      Postural Ed  5 5 5 5 5 5 5      Cervical AROM  10 10 10 10 10 10      Corner Push up  15 15 15 15 15 15                                                                                                                                                                               HEP Stretches/  Postural ex 5                                          Modalities  9/16/ 2019 9/18 9/23 9/27 9/30 10/4 10/7      HT with e/stim 15 15 15 15 15 15 15

## 2019-10-07 NOTE — TELEPHONE ENCOUNTER
Pt had an appointment today, she called and cancelled it and just requested that Dr Lenora Aparicio just give her a call at    [phone]

## 2019-10-09 ENCOUNTER — OFFICE VISIT (OUTPATIENT)
Dept: PHYSICAL THERAPY | Age: 64
End: 2019-10-09
Payer: COMMERCIAL

## 2019-10-09 DIAGNOSIS — M54.2 NECK PAIN: ICD-10-CM

## 2019-10-09 DIAGNOSIS — M54.12 CERVICAL RADICULOPATHY: Primary | ICD-10-CM

## 2019-10-09 PROCEDURE — 97140 MANUAL THERAPY 1/> REGIONS: CPT | Performed by: PHYSICAL THERAPIST

## 2019-10-09 PROCEDURE — 97014 ELECTRIC STIMULATION THERAPY: CPT | Performed by: PHYSICAL THERAPIST

## 2019-10-09 PROCEDURE — 97110 THERAPEUTIC EXERCISES: CPT | Performed by: PHYSICAL THERAPIST

## 2019-10-09 NOTE — PROGRESS NOTES
Daily Note     Today's date: 10/9/2019  Patient name: Kennedy Seip  : 1955  MRN: [id number]  Referring provider: Shauna Thompson DO  Dx:   Encounter Diagnosis     ICD-10-CM    1  Cervical radiculopathy M54 12    2  Neck pain M54 2        Start Time:   Stop Time:   Total time in clinic (min): 50 minutes    Subjective: Patient complains of increased  cervical soreness today after a busy work day  Objective: See treatment diary below      Assessment: Tolerated treatment well  Patient demonstrated fatigue post treatment, exhibited good technique with therapeutic exercises and would benefit from continued PT, still with upper trap and pectoralis muscle tenderness with forward head posture  Plan: Progress treatment as tolerated         Precautions: DJD/OA, Migraines, TKR      Manual  9/16/ 2019 9/18 9/23 9/27 9/30 10/4 10/7 10/9     MT Cervical: S/O area 15 10 10 10 10 10   10 10     MFR 10 10 10 10 10 10   10 10     Arm Pulls 5 5 5 5 5 5 5 5                                   Exercise Diary  9/16/ 2019 9/18 9/25 9/27 9/30 10/4 10/7 10/9     Cervical ROM/Isometric 15 15 15 15 15 15 15 15     Postural Ed  5 5 5 5 5 5 5 5     Cervical AROM  10 10 10 10 10 10 10     Corner Push up  15 15 15 15 15 15 15                                                                                                                                                                              HEP Stretches/  Postural ex 5                                          Modalities  9/16/ 2019 9/18 9/23 9/27 9/30 10/4 10/7 10/9     HT with e/stim 15 15 15 15 15 15 15 15

## 2019-10-11 ENCOUNTER — APPOINTMENT (OUTPATIENT)
Dept: PHYSICAL THERAPY | Age: 64
End: 2019-10-11
Payer: COMMERCIAL

## 2019-10-14 ENCOUNTER — OFFICE VISIT (OUTPATIENT)
Dept: PHYSICAL THERAPY | Age: 64
End: 2019-10-14
Payer: COMMERCIAL

## 2019-10-14 DIAGNOSIS — M54.12 CERVICAL RADICULOPATHY: Primary | ICD-10-CM

## 2019-10-14 PROCEDURE — 97110 THERAPEUTIC EXERCISES: CPT | Performed by: PHYSICAL THERAPIST

## 2019-10-14 PROCEDURE — 97140 MANUAL THERAPY 1/> REGIONS: CPT | Performed by: PHYSICAL THERAPIST

## 2019-10-14 PROCEDURE — 97014 ELECTRIC STIMULATION THERAPY: CPT | Performed by: PHYSICAL THERAPIST

## 2019-10-14 NOTE — PROGRESS NOTES
Daily Note     Today's date: 10/14/2019  Patient name: Alma Ross  : 1955  MRN: [id number]  Referring provider: Jose Collins DO  Dx:   Encounter Diagnosis     ICD-10-CM    1  Cervical radiculopathy M54 12        Start Time: 0600  Stop Time: 0650  Total time in clinic (min): 50 minutes    Subjective: Patient reports she had a severe migraine after last visit which lasted for several days  She reports she took extra migraine meds which decreased the intensity  Objective: See treatment diary below    ROM is unchanged in cervical area  Assessment: Tolerated treatment fair  Patient would benefit from continued PT      Plan: Continue per plan of care  Progress treatment as tolerated         Precautions: DJD/OA, Migraines, TKR      Manual  9/16/ 2019 9/18 9/23 9/27 9/30 10/4 10/7 10/9 10/14    MT Cervical: S/O area 15 10 10 10 10 10   10 10 10    MFR 10 10 10 10 10 10   10 10 10    Arm Pulls 5 5 5 5 5 5 5 5 5                                  Exercise Diary  9/16/ 2019 9/18 9/25 9/27 9/30 10/4 10/7 10/9 10/14    Cervical ROM/Isometric 15 15 15 15 15 15 15 15 15    Postural Ed  5 5 5 5 5 5 5 5 5    Cervical AROM  10 10 10 10 10 10 10 10    Corner Push up  15 15 15 15 15 15 15 15                                                                                                                                                                             HEP Stretches/  Postural ex 5                                          Modalities  9/16/ 2019 9/18 9/23 9/27 9/30 10/4 10/7 10/9 10/14    HT with e/stim 15 15 15 15 15 15 15 15 15

## 2019-10-18 ENCOUNTER — OFFICE VISIT (OUTPATIENT)
Dept: PHYSICAL THERAPY | Age: 64
End: 2019-10-18
Payer: COMMERCIAL

## 2019-10-18 DIAGNOSIS — M54.12 CERVICAL RADICULOPATHY: Primary | ICD-10-CM

## 2019-10-18 DIAGNOSIS — M54.2 NECK PAIN: ICD-10-CM

## 2019-10-18 PROCEDURE — 97110 THERAPEUTIC EXERCISES: CPT | Performed by: PHYSICAL THERAPIST

## 2019-10-18 PROCEDURE — 97140 MANUAL THERAPY 1/> REGIONS: CPT | Performed by: PHYSICAL THERAPIST

## 2019-10-18 PROCEDURE — 97014 ELECTRIC STIMULATION THERAPY: CPT | Performed by: PHYSICAL THERAPIST

## 2019-10-18 NOTE — PROGRESS NOTES
PT Re-Evaluation     Today's date: 10/18/2019  Patient name: Tom Corona  : 1955  MRN: [id number]  Referring provider: Sanju Parsons DO  Dx:   Encounter Diagnosis     ICD-10-CM    1  Cervical radiculopathy M54 12    2  Neck pain M54 2        Start Time: 0800  Stop Time: 6503  Total time in clinic (min): 55 minutes    Assessment  Assessment details: Tom Corona is a 70 y o  female who presents with pain, decreased strength, decreased ROM and postural  dysfunction  Due to these impairments, Patient has difficulty performing a/iadls, recreational activities, work-related activities and engaging in social activities  Patient's clinical presentation is consistent with their referring diagnosis of Cervical Pain and muscle spasms  Patient has been seen for 10 visits to date with good response to treatment  She shows gains in AROM and postural awareness and strength  She could benefit from additional PT to address goals as outlined Patient would benefit from continued  skilled physical therapy to address their aforementioned impairments, improve their level of function and to improve their overall quality of life  Impairments: abnormal or restricted ROM, activity intolerance, impaired physical strength, lacks appropriate home exercise program, pain with function, poor posture  and poor body mechanics  Understanding of Dx/Px/POC: excellent   Prognosis: good    Goals  ST-3 WEEKS  1  Decrease pain by 2 points on VAS at its worst in cervical area  - met  2  Increase ROM by > 5 deg in all deficients planes in cervical area  - met  3  Increase UE and cervical core strength by 1/2 MMT grade in all deficient planes  -met    LT-6 WEEKS - progressing towards  1  Patient to be independent with a/iadls  2  Increase functional activities for leisure and home activities to previous LOF    3  Independent with HEP and/or fitness program     New Goals  2-3 weeks  1  2/10 or less pain cervical area  2  Increased positional tolerance for sitting and computer work  3  4/5 UE and cervical strength    LTG  6 weeks  1  Discharge planning to fitness and HEP    Plan  Patient would benefit from: skilled physical therapy  Planned modality interventions: cryotherapy, electrical stimulation/Russian stimulation, thermotherapy: hydrocollator packs and unattended electrical stimulation  Planned therapy interventions: activity modification, aquatic therapy, home exercise program, manual therapy, neuromuscular re-education, patient education, postural training, therapeutic activities, therapeutic exercise and body mechanics training  Frequency: 2x week (2-3x week)  Duration in weeks: 8  Plan of Care beginning date: 10/18/2019  Plan of Care expiration date: 2019  Treatment plan discussed with: patient        Subjective Evaluation    History of Present Illness  Mechanism of injury: 69 y/o WF who reports increased symptoms in the cervical area radiating into both shoulders several months ago which she attributes to  issues at work, increased computer work and stress  Patient has been seen in PT for 10 visits to date  Notes overall improving with decreased pain and tightness post treatments and increased mobility and postural awareness  Continues to deal with stress at work and variable neck pain            Recurrent probem    Quality of life: fair    Pain  Current pain ratin  At best pain rating: 3  At worst pain ratin  Quality: radiating, throbbing, discomfort and sharp  Relieving factors: rest, change in position and medications  Aggravating factors: overhead activity and lifting  Progression: worsening    Social Support  Lives with: spouse    Hand dominance: right    Treatments  Previous treatment: physical therapy and medication  Current treatment: medication and physical therapy  Patient Goals  Patient goals for therapy: decreased pain, increased motion, increased strength, independence with ADLs/IADLs and return to sport/leisure activities          Objective     Postural Observations  Seated posture: fair  Standing posture: fair  Correction of posture: makes symptoms better    Additional Postural Observation Details  Forward head with rounded shoulders and a decreased cervical lordotic curve  Increased postural awareness    Palpation   Left   Muscle spasm in the rhomboids, scalenes and upper trapezius  Tenderness of the cervical paraspinals, pectoralis major, pectoralis minor and sternocleidomastoid  Right   Muscle spasm in the rhomboids, scalenes and upper trapezius  Tenderness of the cervical paraspinals, pectoralis major, pectoralis minor and sternocleidomastoid       Neurological Testing     Sensation   Cervical/Thoracic   Left   Intact: light touch, pin prick and sharp/dull discrimination    Right   Intact: light touch, pin prick and sharp/dull discrimination    Active Range of Motion   Cervical/Thoracic Spine       Cervical    Flexion:  Restriction level: minimal  Extension:  Restriction level: moderate  Left lateral flexion:  Restriction level: moderate  Right lateral flexion:  Restriction level moderate  Left rotation:  Restriction level: minimal  Right rotation:  Restriction level: moderate    Strength/Myotome Testing   Cervical Spine   Neck extension: 3+  Neck flexion: 3+    Left   Neck lateral flexion (C3): 3+    Right   Neck lateral flexion (C3): 3+    Left Shoulder     Planes of Motion   Flexion: 3+   Abduction: 3+     Right Shoulder     Planes of Motion   Flexion: 3+   Abduction: 3+       Flowsheet Rows      Most Recent Value   PT/OT G-Codes   Current Score  53   Projected Score  55               Precautions: DJD/OA, Migraines, TKR      Manual  9/16/ 2019 9/18 9/23 9/27 9/30 10/4 10/7 10/9 10/14 10/18   MT Cervical: S/O area 15 10 10 10 10 10   10 10 10 10   MFR 10 10 10 10 10 10   10 10 10 10   Arm Pulls 5 5 5 5 5 5 5 5 5 5                                 Exercise Diary  9/16/ 2019 9/18 9/25 9/27 9/30 10/4 10/7 10/9 10/14 10/18   Cervical ROM/Isometric 15 15 15 15 15 15 15 15 15 15   Postural Ed  5 5 5 5 5 5 5 5 5 5   Cervical AROM  10 10 10 10 10 10 10 10 10   Corner Push up  15 15 15 15 15 15 15 15 15                                                                                                                                                                            HEP Stretches/  Postural ex 5                                          Modalities  9/16/ 2019 9/18 9/23 9/27 9/30 10/4 10/7 10/9 10/14 10/18   HT with e/stim 15 15 15 15 15 15 15 15 15 15

## 2019-10-20 ENCOUNTER — HOSPITAL ENCOUNTER (EMERGENCY)
Facility: HOSPITAL | Age: 64
Discharge: HOME | End: 2019-10-20
Attending: EMERGENCY MEDICINE
Payer: COMMERCIAL

## 2019-10-20 ENCOUNTER — APPOINTMENT (EMERGENCY)
Dept: RADIOLOGY | Facility: HOSPITAL | Age: 64
End: 2019-10-20
Payer: COMMERCIAL

## 2019-10-20 VITALS
HEIGHT: 64 IN | OXYGEN SATURATION: 96 % | SYSTOLIC BLOOD PRESSURE: 130 MMHG | RESPIRATION RATE: 20 BRPM | HEART RATE: 72 BPM | DIASTOLIC BLOOD PRESSURE: 62 MMHG | WEIGHT: 187.39 LBS | TEMPERATURE: 97.6 F | BODY MASS INDEX: 31.99 KG/M2

## 2019-10-20 DIAGNOSIS — R07.9 CHEST PAIN: Primary | ICD-10-CM

## 2019-10-20 LAB
ALBUMIN SERPL BCP-MCNC: 3.7 G/DL (ref 3.5–5)
ALP SERPL-CCNC: 117 U/L (ref 46–116)
ALT SERPL W P-5'-P-CCNC: 29 U/L (ref 12–78)
ANION GAP SERPL CALCULATED.3IONS-SCNC: 8 MMOL/L (ref 4–13)
AST SERPL W P-5'-P-CCNC: 33 U/L (ref 5–45)
BASOPHILS # BLD AUTO: 0.06 THOUSANDS/ΜL (ref 0–0.1)
BASOPHILS NFR BLD AUTO: 1 % (ref 0–1)
BILIRUB SERPL-MCNC: 0.3 MG/DL (ref 0.2–1)
BUN SERPL-MCNC: 15 MG/DL (ref 5–25)
CALCIUM SERPL-MCNC: 8.9 MG/DL (ref 8.3–10.1)
CHLORIDE SERPL-SCNC: 104 MMOL/L (ref 100–108)
CO2 SERPL-SCNC: 30 MMOL/L (ref 21–32)
CREAT SERPL-MCNC: 0.69 MG/DL (ref 0.6–1.3)
EOSINOPHIL # BLD AUTO: 0.2 THOUSAND/ΜL (ref 0–0.61)
EOSINOPHIL NFR BLD AUTO: 2 % (ref 0–6)
ERYTHROCYTE [DISTWIDTH] IN BLOOD BY AUTOMATED COUNT: 14.4 % (ref 11.6–15.1)
GFR SERPL CREATININE-BSD FRML MDRD: 92 ML/MIN/1.73SQ M
GLUCOSE SERPL-MCNC: 99 MG/DL (ref 65–140)
HCT VFR BLD AUTO: 42.8 % (ref 34.8–46.1)
HGB BLD-MCNC: 13.6 G/DL (ref 11.5–15.4)
IMM GRANULOCYTES # BLD AUTO: 0.04 THOUSAND/UL (ref 0–0.2)
IMM GRANULOCYTES NFR BLD AUTO: 0 % (ref 0–2)
LYMPHOCYTES # BLD AUTO: 2.77 THOUSANDS/ΜL (ref 0.6–4.47)
LYMPHOCYTES NFR BLD AUTO: 31 % (ref 14–44)
MCH RBC QN AUTO: 26.8 PG (ref 26.8–34.3)
MCHC RBC AUTO-ENTMCNC: 31.8 G/DL (ref 31.4–37.4)
MCV RBC AUTO: 84 FL (ref 82–98)
MONOCYTES # BLD AUTO: 0.7 THOUSAND/ΜL (ref 0.17–1.22)
MONOCYTES NFR BLD AUTO: 8 % (ref 4–12)
NEUTROPHILS # BLD AUTO: 5.18 THOUSANDS/ΜL (ref 1.85–7.62)
NEUTS SEG NFR BLD AUTO: 58 % (ref 43–75)
NRBC BLD AUTO-RTO: 0 /100 WBCS
PLATELET # BLD AUTO: 282 THOUSANDS/UL (ref 149–390)
PMV BLD AUTO: 9.3 FL (ref 8.9–12.7)
POTASSIUM SERPL-SCNC: 4.6 MMOL/L (ref 3.5–5.3)
PROT SERPL-MCNC: 7.5 G/DL (ref 6.4–8.2)
RBC # BLD AUTO: 5.08 MILLION/UL (ref 3.81–5.12)
SODIUM SERPL-SCNC: 142 MMOL/L (ref 136–145)
TROPONIN I SERPL-MCNC: <0.02 NG/ML
WBC # BLD AUTO: 8.95 THOUSAND/UL (ref 4.31–10.16)

## 2019-10-20 PROCEDURE — 85025 COMPLETE CBC W/AUTO DIFF WBC: CPT | Performed by: EMERGENCY MEDICINE

## 2019-10-20 PROCEDURE — 93005 ELECTROCARDIOGRAM TRACING: CPT

## 2019-10-20 PROCEDURE — 36415 COLL VENOUS BLD VENIPUNCTURE: CPT

## 2019-10-20 PROCEDURE — 99285 EMERGENCY DEPT VISIT HI MDM: CPT

## 2019-10-20 PROCEDURE — 80053 COMPREHEN METABOLIC PANEL: CPT | Performed by: EMERGENCY MEDICINE

## 2019-10-20 PROCEDURE — 84484 ASSAY OF TROPONIN QUANT: CPT | Performed by: EMERGENCY MEDICINE

## 2019-10-20 PROCEDURE — 99285 EMERGENCY DEPT VISIT HI MDM: CPT | Performed by: PHYSICIAN ASSISTANT

## 2019-10-20 PROCEDURE — 71046 X-RAY EXAM CHEST 2 VIEWS: CPT

## 2019-10-20 NOTE — ED PROVIDER NOTES
History  Chief Complaint   Patient presents with    Chest Pain     Pt c/o cp that begun 30 minutes ago  This is a 77-year-old female patient presenting for evaluation chest pain  Started 30 minutes prior to arrival   Sudden onset  It is difficult for her to describe it  States it is just intense pain  It was severe to the point that she was clutching her chest   She tried taking an antacid which she normally does when she gets chest pain except the antacid was not helping  This made her feel more nervous  She was not short of breath nauseous nor has she had any vomiting during this  No syncope or lightheadedness  The pain has since subsided and is now completely pain free and symptom free  She did travel to [facility] about 2 months ago  She is however on Xarelto for history of prior DVT  She had an echo stress test about 1 year ago which she was told was normal   She follows with Cardiology  She has frequent premature ventricular contractions for which she takes magnesium         History provided by:  Patient   used: No    Chest Pain   Pain location:  Substernal area  Pain radiates to:  Does not radiate  Pain radiates to the back: no    Pain severity:  Severe  Onset quality:  Sudden  Duration:  30 minutes  Timing:  Constant  Progression:  Resolved  Chronicity:  New  Context: at rest    Context: not breathing, no drug use, not eating, no intercourse, not lifting, no movement, not raising an arm, no stress and no trauma    Relieved by:  Nothing  Worsened by:  Nothing tried  Ineffective treatments:  None tried  Associated symptoms: no abdominal pain, no AICD problem, no altered mental status, no anorexia, no anxiety, no back pain, no claudication, no cough, no diaphoresis, no dizziness, no dysphagia, no fatigue, no fever, no headache, no heartburn, no lower extremity edema, no nausea, no near-syncope, no numbness, no orthopnea, no palpitations, no PND, no shortness of breath, no syncope, not vomiting and no weakness    Risk factors: high cholesterol, obesity, prior DVT/PE and smoking (Prior history)        Prior to Admission Medications   Prescriptions Last Dose Informant Patient Reported? Taking? Evening Primrose Oil 1000 MG CAPS  Self Yes Yes   Sig: Take 1 capsule by mouth daily   Multiple Vitamin (MULTIVITAMIN) capsule 10/20/2019 at Unknown time Self Yes Yes   Sig: Take 1 capsule by mouth daily  ZOLMitriptan (ZOMIG) 5 MG tablet  Self No Yes   Sig: Take 1 tablet (5 mg total) by mouth once as needed for migraine for up to 1 dose   albuterol (VENTOLIN HFA) 90 mcg/act inhaler  Self No Yes   Si-2 puffs 4 times a day as needed   butalbital-acetaminophen-caffeine (FIORICET,ESGIC) -40 mg per tablet  Self No Yes   Sig: Take 1 tablet by mouth every 4 (four) hours as needed for headaches   cycloSPORINE (RESTASIS) 0 05 % ophthalmic emulsion  Self Yes Yes   Sig: Apply 1 drop to eye 2 (two) times a day   diazepam (VALIUM) 5 mg tablet  Self No Yes   Sig: Take 1 tablet (5 mg total) by mouth every 8 (eight) hours as needed for anxiety   diphenhydrAMINE (BENADRYL) 25 mg capsule  Self Yes Yes   Sig: Take by mouth daily at bedtime as needed     fluticasone (FLONASE) 50 mcg/act nasal spray  Self Yes Yes   Si sprays into each nostril daily as needed     fluticasone (FLOVENT HFA) 220 mcg/act inhaler  Self No Yes   Sig: Inhale 1 puff 2 (two) times a day   loratadine (CLARITIN) 10 mg tablet  Self No Yes   Sig: Take 1 tablet (10 mg total) by mouth daily   loteprednol etabonate (LOTEMAX) 0 5 % ophthalmic suspension  Self Yes Yes   Sig: Administer 1 drop to both eyes 3 (three) times a day as needed     montelukast (SINGULAIR) 10 mg tablet Not Taking at Unknown time Self No No   Sig: Take 1 tablet (10 mg total) by mouth daily at bedtime   Patient not taking: Reported on 10/20/2019   montelukast (SINGULAIR) 10 mg tablet Not Taking at Unknown time  No No   Sig: TAKE ONE TABLET BY MOUTH EVERY DAY AS DIRECTED   Patient not taking: Reported on 10/20/2019   pantoprazole (PROTONIX) 40 mg tablet  Self No No   Sig: PT  TO TAKE 2 TABS DAILY   promethazine-codeine (PHENERGAN WITH CODEINE) 6 25-10 mg/5 mL syrup   No Yes   Sig: Take 5 mL by mouth every 4 (four) hours as needed for cough   rivaroxaban (XARELTO) 10 mg tablet  Self No Yes   Sig: Take 1 tablet (10 mg total) by mouth daily      Facility-Administered Medications: None       Past Medical History:   Diagnosis Date    Acid reflux     Allergic rhinitis     Last Assessed: 2017    Anesthesia complication     HYPOTENSION    Arthritis     Asthma     Bigeminy     history    [facility]VT [facility]deep venous thrombosis[facility] ([facility] ) 10/23/2018    [facility]     DVT of leg (deep venous thrombosis) ([facility] )     left    Female pelvic pain     Hyperlipidemia     Intermittent palpitations     Irregular heart beat     Migraines     Obesity (BMI 30 0-34  9)     Palpitations     Pes planus     unspecified laterality; Last Assessed: 2014    Thyroid nodule     Trigeminy     history    Wears glasses        Past Surgical History:   Procedure Laterality Date    APPENDECTOMY      CATARACT EXTRACTION       SECTION      X 2    CHOLECYSTECTOMY      COLONOSCOPY      DILATION AND CURETTAGE OF UTERUS      JOINT REPLACEMENT      left TKR    KNEE SURGERY Left     X 3    IA ESOPHAGOGASTRODUODENOSCOPY TRANSORAL DIAGNOSTIC N/A 3/14/2016    Procedure: ESOPHAGOGASTRODUODENOSCOPY (EGD); Surgeon: Caitie Walker MD;  Location: [facility];   Service: Gastroenterology    IA LAPAROSCOPY W TOT HYSTERECTUTERUS <=[address] [place] TUBE/OVARY Bilateral 2016    Procedure: HYSTERECTOMY LAPAROSCOPIC TOTAL ,BSO;  Surgeon: Junior Quispe MD;  Location: [facility];  Service: Gynecology Oncology    REPLACEMENT TOTAL KNEE      US GUIDED BREAST BIOPSY LEFT COMPLETE Left 2019    WISDOM TOOTH EXTRACTION         Family History   Problem Relation Age of Onset  Endometrial cancer Mother 76    Migraines Mother     Diabetes Father     Heart disease Father     Heart attack Father     Prostate cancer Father     Hypertension Father     Thyroid cancer Brother         medullary    Diabetes type II Brother     Growth hormone deficiency Daughter     Alzheimer's disease Maternal Aunt     Thyroid cancer Maternal Aunt     Breast cancer Maternal Aunt     Diabetes Paternal Aunt     Hashimoto's thyroiditis Paternal Aunt         Total Thyroidectomy    Hypertension Paternal Aunt     Other Maternal Uncle         Brain Tumor    Alzheimer's disease Maternal Uncle     Cancer Family     Stroke Paternal Uncle      I have reviewed and agree with the history as documented  Social History     Tobacco Use    Smoking status: Former Smoker     Packs/day: 0 50     Types: Cigarettes     Last attempt to quit: 1978     Years since quittin 3    Smokeless tobacco: Never Used    Tobacco comment: as teenager   Substance Use Topics    Alcohol use: Yes     Comment: socially    Drug use: No        Review of Systems   Constitutional: Negative for activity change, appetite change, chills, diaphoresis, fatigue, fever and unexpected weight change  HENT: Negative for congestion, rhinorrhea, sinus pressure, sore throat and trouble swallowing  Eyes: Negative for photophobia and visual disturbance  Respiratory: Negative for apnea, cough, choking, chest tightness, shortness of breath, wheezing and stridor  Cardiovascular: Positive for chest pain  Negative for palpitations, orthopnea, claudication, leg swelling, syncope, PND and near-syncope  Gastrointestinal: Negative for abdominal distention, abdominal pain, anorexia, blood in stool, constipation, diarrhea, heartburn, nausea and vomiting  Genitourinary: Negative for decreased urine volume, difficulty urinating, dysuria, enuresis, flank pain, frequency, hematuria and urgency     Musculoskeletal: Negative for arthralgias, back pain, myalgias, neck pain and neck stiffness  Skin: Negative for color change, pallor, rash and wound  Allergic/Immunologic: Negative  Neurological: Negative for dizziness, tremors, syncope, weakness, light-headedness, numbness and headaches  Hematological: Negative  Psychiatric/Behavioral: Negative  All other systems reviewed and are negative  Physical Exam  Physical Exam   Constitutional: She is oriented to person, place, and time  She appears well-developed and well-nourished  Non-toxic appearance  She does not have a sickly appearance  She does not appear ill  No distress  HENT:   Head: Normocephalic and atraumatic  Eyes: Pupils are equal, round, and reactive to light  EOM and lids are normal    Neck: Normal range of motion  Neck supple  Cardiovascular: Normal rate, regular rhythm, S1 normal, S2 normal, normal heart sounds, intact distal pulses and normal pulses  Exam reveals no gallop, no distant heart sounds, no friction rub and no decreased pulses  No murmur heard  Pulses:       Radial pulses are 2+ on the right side, and 2+ on the left side  Pulmonary/Chest: Effort normal and breath sounds normal  No accessory muscle usage  No apnea, no tachypnea and no bradypnea  No respiratory distress  She has no decreased breath sounds  She has no wheezes  She has no rhonchi  She has no rales  Abdominal: Soft  Normal appearance  She exhibits no distension  There is no tenderness  There is no rigidity, no rebound and no guarding  Musculoskeletal: Normal range of motion  She exhibits no edema, tenderness or deformity  Neurological: She is alert and oriented to person, place, and time  No cranial nerve deficit  GCS eye subscore is 4  GCS verbal subscore is 5  GCS motor subscore is 6  Skin: Skin is warm, dry and intact  No rash noted  She is not diaphoretic  No erythema  No pallor  Psychiatric: Her speech is normal    Nursing note and vitals reviewed        Vital Signs  ED Triage Vitals [10/20/19 1816]   Temperature Pulse Respirations Blood Pressure SpO2   97 6 °F (36 4 °C) 73 19 130/62 94 %      Temp Source Heart Rate Source Patient Position - Orthostatic VS BP Location FiO2 (%)   Oral Monitor Lying Right arm --      Pain Score       2           Vitals:    10/20/19 1816 10/20/19 1830   BP: 130/62 130/62   Pulse: 73 72   Patient Position - Orthostatic VS: Lying          Visual Acuity      ED Medications  Medications - No data to display    Diagnostic Studies  Results Reviewed     Procedure Component Value Units Date/Time    Troponin I [[id number]]  (Normal) Collected:  10/20/19 1832    Lab Status:  Final result Specimen:  Blood from Arm, Right Updated:  10/20/19 1856     Troponin I <0 02 ng/mL     Comprehensive metabolic panel [[id number]]  (Abnormal) Collected:  10/20/19 1832    Lab Status:  Final result Specimen:  Blood from Arm, Right Updated:  10/20/19 1855     Sodium 142 mmol/L      Potassium 4 6 mmol/L      Chloride 104 mmol/L      CO2 30 mmol/L      ANION GAP 8 mmol/L      BUN 15 mg/dL      Creatinine 0 69 mg/dL      Glucose 99 mg/dL      Calcium 8 9 mg/dL      AST 33 U/L      ALT 29 U/L      Alkaline Phosphatase 117 U/L      Total Protein 7 5 g/dL      Albumin 3 7 g/dL      Total Bilirubin 0 30 mg/dL      eGFR 92 ml/min/1 73sq m     Narrative:       Layla guidelines for Chronic Kidney Disease (CKD):     Stage 1 with normal or high GFR (GFR > 90 mL/min/1 73 square meters)    Stage 2 Mild CKD (GFR = 60-89 mL/min/1 73 square meters)    Stage 3A Moderate CKD (GFR = 45-59 mL/min/1 73 square meters)    Stage 3B Moderate CKD (GFR = 30-44 mL/min/1 73 square meters)    Stage 4 Severe CKD (GFR = 15-29 mL/min/1 73 square meters)    Stage 5 End Stage CKD (GFR <15 mL/min/1 73 square meters)  Note: GFR calculation is accurate only with a steady state creatinine    CBC and differential [[id number]] Collected:  10/20/19 1832    Lab Status:  Final result Specimen:  Blood from Arm, Right Updated:  10/20/19 1837     WBC 8 95 Thousand/uL      RBC 5 08 Million/uL      Hemoglobin 13 6 g/dL      Hematocrit 42 8 %      MCV 84 fL      MCH 26 8 pg      MCHC 31 8 g/dL      RDW 14 4 %      MPV 9 3 fL      Platelets 489 Thousands/uL      nRBC 0 /100 WBCs      Neutrophils Relative 58 %      Immat GRANS % 0 %      Lymphocytes Relative 31 %      Monocytes Relative 8 %      Eosinophils Relative 2 %      Basophils Relative 1 %      Neutrophils Absolute 5 18 Thousands/µL      Immature Grans Absolute 0 04 Thousand/uL      Lymphocytes Absolute 2 77 Thousands/µL      Monocytes Absolute 0 70 Thousand/µL      Eosinophils Absolute 0 20 Thousand/µL      Basophils Absolute 0 06 Thousands/µL                  XR chest 2 views    (Results Pending)              Procedures  ECG 12 Lead Documentation Only  Date/Time: 10/20/2019 6:49 PM  Performed by: Anita Noguera PA-C  Authorized by: Anita Noguera PA-C     Indications / Diagnosis:  Chest pain  ECG reviewed by me, the ED Provider: yes    Patient location:  ED  Previous ECG:     Previous ECG:  Unavailable    Comparison to cardiac monitor: Yes    Interpretation:     Interpretation: normal    Quality:     Tracing quality:  Limited by artifact  Rate:     ECG rate:  72    ECG rate assessment: normal    Rhythm:     Rhythm: sinus rhythm    Ectopy:     Ectopy: none    QRS:     QRS axis:  Normal    QRS intervals:  Normal  Conduction:     Conduction: normal    ST segments:     ST segments:  Normal  T waves:     T waves: normal             ED Course         HEART Risk Score      Most Recent Value   History  0 Filed at: 10/20/2019 1859   ECG  0 Filed at: 10/20/2019 1859   Age  2 Filed at: 10/20/2019 1859   Risk Factors  2 Filed at: 10/20/2019 1859   Troponin  0 Filed at: 10/20/2019 1859   Heart Score Risk Calculator   History  0 Filed at: 10/20/2019 1859   ECG  0 Filed at: 10/20/2019 1859   Age  2 Filed at: 10/20/2019 1859   Risk Factors  2 Filed at: 10/20/2019 1859   Troponin  0 Filed at: 10/20/2019 1859   HEART Score  4 Filed at: 10/20/2019 1859   HEART Score  4 Filed at: 10/20/2019 1859        Identification of Seniors at Risk      Most Recent Value   (ISAR) Identification of Seniors at Risk   Before the illness or injury that brought you to the Emergency, did you need someone to help you on a regular basis? 0 Filed at: 10/20/2019 1819   In the last 24 hours, have you needed more help than usual?  0 Filed at: 10/20/2019 1819   Have you been hospitalized for one or more nights during the past 6 months? 0 Filed at: 10/20/2019 1819   In general, do you see well?  0 Filed at: 10/20/2019 1819   In general, do you have serious problems with your memory? 0 Filed at: 10/20/2019 1819   Do you take more than three different medications every day? 1 Filed at: 10/20/2019 1819   ISAR Score  1 Filed at: 10/20/2019 1819                          MDM  Number of Diagnoses or Management Options  Chest pain: new and requires workup  Diagnosis management comments: Will get a differential diagnosis includes but is not limited to gastritis, peptic ulcer disease, ACS/mi, PE, aortic dissection, pneumonia, pneumothorax co anxiety  Plan:  Cardiac workup, chest x-ray  Dispo pending  Amount and/or Complexity of Data Reviewed  Clinical lab tests: ordered and reviewed  Tests in the radiology section of CPT®: ordered and reviewed  Independent visualization of images, tracings, or specimens: yes    Risk of Complications, Morbidity, and/or Mortality  Presenting problems: moderate  Management options: low  General comments: 70year-old with chest pain  Her workup here was negative  Troponin negative  EKG unchanged from prior EKG last year  Remainder of labs unremarkable  X-ray chest shows no acute cardiopulmonary abnormalities  She does have a heart score of 4   I did discuss admission for observation informing her and her  that she is overall at a moderate risk, anywhere between 12 and 65% chance for major adverse cardiac event in the next 30 days  Patient and  ultimately decided that they would prefer to go home  I also encouraged him to stay for delta troponin which they declined  They understand that he can take up to 8-10 hours before troponin elevates  They still would not like to wait  He will follow up with family physician this week and return if symptoms worsen  Patient ambulated out of department  Patient Progress  Patient progress: stable      Disposition  Final diagnoses:   Chest pain     Time reflects when diagnosis was documented in both MDM as applicable and the Disposition within this note     Time User Action Codes Description Comment    10/20/2019  7:21 PM Harpreet Kaur Add [R07 9] Chest pain       ED Disposition     ED Disposition Condition Date/Time Comment    Discharge Stable Sun Oct 20, 2019  7:21 PM Ratna Horvath discharge to home/self care              Follow-up Information     Follow up With Specialties Details Why Contact Info    Arline Villalpando,  Internal Medicine Call   [address] [address]  [phone]            Discharge Medication List as of 10/20/2019  7:22 PM      CONTINUE these medications which have NOT CHANGED    Details   albuterol (VENTOLIN HFA) 90 mcg/act inhaler 1-2 puffs 4 times a day as needed, Normal      butalbital-acetaminophen-caffeine (FIORICET,ESGIC) -40 mg per tablet Take 1 tablet by mouth every 4 (four) hours as needed for headaches, Starting Fri 1/11/2019, Print      cycloSPORINE (RESTASIS) 0 05 % ophthalmic emulsion Apply 1 drop to eye 2 (two) times a day, Historical Med      diazepam (VALIUM) 5 mg tablet Take 1 tablet (5 mg total) by mouth every 8 (eight) hours as needed for anxiety, Starting Mon 5/6/2019, Print      diphenhydrAMINE (BENADRYL) 25 mg capsule Take by mouth daily at bedtime as needed  , Historical Med      Evening Primrose Oil 1000 MG CAPS Take 1 capsule by mouth daily, Historical Med      fluticasone (FLONASE) 50 mcg/act nasal spray 2 sprays into each nostril daily as needed  , Starting Wed 12/14/2016, Historical Med      fluticasone (FLOVENT HFA) 220 mcg/act inhaler Inhale 1 puff 2 (two) times a day, Starting Fri 4/12/2019, Normal      loratadine (CLARITIN) 10 mg tablet Take 1 tablet (10 mg total) by mouth daily, Starting Tue 3/6/2018, Normal      loteprednol etabonate (LOTEMAX) 0 5 % ophthalmic suspension Administer 1 drop to both eyes 3 (three) times a day as needed , Historical Med      Multiple Vitamin (MULTIVITAMIN) capsule Take 1 capsule by mouth daily  , Historical Med      promethazine-codeine (PHENERGAN WITH CODEINE) 6 25-10 mg/5 mL syrup Take 5 mL by mouth every 4 (four) hours as needed for cough, Starting Fri 10/4/2019, Normal      rivaroxaban (XARELTO) 10 mg tablet Take 1 tablet (10 mg total) by mouth daily, Starting Mon 4/22/2019, Normal      ZOLMitriptan (ZOMIG) 5 MG tablet Take 1 tablet (5 mg total) by mouth once as needed for migraine for up to 1 dose, Starting Fri 4/12/2019, Normal      !! montelukast (SINGULAIR) 10 mg tablet Take 1 tablet (10 mg total) by mouth daily at bedtime, Starting Tue 3/6/2018, Normal      !! montelukast (SINGULAIR) 10 mg tablet TAKE ONE TABLET BY MOUTH EVERY DAY AS DIRECTED, Normal      pantoprazole (PROTONIX) 40 mg tablet PT  TO TAKE 2 TABS DAILY, Normal       !! - Potential duplicate medications found  Please discuss with provider  No discharge procedures on file      ED Provider  Electronically Signed by           Josselyn More PA-C  10/20/19 0988

## 2019-10-20 NOTE — DISCHARGE INSTRUCTIONS
Return to the Emergency Department sooner if increased pain, fever, vomiting, difficulty breathing, rash

## 2019-10-21 ENCOUNTER — APPOINTMENT (OUTPATIENT)
Dept: PHYSICAL THERAPY | Age: 64
End: 2019-10-21
Payer: COMMERCIAL

## 2019-10-21 ENCOUNTER — OFFICE VISIT (OUTPATIENT)
Dept: INTERNAL MEDICINE CLINIC | Facility: CLINIC | Age: 64
End: 2019-10-21
Payer: COMMERCIAL

## 2019-10-21 ENCOUNTER — TELEPHONE (OUTPATIENT)
Dept: INTERNAL MEDICINE CLINIC | Facility: CLINIC | Age: 64
End: 2019-10-21

## 2019-10-21 ENCOUNTER — OFFICE VISIT (OUTPATIENT)
Dept: PHYSICAL THERAPY | Age: 64
End: 2019-10-21
Payer: COMMERCIAL

## 2019-10-21 VITALS
HEART RATE: 71 BPM | BODY MASS INDEX: 31.58 KG/M2 | DIASTOLIC BLOOD PRESSURE: 68 MMHG | OXYGEN SATURATION: 96 % | WEIGHT: 185 LBS | HEIGHT: 64 IN | SYSTOLIC BLOOD PRESSURE: 108 MMHG

## 2019-10-21 DIAGNOSIS — M54.2 NECK PAIN: ICD-10-CM

## 2019-10-21 DIAGNOSIS — R10.13 EPIGASTRIC PAIN: ICD-10-CM

## 2019-10-21 DIAGNOSIS — M54.12 CERVICAL RADICULOPATHY: Primary | ICD-10-CM

## 2019-10-21 DIAGNOSIS — I82.402 DEEP VEIN THROMBOSIS (DVT) OF LEFT LOWER EXTREMITY, UNSPECIFIED CHRONICITY, UNSPECIFIED VEIN (HCC): Primary | ICD-10-CM

## 2019-10-21 PROCEDURE — 99214 OFFICE O/P EST MOD 30 MIN: CPT | Performed by: INTERNAL MEDICINE

## 2019-10-21 PROCEDURE — 97110 THERAPEUTIC EXERCISES: CPT | Performed by: PHYSICAL THERAPIST

## 2019-10-21 PROCEDURE — 97014 ELECTRIC STIMULATION THERAPY: CPT | Performed by: PHYSICAL THERAPIST

## 2019-10-21 PROCEDURE — 97140 MANUAL THERAPY 1/> REGIONS: CPT | Performed by: PHYSICAL THERAPIST

## 2019-10-21 NOTE — PROGRESS NOTES
Daily Note     Today's date: 10/21/2019  Patient name: Twyla Velazquez  : 1955  MRN: [id number]  Referring provider: Yuridia Seals DO  Dx:   Encounter Diagnosis     ICD-10-CM    1  Cervical radiculopathy M54 12    2  Neck pain M54 2        Start Time: 1709  Stop Time: 1800  Total time in clinic (min): 51 minutes    Subjective: Patient notes she was in the ER last night due to chest pain  Seen by family MD this morning and will see GI specialist tomorrow  Presents today with complaints of sternal tenderness that "feels muscular" and moderate bilateral cerv and trap discomfort  Objective: See treatment diary below      Assessment: Tolerated treatment well  Patient would benefit from continued PT Tolerated well  Reported decreased pain post treatment  Right sterno-costal junction tenderness with triggers points that decreased post treatment      Plan: Continue per plan of care        Precautions: DJD/OA, Migraines, TKR      Manual  10/21 9/18 9/23 9/27 9/30 10/4 10/7 10/9 10/14 10/18   MT Cervical: S/O area 15 10 10 10 10 10   10 10 10 10   MFR 10 10 10 10 10 10   10 10 10 10   Arm Pulls 5 5 5 5 5 5 5 5 5 5                                 Exercise Diary  10/21 9/18 9/25 9/27 9/30 10/4 10/7 10/9 10/14 10/18   Cervical ROM/Isometric 15 15 15 15 15 15 15 15 15 15   Postural Ed  5 5 5 5 5 5 5 5 5 5   Cervical AROM 10 10 10 10 10 10 10 10 10 10   Corner Push up nt 15 15 15 15 15 15 15 15 15                                                                                                                                                                            HEP Stretches/  Postural ex 5                                          Modalities  9/16/ 2019 9/18 9/23 9/27 9/30 10/4 10/7 10/9 10/14 10/18   HT with e/stim 15 15 15 15 15 15 15 15 15 15

## 2019-10-21 NOTE — TELEPHONE ENCOUNTER
Called Dr Gerardo Plummer office and they are waiting for Dr Wil Blunt to contact the office with a date for patient's appointment

## 2019-10-21 NOTE — TELEPHONE ENCOUNTER
Called Dr Alison Waldron office multiple times to schedule the patient for an appointment, and no one picks up left message on machine 2 times to get a call back

## 2019-10-22 ENCOUNTER — OFFICE VISIT (OUTPATIENT)
Dept: GASTROENTEROLOGY | Facility: CLINIC | Age: 64
End: 2019-10-22
Payer: COMMERCIAL

## 2019-10-22 VITALS
WEIGHT: 180 LBS | SYSTOLIC BLOOD PRESSURE: 120 MMHG | DIASTOLIC BLOOD PRESSURE: 76 MMHG | BODY MASS INDEX: 30.73 KG/M2 | HEART RATE: 77 BPM | HEIGHT: 64 IN

## 2019-10-22 DIAGNOSIS — K21.9 GASTROESOPHAGEAL REFLUX DISEASE WITHOUT ESOPHAGITIS: ICD-10-CM

## 2019-10-22 DIAGNOSIS — R07.89 OTHER CHEST PAIN: Primary | ICD-10-CM

## 2019-10-22 PROCEDURE — 99244 OFF/OP CNSLTJ NEW/EST MOD 40: CPT | Performed by: INTERNAL MEDICINE

## 2019-10-22 RX ORDER — PANTOPRAZOLE SODIUM 40 MG/1
TABLET, DELAYED RELEASE ORAL
Qty: 180 TABLET | Refills: 1 | Status: SHIPPED | OUTPATIENT
Start: 2019-10-22 | End: 2019-10-28 | Stop reason: SDUPTHER

## 2019-10-22 NOTE — H&P (VIEW-ONLY)
Consultation - [address] [facility]  Nick Anders 1955 70 y o  female     ASSESSMENT @ PLAN:   She is 61-year-old female with chronic gastroesophageal reflux disease well controlled on pantoprazole 40 mg daily with 45 minutes of severe epigastric and substernal chest pain that resolved with Gaviscon  She had an emergency room visit with normal lab work and normal chest x-ray imaging  1 will do EGD to investigate    2 she will stay on Protonix 40 mg p o  b i d  for 4-6 weeks    3 she is reducing her dietary acid triggers    This is Dr Beto New wife    Chief Complaint:   GERD and chest pain    HPI:   She is a 61-year-old female with chronic gastroesophageal reflux disease is well controlled on pantoprazole 40 mg daily  She reports that she has been on omeprazole and pantoprazole for years  She has had no chronic symptoms in the last 6 months of heartburn regurgitation chest pain nausea vomiting abdominal pain melena or hematochezia  She was feeling well and all of a sudden had severe epigastric pain that radiated into her chest that started at home on Sunday  The pain lasted for about 45 minutes and by the time she was going into the emergency room the pain and resolved  She gave herself 2 Gaviscon doses in this did help her  She did not have heartburn regurgitation during the episode  She went to the emergency room and had negative chest x-ray imaging and negative lab work  She reports feeling sore throat  She has struggled with a URI this month at but has only been taking Tylenol is not been taking excessive NSAIDs  REVIEW OF SYSTEMS:     CONSTITUTIONAL: Denies any fever, chills, or rigors  Good appetite, and no recent weight loss  HEENT: No earache or tinnitus  Denies hearing loss or visual disturbances  CARDIOVASCULAR: No chest pain or palpitations  RESPIRATORY: Denies any cough, hemoptysis, shortness of breath or dyspnea on exertion    GASTROINTESTINAL: As noted in the History of Present Illness  GENITOURINARY: No problems with urination  Denies any hematuria or dysuria  NEUROLOGIC: No dizziness or vertigo, denies headaches  MUSCULOSKELETAL: Denies any muscle or joint pain  SKIN: Denies skin rashes or itching  ENDOCRINE: Denies excessive thirst  Denies intolerance to heat or cold  PSYCHOSOCIAL: Denies depression or anxiety  Denies any recent memory loss  Past Medical History:   Diagnosis Date    Acid reflux     Allergic rhinitis     Last Assessed: 2017    Anesthesia complication     HYPOTENSION    Arthritis     Asthma     Bigeminy     history    [facility] 10/23/2018    [facility]) ([facility])     DVT of leg (deep venous thrombosis) ([facility] [address] )     left    Female pelvic pain     Hyperlipidemia     Intermittent palpitations     Irregular heart beat     Migraines     Obesity (BMI 30 0-34  9)     Palpitations     Pes planus     unspecified laterality; Last Assessed: 2014    Thyroid nodule     Trigeminy     history    Wears glasses       Past Surgical History:   Procedure Laterality Date    APPENDECTOMY      CATARACT EXTRACTION       SECTION      X 2    CHOLECYSTECTOMY      COLONOSCOPY      DILATION AND CURETTAGE OF UTERUS      JOINT REPLACEMENT      left TKR    KNEE SURGERY Left     X 3    OR ESOPHAGOGASTRODUODENOSCOPY TRANSORAL DIAGNOSTIC N/A 3/14/2016    Procedure: ESOPHAGOGASTRODUODENOSCOPY (EGD); Surgeon: Delonte Fowler MD;  [facility]: [facility];   Service: Gastroenterology    OR LAPAROSCOPY W TOT HYSTERECTUTERUS <=250 Karlynn Joo TUBE/OVARY Bilateral 2016    Procedure: HYSTERECTOMY LAPAROSCOPIC TOTAL ,BSO;  Surgeon: Trung Mena MD;  [facility]: [facility];  Service: Gynecology Oncology    REPLACEMENT TOTAL KNEE      US GUIDED BREAST BIOPSY LEFT COMPLETE Left 2019    WISDOM TOOTH EXTRACTION       Social History     Socioeconomic History    Marital status: /Civil Union     Spouse name: Not on file    Number of children: Not on file    Years of education: Not on file    Highest education level: Not on file   Occupational History    Occupation: Pediatrics   Social Needs    Financial resource strain: Not on file    Food insecurity:     Worry: Not on file     Inability: Not on file    Transportation needs:     Medical: Not on file     Non-medical: Not on file   Tobacco Use    Smoking status: Former Smoker     Packs/day: 0 50     Types: Cigarettes     Last attempt to quit: 1978     Years since quittin 3    Smokeless tobacco: Never Used    Tobacco comment: as teenager   Substance and Sexual Activity    Alcohol use: Yes     Comment: socially    Drug use: No    Sexual activity: Not Currently     Birth control/protection: Surgical   Lifestyle    Physical activity:     Days per week: 0 days     Minutes per session: 0 min    Stress: Very much   Relationships    Social connections:     Talks on phone: Not on file     Gets together: Not on file     Attends Cheondoism service: Not on file     Active member of club or organization: Not on file     Attends meetings of clubs or organizations: Not on file     Relationship status: Not on file    Intimate partner violence:     Fear of current or ex partner: Not on file     Emotionally abused: Not on file     Physically abused: Not on file     Forced sexual activity: Not on file   Other Topics Concern    Not on file   Social History Narrative    Not on file     Family History   Problem Relation Age of Onset    Endometrial cancer Mother 76    Migraines Mother     Diabetes Father     Heart disease Father     Heart attack Father     Prostate cancer Father     Hypertension Father     Thyroid cancer Brother         medullary    Diabetes type II Brother     Growth hormone deficiency Daughter     Alzheimer's disease Maternal Aunt     Thyroid cancer Maternal Aunt     Breast cancer Maternal Aunt     Diabetes Paternal Aunt     Hashimoto's thyroiditis Paternal Aunt         Total Thyroidectomy    Hypertension Paternal Aunt     Other Maternal Uncle         Brain Tumor    Alzheimer's disease Maternal Uncle     Cancer Family     Stroke Paternal Uncle      Naproxen and Seasonal ic [cholestatin]  Current Outpatient Medications   Medication Sig Dispense Refill    albuterol (VENTOLIN HFA) 90 mcg/act inhaler 1-2 puffs 4 times a day as needed 18 Inhaler 0    butalbital-acetaminophen-caffeine (FIORICET,ESGIC) -40 mg per tablet Take 1 tablet by mouth every 4 (four) hours as needed for headaches 20 tablet 0    cycloSPORINE (RESTASIS) 0 05 % ophthalmic emulsion Apply 1 drop to eye 2 (two) times a day      diazepam (VALIUM) 5 mg tablet Take 1 tablet (5 mg total) by mouth every 8 (eight) hours as needed for anxiety 60 tablet 0    diphenhydrAMINE (BENADRYL) 25 mg capsule Take by mouth daily at bedtime as needed        Evening Primrose Oil 1000 MG CAPS Take 1 capsule by mouth daily      fluticasone (FLONASE) 50 mcg/act nasal spray 2 sprays into each nostril daily as needed        fluticasone (FLOVENT HFA) 220 mcg/act inhaler Inhale 1 puff 2 (two) times a day 12 Inhaler 2    loratadine (CLARITIN) 10 mg tablet Take 1 tablet (10 mg total) by mouth daily 30 tablet 0    loteprednol etabonate (LOTEMAX) 0 5 % ophthalmic suspension Administer 1 drop to both eyes 3 (three) times a day as needed   montelukast (SINGULAIR) 10 mg tablet Take 1 tablet (10 mg total) by mouth daily at bedtime 30 tablet 0    montelukast (SINGULAIR) 10 mg tablet TAKE ONE TABLET BY MOUTH EVERY DAY AS DIRECTED 90 tablet 0    Multiple Vitamin (MULTIVITAMIN) capsule Take 1 capsule by mouth daily   pantoprazole (PROTONIX) 40 mg tablet PT   TO TAKE 2 TABS DAILY 180 tablet 1    promethazine-codeine (PHENERGAN WITH CODEINE) 6 25-10 mg/5 mL syrup Take 5 mL by mouth every 4 (four) hours as needed for cough 240 mL 0    rivaroxaban (XARELTO) 10 mg tablet Take 1 tablet (10 mg total) by mouth daily 14 tablet 0    ZOLMitriptan (ZOMIG) 5 MG tablet Take 1 tablet (5 mg total) by mouth once as needed for migraine for up to 1 dose 8 tablet 0     No current facility-administered medications for this visit  Blood pressure 120/76, pulse 77, height 5' 4" (1 626 m), weight 81 6 kg (180 lb), not currently breastfeeding  PHYSICAL EXAM:     General Appearance:   Alert, cooperative, no distress, appears stated age    HEENT:   Normocephalic, atraumatic, anicteric      Neck:  Supple, symmetrical, trachea midline, no adenopathy;    thyroid: no enlargement/tenderness/nodules; no carotid  bruit or JVD    Lungs:   Clear to auscultation bilaterally; no rales, rhonchi or wheezing; respirations unlabored    Heart[de-identified]   S1 and S2 normal; regular rate and rhythm; no murmur, rub, or gallop     Abdomen:   Soft, non-tender, non-distended; normal bowel sounds; no masses, no organomegaly    Genitalia:   Deferred    Rectal:   Deferred    Extremities:  No cyanosis, clubbing or edema    Pulses:  2+ and symmetric all extremities    Skin:  Skin color, texture, turgor normal, no rashes or lesions    Lymph nodes:  No palpable cervical, axillary or inguinal lymphadenopathy        Lab Results   Component Value Date    WBC 8 95 10/20/2019    HGB 13 6 10/20/2019    HCT 42 8 10/20/2019    MCV 84 10/20/2019     10/20/2019     Lab Results   Component Value Date    GLUCOSE 88 08/20/2015    CALCIUM 8 9 10/20/2019     08/20/2015    K 4 6 10/20/2019    CO2 30 10/20/2019     10/20/2019    BUN 15 10/20/2019    CREATININE 0 69 10/20/2019     Lab Results   Component Value Date    ALT 29 10/20/2019    AST 33 10/20/2019    ALKPHOS 117 (H) 10/20/2019    BILITOT 0 3 08/20/2015     Lab Results   Component Value Date    INR 1 06 12/21/2018    INR 0 99 10/22/2018    INR 0 98 03/06/2018    PROTIME 13 9 12/21/2018    PROTIME 13 0 10/22/2018    PROTIME 13 2 03/06/2018

## 2019-10-22 NOTE — PROGRESS NOTES
Consultation - [facility]  Td Bailey 1955 70 y o  female     ASSESSMENT @ PLAN:   She is 77-year-old female with chronic gastroesophageal reflux disease well controlled on pantoprazole 40 mg daily with 45 minutes of severe epigastric and substernal chest pain that resolved with Gaviscon  She had an emergency room visit with normal lab work and normal chest x-ray imaging  1 will do EGD to investigate    2 she will stay on Protonix 40 mg p o  b i d  for 4-6 weeks    3 she is reducing her dietary acid triggers    This is Dr Oleg Myrick wife    Chief Complaint:   GERD and chest pain    HPI:   She is a 77-year-old female with chronic gastroesophageal reflux disease is well controlled on pantoprazole 40 mg daily  She reports that she has been on omeprazole and pantoprazole for years  She has had no chronic symptoms in the last 6 months of heartburn regurgitation chest pain nausea vomiting abdominal pain melena or hematochezia  She was feeling well and all of a sudden had severe epigastric pain that radiated into her chest that started at home on Sunday  The pain lasted for about 45 minutes and by the time she was going into the emergency room the pain and resolved  She gave herself 2 Gaviscon doses in this did help her  She did not have heartburn regurgitation during the episode  She went to the emergency room and had negative chest x-ray imaging and negative lab work  She reports feeling sore throat  She has struggled with a URI this month at but has only been taking Tylenol is not been taking excessive NSAIDs  REVIEW OF SYSTEMS:     CONSTITUTIONAL: Denies any fever, chills, or rigors  Good appetite, and no recent weight loss  HEENT: No earache or tinnitus  Denies hearing loss or visual disturbances  CARDIOVASCULAR: No chest pain or palpitations  RESPIRATORY: Denies any cough, hemoptysis, shortness of breath or dyspnea on exertion    GASTROINTESTINAL: As noted in the History of Present Illness  GENITOURINARY: No problems with urination  Denies any hematuria or dysuria  NEUROLOGIC: No dizziness or vertigo, denies headaches  MUSCULOSKELETAL: Denies any muscle or joint pain  SKIN: Denies skin rashes or itching  ENDOCRINE: Denies excessive thirst  Denies intolerance to heat or cold  PSYCHOSOCIAL: Denies depression or anxiety  Denies any recent memory loss  Past Medical History:   Diagnosis Date    Acid reflux     Allergic rhinitis     Last Assessed: 2017    Anesthesia complication     HYPOTENSION    Arthritis     Asthma     Bigeminy     history    [facility] 10[facility]23/2018    [facility]) [facility])     DVT [facility] leg (deep venous thrombosis) ([facility] [address] )     left    Female pelvic pain     Hyperlipidemia     Intermittent palpitations     Irregular heart beat     Migraines     Obesity (BMI 30 0-34  9)     Palpitations     Pes planus     unspecified laterality; Last Assessed: 2014    Thyroid nodule     Trigeminy     history    Wears glasses       Past Surgical History:   Procedure Laterality Date    APPENDECTOMY      CATARACT EXTRACTION       SECTION      X 2    CHOLECYSTECTOMY      COLONOSCOPY      DILATION AND CURETTAGE OF UTERUS      JOINT REPLACEMENT      left TKR    KNEE SURGERY Left     X 3    AR ESOPHAGOGASTRODUODENOSCOPY TRANSORAL DIAGNOSTIC N/A 3/14/2016    Procedure: ESOPHAGOGASTRODUODENOSCOPY (EGD); Surgeon: Desmond Gambino MD;  [facility];   Service: Gastroenterology    AR LAPAROSCOPY W TOT HYSTERECTUTERUS <=250 Harvy Bearded TUBE/OVARY Bilateral 2016    Procedure: HYSTERECTOMY LAPAROSCOPIC TOTAL ,BSO;  Surgeon: Santiago Perez MD;  [facility];  Service: Gynecology Oncology    REPLACEMENT TOTAL KNEE      US GUIDED BREAST BIOPSY LEFT COMPLETE Left 2019    WISDOM TOOTH EXTRACTION       Social History     Socioeconomic History    Marital status: /Civil Union     Spouse name: Not on file    Number of children: Not on file    Years of education: Not on file    Highest education level: Not on file   Occupational History    Occupation: Pediatrics   Social Needs    Financial resource strain: Not on file    Food insecurity:     Worry: Not on file     Inability: Not on file    Transportation needs:     Medical: Not on file     Non-medical: Not on file   Tobacco Use    Smoking status: Former Smoker     Packs/day: 0 50     Types: Cigarettes     Last attempt to quit: 1978     Years since quittin 3    Smokeless tobacco: Never Used    Tobacco comment: as teenager   Substance and Sexual Activity    Alcohol use: Yes     Comment: socially    Drug use: No    Sexual activity: Not Currently     Birth control/protection: Surgical   Lifestyle    Physical activity:     Days per week: 0 days     Minutes per session: 0 min    Stress: Very much   Relationships    Social connections:     Talks on phone: Not on file     Gets together: Not on file     Attends Evangelical service: Not on file     Active member of club or organization: Not on file     Attends meetings of clubs or organizations: Not on file     Relationship status: Not on file    Intimate partner violence:     Fear of current or ex partner: Not on file     Emotionally abused: Not on file     Physically abused: Not on file     Forced sexual activity: Not on file   Other Topics Concern    Not on file   Social History Narrative    Not on file     Family History   Problem Relation Age of Onset    Endometrial cancer Mother 76    Migraines Mother     Diabetes Father     Heart disease Father     Heart attack Father     Prostate cancer Father     Hypertension Father     Thyroid cancer Brother         medullary    Diabetes type II Brother     Growth hormone deficiency Daughter     Alzheimer's disease Maternal Aunt     Thyroid cancer Maternal Aunt     Breast cancer Maternal Aunt     Diabetes Paternal Aunt     Hashimoto's thyroiditis Paternal Aunt         Total Thyroidectomy    Hypertension Paternal Aunt     Other Maternal Uncle         Brain Tumor    Alzheimer's disease Maternal Uncle     Cancer Family     Stroke Paternal Uncle      Naproxen and Seasonal ic [cholestatin]  Current Outpatient Medications   Medication Sig Dispense Refill    albuterol (VENTOLIN HFA) 90 mcg/act inhaler 1-2 puffs 4 times a day as needed 18 Inhaler 0    butalbital-acetaminophen-caffeine (FIORICET,ESGIC) -40 mg per tablet Take 1 tablet by mouth every 4 (four) hours as needed for headaches 20 tablet 0    cycloSPORINE (RESTASIS) 0 05 % ophthalmic emulsion Apply 1 drop to eye 2 (two) times a day      diazepam (VALIUM) 5 mg tablet Take 1 tablet (5 mg total) by mouth every 8 (eight) hours as needed for anxiety 60 tablet 0    diphenhydrAMINE (BENADRYL) 25 mg capsule Take by mouth daily at bedtime as needed        Evening Primrose Oil 1000 MG CAPS Take 1 capsule by mouth daily      fluticasone (FLONASE) 50 mcg/act nasal spray 2 sprays into each nostril daily as needed        fluticasone (FLOVENT HFA) 220 mcg/act inhaler Inhale 1 puff 2 (two) times a day 12 Inhaler 2    loratadine (CLARITIN) 10 mg tablet Take 1 tablet (10 mg total) by mouth daily 30 tablet 0    loteprednol etabonate (LOTEMAX) 0 5 % ophthalmic suspension Administer 1 drop to both eyes 3 (three) times a day as needed   montelukast (SINGULAIR) 10 mg tablet Take 1 tablet (10 mg total) by mouth daily at bedtime 30 tablet 0    montelukast (SINGULAIR) 10 mg tablet TAKE ONE TABLET BY MOUTH EVERY DAY AS DIRECTED 90 tablet 0    Multiple Vitamin (MULTIVITAMIN) capsule Take 1 capsule by mouth daily   pantoprazole (PROTONIX) 40 mg tablet PT   TO TAKE 2 TABS DAILY 180 tablet 1    promethazine-codeine (PHENERGAN WITH CODEINE) 6 25-10 mg/5 mL syrup Take 5 mL by mouth every 4 (four) hours as needed for cough 240 mL 0    rivaroxaban (XARELTO) 10 mg tablet Take 1 tablet (10 mg total) by mouth daily 14 tablet 0    ZOLMitriptan (ZOMIG) 5 MG tablet Take 1 tablet (5 mg total) by mouth once as needed for migraine for up to 1 dose 8 tablet 0     No current facility-administered medications for this visit  Blood pressure 120/76, pulse 77, height 5' 4" (1 626 m), weight 81 6 kg (180 lb), not currently breastfeeding  PHYSICAL EXAM:     General Appearance:   Alert, cooperative, no distress, appears stated age    HEENT:   Normocephalic, atraumatic, anicteric      Neck:  Supple, symmetrical, trachea midline, no adenopathy;    thyroid: no enlargement/tenderness/nodules; no carotid  bruit or JVD    Lungs:   Clear to auscultation bilaterally; no rales, rhonchi or wheezing; respirations unlabored    Heart[de-identified]   S1 and S2 normal; regular rate and rhythm; no murmur, rub, or gallop     Abdomen:   Soft, non-tender, non-distended; normal bowel sounds; no masses, no organomegaly    Genitalia:   Deferred    Rectal:   Deferred    Extremities:  No cyanosis, clubbing or edema    Pulses:  2+ and symmetric all extremities    Skin:  Skin color, texture, turgor normal, no rashes or lesions    Lymph nodes:  No palpable cervical, axillary or inguinal lymphadenopathy        Lab Results   Component Value Date    WBC 8 95 10/20/2019    HGB 13 6 10/20/2019    HCT 42 8 10/20/2019    MCV 84 10/20/2019     10/20/2019     Lab Results   Component Value Date    GLUCOSE 88 08/20/2015    CALCIUM 8 9 10/20/2019     08/20/2015    K 4 6 10/20/2019    CO2 30 10/20/2019     10/20/2019    BUN 15 10/20/2019    CREATININE 0 69 10/20/2019     Lab Results   Component Value Date    ALT 29 10/20/2019    AST 33 10/20/2019    ALKPHOS 117 (H) 10/20/2019    BILITOT 0 3 08/20/2015     Lab Results   Component Value Date    INR 1 06 12/21/2018    INR 0 99 10/22/2018    INR 0 98 03/06/2018    PROTIME 13 9 12/21/2018    PROTIME 13 0 10/22/2018    PROTIME 13 2 03/06/2018

## 2019-10-23 LAB
ATRIAL RATE: 72 BPM
P AXIS: 50 DEGREES
PR INTERVAL: 192 MS
QRS AXIS: 66 DEGREES
QRSD INTERVAL: 80 MS
QT INTERVAL: 418 MS
QTC INTERVAL: 457 MS
T WAVE AXIS: 25 DEGREES
VENTRICULAR RATE: 72 BPM

## 2019-10-23 PROCEDURE — 93010 ELECTROCARDIOGRAM REPORT: CPT | Performed by: INTERNAL MEDICINE

## 2019-10-24 ENCOUNTER — ANESTHESIA EVENT (OUTPATIENT)
Dept: GASTROENTEROLOGY | Facility: HOSPITAL | Age: 64
End: 2019-10-24

## 2019-10-24 RX ORDER — SODIUM CHLORIDE, SODIUM LACTATE, POTASSIUM CHLORIDE, CALCIUM CHLORIDE 600; 310; 30; 20 MG/100ML; MG/100ML; MG/100ML; MG/100ML
125 INJECTION, SOLUTION INTRAVENOUS CONTINUOUS
Status: CANCELLED | OUTPATIENT
Start: 2019-10-24

## 2019-10-25 ENCOUNTER — HOSPITAL ENCOUNTER (OUTPATIENT)
Dept: GASTROENTEROLOGY | Facility: HOSPITAL | Age: 64
Setting detail: OUTPATIENT SURGERY
Discharge: HOME | End: 2019-10-25
Attending: INTERNAL MEDICINE | Admitting: INTERNAL MEDICINE
Payer: COMMERCIAL

## 2019-10-25 ENCOUNTER — APPOINTMENT (OUTPATIENT)
Dept: PHYSICAL THERAPY | Age: 64
End: 2019-10-25
Payer: COMMERCIAL

## 2019-10-25 ENCOUNTER — ANESTHESIA (OUTPATIENT)
Dept: GASTROENTEROLOGY | Facility: HOSPITAL | Age: 64
End: 2019-10-25

## 2019-10-25 VITALS
HEIGHT: 64 IN | SYSTOLIC BLOOD PRESSURE: 107 MMHG | DIASTOLIC BLOOD PRESSURE: 61 MMHG | HEART RATE: 55 BPM | TEMPERATURE: 97.8 F | RESPIRATION RATE: 13 BRPM | BODY MASS INDEX: 31.31 KG/M2 | WEIGHT: 183.42 LBS | OXYGEN SATURATION: 97 %

## 2019-10-25 DIAGNOSIS — R07.89 OTHER CHEST PAIN: ICD-10-CM

## 2019-10-25 DIAGNOSIS — K21.9 GASTROESOPHAGEAL REFLUX DISEASE WITHOUT ESOPHAGITIS: ICD-10-CM

## 2019-10-25 PROCEDURE — 88305 TISSUE EXAM BY PATHOLOGIST: CPT | Performed by: PATHOLOGY

## 2019-10-25 PROCEDURE — 43239 EGD BIOPSY SINGLE/MULTIPLE: CPT | Performed by: INTERNAL MEDICINE

## 2019-10-25 RX ORDER — PROPOFOL 10 MG/ML
INJECTION, EMULSION INTRAVENOUS AS NEEDED
Status: DISCONTINUED | OUTPATIENT
Start: 2019-10-25 | End: 2019-10-25 | Stop reason: SURG

## 2019-10-25 RX ORDER — SODIUM CHLORIDE, SODIUM LACTATE, POTASSIUM CHLORIDE, CALCIUM CHLORIDE 600; 310; 30; 20 MG/100ML; MG/100ML; MG/100ML; MG/100ML
INJECTION, SOLUTION INTRAVENOUS CONTINUOUS PRN
Status: DISCONTINUED | OUTPATIENT
Start: 2019-10-25 | End: 2019-10-25 | Stop reason: SURG

## 2019-10-25 RX ORDER — FENTANYL CITRATE 50 UG/ML
INJECTION, SOLUTION INTRAMUSCULAR; INTRAVENOUS AS NEEDED
Status: DISCONTINUED | OUTPATIENT
Start: 2019-10-25 | End: 2019-10-25 | Stop reason: SURG

## 2019-10-25 RX ORDER — LIDOCAINE HYDROCHLORIDE 10 MG/ML
INJECTION, SOLUTION INFILTRATION; PERINEURAL AS NEEDED
Status: DISCONTINUED | OUTPATIENT
Start: 2019-10-25 | End: 2019-10-25 | Stop reason: SURG

## 2019-10-25 RX ADMIN — PROPOFOL 100 MG: 10 INJECTION, EMULSION INTRAVENOUS at 07:21

## 2019-10-25 RX ADMIN — SODIUM CHLORIDE, SODIUM LACTATE, POTASSIUM CHLORIDE, AND CALCIUM CHLORIDE: .6; .31; .03; .02 INJECTION, SOLUTION INTRAVENOUS at 07:19

## 2019-10-25 RX ADMIN — LIDOCAINE HYDROCHLORIDE 80 MG: 10 INJECTION, SOLUTION INFILTRATION; PERINEURAL at 07:21

## 2019-10-25 RX ADMIN — FENTANYL CITRATE 50 MCG: 50 INJECTION, SOLUTION INTRAMUSCULAR; INTRAVENOUS at 07:21

## 2019-10-25 NOTE — INTERVAL H&P NOTE
H&P reviewed  After examining the patient I find no changes in the patients condition since the H&P had been written      Vitals:    10/25/19 0648   BP: 119/60   Pulse: 67   Resp: 12   Temp: 98 2 °F (36 8 °C)   SpO2: 99%

## 2019-10-25 NOTE — ANESTHESIA PREPROCEDURE EVALUATION
Review of Systems/Medical History  Patient summary reviewed    History of anesthetic complications (PONV Hx ; Scope patch ordered)     Cardiovascular  EKG reviewed, Negative cardio ROS Hyperlipidemia, Dysrhythmias , DVT  Comment: Hx pvc's Stress normal EF>60%,  Pulmonary  Negative pulmonary ROS Asthma Asthma type of rescue: PRN inhaler, Shortness of breath,        GI/Hepatic  Negative GI/hepatic ROS   GERD ,        Negative  ROS        Endo/Other  History of thyroid disease , hypothyroidism,   Obesity    GYN  Negative gynecology ROS          Hematology  Negative hematology ROS      Musculoskeletal  Negative musculoskeletal ROS   Arthritis     Neurology  Negative neurology ROS   Headaches,    Psychology   Psychiatric history,   Chronic pain,            Physical Exam    Airway    Mallampati score: II  TM Distance: >3 FB  Neck ROM: full     Dental   No notable dental hx     Cardiovascular  Comment: Negative ROS, Rhythm: regular, Rate: normal, Cardiovascular exam normal    Pulmonary  Pulmonary exam normal Breath sounds clear to auscultation,     Other Findings        Anesthesia Plan  ASA Score- 2     Anesthesia Type- IV sedation with anesthesia with ASA Monitors  Additional Monitors:   Airway Plan:     Comment: PONV Hx  Plan Factors-    Induction- intravenous  Postoperative Plan-     Informed Consent- Anesthetic plan and risks discussed with patient

## 2019-10-25 NOTE — PROGRESS NOTES
Assessment/Plan: An EKG was done and was unchanged  There were no acute changes  The discomfort is reproducible by epigastric palpation  I believe it is gastrointestinal in etiology  I have advised her to take the Protonix 2 times per day and to make appointment with her gastroenterologist as soon as possible for consideration to repeat EGD  Will follow her up afterwards  As always should her pain pattern change she should go back to the emergency room  1  Deep vein thrombosis (DVT) of left lower extremity, unspecified chronicity, unspecified vein (HCC)  TEDS Stockings   2  Epigastric pain  Ambulatory referral to Gastroenterology       Orders Placed This Encounter   Procedures   [id number] [facility] Ambulatory referral to Gastroenterology         Subjective: This is a follow-up from an emergency room visit  Patient had epigastric and retrosternal chest pain  She went to the emergency room  Myocardial injury was ruled out  The location seem to be in the epigastrium  She took an extra dose of pantoprazole  Today she still has some discomfort  Patient ID: Beverly Diaz is a 70 y o  female  HPI    The following portions of the patient's history were reviewed and updated as appropriate:   She has a past medical history of Acid reflux, Allergic rhinitis, Anesthesia complication, Arthritis, Asthma, Bigeminy, DVT (deep venous thrombosis) ([facility] [place] [address] ) (10/23/2018), DVT (deep venous thrombosis) ([facility] [place] [address] ), DVT of leg (deep venous thrombosis) ([facility] [place] [address] ) (), Female pelvic pain, Hyperlipidemia, Intermittent palpitations, Irregular heart beat, Migraines, Obesity (BMI 30 0-34 9), Palpitations, Pes planus, Thyroid nodule, Trigeminy, and Wears glasses  ,  does not have any pertinent problems on file  ,   has a past surgical history that includes Replacement total knee (); Appendectomy; Knee surgery (Left);  section; Joint replacement; Dilation and curettage of uterus; Colonoscopy;  [place] tooth extraction; Cholecystectomy; pr laparoscopy w tot hysterectuterus <=250 gram  w tube/ovary (Bilateral, 7/8/2016); pr esophagogastroduodenoscopy transoral diagnostic (N/A, 3/14/2016); Cataract extraction; and US guided breast biopsy left complete (Left, 2/6/2019)  ,  family history includes Alzheimer's disease in her maternal aunt and maternal uncle; Breast cancer in her maternal aunt; Cancer in her family; Diabetes in her father and paternal aunt; Diabetes type II in her brother; Endometrial cancer (age of onset: 76) in her mother; Growth hormone deficiency in her daughter; Hashimoto's thyroiditis in her paternal aunt; Heart attack in her father; Heart disease in her father; Hypertension in her father and paternal aunt; Migraines in her mother; Other in her maternal uncle; Prostate cancer in her father; Stroke in her paternal uncle; Thyroid cancer in her brother and maternal aunt  ,   reports that she quit smoking about 41 years ago  Her smoking use included cigarettes  She smoked 0 50 packs per day  She has never used smokeless tobacco  She reports that she drinks alcohol  She reports that she does not use drugs  ,  is allergic to naproxen; iv contrast [iodinated diagnostic agents]; and seasonal ic [cholestatin]       Current Outpatient Medications:     albuterol (VENTOLIN HFA) 90 mcg/act inhaler, 1-2 puffs 4 times a day as needed, Disp: 18 Inhaler, Rfl: 0    butalbital-acetaminophen-caffeine (FIORICET,ESGIC) -40 mg per tablet, Take 1 tablet by mouth every 4 (four) hours as needed for headaches, Disp: 20 tablet, Rfl: 0    cycloSPORINE (RESTASIS) 0 05 % ophthalmic emulsion, Apply 1 drop to eye 2 (two) times a day, Disp: , Rfl:     diazepam (VALIUM) 5 mg tablet, Take 1 tablet (5 mg total) by mouth every 8 (eight) hours as needed for anxiety, Disp: 60 tablet, Rfl: 0    diphenhydrAMINE (BENADRYL) 25 mg capsule, Take by mouth daily at bedtime as needed  , Disp: , Rfl:     Evening Primrose Oil 1000 MG CAPS, Take 1 capsule by mouth daily, Disp: , Rfl:     fluticasone (FLONASE) 50 mcg/act nasal spray, 2 sprays into each nostril daily as needed  , Disp: , Rfl:     fluticasone (FLOVENT HFA) 220 mcg/act inhaler, Inhale 1 puff 2 (two) times a day, Disp: 12 Inhaler, Rfl: 2    loratadine (CLARITIN) 10 mg tablet, Take 1 tablet (10 mg total) by mouth daily, Disp: 30 tablet, Rfl: 0    loteprednol etabonate (LOTEMAX) 0 5 % ophthalmic suspension, Administer 1 drop to both eyes 3 (three) times a day as needed  , Disp: , Rfl:     montelukast (SINGULAIR) 10 mg tablet, Take 1 tablet (10 mg total) by mouth daily at bedtime, Disp: 30 tablet, Rfl: 0    Multiple Vitamin (MULTIVITAMIN) capsule, Take 1 capsule by mouth daily  , Disp: , Rfl:     promethazine-codeine (PHENERGAN WITH CODEINE) 6 25-10 mg/5 mL syrup, Take 5 mL by mouth every 4 (four) hours as needed for cough, Disp: 240 mL, Rfl: 0    rivaroxaban (XARELTO) 10 mg tablet, Take 1 tablet (10 mg total) by mouth daily, Disp: 14 tablet, Rfl: 0    ZOLMitriptan (ZOMIG) 5 MG tablet, Take 1 tablet (5 mg total) by mouth once as needed for migraine for up to 1 dose, Disp: 8 tablet, Rfl: 0    calcium-vitamin D (OSCAL) 250-125 MG-UNIT per tablet, Take 1 tablet by mouth daily, Disp: , Rfl:     pantoprazole (PROTONIX) 40 mg tablet, PT  TO TAKE 2 TABS DAILY, Disp: 180 tablet, Rfl: 1  No current facility-administered medications for this visit  Review of Systems   Constitutional: Negative for activity change, appetite change, chills, diaphoresis, fatigue, fever and unexpected weight change  HENT: Negative for congestion, ear pain, hearing loss, mouth sores, nosebleeds, postnasal drip, sinus pressure, sinus pain, sore throat and trouble swallowing  Eyes: Negative for pain, discharge and visual disturbance  Respiratory: Negative for apnea, cough, chest tightness, shortness of breath and wheezing  Cardiovascular: Negative for chest pain, palpitations and leg swelling  Gastrointestinal: Negative for abdominal pain, anal bleeding, blood in stool, constipation, diarrhea, nausea and vomiting  She has epigastric discomfort as described above  Endocrine: Negative for polydipsia and polyphagia  Genitourinary: Negative for decreased urine volume, dysuria, flank pain, frequency, hematuria and urgency  Musculoskeletal: Negative for arthralgias, back pain, gait problem, joint swelling and myalgias  Skin: Negative for rash and wound  Allergic/Immunologic: Negative for environmental allergies and food allergies  Neurological: Negative for dizziness, tremors, seizures, syncope, speech difficulty, light-headedness, numbness and headaches  Hematological: Negative for adenopathy  Does not bruise/bleed easily  Psychiatric/Behavioral: Negative for agitation, confusion, hallucinations, sleep disturbance and suicidal ideas  The patient is not nervous/anxious  Objective:  /68 (BP Location: Left arm, Patient Position: Sitting, Cuff Size: Standard)   Pulse 71   Ht 5' 4" (1 626 m)   Wt 83 9 kg (185 lb)   LMP  (LMP Unknown)   SpO2 96%   BMI 31 76 kg/m²      Physical Exam   Constitutional: She appears well-developed and well-nourished  No distress  HENT:   Head: Normocephalic  Right Ear: External ear normal    Left Ear: External ear normal    Nose: Nose normal    Mouth/Throat: Oropharynx is clear and moist  No oropharyngeal exudate  Eyes: Pupils are equal, round, and reactive to light  Conjunctivae and EOM are normal  Right eye exhibits no discharge  Left eye exhibits no discharge  Neck: Normal range of motion  Neck supple  No thyromegaly present  Cardiovascular: Normal rate, regular rhythm, normal heart sounds and intact distal pulses  Exam reveals no gallop and no friction rub  No murmur heard  Pulmonary/Chest: Effort normal and breath sounds normal  No respiratory distress  She has no wheezes  She has no rales  Abdominal: Soft   Bowel sounds are normal  She exhibits no distension and no mass  There is no tenderness  There is no rebound and no guarding  She has a little bit of epigastric discomfort  Also little tenderness of the lower sternum  Musculoskeletal: Normal range of motion  She exhibits no edema, tenderness or deformity  Lymphadenopathy:     She has no cervical adenopathy  Neurological: She is alert  She has normal reflexes  She displays normal reflexes  No cranial nerve deficit  Coordination normal    Skin: Skin is warm and dry  No rash noted  No erythema  Psychiatric: She has a normal mood and affect  Her behavior is normal  Judgment and thought content normal    Nursing note and vitals reviewed          Recent Results (from the past 1008 hour(s))   POCT rapid strepA    Collection Time: 09/29/19  9:20 AM   Result Value Ref Range     RAPID STREP A Negative Negative   Throat culture    Collection Time: 09/29/19  9:20 AM   Result Value Ref Range    Throat Culture Negative for beta-hemolytic Streptococcus    ECG 12 lead    Collection Time: 10/20/19  6:27 PM   Result Value Ref Range    Ventricular Rate 72 BPM    Atrial Rate 72 BPM    UT Interval 192 ms    QRSD Interval 80 ms    QT Interval 418 ms    QTC Interval 457 ms    P Axis 50 degrees    QRS Axis 66 degrees    T Wave Axis 25 degrees   CBC and differential    Collection Time: 10/20/19  6:32 PM   Result Value Ref Range    WBC 8 95 4 31 - 10 16 Thousand/uL    RBC 5 08 3 81 - 5 12 Million/uL    Hemoglobin 13 6 11 5 - 15 4 g/dL    Hematocrit 42 8 34 8 - 46 1 %    MCV 84 82 - 98 fL    MCH 26 8 26 8 - 34 3 pg    MCHC 31 8 31 4 - 37 4 g/dL    RDW 14 4 11 6 - 15 1 %    MPV 9 3 8 9 - 12 7 fL    Platelets 924 211 - 514 Thousands/uL    nRBC 0 /100 WBCs    Neutrophils Relative 58 43 - 75 %    Immat GRANS % 0 0 - 2 %    Lymphocytes Relative 31 14 - 44 %    Monocytes Relative 8 4 - 12 %    Eosinophils Relative 2 0 - 6 %    Basophils Relative 1 0 - 1 %    Neutrophils Absolute 5 18 1 85 - 7 62 Thousands/µL    Immature Grans Absolute 0 04 0 00 - 0 20 Thousand/uL    Lymphocytes Absolute 2 77 0 60 - 4 47 Thousands/µL    Monocytes Absolute 0 70 0 17 - 1 22 Thousand/µL    Eosinophils Absolute 0 20 0 00 - 0 61 Thousand/µL    Basophils Absolute 0 06 0 00 - 0 10 Thousands/µL   Comprehensive metabolic panel    Collection Time: 10/20/19  6:32 PM   Result Value Ref Range    Sodium 142 136 - 145 mmol/L    Potassium 4 6 3 5 - 5 3 mmol/L    Chloride 104 100 - 108 mmol/L    CO2 30 21 - 32 mmol/L    ANION GAP 8 4 - 13 mmol/L    BUN 15 5 - 25 mg/dL    Creatinine 0 69 0 60 - 1 30 mg/dL    Glucose 99 65 - 140 mg/dL    Calcium 8 9 8 3 - 10 1 mg/dL    AST 33 5 - 45 U/L    ALT 29 12 - 78 U/L    Alkaline Phosphatase 117 (H) 46 - 116 U/L    Total Protein 7 5 6 4 - 8 2 g/dL    Albumin 3 7 3 5 - 5 0 g/dL    Total Bilirubin 0 30 0 20 - 1 00 mg/dL    eGFR 92 ml/min/1 73sq m   Troponin I    Collection Time: 10/20/19  6:32 PM   Result Value Ref Range    Troponin I <0 02 <=0 04 ng/mL

## 2019-10-25 NOTE — ANESTHESIA POSTPROCEDURE EVALUATION
Post-Op Assessment Note    CV Status:  Stable  Pain Score: 0    Pain management: adequate     Mental Status:  Sleepy   Hydration Status:  Stable and euvolemic   PONV Controlled:  None   Airway Patency:  Patent   Post Op Vitals Reviewed: Yes      Staff: CRNA           BP   110/67   Temp      Pulse 78   Resp 18   SpO2 97

## 2019-10-25 NOTE — DISCHARGE INSTRUCTIONS
Upper Endoscopy   WHAT YOU NEED TO KNOW:   An upper endoscopy is also called an upper gastrointestinal (GI) endoscopy, or an esophagogastroduodenoscopy (EGD)  You may feel bloated, gassy, or have some abdominal discomfort after your procedure  Your throat may be sore for 24 to 36 hours  You may burp or pass gas from air that is still inside your body  DISCHARGE INSTRUCTIONS:   Call 911 for any of the following:   · You have sudden chest pain or trouble breathing  Seek care immediately if:   · You feel dizzy or faint  · You have trouble swallowing  · Your bowel movements are very dark or black  · Your abdomen is hard and firm and you have severe pain  · You vomit blood  Contact your healthcare provider if:   · You feel full or bloated and cannot burp or pass gas  · You have not had a bowel movement for 3 days after your procedure  · You have neck pain  · You have a fever or chills  · You have nausea or are vomiting  · You have a rash or hives  · You have questions or concerns about your endoscopy  Relieve a sore throat:  Suck on throat lozenges or crushed ice  Gargle with a small amount of warm salt water  Mix 1 teaspoon of salt and 1 cup of warm water to make salt water  Relieve gas and discomfort from bloating:  Lie on your right side with a heating pad on your abdomen  Take short walks to help pass gas  Eat small meals until bloating is relieved  Rest after your procedure: You have been given medicine to relax you  Do not  drive or make important decisions until the day after your procedure  Return to your normal activity as directed  You can usually return to work the day after your procedure  Follow up with your healthcare provider as directed:  Write down your questions so you remember to ask them during your visits     © 2017 [address] is for End User's use only and may not be sold, redistributed or otherwise used for commercial purposes  All illustrations and images included in CareNotes® are the copyrighted property of [employer]  or [employer]  The above information is an  only  It is not intended as medical advice for individual conditions or treatments  Talk to your doctor, nurse or pharmacist before following any medical regimen to see if it is safe and effective for you

## 2019-10-28 ENCOUNTER — OFFICE VISIT (OUTPATIENT)
Dept: PHYSICAL THERAPY | Age: 64
End: 2019-10-28
Payer: COMMERCIAL

## 2019-10-28 DIAGNOSIS — M54.12 CERVICAL RADICULOPATHY: Primary | ICD-10-CM

## 2019-10-28 DIAGNOSIS — K21.9 GASTROESOPHAGEAL REFLUX DISEASE WITHOUT ESOPHAGITIS: ICD-10-CM

## 2019-10-28 PROCEDURE — 97014 ELECTRIC STIMULATION THERAPY: CPT | Performed by: PHYSICAL THERAPIST

## 2019-10-28 PROCEDURE — 97110 THERAPEUTIC EXERCISES: CPT | Performed by: PHYSICAL THERAPIST

## 2019-10-28 PROCEDURE — 97140 MANUAL THERAPY 1/> REGIONS: CPT | Performed by: PHYSICAL THERAPIST

## 2019-10-28 RX ORDER — PANTOPRAZOLE SODIUM 40 MG/1
TABLET, DELAYED RELEASE ORAL
Qty: 180 TABLET | Refills: 1 | Status: SHIPPED | OUTPATIENT
Start: 2019-10-28 | End: 2020-01-02 | Stop reason: SDUPTHER

## 2019-10-28 NOTE — PROGRESS NOTES
Daily Note     Today's date: 10/28/2019  Patient name: Twyla Velazquez  : 1955  MRN: [id number]  Referring provider: Yuridia Seals DO  Dx:   Encounter Diagnosis     ICD-10-CM    1  Cervical radiculopathy M54 12        Start Time: 615  Stop Time: 715  Total time in clinic (min): 60 minutes    Subjective: Patient reports she was experiencing chest pain last week and went to the ER but was cleared of any cardiac problems  Pain level is 3/10 today  Objective: See treatment diary below      Assessment: Tolerated treatment well  Patient will continue to benefit from PT  Plan: Continue per plan of care  Progress treatment as tolerated         Precautions: DJD/OA, Migraines, TKR      Manual  10/21 10/28       10/14 10/18   MT Cervical: S/O area 15 10 10 10 10 10   10 10 10 10   MFR 10 10 10 10 10 10   10 10 10 10   Arm Pulls 5 5 5 5 5 5 5 5 5 5                                 Exercise Diary  10/21 10/28       10/14 10/18   Cervical ROM/Isometric 15 15 15 15 15 15 15 15 15 15   Postural Ed  5 5 5 5 5 5 5 5 5 5   Cervical AROM 10 10 10 10 10 10 10 10 10 10   Corner Push up nt 15 15 15 15 15 15 15 15 15                                                                                                                                                                            HEP Stretches/  Postural ex 5                                          Modalities  10/21/ 2019 10/28       10/14 10/18   HT with e/stim 15 15 15 15 15 15 15 15 15 15

## 2019-10-28 NOTE — TELEPHONE ENCOUNTER
----- Message from Serenity Marie MD sent at 10/28/2019  1:35 PM EDT -----  We spoke; [facility] and [facility] with me in 5 weeks for OV- she can be overbooked if needed

## 2019-11-01 ENCOUNTER — OFFICE VISIT (OUTPATIENT)
Dept: PHYSICAL THERAPY | Age: 64
End: 2019-11-01
Payer: COMMERCIAL

## 2019-11-01 ENCOUNTER — OFFICE VISIT (OUTPATIENT)
Dept: INTERNAL MEDICINE CLINIC | Facility: CLINIC | Age: 64
End: 2019-11-01
Payer: COMMERCIAL

## 2019-11-01 VITALS
HEART RATE: 76 BPM | OXYGEN SATURATION: 99 % | HEIGHT: 64 IN | BODY MASS INDEX: 31.65 KG/M2 | WEIGHT: 185.4 LBS | SYSTOLIC BLOOD PRESSURE: 118 MMHG | DIASTOLIC BLOOD PRESSURE: 70 MMHG

## 2019-11-01 DIAGNOSIS — K21.9 GASTROESOPHAGEAL REFLUX DISEASE WITHOUT ESOPHAGITIS: ICD-10-CM

## 2019-11-01 DIAGNOSIS — M54.12 CERVICAL RADICULOPATHY: Primary | ICD-10-CM

## 2019-11-01 DIAGNOSIS — M54.2 NECK PAIN: ICD-10-CM

## 2019-11-01 DIAGNOSIS — E04.1 THYROID NODULE: ICD-10-CM

## 2019-11-01 DIAGNOSIS — E78.1 HYPERTRIGLYCERIDEMIA: ICD-10-CM

## 2019-11-01 DIAGNOSIS — Z23 IMMUNIZATION DUE: ICD-10-CM

## 2019-11-01 DIAGNOSIS — J30.1 ALLERGIC RHINITIS DUE TO POLLEN, UNSPECIFIED SEASONALITY: ICD-10-CM

## 2019-11-01 DIAGNOSIS — I82.402 DEEP VEIN THROMBOSIS (DVT) OF LEFT LOWER EXTREMITY, UNSPECIFIED CHRONICITY, UNSPECIFIED VEIN (HCC): Primary | ICD-10-CM

## 2019-11-01 PROCEDURE — 90471 IMMUNIZATION ADMIN: CPT | Performed by: INTERNAL MEDICINE

## 2019-11-01 PROCEDURE — 97014 ELECTRIC STIMULATION THERAPY: CPT | Performed by: PHYSICAL THERAPIST

## 2019-11-01 PROCEDURE — 90670 PCV13 VACCINE IM: CPT | Performed by: INTERNAL MEDICINE

## 2019-11-01 PROCEDURE — 99214 OFFICE O/P EST MOD 30 MIN: CPT | Performed by: INTERNAL MEDICINE

## 2019-11-01 PROCEDURE — 97140 MANUAL THERAPY 1/> REGIONS: CPT | Performed by: PHYSICAL THERAPIST

## 2019-11-01 PROCEDURE — 97110 THERAPEUTIC EXERCISES: CPT | Performed by: PHYSICAL THERAPIST

## 2019-11-01 RX ORDER — FLUTICASONE PROPIONATE 50 MCG
2 SPRAY, SUSPENSION (ML) NASAL DAILY
Qty: 1 BOTTLE | Refills: 5 | Status: SHIPPED | OUTPATIENT
Start: 2019-11-01 | End: 2022-01-05 | Stop reason: SDUPTHER

## 2019-11-01 NOTE — PROGRESS NOTES
Assessment/Plan:  Regarding her gastrointestinal issues she is going to continue her medications and follow up with her gastroenterologist     She is going to get a Prevnar 13 today  She is going to have a thyroid ultrasound and I will see her back here in 2 months to re-evaluate  She will continue the Protonix for the moment  1  Deep vein thrombosis (DVT) of left lower extremity, unspecified chronicity, unspecified vein (HCC)     2  Gastroesophageal reflux disease without esophagitis     3  Thyroid nodule     4  Hypertriglyceridemia     5  Immunization due  PNEUMOCOCCAL CONJUGATE VACCINE 13-VALENT GREATER THAN 6 MONTHS   6  Allergic rhinitis due to pollen, unspecified seasonality  fluticasone (FLONASE) 50 mcg/act nasal spray       Orders Placed This Encounter   Procedures    PNEUMOCOCCAL CONJUGATE VACCINE 13-VALENT GREATER THAN 6 MONTHS         Subjective:  She is doing better regarding her epigastric pain  She had an EGD is showing some inflammation  She was increased on her pantoprazole  Otherwise current medicines were reviewed  Patient ID: Cassandria Severance is a 70 y o  female  HPI she has a history of a thyroid nodule  This will be evaluated with a thyroid ultrasound  She also is in need of a Prevnar 13  Her blood pressures are stable and she has no cardiac complaints  The following portions of the patient's history were reviewed and updated as appropriate:   She has a past medical history of Acid reflux, Allergic rhinitis, Anesthesia complication, Arthritis, Asthma, Bigeminy, DVT (deep venous thrombosis) ([facility] [address] ) (10/23/2018), DVT (deep venous thrombosis) ([facility] [address] DVT of leg (deep venous thrombosis) ([facility] [address] ) (2001), Female pelvic pain, Hyperlipidemia, Intermittent palpitations, Irregular heart beat, Migraines, Obesity (BMI 30 0-34 9), Palpitations, Pes planus, Thyroid nodule, Trigeminy, and Wears glasses  ,  does not have any pertinent problems on file  ,   has a past surgical history that includes Replacement total knee (); Appendectomy; Knee surgery (Left);  section; Joint replacement; Dilation and curettage of uterus; Colonoscopy; [place] tooth extraction; Cholecystectomy; pr laparoscopy w tot hysterectuterus <=250 gram  w tube/ovary (Bilateral, 2016); pr esophagogastroduodenoscopy transoral diagnostic (N/A, 3/14/2016); Cataract extraction; and US guided breast biopsy left complete (Left, 2019)  ,  family history includes Alzheimer's disease in her maternal aunt and maternal uncle; Breast cancer in her maternal aunt; Cancer in her family; Diabetes in her father and paternal aunt; Diabetes type II in her brother; Endometrial cancer (age of onset: 76) in her mother; Growth hormone deficiency in her daughter; Hashimoto's thyroiditis in her paternal aunt; Heart attack in her father; Heart disease in her father; Hypertension in her father and paternal aunt; Migraines in her mother; Other in her maternal uncle; Prostate cancer in her father; Stroke in her paternal uncle; Thyroid cancer in her brother and maternal aunt  ,   reports that she quit smoking about 41 years ago  Her smoking use included cigarettes  She smoked 0 50 packs per day  She has never used smokeless tobacco  She reports that she drinks alcohol  She reports that she does not use drugs  ,  is allergic to naproxen; iv contrast [iodinated diagnostic agents]; and seasonal ic [cholestatin]       Current Outpatient Medications:     albuterol (VENTOLIN HFA) 90 mcg/act inhaler, 1-2 puffs 4 times a day as needed, Disp: 18 Inhaler, Rfl: 0    butalbital-acetaminophen-caffeine (FIORICET,ESGIC) -40 mg per tablet, Take 1 tablet by mouth every 4 (four) hours as needed for headaches, Disp: 20 tablet, Rfl: 0    calcium-vitamin D (OSCAL) 250-125 MG-UNIT per tablet, Take 1 tablet by mouth daily, Disp: , Rfl:     cycloSPORINE (RESTASIS) 0 05 % ophthalmic emulsion, Apply 1 drop to eye 2 (two) times a day, Disp: , Rfl:   diazepam (VALIUM) 5 mg tablet, Take 1 tablet (5 mg total) by mouth every 8 (eight) hours as needed for anxiety, Disp: 60 tablet, Rfl: 0    diphenhydrAMINE (BENADRYL) 25 mg capsule, Take by mouth daily at bedtime as needed  , Disp: , Rfl:     Evening Primrose Oil 1000 MG CAPS, Take 1 capsule by mouth daily, Disp: , Rfl:     fluticasone (FLONASE) 50 mcg/act nasal spray, 2 sprays into each nostril daily, Disp: 1 Bottle, Rfl: 5    fluticasone (FLOVENT HFA) 220 mcg/act inhaler, Inhale 1 puff 2 (two) times a day, Disp: 12 Inhaler, Rfl: 2    loratadine (CLARITIN) 10 mg tablet, Take 1 tablet (10 mg total) by mouth daily, Disp: 30 tablet, Rfl: 0    loteprednol etabonate (LOTEMAX) 0 5 % ophthalmic suspension, Administer 1 drop to both eyes 3 (three) times a day as needed  , Disp: , Rfl:     montelukast (SINGULAIR) 10 mg tablet, Take 1 tablet (10 mg total) by mouth daily at bedtime, Disp: 30 tablet, Rfl: 0    Multiple Vitamin (MULTIVITAMIN) capsule, Take 1 capsule by mouth daily  , Disp: , Rfl:     pantoprazole (PROTONIX) 40 mg tablet, Take 1 tablet by mouth 2 times a day 30 minutes prior to breakfast and dinner, Disp: 180 tablet, Rfl: 1    promethazine-codeine (PHENERGAN WITH CODEINE) 6 25-10 mg/5 mL syrup, Take 5 mL by mouth every 4 (four) hours as needed for cough, Disp: 240 mL, Rfl: 0    rivaroxaban (XARELTO) 10 mg tablet, Take 1 tablet (10 mg total) by mouth daily, Disp: 14 tablet, Rfl: 0    ZOLMitriptan (ZOMIG) 5 MG tablet, Take 1 tablet (5 mg total) by mouth once as needed for migraine for up to 1 dose, Disp: 8 tablet, Rfl: 0    Review of Systems   Constitutional: Negative for activity change, appetite change, chills, diaphoresis, fatigue, fever and unexpected weight change  HENT: Negative for congestion, ear pain, hearing loss, mouth sores, nosebleeds, postnasal drip, sinus pressure, sinus pain, sore throat and trouble swallowing  Eyes: Negative for pain, discharge and visual disturbance  Respiratory: Negative for apnea, cough, chest tightness, shortness of breath and wheezing  Cardiovascular: Negative for chest pain, palpitations and leg swelling  Her chest pain is gone away  Gastrointestinal: Negative for abdominal pain, anal bleeding, blood in stool, constipation, diarrhea, nausea and vomiting  Endocrine: Negative for polydipsia and polyphagia  She has a history of a thyroid nodule  That needs to be re-evaluated  Genitourinary: Negative for decreased urine volume, dysuria, flank pain, frequency, hematuria and urgency  Musculoskeletal: Negative for arthralgias, back pain, gait problem, joint swelling and myalgias  Skin: Negative for rash and wound  Allergic/Immunologic: Negative for environmental allergies and food allergies  Neurological: Negative for dizziness, tremors, seizures, syncope, speech difficulty, light-headedness, numbness and headaches  Hematological: Negative for adenopathy  Does not bruise/bleed easily  Psychiatric/Behavioral: Negative for agitation, confusion, hallucinations, sleep disturbance and suicidal ideas  The patient is not nervous/anxious  Objective:  /70 (BP Location: Left arm, Patient Position: Sitting, Cuff Size: Standard)   Pulse 76   Ht 5' 4" (1 626 m)   Wt 84 1 kg (185 lb 6 4 oz)   LMP  (LMP Unknown)   SpO2 99%   BMI 31 82 kg/m²      Physical Exam   Constitutional: She appears well-developed and well-nourished  No distress  Blood pressure is 118/70  Heart rhythm is regular  Rate is 80  HENT:   Head: Normocephalic  Right Ear: External ear normal    Left Ear: External ear normal    Nose: Nose normal    Mouth/Throat: Oropharynx is clear and moist  No oropharyngeal exudate  Eyes: Pupils are equal, round, and reactive to light  Conjunctivae and EOM are normal  Right eye exhibits no discharge  Left eye exhibits no discharge  Neck: Normal range of motion  Neck supple  No thyromegaly present     There is a suggestion of enlargement of the right side of her thyroid  No cervical adenopathy  Cardiovascular: Normal rate, regular rhythm, normal heart sounds and intact distal pulses  Exam reveals no gallop and no friction rub  No murmur heard  Pulmonary/Chest: Effort normal and breath sounds normal  No respiratory distress  She has no wheezes  She has no rales  Abdominal: Soft  Bowel sounds are normal  She exhibits no distension and no mass  There is no tenderness  There is no rebound and no guarding  Musculoskeletal: Normal range of motion  She exhibits no edema, tenderness or deformity  Lymphadenopathy:     She has no cervical adenopathy  Neurological: She is alert  She has normal reflexes  She displays normal reflexes  No cranial nerve deficit  Coordination normal    Skin: Skin is warm and dry  No rash noted  No erythema  Psychiatric: She has a normal mood and affect  Her behavior is normal  Judgment and thought content normal    Nursing note and vitals reviewed          Recent Results (from the past 1008 hour(s))   POCT rapid strepA    Collection Time: 09/29/19  9:20 AM   Result Value Ref Range     RAPID STREP A Negative Negative   Throat culture    Collection Time: 09/29/19  9:20 AM   Result Value Ref Range    Throat Culture Negative for beta-hemolytic Streptococcus    ECG 12 lead    Collection Time: 10/20/19  6:27 PM   Result Value Ref Range    Ventricular Rate 72 BPM    Atrial Rate 72 BPM    MN Interval 192 ms    QRSD Interval 80 ms    QT Interval 418 ms    QTC Interval 457 ms    P Axis 50 degrees    QRS Axis 66 degrees    T Wave Axis 25 degrees   CBC and differential    Collection Time: 10/20/19  6:32 PM   Result Value Ref Range    WBC 8 95 4 31 - 10 16 Thousand/uL    RBC 5 08 3 81 - 5 12 Million/uL    Hemoglobin 13 6 11 5 - 15 4 g/dL    Hematocrit 42 8 34 8 - 46 1 %    MCV 84 82 - 98 fL    MCH 26 8 26 8 - 34 3 pg    MCHC 31 8 31 4 - 37 4 g/dL    RDW 14 4 11 6 - 15 1 %    MPV 9 3 8 9 - 12 7 fL Platelets 075 909 - 461 Thousands/uL    nRBC 0 /100 WBCs    Neutrophils Relative 58 43 - 75 %    Immat GRANS % 0 0 - 2 %    Lymphocytes Relative 31 14 - 44 %    Monocytes Relative 8 4 - 12 %    Eosinophils Relative 2 0 - 6 %    Basophils Relative 1 0 - 1 %    Neutrophils Absolute 5 18 1 85 - 7 62 Thousands/µL    Immature Grans Absolute 0 04 0 00 - 0 20 Thousand/uL    Lymphocytes Absolute 2 77 0 60 - 4 47 Thousands/µL    Monocytes Absolute 0 70 0 17 - 1 22 Thousand/µL    Eosinophils Absolute 0 20 0 00 - 0 61 Thousand/µL    Basophils Absolute 0 06 0 00 - 0 10 Thousands/µL   Comprehensive metabolic panel    Collection Time: 10/20/19  6:32 PM   Result Value Ref Range    Sodium 142 136 - 145 mmol/L    Potassium 4 6 3 5 - 5 3 mmol/L    Chloride 104 100 - 108 mmol/L    CO2 30 21 - 32 mmol/L    ANION GAP 8 4 - 13 mmol/L    BUN 15 5 - 25 mg/dL    Creatinine 0 69 0 60 - 1 30 mg/dL    Glucose 99 65 - 140 mg/dL    Calcium 8 9 8 3 - 10 1 mg/dL    AST 33 5 - 45 U/L    ALT 29 12 - 78 U/L    Alkaline Phosphatase 117 (H) 46 - 116 U/L    Total Protein 7 5 6 4 - 8 2 g/dL    Albumin 3 7 3 5 - 5 0 g/dL    Total Bilirubin 0 30 0 20 - 1 00 mg/dL    eGFR 92 ml/min/1 73sq m   Troponin I    Collection Time: 10/20/19  6:32 PM   Result Value Ref Range    Troponin I <0 02 <=0 04 ng/mL   Tissue Exam    Collection Time: 10/25/19  7:29 AM   Result Value Ref Range    Case Report       Surgical Pathology Report                         Case: [id number]                                   Authorizing Provider:  Osa Eisenmenger, MD   Collected:           10/25/2019 0729              Ordering Location:      [facility]       Received:            10/25/2019 [place] [address] Endoscopy                                                             Pathologist:           Cruz Mcburney, MD                                                        Specimens:   A) - Duodenum B) - Stomach                                                                                        C) - Polyp, Stomach/Small Intestine, fundus                                                Final Diagnosis       A  Duodenum, biopsy:  -  Benign duodenal mucosa  -  No villous atrophy, intraepithelial lymphocytosis or crypt hyperplasia; negative for features of malabsorptive enteropathy   -  Negative for chronic or active duodenitis, dysplasia or malignancy  B   Stomach, biopsy:  -  Chronic inactive oxyntic and antral gastritis  -  Negative for Helicobacter pylori, by H&E stain   -  Negative for atrophy, intestinal metaplasia, dysplasia or carcinoma  C   Stomach, fundus polyp, biopsy:  -  Benign cystic fundic gland polyp   -  Negative for intestinal metaplasia, dysplasia or carcinoma  Additional Information       All controls performed with the immunohistochemical stains reported above reacted appropriately  These tests were developed and their performance characteristics determined by [address] [place] [address] or [employer]  They may not be cleared or approved by the [place]  Food and Drug Administration  The FDA has determined that such clearance or approval is not necessary  These tests are used for clinical purposes  They should not be regarded as investigational or for research  This laboratory has been approved by CLIA 88, designated as a high-complexity laboratory and is qualified to perform these tests  Gross Description       A  The specimen is received in formalin, labeled with the patient's name and hospital number, and is designated "duodenum  The specimen consists of 3 tan soft tissue fragments measuring 0 1-0 3 cm in greatest dimensions  The specimen is entirely submitted in a screened cassette  B  The specimen is received in formalin, labeled with the patient's name and hospital number, and is designated "stomach"    The specimen consists of 4 tan flat and elongated soft tissue fragments measuring 0 3-0 7 cm in greatest dimensions  The specimen is entirely submitted in a screened cassette  C  The specimen is received in formalin, labeled with the patient's name and hospital number, and is designated "polyp fundus  The specimen consists of a single tan soft tissue fragment measuring 0 3 cm in greatest dimensions  The specimen is entirely submitted in a screened cassette  Note: The estimated total formalin fixation time based upon information provided by the submitting clinician and the standard processing schedule is under 72 hours      [facility]                                          Clinical Information [facility] r/o angie ledbetter

## 2019-11-01 NOTE — PROGRESS NOTES
Daily Note     Today's date: 2019  Patient name: Annika Mariee  : 1955  MRN: [id number]  Referring provider: Lashawn Cabrales DO  Dx:   Encounter Diagnosis     ICD-10-CM    1  Cervical radiculopathy M54 12    2  Neck pain M54 2        Start Time: 0900  Stop Time: 4704  Total time in clinic (min): 52 minutes    Subjective:  Headache and mild tension upper shoulders and neck  No pain post treatment  Decreased sternal pain  Objective: See treatment diary below      Assessment: Tolerated treatment well  Patient would benefit from continued PT Right pec minor trigger point that decreased in tenderness post manual therapy  Improving postural awareness  Plan: Continue per plan of care        Precautions: DJD/OA, Migraines, TKR      Manual  10/21 10/28 11/1      10/14 10/18   MT Cervical: S/O area 15 10 10 10 10 10   10 10 10 10   MFR 10 10 10 10 10 10   10 10 10 10   Arm Pulls 5 5 5 5 5 5 5 5 5 5                                 Exercise Diary  10/21 10/28 11/1      10/14 10/18   Cervical ROM/Isometric 15 15 15 15 15 15 15 15 15 15   Postural Ed  5 5 5 5 5 5 5 5 5 5   Cervical AROM 10 10 10 10 10 10 10 10 10 10   Corner Push up nt 15 15 15 15 15 15 15 15 15                                                                                                                                                                            HEP Stretches/  Postural ex 5                                          Modalities  10/21/ 2019 10/28       10/14 10/18   HT with e/stim 15 15 15 15 15 15 15 15 15 15

## 2019-11-07 ENCOUNTER — OFFICE VISIT (OUTPATIENT)
Dept: PHYSICAL THERAPY | Age: 64
End: 2019-11-07
Payer: COMMERCIAL

## 2019-11-07 DIAGNOSIS — M54.12 CERVICAL RADICULOPATHY: Primary | ICD-10-CM

## 2019-11-07 DIAGNOSIS — M54.2 NECK PAIN: ICD-10-CM

## 2019-11-07 PROCEDURE — 97014 ELECTRIC STIMULATION THERAPY: CPT | Performed by: PHYSICAL THERAPIST

## 2019-11-07 PROCEDURE — 97110 THERAPEUTIC EXERCISES: CPT | Performed by: PHYSICAL THERAPIST

## 2019-11-07 PROCEDURE — 97140 MANUAL THERAPY 1/> REGIONS: CPT | Performed by: PHYSICAL THERAPIST

## 2019-11-07 NOTE — PROGRESS NOTES
Daily Note     Today's date: 2019  Patient name: Jenna Juan  : 1955  MRN: [id number]  Referring provider: Netta Bowles DO  Dx:   Encounter Diagnosis     ICD-10-CM    1  Cervical radiculopathy M54 12    2  Neck pain M54 2        Start Time: 912  Stop Time: 1003  Total time in clinic (min): 51 minutes    Subjective: Patient notes she has been "having a lot of muscle pain" in her neck and shoulders  She relates that work stress contributes to pain  Notes generally relief of pain post PT sessions  She intends to start a land and aquatic fitness program in the next weeks  Objective: See treatment diary below      Assessment: Tolerated treatment well  Patient would benefit from continued PT Moderate left paraspinal tightness with tenderness to palpation  Reviewed pec stretch for home  Responds well to manual therapies with decreased pain afterward  Increased postural awareness  Plan: Continue per plan of care        Precautions: DJD/OA, Migraines, TKR      Manual  10/21 10/28 11/1 11/7     10/14 10/18   MT Cervical: S/O area 15 10 10 10 10 10   10 10 10 10   MFR 10 10 10 10 10 10   10 10 10 10   Arm Pulls 5 5 5 5 5 5 5 5 5 5                                 Exercise Diary  10/21 10/28 11/1 11/7     10/14 10/18   Cervical ROM/Isometric 15 15 15 15 15 15 15 15 15 15   Postural Ed  5 5 5 5 5 5 5 5 5 5   Cervical AROM 10 10 10 10 10 10 10 10 10 10   Corner Push up nt 15 15 15 15 15 15 15 15 15                                                                                                                                                                            HEP Stretches/  Postural ex 5                                          Modalities  10/21/ 2019 10/28 11/1 11/7     10/14 10/18   HT with e/stim 15 15 15 15 15 15 15 15 15 15

## 2019-11-11 ENCOUNTER — OFFICE VISIT (OUTPATIENT)
Dept: PHYSICAL THERAPY | Age: 64
End: 2019-11-11
Payer: COMMERCIAL

## 2019-11-11 DIAGNOSIS — M54.12 CERVICAL RADICULOPATHY: Primary | ICD-10-CM

## 2019-11-11 PROCEDURE — 97014 ELECTRIC STIMULATION THERAPY: CPT | Performed by: PHYSICAL THERAPIST

## 2019-11-11 PROCEDURE — G8986 CARRY D/C STATUS: HCPCS | Performed by: PHYSICAL THERAPIST

## 2019-11-11 PROCEDURE — 97140 MANUAL THERAPY 1/> REGIONS: CPT | Performed by: PHYSICAL THERAPIST

## 2019-11-11 PROCEDURE — 97110 THERAPEUTIC EXERCISES: CPT | Performed by: PHYSICAL THERAPIST

## 2019-11-11 PROCEDURE — G8985 CARRY GOAL STATUS: HCPCS | Performed by: PHYSICAL THERAPIST

## 2019-11-11 NOTE — PROGRESS NOTES
Discharge    Today's date: 2019  Patient name: Tawnya Sterling  : 1955  MRN: [id number]  Referring provider: Pinky Barriga DO  Dx:   Encounter Diagnosis     ICD-10-CM    1  Cervical radiculopathy M54 12                   Subjective: Patient reports she is feeling much better with decreased pain in her cervical area  Objective: See treatment diary below    ROM is WFLs in cervical area  Strength is 4/5 in Cervical and B UEs  Posture has improved      Assessment: Tolerated treatment well  Patient tolerated PT well  Goals were met    Will discharge to HEP/Fitness program         Plan: Discharge to HEP/Fitness program        Precautions: DJD/OA, Migraines, TKR      Manual  10/21 10/28 11/1 11/7 11/11    10/14 10/18   MT Cervical: S/O area 15 10 10 10 10 10   10 10 10 10   MFR 10 10 10 10 10 10   10 10 10 10   Arm Pulls 5 5 5 5 5 5 5 5 5 5                                 Exercise Diary  10/21 10/28 11/1 11/7 11/11    10/14 10/18   Cervical ROM/Isometric 15 15 15 15 15 15 15 15 15 15   Postural Ed  5 5 5 5 5 5 5 5 5 5   Cervical AROM 10 10 10 10 10 10 10 10 10 10   Corner Push up nt 15 15 15 15 15 15 15 15 15                                                                                                                                                                            HEP Stretches/  Postural ex 5                                          Modalities  10/21/ 2019 10/28 11/1 11/7 11/11    10/14 10/18   HT with e/stim 15 15 15 15 15 15 15 15 15 15

## 2019-11-27 ENCOUNTER — HOSPITAL ENCOUNTER (EMERGENCY)
Facility: HOSPITAL | Age: 64
Discharge: HOME | End: 2019-11-27
Attending: EMERGENCY MEDICINE
Payer: COMMERCIAL

## 2019-11-27 ENCOUNTER — APPOINTMENT (EMERGENCY)
Dept: ULTRASOUND IMAGING | Facility: HOSPITAL | Age: 64
End: 2019-11-27
Payer: COMMERCIAL

## 2019-11-27 VITALS
HEART RATE: 75 BPM | OXYGEN SATURATION: 97 % | DIASTOLIC BLOOD PRESSURE: 65 MMHG | SYSTOLIC BLOOD PRESSURE: 134 MMHG | RESPIRATION RATE: 18 BRPM | TEMPERATURE: 97.9 F

## 2019-11-27 DIAGNOSIS — N76.2 ATROPHIC VULVITIS: Primary | ICD-10-CM

## 2019-11-27 DIAGNOSIS — M79.661 RIGHT CALF PAIN: Primary | ICD-10-CM

## 2019-11-27 PROCEDURE — 93971 EXTREMITY STUDY: CPT | Performed by: SURGERY

## 2019-11-27 PROCEDURE — 93971 EXTREMITY STUDY: CPT

## 2019-11-27 PROCEDURE — 99283 EMERGENCY DEPT VISIT LOW MDM: CPT

## 2019-11-27 PROCEDURE — 99282 EMERGENCY DEPT VISIT SF MDM: CPT | Performed by: PHYSICIAN ASSISTANT

## 2019-11-27 RX ORDER — CLOBETASOL PROPIONATE 0.5 MG/G
CREAM TOPICAL 2 TIMES DAILY
Qty: 30 G | Refills: 0 | Status: SHIPPED | OUTPATIENT
Start: 2019-11-27 | End: 2020-03-27

## 2019-11-28 NOTE — ED PROVIDER NOTES
History  Chief Complaint   Patient presents with    Leg Pain     Pt presents to ED with c/o posterior R leg pain for the past two days intermittently  Pt has hx of DVT and is on xeralto  Patient is a 57-year-old female with history of DVT that presents emergency department complaints of right calf pain that began 2 days ago  Patient states the pain became more persistent today  Patient is currently on Xarelto for history of DVT  Patient denies chest pain or shortness of breath  Prior to Admission Medications   Prescriptions Last Dose Informant Patient Reported? Taking? Evening Primrose Oil 1000 MG CAPS  Self Yes No   Sig: Take 1 capsule by mouth daily   Multiple Vitamin (MULTIVITAMIN) capsule  Self Yes No   Sig: Take 1 capsule by mouth daily     ZOLMitriptan (ZOMIG) 5 MG tablet  Self No No   Sig: Take 1 tablet (5 mg total) by mouth once as needed for migraine for up to 1 dose   albuterol (VENTOLIN HFA) 90 mcg/act inhaler  Self No No   Si-2 puffs 4 times a day as needed   butalbital-acetaminophen-caffeine (FIORICET,ESGIC) -40 mg per tablet  Self No No   Sig: Take 1 tablet by mouth every 4 (four) hours as needed for headaches   calcium-vitamin D (OSCAL) 250-125 MG-UNIT per tablet  Self Yes No   Sig: Take 1 tablet by mouth daily   clobetasol (TEMOVATE) 0 05 % cream   No No   Sig: Apply topically 2 (two) times a day   cycloSPORINE (RESTASIS) 0 05 % ophthalmic emulsion  Self Yes No   Sig: Apply 1 drop to eye 2 (two) times a day   diazepam (VALIUM) 5 mg tablet  Self No No   Sig: Take 1 tablet (5 mg total) by mouth every 8 (eight) hours as needed for anxiety   diphenhydrAMINE (BENADRYL) 25 mg capsule  Self Yes No   Sig: Take by mouth daily at bedtime as needed     fluticasone (FLONASE) 50 mcg/act nasal spray   No No   Si sprays into each nostril daily   fluticasone (FLOVENT HFA) 220 mcg/act inhaler  Self No No   Sig: Inhale 1 puff 2 (two) times a day   loratadine (CLARITIN) 10 mg tablet Self No No   Sig: Take 1 tablet (10 mg total) by mouth daily   loteprednol etabonate (LOTEMAX) 0 5 % ophthalmic suspension  Self Yes No   Sig: Administer 1 drop to both eyes 3 (three) times a day as needed  montelukast (SINGULAIR) 10 mg tablet  Self No No   Sig: Take 1 tablet (10 mg total) by mouth daily at bedtime   pantoprazole (PROTONIX) 40 mg tablet  Self No No   Sig: Take 1 tablet by mouth 2 times a day 30 minutes prior to breakfast and dinner   promethazine-codeine (PHENERGAN WITH CODEINE) 6 25-10 mg/5 mL syrup  Self No No   Sig: Take 5 mL by mouth every 4 (four) hours as needed for cough   rivaroxaban (XARELTO) 10 mg tablet  Self No No   Sig: Take 1 tablet (10 mg total) by mouth daily      Facility-Administered Medications: None       Past Medical History:   Diagnosis Date    Acid reflux     Allergic rhinitis     Last Assessed: 2017    Anesthesia complication     HYPOTENSION    Arthritis     Asthma     Bigeminy     history    [facility] ) 10/23/2018    [facility]     DVT of leg (deep venous thrombosis) ([facility] )     left    Female pelvic pain     Hyperlipidemia     Intermittent palpitations     Irregular heart beat     Migraines     Obesity (BMI 30 0-34  9)     Palpitations     Pes planus     unspecified laterality; Last Assessed: 2014    Thyroid nodule     Trigeminy     history    Wears glasses        Past Surgical History:   Procedure Laterality Date    APPENDECTOMY      CATARACT EXTRACTION       SECTION      X 2    CHOLECYSTECTOMY      COLONOSCOPY      DILATION AND CURETTAGE OF UTERUS      JOINT REPLACEMENT      left TKR    KNEE SURGERY Left     X 3    ND ESOPHAGOGASTRODUODENOSCOPY TRANSORAL DIAGNOSTIC N/A 3/14/2016    Procedure: ESOPHAGOGASTRODUODENOSCOPY (EGD); Surgeon: Balaji Mayer MD;  Location: [facility];   Service: Gastroenterology    ND LAPAROSCOPY W TOT HYSTERECTUTERUS <=250 [place]  W TUBE/OVARY Bilateral 2016    Procedure: HYSTERECTOMY LAPAROSCOPIC TOTAL ,BSO;  Surgeon: Faye Nguyễn MD;  Location: [facility];  Service: Gynecology Oncology    REPLACEMENT TOTAL KNEE  2014    US GUIDED BREAST BIOPSY LEFT COMPLETE Left 2019    WISDOM TOOTH EXTRACTION         Family History   Problem Relation Age of Onset    Endometrial cancer Mother 76    Migraines Mother     Diabetes Father     Heart disease Father     Heart attack Father     Prostate cancer Father     Hypertension Father     Thyroid cancer Brother         medullary    Diabetes type II Brother     Growth hormone deficiency Daughter     Alzheimer's disease Maternal Aunt     Thyroid cancer Maternal Aunt     Breast cancer Maternal Aunt     Diabetes Paternal Aunt     Hashimoto's thyroiditis Paternal Aunt         Total Thyroidectomy    Hypertension Paternal Aunt     Other Maternal Uncle         Brain Tumor    Alzheimer's disease Maternal Uncle     Cancer Family     Stroke Paternal Uncle      I have reviewed and agree with the history as documented  Social History     Tobacco Use    Smoking status: Former Smoker     Packs/day: 0 50     Types: Cigarettes     Last attempt to quit: 1978     Years since quittin 4    Smokeless tobacco: Never Used    Tobacco comment: as teenager   Substance Use Topics    Alcohol use: Yes     Comment: socially    Drug use: No        Review of Systems   Constitutional: Negative for fever  Respiratory: Negative for shortness of breath  Cardiovascular: Negative for chest pain  Musculoskeletal: Positive for myalgias  All other systems reviewed and are negative  Physical Exam  Physical Exam   Constitutional: She is oriented to person, place, and time  She appears well-developed and well-nourished  HENT:   Head: Normocephalic and atraumatic     Right Ear: External ear normal    Left Ear: External ear normal    Nose: Nose normal    Mouth/Throat: Oropharynx is clear and moist    Eyes: Pupils are equal, round, and reactive to light  Conjunctivae and EOM are normal    Neck: Normal range of motion  Cardiovascular: Normal rate, regular rhythm and normal heart sounds  Pulmonary/Chest: Effort normal and breath sounds normal    Musculoskeletal: Normal range of motion  Right lower leg: She exhibits no swelling and no edema  Legs:  Neurological: She is alert and oriented to person, place, and time  Skin: Capillary refill takes less than 2 seconds  Psychiatric: She has a normal mood and affect  Her behavior is normal  Judgment and thought content normal    Vitals reviewed  Vital Signs  ED Triage Vitals [11/27/19 2004]   Temperature Pulse Respirations Blood Pressure SpO2   97 9 °F (36 6 °C) 75 18 134/65 97 %      Temp Source Heart Rate Source Patient Position - Orthostatic VS BP Location FiO2 (%)   Oral Monitor Sitting Left arm --      Pain Score       --           Vitals:    11/27/19 2004   BP: 134/65   Pulse: 75   Patient Position - Orthostatic VS: Sitting         Visual Acuity      ED Medications  Medications - No data to display    Diagnostic Studies  Results Reviewed     None                 VAS lower limb venous duplex study, unilateral/limited   Final Result by Jackson Edward MD (11/27 0775)                 Procedures  Procedures       ED Course           Identification of Seniors at Risk      Most Recent Value   (ISAR) Identification of Seniors at Risk   Before the illness or injury that brought you to the Emergency, did you need someone to help you on a regular basis? 1 Filed at: 11/27/2019 2008   In the last 24 hours, have you needed more help than usual?  0 Filed at: 11/27/2019 2008   Have you been hospitalized for one or more nights during the past 6 months? 1 Filed at: 11/27/2019 2008   In general, do you see well?  0 Filed at: 11/27/2019 2008   In general, do you have serious problems with your memory?   0 Filed at: 11/27/2019 2008   Do you take more than three different medications every day? 1 Filed at: 11/27/2019 2008   ISAR Score  3 Filed at: 11/27/2019 2008                          [facility]  Number of Diagnoses or Management Options  Right calf pain:   Diagnosis management comments: Patient is a 28-year-old female presents emergency department complaints of right calf pain  Patient has history of DVT and is currently on Xarelto  Ultrasound of the right lower extremity was performed does not show any evidence of DVT  Recommend continuing current dose of Xarelto and follow up with family physician  Return parameters were discussed  Patient stable for discharge  Amount and/or Complexity of Data Reviewed  Tests in the radiology section of CPT®: ordered    Risk of Complications, Morbidity, and/or Mortality  Presenting problems: moderate  Diagnostic procedures: moderate  Management options: moderate    Patient Progress  Patient progress: stable      Disposition  Final diagnoses:   Right calf pain     Time reflects when diagnosis was documented in both MDM as applicable and the Disposition within this note     Time User Action Codes Description Comment    11/27/2019  9:46 PM Jamil Delarosa [[id number]] Right calf pain       ED Disposition     ED Disposition Condition Date/Time Comment    Discharge Good Wed Nov 27, 2019 2[address] Josey Cabello discharge to home/self care              Follow-up Information     Follow up With Specialties Details Why Contact Info Additional Information    Charu Cornejo DO Internal Medicine Schedule an appointment as soon as possible for a visit   [address] Emergency Department Emergency Medicine  If symptoms worsen [address] [facility] [address] ED, [address] [place], [place], [address]          Discharge Medication List as of 11/27/2019  9:46 PM      CONTINUE these medications which have NOT CHANGED    Details   albuterol (VENTOLIN HFA) 90 mcg/act inhaler 1-2 puffs 4 times a day as needed, Normal      butalbital-acetaminophen-caffeine (FIORICET,ESGIC) -40 mg per tablet Take 1 tablet by mouth every 4 (four) hours as needed for headaches, Starting Fri 1/11/2019, Print      calcium-vitamin D (OSCAL) 250-125 MG-UNIT per tablet Take 1 tablet by mouth daily, Historical Med      clobetasol (TEMOVATE) 0 05 % cream Apply topically 2 (two) times a day, Starting Wed 11/27/2019, Normal      cycloSPORINE (RESTASIS) 0 05 % ophthalmic emulsion Apply 1 drop to eye 2 (two) times a day, Historical Med      diazepam (VALIUM) 5 mg tablet Take 1 tablet (5 mg total) by mouth every 8 (eight) hours as needed for anxiety, Starting Mon 5/6/2019, Print      diphenhydrAMINE (BENADRYL) 25 mg capsule Take by mouth daily at bedtime as needed  , Historical Med      Evening Primrose Oil 1000 MG CAPS Take 1 capsule by mouth daily, Historical Med      fluticasone (FLONASE) 50 mcg/act nasal spray 2 sprays into each nostril daily, Starting Fri 11/1/2019, Normal      fluticasone (FLOVENT HFA) 220 mcg/act inhaler Inhale 1 puff 2 (two) times a day, Starting Fri 4/12/2019, Normal      loratadine (CLARITIN) 10 mg tablet Take 1 tablet (10 mg total) by mouth daily, Starting Tue 3/6/2018, Normal      loteprednol etabonate (LOTEMAX) 0 5 % ophthalmic suspension Administer 1 drop to both eyes 3 (three) times a day as needed , Historical Med      montelukast (SINGULAIR) 10 mg tablet Take 1 tablet (10 mg total) by mouth daily at bedtime, Starting Tue 3/6/2018, Normal      Multiple Vitamin (MULTIVITAMIN) capsule Take 1 capsule by mouth daily  , Historical Med      pantoprazole (PROTONIX) 40 mg tablet Take 1 tablet by mouth 2 times a day 30 minutes prior to breakfast and dinner, Normal      promethazine-codeine (PHENERGAN WITH CODEINE) 6 25-10 mg/5 mL syrup Take 5 mL by mouth every 4 (four) hours as needed for cough, Starting Fri 10/4/2019, Normal      rivaroxaban (XARELTO) 10 mg tablet Take 1 tablet (10 mg total) by mouth daily, Starting Mon 4/22/2019, Normal      ZOLMitriptan (ZOMIG) 5 MG tablet Take 1 tablet (5 mg total) by mouth once as needed for migraine for up to 1 dose, Starting Fri 4/12/2019, Normal           No discharge procedures on file      ED Provider  Electronically Signed by           Vick Vázquez PA-C  11/28/19 6627

## 2019-12-10 ENCOUNTER — OFFICE VISIT (OUTPATIENT)
Dept: INTERNAL MEDICINE CLINIC | Facility: CLINIC | Age: 64
End: 2019-12-10
Payer: COMMERCIAL

## 2019-12-10 VITALS
DIASTOLIC BLOOD PRESSURE: 66 MMHG | OXYGEN SATURATION: 99 % | HEIGHT: 64 IN | WEIGHT: 188.8 LBS | HEART RATE: 75 BPM | SYSTOLIC BLOOD PRESSURE: 122 MMHG | BODY MASS INDEX: 32.23 KG/M2

## 2019-12-10 DIAGNOSIS — E78.1 HYPERTRIGLYCERIDEMIA: ICD-10-CM

## 2019-12-10 DIAGNOSIS — E04.1 THYROID NODULE: Primary | ICD-10-CM

## 2019-12-10 DIAGNOSIS — Z86.69 HX OF MIGRAINE HEADACHES: ICD-10-CM

## 2019-12-10 DIAGNOSIS — K21.9 GASTROESOPHAGEAL REFLUX DISEASE WITHOUT ESOPHAGITIS: ICD-10-CM

## 2019-12-10 DIAGNOSIS — R73.03 PREDIABETES: ICD-10-CM

## 2019-12-10 DIAGNOSIS — G43.009 MIGRAINE WITHOUT AURA AND WITHOUT STATUS MIGRAINOSUS, NOT INTRACTABLE: ICD-10-CM

## 2019-12-10 PROCEDURE — 99214 OFFICE O/P EST MOD 30 MIN: CPT | Performed by: INTERNAL MEDICINE

## 2019-12-10 RX ORDER — METHOCARBAMOL 750 MG/1
750 TABLET, FILM COATED ORAL EVERY 6 HOURS PRN
COMMUNITY
End: 2021-12-10 | Stop reason: SDUPTHER

## 2019-12-10 RX ORDER — ZOLMITRIPTAN 5 MG/1
5 TABLET, FILM COATED ORAL ONCE AS NEEDED
Qty: 20 TABLET | Refills: 0 | Status: SHIPPED | OUTPATIENT
Start: 2019-12-10 | End: 2020-11-25 | Stop reason: SDUPTHER

## 2019-12-10 NOTE — PROGRESS NOTES
Assessment/Plan:  Regarding the history of thyroid nodule she will get thyroid functions and an ultrasound  She will call for the results  She will have an A1c done as well as thyroid function  She will continue the rest of her medications  Will see her back here in 4 months  Before if any problems  She is going to get a mammogram in 2 months  1  Thyroid nodule  T4, free    TSH, 3rd generation    US thyroid   2  Hx of migraine headaches  ZOLMitriptan (ZOMIG) 5 MG tablet   3  Gastroesophageal reflux disease without esophagitis     4  Migraine without aura and without status migrainosus, not intractable     5  Prediabetes  HEMOGLOBIN A1C W/ EAG ESTIMATION    Basic metabolic panel   6  Hypertriglyceridemia  Lipid panel       Orders Placed This Encounter   Procedures    US thyroid    HEMOGLOBIN A1C W/ EAG ESTIMATION    Lipid panel    T4, free    TSH, 3rd generation    Basic metabolic panel         Subjective:  She is actually feeling fairly well  She was seen for right calf pain  This is resolved  She was seen in the emergency room and ultrasound was negative  She continues anticoagulation  Her epigastric pain is resolved  She has a history of thyroid nodule  She needs to have a follow-up ultrasound  Patient ID: Manuel Bay is a 75 y o  female  HPI    The following portions of the patient's history were reviewed and updated as appropriate:   She has a past medical history of Acid reflux, Allergic rhinitis, Anesthesia complication, Arthritis, Asthma, Bigeminy, [facility]) ([facility] ) (10/23/2018), DVT (deep venous thrombosis) ([facility] ), DVT of leg (deep venous thrombosis) ([facility] ) (2001), Female pelvic pain, Hyperlipidemia, Intermittent palpitations, Irregular heart beat, Migraines, Obesity (BMI 30 0-34 9), Palpitations, Pes planus, Thyroid nodule, Trigeminy, and Wears glasses  ,  does not have any pertinent problems on file  ,   has a past surgical history that includes Replacement total knee (2014); Appendectomy; Knee surgery (Left);  section; Joint replacement; Dilation and curettage of uterus; Colonoscopy; [place] tooth extraction; Cholecystectomy; pr laparoscopy w tot hysterectuterus <=250 gram  w tube/ovary (Bilateral, 2016); pr esophagogastroduodenoscopy transoral diagnostic (N/A, 3/14/2016); Cataract extraction; and US guided breast biopsy left complete (Left, 2019)  ,  family history includes Alzheimer's disease in her maternal aunt and maternal uncle; Breast cancer in her maternal aunt; Cancer in her family; Diabetes in her father and paternal aunt; Diabetes type II in her brother; Endometrial cancer (age of onset: 76) in her mother; Growth hormone deficiency in her daughter; Hashimoto's thyroiditis in her paternal aunt; Heart attack in her father; Heart disease in her father; Hypertension in her father and paternal aunt; Migraines in her mother; Other in her maternal uncle; Prostate cancer in her father; Stroke in her paternal uncle; Thyroid cancer in her brother and maternal aunt  ,   reports that she quit smoking about 41 years ago  Her smoking use included cigarettes  She smoked 0 50 packs per day  She has never used smokeless tobacco  She reports that she drinks alcohol  She reports that she does not use drugs  ,  is allergic to naproxen; iv contrast [iodinated diagnostic agents]; and seasonal ic [cholestatin]       Current Outpatient Medications:     albuterol (VENTOLIN HFA) 90 mcg/act inhaler, 1-2 puffs 4 times a day as needed, Disp: 18 Inhaler, Rfl: 0    butalbital-acetaminophen-caffeine (FIORICET,ESGIC) -40 mg per tablet, Take 1 tablet by mouth every 4 (four) hours as needed for headaches, Disp: 20 tablet, Rfl: 0    calcium-vitamin D (OSCAL) 250-125 MG-UNIT per tablet, Take 1 tablet by mouth daily, Disp: , Rfl:     clobetasol (TEMOVATE) 0 05 % cream, Apply topically 2 (two) times a day, Disp: 30 g, Rfl: 0    cycloSPORINE (RESTASIS) 0 05 % ophthalmic emulsion, Apply 1 drop to eye 2 (two) times a day, Disp: , Rfl:     diazepam (VALIUM) 5 mg tablet, Take 1 tablet (5 mg total) by mouth every 8 (eight) hours as needed for anxiety, Disp: 60 tablet, Rfl: 0    diphenhydrAMINE (BENADRYL) 25 mg capsule, Take by mouth daily at bedtime as needed  , Disp: , Rfl:     Evening Primrose Oil 1000 MG CAPS, Take 1 capsule by mouth daily, Disp: , Rfl:     fluticasone (FLONASE) 50 mcg/act nasal spray, 2 sprays into each nostril daily, Disp: 1 Bottle, Rfl: 5    fluticasone (FLOVENT HFA) 220 mcg/act inhaler, Inhale 1 puff 2 (two) times a day, Disp: 12 Inhaler, Rfl: 2    loratadine (CLARITIN) 10 mg tablet, Take 1 tablet (10 mg total) by mouth daily, Disp: 30 tablet, Rfl: 0    loteprednol etabonate (LOTEMAX) 0 5 % ophthalmic suspension, Administer 1 drop to both eyes 3 (three) times a day as needed  , Disp: , Rfl:     methocarbamol (ROBAXIN) 750 mg tablet, Take 750 mg by mouth every 6 (six) hours as needed for muscle spasms, Disp: , Rfl:     montelukast (SINGULAIR) 10 mg tablet, Take 1 tablet (10 mg total) by mouth daily at bedtime, Disp: 30 tablet, Rfl: 0    Multiple Vitamin (MULTIVITAMIN) capsule, Take 1 capsule by mouth daily  , Disp: , Rfl:     pantoprazole (PROTONIX) 40 mg tablet, Take 1 tablet by mouth 2 times a day 30 minutes prior to breakfast and dinner, Disp: 180 tablet, Rfl: 1    rivaroxaban (XARELTO) 10 mg tablet, Take 1 tablet (10 mg total) by mouth daily, Disp: 14 tablet, Rfl: 0    ZOLMitriptan (ZOMIG) 5 MG tablet, Take 1 tablet (5 mg total) by mouth once as needed for migraine for up to 1 dose, Disp: 20 tablet, Rfl: 0    Review of Systems   Constitutional: Negative for activity change, appetite change, chills, diaphoresis, fatigue, fever and unexpected weight change  HENT: Negative for congestion, ear pain, hearing loss, mouth sores, nosebleeds, postnasal drip, sinus pressure, sinus pain, sore throat and trouble swallowing      Eyes: Negative for pain, discharge and visual disturbance  Respiratory: Negative for apnea, cough, chest tightness, shortness of breath and wheezing  Cardiovascular: Negative for chest pain, palpitations and leg swelling  Gastrointestinal: Negative for abdominal pain, anal bleeding, blood in stool, constipation, diarrhea, nausea and vomiting  Endocrine: Negative for polydipsia and polyphagia  Positive history of thyroid nodule  History of mild hyperglycemia  Genitourinary: Negative for decreased urine volume, dysuria, flank pain, frequency, hematuria and urgency  Musculoskeletal: Negative for arthralgias, back pain, gait problem, joint swelling and myalgias  Calf pain has resolved  Skin: Negative for rash and wound  Allergic/Immunologic: Negative for environmental allergies and food allergies  Neurological: Negative for dizziness, tremors, seizures, syncope, speech difficulty, light-headedness, numbness and headaches  Positive history of migraine headaches  Hematological: Negative for adenopathy  Does not bruise/bleed easily  Psychiatric/Behavioral: Negative for agitation, confusion, hallucinations, sleep disturbance and suicidal ideas  The patient is not nervous/anxious  Objective:  /66 (BP Location: Left arm, Patient Position: Sitting, Cuff Size: Standard)   Pulse 75   Ht 5' 4" (1 626 m)   Wt 85 6 kg (188 lb 12 8 oz)   LMP  (LMP Unknown)   SpO2 99%   BMI 32 41 kg/m²      Physical Exam   Constitutional: She appears well-developed and well-nourished  No distress  HENT:   Head: Normocephalic  Right Ear: External ear normal    Left Ear: External ear normal    Nose: Nose normal    Mouth/Throat: Oropharynx is clear and moist  No oropharyngeal exudate  Eyes: Pupils are equal, round, and reactive to light  Conjunctivae and EOM are normal  Right eye exhibits no discharge  Left eye exhibits no discharge  Neck: Normal range of motion  Neck supple   No thyromegaly present  No definite thyroid enlargement  Cardiovascular: Normal rate, regular rhythm, normal heart sounds and intact distal pulses  Exam reveals no gallop and no friction rub  No murmur heard  Pulmonary/Chest: Effort normal and breath sounds normal  No respiratory distress  She has no wheezes  She has no rales  Abdominal: Soft  Bowel sounds are normal  She exhibits no distension and no mass  There is no tenderness  There is no rebound and no guarding  Musculoskeletal: Normal range of motion  She exhibits no edema, tenderness or deformity  Lymphadenopathy:     She has no cervical adenopathy  Neurological: She is alert  She has normal reflexes  She displays normal reflexes  No cranial nerve deficit  Coordination normal    Skin: Skin is warm and dry  No rash noted  No erythema  Psychiatric: She has a normal mood and affect  Her behavior is normal  Judgment and thought content normal    Nursing note and vitals reviewed  No results found for this or any previous visit (from the past 1008 hour(s))

## 2019-12-12 ENCOUNTER — OFFICE VISIT (OUTPATIENT)
Dept: GASTROENTEROLOGY | Facility: CLINIC | Age: 64
End: 2019-12-12
Payer: COMMERCIAL

## 2019-12-12 VITALS
BODY MASS INDEX: 32.27 KG/M2 | HEART RATE: 69 BPM | HEIGHT: 64 IN | DIASTOLIC BLOOD PRESSURE: 64 MMHG | WEIGHT: 189 LBS | SYSTOLIC BLOOD PRESSURE: 120 MMHG

## 2019-12-12 DIAGNOSIS — K21.9 GASTROESOPHAGEAL REFLUX DISEASE WITHOUT ESOPHAGITIS: ICD-10-CM

## 2019-12-12 DIAGNOSIS — Z86.010 HISTORY OF COLON POLYPS: Primary | ICD-10-CM

## 2019-12-12 PROCEDURE — 99214 OFFICE O/P EST MOD 30 MIN: CPT | Performed by: INTERNAL MEDICINE

## 2019-12-12 NOTE — PROGRESS NOTES
Follow-up Note -  [facility]  Jennifer Seaman 1955 75 y o  female     ASSESSMENT @ PLAN:   She is a 25-year-old female with gastroesophageal reflux disease who is improved after 8 weeks of Protonix 40 milligrams p o  b i d  she had endoscopy on October 23rd that showed LA grade a reflux esophagitis and mild nonerosive gastritis and scattered fundal gland polyps  Biopsies were negative  She had a colonoscopy with Dr Darvin Jones in 2014 with a small tubular adenoma removed in the sigmoid colon  1 she will need a colonoscopy done in 2020 and this will be scheduled    2 she is going to reduce the Protonix to 40 milligrams daily q a m     3 she is going to try to lose weight which will help her reflux    Reason:   GERD and history of colon polyp    HPI:   She is a 25-year-old female with gastroesophageal reflux disease who is here for follow-up  She had had an episode where she has had some chest pain as severe epigastric abdominal pain and had an emergency room visit  She came to see me in we into we went up on her Protonix to 40 milligrams p o  b i d  She has had a good response on this  She has been doing this for about 7 weeks and she has only had to use her extra antacid medicine about once  She has had no further episodes of pain  She had endoscopy with me on October 23rd that showed LA grade a reflux esophagitis and mild nonerosive gastritis and scattered fundal gland polyps  All pathology was negative  We are going to go down her min or medicine  She is going to go to the weight loss center and try to lose weight in the new year  She is working less  She had a colonoscopy in 2014 with small polyp removed in the sigmoid colon and is due for follow-up  REVIEW OF SYSTEMS:     CONSTITUTIONAL: Denies any fever, chills, or rigors  Good appetite, and no recent weight loss  HEENT: No earache or tinnitus  Denies hearing loss or visual disturbances    CARDIOVASCULAR: No chest pain or palpitations  RESPIRATORY: Denies any cough, hemoptysis, shortness of breath or dyspnea on exertion  GASTROINTESTINAL: As noted in the History of Present Illness  GENITOURINARY: No problems with urination  Denies any hematuria or dysuria  NEUROLOGIC: No dizziness or vertigo, denies headaches  MUSCULOSKELETAL: Denies any muscle or joint pain  SKIN: Denies skin rashes or itching  ENDOCRINE: Denies excessive thirst  Denies intolerance to heat or cold  PSYCHOSOCIAL: Denies depression or anxiety  Denies any recent memory loss  Past Medical History:   Diagnosis Date    Acid reflux     Allergic rhinitis     Last Assessed: 2017    Anesthesia complication     HYPOTENSION    Arthritis     Asthma     Bigeminy     history    [facility] 10/23/2018    [facility]     [facility]     left    Female pelvic pain     Hyperlipidemia     Intermittent palpitations     Irregular heart beat     Migraines     Obesity (BMI 30 0-34  9)     Palpitations     Pes planus     unspecified laterality; Last Assessed: 2014    Thyroid nodule     Trigeminy     history    Wears glasses       Past Surgical History:   Procedure Laterality Date    APPENDECTOMY      CATARACT EXTRACTION       SECTION      X 2    CHOLECYSTECTOMY      COLONOSCOPY      DILATION AND CURETTAGE OF UTERUS      JOINT REPLACEMENT      left TKR    KNEE SURGERY Left     X 3    CO ESOPHAGOGASTRODUODENOSCOPY TRANSORAL DIAGNOSTIC N/A 3/14/2016    Procedure: ESOPHAGOGASTRODUODENOSCOPY (EGD); Surgeon: Burt Rosario MD;  Location: [facility];   Service: Gastroenterology    CO LAPAROSCOPY W TOT HYSTERECTUTERUS <=250 [place] TUBE/OVARY Bilateral 2016    Procedure: HYSTERECTOMY LAPAROSCOPIC TOTAL ,BSO;  Surgeon: Daisy Corrales MD;  Location: [facility] OR;  Service: Gynecology Oncology    REPLACEMENT TOTAL KNEE  2014    [facility]    WISDOM TOOTH EXTRACTION       Social History     Socioeconomic History    Marital status: /Civil Union     Spouse name: Not on file    Number of children: Not on file    Years of education: Not on file    Highest education level: Not on file   Occupational History    Occupation: Pediatrics   Social Needs    Financial resource strain: Not on file    Food insecurity:     Worry: Not on file     Inability: Not on file    Transportation needs:     Medical: Not on file     Non-medical: Not on file   Tobacco Use    Smoking status: Former Smoker     Packs/day: 0 50     Types: Cigarettes     Last attempt to quit: 1978     Years since quittin 5    Smokeless tobacco: Never Used    Tobacco comment: as teenager   Substance and Sexual Activity    Alcohol use: Yes     Comment: socially    Drug use: No    Sexual activity: Not Currently     Birth control/protection: Surgical   Lifestyle    Physical activity:     Days per week: 0 days     Minutes per session: 0 min    Stress: Very much   Relationships    Social connections:     Talks on phone: Not on file     Gets together: Not on file     Attends Orthodox service: Not on file     Active member of club or organization: Not on file     Attends meetings of clubs or organizations: Not on file     Relationship status: Not on file    Intimate partner violence:     Fear of current or ex partner: Not on file     Emotionally abused: Not on file     Physically abused: Not on file     Forced sexual activity: Not on file   Other Topics Concern    Not on file   Social History Narrative    Not on file     Family History   Problem Relation Age of Onset    Endometrial cancer Mother 76    Migraines Mother     Diabetes Father     Heart disease Father     Heart attack Father     Prostate cancer Father     Hypertension Father     Thyroid cancer Brother         medullary    Diabetes type II Brother     Growth hormone deficiency Daughter     Alzheimer's disease Maternal Aunt     Thyroid cancer Maternal Aunt     Breast cancer Maternal Aunt     Diabetes Paternal Aunt     Hashimoto's thyroiditis Paternal Aunt         Total Thyroidectomy    Hypertension Paternal Aunt     Other Maternal Uncle         Brain Tumor    Alzheimer's disease Maternal Uncle     Cancer Family     Stroke Paternal Uncle      Naproxen; Iv contrast [iodinated diagnostic agents]; and Seasonal ic [cholestatin]  Current Outpatient Medications   Medication Sig Dispense Refill    albuterol (VENTOLIN HFA) 90 mcg/act inhaler 1-2 puffs 4 times a day as needed 18 Inhaler 0    butalbital-acetaminophen-caffeine (FIORICET,ESGIC) -40 mg per tablet Take 1 tablet by mouth every 4 (four) hours as needed for headaches 20 tablet 0    calcium-vitamin D (OSCAL) 250-125 MG-UNIT per tablet Take 1 tablet by mouth daily      clobetasol (TEMOVATE) 0 05 % cream Apply topically 2 (two) times a day 30 g 0    cycloSPORINE (RESTASIS) 0 05 % ophthalmic emulsion Apply 1 drop to eye 2 (two) times a day      diazepam (VALIUM) 5 mg tablet Take 1 tablet (5 mg total) by mouth every 8 (eight) hours as needed for anxiety 60 tablet 0    diphenhydrAMINE (BENADRYL) 25 mg capsule Take by mouth daily at bedtime as needed        Evening Primrose Oil 1000 MG CAPS Take 1 capsule by mouth daily      fluticasone (FLONASE) 50 mcg/act nasal spray 2 sprays into each nostril daily 1 Bottle 5    fluticasone (FLOVENT HFA) 220 mcg/act inhaler Inhale 1 puff 2 (two) times a day 12 Inhaler 2    loratadine (CLARITIN) 10 mg tablet Take 1 tablet (10 mg total) by mouth daily 30 tablet 0    loteprednol etabonate (LOTEMAX) 0 5 % ophthalmic suspension Administer 1 drop to both eyes 3 (three) times a day as needed        methocarbamol (ROBAXIN) 750 mg tablet Take 750 mg by mouth every 6 (six) hours as needed for muscle spasms      montelukast (SINGULAIR) 10 mg tablet Take 1 tablet (10 mg total) by mouth daily at bedtime 30 tablet 0    Multiple Vitamin (MULTIVITAMIN) capsule Take 1 capsule by mouth daily   pantoprazole (PROTONIX) 40 mg tablet Take 1 tablet by mouth 2 times a day 30 minutes prior to breakfast and dinner (Patient taking differently: 2 (two) times a day Take 1 tablet by mouth 2 times a day 30 minutes prior to breakfast and dinner) 180 tablet 1    rivaroxaban (XARELTO) 10 mg tablet Take 1 tablet (10 mg total) by mouth daily 14 tablet 0    ZOLMitriptan (ZOMIG) 5 MG tablet Take 1 tablet (5 mg total) by mouth once as needed for migraine for up to 1 dose 20 tablet 0     No current facility-administered medications for this visit  Blood pressure 120/64, pulse 69, height 5' 4" (1 626 m), weight 85 7 kg (189 lb), not currently breastfeeding  PHYSICAL EXAM:     General Appearance:   Alert, cooperative, no distress, appears stated age    HEENT:   Normocephalic, atraumatic, anicteric      Neck:  Supple, symmetrical, trachea midline, no adenopathy;    thyroid: no enlargement/tenderness/nodules; no carotid  bruit or JVD    Lungs:   Clear to auscultation bilaterally; no rales, rhonchi or wheezing; respirations unlabored    Heart[de-identified]   S1 and S2 normal; regular rate and rhythm; no murmur, rub, or gallop     Abdomen:   Soft, non-tender, non-distended; normal bowel sounds; no masses, no organomegaly    Genitalia:   Deferred    Rectal:   Deferred    Extremities:  No cyanosis, clubbing or edema    Pulses:  2+ and symmetric all extremities    Skin:  Skin color, texture, turgor normal, no rashes or lesions    Lymph nodes:  No palpable cervical, axillary or inguinal lymphadenopathy        Lab Results   Component Value Date    WBC 8 95 10/20/2019    HGB 13 6 10/20/2019    HCT 42 8 10/20/2019    MCV 84 10/20/2019     10/20/2019     Lab Results   Component Value Date    GLUCOSE 88 08/20/2015    CALCIUM 8 9 10/20/2019     08/20/2015    K 4 6 10/20/2019    CO2 30 10/20/2019     10/20/2019 BUN 15 10/20/2019    CREATININE 0 69 10/20/2019     Lab Results   Component Value Date    ALT 29 10/20/2019    AST 33 10/20/2019    ALKPHOS 117 (H) 10/20/2019    BILITOT 0 3 08/20/2015     Lab Results   Component Value Date    INR 1 06 12/21/2018    INR 0 99 10/22/2018    INR 0 98 03/06/2018    PROTIME 13 9 12/21/2018    PROTIME 13 0 10/22/2018    PROTIME 13 2 03/06/2018

## 2019-12-18 ENCOUNTER — OFFICE VISIT (OUTPATIENT)
Dept: BARIATRICS | Facility: CLINIC | Age: 64
End: 2019-12-18

## 2019-12-18 DIAGNOSIS — R63.5 ABNORMAL WEIGHT GAIN: Primary | ICD-10-CM

## 2019-12-18 PROCEDURE — RECHECK

## 2019-12-18 NOTE — PROGRESS NOTES
Weight Management Medical Nutrition Assessment   Ayesha was here for her 2nd of 3 RD visit bundles  Today's weight 190 4 lbs giving her a gain  Of 9 2 lbs since her last appointment in August   Since her last appoitnment, she has stopped using the meal replacements and admits that she does not journal her food or pay attention to portions sizes  Discussed the importance of protin sizes and food journaling  Since her last appointment, she has been seen by GI because of some acid reflux issues  We discussed food that she should limit ( orange juice, tomato products etc)  She has decided that she would like to go back to having 2 meal replacements, a bar and then 1 sensible meal       Anthropometric Measurements  Start Weight (lbs): 194 6, lbs  Current Weight (lbs): 190 4 lbs  TBW % Change from start weight:  2%  Ideal Body Weight (lbs):117 5 lbs  Goal Weight (lbs):160 lbs     Weight Loss History  Previous weight loss attempts: Counseling with  MD  Self Created Diets (Portion Control, Healthy Food Choices, etc )  VLCD      Food and Nutrition Related History     Food Recall  Breakfast: coffee, shake           Snack:cheese stick   Lunch: pudding  Snack: string cheese  Dinner: meatloaf, vegetables  Snack:        Beverages: water  Volume of beverage intake: 60 to 80 oz     Weekends: Same  Cravings: none reported  Trouble area of day:none reported     Frequency of Eating out: irregularly  Food restrictions:none reported  Cooking: self   Food Shopping: self     Physical Activity Intake  Activity:Swimming   Frequency: plans to start exercising  Physical limitations/barriers to exercise: none reported     Estimated Needs  Energy  [employer] Energy Needs:  BMR : 1389  calories  1# loss weekly sedentary:  1166    calories  1-2# loss weekly lightly active: 909 -1409 calories  Protein:64-80gm      (1 2-1 5g/kg IBW)  Fluid: 62oz     (35mL/kg IBW)     Nutrition Diagnosis  Yes;    Overweight/obesity  related to Excess energy intake as evidenced by [facility] more than normative standard for age and sex (obesity-grade I 26-30  9)     Nutrition Intervention     Nutrition Prescription  Calories: 1000 calories   Protein: 75 gm   Fluid:62oz     Meal Plan  2 meal replacements, a bar, a food snack (string cheese or hard boiled egg) and a lean protein with non-starchy vegetables       Nutrition Education:    Calorie controlled menu  Lean protein food choices  Healthy snack options  Food journaling tips        Nutrition Counseling:  Strategies: meal planning, portion sizes, healthy snack choices, hydration, fiber intake, protein intake, exercise, food journal        Monitoring and Evaluation:  Evaluation criteria:  Energy Intake  Meet protein needs  Maintain adequate hydration  Monitor weekly weight  Meal planning/preparation  Food journal   Decreased portions at mealtimes and snacks  Physical activity      Barriers to learning:none  Readiness to change: Action  Comprehension: good  Expected Compliance: fair

## 2019-12-19 VITALS — BODY MASS INDEX: 32.5 KG/M2 | HEIGHT: 64 IN | WEIGHT: 190.4 LBS

## 2020-01-02 DIAGNOSIS — K21.9 GASTROESOPHAGEAL REFLUX DISEASE WITHOUT ESOPHAGITIS: ICD-10-CM

## 2020-01-02 NOTE — TELEPHONE ENCOUNTER
NEEDS  ALPRAZOLAM 0 25MG  #30  [facility]   [phone]    [phone]    PATIENT GOING OUT OF THE COUNTRY , LEAVING ON January 15, 2020 AND RETURNED IN MED February 2020

## 2020-01-06 RX ORDER — PANTOPRAZOLE SODIUM 40 MG/1
40 TABLET, DELAYED RELEASE ORAL 2 TIMES DAILY
Qty: 180 TABLET | Refills: 3 | Status: SHIPPED | OUTPATIENT
Start: 2020-01-06 | End: 2020-03-10 | Stop reason: SDUPTHER

## 2020-01-07 DIAGNOSIS — F41.9 ANXIETY: ICD-10-CM

## 2020-01-10 RX ORDER — ALPRAZOLAM 0.25 MG/1
TABLET ORAL
Qty: 30 TABLET | Refills: 0 | Status: SHIPPED | OUTPATIENT
Start: 2020-01-10 | End: 2020-12-30 | Stop reason: SDUPTHER

## 2020-02-20 ENCOUNTER — TELEPHONE (OUTPATIENT)
Dept: OTHER | Facility: OTHER | Age: 65
End: 2020-02-20

## 2020-02-20 NOTE — TELEPHONE ENCOUNTER
Received a VM that she is to have blood work for her appointment, but states that she was not informed or given any instructions prior to have blood work done, she said she really needs this appointment and will be there

## 2020-02-21 ENCOUNTER — OFFICE VISIT (OUTPATIENT)
Dept: HEMATOLOGY ONCOLOGY | Facility: CLINIC | Age: 65
End: 2020-02-21
Payer: COMMERCIAL

## 2020-02-21 VITALS
HEART RATE: 77 BPM | OXYGEN SATURATION: 99 % | BODY MASS INDEX: 32.44 KG/M2 | SYSTOLIC BLOOD PRESSURE: 122 MMHG | HEIGHT: 64 IN | RESPIRATION RATE: 16 BRPM | WEIGHT: 190 LBS | TEMPERATURE: 98.5 F | DIASTOLIC BLOOD PRESSURE: 82 MMHG

## 2020-02-21 DIAGNOSIS — I82.441 ACUTE DEEP VEIN THROMBOSIS (DVT) OF TIBIAL VEIN OF RIGHT LOWER EXTREMITY (HCC): ICD-10-CM

## 2020-02-21 DIAGNOSIS — I82.4Y9 DEEP VEIN THROMBOSIS (DVT) OF PROXIMAL LOWER EXTREMITY, UNSPECIFIED CHRONICITY, UNSPECIFIED LATERALITY (HCC): Primary | ICD-10-CM

## 2020-02-21 PROCEDURE — 99214 OFFICE O/P EST MOD 30 MIN: CPT | Performed by: INTERNAL MEDICINE

## 2020-02-21 PROCEDURE — 1160F RVW MEDS BY RX/DR IN RCRD: CPT | Performed by: INTERNAL MEDICINE

## 2020-02-21 PROCEDURE — 3008F BODY MASS INDEX DOCD: CPT | Performed by: INTERNAL MEDICINE

## 2020-02-21 PROCEDURE — 1036F TOBACCO NON-USER: CPT | Performed by: INTERNAL MEDICINE

## 2020-02-21 PROCEDURE — 4040F PNEUMOC VAC/ADMIN/RCVD: CPT | Performed by: INTERNAL MEDICINE

## 2020-02-22 NOTE — PROGRESS NOTES
HEMATOLOGY / [address] NOTE    Primary Care Provider: Trae Brownlee DO  Referring Provider:    MRN: [id number]  : 1955    Reason for Encounter:    Chief Complaint   Patient presents with    Follow-up     1 year         Hematology / Oncology History:     Kaur Pierre is a 75 y o  female who came in  To [address] [place] [address] with hematology    1, left lower ext DVT  - , unprovoked, was on coumadin x 3 years    2, right lower ext DVT  - 10/22/2018, unprovoked, on xarelto    - recurrent DVT, total two events, both were non provoked  - initially presented with leg pain, completely resolved in days after Xarelto started  - repeated doppler showed chronic right superficial DVT  Previous identified posterior  tibial veins at mid calf resolved  - thrombosis panel is negative  Assessment / Plan:       1  History of deep venous thrombosis (DVT) of distal vein of right lower extremity  - discussed with patient regarding the risks and benefit of continue versus stop Xarelto, risk is roughly equal   After discussion of like to continue Xarelto 10 mg, will check D-dimer in 1 year, patient will then follow-up  If D-dimer is negative we can consider to stop Xarelto switched to aspirin      - D-dimer, quantitative; Future    2  Acute deep vein thrombosis (DVT) of tibial vein of right lower extremity (HCC)    - rivaroxaban (XARELTO) 10 mg tablet; Take 1 tablet (10 mg total) by mouth daily  Dispense: 90 tablet; Refill: 3           25   minutes were spent face to face with patient with greater than 50% of the time spent in counseling or coordination of care including discussions of treatment instructions  All of the patient's questions were answered to their satisfactory during this discussion  Advised pt to call if there is any further questions             Interval History:     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, who knee surgeries, with history of obesity, multiple hip and knee surgeries, two deliveries, thyroid nodule, referred to Hematology for DVT as summarized above  Patient reported overall at baseline health status, no constitutional symptoms  Has been on Xarelto for about 3 months, doing well no bleeding  For both DVT events, there is no clear trigger identified  Patient has no history of malignancy, chronic inflammation, autoimmune disease, nonsmoker, not on hormone replacement surgery, no recent travel surgeries, no family history of thrombosis, no prior history of bleeding disorders, no uterine fibroid    Patient had a repeat Doppler a month after on Xarelto, showed new DVT resolved  Nonsmoker  Again working as a physician  2/11/2019: came in for F/u  doing ok  No bleeding  Had a left breast biopsy  2/21/2020:  Came in for follow-up  Doing well, on Xarelto, no bleeding  No leg swelling no chest pain cough hemoptysis  Problem list:       Patient Active Problem List   Diagnosis    Arthralgia of hip, left    Pain in joint involving other specified sites    Arthritis of left knee    [facility]    Memory difficulty    Migraine, unspecified, not intractable, without status migrainosus    Mild intermittent asthma    Obesity (BMI 30 0-34  9)    Osteopenia of multiple sites    Prediabetes    Suprapubic abdominal pain    Chronic pain of right knee    Primary osteoarthritis of right knee    Effusion of right knee    Shortness of breath    Thyroid nodule    Pain and swelling of right ankle    Posterior tibial tendinitis of right lower extremity    History of deep venous thrombosis (DVT) of distal vein of right lower extremity    Hypertriglyceridemia    Palpitations    Encounter for gynecological examination without abnormal finding    Epigastric pain    Other chest pain    Gastroesophageal reflux disease without esophagitis    Right calf pain         PHYSICIAL EXAMINATION:       Vital Signs:   [unfilled]  Body mass index is 32 61 kg/m²  Body surface area is 1 91 meters squared  GEN: Alert, awake oriented x3, in no acute distress  HEENT- No pallor, icterus, cyanosis, no oral mucosal lesions,   LAD - no palpable cervical, clavicle, axillary, inguinal LAD  Heart- normal S1 S2, regular rate and rhythm, No murmur, rubs  Lungs- decreased breathing sound bilateral    Abdomen- soft, Non tender, bowel sounds present  Extremities- No cyanosis, clubbing, edema  Neuro- No focal neurological deficit           PAST MEDICAL HISTORY:   has a past medical history of Acid reflux, Allergic rhinitis, Anesthesia complication, Arthritis, Asthma, Bigeminy, DVT (deep venous thrombosis) ([facility] ) (10/23/2018), DVT (deep venous thrombosis) ([facility] ), DVT of leg (deep venous thrombosis) ([facility] ) (), Female pelvic pain, Hyperlipidemia, Intermittent palpitations, Irregular heart beat, Migraines, Obesity (BMI 30 0-34 9), Palpitations, Pes planus, Thyroid nodule, Trigeminy, and Wears glasses  PAST SURGICAL HISTORY:   has a past surgical history that includes Replacement total knee (); Appendectomy; Knee surgery (Left);  section; Joint replacement; Dilation and curettage of uterus; Colonoscopy; [place] tooth extraction; Cholecystectomy; pr laparoscopy w tot hysterectuterus <=250 gram  w tube/ovary (Bilateral, 2016); pr esophagogastroduodenoscopy transoral diagnostic (N/A, 3/14/2016); Cataract extraction; and US guided breast biopsy left complete (Left, 2019)      CURRENT MEDICATIONS:     Current Outpatient Medications   Medication Sig Dispense Refill    albuterol (VENTOLIN HFA) 90 mcg/act inhaler 1-2 puffs 4 times a day as needed 18 Inhaler 0    ALPRAZolam (XANAX) 0 25 mg tablet TAKE 1 TABLET BY MOUTH 2 TIMES A DAY AS NEEDED FOR ANXIETY FOR UP TO 30 DAYS 30 tablet 0    butalbital-acetaminophen-caffeine (FIORICET,ESGIC) -40 mg per tablet Take 1 tablet by mouth every 4 (four) hours as needed for headaches 20 tablet 0    calcium-vitamin D (OSCAL) 250-125 MG-UNIT per tablet Take 1 tablet by mouth daily      clobetasol (TEMOVATE) 0 05 % cream Apply topically 2 (two) times a day 30 g 0    cycloSPORINE (RESTASIS) 0 05 % ophthalmic emulsion Apply 1 drop to eye 2 (two) times a day      diazepam (VALIUM) 5 mg tablet Take 1 tablet (5 mg total) by mouth every 8 (eight) hours as needed for anxiety 60 tablet 0    diphenhydrAMINE (BENADRYL) 25 mg capsule Take by mouth daily at bedtime as needed        Evening Primrose Oil 1000 MG CAPS Take 1 capsule by mouth daily      fluticasone (FLONASE) 50 mcg/act nasal spray 2 sprays into each nostril daily 1 Bottle 5    fluticasone (FLOVENT HFA) 220 mcg/act inhaler Inhale 1 puff 2 (two) times a day 12 Inhaler 2    loratadine (CLARITIN) 10 mg tablet Take 1 tablet (10 mg total) by mouth daily 30 tablet 0    loteprednol etabonate (LOTEMAX) 0 5 % ophthalmic suspension Administer 1 drop to both eyes 3 (three) times a day as needed   methocarbamol (ROBAXIN) 750 mg tablet Take 750 mg by mouth every 6 (six) hours as needed for muscle spasms      montelukast (SINGULAIR) 10 mg tablet Take 1 tablet (10 mg total) by mouth daily at bedtime 30 tablet 0    Multiple Vitamin (MULTIVITAMIN) capsule Take 1 capsule by mouth daily   pantoprazole (PROTONIX) 40 mg tablet Take 1 tablet (40 mg total) by mouth 2 (two) times a day Take 1 tablet by mouth 2 times a day 30 minutes prior to breakfast and dinner 180 tablet 3    rivaroxaban (XARELTO) 10 mg tablet Take 1 tablet (10 mg total) by mouth daily 90 tablet 3    ZOLMitriptan (ZOMIG) 5 MG tablet Take 1 tablet (5 mg total) by mouth once as needed for migraine for up to 1 dose 20 tablet 0     No current facility-administered medications for this visit  [unfilled]    SOCIAL HISTORY:   reports that she quit smoking about 41 years ago  Her smoking use included cigarettes  She smoked 0 50 packs per day   She has never used smokeless tobacco  She reports that she drinks alcohol  She reports that she does not use drugs  FAMILY HISTORY:  family history includes Alzheimer's disease in her maternal aunt and maternal uncle; Breast cancer in her maternal aunt; Cancer in her family; Diabetes in her father and paternal aunt; Diabetes type II in her brother; Endometrial cancer (age of onset: 76) in her mother; Growth hormone deficiency in her daughter; Hashimoto's thyroiditis in her paternal aunt; Heart attack in her father; Heart disease in her father; Hypertension in her father and paternal aunt; Migraines in her mother; Other in her maternal uncle; Prostate cancer in her father; Stroke in her paternal uncle; Thyroid cancer in her brother and maternal aunt  ALLERGIES:  is allergic to naproxen; iv contrast [iodinated diagnostic agents]; and seasonal ic [cholestatin]  REVIEW OF SYSTEMS:  Please note that a 14-point review of systems was performed to include Constitutional, HEENT, Respiratory, CVS, GI, , Musculoskeletal, Integumentary, Neurologic, Rheumatologic, Endocrinologic, Psychiatric, Lymphatic, and Hematologic/Oncologic systems were reviewed and are negative unless otherwise stated in HPI  Positive and negative findings pertinent to this evaluation are incorporated into the history of present illness  Lab Results   Component Value Date    WBC 8 95 10/20/2019    HGB 13 6 10/20/2019    HCT 42 8 10/20/2019    MCV 84 10/20/2019     10/20/2019                                      CREATININE 0 77 10/22/2018    GLUCOSE 88 08/20/2015    GLUF 91 08/04/2017    CALCIUM 9 2 10/22/2018    AST 40 10/22/2018    ALT 69 10/22/2018    ALKPHOS 131 (H) 10/22/2018    PROT 7 1 08/20/2015    BILITOT 0 3 08/20/2015    EGFR 82 10/22/2018        No results found

## 2020-02-24 ENCOUNTER — TELEPHONE (OUTPATIENT)
Dept: HEMATOLOGY ONCOLOGY | Facility: CLINIC | Age: 65
End: 2020-02-24

## 2020-02-24 NOTE — TELEPHONE ENCOUNTER
Spoke with patient and advised her per Dr Tillman she is to stop holding her Xarelto 3 days prior to her procedure and she can resume it the day after  She voiced understanding and appreciation

## 2020-02-24 NOTE — TELEPHONE ENCOUNTER
Patient called stating that she will be having a colonoscopy 2/28 and would like to know if she should stop her Xarelto medication and if so when she should she resume  Best  call back [phone]

## 2020-02-27 ENCOUNTER — ANESTHESIA EVENT (OUTPATIENT)
Dept: GASTROENTEROLOGY | Facility: HOSPITAL | Age: 65
End: 2020-02-27

## 2020-02-28 ENCOUNTER — ANESTHESIA (OUTPATIENT)
Dept: GASTROENTEROLOGY | Facility: HOSPITAL | Age: 65
End: 2020-02-28

## 2020-02-28 ENCOUNTER — HOSPITAL ENCOUNTER (OUTPATIENT)
Dept: GASTROENTEROLOGY | Facility: HOSPITAL | Age: 65
Setting detail: OUTPATIENT SURGERY
Discharge: HOME | End: 2020-02-28
Attending: INTERNAL MEDICINE | Admitting: INTERNAL MEDICINE
Payer: COMMERCIAL

## 2020-02-28 VITALS
DIASTOLIC BLOOD PRESSURE: 58 MMHG | TEMPERATURE: 98.8 F | HEART RATE: 62 BPM | RESPIRATION RATE: 17 BRPM | HEIGHT: 64 IN | WEIGHT: 188.49 LBS | OXYGEN SATURATION: 99 % | SYSTOLIC BLOOD PRESSURE: 134 MMHG | BODY MASS INDEX: 32.18 KG/M2

## 2020-02-28 DIAGNOSIS — Z86.010 HISTORY OF COLON POLYPS: ICD-10-CM

## 2020-02-28 PROCEDURE — 45385 COLONOSCOPY W/LESION REMOVAL: CPT | Performed by: INTERNAL MEDICINE

## 2020-02-28 PROCEDURE — 45380 COLONOSCOPY AND BIOPSY: CPT | Performed by: INTERNAL MEDICINE

## 2020-02-28 PROCEDURE — 88305 TISSUE EXAM BY PATHOLOGIST: CPT | Performed by: PATHOLOGY

## 2020-02-28 RX ORDER — METOCLOPRAMIDE HYDROCHLORIDE 5 MG/ML
10 INJECTION INTRAMUSCULAR; INTRAVENOUS ONCE
Status: COMPLETED | OUTPATIENT
Start: 2020-02-28 | End: 2020-02-28

## 2020-02-28 RX ORDER — PROPOFOL 10 MG/ML
INJECTION, EMULSION INTRAVENOUS AS NEEDED
Status: DISCONTINUED | OUTPATIENT
Start: 2020-02-28 | End: 2020-02-28 | Stop reason: SURG

## 2020-02-28 RX ORDER — SODIUM CHLORIDE, SODIUM LACTATE, POTASSIUM CHLORIDE, CALCIUM CHLORIDE 600; 310; 30; 20 MG/100ML; MG/100ML; MG/100ML; MG/100ML
125 INJECTION, SOLUTION INTRAVENOUS CONTINUOUS
Status: DISCONTINUED | OUTPATIENT
Start: 2020-02-28 | End: 2020-03-03 | Stop reason: HOSPADM

## 2020-02-28 RX ORDER — LIDOCAINE HYDROCHLORIDE 10 MG/ML
INJECTION, SOLUTION EPIDURAL; INFILTRATION; INTRACAUDAL; PERINEURAL AS NEEDED
Status: DISCONTINUED | OUTPATIENT
Start: 2020-02-28 | End: 2020-02-28 | Stop reason: SURG

## 2020-02-28 RX ADMIN — PROPOFOL 50 MG: 10 INJECTION, EMULSION INTRAVENOUS at 07:28

## 2020-02-28 RX ADMIN — LIDOCAINE HYDROCHLORIDE 50 MG: 10 INJECTION, SOLUTION EPIDURAL; INFILTRATION; INTRACAUDAL; PERINEURAL at 07:22

## 2020-02-28 RX ADMIN — METOCLOPRAMIDE 10 MG: 5 INJECTION, SOLUTION INTRAMUSCULAR; INTRAVENOUS at 08:00

## 2020-02-28 RX ADMIN — SODIUM CHLORIDE, SODIUM LACTATE, POTASSIUM CHLORIDE, AND CALCIUM CHLORIDE: .6; .31; .03; .02 INJECTION, SOLUTION INTRAVENOUS at 07:18

## 2020-02-28 RX ADMIN — PROPOFOL 50 MG: 10 INJECTION, EMULSION INTRAVENOUS at 07:32

## 2020-02-28 RX ADMIN — PROPOFOL 50 MG: 10 INJECTION, EMULSION INTRAVENOUS at 07:36

## 2020-02-28 RX ADMIN — PROPOFOL 50 MG: 10 INJECTION, EMULSION INTRAVENOUS at 07:25

## 2020-02-28 RX ADMIN — PROPOFOL 50 MG: 10 INJECTION, EMULSION INTRAVENOUS at 07:39

## 2020-02-28 RX ADMIN — PROPOFOL 100 MG: 10 INJECTION, EMULSION INTRAVENOUS at 07:22

## 2020-02-28 NOTE — ANESTHESIA POSTPROCEDURE EVALUATION
Post-Op Assessment Note    CV Status:  Stable    Pain management: adequate     Mental Status:  Alert and awake   Hydration Status:  Euvolemic   PONV Controlled:  Controlled   Airway Patency:  Patent   Post Op Vitals Reviewed: Yes      Staff: CRNA           BP   119/57   Temp      Pulse  63   Resp   16   SpO2   95

## 2020-02-28 NOTE — ANESTHESIA PREPROCEDURE EVALUATION
Review of Systems/Medical History  Patient summary reviewed  Chart reviewed  No history of anesthetic complications     Cardiovascular  Exercise tolerance (METS): >4,  Hyperlipidemia, Dysrhythmias , DVT   Pulmonary  Asthma , well controlled/ stable Last rescue: < 1 week ago Asthma type of rescue: PRN inhaler, No shortness of breath,        GI/Hepatic    GERD well controlled,             Endo/Other  History of thyroid disease , hypothyroidism,   Obesity    GYN       Hematology    Coagulation disorder currently taking oral anticoagulants,    Musculoskeletal    Arthritis     Neurology    Headaches,    Psychology           Physical Exam    Airway    Mallampati score: II  TM Distance: >3 FB  Neck ROM: full     Dental   No notable dental hx     Cardiovascular      Pulmonary      Other Findings        Anesthesia Plan  ASA Score- 2     Anesthesia Type- IV sedation with anesthesia with ASA Monitors  Additional Monitors:   Airway Plan:         Plan Factors-    Induction- intravenous  Postoperative Plan-     Informed Consent- Anesthetic plan and risks discussed with patient  I personally reviewed this patient with the CRNA  Discussed and agreed on the Anesthesia Plan with the MAYA Rosenbaum

## 2020-02-28 NOTE — H&P
History and Physical -  [facility]  Everton De Luna 75 y o  female MRN: [id number]                  HPI: Everton De Luna is a 75y o  year old female who presents for colonoscopy for history of colon polyp in       REVIEW OF SYSTEMS: Per the HPI, and otherwise unremarkable  Historical Information   Past Medical History:   Diagnosis Date    Acid reflux     Allergic rhinitis     Last Assessed: 2017    Anesthesia complication     HYPOTENSION    Arthritis     Asthma     Bigeminy     history    [facility] ) 10/23/2018    [facility]     DVT of leg (deep venous thrombosis) ([facility] )     left    Female pelvic pain     Hyperlipidemia     Intermittent palpitations     Irregular heart beat     Migraines     Obesity (BMI 30 0-34  9)     Palpitations     Pes planus     unspecified laterality; Last Assessed: 2014    Thyroid nodule     Trigeminy     history    Wears glasses      Past Surgical History:   Procedure Laterality Date    APPENDECTOMY      CATARACT EXTRACTION       SECTION      X 2    CHOLECYSTECTOMY      COLONOSCOPY      DILATION AND CURETTAGE OF UTERUS      JOINT REPLACEMENT      left TKR    KNEE SURGERY Left     X 3    [place] ESOPHAGOGASTRODUODENOSCOPY TRANSORAL DIAGNOSTIC N/A 3/14/2016    Procedure: ESOPHAGOGASTRODUODENOSCOPY (EGD); Surgeon: Balaji Sutton MD;  [facility];   Service: Gastroenterology    [place] LAPAROSCOPY W TOT HYSTERECTUTERUS <=250 [place] TUBE/OVARY Bilateral 2016    Procedure: HYSTERECTOMY LAPAROSCOPIC TOTAL ,BSO;  Surgeon: Yolanda Snell MD;  [facility];  Service: Gynecology Oncology    REPLACEMENT TOTAL KNEE      US GUIDED BREAST BIOPSY LEFT COMPLETE Left 2019    WISDOM TOOTH EXTRACTION       Social History   Social History     Substance and Sexual Activity   Alcohol Use Yes    Comment: socially     Social History     Substance and Sexual Activity   Drug Use No     Social History     Tobacco Use   Smoking Status Former Smoker    Packs/day: 0 50    Types: Cigarettes    Last attempt to quit: 1978    Years since quittin 7   Smokeless Tobacco Never Used   Tobacco Comment    as teenager     Family History   Problem Relation Age of Onset    Endometrial cancer Mother 76    Migraines Mother     Diabetes Father     Heart disease Father     Heart attack Father     Prostate cancer Father     Hypertension Father     Thyroid cancer Brother         medullary    Diabetes type II Brother     Growth hormone deficiency Daughter     Alzheimer's disease Maternal Aunt     Thyroid cancer Maternal Aunt     Breast cancer Maternal Aunt     Diabetes Paternal Aunt     Hashimoto's thyroiditis Paternal Aunt         Total Thyroidectomy    Hypertension Paternal Aunt     Other Maternal Uncle         Brain Tumor    Alzheimer's disease Maternal Uncle     Cancer Family     Stroke Paternal Uncle        Meds/Allergies       (Not in a hospital admission)    Allergies   Allergen Reactions    Naproxen Shortness Of Breath    Iv Contrast [Iodinated Diagnostic Agents] Itching    Seasonal Ic [Cholestatin]     Phentermine Palpitations       Objective     not currently breastfeeding  PHYSICAL EXAM    LMP  (LMP Unknown)       Gen: NAD  CV: RRR  CHEST: Clear  ABD: soft, NT/ND  EXT: no edema      ASSESSMENT/PLAN:  This is a 75y o  year old female here for colonoscopy, and she is stable and optimized for her procedure

## 2020-02-28 NOTE — DISCHARGE INSTRUCTIONS
Colonoscopy   WHAT YOU NEED TO KNOW:   A colonoscopy is a procedure to examine the inside of your colon (intestine) with a scope  Polyps or tissue growths may have been removed during your colonoscopy  It is normal to feel bloated and to have some abdominal discomfort  You should be passing gas  If you have hemorrhoids or you had polyps removed, you may have a small amount of bleeding  DISCHARGE INSTRUCTIONS:   Seek care immediately if:   · You have a large amount of bright red blood in your bowel movements  · Your abdomen is hard and firm and you have severe pain  · You have sudden trouble breathing  Contact your healthcare provider if:   · You develop a rash or hives  · You have a fever within 24 hours of your procedure  · You have not had a bowel movement for 3 days after your procedure  · You have questions or concerns about your condition or care  Activity:   · Do not lift, strain, or run  for 3 days after your procedure  · Rest after your procedure  You have been given medicine to relax you  Do not  drive or make important decisions until the day after your procedure  Return to your normal activity as directed  · Relieve gas and discomfort from bloating  by lying on your right side with a heating pad on your abdomen  You may need to take short walks to help the gas move out  Eat small meals until bloating is relieved  If you had polyps removed: For 7 days after your procedure:  · Do not  take aspirin  · Do not  go on long car rides  Help prevent constipation:   · Eat a variety of healthy foods  Healthy foods include fruit, vegetables, whole-grain breads, low-fat dairy products, beans, lean meat, and fish  Ask if you need to be on a special diet  Your healthcare provider may recommend that you eat high-fiber foods such as cooked beans  Fiber helps you have regular bowel movements  · Drink liquids as directed    Adults should drink between 9 and 13 eight-ounce cups of liquid every day  Ask what amount is best for you  For most people, good liquids to drink are water, juice, and milk  · Exercise as directed  Talk to your healthcare provider about the best exercise plan for you  Exercise can help prevent constipation, decrease your blood pressure and improve your health  Follow up with your healthcare provider as directed:  Write down your questions so you remember to ask them during your visits  © 2017 [employer] Information is for End User's use only and may not be sold, redistributed or otherwise used for commercial purposes  All illustrations and images included in CareNotes® are the copyrighted property of [employer]  or [employer]  The above information is an  only  It is not intended as medical advice for individual conditions or treatments  Talk to your doctor, nurse or pharmacist before following any medical regimen to see if it is safe and effective for you

## 2020-03-10 ENCOUNTER — TELEPHONE (OUTPATIENT)
Dept: GASTROENTEROLOGY | Facility: AMBULARY SURGERY CENTER | Age: 65
End: 2020-03-10

## 2020-03-10 ENCOUNTER — HOSPITAL ENCOUNTER (OUTPATIENT)
Dept: MAMMOGRAPHY | Facility: CLINIC | Age: 65
Discharge: HOME | End: 2020-03-10
Payer: COMMERCIAL

## 2020-03-10 VITALS — HEIGHT: 64 IN | BODY MASS INDEX: 32.1 KG/M2 | WEIGHT: 188 LBS

## 2020-03-10 DIAGNOSIS — Z12.31 ENCOUNTER FOR SCREENING MAMMOGRAM FOR MALIGNANT NEOPLASM OF BREAST: ICD-10-CM

## 2020-03-10 DIAGNOSIS — K21.9 GASTROESOPHAGEAL REFLUX DISEASE WITHOUT ESOPHAGITIS: ICD-10-CM

## 2020-03-10 DIAGNOSIS — K21.00 GASTROESOPHAGEAL REFLUX DISEASE WITH ESOPHAGITIS: Primary | ICD-10-CM

## 2020-03-10 PROCEDURE — 77067 SCR MAMMO BI INCL CAD: CPT

## 2020-03-10 PROCEDURE — 77063 BREAST TOMOSYNTHESIS BI: CPT

## 2020-03-10 RX ORDER — PANTOPRAZOLE SODIUM 40 MG/1
40 TABLET, DELAYED RELEASE ORAL 2 TIMES DAILY
Qty: 180 TABLET | Refills: 3 | Status: SHIPPED | OUTPATIENT
Start: 2020-03-10 | End: 2020-04-14

## 2020-03-10 RX ORDER — PANTOPRAZOLE SODIUM 40 MG/1
40 TABLET, DELAYED RELEASE ORAL 2 TIMES DAILY
Qty: 60 TABLET | Refills: 2 | Status: SHIPPED | OUTPATIENT
Start: 2020-03-10 | End: 2020-04-14

## 2020-03-10 NOTE — TELEPHONE ENCOUNTER
Dr Miranda Posada patient    Patient called with worsening heartburn last night  Taking pantoprazole 40 mg BID  Patient requesting a PA or Dr Miranda Posada call back with recommendations

## 2020-03-10 NOTE — TELEPHONE ENCOUNTER
I spoke to her as well  This is Dr Rebecca Washington wife    Please make OV with me in 4 weeks  She can be over booked anytime she wants

## 2020-03-10 NOTE — TELEPHONE ENCOUNTER
Spoke to her! She had an episode of severe pyrosis at night  We will increase her Protonix back up to twice daily  She has been taking it once daily by direction of doctor Ismael Gregory after she completed a twice daily course  She should have follow-up with him at some point

## 2020-03-11 NOTE — TELEPHONE ENCOUNTER
This is Dr Mackenzie Phillips wife  Please call pt and schedule follow up per Dr Raul Vaca with him in 4 weeks can be overbooked

## 2020-03-23 DIAGNOSIS — J45.909 MODERATE ASTHMA, UNSPECIFIED WHETHER COMPLICATED, UNSPECIFIED WHETHER PERSISTENT: ICD-10-CM

## 2020-03-23 RX ORDER — ALBUTEROL SULFATE 90 UG/1
AEROSOL, METERED RESPIRATORY (INHALATION)
Qty: 3 INHALER | Refills: 3 | Status: SHIPPED | OUTPATIENT
Start: 2020-03-23 | End: 2021-12-07

## 2020-03-27 DIAGNOSIS — N76.2 ATROPHIC VULVITIS: ICD-10-CM

## 2020-03-27 RX ORDER — CLOBETASOL PROPIONATE 0.5 MG/G
CREAM TOPICAL
Qty: 30 G | Refills: 0 | Status: SHIPPED | OUTPATIENT
Start: 2020-03-27 | End: 2021-03-31 | Stop reason: ALTCHOICE

## 2020-04-01 ENCOUNTER — TELEPHONE (OUTPATIENT)
Dept: INTERNAL MEDICINE CLINIC | Facility: CLINIC | Age: 65
End: 2020-04-01

## 2020-04-03 ENCOUNTER — TELEMEDICINE (OUTPATIENT)
Dept: INTERNAL MEDICINE CLINIC | Facility: CLINIC | Age: 65
End: 2020-04-03
Payer: COMMERCIAL

## 2020-04-03 VITALS — BODY MASS INDEX: 32.27 KG/M2 | TEMPERATURE: 98.8 F | HEIGHT: 64 IN

## 2020-04-03 DIAGNOSIS — T78.40XS ALLERGIC STATE, SEQUELA: Primary | ICD-10-CM

## 2020-04-03 PROCEDURE — 99442 PR PHYS/QHP TELEPHONE EVALUATION 11-20 MIN: CPT | Performed by: INTERNAL MEDICINE

## 2020-04-08 ENCOUNTER — TELEPHONE (OUTPATIENT)
Dept: INTERNAL MEDICINE CLINIC | Facility: CLINIC | Age: 65
End: 2020-04-08

## 2020-04-08 ENCOUNTER — TELEMEDICINE (OUTPATIENT)
Dept: INTERNAL MEDICINE CLINIC | Facility: CLINIC | Age: 65
End: 2020-04-08
Payer: COMMERCIAL

## 2020-04-08 VITALS
HEIGHT: 64 IN | SYSTOLIC BLOOD PRESSURE: 116 MMHG | BODY MASS INDEX: 32.44 KG/M2 | DIASTOLIC BLOOD PRESSURE: 61 MMHG | WEIGHT: 190 LBS | HEART RATE: 69 BPM | TEMPERATURE: 98.9 F

## 2020-04-08 DIAGNOSIS — J30.1 ALLERGIC RHINITIS DUE TO POLLEN, UNSPECIFIED SEASONALITY: Primary | ICD-10-CM

## 2020-04-08 PROCEDURE — 99442 PR PHYS/QHP TELEPHONE EVALUATION 11-20 MIN: CPT | Performed by: INTERNAL MEDICINE

## 2020-04-08 RX ORDER — LEVOCETIRIZINE DIHYDROCHLORIDE 5 MG/1
5 TABLET, FILM COATED ORAL EVERY EVENING
Qty: 60 TABLET | Refills: 3 | Status: SHIPPED | OUTPATIENT
Start: 2020-04-08 | End: 2020-06-05 | Stop reason: SDUPTHER

## 2020-04-10 ENCOUNTER — TELEMEDICINE (OUTPATIENT)
Dept: INTERNAL MEDICINE CLINIC | Facility: CLINIC | Age: 65
End: 2020-04-10
Payer: COMMERCIAL

## 2020-04-10 VITALS
SYSTOLIC BLOOD PRESSURE: 120 MMHG | TEMPERATURE: 98.5 F | DIASTOLIC BLOOD PRESSURE: 59 MMHG | HEART RATE: 76 BPM | HEIGHT: 64 IN | WEIGHT: 190 LBS | BODY MASS INDEX: 32.44 KG/M2

## 2020-04-10 DIAGNOSIS — J45.909 MODERATE ASTHMA WITHOUT COMPLICATION, UNSPECIFIED WHETHER PERSISTENT: Primary | ICD-10-CM

## 2020-04-10 PROCEDURE — 99441 PR PHYS/QHP TELEPHONE EVALUATION 5-10 MIN: CPT | Performed by: INTERNAL MEDICINE

## 2020-04-14 ENCOUNTER — TELEMEDICINE (OUTPATIENT)
Dept: GASTROENTEROLOGY | Facility: CLINIC | Age: 65
End: 2020-04-14
Payer: COMMERCIAL

## 2020-04-14 DIAGNOSIS — K21.9 GASTROESOPHAGEAL REFLUX DISEASE WITHOUT ESOPHAGITIS: Primary | ICD-10-CM

## 2020-04-14 PROCEDURE — 99214 OFFICE O/P EST MOD 30 MIN: CPT | Performed by: INTERNAL MEDICINE

## 2020-04-14 PROCEDURE — 4040F PNEUMOC VAC/ADMIN/RCVD: CPT | Performed by: INTERNAL MEDICINE

## 2020-04-14 PROCEDURE — 1160F RVW MEDS BY RX/DR IN RCRD: CPT | Performed by: INTERNAL MEDICINE

## 2020-04-14 PROCEDURE — 1036F TOBACCO NON-USER: CPT | Performed by: INTERNAL MEDICINE

## 2020-04-14 RX ORDER — PANTOPRAZOLE SODIUM 40 MG/1
40 TABLET, DELAYED RELEASE ORAL DAILY
Qty: 180 TABLET | Refills: 3 | Status: SHIPPED | OUTPATIENT
Start: 2020-04-14 | End: 2020-11-25 | Stop reason: SDUPTHER

## 2020-04-15 ENCOUNTER — HOSPITAL ENCOUNTER (OUTPATIENT)
Dept: RADIOLOGY | Facility: MEDICAL CENTER | Age: 65
Discharge: HOME | End: 2020-04-15

## 2020-04-27 DIAGNOSIS — J45.21 MILD INTERMITTENT ASTHMA WITH ACUTE EXACERBATION: ICD-10-CM

## 2020-04-27 RX ORDER — FLUTICASONE PROPIONATE 220 UG/1
AEROSOL, METERED RESPIRATORY (INHALATION)
Qty: 12 INHALER | Refills: 2 | Status: SHIPPED | OUTPATIENT
Start: 2020-04-27 | End: 2020-06-24 | Stop reason: SDUPTHER

## 2020-05-26 ENCOUNTER — HOSPITAL ENCOUNTER (OUTPATIENT)
Dept: RADIOLOGY | Facility: MEDICAL CENTER | Age: 65
Discharge: HOME | End: 2020-05-26
Payer: COMMERCIAL

## 2020-05-26 DIAGNOSIS — M81.0 OSTEOPOROSIS, UNSPECIFIED OSTEOPOROSIS TYPE, UNSPECIFIED PATHOLOGICAL FRACTURE PRESENCE: ICD-10-CM

## 2020-05-26 DIAGNOSIS — E04.1 THYROID NODULE: ICD-10-CM

## 2020-05-26 PROCEDURE — 77080 DXA BONE DENSITY AXIAL: CPT

## 2020-05-26 PROCEDURE — 76536 US EXAM OF HEAD AND NECK: CPT

## 2020-06-02 ENCOUNTER — TELEPHONE (OUTPATIENT)
Dept: OBGYN CLINIC | Facility: CLINIC | Age: 65
End: 2020-06-02

## 2020-06-05 ENCOUNTER — TELEPHONE (OUTPATIENT)
Dept: INTERNAL MEDICINE CLINIC | Facility: CLINIC | Age: 65
End: 2020-06-05

## 2020-06-05 DIAGNOSIS — J30.1 ALLERGIC RHINITIS DUE TO POLLEN, UNSPECIFIED SEASONALITY: ICD-10-CM

## 2020-06-05 RX ORDER — LEVOCETIRIZINE DIHYDROCHLORIDE 5 MG/1
5 TABLET, FILM COATED ORAL EVERY EVENING
Qty: 90 TABLET | Refills: 3 | Status: SHIPPED | OUTPATIENT
Start: 2020-06-05 | End: 2021-06-23

## 2020-06-09 ENCOUNTER — ANNUAL EXAM (OUTPATIENT)
Dept: OBGYN CLINIC | Facility: CLINIC | Age: 65
End: 2020-06-09
Payer: COMMERCIAL

## 2020-06-09 VITALS
RESPIRATION RATE: 14 BRPM | DIASTOLIC BLOOD PRESSURE: 66 MMHG | WEIGHT: 191 LBS | BODY MASS INDEX: 32.61 KG/M2 | HEIGHT: 64 IN | SYSTOLIC BLOOD PRESSURE: 110 MMHG

## 2020-06-09 DIAGNOSIS — N81.89 PELVIC FLOOR WEAKNESS: ICD-10-CM

## 2020-06-09 DIAGNOSIS — R39.15 URGENCY OF URINATION: ICD-10-CM

## 2020-06-09 DIAGNOSIS — N81.10 FEMALE CYSTOCELE: ICD-10-CM

## 2020-06-09 DIAGNOSIS — Z12.31 ENCOUNTER FOR SCREENING MAMMOGRAM FOR MALIGNANT NEOPLASM OF BREAST: ICD-10-CM

## 2020-06-09 DIAGNOSIS — Z01.419 ENCOUNTER FOR GYNECOLOGICAL EXAMINATION WITHOUT ABNORMAL FINDING: Primary | ICD-10-CM

## 2020-06-09 PROBLEM — R07.89 OTHER CHEST PAIN: Status: RESOLVED | Noted: 2019-10-22 | Resolved: 2020-06-09

## 2020-06-09 PROBLEM — R10.13 EPIGASTRIC PAIN: Status: RESOLVED | Noted: 2019-10-21 | Resolved: 2020-06-09

## 2020-06-09 PROBLEM — R10.2 SUPRAPUBIC ABDOMINAL PAIN: Status: RESOLVED | Noted: 2018-03-19 | Resolved: 2020-06-09

## 2020-06-09 PROBLEM — M79.661 RIGHT CALF PAIN: Status: RESOLVED | Noted: 2019-11-27 | Resolved: 2020-06-09

## 2020-06-09 PROBLEM — M25.471 PAIN AND SWELLING OF RIGHT ANKLE: Status: RESOLVED | Noted: 2018-10-26 | Resolved: 2020-06-09

## 2020-06-09 PROBLEM — M25.571 PAIN AND SWELLING OF RIGHT ANKLE: Status: RESOLVED | Noted: 2018-10-26 | Resolved: 2020-06-09

## 2020-06-09 PROBLEM — R06.02 SHORTNESS OF BREATH: Status: RESOLVED | Noted: 2018-08-16 | Resolved: 2020-06-09

## 2020-06-09 PROCEDURE — 99397 PER PM REEVAL EST PAT 65+ YR: CPT | Performed by: OBSTETRICS & GYNECOLOGY

## 2020-06-09 PROCEDURE — 87086 URINE CULTURE/COLONY COUNT: CPT | Performed by: OBSTETRICS & GYNECOLOGY

## 2020-06-09 RX ORDER — PHENAZOPYRIDINE HYDROCHLORIDE 100 MG/1
100 TABLET, FILM COATED ORAL 3 TIMES DAILY PRN
Qty: 10 TABLET | Refills: 0 | Status: SHIPPED | OUTPATIENT
Start: 2020-06-09 | End: 2020-09-15 | Stop reason: SDUPTHER

## 2020-06-09 RX ORDER — SULFAMETHOXAZOLE AND TRIMETHOPRIM 800; 160 MG/1; MG/1
1 TABLET ORAL EVERY 12 HOURS SCHEDULED
Qty: 6 TABLET | Refills: 0 | Status: SHIPPED | OUTPATIENT
Start: 2020-06-09 | End: 2020-06-12

## 2020-06-10 LAB — BACTERIA UR CULT: NORMAL

## 2020-06-17 ENCOUNTER — EVALUATION (OUTPATIENT)
Dept: PHYSICAL THERAPY | Age: 65
End: 2020-06-17
Payer: COMMERCIAL

## 2020-06-17 DIAGNOSIS — N81.10 FEMALE CYSTOCELE: ICD-10-CM

## 2020-06-17 DIAGNOSIS — N81.89 PELVIC FLOOR WEAKNESS: Primary | ICD-10-CM

## 2020-06-17 PROCEDURE — 97162 PT EVAL MOD COMPLEX 30 MIN: CPT | Performed by: PHYSICAL THERAPIST

## 2020-06-17 PROCEDURE — 97140 MANUAL THERAPY 1/> REGIONS: CPT | Performed by: PHYSICAL THERAPIST

## 2020-06-18 ENCOUNTER — TELEPHONE (OUTPATIENT)
Dept: HEMATOLOGY ONCOLOGY | Facility: CLINIC | Age: 65
End: 2020-06-18

## 2020-06-22 ENCOUNTER — OFFICE VISIT (OUTPATIENT)
Dept: PODIATRY | Facility: CLINIC | Age: 65
End: 2020-06-22
Payer: COMMERCIAL

## 2020-06-22 DIAGNOSIS — L60.8 PINCER NAIL DEFORMITY: Primary | ICD-10-CM

## 2020-06-22 DIAGNOSIS — M72.2 PLANTAR FASCIITIS: ICD-10-CM

## 2020-06-22 DIAGNOSIS — M76.821 POSTERIOR TIBIAL TENDON DYSFUNCTION (PTTD) OF BOTH LOWER EXTREMITIES: ICD-10-CM

## 2020-06-22 DIAGNOSIS — M76.822 POSTERIOR TIBIAL TENDON DYSFUNCTION (PTTD) OF BOTH LOWER EXTREMITIES: ICD-10-CM

## 2020-06-22 PROCEDURE — 1160F RVW MEDS BY RX/DR IN RCRD: CPT | Performed by: PODIATRIST

## 2020-06-22 PROCEDURE — 4040F PNEUMOC VAC/ADMIN/RCVD: CPT | Performed by: PODIATRIST

## 2020-06-22 PROCEDURE — 99203 OFFICE O/P NEW LOW 30 MIN: CPT | Performed by: PODIATRIST

## 2020-06-22 PROCEDURE — 1036F TOBACCO NON-USER: CPT | Performed by: PODIATRIST

## 2020-06-24 ENCOUNTER — OFFICE VISIT (OUTPATIENT)
Dept: PHYSICAL THERAPY | Age: 65
End: 2020-06-24
Payer: COMMERCIAL

## 2020-06-24 DIAGNOSIS — N81.10 FEMALE CYSTOCELE: ICD-10-CM

## 2020-06-24 DIAGNOSIS — J45.21 MILD INTERMITTENT ASTHMA WITH ACUTE EXACERBATION: ICD-10-CM

## 2020-06-24 DIAGNOSIS — N81.89 PELVIC FLOOR WEAKNESS: Primary | ICD-10-CM

## 2020-06-24 PROCEDURE — 97110 THERAPEUTIC EXERCISES: CPT | Performed by: PHYSICAL THERAPIST

## 2020-06-24 PROCEDURE — 97140 MANUAL THERAPY 1/> REGIONS: CPT | Performed by: PHYSICAL THERAPIST

## 2020-06-24 RX ORDER — FLUTICASONE PROPIONATE 220 UG/1
2 AEROSOL, METERED RESPIRATORY (INHALATION) 2 TIMES DAILY
Qty: 12 INHALER | Refills: 2 | Status: SHIPPED | OUTPATIENT
Start: 2020-06-24 | End: 2020-06-25 | Stop reason: SDUPTHER

## 2020-06-25 DIAGNOSIS — J45.21 MILD INTERMITTENT ASTHMA WITH ACUTE EXACERBATION: ICD-10-CM

## 2020-06-25 DIAGNOSIS — J45.909 ALLERGIC BRONCHITIS: ICD-10-CM

## 2020-06-25 RX ORDER — MONTELUKAST SODIUM 10 MG/1
10 TABLET ORAL
Qty: 30 TABLET | Refills: 0 | Status: SHIPPED | OUTPATIENT
Start: 2020-06-25 | End: 2020-12-30 | Stop reason: SDUPTHER

## 2020-06-25 RX ORDER — FLUTICASONE PROPIONATE 220 UG/1
2 AEROSOL, METERED RESPIRATORY (INHALATION) 2 TIMES DAILY
Qty: 12 INHALER | Refills: 2 | Status: SHIPPED | OUTPATIENT
Start: 2020-06-25 | End: 2020-10-02

## 2020-07-01 ENCOUNTER — OFFICE VISIT (OUTPATIENT)
Dept: PHYSICAL THERAPY | Age: 65
End: 2020-07-01
Payer: COMMERCIAL

## 2020-07-01 DIAGNOSIS — N81.10 FEMALE CYSTOCELE: ICD-10-CM

## 2020-07-01 DIAGNOSIS — N81.89 PELVIC FLOOR WEAKNESS: Primary | ICD-10-CM

## 2020-07-01 PROCEDURE — 97140 MANUAL THERAPY 1/> REGIONS: CPT | Performed by: PHYSICAL THERAPIST

## 2020-07-01 PROCEDURE — 97110 THERAPEUTIC EXERCISES: CPT | Performed by: PHYSICAL THERAPIST

## 2020-07-01 NOTE — PROGRESS NOTES
Daily Note     Today's date: 2020  Patient name: Elda Plummer  : 1955  MRN: [id number]  Referring provider: Xavier Hilton, *  Dx:   Encounter Diagnosis     ICD-10-CM    1  Pelvic floor weakness N81 89    2  Female cystocele N81 10        Start Time: 1600  Stop Time: 0652  Total time in clinic (min): 59 minutes    Subjective: Patient notes feeling somewhat better with decreased urgency and frequency of urination  Notes doing HEP when she remembers  Objective: See treatment diary below      Assessment: Tolerated treatment well  Patient would benefit from continued PT Verbal cues for breathing sequence with exercises  Reviewed breathing with ADLS and IADLs and the importance of exhalation with exertion  Right psoas tightness noted  Decreased pain post treatment with good response to dural tube  Plan: Continue per plan of care        Precautions:LKR, cardiac, back and neck pain, pelvic pain, DVT      Manuals  7          Dural tube 10 10 10          LEs  10 10          Pelvic transverse plane  10 10                       Neuro Re-Ed                          HEP  As below Ball  band          THER EX             Contract relax Kegel  3"/10"  5x 10x          ADIM  5"/10x 10x                       ball   10x          band   10x                                                                                                                  Ther Activity                                       Gait Training                                       Modalities

## 2020-07-08 ENCOUNTER — OFFICE VISIT (OUTPATIENT)
Dept: PHYSICAL THERAPY | Age: 65
End: 2020-07-08
Payer: COMMERCIAL

## 2020-07-08 DIAGNOSIS — N81.89 PELVIC FLOOR WEAKNESS: Primary | ICD-10-CM

## 2020-07-08 PROCEDURE — 97110 THERAPEUTIC EXERCISES: CPT | Performed by: PHYSICAL THERAPIST

## 2020-07-08 PROCEDURE — 97140 MANUAL THERAPY 1/> REGIONS: CPT | Performed by: PHYSICAL THERAPIST

## 2020-07-08 NOTE — PROGRESS NOTES
Daily Note     Today's date: 2020  Patient name: Eliazar Freeman  : 1955  MRN: [id number]  Referring provider: Pau Graham, *  Dx:   Encounter Diagnosis     ICD-10-CM    1  Pelvic floor weakness N81 89        Start Time: 1600  Stop Time:   Total time in clinic (min): 58 minutes    Subjective: Patient notes yesterday having a little bladder discomfort  None today  Aware of food triggers and stress increasing discomfort at times  Reports high stress levels currently at home  Reports little compliance with HEP this week as she returned to work  Objective: See treatment diary below      Assessment: Tolerated treatment well  Patient exhibited good technique with therapeutic exercises and would benefit from continued PT Improved breathing technique with exercises  Good response to manual therapy  Plan: Continue per plan of care        Precautions:LKR, cardiac, back and neck pain, pelvic pain, DVT      Manuals  7/8         Dural tube 10 10 10 10         LEs  10 10 10         Pelvic transverse plane  10 10 10                      Neuro Re-Ed                          HEP  As below Ball  band          THER EX             Contract relax Kegel  3"/10"  5x 10x 10x         ADIM  5"/10x 10x                       ball   10x 10         band   10x 10         Long leg ER/IR                                                                                                        Ther Activity                                       Gait Training                                       Modalities

## 2020-07-15 ENCOUNTER — OFFICE VISIT (OUTPATIENT)
Dept: PHYSICAL THERAPY | Age: 65
End: 2020-07-15
Payer: COMMERCIAL

## 2020-07-15 ENCOUNTER — TELEPHONE (OUTPATIENT)
Dept: INTERNAL MEDICINE CLINIC | Facility: CLINIC | Age: 65
End: 2020-07-15

## 2020-07-15 DIAGNOSIS — N81.10 FEMALE CYSTOCELE: ICD-10-CM

## 2020-07-15 DIAGNOSIS — N81.89 PELVIC FLOOR WEAKNESS: Primary | ICD-10-CM

## 2020-07-15 PROCEDURE — 97110 THERAPEUTIC EXERCISES: CPT | Performed by: PHYSICAL THERAPIST

## 2020-07-15 PROCEDURE — 97140 MANUAL THERAPY 1/> REGIONS: CPT | Performed by: PHYSICAL THERAPIST

## 2020-07-15 NOTE — TELEPHONE ENCOUNTER
COVID Pre-Visit Screening     1  Is this a family member screening? No  2  Have you traveled outside of your state in the past 2 weeks? No  3  Do you presently have a fever or flu-like symptoms? No  4  Do you have symptoms of an upper respiratory infection like runny nose, sore throat, or cough? No  5  Are you suffering from new headache that you have not had in the past?  No  6  Do you have/have you experienced any new shortness of breath recently? Yes, asthma? 7  Do you have any new diarrhea, nausea or vomiting? No  8  Have you been in contact with anyone who has been sick or diagnosed with COVID-19? No? She is a pediatrician   9  Do you have any new loss of taste or smell? No  10  Are you able to wear a mask without a valve for the entire visit?  Yes

## 2020-07-15 NOTE — PROGRESS NOTES
Daily Note     Today's date: 7/15/2020  Patient name: Donna Teixeira  : 1955  MRN: [id number]  Referring provider: Ely Partida, *  Dx:   Encounter Diagnosis     ICD-10-CM    1  Pelvic floor weakness N81 89    2  Female cystocele N81 10        Start Time: 901  Stop Time: 1000  Total time in clinic (min): 59 minutes    Subjective: Patient notes she attended a [employer] class last week and used the breathing techniques with some benefit  Notes overall improvement with decreased urgency and increased awareness of protecting the pelvic floor  Objective: See treatment diary below      Assessment: Tolerated treatment well  Patient exhibited good technique with therapeutic exercises Responds well to manual therapy  [place] in Hep and self care techniques  Plan:   Potential Discharge PT to self care and HEP  Will reassess in the next two weeks        Precautions:LKR, cardiac, back and neck pain, pelvic pain, DVT      Manuals 6/17 6/24 7/1 7/8 7/15        Dural tube 10 10 10 10 10        LEs  10 10 10 10        Pelvic transverse plane  10 10 10 10                     Neuro Re-Ed                          HEP  As below Ball  band          THER EX             Contract relax Kegel  3"/10"  5x 10x 10x 10        ADIM  5"/10x 10x  review                     ball   10x 10 10        band   10x 10 10        Long leg ER/IR                                                                                                        Ther Activity                                       Gait Training                                       Modalities

## 2020-07-16 ENCOUNTER — OFFICE VISIT (OUTPATIENT)
Dept: INTERNAL MEDICINE CLINIC | Facility: CLINIC | Age: 65
End: 2020-07-16
Payer: COMMERCIAL

## 2020-07-16 VITALS
BODY MASS INDEX: 33.29 KG/M2 | TEMPERATURE: 98.4 F | OXYGEN SATURATION: 97 % | WEIGHT: 195 LBS | HEART RATE: 82 BPM | SYSTOLIC BLOOD PRESSURE: 110 MMHG | HEIGHT: 64 IN | DIASTOLIC BLOOD PRESSURE: 76 MMHG

## 2020-07-16 DIAGNOSIS — Z20.828 EXPOSURE TO SARS-ASSOCIATED CORONAVIRUS: ICD-10-CM

## 2020-07-16 DIAGNOSIS — G89.29 CHRONIC LOW BACK PAIN: ICD-10-CM

## 2020-07-16 DIAGNOSIS — J45.20 MILD INTERMITTENT ASTHMA, UNSPECIFIED WHETHER COMPLICATED: Primary | ICD-10-CM

## 2020-07-16 DIAGNOSIS — M54.50 CHRONIC LOW BACK PAIN: ICD-10-CM

## 2020-07-16 DIAGNOSIS — M62.838 MUSCLE SPASM: ICD-10-CM

## 2020-07-16 PROBLEM — J45.40 MODERATE PERSISTENT ASTHMA WITHOUT COMPLICATION: Status: ACTIVE | Noted: 2020-07-16

## 2020-07-16 PROCEDURE — 99214 OFFICE O/P EST MOD 30 MIN: CPT | Performed by: INTERNAL MEDICINE

## 2020-07-16 PROCEDURE — U0003 INFECTIOUS AGENT DETECTION BY NUCLEIC ACID (DNA OR RNA); SEVERE ACUTE RESPIRATORY SYNDROME CORONAVIRUS 2 (SARS-COV-2) (CORONAVIRUS DISEASE [COVID-19]), AMPLIFIED PROBE TECHNIQUE, MAKING USE OF HIGH THROUGHPUT TECHNOLOGIES AS DESCRIBED BY CMS-2020-01-R: HCPCS

## 2020-07-16 RX ORDER — DIAZEPAM 5 MG/1
5 TABLET ORAL EVERY 8 HOURS PRN
Qty: 30 TABLET | Refills: 0 | Status: SHIPPED | OUTPATIENT
Start: 2020-07-16 | End: 2020-09-15 | Stop reason: SDUPTHER

## 2020-07-16 NOTE — PROGRESS NOTES
COVID-19 Virtual Visit     Assessment/Plan:  The patient had concerns about her asthma  She has been using inhaler for some time  She is actually on albuterol as a rescue inhaler and Flovent for maintenance  She would like to know whether she needs to continue that  She has not been seen by an allergist   We have decided to send her to Allergy and immunology for evaluation  At the moment her lungs were clear  Heart rhythm is regular without gallop  ENT exam was basically unremarkable  Problem List Items Addressed This Visit        Respiratory    Mild intermittent asthma - Primary    Relevant Orders    Ambulatory referral to Allergy      Other Visit Diagnoses     Muscle spasm        Relevant Medications    diazepam (VALIUM) 5 mg tablet    Chronic low back pain        Relevant Medications    diazepam (VALIUM) 5 mg tablet    Exposure to SARS-associated coronavirus        Relevant Orders    MISC COVID-19 TEST- Collected at [facility]        This virtual check-in was done via [employer] and patient was informed that this is not a secure, HIPAA-complaint platform  She agrees to proceed     Disposition:      I referred Coretta Cheadle to one of our centralized sites for a COVID-19 swab    I spent 50 minutes directly with the patient during this visit    Encounter provider Sarah Padgett DO    Provider located at [address] [place] [address]    Recent Visits  Date Type Provider Dept   07/15/20 Telephone Shonna Suthrelandi Mello recent visits within past 7 days and meeting all other requirements     Today's Visits  Date Type Provider Dept   07/16/20 Office Visit Shonna Irwin [address] today's visits and meeting all other requirements     Future Appointments  No visits were found meeting these conditions     Showing future appointments within next 150 days and meeting all other requirements        Patient agrees to participate in a virtual check in via telephone or video visit instead of presenting to the office to address urgent/immediate medical needs  Patient is aware this is a billable service  After connecting through [employer], the patient was identified by name and date of birth  Tereza Morales was informed that this was a telemedicine visit and that the exam was being conducted confidentially over secure lines  My office door was closed  No one else was in the room  Tereza Morales acknowledged consent and understanding of privacy and security of the telemedicine visit  I informed the patient that I have reviewed her record in Epic and presented the opportunity for her to ask any questions regarding the visit today  The patient agreed to participate  Raquel Nation is a 75 y o  female who is concerned about COVID-19  She reports shortness of breath  She has not experienced shortness of breath She has not had contact with a person who is under investigation for or who is positive for COVID-19 within the last 14 days  She has not been hospitalized recently for fever and/or lower respiratory symptoms  Past Medical History:   Diagnosis Date    Acid reflux     Allergic rhinitis     Last Assessed: 11/2/2017    Anesthesia complication     HYPOTENSION    Arthritis     Asthma     Bigeminy     history    [facility]    [facility]     D[facility] of leg (deep venous thrombosis) ([facility] ) 2001    left    Female pelvic pain     Hyperlipidemia     Intermittent palpitations     Irregular heart beat     Migraines     Obesity (BMI 30 0-34  9)     Palpitations     Pes planus     unspecified laterality;  Last Assessed: 4/25/2014    Thyroid nodule     Trigeminy     history    Wears glasses        Past Surgical History:   Procedure Laterality Date    APPENDECTOMY      BREAST BIOPSY Left 2019    CATARACT EXTRACTION       SECTION      X 2    CHOLECYSTECTOMY      COLONOSCOPY      DILATION AND CURETTAGE OF UTERUS      JOINT REPLACEMENT      left TKR    KNEE SURGERY Left     X 3    [place] ESOPHAGOGASTRODUODENOSCOPY TRANSORAL DIAGNOSTIC N/A 3/14/2016    Procedure: ESOPHAGOGASTRODUODENOSCOPY (EGD); Surgeon: Sherrell Barbour MD;  [facility]; Service: Gastroenterology    [place] LAPAROSCOPY W TOT HYSTERECTUTERUS <=250 [place] [place] TUBE/OVARY Bilateral 2016    Procedure: HYSTERECTOMY LAPAROSCOPIC TOTAL ,BSO;  Surgeon: Dyllan Stoll MD;  [facility];  Service: Gynecology Oncology    REPLACEMENT TOTAL KNEE      US GUIDED BREAST BIOPSY LEFT COMPLETE Left 2019    WISDOM TOOTH EXTRACTION         Current Outpatient Medications   Medication Sig Dispense Refill    albuterol (Ventolin HFA) 90 mcg/act inhaler 1-2 puffs 4 times a day as needed 3 Inhaler 3    ALPRAZolam (XANAX) 0 25 mg tablet TAKE 1 TABLET BY MOUTH 2 TIMES A DAY AS NEEDED FOR ANXIETY FOR UP TO 30 DAYS 30 tablet 0    butalbital-acetaminophen-caffeine (FIORICET,ESGIC) -40 mg per tablet Take 1 tablet by mouth every 4 (four) hours as needed for headaches 20 tablet 0    calcium-vitamin D (OSCAL) 250-125 MG-UNIT per tablet Take 1 tablet by mouth daily      cycloSPORINE (RESTASIS) 0 05 % ophthalmic emulsion Apply 1 drop to eye 2 (two) times a day      diazepam (VALIUM) 5 mg tablet Take 1 tablet (5 mg total) by mouth every 8 (eight) hours as needed for anxiety 30 tablet 0    diphenhydrAMINE (BENADRYL) 25 mg capsule Take by mouth daily at bedtime as needed        fluticasone (FLONASE) 50 mcg/act nasal spray 2 sprays into each nostril daily 1 Bottle 5    fluticasone (Flovent HFA) 220 mcg/act inhaler Inhale 2 puffs 2 (two) times a day Rinse mouth after use   12 Inhaler 2    levocetirizine (XYZAL) 5 MG tablet Take 1 tablet (5 mg total) by mouth every evening 90 tablet 3    loteprednol etabonate (LOTEMAX) 0 5 % ophthalmic suspension Administer 1 drop to both eyes 3 (three) times a day as needed   methocarbamol (ROBAXIN) 750 mg tablet Take 750 mg by mouth every 6 (six) hours as needed for muscle spasms      montelukast (SINGULAIR) 10 mg tablet Take 1 tablet (10 mg total) by mouth daily at bedtime 30 tablet 0    Multiple Vitamin (MULTIVITAMIN) capsule Take 1 capsule by mouth daily   pantoprazole (PROTONIX) 40 mg tablet Take 1 tablet (40 mg total) by mouth daily Take 1 tablet by mouth 2 times a day 30 minutes prior to breakfast and dinner 180 tablet 3    rivaroxaban (XARELTO) 10 mg tablet Take 1 tablet (10 mg total) by mouth daily 90 tablet 3    ZOLMitriptan (ZOMIG) 5 MG tablet Take 1 tablet (5 mg total) by mouth once as needed for migraine for up to 1 dose 20 tablet 0    clobetasol (TEMOVATE) 0 05 % cream APPLY TO AFFECTED AREA TWICE A DAY (Patient not taking: Reported on 7/15/2020) 30 g 0    Evening Primrose Oil 1000 MG CAPS Take 1 capsule by mouth daily      phenazopyridine (PYRIDIUM) 100 mg tablet Take 1 tablet (100 mg total) by mouth 3 (three) times a day as needed for bladder spasms (Patient not taking: Reported on 7/15/2020) 10 tablet 0     No current facility-administered medications for this visit  Allergies   Allergen Reactions    Naproxen Shortness Of Breath    Iv Contrast [Iodinated Diagnostic Agents] Itching    Seasonal Ic [Cholestatin]     Phentermine Palpitations       Review of Systems    Video Exam    Vitals:    07/16/20 0835   BP: 110/76   Pulse: 82   Temp: 98 4 °F (36 9 °C)   SpO2: 97%   Weight: 88 5 kg (195 lb)   Height: 5' 4" (1 626 m)         Jose Apa appears no distress  Physical Exam     VIRTUAL VISIT DISCLAIMER    Tanesha Robertodonna acknowledges that she has consented to an online visit or consultation   She understands that the online visit is based solely on information provided by her, and that, in the absence of a face-to-face physical evaluation by the physician, the diagnosis she receives is both limited and provisional in terms of accuracy and completeness  This is not intended to replace a full medical face-to-face evaluation by the physician  Sandy Bustillos understands and accepts these terms

## 2020-07-17 ENCOUNTER — TELEPHONE (OUTPATIENT)
Dept: INTERNAL MEDICINE CLINIC | Facility: CLINIC | Age: 65
End: 2020-07-17

## 2020-07-17 LAB
INPATIENT: NORMAL
SARS-COV-2 RNA SPEC QL NAA+PROBE: NOT DETECTED

## 2020-07-17 NOTE — TELEPHONE ENCOUNTER
----- Message from Ellis Paz DO sent at 7/17/2020  5:44 PM EDT -----  Call patient    COVID test negative

## 2020-07-22 ENCOUNTER — EVALUATION (OUTPATIENT)
Dept: PHYSICAL THERAPY | Age: 65
End: 2020-07-22
Payer: COMMERCIAL

## 2020-07-22 DIAGNOSIS — M54.2 NECK PAIN: Primary | ICD-10-CM

## 2020-07-22 DIAGNOSIS — J45.20 MILD INTERMITTENT ASTHMA, UNSPECIFIED WHETHER COMPLICATED: ICD-10-CM

## 2020-07-22 PROCEDURE — 97162 PT EVAL MOD COMPLEX 30 MIN: CPT | Performed by: PHYSICAL THERAPIST

## 2020-07-22 PROCEDURE — 97014 ELECTRIC STIMULATION THERAPY: CPT | Performed by: PHYSICAL THERAPIST

## 2020-07-22 PROCEDURE — 97140 MANUAL THERAPY 1/> REGIONS: CPT | Performed by: PHYSICAL THERAPIST

## 2020-07-22 NOTE — PROGRESS NOTES
PT Evaluation     Today's date: 2020  Patient name: Pancho Villagran  : 1955  MRN: [id number]  Referring provider: Harsha Conte PT  Dx:   Encounter Diagnosis     ICD-10-CM    1  Neck pain M54 2    2  Mild intermittent asthma, unspecified whether complicated W95 [id number] Ambulatory referral to Allergy       Start Time: 1600  Stop Time: 1655  Total time in clinic (min): 55 minutes    Assessment  Assessment details: Pancho Villagran is a 75 y o  female who presents with pain, decreased strength and decreased ROM  Due to these impairments, Patient has difficulty performing a/iadls, recreational activities, work-related activities and engaging in social activities  Patient's clinical presentation is consistent with a PT diagnosis of neck pain  She is seen today by direct access  She has responded well to physical therapy in the past for similar presentation and would benefit from a trial of  skilled physical therapy to address their aforementioned impairments, improve their level of function and to improve their overall quality of life  Impairments: abnormal muscle tone, abnormal or restricted ROM, activity intolerance, impaired physical strength, lacks appropriate home exercise program, pain with function, poor posture  and poor body mechanics  Understanding of Dx/Px/POC: good   Prognosis: good    Goals  ST-3 WEEKS  1  Decrease pain by 3 points on VAS at its worst   2   Increase ROM by > 5-10 deg in all deficients planes cervical region  3  Increase postural endurance and positional tolerance with increased postural awareness  4  Decrease occurrence of daily headaches  LT-6 WEEKS  1  Patient to be independent with a/iadls  2  Increase functional activities for leisure, recreation and home activities to previous LOF    3  Independent with HEP with transition to  fitness program     Plan  Patient would benefit from: skilled physical therapy  Planned modality interventions: cryotherapy, thermotherapy: hydrocollator packs and unattended electrical stimulation  Planned therapy interventions: behavior modification, body mechanics training, aquatic therapy, home exercise program, joint mobilization, manual therapy, neuromuscular re-education, patient education, postural training, therapeutic activities and therapeutic exercise  Frequency: 2x per week  Duration in weeks: 6  Plan of Care beginning date: 2020  Plan of Care expiration date: 10/20/2020  Treatment plan discussed with: patient        Subjective Evaluation    History of Present Illness  Mechanism of injury: Ongoing history of neck pain for many years that has recently become progressively worse with increase in headaches and daily pain  Notes pain is generally exacerbated by stress  Quality of life: good    Pain  Current pain rating: 3  At best pain ratin  At worst pain ratin  Location: neck and bilateral shoulder and scapular area, occasioanl anterior chest wall with headaches  Quality: tight, dull ache, pressure, throbbing and discomfort  Relieving factors: rest, change in position and heat  Aggravating factors: lifting and keyboarding (stress)  Progression: no change    Social Support  Lives in: multiple-level home  Lives with: spouse and adult children    Employment status: working  Hand dominance: right    Patient Goals  Patient goals for therapy: increased motion and decreased pain          Objective     Static Posture     Head  Forward  Shoulders  Rounded  Palpation   Left   Hypertonic in the levator scapulae  Tenderness of the levator scapulae, pectoralis major, rhomboids and upper trapezius  Trigger point to upper trapezius  Right   Hypertonic in the levator scapulae  Tenderness of the levator scapulae, pectoralis major, rhomboids and upper trapezius  Trigger point to upper trapezius       Neurological Testing     Sensation   Cervical/Thoracic   Left   Intact: light touch  Paresthesia: light touch    Right   Intact: light touch  Paresthesia: light touch    Active Range of Motion   Cervical/Thoracic Spine       Cervical    Left lateral flexion: 18 degrees      Right lateral flexion: 20 degrees      Left rotation: 40 degrees with pain  Right rotation: 45 degrees    with pain    Strength/Myotome Testing     Left Shoulder     Planes of Motion   Flexion: 4   Abduction: 4     Right Shoulder     Planes of Motion   Flexion: 4   Abduction: 4     Left Elbow   Flexion: 4  Extension: 4    Right Elbow   Flexion: 4  Extension: 4             Precautions: asthma, back and neck pain, TKA L, pelvic pain      Manuals 7/22            cerv/trap             Anterior release                                       Neuro Re-Ed                                                                                                        Ther Ex                                                                                                                     Ther Activity                                       Gait Training                                       Modalities             MH/ES trap/scap 10'

## 2020-07-27 ENCOUNTER — OFFICE VISIT (OUTPATIENT)
Dept: PHYSICAL THERAPY | Age: 65
End: 2020-07-27
Payer: COMMERCIAL

## 2020-07-27 DIAGNOSIS — M54.2 NECK PAIN: Primary | ICD-10-CM

## 2020-07-27 PROCEDURE — 97140 MANUAL THERAPY 1/> REGIONS: CPT | Performed by: PHYSICAL THERAPIST

## 2020-07-27 PROCEDURE — 97110 THERAPEUTIC EXERCISES: CPT | Performed by: PHYSICAL THERAPIST

## 2020-07-27 NOTE — PROGRESS NOTES
Daily Note     Today's date: 2020  Patient name: Isela Lopez  : 1955  MRN: [id number]  Referring provider: Francesco Lemons PT  Dx:   Encounter Diagnosis     ICD-10-CM    1  Neck pain M54 2        Start Time: 1600  Stop Time: 1657  Total time in clinic (min): 57 minutes    Subjective: Patient offers complaints of pain in cervical region and headache  Notes PT helpful with decreased pain post treatments  Objective: See treatment diary below      Assessment: Tolerated treatment well  Patient demonstrated fatigue post treatment Decreased pain post treatment  Plan: Continue per plan of care        Precautions: asthma, back and neck pain, TKA L, pelvic pain      Manuals            cerv/trap  10           Anterior release  15                                     Neuro Re-Ed                                                                                                        Ther Ex             AROM  10           Posture education  10           Roseann breathing  10                                                                            Ther Activity                                       Gait Training                                       Modalities             MH/ES trap/scap 10'

## 2020-07-28 ENCOUNTER — TELEPHONE (OUTPATIENT)
Dept: HEMATOLOGY ONCOLOGY | Facility: CLINIC | Age: 65
End: 2020-07-28

## 2020-07-28 NOTE — TELEPHONE ENCOUNTER
Patient is on xarelto 10 mg  Per podiatrist note: In my opinion this would resolve with a simple matrixectomy  She is very concerned about being on Xarelto for nail procedure but I explained that is safe to perform this while on Xarelto  I explained the procedure and the recovery  She is considering the process but very anxious about it  Patient would like Dr Emam Hernandez opinion on [address] with procedure as outlined above      Will pass on to Dr Emma Hernandez MA to discuss with MD

## 2020-07-31 NOTE — TELEPHONE ENCOUNTER
Called and spoke with patient  Advised her per Dr Anabela Valerio it is ok to hold to hold Xarelto 2 days prior to her procedure and then restart it after  She voiced understanding and appreciation

## 2020-08-03 ENCOUNTER — OFFICE VISIT (OUTPATIENT)
Dept: PODIATRY | Facility: CLINIC | Age: 65
End: 2020-08-03
Payer: COMMERCIAL

## 2020-08-03 VITALS
BODY MASS INDEX: 33.46 KG/M2 | HEART RATE: 80 BPM | DIASTOLIC BLOOD PRESSURE: 81 MMHG | HEIGHT: 64 IN | WEIGHT: 196 LBS | SYSTOLIC BLOOD PRESSURE: 139 MMHG

## 2020-08-03 DIAGNOSIS — L60.8 PINCER NAIL DEFORMITY: Primary | ICD-10-CM

## 2020-08-03 PROCEDURE — 11750 EXCISION NAIL&NAIL MATRIX: CPT | Performed by: PODIATRIST

## 2020-08-03 RX ORDER — LIDOCAINE HYDROCHLORIDE 10 MG/ML
2 INJECTION, SOLUTION INFILTRATION; PERINEURAL ONCE
Status: COMPLETED | OUTPATIENT
Start: 2020-08-03 | End: 2020-08-03

## 2020-08-03 RX ADMIN — LIDOCAINE HYDROCHLORIDE 2 ML: 10 INJECTION, SOLUTION INFILTRATION; PERINEURAL at 16:39

## 2020-08-03 NOTE — LETTER
August 3, 2020     Patient: Bebeto Saxena   YOB: 1955   Date of Visit: 8/3/2020       To Whom it May Concern:    Bebeto Saxena is under my professional care  She was seen in my office on 8/3/2020  She had a minor toenail procedure and may need to wear open toed shoes for 4 weeks       If you have any questions or concerns, please don't hesitate to call           Sincerely,          Stephania Musa DPM        CC: Bebeto Saxena

## 2020-08-03 NOTE — PROGRESS NOTES
Patient is here for nail deformity  Her left medial 2nd toenail is deformed and causes chronic pain  She is here for matrixectomy  Postop instructions given  Check in 2 weeks  Nail removal    Date/Time: 8/3/2020 4:42 PM  Performed by: Juan Ignacio DPM  Authorized by: Juan Ignacio DPM     Patient location:  Clinic  Consent:     Consent obtained:  Verbal    Consent given by:  Patient and spouse    Risks discussed:  Bleeding, incomplete removal, infection, pain and permanent nail deformity    Alternatives discussed:  Observation, alternative treatment and delayed treatment  Universal protocol:     Patient identity confirmed:  Verbally with patient  Location:     Foot:  L second toe  Pre-procedure details:     Skin preparation:  Betadine  Anesthesia (see MAR for exact dosages): Anesthesia method:  Local infiltration    Local anesthetic:  Lidocaine 1% w/o epi  Nail Removal:     Nail removed:  Partial    Nail side:  Medial  Ingrown nail:     Nail matrix removed or ablated:  Partial  Post-procedure details:     Dressing:  4x4 sterile gauze, antibiotic ointment and gauze roll    Patient tolerance of procedure:   Tolerated well, no immediate complications

## 2020-08-07 ENCOUNTER — OFFICE VISIT (OUTPATIENT)
Dept: PHYSICAL THERAPY | Age: 65
End: 2020-08-07
Payer: COMMERCIAL

## 2020-08-07 DIAGNOSIS — M54.2 NECK PAIN: Primary | ICD-10-CM

## 2020-08-07 PROCEDURE — 97014 ELECTRIC STIMULATION THERAPY: CPT | Performed by: PHYSICAL THERAPIST

## 2020-08-07 PROCEDURE — 97110 THERAPEUTIC EXERCISES: CPT | Performed by: PHYSICAL THERAPIST

## 2020-08-07 PROCEDURE — 97140 MANUAL THERAPY 1/> REGIONS: CPT | Performed by: PHYSICAL THERAPIST

## 2020-08-07 NOTE — PROGRESS NOTES
Daily Note     Today's date: 2020  Patient name: Lea Uribe  : 1955  MRN: [id number]  Referring provider: Rafael Calderón, PT  Dx:   Encounter Diagnosis     ICD-10-CM    1  Neck pain  M54 2        Start Time: 0900  Stop Time: 5880  Total time in clinic (min): 55 minutes    Subjective: Reports stress adds to pain  Relief post manual therapy  Variable headaches  Objective: See treatment diary below      Assessment: Tolerated treatment well  Patient with decreased pain and improved postural awareness  She is independent in an ongoing HEP although inconsistent  Patient now able to self manage with stretching, modalities and exercises at home as well as relaxation techniques  AROM cervical is WFLs in all planes with decreased muscle spasms and tenderness  Plan: Discharge PT to self care       Precautions: asthma, back and neck pain, TKA L, pelvic pain      Manuals           cerv/trap  10 15          Anterior release  15 15                                    Neuro Re-Ed                                                                                                        Ther Ex             AROM  10 10          Posture education  10 10          Roseann breathing  10 10                                                                           Ther Activity                                       Gait Training                                       Modalities             MH/ES trap/scap 10'  10'

## 2020-08-17 ENCOUNTER — OFFICE VISIT (OUTPATIENT)
Dept: PODIATRY | Facility: CLINIC | Age: 65
End: 2020-08-17
Payer: COMMERCIAL

## 2020-08-17 VITALS
HEART RATE: 90 BPM | SYSTOLIC BLOOD PRESSURE: 91 MMHG | DIASTOLIC BLOOD PRESSURE: 47 MMHG | BODY MASS INDEX: 33.64 KG/M2 | HEIGHT: 64 IN | TEMPERATURE: 98 F

## 2020-08-17 DIAGNOSIS — L03.032 CELLULITIS OF SECOND TOE OF LEFT FOOT: Primary | ICD-10-CM

## 2020-08-17 PROCEDURE — 99213 OFFICE O/P EST LOW 20 MIN: CPT | Performed by: PODIATRIST

## 2020-08-17 PROCEDURE — 1036F TOBACCO NON-USER: CPT | Performed by: PODIATRIST

## 2020-08-17 PROCEDURE — 1160F RVW MEDS BY RX/DR IN RCRD: CPT | Performed by: PODIATRIST

## 2020-08-17 PROCEDURE — 4040F PNEUMOC VAC/ADMIN/RCVD: CPT | Performed by: PODIATRIST

## 2020-08-17 RX ORDER — CEPHALEXIN 500 MG/1
500 CAPSULE ORAL EVERY 6 HOURS SCHEDULED
Qty: 12 CAPSULE | Refills: 0 | Status: SHIPPED | OUTPATIENT
Start: 2020-08-17 | End: 2020-08-20

## 2020-08-17 NOTE — PROGRESS NOTES
Assessment/Plan:      Diagnoses and all orders for this visit:    Cellulitis of second toe of left foot  -     cephalexin (KEFLEX) 500 mg capsule; Take 1 capsule (500 mg total) by mouth every 6 (six) hours for 3 days      patient has small cellulitic infection following matrixectomy 2 weeks ago  She began Keflex 2 days ago but only had 2 days worth of pills  I did recommend 3 more days  She can stop Neosporin and is dry the nailbed out with Betadine  I would like to check once more in 2 to 3 weeks  Continue foot soaks  Continue to clean daily  Subjective:     Patient ID: Bing Almaguer is a 75 y o  female  Patient is 2 weeks s/p left 2nd medial nail matrixectomy  She was doing well  Two days ago the toe turned red and drained  She had 2 days of keflex she started yesterday and the redness looks better  Review of Systems   Constitutional: Negative  Respiratory: Negative for cough and shortness of breath  Gastrointestinal: Negative for diarrhea and nausea  Skin: Positive for color change  Neurological: Negative for numbness  Objective:     Physical Exam    Vitals reviewed    Constitutional: Patient is not distressed  Patient is well developed  Vascular: Dorsalis pedis and posterior tibial pulses palpable  Capillary refill time within normal limits to all digits  No erythema  No edema  No significant varicosities  Dermatology: No rash  No open lesions  Present pedal hair  Skin has healthy turgor  Erythema and drainage to nail avulsion site left 2nd toe medially  NO pus but tender  Musculoskeletal: Normal range of motion to ankle, subtalar joint, and midtarsal joint  Normal range of motion first MTPJ  Manual muscle testing 5 out of 5 for inversion/eversion/dorsiflexion/plantarflexion  On stance patients feet are generally rectus  Neurological exam: Monofilament sensation is intact  Vibratory sensation is intact  Achilles reflex is normal  Patient is AAOx3

## 2020-09-09 ENCOUNTER — OFFICE VISIT (OUTPATIENT)
Dept: PODIATRY | Facility: CLINIC | Age: 65
End: 2020-09-09
Payer: COMMERCIAL

## 2020-09-09 VITALS — HEIGHT: 64 IN | TEMPERATURE: 97.1 F | BODY MASS INDEX: 33.64 KG/M2

## 2020-09-09 DIAGNOSIS — M76.821 POSTERIOR TIBIALIS TENDINITIS OF BOTH LOWER EXTREMITIES: Primary | ICD-10-CM

## 2020-09-09 DIAGNOSIS — M21.41 PES PLANUS OF BOTH FEET: ICD-10-CM

## 2020-09-09 DIAGNOSIS — M76.822 POSTERIOR TIBIALIS TENDINITIS OF BOTH LOWER EXTREMITIES: Primary | ICD-10-CM

## 2020-09-09 DIAGNOSIS — L03.032 PARONYCHIA OF SECOND TOE OF LEFT FOOT: ICD-10-CM

## 2020-09-09 DIAGNOSIS — M21.42 PES PLANUS OF BOTH FEET: ICD-10-CM

## 2020-09-09 PROCEDURE — 99213 OFFICE O/P EST LOW 20 MIN: CPT | Performed by: PODIATRIST

## 2020-09-09 NOTE — PROGRESS NOTES
[id number]/Plan:      Diagnoses and all orders for this visit:    Posterior tibialis tendinitis of both lower extremities  -     Ambulatory referral to Physical Therapy; Future    Pes planus of both feet  -     Ambulatory referral to Physical Therapy; Future    Paronychia of second toe of left foot  Comments:  resolved        Patient has paronychia is resolved  No further care necessary  Regarding the patient's chronic posterior tibial tendon dysfunction she does require custom orthotics  Her old orthotics are worn out  I gave her a prescription for new custom orthotics  She may call me as needed  Subjective:     Patient ID: Tereza Morales is a 75 y o  female  Patient following up from nail bed infection  She was on keflex  The redness pain and drainage resolved  She feels well  She does have chronic B/L PTTD  She wears orthotics but they are worn out          Review of Systems   Constitutional: Negative  Gastrointestinal: Negative for diarrhea and nausea  Musculoskeletal: Positive for arthralgias and myalgias  Skin: Negative for color change  Neurological: Negative for numbness  Objective:     Physical Exam    Vitals reviewed    Constitutional: Patient is not distressed  Patient is well developed  Patient is obese  Vascular: Dorsalis pedis and posterior tibial pulses palpable  Capillary refill time within normal limits to all digits  No erythema  No edema  No significant varicosities  Dermatology: No rash  No open lesions  Present pedal hair  Skin has healthy turgor  Left 2nd medial nail avulsion site has no redness drainage or sign of infection  There is no pain  Musculoskeletal: Normal range of motion to ankle, subtalar joint, and midtarsal joint  Normal range of motion first MTPJ  Manual muscle testing 5 out of 5 for inversion/eversion/dorsiflexion/plantarflexion    On stance patients feet are   Flattened with prominent talar head medially and tenderness along the posterior tibial tendon  Neurological: Monofilament sensation is intact  Vibratory sensation is intact     Achilles reflex is normal    Proprioception is normal

## 2020-09-15 ENCOUNTER — OFFICE VISIT (OUTPATIENT)
Dept: INTERNAL MEDICINE CLINIC | Facility: CLINIC | Age: 65
End: 2020-09-15
Payer: COMMERCIAL

## 2020-09-15 ENCOUNTER — APPOINTMENT (OUTPATIENT)
Dept: LAB | Facility: CLINIC | Age: 65
End: 2020-09-15
Payer: COMMERCIAL

## 2020-09-15 VITALS
SYSTOLIC BLOOD PRESSURE: 110 MMHG | HEIGHT: 64 IN | WEIGHT: 195 LBS | OXYGEN SATURATION: 97 % | HEART RATE: 71 BPM | TEMPERATURE: 97.1 F | DIASTOLIC BLOOD PRESSURE: 70 MMHG | BODY MASS INDEX: 33.29 KG/M2

## 2020-09-15 DIAGNOSIS — M62.838 MUSCLE SPASM: ICD-10-CM

## 2020-09-15 DIAGNOSIS — M54.50 CHRONIC LOW BACK PAIN: ICD-10-CM

## 2020-09-15 DIAGNOSIS — N39.0 URINARY TRACT INFECTION WITHOUT HEMATURIA, SITE UNSPECIFIED: ICD-10-CM

## 2020-09-15 DIAGNOSIS — N39.0 URINARY TRACT INFECTION WITHOUT HEMATURIA, SITE UNSPECIFIED: Primary | ICD-10-CM

## 2020-09-15 DIAGNOSIS — G89.29 CHRONIC LOW BACK PAIN: ICD-10-CM

## 2020-09-15 DIAGNOSIS — R39.15 URGENCY OF URINATION: ICD-10-CM

## 2020-09-15 LAB
BACTERIA UR QL AUTO: ABNORMAL /HPF
BILIRUB UR QL STRIP: ABNORMAL
CLARITY UR: CLEAR
COLOR UR: ABNORMAL
GLUCOSE UR STRIP-MCNC: NEGATIVE MG/DL
HGB UR QL STRIP.AUTO: NEGATIVE
KETONES UR STRIP-MCNC: ABNORMAL MG/DL
LEUKOCYTE ESTERASE UR QL STRIP: ABNORMAL
NITRITE UR QL STRIP: POSITIVE
NON-SQ EPI CELLS URNS QL MICRO: ABNORMAL /HPF
PH UR STRIP.AUTO: 5.5 [PH]
PROT UR STRIP-MCNC: NEGATIVE MG/DL
RBC #/AREA URNS AUTO: ABNORMAL /HPF
SP GR UR STRIP.AUTO: 1.02 (ref 1–1.03)
UROBILINOGEN UR QL STRIP.AUTO: 1 E.U./DL
WBC #/AREA URNS AUTO: ABNORMAL /HPF

## 2020-09-15 PROCEDURE — 87086 URINE CULTURE/COLONY COUNT: CPT

## 2020-09-15 PROCEDURE — 99213 OFFICE O/P EST LOW 20 MIN: CPT | Performed by: INTERNAL MEDICINE

## 2020-09-15 PROCEDURE — 81001 URINALYSIS AUTO W/SCOPE: CPT | Performed by: INTERNAL MEDICINE

## 2020-09-15 RX ORDER — DIAZEPAM 5 MG/1
5 TABLET ORAL EVERY 8 HOURS PRN
Qty: 30 TABLET | Refills: 0 | Status: SHIPPED | OUTPATIENT
Start: 2020-09-15 | End: 2020-11-25 | Stop reason: SDUPTHER

## 2020-09-15 RX ORDER — SULFAMETHOXAZOLE AND TRIMETHOPRIM 400; 80 MG/1; MG/1
2 TABLET ORAL EVERY 12 HOURS SCHEDULED
COMMUNITY
End: 2021-03-31 | Stop reason: ALTCHOICE

## 2020-09-15 RX ORDER — PHENAZOPYRIDINE HYDROCHLORIDE 100 MG/1
100 TABLET, FILM COATED ORAL 3 TIMES DAILY PRN
Qty: 20 TABLET | Refills: 1 | Status: SHIPPED | OUTPATIENT
Start: 2020-09-15 | End: 2020-09-29 | Stop reason: SDUPTHER

## 2020-09-15 RX ORDER — SULFAMETHOXAZOLE AND TRIMETHOPRIM 800; 160 MG/1; MG/1
1 TABLET ORAL EVERY 12 HOURS SCHEDULED
Qty: 14 TABLET | Refills: 1 | Status: SHIPPED | OUTPATIENT
Start: 2020-09-15 | End: 2020-09-22

## 2020-09-15 NOTE — PROGRESS NOTES
Assessment/Plan:  Symptoms suggestive of UTI  Urinalysis and culture pending  Continue 5 days of Bactrim  Side effects explained  Call if no improvement  1  Urinary tract infection without hematuria, site unspecified  Urine culture    UA w Reflex to Microscopic w Reflex to Culture - Clinic Collect    sulfamethoxazole-trimethoprim (BACTRIM DS) 800-160 mg per tablet   2  Urgency of urination  phenazopyridine (PYRIDIUM) 100 mg tablet   3  Muscle spasm  diazepam (VALIUM) 5 mg tablet   4  Chronic low back pain  diazepam (VALIUM) 5 mg tablet       Orders Placed This Encounter   Procedures    Urine culture    UA w Reflex to Microscopic w Reflex to Culture - Clinic Collect            Subjective:  Symptoms of UTI     Patient ID: Karson Merlos is a 75 y o  female  HPI she developed dysuria and urinary frequency  It started over the weekend  She was started on Bactrim by her   Her symptoms are improving slowly  She has no fever or flank pain  The following portions of the patient's history were reviewed and updated as appropriate:   She has a past medical history of Acid reflux, Allergic rhinitis, Anesthesia complication, Arthritis, Asthma, Bigeminy, DVT (deep venous thrombosis) ([facility] [address] ) (10/23/2018), DVT (deep venous thrombosis) ([facility] [address] ), DVT of leg (deep venous thrombosis) ([facility] [address] ) (), Female pelvic pain, Hyperlipidemia, Intermittent palpitations, Irregular heart beat, Migraines, Obesity (BMI 30 0-34 9), Palpitations, Pes planus, Thyroid nodule, Trigeminy, and Wears glasses  ,  does not have any pertinent problems on file  ,   has a past surgical history that includes Replacement total knee (); Appendectomy; Knee surgery (Left);  section; Joint replacement; Dilation and curettage of uterus; Colonoscopy; [place] tooth extraction;  Cholecystectomy; pr laparoscopy w tot hysterectuterus <=250 gram  w tube/ovary (Bilateral, 2016); pr esophagogastroduodenoscopy transoral diagnostic (N/A, 3/14/2016); Cataract extraction; US guided breast biopsy left complete (Left, 2/6/2019); and Breast biopsy (Left, 2019)  ,  family history includes Alzheimer's disease in her maternal aunt and maternal uncle; Breast cancer (age of onset: 72) in her maternal aunt; Cancer in her family; Diabetes in her father and paternal aunt; Diabetes type II in her brother; Endometrial cancer (age of onset: 76) in her mother; Growth hormone deficiency in her daughter; Hashimoto's thyroiditis in her paternal aunt; Heart attack in her father; Heart disease in her father; Hypertension in her father and paternal aunt; Migraines in her mother; No Known Problems in her daughter, maternal aunt, maternal aunt, maternal aunt, maternal aunt, maternal grandmother, paternal aunt, and paternal grandmother; Other in her maternal uncle; Prostate cancer in her father; Stroke in her paternal uncle; Thyroid cancer in her brother and maternal aunt; Thyroid cancer (age of onset: 80) in her maternal aunt  ,   reports that she quit smoking about 42 years ago  Her smoking use included cigarettes  She smoked 0 50 packs per day  She has never used smokeless tobacco  She reports current alcohol use  She reports that she does not use drugs  ,  is allergic to naproxen; iv contrast [iodinated diagnostic agents]; seasonal ic [cholestatin]; and phentermine       Current Outpatient Medications:     albuterol (Ventolin HFA) 90 mcg/act inhaler, 1-2 puffs 4 times a day as needed, Disp: 3 Inhaler, Rfl: 3    ALPRAZolam (XANAX) 0 25 mg tablet, TAKE 1 TABLET BY MOUTH 2 TIMES A DAY AS NEEDED FOR ANXIETY FOR UP TO 30 DAYS, Disp: 30 tablet, Rfl: 0    butalbital-acetaminophen-caffeine (FIORICET,ESGIC) -40 mg per tablet, Take 1 tablet by mouth every 4 (four) hours as needed for headaches, Disp: 20 tablet, Rfl: 0    calcium-vitamin D (OSCAL) 250-125 MG-UNIT per tablet, Take 1 tablet by mouth daily, Disp: , Rfl:     clobetasol (TEMOVATE) 0 05 % cream, APPLY TO AFFECTED AREA TWICE A DAY, Disp: 30 g, Rfl: 0    cycloSPORINE (RESTASIS) 0 05 % ophthalmic emulsion, Apply 1 drop to eye 2 (two) times a day, Disp: , Rfl:     diazepam (VALIUM) 5 mg tablet, Take 1 tablet (5 mg total) by mouth every 8 (eight) hours as needed for anxiety, Disp: 30 tablet, Rfl: 0    diphenhydrAMINE (BENADRYL) 25 mg capsule, Take by mouth daily at bedtime as needed  , Disp: , Rfl:     Evening Primrose Oil 1000 MG CAPS, Take 1 capsule by mouth daily, Disp: , Rfl:     fluticasone (FLONASE) 50 mcg/act nasal spray, 2 sprays into each nostril daily, Disp: 1 Bottle, Rfl: 5    fluticasone (Flovent HFA) 220 mcg/act inhaler, Inhale 2 puffs 2 (two) times a day Rinse mouth after use , Disp: 12 Inhaler, Rfl: 2    levocetirizine (XYZAL) 5 MG tablet, Take 1 tablet (5 mg total) by mouth every evening, Disp: 90 tablet, Rfl: 3    loteprednol etabonate (LOTEMAX) 0 5 % ophthalmic suspension, Administer 1 drop to both eyes 3 (three) times a day as needed  , Disp: , Rfl:     methocarbamol (ROBAXIN) 750 mg tablet, Take 750 mg by mouth every 6 (six) hours as needed for muscle spasms, Disp: , Rfl:     montelukast (SINGULAIR) 10 mg tablet, Take 1 tablet (10 mg total) by mouth daily at bedtime, Disp: 30 tablet, Rfl: 0    Multiple Vitamin (MULTIVITAMIN) capsule, Take 1 capsule by mouth daily  , Disp: , Rfl:     pantoprazole (PROTONIX) 40 mg tablet, Take 1 tablet (40 mg total) by mouth daily Take 1 tablet by mouth 2 times a day 30 minutes prior to breakfast and dinner, Disp: 180 tablet, Rfl: 3    phenazopyridine (PYRIDIUM) 100 mg tablet, Take 1 tablet (100 mg total) by mouth 3 (three) times a day as needed for bladder spasms, Disp: 20 tablet, Rfl: 1    rivaroxaban (XARELTO) 10 mg tablet, Take 1 tablet (10 mg total) by mouth daily, Disp: 90 tablet, Rfl: 3    sulfamethoxazole-trimethoprim (BACTRIM) 400-80 mg per tablet, Take 2 tablets by mouth every 12 (twelve) hours, Disp: , Rfl:     ZOLMitriptan (ZOMIG) 5 MG tablet, Take 1 tablet (5 mg total) by mouth once as needed for migraine for up to 1 dose, Disp: 20 tablet, Rfl: 0    sulfamethoxazole-trimethoprim (BACTRIM DS) 800-160 mg per tablet, Take 1 tablet by mouth every 12 (twelve) hours for 7 days, Disp: 14 tablet, Rfl: 1    Review of Systems  she has no nausea vomiting  She has no real abdominal pain just a little pressure in the suprapubic area  Her dysuria has improved  It is not completely gone  Objective:  /70 (BP Location: Left arm, Patient Position: Sitting, Cuff Size: Standard)   Pulse 71   Temp (!) 97 1 °F (36 2 °C)   Ht 5' 4" (1 626 m)   Wt 88 5 kg (195 lb)   LMP  (LMP Unknown)   SpO2 97%   BMI 33 47 kg/m²      Physical Exam  afebrile  Blood pressure is 110/70  Chest is clear  Heart rhythm is regular without murmur  Abdomen slight tenderness in the suprapubic area  No results found for this or any previous visit (from the past 1008 hour(s))

## 2020-09-16 LAB — BACTERIA UR CULT: ABNORMAL

## 2020-09-22 ENCOUNTER — TELEPHONE (OUTPATIENT)
Dept: INTERNAL MEDICINE CLINIC | Facility: CLINIC | Age: 65
End: 2020-09-22

## 2020-09-22 NOTE — TELEPHONE ENCOUNTER
----- Message from Kanika Reina DO sent at 9/22/2020  4:45 PM EDT -----  Call patient  She was treated for urinary tract infection  Tell her that the urine appears to be clearing    Let me know if she is not improving

## 2020-09-25 ENCOUNTER — APPOINTMENT (OUTPATIENT)
Dept: LAB | Facility: CLINIC | Age: 65
End: 2020-09-25
Payer: COMMERCIAL

## 2020-09-25 ENCOUNTER — TELEPHONE (OUTPATIENT)
Dept: INTERNAL MEDICINE CLINIC | Facility: CLINIC | Age: 65
End: 2020-09-25

## 2020-09-25 DIAGNOSIS — N39.0 URINARY TRACT INFECTION WITHOUT HEMATURIA, SITE UNSPECIFIED: ICD-10-CM

## 2020-09-25 DIAGNOSIS — N39.0 URINARY TRACT INFECTION WITHOUT HEMATURIA, SITE UNSPECIFIED: Primary | ICD-10-CM

## 2020-09-25 LAB
BILIRUB UR QL STRIP: NEGATIVE
CLARITY UR: CLEAR
COLOR UR: NORMAL
GLUCOSE UR STRIP-MCNC: NEGATIVE MG/DL
HGB UR QL STRIP.AUTO: NEGATIVE
KETONES UR STRIP-MCNC: NEGATIVE MG/DL
LEUKOCYTE ESTERASE UR QL STRIP: NEGATIVE
NITRITE UR QL STRIP: NEGATIVE
PH UR STRIP.AUTO: 5.5 [PH]
PROT UR STRIP-MCNC: NEGATIVE MG/DL
SP GR UR STRIP.AUTO: 1.02 (ref 1–1.03)
UROBILINOGEN UR QL STRIP.AUTO: 0.2 E.U./DL

## 2020-09-25 PROCEDURE — 81003 URINALYSIS AUTO W/O SCOPE: CPT | Performed by: INTERNAL MEDICINE

## 2020-09-25 PROCEDURE — 87086 URINE CULTURE/COLONY COUNT: CPT

## 2020-09-25 RX ORDER — NITROFURANTOIN MACROCRYSTALS 50 MG/1
50 CAPSULE ORAL
Qty: 30 CAPSULE | Refills: 0 | Status: SHIPPED | OUTPATIENT
Start: 2020-09-25 | End: 2020-12-30 | Stop reason: ALTCHOICE

## 2020-09-25 NOTE — PROGRESS NOTES
I talked to patient  Still burning dysuria and frequency  Will switch to Macrodantin 50 q i d   Re-culture

## 2020-09-25 NOTE — TELEPHONE ENCOUNTER
As per the patient she feels that her Urinary tract infection is getting worse instead of better patient has been using Bactrum DS and she is still urinating often and feeling pain, patient also stated that she used all the medication over the 5 day period and she is not feeling better at all   Please advise

## 2020-09-27 LAB
BACTERIA UR CULT: ABNORMAL
BACTERIA UR CULT: ABNORMAL

## 2020-09-29 DIAGNOSIS — R39.15 URGENCY OF URINATION: ICD-10-CM

## 2020-09-29 RX ORDER — PHENAZOPYRIDINE HYDROCHLORIDE 100 MG/1
100 TABLET, FILM COATED ORAL 3 TIMES DAILY PRN
Qty: 20 TABLET | Refills: 1 | Status: SHIPPED | OUTPATIENT
Start: 2020-09-29 | End: 2020-12-30 | Stop reason: ALTCHOICE

## 2020-10-02 DIAGNOSIS — J45.21 MILD INTERMITTENT ASTHMA WITH ACUTE EXACERBATION: ICD-10-CM

## 2020-10-02 RX ORDER — FLUTICASONE PROPIONATE 220 UG/1
AEROSOL, METERED RESPIRATORY (INHALATION)
Qty: 12 INHALER | Refills: 0 | Status: SHIPPED | OUTPATIENT
Start: 2020-10-02 | End: 2022-01-05 | Stop reason: SDUPTHER

## 2020-10-06 ENCOUNTER — EVALUATION (OUTPATIENT)
Dept: PHYSICAL THERAPY | Facility: CLINIC | Age: 65
End: 2020-10-06
Payer: COMMERCIAL

## 2020-10-06 ENCOUNTER — OFFICE VISIT (OUTPATIENT)
Dept: PHYSICAL THERAPY | Facility: CLINIC | Age: 65
End: 2020-10-06
Payer: COMMERCIAL

## 2020-10-06 DIAGNOSIS — M76.821 POSTERIOR TIBIALIS TENDINITIS OF BOTH LOWER EXTREMITIES: Primary | ICD-10-CM

## 2020-10-06 DIAGNOSIS — M76.822 POSTERIOR TIBIALIS TENDINITIS OF BOTH LOWER EXTREMITIES: Primary | ICD-10-CM

## 2020-10-06 DIAGNOSIS — M76.822 POSTERIOR TIBIALIS TENDINITIS OF BOTH LOWER EXTREMITIES: ICD-10-CM

## 2020-10-06 DIAGNOSIS — M76.821 POSTERIOR TIBIALIS TENDINITIS OF BOTH LOWER EXTREMITIES: ICD-10-CM

## 2020-10-06 DIAGNOSIS — M21.42 PES PLANUS OF BOTH FEET: ICD-10-CM

## 2020-10-06 DIAGNOSIS — M21.41 PES PLANUS OF BOTH FEET: ICD-10-CM

## 2020-10-06 PROCEDURE — 97161 PT EVAL LOW COMPLEX 20 MIN: CPT | Performed by: PHYSICAL THERAPIST

## 2020-10-06 PROCEDURE — 97760 ORTHOTIC MGMT&TRAING 1ST ENC: CPT | Performed by: PHYSICAL THERAPIST

## 2020-11-16 ENCOUNTER — OFFICE VISIT (OUTPATIENT)
Dept: FAMILY MEDICINE CLINIC | Facility: CLINIC | Age: 65
End: 2020-11-16
Payer: COMMERCIAL

## 2020-11-16 VITALS
OXYGEN SATURATION: 97 % | SYSTOLIC BLOOD PRESSURE: 118 MMHG | HEART RATE: 74 BPM | BODY MASS INDEX: 33.67 KG/M2 | RESPIRATION RATE: 16 BRPM | HEIGHT: 64 IN | DIASTOLIC BLOOD PRESSURE: 72 MMHG | WEIGHT: 197.2 LBS | TEMPERATURE: 98.3 F

## 2020-11-16 DIAGNOSIS — R39.9 UTI SYMPTOMS: Primary | ICD-10-CM

## 2020-11-16 LAB
SL AMB  POCT GLUCOSE, UA: NORMAL
SL AMB LEUKOCYTE ESTERASE,UA: NORMAL
SL AMB POCT BILIRUBIN,UA: NORMAL
SL AMB POCT BLOOD,UA: NORMAL
SL AMB POCT CLARITY,UA: CLEAR
SL AMB POCT COLOR,UA: NORMAL
SL AMB POCT KETONES,UA: NORMAL
SL AMB POCT NITRITE,UA: NORMAL
SL AMB POCT PH,UA: NORMAL
SL AMB POCT SPECIFIC GRAVITY,UA: 1.01
SL AMB POCT URINE PROTEIN: NORMAL
SL AMB POCT UROBILINOGEN: NORMAL

## 2020-11-16 PROCEDURE — 99213 OFFICE O/P EST LOW 20 MIN: CPT | Performed by: NURSE PRACTITIONER

## 2020-11-16 PROCEDURE — 81002 URINALYSIS NONAUTO W/O SCOPE: CPT | Performed by: NURSE PRACTITIONER

## 2020-11-16 PROCEDURE — 87086 URINE CULTURE/COLONY COUNT: CPT | Performed by: NURSE PRACTITIONER

## 2020-11-16 RX ORDER — NITROFURANTOIN 25; 75 MG/1; MG/1
100 CAPSULE ORAL 2 TIMES DAILY
Qty: 10 CAPSULE | Refills: 0 | Status: SHIPPED | OUTPATIENT
Start: 2020-11-16 | End: 2020-11-21

## 2020-11-17 LAB — BACTERIA UR CULT: NORMAL

## 2020-11-18 ENCOUNTER — OFFICE VISIT (OUTPATIENT)
Dept: PHYSICAL THERAPY | Facility: CLINIC | Age: 65
End: 2020-11-18
Payer: COMMERCIAL

## 2020-11-18 DIAGNOSIS — M76.821 POSTERIOR TIBIALIS TENDINITIS OF BOTH LOWER EXTREMITIES: Primary | ICD-10-CM

## 2020-11-18 DIAGNOSIS — M76.822 POSTERIOR TIBIALIS TENDINITIS OF BOTH LOWER EXTREMITIES: Primary | ICD-10-CM

## 2020-11-18 PROCEDURE — L3010 FOOT LONGITUDINAL ARCH SUPPO: HCPCS | Performed by: PHYSICAL THERAPIST

## 2020-11-23 ENCOUNTER — TELEMEDICINE (OUTPATIENT)
Dept: FAMILY MEDICINE CLINIC | Facility: CLINIC | Age: 65
End: 2020-11-23
Payer: COMMERCIAL

## 2020-11-23 DIAGNOSIS — Z20.822 EXPOSURE TO COVID-19 VIRUS: Primary | ICD-10-CM

## 2020-11-23 DIAGNOSIS — Z20.822 EXPOSURE TO COVID-19 VIRUS: ICD-10-CM

## 2020-11-23 PROCEDURE — 99441 PR PHYS/QHP TELEPHONE EVALUATION 5-10 MIN: CPT | Performed by: NURSE PRACTITIONER

## 2020-11-23 PROCEDURE — U0003 INFECTIOUS AGENT DETECTION BY NUCLEIC ACID (DNA OR RNA); SEVERE ACUTE RESPIRATORY SYNDROME CORONAVIRUS 2 (SARS-COV-2) (CORONAVIRUS DISEASE [COVID-19]), AMPLIFIED PROBE TECHNIQUE, MAKING USE OF HIGH THROUGHPUT TECHNOLOGIES AS DESCRIBED BY CMS-2020-01-R: HCPCS | Performed by: NURSE PRACTITIONER

## 2020-11-24 ENCOUNTER — TELEPHONE (OUTPATIENT)
Dept: FAMILY MEDICINE CLINIC | Facility: CLINIC | Age: 65
End: 2020-11-24

## 2020-11-24 LAB — SARS-COV-2 RNA SPEC QL NAA+PROBE: NOT DETECTED

## 2020-11-25 ENCOUNTER — OFFICE VISIT (OUTPATIENT)
Dept: FAMILY MEDICINE CLINIC | Facility: CLINIC | Age: 65
End: 2020-11-25
Payer: COMMERCIAL

## 2020-11-25 VITALS
DIASTOLIC BLOOD PRESSURE: 60 MMHG | BODY MASS INDEX: 33.63 KG/M2 | HEART RATE: 76 BPM | TEMPERATURE: 98.6 F | WEIGHT: 197 LBS | SYSTOLIC BLOOD PRESSURE: 110 MMHG | HEIGHT: 64 IN | OXYGEN SATURATION: 97 %

## 2020-11-25 DIAGNOSIS — G89.29 CHRONIC LOW BACK PAIN: ICD-10-CM

## 2020-11-25 DIAGNOSIS — Z86.718 HISTORY OF DEEP VENOUS THROMBOSIS (DVT) OF DISTAL VEIN OF RIGHT LOWER EXTREMITY: ICD-10-CM

## 2020-11-25 DIAGNOSIS — K21.9 GASTROESOPHAGEAL REFLUX DISEASE WITHOUT ESOPHAGITIS: Primary | ICD-10-CM

## 2020-11-25 DIAGNOSIS — R39.9 UTI SYMPTOMS: ICD-10-CM

## 2020-11-25 DIAGNOSIS — M62.838 MUSCLE SPASM: ICD-10-CM

## 2020-11-25 DIAGNOSIS — M54.50 CHRONIC LOW BACK PAIN: ICD-10-CM

## 2020-11-25 DIAGNOSIS — Z86.69 HX OF MIGRAINE HEADACHES: ICD-10-CM

## 2020-11-25 PROCEDURE — 99214 OFFICE O/P EST MOD 30 MIN: CPT | Performed by: FAMILY MEDICINE

## 2020-11-25 RX ORDER — PANTOPRAZOLE SODIUM 40 MG/1
40 TABLET, DELAYED RELEASE ORAL DAILY
Qty: 180 TABLET | Refills: 3 | Status: SHIPPED | OUTPATIENT
Start: 2020-11-25 | End: 2020-12-30 | Stop reason: SDUPTHER

## 2020-11-25 RX ORDER — ZOLMITRIPTAN 5 MG/1
5 TABLET, FILM COATED ORAL ONCE AS NEEDED
Qty: 20 TABLET | Refills: 3 | Status: SHIPPED | OUTPATIENT
Start: 2020-11-25

## 2020-11-25 RX ORDER — SUCRALFATE 1 G/1
1 TABLET ORAL 4 TIMES DAILY
Qty: 120 TABLET | Refills: 3 | Status: SHIPPED | OUTPATIENT
Start: 2020-11-25 | End: 2021-03-31 | Stop reason: ALTCHOICE

## 2020-11-25 RX ORDER — SUCRALFATE 1 G/1
1 TABLET ORAL 4 TIMES DAILY
Qty: 120 TABLET | Refills: 3 | Status: SHIPPED | OUTPATIENT
Start: 2020-11-25 | End: 2020-11-25

## 2020-11-25 RX ORDER — DIAZEPAM 5 MG/1
5 TABLET ORAL EVERY 8 HOURS PRN
Qty: 30 TABLET | Refills: 0 | Status: SHIPPED | OUTPATIENT
Start: 2020-11-25 | End: 2021-06-25 | Stop reason: SDUPTHER

## 2020-11-26 NOTE — TELEPHONE ENCOUNTER
She called and reports that she did feel hungry the first 2 days and had a snack from the list   She is drinking her water but is struggling to get all 80 oz in a day    Stressed the importance of meeting the water goal 
normal...

## 2020-12-01 ENCOUNTER — LAB (OUTPATIENT)
Dept: LAB | Facility: CLINIC | Age: 65
End: 2020-12-01
Payer: COMMERCIAL

## 2020-12-01 DIAGNOSIS — Z86.69 HX OF MIGRAINE HEADACHES: ICD-10-CM

## 2020-12-01 DIAGNOSIS — K21.9 GASTROESOPHAGEAL REFLUX DISEASE WITHOUT ESOPHAGITIS: ICD-10-CM

## 2020-12-01 DIAGNOSIS — G89.29 CHRONIC LOW BACK PAIN: ICD-10-CM

## 2020-12-01 DIAGNOSIS — Z86.718 HISTORY OF DEEP VENOUS THROMBOSIS (DVT) OF DISTAL VEIN OF RIGHT LOWER EXTREMITY: ICD-10-CM

## 2020-12-01 DIAGNOSIS — R39.9 UTI SYMPTOMS: ICD-10-CM

## 2020-12-01 DIAGNOSIS — I82.4Y9 DEEP VEIN THROMBOSIS (DVT) OF PROXIMAL LOWER EXTREMITY, UNSPECIFIED CHRONICITY, UNSPECIFIED LATERALITY (HCC): ICD-10-CM

## 2020-12-01 DIAGNOSIS — M54.50 CHRONIC LOW BACK PAIN: ICD-10-CM

## 2020-12-01 DIAGNOSIS — M62.838 MUSCLE SPASM: ICD-10-CM

## 2020-12-01 LAB
ALBUMIN SERPL BCP-MCNC: 3.8 G/DL (ref 3.5–5)
ALP SERPL-CCNC: 127 U/L (ref 46–116)
ALT SERPL W P-5'-P-CCNC: 47 U/L (ref 12–78)
ANION GAP SERPL CALCULATED.3IONS-SCNC: 4 MMOL/L (ref 4–13)
AST SERPL W P-5'-P-CCNC: 24 U/L (ref 5–45)
BASOPHILS # BLD AUTO: 0.07 THOUSANDS/ΜL (ref 0–0.1)
BASOPHILS NFR BLD AUTO: 1 % (ref 0–1)
BILIRUB SERPL-MCNC: 0.45 MG/DL (ref 0.2–1)
BUN SERPL-MCNC: 18 MG/DL (ref 5–25)
CALCIUM SERPL-MCNC: 9.1 MG/DL (ref 8.3–10.1)
CHLORIDE SERPL-SCNC: 106 MMOL/L (ref 100–108)
CHOLEST SERPL-MCNC: 196 MG/DL (ref 50–200)
CO2 SERPL-SCNC: 30 MMOL/L (ref 21–32)
CREAT SERPL-MCNC: 0.86 MG/DL (ref 0.6–1.3)
D DIMER PPP FEU-MCNC: 0.8 UG/ML FEU
EOSINOPHIL # BLD AUTO: 0.14 THOUSAND/ΜL (ref 0–0.61)
EOSINOPHIL NFR BLD AUTO: 2 % (ref 0–6)
ERYTHROCYTE [DISTWIDTH] IN BLOOD BY AUTOMATED COUNT: 13.8 % (ref 11.6–15.1)
EST. AVERAGE GLUCOSE BLD GHB EST-MCNC: 108 MG/DL
GFR SERPL CREATININE-BSD FRML MDRD: 70 ML/MIN/1.73SQ M
GLUCOSE P FAST SERPL-MCNC: 86 MG/DL (ref 65–99)
HBA1C MFR BLD: 5.4 %
HCT VFR BLD AUTO: 43.5 % (ref 34.8–46.1)
HDLC SERPL-MCNC: 47 MG/DL
HGB BLD-MCNC: 13.7 G/DL (ref 11.5–15.4)
IMM GRANULOCYTES # BLD AUTO: 0.03 THOUSAND/UL (ref 0–0.2)
IMM GRANULOCYTES NFR BLD AUTO: 1 % (ref 0–2)
LDLC SERPL CALC-MCNC: 104 MG/DL (ref 0–100)
LYMPHOCYTES # BLD AUTO: 1.94 THOUSANDS/ΜL (ref 0.6–4.47)
LYMPHOCYTES NFR BLD AUTO: 31 % (ref 14–44)
MCH RBC QN AUTO: 26.9 PG (ref 26.8–34.3)
MCHC RBC AUTO-ENTMCNC: 31.5 G/DL (ref 31.4–37.4)
MCV RBC AUTO: 86 FL (ref 82–98)
MONOCYTES # BLD AUTO: 0.59 THOUSAND/ΜL (ref 0.17–1.22)
MONOCYTES NFR BLD AUTO: 9 % (ref 4–12)
NEUTROPHILS # BLD AUTO: 3.48 THOUSANDS/ΜL (ref 1.85–7.62)
NEUTS SEG NFR BLD AUTO: 56 % (ref 43–75)
NONHDLC SERPL-MCNC: 149 MG/DL
NRBC BLD AUTO-RTO: 0 /100 WBCS
PLATELET # BLD AUTO: 279 THOUSANDS/UL (ref 149–390)
PMV BLD AUTO: 10.1 FL (ref 8.9–12.7)
POTASSIUM SERPL-SCNC: 4 MMOL/L (ref 3.5–5.3)
PROT SERPL-MCNC: 7 G/DL (ref 6.4–8.2)
RBC # BLD AUTO: 5.09 MILLION/UL (ref 3.81–5.12)
SODIUM SERPL-SCNC: 140 MMOL/L (ref 136–145)
TRIGL SERPL-MCNC: 226 MG/DL
TSH SERPL DL<=0.05 MIU/L-ACNC: 2.17 UIU/ML (ref 0.36–3.74)
WBC # BLD AUTO: 6.25 THOUSAND/UL (ref 4.31–10.16)

## 2020-12-01 PROCEDURE — 80053 COMPREHEN METABOLIC PANEL: CPT

## 2020-12-01 PROCEDURE — 85379 FIBRIN DEGRADATION QUANT: CPT

## 2020-12-01 PROCEDURE — 36415 COLL VENOUS BLD VENIPUNCTURE: CPT

## 2020-12-01 PROCEDURE — 83036 HEMOGLOBIN GLYCOSYLATED A1C: CPT

## 2020-12-01 PROCEDURE — 84443 ASSAY THYROID STIM HORMONE: CPT

## 2020-12-01 PROCEDURE — 80061 LIPID PANEL: CPT

## 2020-12-01 PROCEDURE — 85025 COMPLETE CBC W/AUTO DIFF WBC: CPT

## 2020-12-22 PROCEDURE — 87086 URINE CULTURE/COLONY COUNT: CPT | Performed by: OBSTETRICS & GYNECOLOGY

## 2020-12-23 ENCOUNTER — LAB REQUISITION (OUTPATIENT)
Dept: LAB | Facility: HOSPITAL | Age: 65
End: 2020-12-23
Payer: COMMERCIAL

## 2020-12-23 DIAGNOSIS — N30.10 INTERSTITIAL CYSTITIS (CHRONIC) WITHOUT HEMATURIA: ICD-10-CM

## 2020-12-24 LAB — BACTERIA UR CULT: NORMAL

## 2020-12-28 ENCOUNTER — TELEPHONE (OUTPATIENT)
Dept: HEMATOLOGY ONCOLOGY | Facility: CLINIC | Age: 65
End: 2020-12-28

## 2020-12-28 DIAGNOSIS — I82.4Y9 DEEP VEIN THROMBOSIS (DVT) OF PROXIMAL LOWER EXTREMITY, UNSPECIFIED CHRONICITY, UNSPECIFIED LATERALITY (HCC): Primary | ICD-10-CM

## 2020-12-30 ENCOUNTER — OFFICE VISIT (OUTPATIENT)
Dept: FAMILY MEDICINE CLINIC | Facility: CLINIC | Age: 65
End: 2020-12-30
Payer: COMMERCIAL

## 2020-12-30 VITALS
SYSTOLIC BLOOD PRESSURE: 128 MMHG | BODY MASS INDEX: 33.46 KG/M2 | WEIGHT: 196 LBS | HEIGHT: 64 IN | OXYGEN SATURATION: 99 % | TEMPERATURE: 97 F | HEART RATE: 70 BPM | DIASTOLIC BLOOD PRESSURE: 82 MMHG

## 2020-12-30 DIAGNOSIS — K21.9 GASTROESOPHAGEAL REFLUX DISEASE WITHOUT ESOPHAGITIS: Primary | ICD-10-CM

## 2020-12-30 DIAGNOSIS — R73.03 PREDIABETES: ICD-10-CM

## 2020-12-30 DIAGNOSIS — G43.009 MIGRAINE WITHOUT AURA AND WITHOUT STATUS MIGRAINOSUS, NOT INTRACTABLE: ICD-10-CM

## 2020-12-30 DIAGNOSIS — I82.441 ACUTE DEEP VEIN THROMBOSIS (DVT) OF TIBIAL VEIN OF RIGHT LOWER EXTREMITY (HCC): ICD-10-CM

## 2020-12-30 DIAGNOSIS — J45.20 MILD INTERMITTENT ASTHMA, UNSPECIFIED WHETHER COMPLICATED: ICD-10-CM

## 2020-12-30 DIAGNOSIS — F41.9 ANXIETY: ICD-10-CM

## 2020-12-30 DIAGNOSIS — J45.909 ALLERGIC BRONCHITIS: ICD-10-CM

## 2020-12-30 PROCEDURE — 99214 OFFICE O/P EST MOD 30 MIN: CPT | Performed by: FAMILY MEDICINE

## 2020-12-30 RX ORDER — ALPRAZOLAM 0.25 MG/1
TABLET ORAL
Qty: 30 TABLET | Refills: 0 | Status: SHIPPED | OUTPATIENT
Start: 2020-12-30 | End: 2021-03-31 | Stop reason: SDUPTHER

## 2020-12-30 RX ORDER — PANTOPRAZOLE SODIUM 40 MG/1
40 TABLET, DELAYED RELEASE ORAL DAILY
Qty: 180 TABLET | Refills: 3 | Status: SHIPPED | OUTPATIENT
Start: 2020-12-30 | End: 2022-01-03

## 2020-12-30 RX ORDER — MONTELUKAST SODIUM 10 MG/1
10 TABLET ORAL
Qty: 30 TABLET | Refills: 0 | Status: SHIPPED | OUTPATIENT
Start: 2020-12-30 | End: 2021-01-04 | Stop reason: SDUPTHER

## 2020-12-31 DIAGNOSIS — J45.909 ALLERGIC BRONCHITIS: ICD-10-CM

## 2020-12-31 RX ORDER — MONTELUKAST SODIUM 10 MG/1
10 TABLET ORAL
Qty: 30 TABLET | Refills: 0 | Status: CANCELLED | OUTPATIENT
Start: 2020-12-31 | End: 2021-03-31

## 2021-01-04 DIAGNOSIS — J45.909 ALLERGIC BRONCHITIS: ICD-10-CM

## 2021-01-04 DIAGNOSIS — I82.441 ACUTE DEEP VEIN THROMBOSIS (DVT) OF TIBIAL VEIN OF RIGHT LOWER EXTREMITY (HCC): ICD-10-CM

## 2021-01-04 RX ORDER — MONTELUKAST SODIUM 10 MG/1
10 TABLET ORAL
Qty: 90 TABLET | Refills: 1 | Status: SHIPPED | OUTPATIENT
Start: 2021-01-04 | End: 2021-08-23

## 2021-02-15 ENCOUNTER — TELEPHONE (OUTPATIENT)
Dept: OTHER | Facility: OTHER | Age: 66
End: 2021-02-15

## 2021-02-15 DIAGNOSIS — Z11.52 ENCOUNTER FOR SCREENING FOR COVID-19: Primary | ICD-10-CM

## 2021-02-15 DIAGNOSIS — Z11.52 ENCOUNTER FOR SCREENING FOR COVID-19: ICD-10-CM

## 2021-02-15 PROCEDURE — U0005 INFEC AGEN DETEC AMPLI PROBE: HCPCS | Performed by: NURSE PRACTITIONER

## 2021-02-15 PROCEDURE — U0003 INFECTIOUS AGENT DETECTION BY NUCLEIC ACID (DNA OR RNA); SEVERE ACUTE RESPIRATORY SYNDROME CORONAVIRUS 2 (SARS-COV-2) (CORONAVIRUS DISEASE [COVID-19]), AMPLIFIED PROBE TECHNIQUE, MAKING USE OF HIGH THROUGHPUT TECHNOLOGIES AS DESCRIBED BY CMS-2020-01-R: HCPCS | Performed by: NURSE PRACTITIONER

## 2021-02-15 NOTE — TELEPHONE ENCOUNTER
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

## 2021-02-16 LAB — SARS-COV-2 RNA RESP QL NAA+PROBE: NEGATIVE

## 2021-02-21 ENCOUNTER — IMMUNIZATIONS (OUTPATIENT)
Dept: FAMILY MEDICINE CLINIC | Facility: HOSPITAL | Age: 66
End: 2021-02-21

## 2021-02-21 DIAGNOSIS — Z23 ENCOUNTER FOR IMMUNIZATION: Primary | ICD-10-CM

## 2021-02-21 PROCEDURE — 91300 SARS-COV-2 / COVID-19 MRNA VACCINE (PFIZER-BIONTECH) 30 MCG: CPT

## 2021-02-21 PROCEDURE — 0001A SARS-COV-2 / COVID-19 MRNA VACCINE (PFIZER-BIONTECH) 30 MCG: CPT

## 2021-02-26 ENCOUNTER — OFFICE VISIT (OUTPATIENT)
Dept: HEMATOLOGY ONCOLOGY | Facility: CLINIC | Age: 66
End: 2021-02-26
Payer: COMMERCIAL

## 2021-02-26 VITALS
RESPIRATION RATE: 18 BRPM | TEMPERATURE: 97.4 F | HEIGHT: 64 IN | WEIGHT: 198 LBS | OXYGEN SATURATION: 99 % | HEART RATE: 71 BPM | BODY MASS INDEX: 33.8 KG/M2 | DIASTOLIC BLOOD PRESSURE: 79 MMHG | SYSTOLIC BLOOD PRESSURE: 124 MMHG

## 2021-02-26 DIAGNOSIS — I82.441 ACUTE DEEP VEIN THROMBOSIS (DVT) OF TIBIAL VEIN OF RIGHT LOWER EXTREMITY (HCC): ICD-10-CM

## 2021-02-26 PROCEDURE — 99214 OFFICE O/P EST MOD 30 MIN: CPT | Performed by: INTERNAL MEDICINE

## 2021-02-26 NOTE — PROGRESS NOTES
HEMATOLOGY / [address] NOTE    Primary Care Provider: Dinah Farnsworth DO  Referring Provider:    MRN: [id number]  : 1955    Reason for Encounter:    Chief Complaint   Patient presents with    Follow-up     1 year         Hematology / Oncology History:     Jarod Yang is a 75 y o  female who came in  To [address] [place] [address] with hematology    1, left lower ext DVT  - , unprovoked, was on coumadin x 3 years    2, right lower ext DVT  - 10/22/2018, unprovoked, on xarelto    - recurrent DVT, total two events, both were non provoked  - initially presented with leg pain, completely resolved in days after Xarelto started  - repeated doppler showed chronic right superficial DVT  Previous identified posterior  tibial veins at mid calf resolved  - thrombosis panel is negative  - 2020: d-dimer 0 8, continue xarelto 10 mg       Assessment / Plan:       1  Acute deep vein thrombosis (DVT) of tibial vein of right lower extremity (HCC)  - d-dimer remains elevated, decided to continue xarelto 10 mg for 1 yr, repeat d-dimer, then F/U after    - pt will talk to daughters regarding if they should be on BCP  - rivaroxaban (XARELTO) 10 mg tablet; Take 1 tablet (10 mg total) by mouth daily  Dispense: 90 tablet; Refill: 3  - D-dimer, quantitative; Future         25   minutes were spent face to face with patient with greater than 50% of the time spent in counseling or coordination of care including discussions of treatment instructions  All of the patient's questions were answered to their satisfactory during this discussion  Advised pt to call if there is any further questions  Interval History:     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, who knee surgeries, with history of obesity, multiple hip and knee surgeries, two deliveries, thyroid nodule, referred to Hematology for DVT as summarized above  Patient reported overall at baseline health status, no constitutional symptoms    Has been on Xarelto for about 3 months, doing well no bleeding  For both DVT events, there is no clear trigger identified  Patient has no history of malignancy, chronic inflammation, autoimmune disease, nonsmoker, not on hormone replacement surgery, no recent travel surgeries, no family history of thrombosis, no prior history of bleeding disorders, no uterine fibroid    Patient had a repeat Doppler a month after on Xarelto, showed new DVT resolved  Nonsmoker  Again working as a physician  2/11/2019: came in for F/u  doing ok  No bleeding  Had a left breast biopsy  2/21/2020:  Came in for follow-up  Doing well, on Xarelto, no bleeding  No leg swelling no chest pain cough hemoptysis  2/26/2021: came I n for F/U; doing well  No leg swelling  Problem list:       Patient Active Problem List   Diagnosis    Arthralgia of hip, left    Pain in joint involving other specified sites    Arthritis of left knee    [facility] leg [facility]deep venous thrombosis) [facility] )    Memory difficulty    Migraine, unspecified, not intractable, without status migrainosus    Mild intermittent asthma    Obesity (BMI 30 0-34  9)    Osteopenia of multiple sites    Prediabetes    Suprapubic abdominal pain    Chronic pain of right knee    Primary osteoarthritis of right knee    Effusion of right knee    Shortness of breath    Thyroid nodule    Pain and swelling of right ankle    Posterior tibial tendinitis of right lower extremity    History of deep venous thrombosis (DVT) of distal vein of right lower extremity    Hypertriglyceridemia    Palpitations    Encounter for gynecological examination without abnormal finding    Epigastric pain    Other chest pain    Gastroesophageal reflux disease without esophagitis    Right calf pain         PHYSICIAL EXAMINATION:       Vital Signs:   [unfilled]  Body mass index is 32 61 kg/m²  Body surface area is 1 91 meters squared  Appears comfortable; no major finding       GEN: Alert, awake oriented x3, in no acute distress  HEENT- No pallor, icterus, cyanosis, no oral mucosal lesions,   LAD - no palpable cervical, clavicle, axillary, inguinal LAD  Heart- normal S1 S2, regular rate and rhythm, No murmur, rubs  Lungs- decreased breathing sound bilateral    Abdomen- soft, Non tender, bowel sounds present  Extremities- No cyanosis, clubbing, edema  Neuro- No focal neurological deficit           PAST MEDICAL HISTORY:   has a past medical history of Acid reflux, Allergic rhinitis, Anesthesia complication, Arthritis, Asthma, Bigeminy, [facility]deep [facility] thrombosis) [facility] ) (10/23/2018), DVT (deep venous thrombosis) ([facility] ), DVT of leg (deep venous thrombosis) ([facility] ) (), Female pelvic pain, Hyperlipidemia, Intermittent palpitations, Irregular heart beat, Migraines, Obesity (BMI 30 0-34 9), Palpitations, Pes planus, Thyroid nodule, Trigeminy, and Wears glasses  PAST SURGICAL HISTORY:   has a past surgical history that includes Replacement total knee (); Appendectomy; Knee surgery (Left);  section; Joint replacement; Dilation and curettage of uterus; Colonoscopy; [place] tooth extraction; Cholecystectomy; pr laparoscopy w tot hysterectuterus <=250 gram  w tube/ovary (Bilateral, 2016); pr esophagogastroduodenoscopy transoral diagnostic (N/A, 3/14/2016); Cataract extraction; and US guided breast biopsy left complete (Left, 2019)      CURRENT MEDICATIONS:     Current Outpatient Medications   Medication Sig Dispense Refill    albuterol (VENTOLIN HFA) 90 mcg/act inhaler 1-2 puffs 4 times a day as needed 18 Inhaler 0    ALPRAZolam (XANAX) 0 25 mg tablet TAKE 1 TABLET BY MOUTH 2 TIMES A DAY AS NEEDED FOR ANXIETY FOR UP TO 30 DAYS 30 tablet 0    butalbital-acetaminophen-caffeine (FIORICET,ESGIC) -40 mg per tablet Take 1 tablet by mouth every 4 (four) hours as needed for headaches 20 tablet 0    calcium-vitamin D (OSCAL) 250-125 MG-UNIT per tablet Take 1 tablet by mouth daily  clobetasol (TEMOVATE) 0 05 % cream Apply topically 2 (two) times a day 30 g 0    cycloSPORINE (RESTASIS) 0 05 % ophthalmic emulsion Apply 1 drop to eye 2 (two) times a day      diazepam (VALIUM) 5 mg tablet Take 1 tablet (5 mg total) by mouth every 8 (eight) hours as needed for anxiety 60 tablet 0    diphenhydrAMINE (BENADRYL) 25 mg capsule Take by mouth daily at bedtime as needed        Evening Primrose Oil 1000 MG CAPS Take 1 capsule by mouth daily      fluticasone (FLONASE) 50 mcg/act nasal spray 2 sprays into each nostril daily 1 Bottle 5    fluticasone (FLOVENT HFA) 220 mcg/act inhaler Inhale 1 puff 2 (two) times a day 12 Inhaler 2    loratadine (CLARITIN) 10 mg tablet Take 1 tablet (10 mg total) by mouth daily 30 tablet 0    loteprednol etabonate (LOTEMAX) 0 5 % ophthalmic suspension Administer 1 drop to both eyes 3 (three) times a day as needed   methocarbamol (ROBAXIN) 750 mg tablet Take 750 mg by mouth every 6 (six) hours as needed for muscle spasms      montelukast (SINGULAIR) 10 mg tablet Take 1 tablet (10 mg total) by mouth daily at bedtime 30 tablet 0    Multiple Vitamin (MULTIVITAMIN) capsule Take 1 capsule by mouth daily   pantoprazole (PROTONIX) 40 mg tablet Take 1 tablet (40 mg total) by mouth 2 (two) times a day Take 1 tablet by mouth 2 times a day 30 minutes prior to breakfast and dinner 180 tablet 3    rivaroxaban (XARELTO) 10 mg tablet Take 1 tablet (10 mg total) by mouth daily 90 tablet 3    ZOLMitriptan (ZOMIG) 5 MG tablet Take 1 tablet (5 mg total) by mouth once as needed for migraine for up to 1 dose 20 tablet 0     No current facility-administered medications for this visit  [unfilled]    SOCIAL HISTORY:   reports that she quit smoking about 41 years ago  Her smoking use included cigarettes  She smoked 0 50 packs per day  She has never used smokeless tobacco  She reports that she drinks alcohol  She reports that she does not use drugs       FAMILY HISTORY:  family history includes Alzheimer's disease in her maternal aunt and maternal uncle; Breast cancer in her maternal aunt; Cancer in her family; Diabetes in her father and paternal aunt; Diabetes type II in her brother; Endometrial cancer (age of onset: 76) in her mother; Growth hormone deficiency in her daughter; Hashimoto's thyroiditis in her paternal aunt; Heart attack in her father; Heart disease in her father; Hypertension in her father and paternal aunt; Migraines in her mother; Other in her maternal uncle; Prostate cancer in her father; Stroke in her paternal uncle; Thyroid cancer in her brother and maternal aunt  ALLERGIES:  is allergic to naproxen; iv contrast [iodinated diagnostic agents]; and seasonal ic [cholestatin]  REVIEW OF SYSTEMS:  Please note that a 14-point review of systems was performed to include Constitutional, HEENT, Respiratory, CVS, GI, , Musculoskeletal, Integumentary, Neurologic, Rheumatologic, Endocrinologic, Psychiatric, Lymphatic, and Hematologic/Oncologic systems were reviewed and are negative unless otherwise stated in HPI  Positive and negative findings pertinent to this evaluation are incorporated into the history of present illness  Lab Results   Component Value Date    WBC 8 95 10/20/2019    HGB 13 6 10/20/2019    HCT 42 8 10/20/2019    MCV 84 10/20/2019     10/20/2019                                      CREATININE 0 77 10/22/2018    GLUCOSE 88 08/20/2015    GLUF 91 08/04/2017    CALCIUM 9 2 10/22/2018    AST 40 10/22/2018    ALT 69 10/22/2018    ALKPHOS 131 (H) 10/22/2018    PROT 7 1 08/20/2015    BILITOT 0 3 08/20/2015    EGFR 82 10/22/2018        No results found

## 2021-03-12 ENCOUNTER — IMMUNIZATIONS (OUTPATIENT)
Dept: FAMILY MEDICINE CLINIC | Facility: HOSPITAL | Age: 66
End: 2021-03-12

## 2021-03-12 DIAGNOSIS — Z23 ENCOUNTER FOR IMMUNIZATION: Primary | ICD-10-CM

## 2021-03-12 PROCEDURE — 91300 SARS-COV-2 / COVID-19 MRNA VACCINE (PFIZER-BIONTECH) 30 MCG: CPT

## 2021-03-12 PROCEDURE — 0002A SARS-COV-2 / COVID-19 MRNA VACCINE (PFIZER-BIONTECH) 30 MCG: CPT

## 2021-03-31 ENCOUNTER — OFFICE VISIT (OUTPATIENT)
Dept: FAMILY MEDICINE CLINIC | Facility: CLINIC | Age: 66
End: 2021-03-31
Payer: COMMERCIAL

## 2021-03-31 VITALS
HEIGHT: 64 IN | BODY MASS INDEX: 33.46 KG/M2 | DIASTOLIC BLOOD PRESSURE: 72 MMHG | SYSTOLIC BLOOD PRESSURE: 112 MMHG | OXYGEN SATURATION: 98 % | WEIGHT: 196 LBS | TEMPERATURE: 97.7 F | HEART RATE: 71 BPM

## 2021-03-31 DIAGNOSIS — N30.10 CYSTITIS, INTERSTITIAL: ICD-10-CM

## 2021-03-31 DIAGNOSIS — F41.9 ANXIETY: ICD-10-CM

## 2021-03-31 DIAGNOSIS — J45.40 MODERATE PERSISTENT ASTHMA WITHOUT COMPLICATION: Primary | ICD-10-CM

## 2021-03-31 DIAGNOSIS — M54.2 NECK PAIN: ICD-10-CM

## 2021-03-31 DIAGNOSIS — R73.03 PREDIABETES: ICD-10-CM

## 2021-03-31 PROCEDURE — 99214 OFFICE O/P EST MOD 30 MIN: CPT | Performed by: FAMILY MEDICINE

## 2021-03-31 RX ORDER — ALPRAZOLAM 0.25 MG/1
TABLET ORAL
Qty: 30 TABLET | Refills: 0 | Status: SHIPPED | OUTPATIENT
Start: 2021-03-31 | End: 2021-09-22 | Stop reason: SDUPTHER

## 2021-03-31 NOTE — PROGRESS NOTES
Assessment/Plan:       Problem List Items Addressed This Visit        Respiratory    Moderate persistent asthma without complication - Primary      Asthma by history without complication patient will use inhaler as needed and contact my office if change or if symptoms worsen            Genitourinary    Cystitis, interstitial     Follow up with Urogynecology follow diet  Other    Prediabetes      Pre diabetes review and evaluate laboratory work manage diet avoiding excess carbohydrates work on weight reduction and follow-up with A1c and laboratory work at next office visit         Neck pain      Neck pain with headaches from muscle spasm will refer for physical therapy           Other Visit Diagnoses     Anxiety        Relevant Medications    ALPRAZolam (XANAX) 0 25 mg tablet            Subjective:      Patient ID: Scar Risk is a 77 y o  female  Patient is presenting today for general checkup and review of multiple medical conditions and complaints requesting physical therapy for neck spasms and pain  Questions vaccines including shingles as she had previously had chickenpox as a child and then had 1 Varivax vaccine  Patient continues with Xarelto she follows up with Hematology-Oncology in light of elevated D-dimer in history of DVT  Patient sees urogynecology for interstitial cystitis Dr Alba Alvares  The following portions of the patient's history were reviewed and updated as appropriate: allergies, current medications, past family history, past medical history, past social history, past surgical history and problem list     Review of Systems   Constitutional: Negative for chills, fatigue and fever  HENT: Negative for congestion, nosebleeds, rhinorrhea, sinus pressure and sore throat  Eyes: Negative for discharge and redness  Respiratory: Negative for cough and shortness of breath  Cardiovascular: Negative for chest pain, palpitations and leg swelling     Gastrointestinal: Negative for abdominal pain, blood in stool and nausea  Endocrine: Negative for cold intolerance, heat intolerance and polyuria  Genitourinary: Negative for dysuria and frequency  Musculoskeletal: Positive for neck pain  Negative for arthralgias, back pain and myalgias  Skin: Negative for rash  Neurological: Negative for dizziness, weakness and headaches  Hematological: Negative for adenopathy  Psychiatric/Behavioral: Negative for behavioral problems and sleep disturbance  The patient is not nervous/anxious  Objective:      /72   Pulse 71   Temp 97 7 °F (36 5 °C)   Ht 5' 4" (1 626 m)   Wt 88 9 kg (196 lb)   LMP  (LMP Unknown)   SpO2 98%   BMI 33 64 kg/m²        Physical Exam  Vitals signs and nursing note reviewed  Constitutional:       General: She is not in acute distress  Appearance: Normal appearance  She is well-developed  HENT:      Head: Normocephalic and atraumatic  Right Ear: Tympanic membrane and external ear normal       Left Ear: Tympanic membrane and external ear normal       Nose: Nose normal       Mouth/Throat:      Mouth: Mucous membranes are moist       Pharynx: Oropharynx is clear  No oropharyngeal exudate  Eyes:      General: No scleral icterus  Right eye: No discharge  Left eye: No discharge  Conjunctiva/sclera: Conjunctivae normal       Pupils: Pupils are equal, round, and reactive to light  Neck:      Musculoskeletal: Normal range of motion  Thyroid: No thyromegaly  Vascular: No JVD  Cardiovascular:      Rate and Rhythm: Normal rate and regular rhythm  Heart sounds: Normal heart sounds  No murmur  Pulmonary:      Effort: Pulmonary effort is normal       Breath sounds: No wheezing or rales  Chest:      Chest wall: No tenderness  Abdominal:      General: Bowel sounds are normal  There is no distension  Palpations: Abdomen is soft  There is no mass  Tenderness:  There is no abdominal tenderness  Musculoskeletal: Normal range of motion  General: No tenderness or deformity  Comments:  Mild tenderness over cervical spine C5-6-C6-7   Lymphadenopathy:      Cervical: No cervical adenopathy  Skin:     General: Skin is warm and dry  Findings: No rash  Neurological:      General: No focal deficit present  Mental Status: She is alert and oriented to person, place, and time  Cranial Nerves: No cranial nerve deficit  Coordination: Coordination normal       Deep Tendon Reflexes: Reflexes are normal and symmetric  Reflexes normal    Psychiatric:         Mood and Affect: Mood normal          Behavior: Behavior normal          Thought Content: Thought content normal          Judgment: Judgment normal           Data:    Laboratory Results: I have personally reviewed the pertinent laboratory results/reports   Radiology/Other Diagnostic Testing Results: I have personally reviewed pertinent reports         Lab Results   Component Value Date    WBC 6 25 12/01/2020    HGB 13 7 12/01/2020    HCT 43 5 12/01/2020    MCV 86 12/01/2020     12/01/2020     Lab Results   Component Value Date     08/20/2015    K 4 0 12/01/2020     12/01/2020    CO2 30 12/01/2020    ANIONGAP 8 6 08/20/2015    BUN 18 12/01/2020    CREATININE 0 86 12/01/2020    GLUCOSE 88 08/20/2015    GLUF 86 12/01/2020    CALCIUM 9 1 12/01/2020    AST 24 12/01/2020    ALT 47 12/01/2020    ALKPHOS 127 (H) 12/01/2020    PROT 7 1 08/20/2015    BILITOT 0 3 08/20/2015    EGFR 70 12/01/2020     Lab Results   Component Value Date    CHOLESTEROL 196 12/01/2020    CHOLESTEROL 193 08/03/2019    CHOLESTEROL 203 (H) 08/22/2018     Lab Results   Component Value Date    HDL 47 12/01/2020    HDL 42 08/03/2019    HDL 43 08/22/2018     Lab Results   Component Value Date    LDLCALC 104 (H) 12/01/2020    LDLCALC 116 (H) 08/03/2019    LDLCALC 90 08/22/2018     Lab Results   Component Value Date    TRIG 226 (H) 12/01/2020 TRIG 177 (H) 08/03/2019    TRIG 350 (H) 08/22/2018     No results found for: [place], [place]  Lab Results   Component Value Date    PGH3VSNLEVHJ 2 170 12/01/2020     Lab Results   Component Value Date    HGBA1C 5 4 12/01/2020     No results found for: MATT Brito, DO

## 2021-03-31 NOTE — ASSESSMENT & PLAN NOTE
Asthma by history without complication patient will use inhaler as needed and contact my office if change or if symptoms worsen

## 2021-03-31 NOTE — ASSESSMENT & PLAN NOTE
Pre diabetes review and evaluate laboratory work manage diet avoiding excess carbohydrates work on weight reduction and follow-up with A1c and laboratory work at next office visit

## 2021-03-31 NOTE — ASSESSMENT & PLAN NOTE
Patient doing well overall on current medication regimen will continue with pantoprazole 40 mg daily she will adjust this medication or add to this as needed if GERD symptoms change or worsen she denies recent worsening abdominal pain indigestion or blood in stools

## 2021-03-31 NOTE — ASSESSMENT & PLAN NOTE
She will be going soon for A1C and fasting glucose ordered by PCP  Educated on importance of diet, exercise, and weight loss  show

## 2021-03-31 NOTE — PATIENT INSTRUCTIONS
Neck Exercises   AMBULATORY CARE:   Neck exercises  help reduce neck pain, and improve neck movement and strength  Neck exercises also help prevent long-term neck problems  What you need to know about neck exercises:   · Do the exercises every day,  or as often as directed by your healthcare provider  · Move slowly, gently, and smoothly  Avoid fast or jerky motions  · Stand and sit the way your healthcare provider shows you  Good posture may reduce your neck pain  Check your posture often, even when you are not doing your neck exercises  How to perform neck exercises safely:   · Exercise position:  You may sit or stand while you do neck exercises  Face forward  Your shoulders should be straight and relaxed, with a good posture  · Head tilts, forward and back:  Gently bow your head and try to touch your chin to your chest  Your healthcare provider may tell you to push on the back of your neck to help bow your head  Raise your chin back to the starting position  Tilt your head back as far as possible so you are looking up at the ceiling  Your healthcare provider may tell you to lift your chin to help tilt your head back  Return your head to the starting position  · Head tilts, side to side:  Tilt your head, bringing your ear toward your shoulder  Then tilt your head toward the other shoulder  · Head turns:  Turn your head to look over your shoulder  Tilt your chin down and try to touch it to your shoulder  Do not raise your shoulder to your chin  Face forward again  Do the same on the other side  · Head rolls:  Slowly bring your chin toward your chest  Next, roll your head to the right  Your ear should be positioned over your shoulder  Hold this position for 5 seconds  Roll your head back toward your chest and to the left into the same position  Hold for 5 seconds  Gently roll your head back and around in a clockwise [place] 3 times   Next, move your head in the reverse direction (counterclockwise) in a [place] 3 times  Do not shrug your shoulders upwards while you do this exercise  Contact your healthcare provider if:   · Your pain does not get better, or gets worse  · You have questions or concerns about your condition, care, or exercise program     © Copyright [address] Information is for End User's use only and may not be sold, redistributed or otherwise used for commercial purposes  All illustrations and images included in CareNotes® are the copyrighted property of [employer]  or [facility] Letitia Caldwell   The above information is an  only  It is not intended as medical advice for individual conditions or treatments  Talk to your doctor, nurse or pharmacist before following any medical regimen to see if it is safe and effective for you

## 2021-04-08 ENCOUNTER — EVALUATION (OUTPATIENT)
Dept: PHYSICAL THERAPY | Age: 66
End: 2021-04-08
Payer: COMMERCIAL

## 2021-04-08 DIAGNOSIS — M54.2 NECK PAIN: Primary | ICD-10-CM

## 2021-04-08 PROCEDURE — 97110 THERAPEUTIC EXERCISES: CPT | Performed by: PHYSICAL THERAPIST

## 2021-04-08 PROCEDURE — 97140 MANUAL THERAPY 1/> REGIONS: CPT | Performed by: PHYSICAL THERAPIST

## 2021-04-08 PROCEDURE — 97162 PT EVAL MOD COMPLEX 30 MIN: CPT | Performed by: PHYSICAL THERAPIST

## 2021-04-08 NOTE — PROGRESS NOTES
PT Evaluation     Today's date: 2021  Patient name: Karson Merlos  : 1955  MRN: [id number]  Referring provider: Francesco Saldana DO  Dx:   Encounter Diagnosis     ICD-10-CM    1  Neck pain  M54 2                   Assessment  Assessment details: Karson Merlos is a 77 y o  female who presents with pain, decreased strength and decreased ROM and limited postural endurance  Due to these impairments, Patient has difficulty performing a/iadls and recreational activities  Patient's clinical presentation is consistent with their referring diagnosis of neck pain  Patient has responded well to PT in the past for chronic neck pain  Patient would benefit from a trial of skilled physical therapy to address their aforementioned impairments, improve their level of function and to improve their overall quality of life  Impairments: abnormal muscle tone, abnormal or restricted ROM, activity intolerance, impaired physical strength, lacks appropriate home exercise program, pain with function and poor posture   Understanding of Dx/Px/POC: good   Prognosis: good    Goals  ST-4 WEEKS  1  Decrease pain by 2 points on VAS at its worst   2   Increase ROM by > 5 deg in all deficients planes  cervical AROM  3  Increase UE by 1/2 MMT grade in all deficient planes  With increased postural endurance    LT-8 WEEKS  1  Patient to be independent with a/iadls  2  Increase functional activities for leisure and home activities to previous LOF  3  Independent with HEP and/or fitness program   4  Decrease headache frequency  intensity  5   Pain 2/10 or less neck    Plan  Patient would benefit from: skilled physical therapy  Planned modality interventions: cryotherapy, electrical stimulation/Russian stimulation, thermotherapy: hydrocollator packs and unattended electrical stimulation  Planned therapy interventions: activity modification, behavior modification, body mechanics training, aquatic therapy, flexibility, functional ROM exercises, home exercise program, joint mobilization, manual therapy, neuromuscular re-education, patient education, postural training, therapeutic activities and therapeutic exercise  Frequency: 2x   Duration in weeks: 8  Plan of Care beginning date: 2021  Plan of Care expiration date: 2021  Treatment plan discussed with: patient        Subjective Evaluation    History of Present Illness  Date of onset: 3/1/2021  Mechanism of injury: History of chronic neck pain for which she has received PT in the past with favorable outcomes  Recently seen by family physician and is now referred for out patient PT due to neck pain and headaches  Notes high stress levels which has triggered neck pain and shoulder pain and migraine HA 3-4 times per week  Quality of life: good    Pain  Current pain ratin  At worst pain rating: 10  Location: neck and shoulders trap and scap, left worse than right  Quality: tight, dull ache and pressure  Relieving factors: medications and relaxation  Aggravating factors: lifting, keyboarding and nothing (stress)  Progression: worsening    Social Support  Lives with: spouse    Hand dominance: right    Treatments  Previous treatment: medication and physical therapy  Patient Goals  Patient goals for therapy: decreased pain, increased motion and increased strength  Patient goal: relax neck and shoulders; start exercising        Objective     Static Posture     Head  Forward  Shoulders  Elevated and rounded  Scapulae  Left anteriorly tipped  Postural Observations  Seated posture: poor    Additional Postural Observation Details  Decreased cervical anterior curve; edema surpaclavicular space    Palpation   Left   Hypertonic in the scalenes  Muscle spasm in the levator scapulae and upper trapezius  Tenderness of the latissimus, levator scapulae, pectoralis major, pectoralis minor, scalenes, upper trapezius and masseter     Trigger point to pectoralis major and pectoralis minor  Right   Hypertonic in the cervical paraspinals and scalenes  Muscle spasm in the levator scapulae and upper trapezius  Tenderness of the latissimus, levator scapulae, pectoralis major, pectoralis minor, scalenes, upper trapezius and masseter  Trigger point to pectoralis major and pectoralis minor  Tenderness   Cervical Spine   Tenderness in the sternum and spinous process  Left Shoulder   Tenderness in the clavicle       Neurological Testing     Sensation     Shoulder   Left Shoulder   Intact: light touch  Paresthesia: light touch    Right Shoulder   Intact: light touch    Comments   Left light touch: 4th/5th digits    Active Range of Motion   Cervical/Thoracic Spine       Cervical    Flexion:  Restriction level: minimal  Extension:  Restriction level: moderate  Left lateral flexion: 15 degrees      Right lateral flexion: 18 degrees      Left rotation: 45 degrees  Right rotation: 60 degrees         Left Shoulder   Flexion: 90 degrees with pain  Abduction: 90 degrees with pain    Right Shoulder   Flexion: 90 degrees with pain  Abduction: 90 degrees with pain    Strength/Myotome Testing     Left Shoulder     Planes of Motion   Flexion: 4-   Abduction: 4-   External rotation at 0°: 4-   Internal rotation at 0°: 4-     Right Shoulder     Planes of Motion   Flexion: 4-   Abduction: 4-   External rotation at 0°: 4   Internal rotation at 0°: 4     Left Wrist/Hand      (2nd hand position)     Trial 1: 25    Trial 2: 28    Trial 3: 25    Right Wrist/Hand      (2nd hand position)     Trial 1: 30    Trial 2: 30    Trial 3: 32             Precautions:       Manuals 4/8            cervical 15                                                   Neuro Re-Ed                                                                                                        Ther Ex             AROM 10            Posture ed 5 Ther Activity                                       Gait Training                                       Modalities

## 2021-04-14 ENCOUNTER — HOSPITAL ENCOUNTER (OUTPATIENT)
Dept: MAMMOGRAPHY | Facility: CLINIC | Age: 66
Discharge: HOME | End: 2021-04-14
Payer: COMMERCIAL

## 2021-04-14 VITALS — BODY MASS INDEX: 33.46 KG/M2 | HEIGHT: 64 IN | WEIGHT: 196 LBS

## 2021-04-14 DIAGNOSIS — Z12.31 ENCOUNTER FOR SCREENING MAMMOGRAM FOR MALIGNANT NEOPLASM OF BREAST: ICD-10-CM

## 2021-04-14 PROCEDURE — 77067 SCR MAMMO BI INCL CAD: CPT

## 2021-04-14 PROCEDURE — 77063 BREAST TOMOSYNTHESIS BI: CPT

## 2021-04-14 NOTE — PROGRESS NOTES
Daily Note     Today's date: 4/15/2021  Patient name: Georgi Casillas  : 1955  MRN: [id number]  Referring provider: Layo Hernández DO  Dx:   Encounter Diagnosis     ICD-10-CM    1  Neck pain  M54 2        Start Time: 1338  Stop Time: 0500  Total time in clinic (min): 45 minutes    Subjective: complained of headache today  Notes no difficulty post initial session      Objective: See treatment diary below      Assessment: Tolerated treatment well  Patient would benefit from continued PT  Moderate bilateral trap and scap tightness  Forward head posturing  Improved with verbal cues and post treatment  Decreased pain post     Plan: Continue PT  Advance as tolerated         Precautions:       Manuals 4/8 4/15           cervical 15 20                                                  Neuro Re-Ed                                                                                                        Ther Ex             AROM 10 10           Posture ed 5 5           pec stretch  5           Upper trap  5                                                               Ther Activity                                       Gait Training                                       Modalities

## 2021-04-15 ENCOUNTER — OFFICE VISIT (OUTPATIENT)
Dept: PHYSICAL THERAPY | Age: 66
End: 2021-04-15
Payer: COMMERCIAL

## 2021-04-15 DIAGNOSIS — M54.2 NECK PAIN: Primary | ICD-10-CM

## 2021-04-15 PROCEDURE — 97140 MANUAL THERAPY 1/> REGIONS: CPT | Performed by: PHYSICAL THERAPIST

## 2021-04-15 PROCEDURE — 97110 THERAPEUTIC EXERCISES: CPT | Performed by: PHYSICAL THERAPIST

## 2021-04-21 ENCOUNTER — OFFICE VISIT (OUTPATIENT)
Dept: PHYSICAL THERAPY | Age: 66
End: 2021-04-21
Payer: COMMERCIAL

## 2021-04-21 DIAGNOSIS — M54.2 NECK PAIN: Primary | ICD-10-CM

## 2021-04-21 PROCEDURE — 97110 THERAPEUTIC EXERCISES: CPT | Performed by: PHYSICAL THERAPIST

## 2021-04-21 PROCEDURE — 97140 MANUAL THERAPY 1/> REGIONS: CPT | Performed by: PHYSICAL THERAPIST

## 2021-04-21 NOTE — PROGRESS NOTES
Daily Note     Today's date: 2021  Patient name: Kylah Viramontes  : 1955  MRN: [id number]  Referring provider: Joelle Ibarra DO  Dx:   Encounter Diagnosis     ICD-10-CM    1  Neck pain  M54 2                   Subjective: Patient notes moderate left shoulder and trap pain upon awaking today  Relates stress contributes to pain  Generally relief with PT treatments  Objective: See treatment diary below      Assessment: Tolerated treatment well  Patient would benefit from continued PTModerate left trap tightness with trigger points  Decreased post manual therapy  Patient education on daily self stretch and HEP  Handout provided  Plan: Continue per plan of care   Initiate aquatic PT next week to increase exercise program     Precautions:       Manuals 4/8 4/15 4/21          cervical 15 20 23                                                 Neuro Re-Ed                                                                                                        Ther Ex             AROM 10 10 10          Posture ed 5 5 5          pec stretch  5 5          Upper trap  5 5          HEP   above                                                 Ther Activity                                       Gait Training                                       Modalities             MH/ES trap   10'

## 2021-04-22 DIAGNOSIS — Z86.718 PERSONAL HISTORY OF DEEP VEIN THROMBOSIS: Primary | ICD-10-CM

## 2021-04-22 NOTE — PROGRESS NOTES
Daily Note     Today's date: 2021  Patient name: Heri Lundy  : 1955  MRN: [id number]  Referring provider: Arabella Zazueta DO  Dx:   Encounter Diagnosis     ICD-10-CM    1  Neck pain  M54 2        Start Time: 715  Stop Time: 0800  Total time in clinic (min): 45 minutes    Subjective: Patient notes awaking with neck and shoulder pain and headache  Objective: See treatment diary below      Assessment: Tolerated treatment well  Patient would benefit from continued PT Left So and left pectoralis tightness that minimized post treatment  Rounded shoulders posturing  Plan: Continue per plan of care  Initiate aquatic PT next visit       Precautions:       Manuals 4/8 4/15 4/21 4/23         cervical 15  23                                                Neuro Re-Ed                                                                                                        Ther Ex             AROM 10 10 10 10         Posture ed 5 5 5 5         pec stretch  5 5 5         Upper trap  5 5          HEP   above                                                 Ther Activity                                       Gait Training                                       Modalities             MH/ES trap   10' 10

## 2021-04-23 ENCOUNTER — OFFICE VISIT (OUTPATIENT)
Dept: PHYSICAL THERAPY | Age: 66
End: 2021-04-23
Payer: COMMERCIAL

## 2021-04-23 DIAGNOSIS — M54.2 NECK PAIN: Primary | ICD-10-CM

## 2021-04-23 PROCEDURE — 97110 THERAPEUTIC EXERCISES: CPT | Performed by: PHYSICAL THERAPIST

## 2021-04-23 PROCEDURE — 97140 MANUAL THERAPY 1/> REGIONS: CPT | Performed by: PHYSICAL THERAPIST

## 2021-04-26 ENCOUNTER — OFFICE VISIT (OUTPATIENT)
Dept: PHYSICAL THERAPY | Age: 66
End: 2021-04-26
Payer: COMMERCIAL

## 2021-04-26 DIAGNOSIS — M54.2 NECK PAIN: Primary | ICD-10-CM

## 2021-04-26 PROCEDURE — 97113 AQUATIC THERAPY/EXERCISES: CPT

## 2021-04-26 NOTE — PROGRESS NOTES
Daily Note     Today's date: 2021  Patient name: Stephanie Quinn  : 1955  MRN: [id number]  Referring provider: Naveed Arvizu DO  Dx:   Encounter Diagnosis     ICD-10-CM    1  Neck pain  M54 2        Start Time: 1100  Stop Time: 1200  Total time in clinic (min): 60 minutes    Subjective: pt c/o tightness to B upper traps and cervical area today  She notes her neck isn't as bad as her L ankle/plantar fascia today  Objective: See treatment diary below      Assessment: Tolerated treatment fair  Initial session for aquatic ex's included gentle water walking forward and backwards  Pt c/o increased pain in L foot w/ walking in waist deep water  Pt then went to the deep end using a noodle and was able to do deep water bike, abd/add and dwtx  Pt then came back up to the shallow end with Gentle posture ex's like shoulder rolls, resistive sh extension and gentle noodle ex's in chest deep water  Patient would benefit from continued PT      Plan: Continue per plan of care        Precautions: L TKR, plantar fascitis    Daily Treatment Diary     Manual                                                                                   Exercise Diary              Water walking 6            Postural training 5            Gait training             Home exercise pgm/patient education             Wall: t/h raises             Hip abd/add             Marching             squats             Knee flex/ext             Step-ups (fwd/bkwd/ss)             SLS (eyes open/closed)             SLS w UE mvmt  AROM/ball toss             Weight shifting             UE Noodle work x 4  5;            UE AROM 5'            Resistive UE work (paddles, bells, TB) 4' blue            Core work on noodle (sitting/stdg) attempted            Sit on noodle with movement             Seated on pool bench w proper posture             Ankle df/pf             Shoulder rolls 2'            DW xc ski 2'            DW abd/add 2' Deep water mvmt 5' bike            Deep water tx/stretching 5            Specific self - stretches wall/steps 2 upper traps                Modalities  4/26            whirlpool 10

## 2021-04-29 ENCOUNTER — OFFICE VISIT (OUTPATIENT)
Dept: PHYSICAL THERAPY | Age: 66
End: 2021-04-29
Payer: COMMERCIAL

## 2021-04-29 DIAGNOSIS — M54.2 NECK PAIN: Primary | ICD-10-CM

## 2021-04-29 PROCEDURE — 97110 THERAPEUTIC EXERCISES: CPT | Performed by: PHYSICAL THERAPIST

## 2021-04-29 PROCEDURE — 97140 MANUAL THERAPY 1/> REGIONS: CPT | Performed by: PHYSICAL THERAPIST

## 2021-04-29 PROCEDURE — 97014 ELECTRIC STIMULATION THERAPY: CPT | Performed by: PHYSICAL THERAPIST

## 2021-04-29 NOTE — PROGRESS NOTES
Daily Note     Today's date: 2021  Patient name: Donna Teixeira  : 1955  MRN: [id number]  Referring provider: Madge Romberg, DO  Dx:   Encounter Diagnosis     ICD-10-CM    1  Neck pain  M54 2        Start Time: 1000  Stop Time: 1055  Total time in clinic (min): 55 minutes    Subjective: Patient notes she performed her HEP yesterday and had a headache afterward  Notes increased bilateral foot pain, greater on the left  Notes soreness post initial pool session  Objective: See treatment diary below      Assessment: Tolerated treatment well  Patient would benefit from continued PT Reviewed proper breathing with exercises and importance of softening shoulders during performing to avoid increased muscle tension  Left upper trap tightness with left SO tightness and tenderness that decreases post PT  Plan: Continue per plan of care           Manuals 4/8 4/15 4/21 4/23 4/29        cervical 15 20 23 23 25                                               Neuro Re-Ed                                                                                                        Ther Ex             AROM 10 10 10 10 10        Posture ed 5 5 5 5         pec stretch  5 5 5 5        Upper trap  5 5  5        HEP   above                                                 Ther Activity                                       Gait Training                                       Modalities             MH/ES trap   10' 10 10

## 2021-05-03 ENCOUNTER — OFFICE VISIT (OUTPATIENT)
Dept: PHYSICAL THERAPY | Age: 66
End: 2021-05-03
Payer: COMMERCIAL

## 2021-05-03 DIAGNOSIS — M54.2 NECK PAIN: Primary | ICD-10-CM

## 2021-05-03 PROCEDURE — 97113 AQUATIC THERAPY/EXERCISES: CPT | Performed by: PHYSICAL THERAPIST

## 2021-05-03 NOTE — PROGRESS NOTES
Daily Note     Today's date: 5/3/2021  Patient name: Bing Almaguer  : 1955  MRN: [id number]  Referring provider: Graciela Mcginnis DO  Dx:   Encounter Diagnosis     ICD-10-CM    1  Neck pain  M54 2        Start Time: 1445  Stop Time: 1540  Total time in clinic (min): 55 minutes    Subjective: apatient complains of neck soreness and HA+ today    Objective: See treatment diary below      Assessment: Tolerated treatment fair  Patient demonstrated fatigue post treatment, exhibited good technique with therapeutic exercises and would benefit from continued PT      Plan: Progress treatment as tolerated           Precautions: L TKR, plantar fascitis    Daily Treatment Diary     Manual                                                                                   Exercise Diary  4/26 5/3           Water walking 6 10           Postural training 5 3             Gait training             Home exercise pgm/patient education             Wall: t/h raises             Hip abd/add             Marching             squats             Knee flex/ext             Step-ups (fwd/bkwd/ss)             SLS (eyes open/closed)             SLS w UE mvmt  AROM/ball toss             Weight shifting             UE Noodle work x 4  5; 5           UE AROM 5' 5           Resistive UE work (paddles, bells, TB) 4' blue 4           Core work on noodle (sitting/stdg) attempted            Sit on noodle with movement             Seated on pool bench w proper posture             Ankle df/pf             Shoulder rolls 2' 2           DW xc ski 2' 2           DW abd/add 2' 2           Deep water mvmt 5' bike 5           Deep water tx/stretching 5 5           Specific self - stretches wall/steps 2 upper traps 2               Modalities   5/3           whirlpool 10 10

## 2021-05-05 ENCOUNTER — OFFICE VISIT (OUTPATIENT)
Dept: PHYSICAL THERAPY | Age: 66
End: 2021-05-05
Payer: COMMERCIAL

## 2021-05-05 DIAGNOSIS — M54.2 NECK PAIN: Primary | ICD-10-CM

## 2021-05-05 PROCEDURE — 97014 ELECTRIC STIMULATION THERAPY: CPT | Performed by: PHYSICAL THERAPIST

## 2021-05-05 PROCEDURE — 97110 THERAPEUTIC EXERCISES: CPT | Performed by: PHYSICAL THERAPIST

## 2021-05-05 PROCEDURE — 97140 MANUAL THERAPY 1/> REGIONS: CPT | Performed by: PHYSICAL THERAPIST

## 2021-05-05 NOTE — PROGRESS NOTES
Daily Note     Today's date: 2021  Patient name: Jhoan Briggs  : 1955  MRN: [id number]  Referring provider: Lisa Dc DO  Dx:   Encounter Diagnosis     ICD-10-CM    1  Neck pain  M54 2        Start Time: 825  Stop Time: 915  Total time in clinic (min): 50 minutes    Subjective: Patient notes she went for a 90 minute massage yesterday and feels better today with decreased muscle tension and improved mobility  Objective: See treatment diary below      Assessment: Tolerated treatment well  Patient would benefit from continued PT Improved cervical AROM this date  Decreased trap tightness  Improving postural awareness  Plan: Continue per plan of care        Precautions: L TKR, plantar fascitis    Manuals 4/8 4/15 4/21 4/23 4/29 5/5       cervical 15 20 23 23 25 25                                              Neuro Re-Ed                                                                                                        Ther Ex             AROM 10 10 10 10 10 10       Posture ed 5 5 5 5         pec stretch  5 5 5 5 5       Upper trap  5 5  5 5       HEP   above                                                 Ther Activity                                       Gait Training                                       Modalities             MH/ES trap   10' 10 10 10

## 2021-05-09 NOTE — PROGRESS NOTES
Daily Note     Today's date: 5/10/2021  Patient name: Sejal Hinojosa  : 1955  MRN: [id number]  Referring provider: Beau Robertson DO  Dx:   Encounter Diagnosis     ICD-10-CM    1  Neck pain  M54 2        Start Time: 825  Stop Time:   Total time in clinic (min): 50 minutes    Subjective: Headache on Friday after working  Decreased neck and shoulder pain  Objective: See treatment diary below      Assessment: Tolerated treatment well  Patient responds well to [address] and manual therapy with decreased pain and muscle tightness  Improved postural awareness  Plan: Continue per plan of care        Precautions: L TKR, plantar fascitis    Manuals 4/8 4/15 4/21 4/23 4/29 5/5 5/10      cervical 15 20 23 23 25 25 25                                             Neuro Re-Ed                                                                                                        Ther Ex             AROM 10 10 10 10 10 10 10      Posture ed 5 5 5 5         pec stretch  5 5 5 5 5 5      Upper trap  5 5  5 5 5      HEP   above                                                 Ther Activity                                       Gait Training                                       Modalities             MH/ES trap   10' 10 10 10 10

## 2021-05-10 ENCOUNTER — OFFICE VISIT (OUTPATIENT)
Dept: PHYSICAL THERAPY | Age: 66
End: 2021-05-10
Payer: COMMERCIAL

## 2021-05-10 DIAGNOSIS — M54.2 NECK PAIN: Primary | ICD-10-CM

## 2021-05-10 PROCEDURE — 97140 MANUAL THERAPY 1/> REGIONS: CPT | Performed by: PHYSICAL THERAPIST

## 2021-05-10 PROCEDURE — 97014 ELECTRIC STIMULATION THERAPY: CPT | Performed by: PHYSICAL THERAPIST

## 2021-05-10 PROCEDURE — 97110 THERAPEUTIC EXERCISES: CPT | Performed by: PHYSICAL THERAPIST

## 2021-05-13 ENCOUNTER — OFFICE VISIT (OUTPATIENT)
Dept: PHYSICAL THERAPY | Age: 66
End: 2021-05-13
Payer: COMMERCIAL

## 2021-05-13 DIAGNOSIS — M54.2 NECK PAIN: Primary | ICD-10-CM

## 2021-05-13 PROCEDURE — 97113 AQUATIC THERAPY/EXERCISES: CPT

## 2021-05-13 NOTE — PROGRESS NOTES
Daily Note     Today's date: 2021  Patient name: Neo Tinajero  : 1955  MRN: [id number]  Referring provider: Jaida Walker DO  Dx:   Encounter Diagnosis     ICD-10-CM    1  Neck pain  M54 2        Start Time: 1005  Stop Time: 1100  Total time in clinic (min): 55 minutes    Subjective: pt continues w/ sig complaints of cervical and B upper trap pain and tightness  Overall pt is a little better  Objective: See treatment diary below      Assessment: Tolerated treatment fair  Difficult to progress pt w/ UE ex's due to pain in B shoulders/upper traps  Gentle stretching for upper back and upper traps  L Ankle and foot pain also limits water walking and standing ex's  Patient would benefit from continued PT      Plan: Continue per plan of care        Precautions: L TKR, plantar fascitis      Manual                                                                                   Exercise Diary  4/26 5/3 5/13          Water walking 6 10 10          Postural training 5 3   2'          Gait training             Home exercise pgm/patient education             Wall: t/h raises             Hip abd/add             Marching             squats             Knee flex/ext             Step-ups (fwd/bkwd/ss)             SLS (eyes open/closed)             SLS w UE mvmt  AROM/ball toss             C/S AROM   3'          UE Noodle work x 4  5; 5 4'          UE AROM 5' 5 2'          Resistive UE work (paddles, bells, TB) 4' blue 4           Core work on noodle (sitting/stdg) attempted            Sit on noodle with movement             Seated LE ex's on bench   5'          Ankle df/pf             Shoulder rolls 2' 2 2          DW xc ski 2' 2 2          DW abd/add 2' 2 2          Deep water mvmt 5' bike 5 5'          Deep water tx/stretching 5 5 10          Specific self - stretches wall/steps 2 upper traps 2 2              Modalities  4/26 5/3 5/13          whirlpool 10 10 10

## 2021-05-14 NOTE — PROGRESS NOTES
PT Re-Evaluation     Today's date: 2021  Patient name: Al Farnsworth  : 1955  MRN: [id number]  Referring provider: Veronica Mendoza DO  Dx:   Encounter Diagnosis     ICD-10-CM    1  Neck pain  M54 2                   Assessment  Assessment details: Al Farnsworth is a 77 y o  female who presents with pain, decreased strength and decreased ROM and limited postural endurance  Due to these impairments, Patient has difficulty performing a/iadls and recreational activities  Patient's clinical presentation is consistent with their referring diagnosis of neck pain  Patient is responding  well to PT  for chronic neck pain  Patient would benefit from contnued skilled physical therapy with advancement of exercise program and transition to self care and HEP  Impairments: abnormal muscle tone, abnormal or restricted ROM, activity intolerance, impaired physical strength, lacks appropriate home exercise program, pain with function and poor posture   Understanding of Dx/Px/POC: good   Prognosis: good    Goals  ST-4 WEEKS - progressing towards  1  Decrease pain by 2 points on VAS at its worst   2   Increase ROM by > 5 deg in all deficients planes  cervical AROM - met  3  Increase UE by 1/2 MMT grade in all deficient planes  With increased postural endurance - met    LT-8 WEEKS progressing towards  1  Patient to be independent with a/iadls  2  Increase functional activities for leisure and home activities to previous LOF  3  Independent with HEP and/or fitness program   4  Decrease headache frequency  intensity  5  Pain 2/10 or less neck    New Goals:  Continue with LTG as outlined with discharge planning over the next 2-4 weeks to self care      Plan  Patient would benefit from: skilled physical therapy  Planned modality interventions: cryotherapy, electrical stimulation/Russian stimulation, thermotherapy: hydrocollator packs and unattended electrical stimulation  Planned therapy interventions: activity modification, behavior modification, body mechanics training, aquatic therapy, flexibility, functional ROM exercises, home exercise program, joint mobilization, manual therapy, neuromuscular re-education, patient education, postural training, therapeutic activities and therapeutic exercise  Frequency: 2x   Duration in weeks: 8  Plan of Care beginning date: 2021  Plan of Care expiration date: 2021  Treatment plan discussed with: patient        Subjective Evaluation    History of Present Illness  Date of onset: 3/1/2021  Mechanism of injury: History of chronic neck pain for which she has received PT in the past with favorable outcomes  Recently seen by family physician and is now referred for out patient PT due to neck pain and headaches  Patient notes improvement since start of PT including both MFR and aquatic based therapy  She receives massage therapy 1 time per week and is meditating for stress management  Quality of life: good    Pain  Current pain ratin  At best pain ratin  At worst pain rating: 10  Location: neck and shoulders trap and scap, left worse than right - varying headaches  Quality: tight, dull ache and pressure  Relieving factors: medications and relaxation  Aggravating factors: lifting, keyboarding and nothing (stress)  Progression: improved    Social Support  Lives with: spouse    Hand dominance: right    Treatments  Previous treatment: medication and physical therapy  Patient Goals  Patient goals for therapy: decreased pain, increased motion and increased strength  Patient goal: relax neck and shoulders; start exercising;         Objective     Static Posture     Head  Forward  Shoulders  Elevated and rounded  Scapulae  Left anteriorly tipped      Postural Observations  Seated posture: poor    Additional Postural Observation Details  Decreased cervical anterior curve; edema surpaclavicular space although improved    Palpation   Left   Hypertonic in the scalenes  Muscle spasm in the levator scapulae and upper trapezius  Tenderness of the latissimus, levator scapulae, pectoralis major, pectoralis minor, scalenes, upper trapezius and masseter  Trigger point to pectoralis major and pectoralis minor  Right   Hypertonic in the cervical paraspinals and scalenes  Muscle spasm in the levator scapulae and upper trapezius  Tenderness of the latissimus, levator scapulae, pectoralis major, pectoralis minor, scalenes, upper trapezius and masseter  Trigger point to pectoralis major and pectoralis minor       Neurological Testing     Sensation     Shoulder   Left Shoulder   Intact: light touch  Paresthesia: light touch    Right Shoulder   Intact: light touch    Comments   Left light touch: 4th/5th digits    Active Range of Motion   Cervical/Thoracic Spine       Cervical    Flexion:  WFL  Extension:  Restriction level: moderate  Left lateral flexion: 25 degrees      Right lateral flexion: 24 degrees      Left rotation: 55 degrees  Right rotation: 65 degrees         Left Shoulder   Flexion: 90 degrees with pain  Abduction: 90 degrees with pain    Right Shoulder   Flexion: 90 degrees with pain  Abduction: 90 degrees with pain    Strength/Myotome Testing     Left Shoulder     Planes of Motion   Flexion: 4   Abduction: 4   External rotation at 0°: 4   Internal rotation at 0°: 4     Right Shoulder     Planes of Motion   Flexion: 4   Abduction: 4   External rotation at 0°: 4   Internal rotation at 0°: 4     Left Wrist/Hand      (2nd hand position)     Trial 1: 25    Trial 2: 28    Trial 3: 25    Right Wrist/Hand      (2nd hand position)     Trial 1: 30    Trial 2: 30    Trial 3: 32          Precautions: L TKR, plantar fascitis    Manuals 4/8 4/15 4/21 4/23 4/29 5/5 5/10 5/17     cervical 15 20 23 23 25 25 25 25                                            Neuro Re-Ed Ther Ex             AROM 10 10 10 10 10 10 10 10     Posture ed 5 5 5 5         pec stretch  5 5 5 5 5 5 5     Upper trap  5 5  5 5 5 5     HEP   above                                                 Ther Activity                                       Gait Training                                       Modalities             MH/ES trap   10' 10 10 10 10 10

## 2021-05-17 ENCOUNTER — EVALUATION (OUTPATIENT)
Dept: PHYSICAL THERAPY | Age: 66
End: 2021-05-17
Payer: COMMERCIAL

## 2021-05-17 DIAGNOSIS — M54.2 NECK PAIN: Primary | ICD-10-CM

## 2021-05-17 PROCEDURE — 97110 THERAPEUTIC EXERCISES: CPT | Performed by: PHYSICAL THERAPIST

## 2021-05-17 PROCEDURE — 97140 MANUAL THERAPY 1/> REGIONS: CPT | Performed by: PHYSICAL THERAPIST

## 2021-05-20 ENCOUNTER — APPOINTMENT (OUTPATIENT)
Dept: PHYSICAL THERAPY | Age: 66
End: 2021-05-20
Payer: COMMERCIAL

## 2021-05-21 NOTE — PROGRESS NOTES
Daily Note     Today's date: 2021  Patient name: Galdino Skinner  : 1955  MRN: [id number]  Referring provider: Kym Escalante DO  Dx:   Encounter Diagnosis     ICD-10-CM    1  Neck pain  M54 2                   Subjective: Notes awoke this morning with a headache  Objective: See treatment diary below      Assessment: Tolerated treatment well  Patient would benefit from continued PT Left So and levator scap tightness that relieved with MFR and MT  Improving postural awareness  Plan: Continue per plan of care        Precautions: L TKR, plantar fascitis      Manuals 4/8 4/15 4/21 4/23 4/29 5/5 5/10 5/17 5/24    cervical 15 20 23 23 25 25 25 25 25                                           Neuro Re-Ed                                                                                                        Ther Ex             AROM 10 10 10 10 10 10 10 10 10    Posture ed 5 5 5 5         pec stretch  5 5 5 5 5 5 5     Upper trap  5 5  5 5 5 5     HEP   above                                                 Ther Activity                                       Gait Training                                       Modalities             MH/ES trap   10' 10 10 10 10 10 nt

## 2021-05-24 ENCOUNTER — OFFICE VISIT (OUTPATIENT)
Dept: PHYSICAL THERAPY | Age: 66
End: 2021-05-24
Payer: COMMERCIAL

## 2021-05-24 DIAGNOSIS — M54.2 NECK PAIN: Primary | ICD-10-CM

## 2021-05-24 PROCEDURE — 97110 THERAPEUTIC EXERCISES: CPT | Performed by: PHYSICAL THERAPIST

## 2021-05-24 PROCEDURE — 97140 MANUAL THERAPY 1/> REGIONS: CPT | Performed by: PHYSICAL THERAPIST

## 2021-05-27 ENCOUNTER — APPOINTMENT (OUTPATIENT)
Dept: PHYSICAL THERAPY | Age: 66
End: 2021-05-27
Payer: COMMERCIAL

## 2021-05-31 ENCOUNTER — APPOINTMENT (OUTPATIENT)
Dept: PHYSICAL THERAPY | Age: 66
End: 2021-05-31
Payer: COMMERCIAL

## 2021-06-02 ENCOUNTER — OFFICE VISIT (OUTPATIENT)
Dept: PHYSICAL THERAPY | Age: 66
End: 2021-06-02
Payer: COMMERCIAL

## 2021-06-02 DIAGNOSIS — M54.2 NECK PAIN: Primary | ICD-10-CM

## 2021-06-02 PROCEDURE — 97113 AQUATIC THERAPY/EXERCISES: CPT

## 2021-06-02 NOTE — PROGRESS NOTES
Daily Note     Today's date: 2021  Patient name: Bebeto Saxena  : 1955  MRN: [id number]  Referring provider: Chun Corona DO  Dx:   Encounter Diagnosis     ICD-10-CM    1  Neck pain  M54 2        Start Time: 1000  Stop Time: 1100  Total time in clinic (min): 60 minutes    Subjective: pt continues w/ sig complaints of pain to L plantar fascitis and L LB today  Pt states her pool at home is open and she will continue w/ her ex's in her own pool and will transition to 2 land appts now  Objective: See treatment diary below  Pt seen 1;1 for 30 minutes  Assessment: Tolerated treatment fair  Difficult to progress w/ ex's  Most ex's today were performed in deep water using a noodle for floatation  L Ankle and foot pain also limits water walking and standing ex's  Patient would benefit from continued PT      Plan: Continue per plan of care        Precautions: L TKR, plantar fascitis      Manual                                                                                   Exercise Diary  4/26 5/3 5/13 6/2         Water walking 6 10 10 10         Postural training 5 3   2'          Gait training             Home exercise pgm/patient education             Wall: t/h raises    1 DW         Hip abd/add    2' DW         Marching    1' DW         squats             Knee flex/ext             Step-ups (fwd/bkwd/ss)             SLS (eyes open/closed)             SLS w UE mvmt  AROM/ball toss             C/S AROM   3'          UE Noodle work x 4  5; 5 4' 4         UE AROM 5' 5 2' 2         Resistive UE work (paddles, bells, TB) 4' blue 4           Core work on noodle (sitting/stdg) attempted            Sit on noodle with movement             Seated LE ex's on bench   5'          Ankle df/pf             Shoulder rolls 2' 2 2          DW xc ski 2' 2 2          DW abd/add 2' 2 2          Deep water mvmt 5' bike 5 5' 8         Deep water tx/stretching 5 5 10 10         Specific self - stretches wall/steps 2 upper traps 2 2 2             Modalities  4/26 5/3 5/13 6/2         whirlpool 10 10 10 10

## 2021-06-04 ENCOUNTER — OFFICE VISIT (OUTPATIENT)
Dept: PHYSICAL THERAPY | Age: 66
End: 2021-06-04
Payer: COMMERCIAL

## 2021-06-04 DIAGNOSIS — M54.2 NECK PAIN: Primary | ICD-10-CM

## 2021-06-04 PROCEDURE — 97110 THERAPEUTIC EXERCISES: CPT | Performed by: PHYSICAL THERAPIST

## 2021-06-04 PROCEDURE — 97014 ELECTRIC STIMULATION THERAPY: CPT | Performed by: PHYSICAL THERAPIST

## 2021-06-04 PROCEDURE — 97140 MANUAL THERAPY 1/> REGIONS: CPT | Performed by: PHYSICAL THERAPIST

## 2021-06-04 NOTE — PROGRESS NOTES
Daily Note     Today's date: 2021  Patient name: Georgi Casillas  : 1955  MRN: [id number]  Referring provider: Layo Hernández DO  Dx:   Encounter Diagnosis     ICD-10-CM    1  Neck pain  M54 2        Start Time: 630  Stop Time: 715  Total time in clinic (min): 45 minutes    Subjective: Patient offers no new complaints this date  Objective: See treatment diary below      Assessment: Tolerated treatment well  Patient with decreased muscle spasm and tightness and improved AROM cervical area    More upright posture  Plan: Continue per plan of care        Precautions: L TKR, plantar fascitis    Precautions: L TKR, plantar fascitis      Manuals 4/8 4/15 4/21 4/23 4/29 5/5 5/10 5/17 5/24 6/4   cervical 15 20 23 23 25 25 25 25 25 25                                          Neuro Re-Ed                                                                                                        Ther Ex             AROM 10 10 10 10 10 10 10 10 10 10   Posture ed 5 5 5 5         pec stretch  5 5 5 5 5 5 5  5   Upper trap  5 5  5 5 5 5  5   HEP   above          UBE             Posture row             TB row             Ther Activity                                       Gait Training                                       Modalities             MH/ES trap   10' 10 10 10 10 10 nt 10

## 2021-06-07 ENCOUNTER — OFFICE VISIT (OUTPATIENT)
Dept: BARIATRICS | Facility: CLINIC | Age: 66
End: 2021-06-07

## 2021-06-07 ENCOUNTER — OFFICE VISIT (OUTPATIENT)
Dept: PHYSICAL THERAPY | Age: 66
End: 2021-06-07
Payer: COMMERCIAL

## 2021-06-07 VITALS — BODY MASS INDEX: 33.57 KG/M2 | WEIGHT: 196.6 LBS | HEIGHT: 64 IN

## 2021-06-07 DIAGNOSIS — R63.5 ABNORMAL WEIGHT GAIN: Primary | ICD-10-CM

## 2021-06-07 DIAGNOSIS — M54.2 NECK PAIN: Primary | ICD-10-CM

## 2021-06-07 PROCEDURE — RECHECK

## 2021-06-07 PROCEDURE — 97140 MANUAL THERAPY 1/> REGIONS: CPT | Performed by: PHYSICAL THERAPIST

## 2021-06-07 PROCEDURE — 97110 THERAPEUTIC EXERCISES: CPT | Performed by: PHYSICAL THERAPIST

## 2021-06-07 NOTE — PROGRESS NOTES
Weight Management Medical Nutrition Assessment   Ayesha was here for her 3rd of 3 RD visit bundles  Today's weight 196 6 lbs giving her a gain of 6 2 lbs since her last visit in December of 2019  She reports that she is "supposed to being following a low acid diet per her doctor "  I reviewed it with her, and she kept asking "so I really can't have all of these foods?"  I recommend that she follow exactly what her doctor is recommending  She had previously been on our VLCD plan and asked about starting that again  I explained that she would need to see one of the providers in order to restart that  I did offer her a partial meal plan (2 meal replacements, a bar, a low carb snack and a sensible low carb dinner )  She asked if she could have 3 meal replacements per day and a bar - I explained to her in order to restart VLCD again, she would need to see a provider  She will be following up with me in 2 weeks, I had her order exactly enough meal replacements for a partial meal plan for 2 weeks          Anthropometric Measurements  Start Weight (lbs): 194 6, lbs  Current Weight (lbs): 196 6 lbs  TBW % Change from start weight:  -1%  Ideal Body Weight (lbs):117 5 lbs  Goal Weight (lbs):160 lbs     Weight Loss History  Previous weight loss attempts: Counseling with  MD  Self Created Diets (Portion Control, Healthy Food Choices, etc )  VLCD      Food and Nutrition Related History     Food Recall  Breakfast: coffee, shake           Snack:pear   Lunch: shake  Snack: string cheese if needed  Dinner: meatloaf, vegetables  Snack:wine        Beverages: water  Volume of beverage intake: 60 to 80 oz     Weekends: Same  Cravings: none reported  Trouble area of day:none reported     Frequency of Eating out: irregularly  Food restrictions:none reported  Cooking: self   Food Shopping: self     Physical Activity Intake  Activity:Swimming   Frequency: plans to start exercising  Physical limitations/barriers to exercise: none reported     Estimated Needs  Energy  [place] Energy Needs: BMR : 1389  calories  1# loss weekly sedentary:  1166    calories  1-2# loss weekly lightly active: 909 -1409 calories  Protein:64-80gm      (1 2-1 5g/kg IBW)  Fluid: 62oz     (35mL/kg IBW)     Nutrition Diagnosis  Yes;    Overweight/obesity  related to Excess energy intake as evidenced by  BMI more than normative standard for age and sex (obesity-grade I 30-34  9)     Nutrition Intervention     Nutrition Prescription  Calories: 1000 calories   Protein: 75 gm   Fluid:62oz     Meal Plan  2 meal replacements, a bar, a food snack (string cheese or hard boiled egg) and a lean protein with non-starchy vegetables       Nutrition Education:    Calorie controlled menu  Lean protein food choices  Healthy snack options  Food journaling tips        Nutrition Counseling:  Strategies: meal planning, portion sizes, healthy snack choices, hydration, fiber intake, protein intake, exercise, food journal        Monitoring and Evaluation:  Evaluation criteria:  Energy Intake  Meet protein needs  Maintain adequate hydration  Monitor weekly weight  Meal planning/preparation  Food journal   Decreased portions at mealtimes and snacks  Physical activity      Barriers to learning:none  Readiness to change: Action  Comprehension: good  Expected Compliance: fair

## 2021-06-07 NOTE — PROGRESS NOTES
Daily Note     Today's date: 2021  Patient name: Neo Tinajero  : 1955  MRN: [id number]  Referring provider: Jaida Walker DO  Dx:   Encounter Diagnosis     ICD-10-CM    1  Neck pain  M54 2        Start Time: 1545  Stop Time: 1630  Total time in clinic (min): 45 minutes    Subjective: patient complains of tightness and tension      Objective: See treatment diary below      Assessment: Tolerated treatment fair  Patient demonstrated fatigue post treatment, exhibited good technique with therapeutic exercises and would benefit from continued PT, tender to left cervical upper trap area and left pect  as well as ULTT to median and ulnar nerve      Plan: Progress treatment as tolerated         Precautions: L TKR, plantar fascitis    Precautions: L TKR, plantar fascitis      Manuals 6/7 4/15 4/21 4/23 4/29 5/ 5/10 5/17 5/24 6   cervical 20 20 23 23 25 25 25 25 25 25                                          Neuro Re-Ed                                                                                                        Ther Ex             AROM 10 10 10 10 10 10 10 10 10 10   Posture ed 5 5 5 5         pec stretch 5 5 5 5 5 5 5 5  5   Upper trap 5 5 5  5 5 5 5  5   HEP   above          UBE             Posture row             TB row             Ther Activity                                       Gait Training                                       Modalities             MH/ES trap   10' 10 10 10 10 10 nt 10

## 2021-06-09 NOTE — PROGRESS NOTES
Daily Note     Today's date: 6/10/2021  Patient name: Georgi Casillas  : 1955  MRN: [id number]  Referring provider: Layo Hernández DO  Dx:   Encounter Diagnosis     ICD-10-CM    1  Neck pain  M54 2        Start Time: 315  Stop Time: 400  Total time in clinic (min): 45 minutes    Subjective: Notes did exercises in her pool yesterday with some soreness today  Objective: See treatment diary below      Assessment: Tolerated treatment well  Patient would benefit from continued PT Significant SO tightness bilaterally today wit forward head posturing  Plan: Continue per plan of care        Precautions: L TKR, plantar fascitis      Manuals 6/7 6/10 4/21 4/23 4/29 5/5 5/10 5/17 5/24 6/4   cervical 20 20 23 23 25 25 25 25 25 25                                          Neuro Re-Ed                                                                                                        Ther Ex             AROM 10 10 10 10 10 10 10 10 10 10   Posture ed 5 5 5 5         pec stretch 5 5 5 5 5 5 5 5  5   Upper trap 5 5 5  5 5 5 5  5   HEP   above          UBE             Posture row             TB row             Ther Activity                                       Gait Training                                       Modalities             MH/ES trap  10' 10' 10 10 10 10 10 nt 10

## 2021-06-10 ENCOUNTER — OFFICE VISIT (OUTPATIENT)
Dept: PHYSICAL THERAPY | Age: 66
End: 2021-06-10
Payer: COMMERCIAL

## 2021-06-10 DIAGNOSIS — M54.2 NECK PAIN: Primary | ICD-10-CM

## 2021-06-10 PROCEDURE — 97110 THERAPEUTIC EXERCISES: CPT | Performed by: PHYSICAL THERAPIST

## 2021-06-10 PROCEDURE — 97140 MANUAL THERAPY 1/> REGIONS: CPT | Performed by: PHYSICAL THERAPIST

## 2021-06-11 ENCOUNTER — TELEPHONE (OUTPATIENT)
Dept: BARIATRICS | Facility: CLINIC | Age: 66
End: 2021-06-11

## 2021-06-11 NOTE — TELEPHONE ENCOUNTER
Patient presented to the office requesting to buy more product  Patient was informed at her visit on 6/7 with RD that she could not purchase more product than the amount she was given at that visit until she followed up with a provider  Patient has not been seen by provider since June 2019  Patient was not happy with this and stated to [facility] " well how would you know if I went home and did VLCD " because of this patients product account was pended so she could not purchase more product until the provider visit  Patient was informed today by  she couldn't buy more product she was requesting  She became very unhappy and I was asked to address the patient  I explained to the patient that she needed to follow the protocol and needed a f/u appt as well as well as f/u labs and ekg  Explained to patient that these things need to be done within the last 6 month timeline  Patient was 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

## 2021-06-11 NOTE — PROGRESS NOTES
Daily Note     Today's date: 2021  Patient name: Radha Bowen  : 1955  MRN: [id number]  Referring provider: Wing Pena DO  Dx:   Encounter Diagnosis     ICD-10-CM    1  Neck pain  M54 2        Start Time: 1610  Stop Time: 1700  Total time in clinic (min): 50 minutes    Subjective: Patient notes continued headaches  Decreased neck pain  Objective: See treatment diary below      Assessment: Tolerated treatment well  Patient with moderate SO tightness  Forward rounding posture  Moderate bilateral scapular tightness  Improved cerv rotation post treatment  Plan: Continue per plan of care        Precautions: L TKR, plantar fascitis      Manuals 6/7 6/10 6/14  4/29 5/5 5/10 5/17 5/24 6/4   cervical 20 20 23 23 25 25 25 25 25 25                                          Neuro Re-Ed                                                                                                        Ther Ex             AROM 10 10 10 10 10 10 10 10 10 10   Posture ed 5 5 5 5         pec stretch 5 5 5 5 5 5 5 5  5   Upper trap 5 5 5  5 5 5 5  5   HEP             UBE             Posture row             TB row             Ther Activity                                       Gait Training                                       Modalities             MH/ES trap  10' 10' 10 10 10 10 10 nt 10

## 2021-06-11 NOTE — TELEPHONE ENCOUNTER
Spoke with pt to schedule her a appointment with Dr Derek Jo  She stated this was not a good time and would call back to schedule appointment 
non smoker non drinker no recreational drugs

## 2021-06-14 ENCOUNTER — OFFICE VISIT (OUTPATIENT)
Dept: PHYSICAL THERAPY | Age: 66
End: 2021-06-14
Payer: COMMERCIAL

## 2021-06-14 DIAGNOSIS — M54.2 NECK PAIN: Primary | ICD-10-CM

## 2021-06-14 PROCEDURE — 97014 ELECTRIC STIMULATION THERAPY: CPT | Performed by: PHYSICAL THERAPIST

## 2021-06-14 PROCEDURE — 97110 THERAPEUTIC EXERCISES: CPT | Performed by: PHYSICAL THERAPIST

## 2021-06-14 PROCEDURE — 97140 MANUAL THERAPY 1/> REGIONS: CPT | Performed by: PHYSICAL THERAPIST

## 2021-06-16 ENCOUNTER — TELEPHONE (OUTPATIENT)
Dept: BARIATRICS | Facility: CLINIC | Age: 66
End: 2021-06-16

## 2021-06-16 NOTE — TELEPHONE ENCOUNTER
Mady Metzger had called earlier to make an appt with Dr Derek Jo and was upset that there are no appts until Aug 9  She asked MR Olga Low to have me call her  I spoke to LATRELL Bustos and said we could schedule her in a same day appt  I called Ayesha back and scheduled her  She is still unhappy that she has to see Dr Derek Jo before she can buy product, and now she is stating "all this just to do a partial meal plan '  Before she was interested in VLCD

## 2021-06-16 NOTE — TELEPHONE ENCOUNTER
Patient called in to try and get an appointment with Dr Brisa Atkins or Marie Pope, before her Gwyn Olivarez visit on Tuesday  Unfortunately, there are no openings  Advised pt of this, to which she stated she is seeing her pcp on Monday and having the bloodwork done that is required by her pcp which she said is the same as what we order  She wants to see if she can get her product on Tuesday with Gwyn Olivarez at her appointment  [place] Alvarenga to give patient a  Call this afternoon to discuss    Pt is requesting the callback today, after 1PM

## 2021-06-18 ENCOUNTER — OFFICE VISIT (OUTPATIENT)
Dept: PHYSICAL THERAPY | Age: 66
End: 2021-06-18
Payer: COMMERCIAL

## 2021-06-18 DIAGNOSIS — M54.2 NECK PAIN: Primary | ICD-10-CM

## 2021-06-18 PROCEDURE — 97110 THERAPEUTIC EXERCISES: CPT | Performed by: PHYSICAL THERAPIST

## 2021-06-18 PROCEDURE — 97140 MANUAL THERAPY 1/> REGIONS: CPT | Performed by: PHYSICAL THERAPIST

## 2021-06-18 NOTE — PROGRESS NOTES
Daily Note     Today's date: 2021  Patient name: Eric Salinas  : 1955  MRN: [id number]  Referring provider: Cinthia Oliver DO  Dx:   Encounter Diagnosis     ICD-10-CM    1  Neck pain  M54 2        Start Time: 1615  Stop Time: 1700  Total time in clinic (min): 45 minutes    Subjective: Patient reports neck and scapula muscles are all stiff      Objective: See treatment diary below      Assessment: Tolerated treatment fair  Patient demonstrated fatigue post treatment, exhibited good technique with therapeutic exercises and would benefit from continued PT, decreased thoracic extension mobility and tight upper traps      Plan: Progress treatment as tolerated         Precautions: L TKR, plantar fascitis      Manuals 6/7 6/10 6/14 6/18 4/29 5/5 5/10 5/17 5/24 6/4   cervical 20 20 23 20 25 25 25 25 25 25                                          Neuro Re-Ed                                                                                                        Ther Ex             AROM 10 10 10 10 10 10 10 10 10 10   Posture ed 5 5 5 5         pec stretch 5 5 5 5 5 5 5 5  5   Upper trap 5 5 5 5 5 5 5 5  5   HEP             UBE             Posture row             TB row             Ther Activity                                       Gait Training                                       Modalities             MH/ES trap  10' 10' 10 10 10 10 10 nt 10

## 2021-06-21 ENCOUNTER — APPOINTMENT (OUTPATIENT)
Dept: PHYSICAL THERAPY | Age: 66
End: 2021-06-21
Payer: COMMERCIAL

## 2021-06-22 ENCOUNTER — ANNUAL EXAM (OUTPATIENT)
Dept: OBGYN CLINIC | Facility: CLINIC | Age: 66
End: 2021-06-22
Payer: COMMERCIAL

## 2021-06-22 VITALS — SYSTOLIC BLOOD PRESSURE: 124 MMHG | WEIGHT: 193.2 LBS | BODY MASS INDEX: 33.16 KG/M2 | DIASTOLIC BLOOD PRESSURE: 80 MMHG

## 2021-06-22 DIAGNOSIS — Z01.419 ENCOUNTER FOR GYNECOLOGICAL EXAMINATION: Primary | ICD-10-CM

## 2021-06-22 DIAGNOSIS — Z01.419 ENCOUNTER FOR GYNECOLOGICAL EXAMINATION WITHOUT ABNORMAL FINDING: ICD-10-CM

## 2021-06-22 DIAGNOSIS — Z12.31 SCREENING MAMMOGRAM, ENCOUNTER FOR: ICD-10-CM

## 2021-06-22 PROCEDURE — 99397 PER PM REEVAL EST PAT 65+ YR: CPT | Performed by: OBSTETRICS & GYNECOLOGY

## 2021-06-22 NOTE — PATIENT INSTRUCTIONS
Wellness Visit for Adults   WHAT YOU NEED TO KNOW:   What is a wellness visit? A wellness visit is when you see your healthcare provider to get screened for health problems  Your healthcare provider will also give you advice on how to stay healthy  Write down your questions so you remember to ask them  Ask your healthcare provider how often you should have a wellness visit  What happens at a wellness visit? Your healthcare provider will ask about your health, and your family history of health problems  This includes high blood pressure, heart disease, and cancer  He or she will ask if you have symptoms that concern you, if you smoke, and about your mood  You may also be asked about your intake of medicines, supplements, food, and alcohol  Any of the following may be done:  · Your weight  will be checked  Your height may also be checked so your body mass index (BMI) can be calculated  Your BMI shows if you are at a healthy weight  · Your blood pressure  and heart rate will be checked  Your temperature may also be checked  · Blood and urine tests  may be done  Blood tests may be done to check your cholesterol levels  Abnormal cholesterol levels increase your risk for heart disease and stroke  You may also need a blood or urine test to check for diabetes if you are at increased risk  Urine tests may be done to look for signs of an infection or kidney disease  · A physical exam  includes checking your heartbeat and lungs with a stethoscope  Your healthcare provider may also check your skin to look for sun damage  · Screening tests  may be recommended  A screening test is done to check for diseases that may not cause symptoms  The screening tests you may need depend on your age, gender, family history, and lifestyle habits  For example, colorectal screening may be recommended if you are 48years old or older  What screening tests do I need if I am a woman?    · A Pap smear  is used to screen for cervical cancer  Pap smears are usually done every 3 to 5 years depending on your age  You may need them more often if you have had abnormal Pap smear test results in the past  Ask your healthcare provider how often you should have a Pap smear  · A mammogram  is an x-ray of your breasts to screen for breast cancer  Experts recommend mammograms every 2 years starting at age 48 years  You may need a mammogram at age 52 years or younger if you have an increased risk for breast cancer  Talk to your healthcare provider about when you should start having mammograms and how often you need them  What vaccines might I need? · Get an influenza vaccine  every year  The influenza vaccine protects you from the flu  Several types of viruses cause the flu  The viruses change over time, so new vaccines are made each year  · Get a tetanus-diphtheria (Td) booster vaccine  every 10 years  This vaccine protects you against tetanus and diphtheria  Tetanus is a severe infection that may cause painful muscle spasms and lockjaw  Diphtheria is a severe bacterial infection that causes a thick covering in the back of your mouth and throat  · Get a human papillomavirus (HPV) vaccine  if you are female and aged 23 to 32 or male 23 to 24 and never received it  This vaccine protects you from HPV infection  HPV is the most common infection spread by sexual contact  HPV may also cause vaginal, penile, and anal cancers  · Get a pneumococcal vaccine  if you are aged 72 years or older  The pneumococcal vaccine is an injection given to protect you from pneumococcal disease  Pneumococcal disease is an infection caused by pneumococcal bacteria  The infection may cause pneumonia, meningitis, or an ear infection  · Get a shingles vaccine  if you are 60 or older, even if you have had shingles before  The shingles vaccine is an injection to protect you from the varicella-zoster virus  This is the same virus that causes chickenpox   Shingles is a painful rash that develops in people who had chickenpox or have been exposed to the virus  How can I eat healthy? My Plate is a model for planning healthy meals  It shows the types and amounts of foods that should go on your plate  Fruits and vegetables make up about half of your plate, and grains and protein make up the other half  A serving of dairy is included on the side of your plate  The amount of calories and serving sizes you need depends on your age, gender, weight, and height  Examples of healthy foods are listed below:  · Eat a variety of vegetables  such as dark green, red, and orange vegetables  You can also include canned vegetables low in sodium (salt) and frozen vegetables without added butter or sauces  · Eat a variety of fresh fruits , canned fruit in 100% juice, frozen fruit, and dried fruit  · Include whole grains  At least half of the grains you eat should be whole grains  Examples include whole-wheat bread, wheat pasta, brown rice, and whole-grain cereals such as oatmeal     · Eat a variety of protein foods such as seafood (fish and shellfish), lean meat, and poultry without skin (turkey and chicken)  Examples of lean meats include pork leg, shoulder, or tenderloin, and beef round, sirloin, tenderloin, and extra lean ground beef  Other protein foods include eggs and egg substitutes, beans, peas, soy products, nuts, and seeds  · Choose low-fat dairy products such as skim or 1% milk or low-fat yogurt, cheese, and cottage cheese  · Limit unhealthy fats  such as butter, hard margarine, and shortening  How much exercise do I need? Exercise at least 30 minutes per day on most days of the week  Some examples of exercise include walking, biking, dancing, and swimming  You can also fit in more physical activity by taking the stairs instead of the elevator or parking farther away from stores  Include muscle strengthening activities 2 days each week   Regular exercise provides many health benefits  It helps you manage your weight, and decreases your risk for type 2 diabetes, heart disease, stroke, and high blood pressure  Exercise can also help improve your mood  Ask your healthcare provider about the best exercise plan for you  What are some general health and safety guidelines I should follow? · Do not smoke  Nicotine and other chemicals in cigarettes and cigars can cause lung damage  Ask your healthcare provider for information if you currently smoke and need help to quit  E-cigarettes or smokeless tobacco still contain nicotine  Talk to your healthcare provider before you use these products  · Limit alcohol  A drink of alcohol is 12 ounces of beer, 5 ounces of wine, or 1½ ounces of liquor  · Lose weight, if needed  Being overweight increases your risk of certain health conditions  These include heart disease, high blood pressure, type 2 diabetes, and certain types of cancer  · Protect your skin  Do not sunbathe or use tanning beds  Use sunscreen with a SPF 15 or higher  Apply sunscreen at least 15 minutes before you go outside  Reapply sunscreen every 2 hours  Wear protective clothing, hats, and sunglasses when you are outside  · Drive safely  Always wear your seatbelt  Make sure everyone in your car wears a seatbelt  A seatbelt can save your life if you are in an accident  Do not use your cell phone when you are driving  This could distract you and cause an accident  Pull over if you need to make a call or send a text message  · Practice safe sex  Use latex condoms if are sexually active and have more than one partner  Your healthcare provider may recommend screening tests for sexually transmitted infections (STIs)  · Wear helmets, lifejackets, and protective gear  Always wear a helmet when you ride a bike or motorcycle, go skiing, or play sports that could cause a head injury  Wear protective equipment when you play sports   Wear a lifejacket when you are on a boat or doing water sports  CARE AGREEMENT:   You have the right to help plan your care  Learn about your health condition and how it may be treated  Discuss treatment options with your healthcare providers to decide what care you want to receive  You always have the right to refuse treatment  The above information is an  only  It is not intended as medical advice for individual conditions or treatments  Talk to your doctor, nurse or pharmacist before following any medical regimen to see if it is safe and effective for you  © Copyright [address] Information is for End User's use only and may not be sold, redistributed or otherwise used for commercial purposes   All illustrations and images included in CareNotes® are the copyrighted property of [employer]  or [address] [place] [address]

## 2021-06-22 NOTE — ASSESSMENT & PLAN NOTE
Pap/HPV not indicated  Mammogram current  Colonoscopy current  DEXA current    Interstitial cystitis - Prelief, change of diet - has noticed improvement  Weight loss - is following weight management program and is doing well  Encourage healthy diet, exercise, Calcium 1200mg per day and at least 800 iu Vitamin D daily

## 2021-06-22 NOTE — PROGRESS NOTES
Assessment/Plan:    Encounter for gynecological examination without abnormal finding  Pap/HPV not indicated  Mammogram current  Colonoscopy current  DEXA current    Interstitial cystitis - Prelief, change of diet - has noticed improvement  Weight loss - is following weight management program and is doing well  Encourage healthy diet, exercise, Calcium 1200mg per day and at least 800 iu Vitamin D daily  Diagnoses and all orders for this visit:    Encounter for gynecological examination    Screening mammogram, encounter for  -     Mammo screening bilateral w 3d & cad; Future    Encounter for gynecological examination without abnormal finding          Subjective:      Patient ID: Bing Almaguer is a 77 y o  female  Patient presents for a routine annual visit  Menarche- 13 Y/O  Hysterectomy  Mammogram-21 WNL  Colonoscopy-19 Due   Dexa-20 osteopenia    Former smoker  Social drinker  Currently sexually active  Family breast cancer and endometrial cancer  No concerns/questions for today's visit    Patient follows up with urogynecology with Dr Erlinda Bowles  Has changed diet and using prelief - improvement noted  Gynecologic Exam  The patient's pertinent negatives include no genital itching, genital lesions, genital odor, genital rash, pelvic pain, vaginal bleeding or vaginal discharge  The patient is experiencing no pain  Associated symptoms include urgency  Pertinent negatives include no chills, constipation, diarrhea, fever, frequency, nausea, sore throat or vomiting   She is postmenopausal        The following portions of the patient's history were reviewed and updated as appropriate:   She  has a past medical history of Acid reflux, Allergic rhinitis, Anesthesia complication, Arthritis, Asthma, Bigeminy, DVT (deep venous thrombosis) ([facility] [address] ) (10/23/2018), DVT (deep venous thrombosis) ([facility] [address] ), DVT of leg (deep venous thrombosis) ([facility] [address] ) (), Female pelvic pain, Hyperlipidemia, Intermittent palpitations, Irregular heart beat, Migraines, Obesity (BMI 30 0-34 9), Palpitations, Pes planus, Thyroid nodule, Trigeminy, and Wears glasses  She   Patient Active Problem List    Diagnosis Date Noted    Neck pain 2021    Cystitis, interstitial 2021    Moderate persistent asthma without complication     Gastroesophageal reflux disease without esophagitis 10/22/2019    Encounter for gynecological examination without abnormal finding 2019    Hypertriglyceridemia 2019    Palpitations 2019    Posterior tibial tendinitis of right lower extremity 10/26/2018    History of deep venous thrombosis (DVT) of distal vein of right lower extremity 10/26/2018    Thyroid nodule 2018    Chronic pain of right knee 2018    Primary osteoarthritis of right knee 2018    Effusion of right knee 2018    Memory difficulty 2017    Obesity (BMI 30 0-34 9) 2017    Mild intermittent asthma 2017    Osteopenia of multiple sites 2016    Arthralgia of hip, left 2014    Migraine, unspecified, not intractable, without status migrainosus 11/10/2014    DVT of leg (deep venous thrombosis) ([facility] [address] ) 2014    Arthritis of left knee 10/03/2014     She  has a past surgical history that includes Replacement total knee (); Appendectomy; Knee surgery (Left);  section; Joint replacement; Dilation and curettage of uterus; Colonoscopy; [place] tooth extraction; Cholecystectomy; pr laparoscopy w tot hysterectuterus <=250 gram  w tube/ovary (Bilateral, 2016); pr esophagogastroduodenoscopy transoral diagnostic (N/A, 3/14/2016); Cataract extraction; US guided breast biopsy left complete (Left, 2019); and Breast biopsy (Left, )  Her family history includes Alzheimer's disease in her maternal aunt and maternal uncle; Breast cancer (age of onset: 72) in her maternal aunt;  Cancer in her family; Diabetes in her father and paternal aunt; Diabetes type II in her brother; Endometrial cancer (age of onset: 76) in her mother; Growth hormone deficiency in her daughter; Hashimoto's thyroiditis in her paternal aunt; Heart attack in her father; Heart disease in her father; Hypertension in her father and paternal aunt; Migraines in her mother; No Known Problems in her daughter, maternal aunt, maternal aunt, maternal aunt, maternal aunt, maternal grandmother, paternal aunt, and paternal grandmother; Other in her maternal uncle; Prostate cancer in her father; Stroke in her paternal uncle; Thyroid cancer in her brother and maternal aunt; Thyroid cancer (age of onset: 80) in her maternal aunt  She  reports that she quit smoking about 43 years ago  Her smoking use included cigarettes  She smoked 0 50 packs per day  She has never used smokeless tobacco  She reports current alcohol use  She reports that she does not use drugs    Current Outpatient Medications   Medication Sig Dispense Refill    albuterol (Ventolin HFA) 90 mcg/act inhaler 1-2 puffs 4 times a day as needed 3 Inhaler 3    ALPRAZolam (XANAX) 0 25 mg tablet Take one tablet twice daily as needed for anxiety 30 tablet 0    butalbital-acetaminophen-caffeine (FIORICET,ESGIC) -40 mg per tablet Take 1 tablet by mouth every 4 (four) hours as needed for headaches 20 tablet 0    calcium-vitamin D (OSCAL) 250-125 MG-UNIT per tablet Take 1 tablet by mouth daily      cycloSPORINE (RESTASIS) 0 05 % ophthalmic emulsion Apply 1 drop to eye 2 (two) times a day      diazepam (VALIUM) 5 mg tablet Take 1 tablet (5 mg total) by mouth every 8 (eight) hours as needed for anxiety 30 tablet 0    diphenhydrAMINE (BENADRYL) 25 mg capsule Take by mouth daily at bedtime as needed        Evening Primrose Oil 1000 MG CAPS Take 1 capsule by mouth daily      Flovent  MCG/ACT inhaler INHALE 1 PUFF BY MOUTH TWICE A DAY 12 Inhaler 0    fluticasone (FLONASE) 50 mcg/act nasal spray 2 sprays into each nostril daily 1 Bottle 5    loteprednol etabonate (LOTEMAX) 0 5 % ophthalmic suspension Administer 1 drop to both eyes as needed       methocarbamol (ROBAXIN) 750 mg tablet Take 750 mg by mouth every 6 (six) hours as needed for muscle spasms      Multiple Vitamin (MULTIVITAMIN) capsule Take 1 capsule by mouth daily   pantoprazole (PROTONIX) 40 mg tablet Take 1 tablet (40 mg total) by mouth daily Take 1 tablet by mouth 2 times a day 30 minutes prior to breakfast and dinner 180 tablet 3    rivaroxaban (XARELTO) 10 mg tablet Take 1 tablet (10 mg total) by mouth daily 90 tablet 3    ZOLMitriptan (ZOMIG) 5 MG tablet Take 1 tablet (5 mg total) by mouth once as needed for migraine for up to 1 dose (Patient taking differently: Take 5 mg by mouth as needed for migraine ) 20 tablet 3    levocetirizine (XYZAL) 5 MG tablet Take 1 tablet (5 mg total) by mouth every evening (Patient not taking: Reported on 3/31/2021) 90 tablet 3    montelukast (SINGULAIR) 10 mg tablet Take 1 tablet (10 mg total) by mouth daily at bedtime (Patient not taking: Reported on 3/31/2021) 90 tablet 1     No current facility-administered medications for this visit       Current Outpatient Medications on File Prior to Visit   Medication Sig    albuterol (Ventolin HFA) 90 mcg/act inhaler 1-2 puffs 4 times a day as needed    ALPRAZolam (XANAX) 0 25 mg tablet Take one tablet twice daily as needed for anxiety    butalbital-acetaminophen-caffeine (FIORICET,ESGIC) -40 mg per tablet Take 1 tablet by mouth every 4 (four) hours as needed for headaches    calcium-vitamin D (OSCAL) 250-125 MG-UNIT per tablet Take 1 tablet by mouth daily    cycloSPORINE (RESTASIS) 0 05 % ophthalmic emulsion Apply 1 drop to eye 2 (two) times a day    diazepam (VALIUM) 5 mg tablet Take 1 tablet (5 mg total) by mouth every 8 (eight) hours as needed for anxiety    diphenhydrAMINE (BENADRYL) 25 mg capsule Take by mouth daily at bedtime as needed  Evening Primrose Oil 1000 MG CAPS Take 1 capsule by mouth daily    Flovent  MCG/ACT inhaler INHALE 1 PUFF BY MOUTH TWICE A DAY    fluticasone (FLONASE) 50 mcg/act nasal spray 2 sprays into each nostril daily    loteprednol etabonate (LOTEMAX) 0 5 % ophthalmic suspension Administer 1 drop to both eyes as needed     methocarbamol (ROBAXIN) 750 mg tablet Take 750 mg by mouth every 6 (six) hours as needed for muscle spasms    Multiple Vitamin (MULTIVITAMIN) capsule Take 1 capsule by mouth daily   pantoprazole (PROTONIX) 40 mg tablet Take 1 tablet (40 mg total) by mouth daily Take 1 tablet by mouth 2 times a day 30 minutes prior to breakfast and dinner    rivaroxaban (XARELTO) 10 mg tablet Take 1 tablet (10 mg total) by mouth daily    ZOLMitriptan (ZOMIG) 5 MG tablet Take 1 tablet (5 mg total) by mouth once as needed for migraine for up to 1 dose (Patient taking differently: Take 5 mg by mouth as needed for migraine )    levocetirizine (XYZAL) 5 MG tablet Take 1 tablet (5 mg total) by mouth every evening (Patient not taking: Reported on 3/31/2021)    montelukast (SINGULAIR) 10 mg tablet Take 1 tablet (10 mg total) by mouth daily at bedtime (Patient not taking: Reported on 3/31/2021)     No current facility-administered medications on file prior to visit  She is allergic to naproxen, iv contrast [iodinated diagnostic agents], seasonal ic [cholestatin], and phentermine       Review of Systems   Constitutional: Negative for activity change, appetite change, chills, fatigue and fever  HENT: Negative for rhinorrhea, sneezing and sore throat  Eyes: Negative for visual disturbance  Respiratory: Negative for cough, shortness of breath and wheezing  Cardiovascular: Negative for chest pain, palpitations and leg swelling  Gastrointestinal: Negative for abdominal distention, constipation, diarrhea, nausea and vomiting  Genitourinary: Positive for urgency   Negative for difficulty urinating, frequency, pelvic pain and vaginal discharge  Neurological: Negative for syncope and light-headedness  Objective:      /80   Wt 87 6 kg (193 lb 3 2 oz)   LMP  (LMP Unknown)   BMI 33 16 kg/m²          Physical Exam  Constitutional:       General: She is not in acute distress  Appearance: Normal appearance  She is well-developed  She is not diaphoretic  Chest:      Breasts: Breasts are symmetrical          Right: No inverted nipple, mass, nipple discharge, skin change or tenderness  Left: No inverted nipple, mass, nipple discharge, skin change or tenderness  Abdominal:      Palpations: Abdomen is soft  Abdomen is not rigid  Tenderness: There is no abdominal tenderness  There is no guarding or rebound  Hernia: There is no hernia in the left inguinal area  Genitourinary:     Labia:         Right: No rash, tenderness, lesion or injury  Left: No rash, tenderness, lesion or injury  Vagina: No vaginal discharge or bleeding  Adnexa:         Right: No mass, tenderness or fullness  Left: No mass, tenderness or fullness  Comments: Urethral meatus: no lesions, non tender  Urethra: non tender    Vaginal mucosa atrophic  Skin:     General: Skin is warm and dry  Coloration: Skin is not pale  Findings: No erythema or rash

## 2021-06-23 ENCOUNTER — OFFICE VISIT (OUTPATIENT)
Dept: BARIATRICS | Facility: CLINIC | Age: 66
End: 2021-06-23
Payer: COMMERCIAL

## 2021-06-23 ENCOUNTER — OFFICE VISIT (OUTPATIENT)
Dept: PHYSICAL THERAPY | Age: 66
End: 2021-06-23
Payer: COMMERCIAL

## 2021-06-23 VITALS
SYSTOLIC BLOOD PRESSURE: 118 MMHG | WEIGHT: 192.2 LBS | BODY MASS INDEX: 32.81 KG/M2 | DIASTOLIC BLOOD PRESSURE: 68 MMHG | HEART RATE: 79 BPM | HEIGHT: 64 IN

## 2021-06-23 DIAGNOSIS — E66.9 OBESITY, CLASS I, BMI 30-34.9: Primary | ICD-10-CM

## 2021-06-23 DIAGNOSIS — R63.5 ABNORMAL WEIGHT GAIN: ICD-10-CM

## 2021-06-23 DIAGNOSIS — M54.2 NECK PAIN: Primary | ICD-10-CM

## 2021-06-23 DIAGNOSIS — K21.9 GASTROESOPHAGEAL REFLUX DISEASE WITHOUT ESOPHAGITIS: ICD-10-CM

## 2021-06-23 PROCEDURE — 99214 OFFICE O/P EST MOD 30 MIN: CPT | Performed by: INTERNAL MEDICINE

## 2021-06-23 PROCEDURE — 97110 THERAPEUTIC EXERCISES: CPT | Performed by: PHYSICAL THERAPIST

## 2021-06-23 PROCEDURE — 97140 MANUAL THERAPY 1/> REGIONS: CPT | Performed by: PHYSICAL THERAPIST

## 2021-06-23 NOTE — PROGRESS NOTES
Assessment/Plan:  Flor Woody was seen today for follow-up  Diagnoses and all orders for this visit:    Obesity, Class I, BMI 30-34 9  -     Magnesium; Future  -     ECG 12 lead; Future    Gastroesophageal reflux disease without esophagitis  -     Magnesium; Future  Controlled with Protonix    Abnormal weight gain  -     Magnesium; Future  -     ECG 12 lead; Future     Goals:  EKG, labs ordered  continue partial meal replacement   Pt not interested in VLCD at this point    Follow up in approximately 2 weeks with Non-Surgical Dietician  Subjective:   Chief Complaint   Patient presents with    Follow-up     Patient is here for [facility] follow-up with Dr Rolanda Saleem  Patient ID: Shay Doctor  is a 77 y o  female with excess weight/obesity here to pursue weight management    Patient is pursuing Conservative Program with partial meal replacement      HPI  -Initial weight loss goal of 5-10% weight loss for improved health  Wt Readings from Last 10 Encounters:   06/23/21 87 2 kg (192 lb 3 2 oz)   06/22/21 87 6 kg (193 lb 3 2 oz)   06/07/21 89 2 kg (196 lb 9 6 oz)   04/14/21 88 9 kg (196 lb)   03/31/21 88 9 kg (196 lb)   02/26/21 89 8 kg (198 lb)   12/30/20 88 9 kg (196 lb)   11/25/20 89 4 kg (197 lb)   11/16/20 89 4 kg (197 lb 3 2 oz)   09/15/20 88 5 kg (195 lb)     Restart initial weight: 196 lbs (6/7/21)  Current weight: 192  Change in weight: -4lbs  Goal:   Logs her calories on Noom- 1000 calories   Hx of phentermine use, caused bigeminy/trigeminy  Doing pudding for breakfast, lunch  5 oz cod and salad for dinner  If gets hungry in between, would do a cheese stick or hardboiled egg or protein bar     Drinks-<64 oz water   Alcohol- no  Exercise- PT      The following portions of the patient's history were reviewed and updated as appropriate: allergies, current medications, past family history, past medical history, past social history, past surgical history, and problem list     Review of Systems   Respiratory: Negative  Cardiovascular: Negative  Gastrointestinal: Negative  Musculoskeletal: Negative  Objective:  /68 (BP Location: Left arm, Patient Position: Sitting, Cuff Size: Standard)   Pulse 79   Ht 5' 4" (1 626 m)   Wt 87 2 kg (192 lb 3 2 oz)   LMP  (LMP Unknown)   BMI 32 99 kg/m²   Constitutional: Well-developed, well-nourished and obese Body mass index is 32 99 kg/m²  Bryan Heckler HEENT: No conjunctival pallor or jaundice  Pulmonary: No increased work of breathing or signs of respiratory distress  CV: Normal rate, well-perfused  GI: Obese  Non-distended  MSK: No edema   Neuro: Oriented to person, place and time  Normal Speech  Normal gait  Psych: Normal affect and mood       Labs and Imaging  Reviewed

## 2021-06-23 NOTE — PROGRESS NOTES
Daily Note     Today's date: 2021  Patient name: Karson Merlos  : 1955  MRN: [id number]  Referring provider: Francesco Saldana DO  Dx:   Encounter Diagnosis     ICD-10-CM    1  Neck pain  M54 2                   Subjective: neck is sore from running around      Objective: See treatment diary below      Assessment: Tolerated treatment well  Patient demonstrated fatigue post treatment, exhibited good technique with therapeutic exercises and would benefit from continued PT, tender to upper traps      Plan: Progress treatment as tolerated         Precautions: L TKR, plantar fascitis      Manuals 6/7 6/10 6/14 6/18 6/23 5/5 5/10 5/17 5/24 6/4   cervical 20 20 23 20 25 25 25 25 25 25                                          Neuro Re-Ed                                                                                                        Ther Ex             AROM 10 10 10 10 10 10 10 10 10 10   Posture ed 5 5 5 5         pec stretch 5 5 5 5 5 5 5 5  5   Upper trap 5 5 5 5 5 5 5 5  5   HEP             UBE             Posture row             TB row             Ther Activity                                       Gait Training                                       Modalities             MH/ES trap  10' 10' 10 10 10 10 10 nt 10

## 2021-06-24 ENCOUNTER — OFFICE VISIT (OUTPATIENT)
Dept: FAMILY MEDICINE CLINIC | Facility: CLINIC | Age: 66
End: 2021-06-24
Payer: COMMERCIAL

## 2021-06-24 VITALS
HEART RATE: 83 BPM | BODY MASS INDEX: 32.74 KG/M2 | DIASTOLIC BLOOD PRESSURE: 70 MMHG | OXYGEN SATURATION: 97 % | SYSTOLIC BLOOD PRESSURE: 102 MMHG | HEIGHT: 64 IN | WEIGHT: 191.8 LBS | TEMPERATURE: 98.5 F

## 2021-06-24 DIAGNOSIS — J45.40 MODERATE PERSISTENT ASTHMA WITHOUT COMPLICATION: Primary | ICD-10-CM

## 2021-06-24 DIAGNOSIS — J30.1 SEASONAL ALLERGIC RHINITIS DUE TO POLLEN: ICD-10-CM

## 2021-06-24 DIAGNOSIS — N30.10 CYSTITIS, INTERSTITIAL: ICD-10-CM

## 2021-06-24 DIAGNOSIS — K21.9 GASTROESOPHAGEAL REFLUX DISEASE WITHOUT ESOPHAGITIS: ICD-10-CM

## 2021-06-24 PROCEDURE — 99214 OFFICE O/P EST MOD 30 MIN: CPT | Performed by: FAMILY MEDICINE

## 2021-06-24 NOTE — PROGRESS NOTES
Assessment/Plan:       Problem List Items Addressed This Visit        Digestive    Gastroesophageal reflux disease without esophagitis     Pantoprazole as directed no change            Respiratory    Moderate persistent asthma without complication - Primary     Continue with current medication regimen no inhaler currently no worsening shortness of breath         Seasonal allergic rhinitis due to pollen     Patient is continuing with Allegra and Flonase            Genitourinary    Cystitis, interstitial     Continuing on a special diet seen by Urogynecology improving no change follow-up at next visit                 Subjective:      Patient ID: Miriam Quezada is a 77 y o  female  She has been seen by gynecology as well as Gynecology and recently to bariatric weight management program and has been on a 1000 a 1200 calorie diet per day and already has lost about 5-6 lb and is doing well so far with this she additionally is on a diet for interstitial cystitis  The following portions of the patient's history were reviewed and updated as appropriate: allergies, current medications, past family history, past medical history, past social history, past surgical history and problem list     Review of Systems   Constitutional: Negative for chills, fatigue and fever  HENT: Negative for congestion, nosebleeds, rhinorrhea, sinus pressure and sore throat  Eyes: Negative for discharge and redness  Respiratory: Negative for cough and shortness of breath  Cardiovascular: Negative for chest pain, palpitations and leg swelling  Gastrointestinal: Negative for abdominal pain, blood in stool and nausea  Endocrine: Negative for cold intolerance, heat intolerance and polyuria  Genitourinary: Negative for dysuria and frequency  Musculoskeletal: Positive for arthralgias and back pain  Negative for myalgias  Skin: Negative for rash  Neurological: Negative for dizziness, weakness and headaches  Hematological: Negative for adenopathy  Psychiatric/Behavioral: Negative for behavioral problems and sleep disturbance  The patient is not nervous/anxious  Objective:      /70   Pulse 83   Temp 98 5 °F (36 9 °C)   Ht 5' 4" (1 626 m)   Wt 87 kg (191 lb 12 8 oz)   LMP  (LMP Unknown)   SpO2 97%   BMI 32 92 kg/m²        Physical Exam  Vitals and nursing note reviewed  Constitutional:       General: She is not in acute distress  Appearance: Normal appearance  She is well-developed  She is obese  HENT:      Head: Normocephalic and atraumatic  Right Ear: Tympanic membrane, ear canal and external ear normal       Left Ear: Tympanic membrane, ear canal and external ear normal       Nose: Nose normal       Mouth/Throat:      Mouth: Mucous membranes are moist       Pharynx: Oropharynx is clear  No oropharyngeal exudate  Eyes:      General: No scleral icterus  Right eye: No discharge  Left eye: No discharge  Conjunctiva/sclera: Conjunctivae normal       Pupils: Pupils are equal, round, and reactive to light  Neck:      Thyroid: No thyromegaly  Vascular: No JVD  Cardiovascular:      Rate and Rhythm: Normal rate and regular rhythm  Heart sounds: Normal heart sounds  No murmur heard  Pulmonary:      Effort: Pulmonary effort is normal       Breath sounds: No wheezing or rales  Chest:      Chest wall: No tenderness  Abdominal:      General: Bowel sounds are normal  There is no distension  Palpations: Abdomen is soft  There is no mass  Tenderness: There is no abdominal tenderness  Musculoskeletal:         General: No tenderness or deformity  Normal range of motion  Cervical back: Normal range of motion  Lymphadenopathy:      Cervical: No cervical adenopathy  Skin:     General: Skin is warm and dry  Findings: No rash  Neurological:      General: No focal deficit present        Mental Status: She is alert and oriented to person, place, and time  Cranial Nerves: No cranial nerve deficit  Coordination: Coordination normal       Deep Tendon Reflexes: Reflexes are normal and symmetric  Reflexes normal    Psychiatric:         Mood and Affect: Mood normal          Behavior: Behavior normal          Thought Content: Thought content normal          Judgment: Judgment normal           Data:    Laboratory Results: I have personally reviewed the pertinent laboratory results/reports   Radiology/Other Diagnostic Testing Results: I have personally reviewed pertinent reports         Lab Results   Component Value Date    WBC 6 25 12/01/2020    HGB 13 7 12/01/2020    HCT 43 5 12/01/2020    MCV 86 12/01/2020     12/01/2020     Lab Results   Component Value Date     08/20/2015    K 4 0 12/01/2020     12/01/2020    CO2 30 12/01/2020    ANIONGAP 8 6 08/20/2015    BUN 18 12/01/2020    CREATININE 0 86 12/01/2020    GLUCOSE 88 08/20/2015    GLUF 86 12/01/2020    CALCIUM 9 1 12/01/2020    AST 24 12/01/2020    ALT 47 12/01/2020    ALKPHOS 127 (H) 12/01/2020    PROT 7 1 08/20/2015    BILITOT 0 3 08/20/2015    EGFR 70 12/01/2020     Lab Results   Component Value Date    CHOLESTEROL 196 12/01/2020    CHOLESTEROL 193 08/03/2019    CHOLESTEROL 203 (H) 08/22/2018     Lab Results   Component Value Date    HDL 47 12/01/2020    HDL 42 08/03/2019    HDL 43 08/22/2018     Lab Results   Component Value Date    LDLCALC 104 (H) 12/01/2020    LDLCALC 116 (H) 08/03/2019    LDLCALC 90 08/22/2018     Lab Results   Component Value Date    TRIG 226 (H) 12/01/2020    TRIG 177 (H) 08/03/2019    TRIG 350 (H) 08/22/2018     No results found for: [place], [place]  Lab Results   Component Value Date    WYN6NZPHLXVO 2 170 12/01/2020     Lab Results   Component Value Date    HGBA1C 5 4 12/01/2020     No results found for: MATT Brito, DO

## 2021-06-24 NOTE — PATIENT INSTRUCTIONS
Core Strengthening Exercises   WHAT YOU NEED TO KNOW:   Your core includes the muscles of your lower back, hip, pelvis, and abdomen  Core strengthening exercises help heal and strengthen these muscles  This helps prevent another injury, and keeps your pelvis, spine, and hips in the correct position  DISCHARGE INSTRUCTIONS:   Contact your healthcare provider if:   · You have sharp or worsening pain during exercise or at rest     · You have questions or concerns about your shoulder exercises  Safety tips:  Talk to your healthcare provider before you start an exercise program  A physical therapist can teach you how to do core strengthening exercises safely  · Do the exercises on a mat or firm surface  A firm surface will support your spine and prevent low back pain  Do not do these exercises on a bed  · Move slowly and smoothly  Avoid fast or jerky motions  · Stop if you feel pain  Core exercises should not be painful  Stop if you feel pain  · Breathe normally during core exercises  Do not hold your breath  This may cause an increase in blood pressure and prevent muscle strengthening  Your healthcare provider will tell you when to inhale and exhale during the exercise  · Begin all of your exercises with abdominal bracing  Abdominal bracing helps warm up your core muscles  You can also practice abdominal bracing throughout the day  Lie on your back with your knees bent and feet flat on the floor  Place your arms in a relaxed position beside your body  Tighten your abdominal muscles  Pull your belly button in and up toward your spine  Hold for 5 seconds  Relax your muscles  Repeat 10 times  Core strengthening exercises: Your healthcare provider will tell you how often to do these exercises  The provider will also tell you how many repetitions of each exercise you should do  Hold each exercise for 5 seconds or as directed  As you get stronger, increase your hold to 10 to 15 seconds   You can do some of these exercises on a stability ball, or with a weight  Ask your healthcare provider how to use a stability ball or weight for these exercises:  · Bridging:  Lie on your back with your knees bent and feet flat on the floor  Rest your arms at your side  Tighten your buttocks, and then lift your hips 1 inch off the floor  Hold for 5 seconds  When you can do this exercise without pain for 10 seconds, increase the distance you lift your hips  A good goal is to be able to lift your hips so that your shoulders, hips, and knees are in a straight line  · Dead bug:  Lie on your back with your knees bent and feet flat on the floor  Place your arms in a relaxed position beside your body  Begin with abdominal bracing  Next, raise one leg, keeping your knee bent  Hold for 5 seconds  Repeat with the other leg  When you can do this exercise without pain for 10 to 15 seconds, you may raise one straight leg and hold  Repeat with the other leg  · Quadruped:  Place your hands and knees on the floor  Keep your wrists directly below your shoulders and your knees directly below your hips  Pull your belly button in toward your spine  Do not flatten or arch your back  Tighten your abdominal muscles below your belly button  Hold for 5 seconds  When you can do this exercise without pain for 10 to 15 seconds, you may extend one arm and hold  Repeat on the other side  · Side bridge exercises:      ? Standing side bridge:  Stand next to a wall and extend one arm toward the wall  Place your palm flat on the wall with your fingers pointing upward  Begin with abdominal bracing  Next, without moving your feet, slowly bend your arm to 90 degrees  Hold for 5 seconds  Repeat on the other side  When you can do this exercise without pain for 10 to 15 seconds, you may do the bent leg side bridge on the floor  ? Bent leg side bridge:  Lie on one side with your legs, hips, and shoulders in a straight line   Prop yourself up onto your forearm so your elbow is directly below your shoulder  Bend your knees back to 90 degrees  Begin with abdominal bracing  Next, lift your hips and balance yourself on your forearm and knees  Hold for 5 seconds  Repeat on the other side  When you can do this exercise without pain for 10 to 15 seconds, you may do the straight leg side bridge on the floor  ? Straight leg side bridge:  Lie on one side with your legs, hips, and shoulders in a straight line  Prop yourself up onto your forearm so your elbow is directly below your shoulder  Begin with abdominal bracing  Lift your hips off the floor and balance yourself on your forearm and the outside of your flexed foot  Do not let your ankle bend sideways  Hold for 5 seconds  Repeat on the other side  When you can do this exercise without pain for 10 to 15 seconds, ask your healthcare provider for more advanced exercises  · Superman:  Lie on your stomach  Extend your arms forward on the floor  Tighten your abdominal muscles and lift your right hand and left leg off the floor  Hold this position  Slowly return to the starting position  Tighten your abdominal muscles and lift your left hand and right leg off the floor  Hold this position  Slowly return to the starting position  · Clam:  Lie on your side with your knees bent  Put your bottom arm under your head to keep your neck in line  Put your top hand on your hip to keep your pelvis from moving  Put your heels together, and keep them together during this exercise  Slowly raise your top knee toward the ceiling  Then lower your leg so your knees are together  Repeat this exercise 10 times  Then switch sides and do the exercise 10 times with the other leg  · Curl up:  Lie on your back with your knees bent and feet flat on the floor  Place your hands, palms down, underneath your lower back   Next, with your elbows on the floor, lift your shoulders and chest 2 to 3 inches off the floor  Keep your head in line with your shoulders  Hold this position  Slowly return to the starting position  · Straight leg raises:  Lie on your back with one leg straight  Bend the other knee and place your foot flat on the floor  Tighten your abdominal muscles  Keep your leg straight and slowly lift it straight up 6 to 12 inches off the floor  Hold this position  Lower your leg slowly  Do as many repetitions as directed on this side  Repeat with the other leg  · Plank:  Lie on your stomach  Bend your elbows and place your forearms flat on the floor  Lift your chest, stomach, and knees off the floor  Make sure your elbows are below your shoulders  Your body should be in a straight line  Do not let your hips or lower back sink to the ground  Squeeze your abdominal muscles together and hold for 15 seconds  To make this exercise harder, hold for 30 seconds or lift 1 leg at a time  · Bicycles:  Lie on your back  Bend both knees and bring them toward your chest  Your calves should be parallel to the floor  Place the palms of your hands on the back of your head  Straighten your right leg and keep it lifted 2 inches off the floor  Raise your head and shoulders off the floor and twist towards your left  Keep your head and shoulders lifted  Bend your right knee while you straighten your left leg  Keep your left leg 2 inches off the floor  Twist your head and chest towards the left leg  Continue to straighten 1 leg at a time and twist        Follow up with your healthcare provider as directed:  Write down your questions so you remember to ask them during your visits  © Copyright [address] Information is for End User's use only and may not be sold, redistributed or otherwise used for commercial purposes  All illustrations and images included in CareNotes® are the copyrighted property of [employer]  or [address] [place], [place] [address]aundrea Caldwell   The above information is an  only   It is not intended as medical advice for individual conditions or treatments  Talk to your doctor, nurse or pharmacist before following any medical regimen to see if it is safe and effective for you

## 2021-06-25 ENCOUNTER — APPOINTMENT (OUTPATIENT)
Dept: PHYSICAL THERAPY | Age: 66
End: 2021-06-25
Payer: COMMERCIAL

## 2021-06-25 DIAGNOSIS — M54.50 CHRONIC LOW BACK PAIN: ICD-10-CM

## 2021-06-25 DIAGNOSIS — G89.29 CHRONIC LOW BACK PAIN: ICD-10-CM

## 2021-06-25 DIAGNOSIS — M62.838 MUSCLE SPASM: ICD-10-CM

## 2021-06-25 RX ORDER — DIAZEPAM 5 MG/1
5 TABLET ORAL EVERY 8 HOURS PRN
Qty: 30 TABLET | Refills: 0 | Status: SHIPPED | OUTPATIENT
Start: 2021-06-25 | End: 2022-01-03

## 2021-06-25 NOTE — TELEPHONE ENCOUNTER
05/29/2021  1  04/17/2021  EPETGO-SRXHWXVQ-OIDT -40  90 0  15  ST STR  [id number]  [facility] (5300)  1   [place]  [place]    04/19/2021  1  04/17/2021  WQDDDP-PDUWRDMY-ZWRK -40  90 0  15  ST STR  [id number]  [facility] (5300)  0   [place] Ins  [place]    03/31/2021  1  03/31/2021  ALPRAZOLAM 0 25 MG TABLET  30 0  15  TH SCOTTY  [id number]  [facility] (5300)  0   [place] Ins  [place]    02/20/2021  1  10/15/2020  ZHEJDH-TAPTAVKY-YSGM -40  90 0  30  ST STR  [id number]  [facility] (5300)  3   [place] Ins  [place]    01/04/2021  1  10/15/2020  YKHCXD-FTVYURQF-ISBC -40  90 0  30  ST STR  [id number]  [facility] (5300)  2   Comm Ins  [place]    12/30/2020  1  12/30/2020  ALPRAZOLAM 0 25 MG TABLET  30 0  15  TH SCOTTY  [id number]  [facility] (5300)  0   [place] Ins  [place]    11/25/2020  1  11/25/2020  DIAZEPAM 5 MG TABLET  30 0  10  TH SCOTTY  [id number]  [facility] (5300)  0   [place] Ins  [place]

## 2021-06-28 ENCOUNTER — OFFICE VISIT (OUTPATIENT)
Dept: BARIATRICS | Facility: CLINIC | Age: 66
End: 2021-06-28

## 2021-06-28 VITALS — WEIGHT: 192.2 LBS | BODY MASS INDEX: 32.81 KG/M2 | HEIGHT: 64 IN

## 2021-06-28 DIAGNOSIS — R63.5 ABNORMAL WEIGHT GAIN: ICD-10-CM

## 2021-06-28 PROCEDURE — DB3PK

## 2021-06-28 PROCEDURE — RECHECK

## 2021-06-28 NOTE — PROGRESS NOTES
Weight Management Medical Nutrition Assessment   Ayesha was here for meal planning  Today's weight 192 2 lbs giving her a loss of 4 2 lbs since her last visit 2 weeks ago  She has been following a partial meal plan - 2 meal replacements, 1 bar, a low carb snack and a low carb meal   She plans to start going to the gym and is trying to walk more  Discussed the importance to food logging I recommend that she start doing that again  She admits that she is not drinking enough water - reviewed her hydration goal       Anthropometric Measurements  Start Weight (lbs): 194 6, lbs  Current Weight (lbs): 192 2 lbs  TBW % Change from start weight:  1%  Ideal Body Weight (lbs):117 5 lbs  Goal Weight (lbs):160 lbs     Weight Loss History  Previous weight loss attempts: Counseling with  MD  Self Created Diets (Portion Control, Healthy Food Choices, etc )  [facility]      Food and Nutrition Related History     Food Recall  Breakfast: coffee, shake           Snack:pear   Lunch: shake  Snack: string cheese if needed  Dinner: meat, vegetables  Snack: bar if needed        Beverages: water  Volume of beverage intake: 60 to 80 oz     Weekends: Same  Cravings: none reported  Trouble area of day:none reported     Frequency of Eating out: irregularly  Food restrictions:none reported  Cooking: self   Food Shopping: self     Physical Activity Intake  Activity:Swimming   Frequency: plans to start exercising  Physical limitations/barriers to exercise: none reported     Estimated Needs  Energy  B[employer] Energy Needs: BMR : 1389  calories  1# loss weekly sedentary:  1166    calories  1-2# loss weekly lightly active: 909 -1409 calories  Protein:64-80gm      (1 2-1 5g/kg IBW)  Fluid: 62oz     (35mL/kg IBW)     Nutrition Diagnosis  Yes;    Overweight/obesity  related to Excess energy intake as evidenced by  BMI more than normative standard for age and sex (obesity-grade I 30-34  9)     Nutrition Intervention     Nutrition Prescription  Calories: 1000 calories   Protein: 76 gm   Fluid:62oz     Meal Plan  2 meal replacements, a bar, a food snack (string cheese or hard boiled egg) and a lean protein with non-starchy vegetables       Nutrition Education:    Calorie controlled menu  Lean protein food choices  Healthy snack options  Food journaling tips        Nutrition Counseling:  Strategies: meal planning, portion sizes, healthy snack choices, hydration, fiber intake, protein intake, exercise, food journal        Monitoring and Evaluation:  Evaluation criteria:  Energy Intake  Meet protein needs  Maintain adequate hydration  Monitor weekly weight  Meal planning/preparation  Food journal   Decreased portions at mealtimes and snacks  Physical activity      Barriers to learning:none  Readiness to change: Action  Comprehension: good  Expected Compliance: fair

## 2021-07-01 NOTE — PROGRESS NOTES
Daily Note     Today's date: 2021  Patient name: Juancarlos Andrade  : 1955  MRN: [id number]  Referring provider: Nellie Valderrama DO  Dx:   Encounter Diagnosis     ICD-10-CM    1  Neck pain  M54 2        Start Time: 730  Stop Time: 815  Total time in clinic (min): 45 minutes    Subjective: Patient notes high stress levels over the last week  Correlates stress with increased neck pain and headache frequency  Mild HA upon arrival and during treatment that eased post treatment  Objective: See treatment diary below      Assessment: Tolerated treatment well  Patient would benefit from continued PTResponds well to manual therapy with decreased HA post  Discussed resuming community exercise class - advised it was be beneficial  Moderate left SO tightness  Functional AROM cervical post treatment  Plan: Continue per plan of care        Precautions: L TKR, plantar fascitis      Manuals 6/7 6/10 6/14 6/18 6/23 7/2 5/10 5/17 5/24 6/4   cervical 20 20 23 20 25 25 25 25 25 25                                          Neuro Re-Ed                                                                                                        Ther Ex             AROM 10 10 10 10 10 10 10 10 10 10   Posture ed 5 5 5 5         pec stretch 5 5 5 5 5 5 5 5  5   Upper trap 5 5 5 5 5 5 5 5  5   HEP             UBE             Posture row             TB row             Ther Activity                                       Gait Training                                       Modalities             MH/ES trap  10' 10' 10 10 nt 10 10 nt 10

## 2021-07-02 ENCOUNTER — OFFICE VISIT (OUTPATIENT)
Dept: PHYSICAL THERAPY | Age: 66
End: 2021-07-02
Payer: COMMERCIAL

## 2021-07-02 DIAGNOSIS — M54.2 NECK PAIN: Primary | ICD-10-CM

## 2021-07-02 PROCEDURE — 97140 MANUAL THERAPY 1/> REGIONS: CPT | Performed by: PHYSICAL THERAPIST

## 2021-07-02 PROCEDURE — 97110 THERAPEUTIC EXERCISES: CPT | Performed by: PHYSICAL THERAPIST

## 2021-07-06 NOTE — PROGRESS NOTES
Daily Note     Today's date: 2021  Patient name: Heber Manning  : 1955  MRN: [id number]  Referring provider: Kenn Adhikari DO  Dx:   Encounter Diagnosis     ICD-10-CM    1  Neck pain  M54 2        Start Time: 715  Stop Time:   Total time in clinic (min): 40 minutes    Subjective:  Patient notes reduced pain and less tightness in upper traps today      Objective: See treatment diary below      Assessment: Tolerated treatment well  Patient with functional AROM cervical and decreased tenderness and muscle spasms  Improved upright posture with improved postural awareness  Plan: Continue per plan of care  Discharge to self care/HEP next visit       Precautions: L TKR, plantar fascitis      Manuals 6/7 6/10 6/14 6/18 6/23 7/2 7/7 5/17 5/24 6/4   cervical 20 20 23 20 25 25 25 25 25 25                                          Neuro Re-Ed                                                                                                        Ther Ex             AROM 10 10 10 10 10 10 10 10 10 10   Posture ed 5 5 5 5         pec stretch 5 5 5 5 5 5 5 5  5   Upper trap 5 5 5 5 5 5 5 5  5   HEP             UBE             Posture row             TB row             Ther Activity                                       Gait Training                                       Modalities             MH/ES trap  10' 10' 10 10 nt nt 10 nt 10

## 2021-07-07 ENCOUNTER — OFFICE VISIT (OUTPATIENT)
Dept: PHYSICAL THERAPY | Age: 66
End: 2021-07-07
Payer: COMMERCIAL

## 2021-07-07 DIAGNOSIS — M54.2 NECK PAIN: Primary | ICD-10-CM

## 2021-07-07 PROCEDURE — 97110 THERAPEUTIC EXERCISES: CPT | Performed by: PHYSICAL THERAPIST

## 2021-07-07 PROCEDURE — 97140 MANUAL THERAPY 1/> REGIONS: CPT | Performed by: PHYSICAL THERAPIST

## 2021-07-09 ENCOUNTER — OFFICE VISIT (OUTPATIENT)
Dept: PHYSICAL THERAPY | Age: 66
End: 2021-07-09
Payer: COMMERCIAL

## 2021-07-09 DIAGNOSIS — M54.2 NECK PAIN: Primary | ICD-10-CM

## 2021-07-09 PROCEDURE — 97140 MANUAL THERAPY 1/> REGIONS: CPT | Performed by: PHYSICAL THERAPIST

## 2021-07-09 PROCEDURE — 97110 THERAPEUTIC EXERCISES: CPT | Performed by: PHYSICAL THERAPIST

## 2021-07-09 NOTE — PROGRESS NOTES
Daily Note     Today's date: 2021  Patient name: Lenny Poole  : 1955  MRN: [id number]  Referring provider: Fidencio Thibodeaux DO  Dx:   Encounter Diagnosis     ICD-10-CM    1  Neck pain  M54 2        Start Time: 715  Stop Time: 0800  Total time in clinic (min): 45 minutes    Subjective: Patient notes variable days of tightness in cervical musculature  Depending on activity and stress levels Continues with headaches that also vary with the same  Notes she will start pubic silver sneakers class this week and conitnues to use her pool for exercise  Objective: See treatment diary below      Assessment: Tolerated treatment well  At this time, patient has achieved their maximum functional benefit from skilled physical therapy services and will be discharged to their HEP  Patient is in agreement with the plan of care  As a result, patient is discharged from physical therapy      Plan: Discharge PT to self care     Precautions: L TKR, plantar fascitis      Manuals 6/7 6/10 6/14 6/18 6/23 7 6   cervical 20 20 23 20 25 25 25 25 25 25                                          Neuro Re-Ed                                                                                                        Ther Ex             AROM 10 10 10 10 10 10 10 10 10 10   Posture ed 5 5 5 5         pec stretch 5 5 5 5 5 5 5 5  5   Upper trap 5 5 5 5 5 5 5 5  5   HEP             UBE             Posture row             TB row             Ther Activity                                       Gait Training                                       Modalities             MH/ES trap  10' 10' 10 10 nt nt 10 nt 10

## 2021-07-19 ENCOUNTER — OFFICE VISIT (OUTPATIENT)
Dept: BARIATRICS | Facility: CLINIC | Age: 66
End: 2021-07-19

## 2021-07-19 VITALS — WEIGHT: 191 LBS | HEIGHT: 64 IN | BODY MASS INDEX: 32.61 KG/M2

## 2021-07-19 DIAGNOSIS — R63.5 ABNORMAL WEIGHT GAIN: Primary | ICD-10-CM

## 2021-07-19 PROCEDURE — RECHECK

## 2021-07-19 NOTE — PROGRESS NOTES
Weight Management Medical Nutrition Assessment   Ayesha was here for 2/3 RD visits  Today's weight 191 0 lbs giving her a loss of 1 2 lbs since her last visit 2 weeks ago  She continues to follow a partial meal plan - 2 meal replacements, 1 bar, a low carb snack and a low carb meal   She has started going to the gym and is trying to walk more  She is still not food logging consistency  Discussed the importance to food logging and  I recommend that she start doing that again  She admits that she is not drinking enough water - reviewed her hydration goal  She needs to increase her water  Anthropometric Measurements  Start Weight (lbs): 194 6, lbs  Current Weight (lbs): 191 0 lbs  TBW % Change from start weight:  2%  Ideal Body Weight (lbs):117 5 lbs  Goal Weight (lbs):160 lbs     Weight Loss History  Previous weight loss attempts: Counseling with  MD  Self Created Diets (Portion Control, Healthy Food Choices, etc )  CD      Food and Nutrition Related History     Food Recall  Breakfast: coffee, shake           Snack:fruit  Lunch: shake  Snack: string cheese or HB egg if needed  Dinner: meat, vegetables  Snack: bar if needed        Beverages: water  Volume of beverage intake: 60 to 80 oz     Weekends: Same  Cravings: none reported  Trouble area of day:none reported     Frequency of Eating out: irregularly  Food restrictions:none reported  Cooking: self   Food Shopping: self     Physical Activity Intake  Activity:Swimming and going to gym  Frequency: plans to start exercising  Physical limitations/barriers to exercise: none reported     Estimated Needs  Energy  Rg 1898 Energy Needs:  BMR : 1386  calories  1# loss weekly sedentary:  1164    calories  1-2# loss weekly lightly active: 909 -1409 calories  Protein:64-80gm      (1 2-1 5g/kg IBW)  Fluid: 62oz     (35mL/kg IBW)     Nutrition Diagnosis  Yes;    Overweight/obesity  related to Excess energy intake as evidenced by  BMI more than normative standard for age and sex (obesity-grade I 26-30  9)     Nutrition Intervention     Nutrition Prescription  Calories: 1000 calories   Protein: 75 gm   Fluid:62oz     Meal Plan  2 meal replacements, a bar, a food snack (string cheese or hard boiled egg) and a lean protein with non-starchy vegetables       Nutrition Education:    Calorie controlled menu  Lean protein food choices  Healthy snack options  Food journaling tips        Nutrition Counseling:  Strategies: meal planning, portion sizes, healthy snack choices, hydration, fiber intake, protein intake, exercise, food journal        Monitoring and Evaluation:  Evaluation criteria:  Energy Intake  Meet protein needs  Maintain adequate hydration  Monitor weekly weight  Meal planning/preparation  Food journal   Decreased portions at mealtimes and snacks  Physical activity      Barriers to learning:none  Readiness to change: Action  Comprehension: good  Expected Compliance: fair

## 2021-07-29 ENCOUNTER — TELEPHONE (OUTPATIENT)
Dept: BARIATRICS | Facility: CLINIC | Age: 66
End: 2021-07-29

## 2021-07-29 NOTE — TELEPHONE ENCOUNTER
Patient called the office and questions about ordering product and asked Sebastianchuy Rodriguez (MR) to have me call her back  She had questions about when she can order product and when she can pick it up  Questions asked and answered

## 2021-08-11 ENCOUNTER — TELEPHONE (OUTPATIENT)
Dept: BARIATRICS | Facility: CLINIC | Age: 66
End: 2021-08-11

## 2021-08-11 NOTE — TELEPHONE ENCOUNTER
Called Thornell Klinefelter to remind her that her RD visit is at the [facility] office tomorrow, and to get her product order  I will be taking it with me to the other office

## 2021-08-12 ENCOUNTER — OFFICE VISIT (OUTPATIENT)
Dept: BARIATRICS | Facility: CLINIC | Age: 66
End: 2021-08-12

## 2021-08-12 DIAGNOSIS — R63.5 ABNORMAL WEIGHT GAIN: Primary | ICD-10-CM

## 2021-08-12 PROCEDURE — RECHECK

## 2021-08-12 NOTE — PROGRESS NOTES
Weight Management Medical Nutrition Assessment   Ayesha was here for 3/3 RD visits  Today's weight 191 0 lbs which is exactly the same as she was before meaning that she has maintained her weight  She continues to follow a partial meal plan most days, but is not food logging  Discussed the importance of accurate food logging  She reports that she has been walking most days since she recently joined the [employer] program, and swims "a few times a week "    Anthropometric Measurements  Start Weight (lbs): 194 6, lbs  Current Weight (lbs): 191 0 lbs  TBW % Change from start weight:  2%  Ideal Body Weight (lbs):117 5 lbs  Goal Weight (lbs):160 lbs     Weight Loss History  Previous weight loss attempts: Counseling with  MD  Self Created Diets (Portion Control, Healthy Food Choices, etc )  [facility]      Food and Nutrition Related History     Food Recall  Breakfast: coffee, shake          Snack:string cheese if needed  Lunch: shake  Snack: string cheese or HB egg if needed  Dinner: meat, vegetables  Snack: bar if needed        Beverages: water  Volume of beverage intake: 60 to 80 oz     Weekends: Same  Cravings: none reported  Trouble area of day:none reported     Frequency of Eating out: irregularly  Food restrictions:none reported  Cooking: self   Food Shopping: self     Physical Activity Intake  Activity:Swimming and going to gym  Frequency: plans to start exercising  Physical limitations/barriers to exercise: none reported     Estimated Needs  Energy  Rg 1898 Energy Needs: BMR : 1386  calories  1# loss weekly sedentary:  1164    calories  1-2# loss weekly lightly active: 909 -1409 calories  Protein:64-80gm      (1 2-1 5g/kg IBW)  Fluid: 62oz     (35mL/kg IBW)     Nutrition Diagnosis  Yes;    Overweight/obesity  related to Excess energy intake as evidenced by  BMI more than normative standard for age and sex (obesity-grade I 30-34  9)     Nutrition Intervention     Nutrition Prescription  Calories: 1000 calories   Protein: 75 gm   Fluid:62oz     Meal Plan  2 meal replacements, a bar, a food snack (string cheese or hard boiled egg) and a lean protein with non-starchy vegetables       Nutrition Education:    Calorie controlled menu  Lean protein food choices  Healthy snack options  Food journaling tips        Nutrition Counseling:  Strategies: meal planning, portion sizes, healthy snack choices, hydration, fiber intake, protein intake, exercise, food journal        Monitoring and Evaluation:  Evaluation criteria:  Energy Intake  Meet protein needs  Maintain adequate hydration  Monitor weekly weight  Meal planning/preparation  Food journal   Decreased portions at mealtimes and snacks  Physical activity      Barriers to learning:none  Readiness to change: Action  Comprehension: good  Expected Compliance: fair

## 2021-08-23 ENCOUNTER — APPOINTMENT (OUTPATIENT)
Dept: LAB | Facility: CLINIC | Age: 66
End: 2021-08-23

## 2021-08-23 ENCOUNTER — APPOINTMENT (OUTPATIENT)
Dept: LAB | Facility: CLINIC | Age: 66
End: 2021-08-23
Payer: COMMERCIAL

## 2021-08-23 DIAGNOSIS — R63.5 ABNORMAL WEIGHT GAIN: ICD-10-CM

## 2021-08-23 DIAGNOSIS — Z00.8 HEALTH EXAMINATION IN POPULATION SURVEY: ICD-10-CM

## 2021-08-23 DIAGNOSIS — F41.9 ANXIETY: ICD-10-CM

## 2021-08-23 DIAGNOSIS — K21.9 GASTROESOPHAGEAL REFLUX DISEASE WITHOUT ESOPHAGITIS: ICD-10-CM

## 2021-08-23 DIAGNOSIS — J45.20 MILD INTERMITTENT ASTHMA, UNSPECIFIED WHETHER COMPLICATED: ICD-10-CM

## 2021-08-23 DIAGNOSIS — I82.441 ACUTE DEEP VEIN THROMBOSIS (DVT) OF TIBIAL VEIN OF RIGHT LOWER EXTREMITY (HCC): ICD-10-CM

## 2021-08-23 DIAGNOSIS — G43.009 MIGRAINE WITHOUT AURA AND WITHOUT STATUS MIGRAINOSUS, NOT INTRACTABLE: ICD-10-CM

## 2021-08-23 DIAGNOSIS — E66.9 OBESITY, CLASS I, BMI 30-34.9: ICD-10-CM

## 2021-08-23 DIAGNOSIS — J45.909 ALLERGIC BRONCHITIS: ICD-10-CM

## 2021-08-23 DIAGNOSIS — R73.03 PREDIABETES: ICD-10-CM

## 2021-08-23 LAB
CHOLEST SERPL-MCNC: 230 MG/DL (ref 50–200)
EST. AVERAGE GLUCOSE BLD GHB EST-MCNC: 108 MG/DL
HBA1C MFR BLD: 5.4 %
HDLC SERPL-MCNC: 49 MG/DL
LDLC SERPL CALC-MCNC: 129 MG/DL (ref 0–100)
MAGNESIUM SERPL-MCNC: 2.6 MG/DL (ref 1.6–2.6)
NONHDLC SERPL-MCNC: 181 MG/DL
TRIGL SERPL-MCNC: 259 MG/DL
TSH SERPL DL<=0.05 MIU/L-ACNC: 2.33 UIU/ML (ref 0.36–3.74)

## 2021-08-23 PROCEDURE — 83036 HEMOGLOBIN GLYCOSYLATED A1C: CPT

## 2021-08-23 PROCEDURE — 36415 COLL VENOUS BLD VENIPUNCTURE: CPT

## 2021-08-23 PROCEDURE — 83735 ASSAY OF MAGNESIUM: CPT

## 2021-08-23 PROCEDURE — 84443 ASSAY THYROID STIM HORMONE: CPT

## 2021-08-23 PROCEDURE — 80061 LIPID PANEL: CPT

## 2021-08-23 RX ORDER — MONTELUKAST SODIUM 10 MG/1
TABLET ORAL
Qty: 30 TABLET | Refills: 2 | Status: SHIPPED | OUTPATIENT
Start: 2021-08-23

## 2021-08-27 ENCOUNTER — APPOINTMENT (OUTPATIENT)
Dept: RADIOLOGY | Facility: MEDICAL CENTER | Age: 66
End: 2021-08-27
Payer: COMMERCIAL

## 2021-08-27 ENCOUNTER — OFFICE VISIT (OUTPATIENT)
Dept: OBGYN CLINIC | Facility: MEDICAL CENTER | Age: 66
End: 2021-08-27
Payer: COMMERCIAL

## 2021-08-27 ENCOUNTER — TELEPHONE (OUTPATIENT)
Dept: OBGYN CLINIC | Facility: HOSPITAL | Age: 66
End: 2021-08-27

## 2021-08-27 VITALS
HEART RATE: 71 BPM | BODY MASS INDEX: 32.61 KG/M2 | WEIGHT: 191 LBS | SYSTOLIC BLOOD PRESSURE: 113 MMHG | DIASTOLIC BLOOD PRESSURE: 79 MMHG | HEIGHT: 64 IN

## 2021-08-27 DIAGNOSIS — M72.2 PLANTAR FASCIITIS, BILATERAL: ICD-10-CM

## 2021-08-27 DIAGNOSIS — M70.50 PES ANSERINE BURSITIS: ICD-10-CM

## 2021-08-27 DIAGNOSIS — G89.29 CHRONIC PAIN OF RIGHT KNEE: Primary | ICD-10-CM

## 2021-08-27 DIAGNOSIS — G89.29 CHRONIC PAIN OF RIGHT KNEE: ICD-10-CM

## 2021-08-27 DIAGNOSIS — M25.561 CHRONIC PAIN OF RIGHT KNEE: ICD-10-CM

## 2021-08-27 DIAGNOSIS — M25.561 CHRONIC PAIN OF RIGHT KNEE: Primary | ICD-10-CM

## 2021-08-27 DIAGNOSIS — M17.11 PRIMARY OSTEOARTHRITIS OF RIGHT KNEE: ICD-10-CM

## 2021-08-27 PROCEDURE — 99214 OFFICE O/P EST MOD 30 MIN: CPT | Performed by: ORTHOPAEDIC SURGERY

## 2021-08-27 PROCEDURE — 73562 X-RAY EXAM OF KNEE 3: CPT

## 2021-08-27 NOTE — TELEPHONE ENCOUNTER
Patient is calling stating that she was told by Dr Petrona Chavez to come in today for her rt knee pain  I called [facility] & they said she could stop in anytime before 11am or between 1pm to 3:30pm  I asked if I needed someone to force this on & they said that they would when she arrived  While we talked she got a message from Dr Petrona Chavez that she should be there at 3pm  Patient will be there then

## 2021-08-27 NOTE — PROGRESS NOTES
Assessment  Problem List Items Addressed This Visit        Musculoskeletal and Integument    Primary osteoarthritis of right knee       Other    Chronic pain of right knee - Primary    Relevant Orders    XR knee 3 vw right non injury      Other Visit Diagnoses     Plantar fasciitis, bilateral        Pes anserine bursitis              Discussion and Plan:     Chronic right knee pain secondary to osteoarthritis and now with pes anserine bursitis  Given her family doctor recommendations to hold Xarelto was 3 days prior to receiving steroid injections to her knee, she will be seen in clinic on Tuesday to undergo knee injections at that time  Additionally a prescription to orthotics was provided for replacement of her old current ones that has provided her significant relief for bilateral plantar fasciitis  Subjective:   Patient ID: Susan Cardona is a 77 y o  female      Presents for follow-up on right knee pain secondary to known osteoarthritis and recommendations for her bilateral plantar fasciitis  She was seen in 2018 for her right knee pain and was provided a corticosteroid injection at that time which provided significant relief of her symptoms and increase her functional mobility  Given her pain really see was able to slowly progress with her ambulatory function and stopped requiring a walker for assistance  Her as she has been more in her feet recently her right knee has been bothering her along the medial aspect  It is intermittent in nature and associated with activity and rest   Additionally she has no bilateral plantar fasciitis that was diagnosed in managed conservatively a 10 fusion go  She was provided with bilateral orthotics which provided significant relief shortly after being diagnosed  These orthotics are old and she would like a referral to get them replaced  Additionally she has had 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 left lower extremity for which he takes Xarelto currently for    Per her primary care doctor, she should hold Xarelto 3 days prior to undergoing corticosteroid injections  She last took her Xarelto yesterday but did not take it today  The following portions of the patient's history were reviewed and updated as appropriate: allergies, current medications, past family history, past medical history, past social history, past surgical history and problem list     Review of Systems   Constitutional: Negative for fever  HENT: Negative for congestion  Eyes: Negative for photophobia  Respiratory: Negative for shortness of breath  Cardiovascular: Negative for chest pain  Gastrointestinal: Negative for nausea and vomiting  Musculoskeletal: Positive for arthralgias  Skin: Negative for rash  Neurological: Negative for headaches  Psychiatric/Behavioral: Negative for behavioral problems  Objective:  /79 (BP Location: Left arm, Patient Position: Sitting, Cuff Size: Standard)   Pulse 71   Ht 5' 4" (1 626 m)   Wt 86 6 kg (191 lb)   LMP  (LMP Unknown)   BMI 32 79 kg/m²       Right Ankle Exam     Comments:  Bilateral feet:  Skin intact, mild diffuse edema to level ankle  Tenderness to palpation to bilateral plantar aspects of feet   Motor intact ankle plantar/dorsiflexion, EHL/FHL   Sensation intact  Calf is soft and supple   Toes are warm well perfused      Right Knee Exam     Tenderness   The patient is experiencing tenderness in the medial joint line and pes anserinus  Range of Motion   Extension: normal   Flexion: normal     Tests   Brian:  Medial - negative   Varus: negative Valgus: negative    Other   Erythema: absent  Scars: absent  Sensation: normal  Pulse: present  Swelling: none  Effusion: no effusion present            Physical Exam  Vitals reviewed  HENT:      Head: Normocephalic  Right Ear: External ear normal       Left Ear: External ear normal       Nose: Nose normal       Mouth/Throat:      Pharynx: Oropharynx is clear     Eyes:      Pupils: Pupils are equal, round, and reactive to light  Cardiovascular:      Rate and Rhythm: Normal rate  Pulses: Normal pulses  Pulmonary:      Effort: Pulmonary effort is normal    Abdominal:      Palpations: Abdomen is soft  Musculoskeletal:      Cervical back: Normal range of motion  Right knee: No effusion  Instability Tests: Medial Brian test negative  Comments: Please see ortho exam    Skin:     Capillary Refill: Capillary refill takes less than 2 seconds  Neurological:      Mental Status: She is alert  Mental status is at baseline  Psychiatric:         Mood and Affect: Mood normal          I have personally reviewed pertinent films in PACS and my interpretation is as follows      Standing AP, lateral, and sunrise x-rays of the right knee asymmetrical joint space narrowing on medially     Procedures

## 2021-08-30 ENCOUNTER — TELEPHONE (OUTPATIENT)
Dept: HEMATOLOGY ONCOLOGY | Facility: CLINIC | Age: 66
End: 2021-08-30

## 2021-08-30 NOTE — TELEPHONE ENCOUNTER
Left message for patient that she should resume Xarelto the day after her procedure  To call back with any further questions

## 2021-08-31 ENCOUNTER — OFFICE VISIT (OUTPATIENT)
Dept: OBGYN CLINIC | Facility: HOSPITAL | Age: 66
End: 2021-08-31
Payer: COMMERCIAL

## 2021-08-31 VITALS
DIASTOLIC BLOOD PRESSURE: 71 MMHG | BODY MASS INDEX: 33.02 KG/M2 | SYSTOLIC BLOOD PRESSURE: 115 MMHG | HEART RATE: 79 BPM | HEIGHT: 64 IN | WEIGHT: 193.4 LBS

## 2021-08-31 DIAGNOSIS — M17.11 PRIMARY OSTEOARTHRITIS OF RIGHT KNEE: Primary | ICD-10-CM

## 2021-08-31 DIAGNOSIS — M70.50 PES ANSERINE BURSITIS: ICD-10-CM

## 2021-08-31 DIAGNOSIS — M72.2 PLANTAR FASCIITIS, BILATERAL: ICD-10-CM

## 2021-08-31 PROCEDURE — 99213 OFFICE O/P EST LOW 20 MIN: CPT | Performed by: ORTHOPAEDIC SURGERY

## 2021-08-31 PROCEDURE — 20610 DRAIN/INJ JOINT/BURSA W/O US: CPT | Performed by: ORTHOPAEDIC SURGERY

## 2021-08-31 RX ORDER — BETAMETHASONE SODIUM PHOSPHATE AND BETAMETHASONE ACETATE 3; 3 MG/ML; MG/ML
12 INJECTION, SUSPENSION INTRA-ARTICULAR; INTRALESIONAL; INTRAMUSCULAR; SOFT TISSUE
Status: COMPLETED | OUTPATIENT
Start: 2021-08-31 | End: 2021-08-31

## 2021-08-31 RX ORDER — LIDOCAINE HYDROCHLORIDE 10 MG/ML
2 INJECTION, SOLUTION INFILTRATION; PERINEURAL
Status: COMPLETED | OUTPATIENT
Start: 2021-08-31 | End: 2021-08-31

## 2021-08-31 RX ORDER — BUPIVACAINE HYDROCHLORIDE 2.5 MG/ML
2 INJECTION, SOLUTION INFILTRATION; PERINEURAL
Status: COMPLETED | OUTPATIENT
Start: 2021-08-31 | End: 2021-08-31

## 2021-08-31 RX ADMIN — BETAMETHASONE SODIUM PHOSPHATE AND BETAMETHASONE ACETATE 12 MG: 3; 3 INJECTION, SUSPENSION INTRA-ARTICULAR; INTRALESIONAL; INTRAMUSCULAR; SOFT TISSUE at 14:13

## 2021-08-31 RX ADMIN — BUPIVACAINE HYDROCHLORIDE 2 ML: 2.5 INJECTION, SOLUTION INFILTRATION; PERINEURAL at 14:13

## 2021-08-31 RX ADMIN — LIDOCAINE HYDROCHLORIDE 2 ML: 10 INJECTION, SOLUTION INFILTRATION; PERINEURAL at 13:58

## 2021-08-31 RX ADMIN — BUPIVACAINE HYDROCHLORIDE 2 ML: 2.5 INJECTION, SOLUTION INFILTRATION; PERINEURAL at 13:58

## 2021-08-31 RX ADMIN — LIDOCAINE HYDROCHLORIDE 2 ML: 10 INJECTION, SOLUTION INFILTRATION; PERINEURAL at 14:13

## 2021-08-31 RX ADMIN — BETAMETHASONE SODIUM PHOSPHATE AND BETAMETHASONE ACETATE 12 MG: 3; 3 INJECTION, SUSPENSION INTRA-ARTICULAR; INTRALESIONAL; INTRAMUSCULAR; SOFT TISSUE at 13:58

## 2021-08-31 NOTE — PROGRESS NOTES
Assessment/Plan:  1  Primary osteoarthritis of right knee  Large joint arthrocentesis: R knee   2  Pes anserine bursitis  Large joint arthrocentesis   3  Plantar fasciitis, bilateral  Durable Medical Equipment       Scribe Attestation    I,:  Farideh Mckinney MA am acting as a scribe while in the presence of the attending physician :       I,:  Errol Eaton MD personally performed the services described in this documentation    as scribed in my presence  :             49-year-old female with right knee osteoarthritis and right pes anserine bursitis  Patient tolerated corticosteroid injections well to both of these areas  Post injection instructions were provided  She may resume her  Xarelto as instructed by her hematologist   She may continue activities as tolerated  I will see her back on as-needed basis    Subjective: Felicia Crowder is a 77 y o  female who presents to the office today for injections to her right knee and right pes anserine bursa  She has stopped his Xarelto since Thursday and has been in contact with her hematologist in regard to this  She notes no new injuries  She continues to experience significant right knee pain secondary to her work status and her arthritis  Review of Systems   Constitutional: Negative for chills, fever and unexpected weight change  HENT: Negative for hearing loss, nosebleeds and sore throat  Eyes: Negative for pain, redness and visual disturbance  Respiratory: Negative for cough, shortness of breath and wheezing  Cardiovascular: Negative for chest pain, palpitations and leg swelling  Gastrointestinal: Negative for abdominal pain, nausea and vomiting  Endocrine: Negative for polydipsia and polyuria  Genitourinary: Negative for dyspareunia and hematuria  Musculoskeletal: Positive for arthralgias and myalgias  Negative for joint swelling  Skin: Negative for rash and wound  Neurological: Negative for dizziness, numbness and headaches  Psychiatric/Behavioral: Negative for decreased concentration and suicidal ideas  The patient is not nervous/anxious  Past Medical History:   Diagnosis Date    Acid reflux     Allergic rhinitis     Last Assessed: 2017    Anesthesia complication     HYPOTENSION    Arthritis     Asthma     Bigeminy     history    [facility]    [facility]     DVT of leg (deep venous thrombosis) ([facility] [address] )     left    Female pelvic pain     Hyperlipidemia     Intermittent palpitations     Irregular heart beat     Migraines     Obesity (BMI 30 0-34  9)     Palpitations     Pes planus     unspecified laterality; Last Assessed: 2014    Thyroid nodule     Trigeminy     history    Wears glasses        Past Surgical History:   Procedure Laterality Date    APPENDECTOMY      BREAST BIOPSY Left 2019    CATARACT EXTRACTION       SECTION      X 2    CHOLECYSTECTOMY      COLONOSCOPY      DILATION AND CURETTAGE OF UTERUS      JOINT REPLACEMENT      left TKR    KNEE SURGERY Left     X 3    GA ESOPHAGOGASTRODUODENOSCOPY TRANSORAL DIAGNOSTIC N/A 3/14/2016    Procedure: ESOPHAGOGASTRODUODENOSCOPY (EGD); Surgeon: Geoffrey Moncada MD;  Location: [facility];   Service: Gastroenterology    GA LAPAROSCOPY W TOT HYSTERECTUTERUS <=250 Lyndol Tamra TUBE/OVARY Bilateral 2016    Procedure: HYSTERECTOMY LAPAROSCOPIC TOTAL ,BSO;  Surgeon: Hermilo Garcia MD;  Location: [facility];  Service: Gynecology Oncology    REPLACEMENT TOTAL KNEE      US GUIDED BREAST BIOPSY LEFT COMPLETE Left 2019    WISDOM TOOTH EXTRACTION         Family History   Problem Relation Age of Onset    Endometrial cancer Mother 76    Migraines Mother     Diabetes Father     Heart disease Father     Heart attack Father     Prostate cancer Father     Hypertension Father     Thyroid cancer Brother         medullary    Diabetes type II Brother     Growth hormone deficiency Daughter     Alzheimer's disease Maternal Aunt     Thyroid cancer Maternal Aunt     Breast cancer Maternal Aunt 72    Diabetes Paternal Aunt     Hashimoto's thyroiditis Paternal Aunt         Total Thyroidectomy    Hypertension Paternal Aunt     Other Maternal Uncle         Brain Tumor    Alzheimer's disease Maternal Uncle     Cancer Family     Stroke Paternal Uncle     No Known Problems Maternal Grandmother     No Known Problems Paternal Grandmother     No Known Problems Daughter     Thyroid cancer Maternal Aunt 80    No Known Problems Maternal Aunt     No Known Problems Maternal Aunt     No Known Problems Maternal Aunt     No Known Problems Maternal Aunt     No Known Problems Paternal Aunt        Social History     Occupational History    Occupation: Pediatrics   Tobacco Use    Smoking status: Former Smoker     Packs/day: 0 50     Types: Cigarettes     Quit date: 1978     Years since quittin 2    Smokeless tobacco: Never Used    Tobacco comment: as teenager   Vaping Use    Vaping Use: Never used   Substance and Sexual Activity    Alcohol use: Yes     Comment: very seldom    Drug use: No    Sexual activity: Not Currently     Birth control/protection: Surgical         Current Outpatient Medications:     albuterol (Ventolin HFA) 90 mcg/act inhaler, 1-2 puffs 4 times a day as needed, Disp: 3 Inhaler, Rfl: 3    ALPRAZolam (XANAX) 0 25 mg tablet, Take one tablet twice daily as needed for anxiety, Disp: 30 tablet, Rfl: 0    butalbital-acetaminophen-caffeine (FIORICET,ESGIC) -40 mg per tablet, Take 1 tablet by mouth every 4 (four) hours as needed for headaches, Disp: 20 tablet, Rfl: 0    calcium-vitamin D (OSCAL) 250-125 MG-UNIT per tablet, Take 1 tablet by mouth daily, Disp: , Rfl:     cycloSPORINE (RESTASIS) 0 05 % ophthalmic emulsion, Apply 1 drop to eye 2 (two) times a day, Disp: , Rfl:     diazepam (VALIUM) 5 mg tablet, Take 1 tablet (5 mg total) by mouth every 8 (eight) hours as needed for anxiety, Disp: 30 tablet, Rfl: 0    diphenhydrAMINE (BENADRYL) 25 mg capsule, Take by mouth daily at bedtime as needed  , Disp: , Rfl:     Flovent  MCG/ACT inhaler, INHALE 1 PUFF BY MOUTH TWICE A DAY, Disp: 12 Inhaler, Rfl: 0    fluticasone (FLONASE) 50 mcg/act nasal spray, 2 sprays into each nostril daily, Disp: 1 Bottle, Rfl: 5    loteprednol etabonate (LOTEMAX) 0 5 % ophthalmic suspension, Administer 1 drop to both eyes as needed , Disp: , Rfl:     methocarbamol (ROBAXIN) 750 mg tablet, Take 750 mg by mouth every 6 (six) hours as needed for muscle spasms, Disp: , Rfl:     montelukast (SINGULAIR) 10 mg tablet, TAKE 1 TABLET BY MOUTH EVERY DAY IN THE EVENING, Disp: 30 tablet, Rfl: 2    Multiple Vitamin (MULTIVITAMIN) capsule, Take 1 capsule by mouth daily , Disp: , Rfl:     pantoprazole (PROTONIX) 40 mg tablet, Take 1 tablet (40 mg total) by mouth daily Take 1 tablet by mouth 2 times a day 30 minutes prior to breakfast and dinner, Disp: 180 tablet, Rfl: 3    rivaroxaban (XARELTO) 10 mg tablet, Take 1 tablet (10 mg total) by mouth daily (Patient not taking: Reported on 8/27/2021), Disp: 90 tablet, Rfl: 3    ZOLMitriptan (ZOMIG) 5 MG tablet, Take 1 tablet (5 mg total) by mouth once as needed for migraine for up to 1 dose (Patient taking differently: Take 5 mg by mouth as needed for migraine ), Disp: 20 tablet, Rfl: 3    Allergies   Allergen Reactions    Naproxen Shortness Of Breath    Iv Contrast [Iodinated Diagnostic Agents] Itching    Seasonal Ic [Cholestatin]     Phentermine Palpitations       Objective:  Vitals:    08/31/21 1334   BP: 115/71   Pulse: 79       Right Knee Exam     Tenderness   The patient is experiencing tenderness in the pes anserinus, lateral joint line and medial joint line  Range of Motion   The patient has normal right knee ROM      Other   Erythema: absent  Scars: absent  Sensation: normal  Pulse: present  Swelling: none  Effusion: no effusion present          Observations     Right Knee   Negative for effusion  Physical Exam  Vitals reviewed  Constitutional:       Appearance: She is well-developed  HENT:      Head: Normocephalic and atraumatic  Eyes:      General: No scleral icterus  Conjunctiva/sclera: Conjunctivae normal    Cardiovascular:      Rate and Rhythm: Normal rate  Pulses: Normal pulses  Pulmonary:      Effort: Pulmonary effort is normal  No respiratory distress  Musculoskeletal:      Cervical back: Normal range of motion and neck supple  Right knee: No effusion  Comments: As noted in HPI   Skin:     General: Skin is warm and dry  Neurological:      Mental Status: She is alert and oriented to person, place, and time  Psychiatric:         Behavior: Behavior normal          I have personally reviewed pertinent films in PACS and my interpretation is as follows:  None today    Large joint arthrocentesis: R knee  Universal Protocol:  Consent: Verbal consent obtained  Consent given by: patient    Supporting Documentation  Indications: pain   Procedure Details  Location: knee - R knee  Preparation: Patient was prepped and draped in the usual sterile fashion  Needle size: 22 G  Ultrasound guidance: no  Medications administered: 2 mL lidocaine 1 %; 2 mL bupivacaine 0 25 %; 12 mg betamethasone acetate-betamethasone sodium phosphate 6 (3-3) mg/mL    Patient tolerance: patient tolerated the procedure well with no immediate complications  Dressing:  Sterile dressing applied    Large joint arthrocentesis: R anserine bursa  Universal Protocol:  Consent: Verbal consent obtained    Consent given by: patient    Supporting Documentation  Indications: pain   Procedure Details  Location: knee - R anserine bursa  Preparation: Patient was prepped and draped in the usual sterile fashion  Needle size: 22 G  Ultrasound guidance: no  Medications administered: 2 mL bupivacaine 0 25 %; 2 mL lidocaine 1 %; 12 mg betamethasone acetate-betamethasone sodium phosphate 6 (3-3) mg/mL

## 2021-09-02 ENCOUNTER — OFFICE VISIT (OUTPATIENT)
Dept: BARIATRICS | Facility: CLINIC | Age: 66
End: 2021-09-02

## 2021-09-02 VITALS — WEIGHT: 192.8 LBS | HEIGHT: 64 IN | BODY MASS INDEX: 32.91 KG/M2

## 2021-09-02 DIAGNOSIS — R63.5 ABNORMAL WEIGHT GAIN: ICD-10-CM

## 2021-09-02 PROCEDURE — DB3PK

## 2021-09-02 PROCEDURE — RECHECK

## 2021-09-02 NOTE — PROGRESS NOTES
Weight Management Medical Nutrition Assessment   Ayesha was here for 1/3 RD visits  Today's weight 192  2 8 lbs giving her a gain of 1 8 lbs since her last visit  She is following a partial meal plan having 1 -2 meal replacements per day, depending on her schedule  She is still not logging her food  Reiterated the importance of accurate food logging  She has not been exercising since she has plantar faciititis  She requested some recipe ideas and information on low cholesterol foods  I have emailed those to her       Anthropometric Measurements  Start Weight (lbs): 194 6, lbs  Current Weight (lbs): 192 8 lbs  TBW % Change from start weight:  1%  Ideal Body Weight (lbs):117 5 lbs  Goal Weight (lbs):160 lbs     Weight Loss History  Previous weight loss attempts: Counseling with  MD  Self Created Diets (Portion Control, Healthy Food Choices, etc )  [facility]      Food and Nutrition Related History     Food Recall  Breakfast: coffee, shake          Snack:string cheese if needed  Lunch: shake  Snack: string cheese or HB egg if needed  Dinner: meat, vegetables  Snack: bar if needed        Beverages: water  Volume of beverage intake: 60 to 80 oz     Weekends: Same  Cravings: none reported  Trouble area of day:none reported     Frequency of Eating out: irregularly  Food restrictions:none reported  Cooking: self   Food Shopping: self     Physical Activity Intake  Activity:Swimming and going to gym  Frequency: plans to start exercising  Physical limitations/barriers to exercise: none reported     Estimated Needs  Energy  Rg 1898 Energy Needs: BMR : 1386  calories  1# loss weekly sedentary:  1164    calories  1-2# loss weekly lightly active: 909 -1409 calories  Protein:64-80gm      (1 2-1 5g/kg IBW)  Fluid: 62oz     (35mL/kg IBW)     Nutrition Diagnosis  Yes;    Overweight/obesity  related to Excess energy intake as evidenced by  BMI more than normative standard for age and sex (obesity-grade I 30-34  9)     Nutrition Intervention     Nutrition Prescription  Calories: 1000 calories   Protein: 76 gm   Fluid:62oz     Meal Plan  2 meal replacements, a bar, a food snack (string cheese or hard boiled egg) and a lean protein with non-starchy vegetables       Nutrition Education:    Calorie controlled menu  Lean protein food choices  Healthy snack options  Food journaling tips        Nutrition Counseling:  Strategies: meal planning, portion sizes, healthy snack choices, hydration, fiber intake, protein intake, exercise, food journal        Monitoring and Evaluation:  Evaluation criteria:  Energy Intake  Meet protein needs  Maintain adequate hydration  Monitor weekly weight  Meal planning/preparation  Food journal   Decreased portions at mealtimes and snacks  Physical activity      Barriers to learning:none  Readiness to change: Action  Comprehension: good  Expected Compliance: fair

## 2021-09-17 ENCOUNTER — OFFICE VISIT (OUTPATIENT)
Dept: FAMILY MEDICINE CLINIC | Facility: CLINIC | Age: 66
End: 2021-09-17
Payer: COMMERCIAL

## 2021-09-17 VITALS — OXYGEN SATURATION: 97 % | TEMPERATURE: 97.5 F | HEART RATE: 71 BPM

## 2021-09-17 DIAGNOSIS — Z91.89 AT INCREASED RISK OF EXPOSURE TO COVID-19 VIRUS: Primary | ICD-10-CM

## 2021-09-17 PROCEDURE — 99213 OFFICE O/P EST LOW 20 MIN: CPT | Performed by: FAMILY MEDICINE

## 2021-09-17 PROCEDURE — U0005 INFEC AGEN DETEC AMPLI PROBE: HCPCS | Performed by: FAMILY MEDICINE

## 2021-09-17 PROCEDURE — U0003 INFECTIOUS AGENT DETECTION BY NUCLEIC ACID (DNA OR RNA); SEVERE ACUTE RESPIRATORY SYNDROME CORONAVIRUS 2 (SARS-COV-2) (CORONAVIRUS DISEASE [COVID-19]), AMPLIFIED PROBE TECHNIQUE, MAKING USE OF HIGH THROUGHPUT TECHNOLOGIES AS DESCRIBED BY CMS-2020-01-R: HCPCS | Performed by: FAMILY MEDICINE

## 2021-09-17 NOTE — ASSESSMENT & PLAN NOTE
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

## 2021-09-17 NOTE — PROGRESS NOTES
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 and has worsening upper respiratory symptoms now  After discussion with her she does have allergies at this time of the year but feels that her symptoms are worse and I felt it was important to rule out a COVID infection sore nasal swab will be done today                 Subjective:      Patient ID: Lenny Poole is a 77 y o  female  Patient presents to the office today for parking lot visit for upper respiratory symptoms dry cough nasal congestion sore throat over the last 3 days she does note that she has had allergies to ragweed but recently felt more symptomatic  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 and has been doing COVID testing she came in for follow-up and possible COVID test today      The following portions of the patient's history were reviewed and updated as appropriate: allergies, current medications, past family history, past medical history, past social history, past surgical history and problem list     Review of Systems   Constitutional: Negative for chills, fatigue and fever  HENT: Positive for postnasal drip, rhinorrhea, sinus pressure and sore throat  Negative for congestion and nosebleeds  Eyes: Negative for discharge and redness  Respiratory: Negative for cough and shortness of breath  Cardiovascular: Negative for chest pain, palpitations and leg swelling  Gastrointestinal: Negative for abdominal pain, blood in stool and nausea  Endocrine: Negative for cold intolerance, heat intolerance and polyuria  Genitourinary: Negative for dysuria and frequency  Musculoskeletal: Negative for arthralgias, back pain and myalgias  Skin: Negative for rash  Neurological: Negative for dizziness, weakness and headaches  Hematological: Negative for adenopathy  Psychiatric/Behavioral: Negative for behavioral problems and sleep disturbance  The patient is not nervous/anxious  Objective:      Pulse 71   Temp 97 5 °F (36 4 °C)   LMP  (LMP Unknown)   SpO2 97%        Physical Exam  Vitals and nursing note reviewed  Constitutional:       General: She is not in acute distress  Appearance: Normal appearance  She is well-developed  HENT:      Head: Normocephalic and atraumatic  Right Ear: Tympanic membrane and external ear normal       Left Ear: Tympanic membrane and external ear normal       Nose: Nose normal       Mouth/Throat:      Mouth: Mucous membranes are moist       Pharynx: No oropharyngeal exudate  Eyes:      General: No scleral icterus  Right eye: No discharge  Left eye: No discharge  Extraocular Movements: Extraocular movements intact  Conjunctiva/sclera: Conjunctivae normal       Pupils: Pupils are equal, round, and reactive to light  Neck:      Thyroid: No thyromegaly  Vascular: No JVD  Cardiovascular:      Rate and Rhythm: Normal rate and regular rhythm  Heart sounds: Normal heart sounds  No murmur heard  Pulmonary:      Effort: Pulmonary effort is normal       Breath sounds: No wheezing or rales  Chest:      Chest wall: No tenderness  Abdominal:      General: Bowel sounds are normal  There is no distension  Palpations: Abdomen is soft  There is no mass  Tenderness: There is no abdominal tenderness  Musculoskeletal:         General: No tenderness or deformity  Normal range of motion  Cervical back: Normal range of motion  Lymphadenopathy:      Cervical: No cervical adenopathy  Skin:     General: Skin is warm and dry  Findings: No rash  Neurological:      General: No focal deficit present  Mental Status: She is alert and oriented to person, place, and time  Cranial Nerves: No cranial nerve deficit  Coordination: Coordination normal       Deep Tendon Reflexes: Reflexes are normal and symmetric  Reflexes normal    Psychiatric:         Mood and Affect: Mood normal          Behavior: Behavior normal          Thought Content: Thought content normal          Judgment: Judgment normal           Data:    Laboratory Results: I have personally reviewed the pertinent laboratory results/reports   Radiology/Other Diagnostic Testing Results: I have personally reviewed pertinent reports         Lab Results   Component Value Date    WBC 6 25 12/01/2020    HGB 13 7 12/01/2020    HCT 43 5 12/01/2020    MCV 86 12/01/2020     12/01/2020     Lab Results   Component Value Date     08/20/2015    K 4 0 12/01/2020     12/01/2020    CO2 30 12/01/2020    ANIONGAP 8 6 08/20/2015    BUN 18 12/01/2020    CREATININE 0 86 12/01/2020    GLUCOSE 88 08/20/2015    GLUF 86 12/01/2020    CALCIUM 9 1 12/01/2020    AST 24 12/01/2020    ALT 47 12/01/2020    ALKPHOS 127 (H) 12/01/2020    PROT 7 1 08/20/2015    BILITOT 0 3 08/20/2015    EGFR 70 12/01/2020     Lab Results   Component Value Date    CHOLESTEROL 230 (H) 08/23/2021    CHOLESTEROL 196 12/01/2020    CHOLESTEROL 193 08/03/2019     Lab Results   Component Value Date    HDL 49 08/23/2021    HDL 47 12/01/2020    HDL 42 08/03/2019     Lab Results   Component Value Date    LDLCALC 129 (H) 08/23/2021    LDLCALC 104 (H) 12/01/2020    LDLCALC 116 (H) 08/03/2019     Lab Results   Component Value Date    TRIG 259 (H) 08/23/2021    TRIG 226 (H) 12/01/2020    TRIG 177 (H) 08/03/2019     No results found for: [place], [place]  Lab Results   Component Value Date    [id number] 2 330 08/23/2021     Lab Results   Component Value Date    HGBA1C 5 4 08/23/2021     No results found for: MATT Brito, DO

## 2021-09-18 LAB — SARS-COV-2 RNA RESP QL NAA+PROBE: NEGATIVE

## 2021-09-22 DIAGNOSIS — F41.9 ANXIETY: ICD-10-CM

## 2021-09-23 RX ORDER — ALPRAZOLAM 0.25 MG/1
TABLET ORAL
Qty: 30 TABLET | Refills: 0 | Status: SHIPPED | OUTPATIENT
Start: 2021-09-23

## 2021-09-30 ENCOUNTER — OFFICE VISIT (OUTPATIENT)
Dept: BARIATRICS | Facility: CLINIC | Age: 66
End: 2021-09-30

## 2021-09-30 DIAGNOSIS — R63.5 ABNORMAL WEIGHT GAIN: Primary | ICD-10-CM

## 2021-09-30 PROCEDURE — RECHECK

## 2021-09-30 NOTE — PROGRESS NOTES
Weight Management Medical Nutrition Assessment   Ayesha was here for 1/3 RD visits  Today's weight 192  2 8 lbs giving her a gain of 1 8 lbs since her last visit  She is following a partial meal plan having 1 -2 meal replacements per day, depending on her schedule  She is still not logging her food  Reiterated the importance of accurate food logging  She has not been exercising since she has plantar faciititis  She requested some recipe ideas and information on low cholesterol foods  I have emailed those to her  Personal stress  eating and stress headaches  taking time off of work             Anthropometric Measurements  Start Weight (lbs): 194 6, lbs  Current Weight (lbs): 192 8 lbs  TBW % Change from start weight:  1%  Ideal Body Weight (lbs):117 5 lbs  Goal Weight (lbs):160 lbs     Weight Loss History  Previous weight loss attempts: Counseling with  MD  Self Created Diets (Portion Control, Healthy Food Choices, etc )  [facility]      Food and Nutrition Related History     Food Recall  Breakfast: coffee, shake          Snack:string cheese if needed  Lunch: shake  Snack: string cheese or HB egg if needed  Dinner: meat, vegetables  Snack: bar if needed        Beverages: water  Volume of beverage intake: 60 to 80 oz     Weekends: Same  Cravings: none reported  Trouble area of day:none reported     Frequency of Eating out: irregularly  Food restrictions:none reported  Cooking: self   Food Shopping: self     Physical Activity Intake  Activity:Swimming and going to gym  Walking and silver sneakers  Frequency: plans to start exercising  Physical limitations/barriers to exercise: none reported     Estimated Needs  Energy  Rg 1898 Energy Needs:  BMR : 1386  calories  1# loss weekly sedentary:  1164    calories  1-2# loss weekly lightly active: 909 -1409 calories  Protein:64-80gm      (1 2-1 5g/kg IBW)  Fluid: 62oz     (35mL/kg IBW)     Nutrition Diagnosis  Yes;    Overweight/obesity  related to Excess energy intake as evidenced by [facility] more than normative standard for age and sex (obesity-grade I 26-30  9)     Nutrition Intervention     Nutrition Prescription  Calories: 1000 calories   Protein: 75 gm   Fluid:62oz     Meal Plan  2 meal replacements, a bar, a food snack (string cheese or hard boiled egg) and a lean protein with non-starchy vegetables       Nutrition Education:    Calorie controlled menu  Lean protein food choices  Healthy snack options  Food journaling tips        Nutrition Counseling:  Strategies: meal planning, portion sizes, healthy snack choices, hydration, fiber intake, protein intake, exercise, food journal        Monitoring and Evaluation:  Evaluation criteria:  Energy Intake  Meet protein needs  Maintain adequate hydration  Monitor weekly weight  Meal planning/preparation  Food journal   Decreased portions at mealtimes and snacks  Physical activity      Barriers to learning:none  Readiness to change: Action  Comprehension: good  Expected Compliance: fair

## 2021-10-05 ENCOUNTER — IMMUNIZATIONS (OUTPATIENT)
Dept: FAMILY MEDICINE CLINIC | Facility: HOSPITAL | Age: 66
End: 2021-10-05

## 2021-10-05 DIAGNOSIS — Z23 ENCOUNTER FOR IMMUNIZATION: Primary | ICD-10-CM

## 2021-10-05 PROCEDURE — 0001A SARS-COV-2 / COVID-19 MRNA VACCINE (PFIZER-BIONTECH) 30 MCG: CPT

## 2021-10-05 PROCEDURE — 91300 SARS-COV-2 / COVID-19 MRNA VACCINE (PFIZER-BIONTECH) 30 MCG: CPT

## 2021-10-19 ENCOUNTER — CLINICAL SUPPORT (OUTPATIENT)
Dept: FAMILY MEDICINE CLINIC | Facility: CLINIC | Age: 66
End: 2021-10-19
Payer: COMMERCIAL

## 2021-10-19 DIAGNOSIS — Z23 ENCOUNTER FOR IMMUNIZATION: Primary | ICD-10-CM

## 2021-10-19 PROCEDURE — 90662 IIV NO PRSV INCREASED AG IM: CPT | Performed by: FAMILY MEDICINE

## 2021-10-19 PROCEDURE — 90471 IMMUNIZATION ADMIN: CPT | Performed by: FAMILY MEDICINE

## 2021-10-25 ENCOUNTER — TELEPHONE (OUTPATIENT)
Dept: BARIATRICS | Facility: CLINIC | Age: 66
End: 2021-10-25

## 2021-11-10 ENCOUNTER — APPOINTMENT (EMERGENCY)
Dept: CT IMAGING | Facility: HOSPITAL | Age: 66
End: 2021-11-10
Payer: COMMERCIAL

## 2021-11-10 ENCOUNTER — HOSPITAL ENCOUNTER (EMERGENCY)
Facility: HOSPITAL | Age: 66
Discharge: HOME | End: 2021-11-10
Attending: EMERGENCY MEDICINE
Payer: COMMERCIAL

## 2021-11-10 VITALS
TEMPERATURE: 98.1 F | RESPIRATION RATE: 16 BRPM | DIASTOLIC BLOOD PRESSURE: 61 MMHG | WEIGHT: 192.9 LBS | SYSTOLIC BLOOD PRESSURE: 119 MMHG | OXYGEN SATURATION: 97 % | BODY MASS INDEX: 33.11 KG/M2 | HEART RATE: 64 BPM

## 2021-11-10 DIAGNOSIS — S09.90XA INJURY OF HEAD, INITIAL ENCOUNTER: Primary | ICD-10-CM

## 2021-11-10 LAB
FLUAV RNA RESP QL NAA+PROBE: NEGATIVE
FLUBV RNA RESP QL NAA+PROBE: NEGATIVE
RSV RNA RESP QL NAA+PROBE: NEGATIVE
SARS-COV-2 RNA RESP QL NAA+PROBE: NEGATIVE

## 2021-11-10 PROCEDURE — 0241U HB NFCT DS VIR RESP RNA 4 TRGT: CPT | Performed by: EMERGENCY MEDICINE

## 2021-11-10 PROCEDURE — 99284 EMERGENCY DEPT VISIT MOD MDM: CPT | Performed by: EMERGENCY MEDICINE

## 2021-11-10 PROCEDURE — 70450 CT HEAD/BRAIN W/O DYE: CPT

## 2021-11-10 PROCEDURE — 99284 EMERGENCY DEPT VISIT MOD MDM: CPT

## 2021-11-18 ENCOUNTER — OFFICE VISIT (OUTPATIENT)
Dept: BARIATRICS | Facility: CLINIC | Age: 66
End: 2021-11-18

## 2021-11-18 VITALS — BODY MASS INDEX: 32.85 KG/M2 | WEIGHT: 192.4 LBS | HEIGHT: 64 IN

## 2021-11-18 DIAGNOSIS — R63.5 ABNORMAL WEIGHT GAIN: Primary | ICD-10-CM

## 2021-11-18 PROCEDURE — RECHECK

## 2021-11-28 ENCOUNTER — PREPPED CHART (OUTPATIENT)
Dept: URBAN - METROPOLITAN AREA CLINIC 6 | Facility: CLINIC | Age: 66
End: 2021-11-28

## 2021-12-07 ENCOUNTER — COMPLETE EYE EXAM (OUTPATIENT)
Dept: URBAN - METROPOLITAN AREA CLINIC 6 | Facility: CLINIC | Age: 66
End: 2021-12-07

## 2021-12-07 DIAGNOSIS — H04.123: ICD-10-CM

## 2021-12-07 DIAGNOSIS — J45.909 MODERATE ASTHMA, UNSPECIFIED WHETHER COMPLICATED, UNSPECIFIED WHETHER PERSISTENT: ICD-10-CM

## 2021-12-07 DIAGNOSIS — H35.372: ICD-10-CM

## 2021-12-07 DIAGNOSIS — Z96.1: ICD-10-CM

## 2021-12-07 PROCEDURE — 92134 CPTRZ OPH DX IMG PST SGM RTA: CPT

## 2021-12-07 PROCEDURE — 92014 COMPRE OPH EXAM EST PT 1/>: CPT

## 2021-12-07 PROCEDURE — G8428 CUR MEDS NOT DOCUMENT: HCPCS

## 2021-12-07 PROCEDURE — 1036F TOBACCO NON-USER: CPT

## 2021-12-07 RX ORDER — ALBUTEROL SULFATE 90 UG/1
AEROSOL, METERED RESPIRATORY (INHALATION)
Qty: 108 G | Refills: 3 | Status: SHIPPED | OUTPATIENT
Start: 2021-12-07 | End: 2022-01-05 | Stop reason: SDUPTHER

## 2021-12-07 ASSESSMENT — TONOMETRY
OD_IOP_MMHG: 12
OS_IOP_MMHG: 12

## 2021-12-07 ASSESSMENT — VISUAL ACUITY
OS_SC: 20/25
OD_SC: 20/25

## 2021-12-10 ENCOUNTER — OFFICE VISIT (OUTPATIENT)
Dept: FAMILY MEDICINE CLINIC | Facility: CLINIC | Age: 66
End: 2021-12-10
Payer: COMMERCIAL

## 2021-12-10 VITALS
HEART RATE: 69 BPM | SYSTOLIC BLOOD PRESSURE: 116 MMHG | OXYGEN SATURATION: 98 % | HEIGHT: 64 IN | WEIGHT: 192.46 LBS | DIASTOLIC BLOOD PRESSURE: 80 MMHG | TEMPERATURE: 98.7 F | BODY MASS INDEX: 32.86 KG/M2

## 2021-12-10 DIAGNOSIS — K21.9 GASTROESOPHAGEAL REFLUX DISEASE WITHOUT ESOPHAGITIS: ICD-10-CM

## 2021-12-10 DIAGNOSIS — M54.2 NECK PAIN: Primary | ICD-10-CM

## 2021-12-10 DIAGNOSIS — I82.402 DEEP VEIN THROMBOSIS (DVT) OF LEFT LOWER EXTREMITY, UNSPECIFIED CHRONICITY, UNSPECIFIED VEIN (HCC): ICD-10-CM

## 2021-12-10 DIAGNOSIS — E66.9 OBESITY (BMI 30.0-34.9): ICD-10-CM

## 2021-12-10 DIAGNOSIS — G43.009 MIGRAINE WITHOUT AURA AND WITHOUT STATUS MIGRAINOSUS, NOT INTRACTABLE: ICD-10-CM

## 2021-12-10 PROCEDURE — 99214 OFFICE O/P EST MOD 30 MIN: CPT | Performed by: FAMILY MEDICINE

## 2021-12-10 RX ORDER — METHOCARBAMOL 750 MG/1
750 TABLET, FILM COATED ORAL EVERY 6 HOURS PRN
Qty: 30 TABLET | Refills: 2 | Status: SHIPPED | OUTPATIENT
Start: 2021-12-10

## 2021-12-23 ENCOUNTER — TELEPHONE (OUTPATIENT)
Dept: FAMILY MEDICINE CLINIC | Facility: CLINIC | Age: 66
End: 2021-12-23

## 2021-12-30 DIAGNOSIS — M62.838 MUSCLE SPASM: ICD-10-CM

## 2021-12-30 DIAGNOSIS — M54.50 CHRONIC LOW BACK PAIN: ICD-10-CM

## 2021-12-30 DIAGNOSIS — G89.29 CHRONIC LOW BACK PAIN: ICD-10-CM

## 2022-01-03 DIAGNOSIS — K21.9 GASTROESOPHAGEAL REFLUX DISEASE WITHOUT ESOPHAGITIS: ICD-10-CM

## 2022-01-03 RX ORDER — PANTOPRAZOLE SODIUM 40 MG/1
TABLET, DELAYED RELEASE ORAL
Qty: 180 TABLET | Refills: 0 | Status: SHIPPED | OUTPATIENT
Start: 2022-01-03

## 2022-01-03 RX ORDER — DIAZEPAM 5 MG/1
5 TABLET ORAL EVERY 8 HOURS PRN
Qty: 30 TABLET | Refills: 0 | Status: SHIPPED | OUTPATIENT
Start: 2022-01-03 | End: 2022-06-03 | Stop reason: SDUPTHER

## 2022-01-05 ENCOUNTER — OFFICE VISIT (OUTPATIENT)
Dept: FAMILY MEDICINE CLINIC | Facility: CLINIC | Age: 67
End: 2022-01-05
Payer: COMMERCIAL

## 2022-01-05 VITALS
SYSTOLIC BLOOD PRESSURE: 122 MMHG | RESPIRATION RATE: 18 BRPM | HEIGHT: 64 IN | HEART RATE: 69 BPM | WEIGHT: 193.2 LBS | BODY MASS INDEX: 32.98 KG/M2 | DIASTOLIC BLOOD PRESSURE: 70 MMHG | TEMPERATURE: 98.3 F | OXYGEN SATURATION: 97 %

## 2022-01-05 DIAGNOSIS — K21.9 GASTROESOPHAGEAL REFLUX DISEASE WITHOUT ESOPHAGITIS: ICD-10-CM

## 2022-01-05 DIAGNOSIS — R07.9 CHEST PAIN AT REST: Primary | ICD-10-CM

## 2022-01-05 DIAGNOSIS — J45.20 MILD INTERMITTENT ASTHMA, UNSPECIFIED WHETHER COMPLICATED: ICD-10-CM

## 2022-01-05 DIAGNOSIS — J45.21 MILD INTERMITTENT ASTHMA WITH ACUTE EXACERBATION: ICD-10-CM

## 2022-01-05 DIAGNOSIS — J45.909 MODERATE ASTHMA, UNSPECIFIED WHETHER COMPLICATED, UNSPECIFIED WHETHER PERSISTENT: ICD-10-CM

## 2022-01-05 DIAGNOSIS — J30.1 ALLERGIC RHINITIS DUE TO POLLEN, UNSPECIFIED SEASONALITY: ICD-10-CM

## 2022-01-05 DIAGNOSIS — R07.89 CHEST TIGHTNESS: ICD-10-CM

## 2022-01-05 PROCEDURE — 99214 OFFICE O/P EST MOD 30 MIN: CPT | Performed by: FAMILY MEDICINE

## 2022-01-05 PROCEDURE — 93000 ELECTROCARDIOGRAM COMPLETE: CPT | Performed by: FAMILY MEDICINE

## 2022-01-05 RX ORDER — FLUTICASONE PROPIONATE 220 UG/1
1 AEROSOL, METERED RESPIRATORY (INHALATION) 2 TIMES DAILY
Qty: 12 G | Refills: 1 | Status: SHIPPED | OUTPATIENT
Start: 2022-01-05 | End: 2022-01-05 | Stop reason: SDUPTHER

## 2022-01-05 RX ORDER — FLUTICASONE PROPIONATE 220 UG/1
1 AEROSOL, METERED RESPIRATORY (INHALATION) 2 TIMES DAILY
Qty: 12 G | Refills: 1 | Status: SHIPPED | OUTPATIENT
Start: 2022-01-05 | End: 2022-04-04

## 2022-01-05 RX ORDER — FLUTICASONE PROPIONATE 50 MCG
2 SPRAY, SUSPENSION (ML) NASAL DAILY
Qty: 16 G | Refills: 3 | Status: SHIPPED | OUTPATIENT
Start: 2022-01-05

## 2022-01-05 RX ORDER — ALBUTEROL SULFATE 90 UG/1
AEROSOL, METERED RESPIRATORY (INHALATION)
Qty: 108 G | Refills: 3 | Status: SHIPPED | OUTPATIENT
Start: 2022-01-05

## 2022-01-05 NOTE — ASSESSMENT & PLAN NOTE
Mild asthma flare up I would recommend continuation of Flovent inhaler now and add additional albuterol if symptoms change or worsen limiting usage to prevent palpitations    If symptoms change or worsen with productive cough or fever she will notify me for further workup

## 2022-01-05 NOTE — TELEPHONE ENCOUNTER
Chest tightness could be multiple symptoms including angina  Unclear full picture without an exam   Asked patient if she is prepared to come into the office for EKG and check up    If low risk for COVID exposure no fever or other symptoms I can see her in office and decide at that point today

## 2022-01-05 NOTE — ASSESSMENT & PLAN NOTE
History of palpitations asymptomatic at this time continue same medications no additional change in medicine

## 2022-01-05 NOTE — TELEPHONE ENCOUNTER
Pt is not wheezing but is having tightness in her chest   Wants a refill on her flovent inhaler and wanted to know if she should get covid tested?

## 2022-01-05 NOTE — ASSESSMENT & PLAN NOTE
Chest tightness not described as angina but associated with shortness of breath and mild dry cough symptoms patient does admit to asthma by history she has had no palpitations radiation of pain no diaphoresis or other anginal-type symptoms associated with this  EKG done at this time shows normal sinus rhythm    She was reassured by this information and will continue with her current medications and we will treat the asthma symptoms

## 2022-01-05 NOTE — PROGRESS NOTES
Assessment/Plan:       Problem List Items Addressed This Visit        Digestive    Gastroesophageal reflux disease without esophagitis     GERD symptoms stable with pantoprazole continue same dosage without change            Respiratory    Mild intermittent asthma     Mild asthma flare up I would recommend continuation of Flovent inhaler now and add additional albuterol if symptoms change or worsen limiting usage to prevent palpitations  If symptoms change or worsen with productive cough or fever she will notify me for further workup            Other    Chest tightness     Chest tightness not described as angina but associated with shortness of breath and mild dry cough symptoms patient does admit to asthma by history she has had no palpitations radiation of pain no diaphoresis or other anginal-type symptoms associated with this  EKG done at this time shows normal sinus rhythm  She was reassured by this information and will continue with her current medications and we will treat the asthma symptoms           Other Visit Diagnoses     Chest pain at rest    -  Primary    Relevant Orders    POCT ECG (Completed)            Subjective:      Patient ID: Pura Sharma is a 74 y o  female  Patient presents today for chest tightness associated with dry cough she additionally has minor swollen glands and cold symptoms but an underlying history of asthma which flares up at times  She is unclear as to the etiology of the chest pain so asked her to come into the office today for further evaluation  EKG to be done at this time  She has been fully vaccinated for COVID and denies fever or exposure to COVID      The following portions of the patient's history were reviewed and updated as appropriate: allergies, current medications, past family history, past medical history, past social history, past surgical history and problem list     Review of Systems   Constitutional: Negative for chills, fatigue and fever  HENT: Negative for congestion, nosebleeds, rhinorrhea, sinus pressure and sore throat  Eyes: Negative for discharge and redness  Respiratory: Positive for cough  Negative for shortness of breath  Cardiovascular: Positive for chest pain  Negative for palpitations and leg swelling  Gastrointestinal: Negative for abdominal pain, blood in stool and nausea  Endocrine: Negative for cold intolerance, heat intolerance and polyuria  Genitourinary: Negative for dysuria and frequency  Musculoskeletal: Negative for arthralgias, back pain and myalgias  Skin: Negative for rash  Neurological: Negative for dizziness, weakness and headaches  Hematological: Negative for adenopathy  Psychiatric/Behavioral: Negative for behavioral problems and sleep disturbance  The patient is not nervous/anxious  Objective:      /70 (BP Location: Left arm, Patient Position: Sitting)   Pulse 69   Temp 98 3 °F (36 8 °C)   Resp 18   Ht 5' 4" (1 626 m)   Wt 87 6 kg (193 lb 3 2 oz)   LMP  (LMP Unknown)   SpO2 97%   BMI 33 16 kg/m²        Physical Exam  Vitals and nursing note reviewed  Constitutional:       General: She is not in acute distress  Appearance: She is normal weight  HENT:      Head: Normocephalic and atraumatic  Right Ear: External ear normal       Left Ear: External ear normal       Nose: Nose normal       Mouth/Throat:      Pharynx: No oropharyngeal exudate  Eyes:      General: No scleral icterus  Right eye: No discharge  Left eye: No discharge  Conjunctiva/sclera: Conjunctivae normal       Pupils: Pupils are equal, round, and reactive to light  Neck:      Thyroid: No thyromegaly  Vascular: No JVD  Cardiovascular:      Rate and Rhythm: Normal rate and regular rhythm  Heart sounds: Normal heart sounds  No murmur heard  Pulmonary:      Effort: Pulmonary effort is normal       Breath sounds: Normal breath sounds  No wheezing or rales     Chest: Chest wall: No tenderness  Abdominal:      General: Bowel sounds are normal  There is no distension  Palpations: Abdomen is soft  There is no mass  Tenderness: There is no abdominal tenderness  Musculoskeletal:         General: No tenderness or deformity  Normal range of motion  Cervical back: Normal range of motion  Lymphadenopathy:      Cervical: No cervical adenopathy  Skin:     General: Skin is warm and dry  Findings: No rash  Neurological:      Mental Status: She is alert and oriented to person, place, and time  Cranial Nerves: No cranial nerve deficit  Coordination: Coordination normal       Deep Tendon Reflexes: Reflexes are normal and symmetric  Reflexes normal    Psychiatric:         Behavior: Behavior normal          Thought Content: Thought content normal          Judgment: Judgment normal           Data:    Laboratory Results: I have personally reviewed the pertinent laboratory results/reports   Radiology/Other Diagnostic Testing Results: I have personally reviewed pertinent reports         Lab Results   Component Value Date    WBC 6 25 12/01/2020    HGB 13 7 12/01/2020    HCT 43 5 12/01/2020    MCV 86 12/01/2020     12/01/2020     Lab Results   Component Value Date     08/20/2015    K 4 0 12/01/2020     12/01/2020    CO2 30 12/01/2020    ANIONGAP 8 6 08/20/2015    BUN 18 12/01/2020    CREATININE 0 86 12/01/2020    GLUCOSE 88 08/20/2015    GLUF 86 12/01/2020    CALCIUM 9 1 12/01/2020    AST 24 12/01/2020    ALT 47 12/01/2020    ALKPHOS 127 (H) 12/01/2020    PROT 7 1 08/20/2015    BILITOT 0 3 08/20/2015    EGFR 70 12/01/2020     Lab Results   Component Value Date    CHOLESTEROL 230 (H) 08/23/2021    CHOLESTEROL 196 12/01/2020    CHOLESTEROL 193 08/03/2019     Lab Results   Component Value Date    HDL 49 08/23/2021    HDL 47 12/01/2020    HDL 42 08/03/2019     Lab Results   Component Value Date    LDLCALC 129 (H) 08/23/2021    LDLCALC 104 (H) 12/01/2020    LDLCALC 116 (H) 08/03/2019     Lab Results   Component Value Date    TRIG 259 (H) 08/23/2021    TRIG 226 (H) 12/01/2020    TRIG 177 (H) 08/03/2019     No results found for: [place], [place]  Lab Results   Component Value Date    [id number] 2 330 08/23/2021     Lab Results   Component Value Date    HGBA1C 5 4 08/23/2021     No results found for: MATT Francisco DO

## 2022-01-11 ENCOUNTER — CLINICAL SUPPORT (OUTPATIENT)
Dept: FAMILY MEDICINE CLINIC | Facility: CLINIC | Age: 67
End: 2022-01-11
Payer: COMMERCIAL

## 2022-01-11 DIAGNOSIS — Z20.822 EXPOSURE TO CONFIRMED CASE OF COVID-19: Primary | ICD-10-CM

## 2022-01-11 LAB
SARS-COV-2 AG UPPER RESP QL IA: NEGATIVE
VALID CONTROL: NORMAL

## 2022-01-11 PROCEDURE — 87811 SARS-COV-2 COVID19 W/OPTIC: CPT | Performed by: FAMILY MEDICINE

## 2022-01-13 ENCOUNTER — TELEPHONE (OUTPATIENT)
Dept: FAMILY MEDICINE CLINIC | Facility: CLINIC | Age: 67
End: 2022-01-13

## 2022-01-13 NOTE — TELEPHONE ENCOUNTER
Pt would like a phone call from you personally about having a covid test done on Monday please advise

## 2022-01-18 ENCOUNTER — TELEPHONE (OUTPATIENT)
Dept: HEMATOLOGY ONCOLOGY | Facility: CLINIC | Age: 67
End: 2022-01-18

## 2022-01-18 NOTE — TELEPHONE ENCOUNTER
Left message for patient that her appt with Dr Audrey Ospina on 2/28 has been rescheduled to 3/8 @ 10:30am and will now be with Daniel Douglas PA-C  Advised patient to call back if this appt does not work for her

## 2022-03-03 ENCOUNTER — TELEPHONE (OUTPATIENT)
Dept: HEMATOLOGY ONCOLOGY | Facility: CLINIC | Age: 67
End: 2022-03-03

## 2022-03-03 DIAGNOSIS — I82.4Y9 DEEP VEIN THROMBOSIS (DVT) OF PROXIMAL LOWER EXTREMITY, UNSPECIFIED CHRONICITY, UNSPECIFIED LATERALITY (HCC): Primary | ICD-10-CM

## 2022-03-07 ENCOUNTER — APPOINTMENT (OUTPATIENT)
Dept: LAB | Facility: CLINIC | Age: 67
End: 2022-03-07
Payer: COMMERCIAL

## 2022-03-07 DIAGNOSIS — I82.4Y9 DEEP VEIN THROMBOSIS (DVT) OF PROXIMAL LOWER EXTREMITY, UNSPECIFIED CHRONICITY, UNSPECIFIED LATERALITY (HCC): ICD-10-CM

## 2022-03-07 LAB — D DIMER PPP FEU-MCNC: 0.67 UG/ML FEU

## 2022-03-07 PROCEDURE — 85379 FIBRIN DEGRADATION QUANT: CPT

## 2022-03-07 PROCEDURE — 36415 COLL VENOUS BLD VENIPUNCTURE: CPT

## 2022-03-07 NOTE — PROGRESS NOTES
[address] [facility] - Hematology & Medical Oncology  Outpatient Visit Encounter Note      Antonia Mac 74 y o  female 1955 [id number] Date:  3/8/2022    HEMATOLOGICAL HISTORY        Clotting History 1  2 Unprovoked LLE DVT (Dx: 2001) - Rx: coumadin x 3 years  2  Unprovoked RLE DVT (Dx: 2018) - Rx: Xarelto 10mg indefinite   Bleeding History Denies   Cancer History Denies   Family Cancer History Mother with endometrial cancer, maternal aunt with breast cancer, maternal uncle with brain tumor   H/O COVID Infection Denies   H/O COVID Vaccination 3/3 Chadwick Dawn   H/O Blood/Plt Transfusion Denies   Tobacco Use Denies   Alcohol Use Glass of wine once a week   Occupation Retired Pediatrician, 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53 is a 74 y o  here as a transfer of care with me today  The patient was previously seen by Dr Sara Simental and transferred to my care  Upon chart review, she had an unprovoked LLE DVT in 2001 which was managed by coumadin for 3 years  She also had a second unprovoked RLE DVT in 2018 and was managed with indefinite Xarelto  She tested negative for prothrombin gene mutation and FVL in 2019  She has a history of postmenopausal thickened endometrial stripe and was managed with a total hysterectomy by Dr Ingris Arvizu  She cannot recall if she had genetic testing performed  She also has a history of colonic polyps upon 2020 colonoscopy  She was recommended for follow-up colonoscopy in 3 years by Dr Gwen Gerber  This Visit  she is here by herself today  she voices no acute complaints    She notes increasing symptoms of GERD which is being managed by GI    she denies fevers, chills, unintentional weight loss, night sweats, fatigue, headaches, lightheadedness/dizziness, cough, SOB, palpitations, chest pain, abdominal tenderness/distension, nausea/vomiting, constipation/diarrhea, melena/hematochezia, hematuria, bleeding via the vagina, petechiae/purpura, increased ecchymosis, or LE swelling  I have reviewed the relevant past medical, surgical, social and family history  I have also reviewed allergies and medications for this patient  Review of Systems  Review of Systems   All other systems reviewed and are negative  OBJECTIVE     Physical Exam  Vitals:    03/08/22 1025   BP: 112/70   BP Location: Left arm   Patient Position: Sitting   Cuff Size: Adult   Pulse: 68   Resp: 16   Temp: (!) 97 4 °F (36 3 °C)   SpO2: 98%   Weight: 88 kg (194 lb)   Height: 5' 4" (1 626 m)       Physical Exam  Vitals reviewed  Constitutional:       General: She is not in acute distress  Appearance: Normal appearance  She is obese  She is not ill-appearing, toxic-appearing or diaphoretic  Comments: Pleasant 74 y o  female sitting comfortably on an exam room chair  HENT:      Head: Normocephalic and atraumatic  Eyes:      General: No scleral icterus  Extraocular Movements: Extraocular movements intact  Conjunctiva/sclera: Conjunctivae normal       Pupils: Pupils are equal, round, and reactive to light  Comments: No conjunctival pallor  Cardiovascular:      Rate and Rhythm: Normal rate and regular rhythm  Pulses: Normal pulses  Heart sounds: Normal heart sounds  No murmur heard  No friction rub  No gallop  Pulmonary:      Effort: Pulmonary effort is normal  No respiratory distress  Breath sounds: Normal breath sounds  No wheezing or rales  Abdominal:      General: Bowel sounds are normal  There is no distension  Palpations: Abdomen is soft  There is no mass  Tenderness: There is no abdominal tenderness  There is no guarding or rebound  Musculoskeletal:         General: No swelling or tenderness  Normal range of motion  Cervical back: Normal range of motion and neck supple  No rigidity  Right lower leg: No edema  Left lower leg: No edema  Lymphadenopathy:      Cervical: No cervical adenopathy     Skin: General: Skin is warm and dry  Coloration: Skin is not jaundiced or pale  Findings: No bruising, erythema or rash  Neurological:      General: No focal deficit present  Mental Status: She is alert  Mental status is at baseline  Psychiatric:         Mood and Affect: Mood normal          Behavior: Behavior normal           Imaging  Relevant imaging reviewed in chart    Labs  Relevant labs reviewed in chart     ASSESSMENT & PLAN      Diagnosis ICD-10-CM Associated Orders   1  Encounter for anticoagulation discussion and counseling  Z71 89    2  Acute deep vein thrombosis (DVT) of tibial vein of right lower extremity (HCC)  I82 441 rivaroxaban (XARELTO) 10 mg tablet   3  Recurrent deep vein thrombosis (DVT) (HCC)  I82 409    4  Chronic anticoagulation  Z79 01        I discussed Ayesha Lockwood's case with Dr Dennis De La Vega and we formulated the plan together  74 y o  Female with obesity seen in follow-up for recurrent unprovoked VTE on indefinite prophylactic dose Xarelto  Discussion   I extensively reviewed her blood work with her  Unfortunately she had not had blood work drawn since December 2020   I reviewed her VTE history with her, which consists of 3 unprovoked DVT, with her last DVT occurring when she was taken off of Coumadin   Given her recurrent VTE, I recommend lifelong anticoagulation  I extensively reviewed the risks of anticoagulation, which include but are not limited to a major hemorrhage in the brain, gingival hemorrhage, epistaxis, hemoptysis, nausea, hematuria, and increased bruising and bleeding  I explained why her benefits of indefinite anticoagulation, which include preventing a recurrent life-threatening VTE, outweigh the risks  I answered all her questions and she voiced understanding  she agreed to the proposed plan with shared decision making     Because no further hematologic work-up or intervention is warranted at this time, it is no longer indicated for her to follow up with me  Routine follow-up and monitoring by her PCP is sufficient  I recommend monitoring her BMP and CBC every 3-6 months to monitor renal and hemodynamic status while on long-term Xarelto  she is aware that if her lab work worsens or her PCP becomes concerned, she is welcome to make an appointment with our office again  We also welcome messaging via Epic inbox or [employer] should any questions arise  I answered all her questions and she voiced agreement  Plan/Labs   Refills of Xarelto provided in the office until she follows up with PCP Dr Ward Cornejo follow-up with PCP - recommend monitoring BMP and CBC q 3 months    Follow Up   None needed  All questions were answered to the patient's satisfaction during this encounter  They appreciated and thanked me for spending time with them  The patient knows the contact information for our office and know to reach out for any relevant concerns related to this encounter  For all other listed problems and medical diagnosis in his chart - they are managed by PCP and/or other specialists, which patient acknowledges        Eron Ferguson PA-C  Hematology & Medical Oncology

## 2022-03-08 ENCOUNTER — OFFICE VISIT (OUTPATIENT)
Dept: HEMATOLOGY ONCOLOGY | Facility: CLINIC | Age: 67
End: 2022-03-08
Payer: COMMERCIAL

## 2022-03-08 VITALS
HEART RATE: 68 BPM | SYSTOLIC BLOOD PRESSURE: 112 MMHG | OXYGEN SATURATION: 98 % | DIASTOLIC BLOOD PRESSURE: 70 MMHG | TEMPERATURE: 97.4 F | WEIGHT: 194 LBS | RESPIRATION RATE: 16 BRPM | BODY MASS INDEX: 33.12 KG/M2 | HEIGHT: 64 IN

## 2022-03-08 DIAGNOSIS — Z71.89 ENCOUNTER FOR ANTICOAGULATION DISCUSSION AND COUNSELING: Primary | ICD-10-CM

## 2022-03-08 DIAGNOSIS — Z79.01 CHRONIC ANTICOAGULATION: ICD-10-CM

## 2022-03-08 DIAGNOSIS — I82.441 ACUTE DEEP VEIN THROMBOSIS (DVT) OF TIBIAL VEIN OF RIGHT LOWER EXTREMITY (HCC): ICD-10-CM

## 2022-03-08 DIAGNOSIS — I82.409 RECURRENT DEEP VEIN THROMBOSIS (DVT) (HCC): ICD-10-CM

## 2022-03-08 PROCEDURE — 99214 OFFICE O/P EST MOD 30 MIN: CPT | Performed by: STUDENT IN AN ORGANIZED HEALTH CARE EDUCATION/TRAINING PROGRAM

## 2022-03-21 ENCOUNTER — OFFICE VISIT (OUTPATIENT)
Dept: FAMILY MEDICINE CLINIC | Facility: CLINIC | Age: 67
End: 2022-03-21
Payer: COMMERCIAL

## 2022-03-21 ENCOUNTER — HOSPITAL ENCOUNTER (OUTPATIENT)
Dept: VASCULAR ULTRASOUND | Facility: HOSPITAL | Age: 67
Discharge: HOME | End: 2022-03-21
Payer: COMMERCIAL

## 2022-03-21 VITALS
HEART RATE: 66 BPM | RESPIRATION RATE: 18 BRPM | TEMPERATURE: 97.1 F | HEIGHT: 64 IN | BODY MASS INDEX: 32.95 KG/M2 | WEIGHT: 193 LBS | DIASTOLIC BLOOD PRESSURE: 70 MMHG | SYSTOLIC BLOOD PRESSURE: 130 MMHG | OXYGEN SATURATION: 99 %

## 2022-03-21 DIAGNOSIS — G43.009 MIGRAINE WITHOUT AURA AND WITHOUT STATUS MIGRAINOSUS, NOT INTRACTABLE: ICD-10-CM

## 2022-03-21 DIAGNOSIS — M79.604 ACUTE PAIN OF RIGHT LOWER EXTREMITY: ICD-10-CM

## 2022-03-21 DIAGNOSIS — J30.1 SEASONAL ALLERGIC RHINITIS DUE TO POLLEN: ICD-10-CM

## 2022-03-21 DIAGNOSIS — I82.402 DEEP VEIN THROMBOSIS (DVT) OF LEFT LOWER EXTREMITY, UNSPECIFIED CHRONICITY, UNSPECIFIED VEIN (HCC): Primary | ICD-10-CM

## 2022-03-21 DIAGNOSIS — M76.821 POSTERIOR TIBIAL TENDINITIS OF RIGHT LOWER EXTREMITY: ICD-10-CM

## 2022-03-21 PROCEDURE — 93971 EXTREMITY STUDY: CPT

## 2022-03-21 PROCEDURE — 99214 OFFICE O/P EST MOD 30 MIN: CPT | Performed by: FAMILY MEDICINE

## 2022-03-21 PROCEDURE — 93971 EXTREMITY STUDY: CPT | Performed by: SURGERY

## 2022-03-21 RX ORDER — BUTALBITAL, ACETAMINOPHEN AND CAFFEINE 50; 325; 40 MG/1; MG/1; MG/1
1 TABLET ORAL EVERY 4 HOURS PRN
Qty: 20 TABLET | Refills: 0 | Status: SHIPPED | OUTPATIENT
Start: 2022-03-21

## 2022-03-21 NOTE — PROGRESS NOTES
Assessment/Plan:       Problem List Items Addressed This Visit        Respiratory    Seasonal allergic rhinitis due to pollen     Continue as needed antihistamines            Cardiovascular and Mediastinum    DVT of leg (deep venous thrombosis) ([facility] [address] ) - Primary     Patient is concerned about pain and swelling in her leg with history of DVT would like re-evaluation now and ultrasound  Right LE pain gastroc likely strain injury but check vennous US  Continue Xarelto  Migraine, unspecified, not intractable, without status migrainosus     Refill and renewal on Fioricet requested at this time         Relevant Medications    butalbital-acetaminophen-caffeine (FIORICET,ESGIC) -40 mg per tablet       Musculoskeletal and Integument    Posterior tibial tendinitis of right lower extremity     Tendinitis and muscular pain she will hold off on her support stockings over the next week ultrasound will be done now and if not improved physical therapy for moist heat and ultrasound treatment and stretching         Relevant Orders    Ambulatory Referral to Physical Therapy            Subjective:      Patient ID: Elda Plummer is a 74 y o  female  Patient presents for swelling and pain in her leg denies injury      The following portions of the patient's history were reviewed and updated as appropriate: allergies, current medications, past family history, past medical history, past social history, past surgical history and problem list     Review of Systems   Constitutional: Negative for chills, fatigue and fever  HENT: Negative for congestion, nosebleeds, rhinorrhea, sinus pressure and sore throat  Eyes: Negative for discharge and redness  Respiratory: Negative for cough and shortness of breath  Cardiovascular: Negative for chest pain, palpitations and leg swelling  Gastrointestinal: Negative for abdominal pain, blood in stool and nausea     Endocrine: Negative for cold intolerance, heat intolerance and polyuria  Genitourinary: Negative for dysuria and frequency  Musculoskeletal: Negative for arthralgias, back pain and myalgias  Skin: Negative for rash  Neurological: Negative for dizziness, weakness and headaches  Hematological: Negative for adenopathy  Psychiatric/Behavioral: Negative for behavioral problems and sleep disturbance  The patient is not nervous/anxious  Objective:      /70 (BP Location: Left arm, Patient Position: Sitting)   Pulse 66   Temp (!) 97 1 °F (36 2 °C)   Resp 18   Ht 5' 4" (1 626 m)   Wt 87 5 kg (193 lb)   LMP  (LMP Unknown)   SpO2 99%   BMI 33 13 kg/m²        Physical Exam  Vitals and nursing note reviewed  Constitutional:       General: She is not in acute distress  Appearance: Normal appearance  She is well-developed and normal weight  HENT:      Head: Normocephalic and atraumatic  Right Ear: Tympanic membrane, ear canal and external ear normal       Left Ear: Tympanic membrane, ear canal and external ear normal       Nose: Nose normal       Mouth/Throat:      Mouth: Mucous membranes are moist       Pharynx: Oropharynx is clear  No oropharyngeal exudate  Eyes:      General: No scleral icterus  Right eye: No discharge  Left eye: No discharge  Extraocular Movements: Extraocular movements intact  Conjunctiva/sclera: Conjunctivae normal       Pupils: Pupils are equal, round, and reactive to light  Neck:      Thyroid: No thyromegaly  Vascular: No JVD  Cardiovascular:      Rate and Rhythm: Normal rate and regular rhythm  Pulses: Normal pulses  Heart sounds: Normal heart sounds  No murmur heard  Pulmonary:      Effort: Pulmonary effort is normal       Breath sounds: No wheezing or rales  Chest:      Chest wall: No tenderness  Abdominal:      General: Bowel sounds are normal  There is no distension  Palpations: Abdomen is soft  There is no mass  Tenderness:  There is no abdominal tenderness  Musculoskeletal:         General: No tenderness or deformity  Normal range of motion  Cervical back: Normal range of motion  Lymphadenopathy:      Cervical: No cervical adenopathy  Skin:     General: Skin is warm and dry  Capillary Refill: Capillary refill takes less than 2 seconds  Findings: No rash  Neurological:      General: No focal deficit present  Mental Status: She is alert and oriented to person, place, and time  Mental status is at baseline  Cranial Nerves: No cranial nerve deficit  Coordination: Coordination normal       Deep Tendon Reflexes: Reflexes are normal and symmetric  Reflexes normal    Psychiatric:         Mood and Affect: Mood normal          Behavior: Behavior normal          Thought Content: Thought content normal          Judgment: Judgment normal           Data:    Laboratory Results: I have personally reviewed the pertinent laboratory results/reports   Radiology/Other Diagnostic Testing Results: I have personally reviewed pertinent reports         Lab Results   Component Value Date    WBC 6 25 12/01/2020    HGB 13 7 12/01/2020    HCT 43 5 12/01/2020    MCV 86 12/01/2020     12/01/2020     Lab Results   Component Value Date     08/20/2015    K 4 0 12/01/2020     12/01/2020    CO2 30 12/01/2020    ANIONGAP 8 6 08/20/2015    BUN 18 12/01/2020    CREATININE 0 86 12/01/2020    GLUCOSE 88 08/20/2015    GLUF 86 12/01/2020    CALCIUM 9 1 12/01/2020    AST 24 12/01/2020    ALT 47 12/01/2020    ALKPHOS 127 (H) 12/01/2020    PROT 7 1 08/20/2015    BILITOT 0 3 08/20/2015    EGFR 70 12/01/2020     Lab Results   Component Value Date    CHOLESTEROL 230 (H) 08/23/2021    CHOLESTEROL 196 12/01/2020    CHOLESTEROL 193 08/03/2019     Lab Results   Component Value Date    HDL 49 08/23/2021    HDL 47 12/01/2020    HDL 42 08/03/2019     Lab Results   Component Value Date    LDLCALC 129 (H) 08/23/2021    LDLCALC 104 (H) 12/01/2020    LDLCALC 116 (H) 08/03/2019     Lab Results   Component Value Date    TRIG 259 (H) 08/23/2021    TRIG 226 (H) 12/01/2020    TRIG 177 (H) 08/03/2019     No results found for: [place], [place]  Lab Results   Component Value Date    [id number] 2 330 08/23/2021     Lab Results   Component Value Date    HGBA1C 5 4 08/23/2021     No results found for: MATT Perdomo DO

## 2022-03-21 NOTE — ASSESSMENT & PLAN NOTE
Tendinitis and muscular pain she will hold off on her support stockings over the next week ultrasound will be done now and if not improved physical therapy for moist heat and ultrasound treatment and stretching

## 2022-03-29 ENCOUNTER — EVALUATION (OUTPATIENT)
Dept: PHYSICAL THERAPY | Age: 67
End: 2022-03-29
Payer: COMMERCIAL

## 2022-03-29 DIAGNOSIS — M76.821 POSTERIOR TIBIAL TENDINITIS OF RIGHT LOWER EXTREMITY: ICD-10-CM

## 2022-03-29 PROCEDURE — 97110 THERAPEUTIC EXERCISES: CPT | Performed by: PHYSICAL THERAPIST

## 2022-03-29 PROCEDURE — 97140 MANUAL THERAPY 1/> REGIONS: CPT | Performed by: PHYSICAL THERAPIST

## 2022-03-29 PROCEDURE — 97162 PT EVAL MOD COMPLEX 30 MIN: CPT | Performed by: PHYSICAL THERAPIST

## 2022-04-04 DIAGNOSIS — J45.21 MILD INTERMITTENT ASTHMA WITH ACUTE EXACERBATION: ICD-10-CM

## 2022-04-04 RX ORDER — FLUTICASONE PROPIONATE 220 UG/1
AEROSOL, METERED RESPIRATORY (INHALATION)
Qty: 12 G | Refills: 1 | Status: SHIPPED | OUTPATIENT
Start: 2022-04-04

## 2022-04-05 ENCOUNTER — OFFICE VISIT (OUTPATIENT)
Dept: PHYSICAL THERAPY | Age: 67
End: 2022-04-05
Payer: COMMERCIAL

## 2022-04-05 DIAGNOSIS — M76.821 POSTERIOR TIBIAL TENDINITIS OF RIGHT LOWER EXTREMITY: Primary | ICD-10-CM

## 2022-04-05 PROCEDURE — 97110 THERAPEUTIC EXERCISES: CPT | Performed by: PHYSICAL THERAPIST

## 2022-04-05 PROCEDURE — 97140 MANUAL THERAPY 1/> REGIONS: CPT | Performed by: PHYSICAL THERAPIST

## 2022-04-05 NOTE — PROGRESS NOTES
Daily Note     Today's date: 2022  Patient name: Twyla Velazquez  : 1955  MRN: [id number]  Referring provider: Rachel Deal DO  Dx:   Encounter Diagnosis     ICD-10-CM    1  Posterior tibial tendinitis of right lower extremity  M76 821        Start Time: 1515  Stop Time: 1600  Total time in clinic (min): 45 minutes    Subjective: Pt reports she got a massage and they worked on her feet yesterday  She feels a little better and has less pain      Objective: See treatment diary below      Assessment: Pt has less tenderness in medial ankle but medial calf remains sensitive to light pressure  Plan: Continue per plan of care        Precautions: standard, L TKR(limited motion<90)      Manuals 3/29 4/5           PROM/stretch  5' 8' B           IASTM laser plantar protocol 6'  6'                        TherEx             doming 10x seated 20x           Post tib ball squeeze heel lift 10x seated 20x           prostretch seated 5x10" 10"x10           Tband arch lift YTB 10x YTB 20x           Tband B EV  20x                                                                                                                                                                       Ther Activity                                       Gait Training                                       Modalities

## 2022-04-08 ENCOUNTER — OFFICE VISIT (OUTPATIENT)
Dept: PHYSICAL THERAPY | Age: 67
End: 2022-04-08
Payer: COMMERCIAL

## 2022-04-08 DIAGNOSIS — M76.821 POSTERIOR TIBIAL TENDINITIS OF RIGHT LOWER EXTREMITY: Primary | ICD-10-CM

## 2022-04-08 PROCEDURE — 97110 THERAPEUTIC EXERCISES: CPT | Performed by: PHYSICAL THERAPIST

## 2022-04-08 PROCEDURE — 97140 MANUAL THERAPY 1/> REGIONS: CPT | Performed by: PHYSICAL THERAPIST

## 2022-04-08 NOTE — PROGRESS NOTES
Daily Note     Today's date: 2022  Patient name: Helen Jeffers  : 1955  MRN: [id number]  Referring provider: Neri Mchugh DO  Dx:   Encounter Diagnosis     ICD-10-CM    1  Posterior tibial tendinitis of right lower extremity  M76 821                   Subjective: Pt reports her foot does feel better  She will be walking more next week while on vacation which will test it  Objective: See treatment diary below      Assessment: Pt has decreased tissue irritability, decreased overall pain  Plan: Continue per plan of care        Precautions: standard, L TKR(limited motion<90)      Manuals 3/29 4/5 4/8          PROM/stretch  5' 8' B 8          IASTM laser plantar protocol 6'  6' 6                       TherEx             Seated heel raise   30x          doming 10x seated 20x 30x          Post tib ball squeeze heel lift 10x seated 20x 30x          prostretch seated 5x10" 10"x10 10x          Tband arch lift YTB 10x YTB 20x 30x          Tband B EV  20x 30x          Tband spider walk   10x          Heel raise B on step   10x          SL heelraise R   3x                                                                                                                               Ther Activity                                       Gait Training                                       Modalities

## 2022-04-12 ENCOUNTER — OFFICE VISIT (OUTPATIENT)
Dept: PHYSICAL THERAPY | Age: 67
End: 2022-04-12
Payer: COMMERCIAL

## 2022-04-12 DIAGNOSIS — M76.821 POSTERIOR TIBIAL TENDINITIS OF RIGHT LOWER EXTREMITY: Primary | ICD-10-CM

## 2022-04-12 PROCEDURE — 97140 MANUAL THERAPY 1/> REGIONS: CPT | Performed by: PHYSICAL THERAPIST

## 2022-04-12 PROCEDURE — 97110 THERAPEUTIC EXERCISES: CPT | Performed by: PHYSICAL THERAPIST

## 2022-04-12 NOTE — PROGRESS NOTES
Daily Note     Today's date: 2022  Patient name: Cassandria Severance  : 1955  MRN: [id number]  Referring provider: David Gramajo DO  Dx:   Encounter Diagnosis     ICD-10-CM    1  Posterior tibial tendinitis of right lower extremity  M76 821        Start Time: 1530  Stop Time: 3663  Total time in clinic (min): 45 minutes    Subjective: Pt reports her pain has decreased form 7 to 4/10  She still has some medial calf tenderness on right LE  Objective: See treatment diary below      Assessment: Focal tenderness in plantar fascia B  Fatigues easily during exercises  Plan: Continue per plan of care        Precautions: standard, L TKR(limited motion<90)      Manuals 3/29 4/5 4/8 4/12         PROM/stretch  5' 8' B 8 8         IASTM laser plantar protocol 6'  6' 6 6                      TherEx             Seated heel raise   30x 30x         doming 10x seated 20x 30x 30x         Post tib ball squeeze heel lift 10x seated 20x 30x 30x         prostretch seated 5x10" 10"x10 10x 10x         Tband arch lift YTB 10x YTB 20x 30x 30x         Tband B EV  20x 30x 30x         Tband spider walk   10x 10x         Heel raise B on step   10x 10x         SL heelraise R   3x 5x                                                                                                                              Ther Activity                                       Gait Training                                       Modalities

## 2022-04-14 ENCOUNTER — APPOINTMENT (OUTPATIENT)
Dept: PHYSICAL THERAPY | Age: 67
End: 2022-04-14
Payer: COMMERCIAL

## 2022-04-19 ENCOUNTER — HOSPITAL ENCOUNTER (OUTPATIENT)
Dept: MAMMOGRAPHY | Facility: CLINIC | Age: 67
Discharge: HOME | End: 2022-04-19
Payer: COMMERCIAL

## 2022-04-19 ENCOUNTER — APPOINTMENT (OUTPATIENT)
Dept: PHYSICAL THERAPY | Age: 67
End: 2022-04-19
Payer: COMMERCIAL

## 2022-04-19 VITALS — BODY MASS INDEX: 33.46 KG/M2 | WEIGHT: 196 LBS | HEIGHT: 64 IN

## 2022-04-19 DIAGNOSIS — Z12.31 SCREENING MAMMOGRAM, ENCOUNTER FOR: ICD-10-CM

## 2022-04-19 PROCEDURE — 77067 SCR MAMMO BI INCL CAD: CPT

## 2022-04-19 PROCEDURE — 77063 BREAST TOMOSYNTHESIS BI: CPT

## 2022-04-22 ENCOUNTER — OFFICE VISIT (OUTPATIENT)
Dept: PHYSICAL THERAPY | Age: 67
End: 2022-04-22
Payer: COMMERCIAL

## 2022-04-22 DIAGNOSIS — M76.821 POSTERIOR TIBIAL TENDINITIS OF RIGHT LOWER EXTREMITY: Primary | ICD-10-CM

## 2022-04-22 PROCEDURE — 97110 THERAPEUTIC EXERCISES: CPT

## 2022-04-22 PROCEDURE — 97140 MANUAL THERAPY 1/> REGIONS: CPT

## 2022-04-22 NOTE — PROGRESS NOTES
Daily Note     Today's date: 2022  Patient name: Td Bailey  : 1955  MRN: [id number]  Referring provider: Daniele Ortega DO  Dx:   Encounter Diagnosis     ICD-10-CM    1  Posterior tibial tendinitis of right lower extremity  M76 821        Start Time: 1030  Stop Time: 1115  Total time in clinic (min): 45 minutes    Subjective: Reports increased pain at her R foot along her arch and medial lower leg  States she was standing for a prolonged time when she was out of town and needed to carry a chair with her in the airport because she was in so much pain  Feels better today, but continues with pain that is elevated  Also reports having a migraine today  Objective: See treatment diary below      Assessment: Tolerated treatment fairly well  TTP along arch and medial ankle/lower leg, as well as at her anterior foot and ankle  No c/o elevated pain post session  Patient was able to complete her program as outlined below  Continues with a positive response to laser treatment  Patient would benefit from continued PT  Plan: Continue per plan of care        Precautions: standard, L TKR(limited motion<90)      Manuals 3/29 4/5 4/8 4/12 4/22        PROM/stretch  5' 8' B 8 8 10'        IASTM laser plantar protocol 6'  6' 6 6 6'                     TherEx             Seated heel raise   30x 30x 30x         doming 10x seated 20x 30x 30x 30x         Post tib ball squeeze heel lift 10x seated 20x 30x 30x 30x         prostretch seated 5x10" 10"x10 10x 10x 10''x10        Tband arch lift YTB 10x YTB 20x 30x 30x 30x YTB         Tband B EV  20x 30x 30x YTB 30x         Tband spider walk   10x 10x YTB 10x         Heel raise B on step   10x 10x 10x         SL heelraise R   3x 5x 5x                                                                                                                             Ther Activity                                       Gait Training Modalities

## 2022-04-26 ENCOUNTER — OFFICE VISIT (OUTPATIENT)
Dept: PHYSICAL THERAPY | Age: 67
End: 2022-04-26
Payer: COMMERCIAL

## 2022-04-26 DIAGNOSIS — M76.821 POSTERIOR TIBIAL TENDINITIS OF RIGHT LOWER EXTREMITY: Primary | ICD-10-CM

## 2022-04-26 PROCEDURE — 97110 THERAPEUTIC EXERCISES: CPT | Performed by: PHYSICAL THERAPIST

## 2022-04-26 PROCEDURE — 97140 MANUAL THERAPY 1/> REGIONS: CPT | Performed by: PHYSICAL THERAPIST

## 2022-04-26 NOTE — PROGRESS NOTES
Daily Note     Today's date: 2022  Patient name: Ave Zepeda  : 1955  MRN: [id number]  Referring provider: Ruchi Garcia DO  Dx:   Encounter Diagnosis     ICD-10-CM    1  Posterior tibial tendinitis of right lower extremity  M76 821        Start Time: 90  Stop Time: 1600  Total time in clinic (min): 45 minutes    Subjective: No new complaints  Objective: See treatment diary below      Assessment: Pt less tender in left PF, right remains tender to palpation  Pt still struggles with ECC phase of step downs on right  Improving with decrease symptoms  Plan: Continue per plan of care        Precautions: standard, L TKR(limited motion<90)      Manuals 3/29 4/5 4/8 4/12 4/22 4/26       PROM/stretch  5' 8' B 8 8 10' 10'       IASTM laser plantar protocol 6'  6' 6 6 6' 6'                    TherEx             Seated heel raise   30x 30x 30x  30x       doming 10x seated 20x 30x 30x 30x  30x       Post tib ball squeeze heel lift 10x seated 20x 30x 30x 30x  30x       prostretch seated 5x10" 10"x10 10x 10x 10''x10 10x       Tband arch lift YTB 10x YTB 20x 30x 30x 30x YTB  30x       Tband B EV  20x 30x 30x YTB 30x  30x       Tband spider walk   10x 10x YTB 10x  15x       Heel raise B on step   10x 10x 10x  10x       SL heelraise R   3x 5x 5x 5x                                                                                                                            Ther Activity                                       Gait Training                                       Modalities

## 2022-04-29 ENCOUNTER — OFFICE VISIT (OUTPATIENT)
Dept: PHYSICAL THERAPY | Age: 67
End: 2022-04-29
Payer: COMMERCIAL

## 2022-04-29 DIAGNOSIS — M76.821 POSTERIOR TIBIAL TENDINITIS OF RIGHT LOWER EXTREMITY: Primary | ICD-10-CM

## 2022-04-29 PROCEDURE — 97140 MANUAL THERAPY 1/> REGIONS: CPT

## 2022-04-29 PROCEDURE — 97110 THERAPEUTIC EXERCISES: CPT

## 2022-04-29 NOTE — PROGRESS NOTES
Daily Note     Today's date: 2022  Patient name: Mariah Deleon  : 1955  MRN: [id number]  Referring provider: Olga Brothers DO  Dx:   Encounter Diagnosis     ICD-10-CM    1  Posterior tibial tendinitis of right lower extremity  M76 821        Start Time:   Stop Time: 165  Total time in clinic (min): 50 minutes    Subjective: Reports feeling great upon arrival, states she had a massage before coming and the massage therapist worked on her feet  Objective: See treatment diary below      Assessment: Tolerated treatment well  Challenged by single limb heel raise, but was able to complete x5 reps  Cues for carryover of program and to ensure proper dosage of prescribed exercises are performed  Demonstrations t/o for proper form  No c/o pain post session  Patient would benefit from continued PT  Plan: Continue per plan of care        Precautions: standard, L TKR(limited motion<90)      Manuals 3/29 4/5 4/8 4/12 4/22 4/26 4/29      PROM/stretch  5' 8' B 8 8 10' 10' 10'      IASTM laser plantar protocol 6'  6' 6 6 6' 6' 6'                   TherEx             Seated heel raise   30x 30x 30x  30x 30x       doming 10x seated 20x 30x 30x 30x  30x 30x      Post tib ball squeeze heel lift 10x seated 20x 30x 30x 30x  30x 30x       prostretch seated 5x10" 10"x10 10x 10x 10''x10 10x 10''x10      Tband arch lift YTB 10x YTB 20x 30x 30x 30x YTB  30x 30x YTB      Tband B EV  20x 30x 30x YTB 30x  30x 30x      Tband spider walk   10x 10x YTB 10x  15x 15x      Heel raise B on step   10x 10x 10x  10x 10x      SL heelraise R   3x 5x 5x 5x 5x                                                                                                                            Ther Activity                                       Gait Training                                       Modalities
Spine appears normal, range of motion is not limited, no muscle or joint tenderness

## 2022-05-03 ENCOUNTER — OFFICE VISIT (OUTPATIENT)
Dept: PHYSICAL THERAPY | Age: 67
End: 2022-05-03
Payer: COMMERCIAL

## 2022-05-03 DIAGNOSIS — M76.821 POSTERIOR TIBIAL TENDINITIS OF RIGHT LOWER EXTREMITY: Primary | ICD-10-CM

## 2022-05-03 PROCEDURE — 97110 THERAPEUTIC EXERCISES: CPT | Performed by: PHYSICAL THERAPIST

## 2022-05-03 PROCEDURE — 97140 MANUAL THERAPY 1/> REGIONS: CPT | Performed by: PHYSICAL THERAPIST

## 2022-05-03 NOTE — PROGRESS NOTES
Daily Note     Today's date: 5/3/2022  Patient name: Lydia Hernandez  : 1955  MRN: [id number]  Referring provider: Henri Argueta DO  Dx:   Encounter Diagnosis     ICD-10-CM    1  Posterior tibial tendinitis of right lower extremity  M76 821        Start Time: 1520  Stop Time: 1600  Total time in clinic (min): 40 minutes    Subjective: pt was walking around mall prior to PT, some soreness in right foot reported prior to PT  Objective: See treatment diary below      Assessment: Pt has some tenderness along right plantar foot and small area of modular tissue along plantar fascia and post tibial tendon into the plantar foot  Plan: Continue per plan of care        Precautions: standard, L TKR(limited motion<90)      Manuals 3/29 4/5 4/8 4/12 4/22 4/26 4/29 5/3     PROM/stretch  5' 8' B 8 8 10' 10' 10' 10     IASTM laser plantar protocol 6'  6' 6 6 6' 6' 6' 6                  TherEx             Seated heel raise   30x 30x 30x  30x 30x  30x     doming 10x seated 20x 30x 30x 30x  30x 30x 30x     Post tib ball squeeze heel lift 10x seated 20x 30x 30x 30x  30x 30x  30x     prostretch seated 5x10" 10"x10 10x 10x 10''x10 10x 10''x10 10x10"     Tband arch lift YTB 10x YTB 20x 30x 30x 30x YTB  30x 30x YTB 30x     Tband B EV  20x 30x 30x YTB 30x  30x 30x 30x     Tband spider walk   10x 10x YTB 10x  15x 15x 15x     Heel raise B on step   10x 10x 10x  10x 10x 10x     SL heelraise R   3x 5x 5x 5x 5x  5x                                                                                                                          Ther Activity                                       Gait Training                                       Modalities

## 2022-05-04 NOTE — PROGRESS NOTES
Daily Note     Today's date: 2019  Patient name: Kye Ramirez  : 1955  MRN: [id number]  Referring provider: John Levy DO  Dx:   Encounter Diagnosis     ICD-10-CM    1  Cervical radiculopathy M54 12        Start Time: 0710  Stop Time: 0800  Total time in clinic (min): 50 minutes    Subjective: Patient reports she continues to see improvement in her cervical area especially while driving  Objective: See treatment diary below      Assessment: Tolerated treatment well  Patient would benefit from continued PT      Plan: Continue per plan of care  Progress treatment as tolerated         Precautions: DJD/OA, Migraines, TKR      Manual          MT Cervical: S/O area 15 10 10 10 10        MFR 10 10 10 10 10        Arm Pulls 5 5 5 5 5                                      Exercise Diary          Cervical ROM/Isometric 15 15 15 15 15        Postural Ed  5 5 5 5 5        Cervical AROM  10 10 10 10        Corner Push up  15 15 15 15                                                                                                                                                                                 HEP Stretches/  Postural ex 5                                          Modalities          HT with e/stim 15 15 15 15 15 oral

## 2022-05-06 ENCOUNTER — OFFICE VISIT (OUTPATIENT)
Dept: PHYSICAL THERAPY | Age: 67
End: 2022-05-06
Payer: COMMERCIAL

## 2022-05-06 DIAGNOSIS — M76.821 POSTERIOR TIBIAL TENDINITIS OF RIGHT LOWER EXTREMITY: Primary | ICD-10-CM

## 2022-05-06 PROCEDURE — 97110 THERAPEUTIC EXERCISES: CPT

## 2022-05-06 PROCEDURE — 97112 NEUROMUSCULAR REEDUCATION: CPT

## 2022-05-06 PROCEDURE — 97140 MANUAL THERAPY 1/> REGIONS: CPT

## 2022-05-06 NOTE — PROGRESS NOTES
Daily Note     Today's date: 2022  Patient name: Td Bailey  : 1955  MRN: [id number]  Referring provider: Daniele Ortega DO  Dx:   Encounter Diagnosis     ICD-10-CM    1  Posterior tibial tendinitis of right lower extremity  M76 821                   Subjective: Increased pian in R arch noted yesterday, resolved following tylenol and rest        Objective: See treatment diary below      Assessment: Continued tenderness along plantar aspect and heel  Laser effective for pain reduction  Fatigues quickly, rest breaks to tolerance  Patient would benefit from continued PT  Plan: Continue per plan of care        Precautions: standard, L TKR(limited motion<90)      Manuals 3/29 4/5 4/8 4/12 4/22 4/26 4/29 5/3 5/6    PROM/stretch  5' 8' B 8 8 10' 10' 10' 10 10'    IASTM laser plantar protocol 6'  6' 6 6 6' 6' 6' 6 6'                 TherEx             Seated heel raise   30x 30x 30x  30x 30x  30x 30x    doming 10x seated 20x 30x 30x 30x  30x 30x 30x 30x    Post tib ball squeeze heel lift 10x seated 20x 30x 30x 30x  30x 30x  30x 30x    prostretch seated 5x10" 10"x10 10x 10x 10''x10 10x 10''x10 10x10" 10x10"    Tband arch lift YTB 10x YTB 20x 30x 30x 30x YTB  30x 30x YTB 30x 30 YTB    Tband B EV  20x 30x 30x YTB 30x  30x 30x 30x 30x    Tband spider walk   10x 10x YTB 10x  15x 15x 15x 15x    Heel raise B on step   10x 10x 10x  10x 10x 10x 10x    SL heelraise R   3x 5x 5x 5x 5x  5x 5x                                                                                                                         Ther Activity                                       Gait Training                                       Modalities

## 2022-05-09 ENCOUNTER — APPOINTMENT (OUTPATIENT)
Dept: PHYSICAL THERAPY | Age: 67
End: 2022-05-09
Payer: COMMERCIAL

## 2022-05-10 ENCOUNTER — OFFICE VISIT (OUTPATIENT)
Dept: PHYSICAL THERAPY | Age: 67
End: 2022-05-10
Payer: COMMERCIAL

## 2022-05-10 DIAGNOSIS — M76.821 POSTERIOR TIBIAL TENDINITIS OF RIGHT LOWER EXTREMITY: Primary | ICD-10-CM

## 2022-05-10 PROCEDURE — 97110 THERAPEUTIC EXERCISES: CPT | Performed by: PHYSICAL THERAPIST

## 2022-05-10 PROCEDURE — 97140 MANUAL THERAPY 1/> REGIONS: CPT | Performed by: PHYSICAL THERAPIST

## 2022-05-10 NOTE — PROGRESS NOTES
Daily Note     Today's date: 5/10/2022  Patient name: Thalia Aponte  : 1955  MRN: [id number]  Referring provider: Gabino Tolentino DO  Dx:   Encounter Diagnosis     ICD-10-CM    1  Posterior tibial tendinitis of right lower extremity  M76 821        Start Time: 1615  Stop Time: 1700  Total time in clinic (min): 45 minutes    Subjective: Pt reports less pain overall, improving  Objective: See treatment diary below      Assessment: Much less tenderness along plantar foot  Improved PF/IN strength  Plan: Continue per plan of care        Precautions: standard, L TKR(limited motion<90)      Manuals 3/29 4/5 4/8 4/12 4/22 4/26 4/29 5/3 5/6 5/10   PROM/stretch  5' 8' B 8 8 10' 10' 10' 10 10' 10   IASTM laser plantar protocol 6'  6' 6 6 6' 6' 6' 6 6' 6'                TherEx             Seated heel raise   30x 30x 30x  30x 30x  30x 30x 30x   doming 10x seated 20x 30x 30x 30x  30x 30x 30x 30x 30x   Post tib ball squeeze heel lift 10x seated 20x 30x 30x 30x  30x 30x  30x 30x 30x   prostretch seated 5x10" 10"x10 10x 10x 10''x10 10x 10''x10 10x10" 10x10" 10x   Tband arch lift YTB 10x YTB 20x 30x 30x 30x YTB  30x 30x YTB 30x 30 YTB 30x YTB   Tband B EV  20x 30x 30x YTB 30x  30x 30x 30x 30x 30x   Tband spider walk   10x 10x YTB 10x  15x 15x 15x 15x 15x   Heel raise B on step   10x 10x 10x  10x 10x 10x 10x 10x   SL heelraise R   3x 5x 5x 5x 5x  5x 5x 5x                                                                                                                        Ther Activity                                       Gait Training                                       Modalities

## 2022-05-12 ENCOUNTER — OFFICE VISIT (OUTPATIENT)
Dept: PHYSICAL THERAPY | Age: 67
End: 2022-05-12
Payer: COMMERCIAL

## 2022-05-12 DIAGNOSIS — M76.821 POSTERIOR TIBIAL TENDINITIS OF RIGHT LOWER EXTREMITY: Primary | ICD-10-CM

## 2022-05-12 PROCEDURE — 97140 MANUAL THERAPY 1/> REGIONS: CPT

## 2022-05-12 PROCEDURE — 97110 THERAPEUTIC EXERCISES: CPT

## 2022-05-12 NOTE — PROGRESS NOTES
Daily Note     Today's date: 2022  Patient name: Sandy Colon  : 1955  MRN: [id number]  Referring provider: Shu Powell DO  Dx:   Encounter Diagnosis     ICD-10-CM    1  Posterior tibial tendinitis of right lower extremity  M76 821        Start Time: 1545  Stop Time: 1625  Total time in clinic (min): 40 minutes    Subjective: States she did not have time to think about pain at work today, wore her good shoes  Objective: See treatment diary below      Assessment: Tolerated treatment well  Patient was able to complete program as outlined below  Cues for carryover, proper technique, and to ensure proper number of reps were completed  No c/o pain t/o session today  Patient would benefit from continued PT  Plan: Continue per plan of care        Precautions: standard, L TKR(limited motion<90)      Manuals 5/12  4/8 4/12 4/22 4/26 4/29 5/3 5/6 5/10   PROM/stretch  10'  8 8 10' 10' 10' 10 10' 10   IASTM laser plantar protocol 6'  6 6 6' 6' 6' 6 6' 6'                TherEx             Seated heel raise 30x  30x 30x 30x  30x 30x  30x 30x 30x   doming 30x  30x 30x 30x  30x 30x 30x 30x 30x   Post tib ball squeeze heel lift 30x   30x 30x 30x  30x 30x  30x 30x 30x   prostretch seated 10''x10  10x 10x 10''x10 10x 10''x10 10x10" 10x10" 10x   Tband arch lift 30x RED  30x 30x 30x YTB  30x 30x YTB 30x 30 YTB 30x YTB   Tband B EV 30x RED  30x 30x YTB 30x  30x 30x 30x 30x 30x   Tband spider walk 15x RED  10x 10x YTB 10x  15x 15x 15x 15x 15x   Heel raise B on step 10x  10x 10x 10x  10x 10x 10x 10x 10x   SL heelraise R 5x  3x 5x 5x 5x 5x  5x 5x 5x                                                                                                                        Ther Activity                                       Gait Training                                       Modalities

## 2022-05-18 ENCOUNTER — OFFICE VISIT (OUTPATIENT)
Dept: PHYSICAL THERAPY | Age: 67
End: 2022-05-18
Payer: COMMERCIAL

## 2022-05-18 DIAGNOSIS — M76.821 POSTERIOR TIBIAL TENDINITIS OF RIGHT LOWER EXTREMITY: Primary | ICD-10-CM

## 2022-05-18 PROCEDURE — 97140 MANUAL THERAPY 1/> REGIONS: CPT

## 2022-05-18 PROCEDURE — 97110 THERAPEUTIC EXERCISES: CPT

## 2022-05-18 NOTE — PROGRESS NOTES
Daily Note     Today's date: 2022  Patient name: Yohana Velasquez  : 1955  MRN: [id number]  Referring provider: Jonatan Stevens DO  Dx:   Encounter Diagnosis     ICD-10-CM    1  Posterior tibial tendinitis of right lower extremity  M76 821                   Subjective: Pt reports that she is feeling tired today and is not really thinking about her pain  Objective: See treatment diary below      Assessment: Tolerated treatment well  Encouragement was needed today for pt to perform full rep count with exercises  Challenged with the standing exercises but able to complete  Increased tenderness present along b/l plantar regions with STM  Re educated pt on the importance of gastroc tightness  Patient would benefit from continued PT  Plan: Continue per plan of care        Precautions: standard, L TKR(limited motion<90)      Manuals 5/12 5/18   4/22 4/26 4/29 5/3 5/6 5/10   PROM/stretch  10' 10'   10' 10' 10' 10 10' 10   IASTM laser plantar protocol 6' 6'   6' 6' 6' 6 6' 6'                TherEx             Seated heel raise 30x 30x   30x  30x 30x  30x 30x 30x   doming 30x 30x   30x  30x 30x 30x 30x 30x   Post tib ball squeeze heel lift 30x  30x   30x  30x 30x  30x 30x 30x   prostretch seated 10''x10 10"x10   10''x10 10x 10''x10 10x10" 10x10" 10x   Tband arch lift 30x RED 30x RED   30x YTB  30x 30x YTB 30x 30 YTB 30x YTB   Tband B EV 30x RED 30x Red   YTB 30x  30x 30x 30x 30x 30x   Tband spider walk 15x RED 15x RED   YTB 10x  15x 15x 15x 15x 15x   Heel raise B on step 10x 10x   10x  10x 10x 10x 10x 10x   SL heelraise R 5x 5x   5x 5x 5x  5x 5x 5x                                                                                                                        Ther Activity                                       Gait Training                                       Modalities

## 2022-05-20 ENCOUNTER — OFFICE VISIT (OUTPATIENT)
Dept: PHYSICAL THERAPY | Age: 67
End: 2022-05-20
Payer: COMMERCIAL

## 2022-05-20 DIAGNOSIS — M76.821 POSTERIOR TIBIAL TENDINITIS OF RIGHT LOWER EXTREMITY: Primary | ICD-10-CM

## 2022-05-20 PROCEDURE — 97110 THERAPEUTIC EXERCISES: CPT

## 2022-05-20 PROCEDURE — 97140 MANUAL THERAPY 1/> REGIONS: CPT

## 2022-05-23 ENCOUNTER — OFFICE VISIT (OUTPATIENT)
Dept: PHYSICAL THERAPY | Age: 67
End: 2022-05-23
Payer: COMMERCIAL

## 2022-05-23 DIAGNOSIS — M76.821 POSTERIOR TIBIAL TENDINITIS OF RIGHT LOWER EXTREMITY: Primary | ICD-10-CM

## 2022-05-23 PROCEDURE — 97110 THERAPEUTIC EXERCISES: CPT

## 2022-05-23 PROCEDURE — 97140 MANUAL THERAPY 1/> REGIONS: CPT

## 2022-05-23 NOTE — PROGRESS NOTES
Daily Note     Today's date: 2022  Patient name: Kaur Pierre  : 1955  MRN: [id number]  Referring provider: John Wolf DO  Dx:   Encounter Diagnosis     ICD-10-CM    1  Posterior tibial tendinitis of right lower extremity  M76 821        Start Time: 1435  Stop Time: 1515  Total time in clinic (min): 40 minutes    Subjective: Reports doing a lot of running around today       Objective: See treatment diary below      Assessment: Tolerated treatment well  Unable to progress HR on step today secondary to L foot pain  Relief felt at R foot/heel post laser/IASTM  Cues for carryover of program and to ensure proper dosage of prescribed exercises are performed  Patient would benefit from continued PT  Plan: Continue per plan of care        Precautions: standard, L TKR(limited motion<90)      Manuals 5/12 5/18 5/20 5/23  4/26 4/29 5/3 5/6 5/10   PROM/stretch  10' 10' 10' 10'  10' 10' 10 10' 10   IASTM laser plantar protocol 6' 6' 6' 6'  6' 6' 6 6' 6'                TherEx             Seated heel raise 30x 30x 30x 30x   30x 30x  30x 30x 30x   doming 30x 30x 30x 30x   30x 30x 30x 30x 30x   Post tib ball squeeze heel lift 30x  30x 30x 30x   30x 30x  30x 30x 30x   prostretch seated 10''x10 10"x10 10"x 10 10''x10   10x 10''x10 10x10" 10x10" 10x   Tband arch lift 30x RED 30x RED 30x Red RTB 30x   30x 30x YTB 30x 30 YTB 30x YTB   Tband B EV 30x RED 30x Red 30x Red RTB 30x  30x 30x 30x 30x 30x   Tband spider walk 15x RED 15x RED 15x Red RTB 15x  15x 15x 15x 15x 15x   Heel raise B on step 10x 10x 10x 10x  10x 10x 10x 10x 10x   SL heelraise R 5x 5x 5x 5x   5x 5x  5x 5x 5x                                                                                                                        Ther Activity                                       Gait Training                                       Modalities

## 2022-05-24 ENCOUNTER — OFFICE VISIT (OUTPATIENT)
Dept: FAMILY MEDICINE CLINIC | Facility: CLINIC | Age: 67
End: 2022-05-24
Payer: COMMERCIAL

## 2022-05-24 VITALS
OXYGEN SATURATION: 99 % | DIASTOLIC BLOOD PRESSURE: 80 MMHG | TEMPERATURE: 97.9 F | HEART RATE: 72 BPM | SYSTOLIC BLOOD PRESSURE: 130 MMHG

## 2022-05-24 DIAGNOSIS — M71.30 MYXOID CYST: Primary | ICD-10-CM

## 2022-05-24 PROCEDURE — 99213 OFFICE O/P EST LOW 20 MIN: CPT | Performed by: FAMILY MEDICINE

## 2022-05-24 NOTE — PROGRESS NOTES
Assessment/Plan:       Problem List Items Addressed This Visit        Musculoskeletal and Integument    Myxoid cyst - Primary     D IP joint middle finger right hand draining synovial fluid will recommend hand surgeon evaluation                   Subjective:      Patient ID: Beverly Diaz is a 74 y o  female  Irritated cyst dorsal surface D IP joint middle finger right hand present chronically but opened and became larger over the last 3 weeks      The following portions of the patient's history were reviewed and updated as appropriate: allergies, current medications, past family history, past medical history, past social history, past surgical history and problem list     Review of Systems   Constitutional: Negative for chills, fatigue and fever  HENT: Negative for congestion, nosebleeds, rhinorrhea, sinus pressure and sore throat  Eyes: Negative for discharge and redness  Respiratory: Negative for cough and shortness of breath  Cardiovascular: Negative for chest pain, palpitations and leg swelling  Gastrointestinal: Negative for abdominal pain, blood in stool and nausea  Endocrine: Negative for cold intolerance, heat intolerance and polyuria  Genitourinary: Negative for dysuria and frequency  Musculoskeletal: Negative for arthralgias, back pain and myalgias  Skin: Negative for rash  Neurological: Negative for dizziness, weakness and headaches  Hematological: Negative for adenopathy  Psychiatric/Behavioral: Negative for behavioral problems and sleep disturbance  The patient is not nervous/anxious  Objective:      /80 (BP Location: Left arm, Patient Position: Sitting)   Pulse 72   Temp 97 9 °F (36 6 °C)   LMP  (LMP Unknown)   SpO2 99%        Physical Exam  Vitals and nursing note reviewed  Constitutional:       General: She is not in acute distress  Appearance: She is well-developed  HENT:      Head: Normocephalic and atraumatic        Right Ear: External ear normal       Left Ear: External ear normal       Nose: Nose normal       Mouth/Throat:      Pharynx: No oropharyngeal exudate  Eyes:      General: No scleral icterus  Right eye: No discharge  Left eye: No discharge  Conjunctiva/sclera: Conjunctivae normal       Pupils: Pupils are equal, round, and reactive to light  Neck:      Thyroid: No thyromegaly  Vascular: No JVD  Cardiovascular:      Rate and Rhythm: Normal rate and regular rhythm  Heart sounds: Normal heart sounds  No murmur heard  Pulmonary:      Effort: Pulmonary effort is normal       Breath sounds: No wheezing or rales  Chest:      Chest wall: No tenderness  Abdominal:      General: Bowel sounds are normal  There is no distension  Palpations: Abdomen is soft  There is no mass  Tenderness: There is no abdominal tenderness  Musculoskeletal:         General: No tenderness or deformity  Normal range of motion  Cervical back: Normal range of motion  Lymphadenopathy:      Cervical: No cervical adenopathy  Skin:     General: Skin is warm and dry  Findings: No rash  Comments: Cyst distal interphalangeal joint middle finger right hand dorsal surface   Neurological:      Mental Status: She is alert and oriented to person, place, and time  Cranial Nerves: No cranial nerve deficit  Coordination: Coordination normal       Deep Tendon Reflexes: Reflexes are normal and symmetric  Reflexes normal    Psychiatric:         Behavior: Behavior normal          Thought Content:  Thought content normal          Judgment: Judgment normal           Data:    Laboratory Results:   Radiology/Other Diagnostic Testing Results:      Lab Results   Component Value Date    WBC 6 25 12/01/2020    HGB 13 7 12/01/2020    HCT 43 5 12/01/2020    MCV 86 12/01/2020     12/01/2020     Lab Results   Component Value Date     08/20/2015    K 4 0 12/01/2020     12/01/2020    CO2 30 12/01/2020 ANIONGAP 8 6 08/20/2015    BUN 18 12/01/2020    CREATININE 0 86 12/01/2020    GLUCOSE 88 08/20/2015    GLUF 86 12/01/2020    CALCIUM 9 1 12/01/2020    AST 24 12/01/2020    ALT 47 12/01/2020    ALKPHOS 127 (H) 12/01/2020    PROT 7 1 08/20/2015    BILITOT 0 3 08/20/2015    EGFR 70 12/01/2020     Lab Results   Component Value Date    CHOLESTEROL 230 (H) 08/23/2021    CHOLESTEROL 196 12/01/2020    CHOLESTEROL 193 08/03/2019     Lab Results   Component Value Date    HDL 49 08/23/2021    HDL 47 12/01/2020    HDL 42 08/03/2019     Lab Results   Component Value Date    LDLCALC 129 (H) 08/23/2021    LDLCALC 104 (H) 12/01/2020    LDLCALC 116 (H) 08/03/2019     Lab Results   Component Value Date    TRIG 259 (H) 08/23/2021    TRIG 226 (H) 12/01/2020    TRIG 177 (H) 08/03/2019     No results found for: [place], [place]  Lab Results   Component Value Date    SQO5TPXKVAPL 2 330 08/23/2021     Lab Results   Component Value Date    HGBA1C 5 4 08/23/2021     No results found for: MATT Freeman DO

## 2022-05-26 ENCOUNTER — OFFICE VISIT (OUTPATIENT)
Dept: PHYSICAL THERAPY | Age: 67
End: 2022-05-26
Payer: COMMERCIAL

## 2022-05-26 DIAGNOSIS — M76.821 POSTERIOR TIBIAL TENDINITIS OF RIGHT LOWER EXTREMITY: Primary | ICD-10-CM

## 2022-05-26 PROCEDURE — 97110 THERAPEUTIC EXERCISES: CPT

## 2022-05-26 PROCEDURE — 97140 MANUAL THERAPY 1/> REGIONS: CPT

## 2022-06-01 ENCOUNTER — OFFICE VISIT (OUTPATIENT)
Dept: PHYSICAL THERAPY | Age: 67
End: 2022-06-01
Payer: COMMERCIAL

## 2022-06-01 DIAGNOSIS — M76.821 POSTERIOR TIBIAL TENDINITIS OF RIGHT LOWER EXTREMITY: Primary | ICD-10-CM

## 2022-06-01 PROCEDURE — 97140 MANUAL THERAPY 1/> REGIONS: CPT | Performed by: PHYSICAL THERAPIST

## 2022-06-01 PROCEDURE — 97110 THERAPEUTIC EXERCISES: CPT | Performed by: PHYSICAL THERAPIST

## 2022-06-01 NOTE — PROGRESS NOTES
Daily Note     Today's date: 2022  Patient name: Josey Cabello  : 1955  MRN: [id number]  Referring provider: Kate Chen DO  Dx:   Encounter Diagnosis     ICD-10-CM    1  Posterior tibial tendinitis of right lower extremity  M76 821        Start Time: 0815  Stop Time: 0900  Total time in clinic (min): 45 minutes    Subjective: Pt reports her plantar fascitis is flared up, ut her posterior tibial and medial ankle pain is better  Objective: See treatment diary below      Assessment: Pt progress has reached a plateau  Discharge was planned for today  She did not have pain along PTT and medial ankle  Pt does have increased tenderness along plantar fascia  Pt is to consider Voltaren gel OTC for use while she is on vacation  If the plantar fascia flare up persists, pt may return via direct access upon return for vacation  Plan: Discharge PT       Precautions: standard, L TKR(limited motion<90)    Manuals 5/12 5/18 5/20 5/23 5/26 6/1 4/29 5/3 5/6 5/10   PROM/stretch  10' 10' 10' 10' 10' 10 10' 10 10' 10   IASTM laser plantar protocol 6' 6' 6' 6' 6' 6' 6' 6 6' 6'                TherEx             Seated heel raise 30x 30x 30x 30x  30x 30 30x  30x 30x 30x   doming 30x 30x 30x 30x  30x 30 30x 30x 30x 30x   Post tib ball squeeze heel lift 30x  30x 30x 30x  30x 30 30x  30x 30x 30x   prostretch seated 10''x10 10"x10 10"x 10 10''x10  10"x10 10x 10''x10 10x10" 10x10" 10x   Tband arch lift 30x RED 30x RED 30x Red RTB 30x  RTB 30x  30 30x YTB 30x 30 YTB 30x YTB   Tband B EV 30x RED 30x Red 30x Red RTB 30x RTB 30x  30 30x 30x 30x 30x   Tband spider walk 15x RED 15x RED 15x Red RTB 15x RTB 15x 30 15x 15x 15x 15x   Heel raise B on step 10x 10x 10x 10x 10x 10 10x 10x 10x 10x   SL heelraise R 5x 5x 5x 5x  5x 5 5x  5x 5x 5x                                                                                                                        Ther Activity                                       Gait Training Modalities

## 2022-06-03 DIAGNOSIS — M54.50 CHRONIC LOW BACK PAIN: ICD-10-CM

## 2022-06-03 DIAGNOSIS — G89.29 CHRONIC LOW BACK PAIN: ICD-10-CM

## 2022-06-03 DIAGNOSIS — M62.838 MUSCLE SPASM: ICD-10-CM

## 2022-06-03 RX ORDER — DIAZEPAM 5 MG/1
5 TABLET ORAL EVERY 8 HOURS PRN
Qty: 30 TABLET | Refills: 0 | Status: SHIPPED | OUTPATIENT
Start: 2022-06-03

## 2022-06-03 NOTE — TELEPHONE ENCOUNTER
Patient is traveling and needs a refill before the weekend  Patient also asked for a script for Donnatal for abdominal pain   She said it has worked in the past

## 2022-06-20 DIAGNOSIS — I82.441 ACUTE DEEP VEIN THROMBOSIS (DVT) OF TIBIAL VEIN OF RIGHT LOWER EXTREMITY (HCC): ICD-10-CM

## 2022-06-20 DIAGNOSIS — Z00.00 ANNUAL PHYSICAL EXAM: Primary | ICD-10-CM

## 2022-06-22 PROCEDURE — 88341 IMHCHEM/IMCYTCHM EA ADD ANTB: CPT | Performed by: PATHOLOGY

## 2022-06-22 PROCEDURE — 88342 IMHCHEM/IMCYTCHM 1ST ANTB: CPT | Performed by: PATHOLOGY

## 2022-06-22 PROCEDURE — 88305 TISSUE EXAM BY PATHOLOGIST: CPT | Performed by: PATHOLOGY

## 2022-06-23 ENCOUNTER — LAB REQUISITION (OUTPATIENT)
Dept: LAB | Facility: HOSPITAL | Age: 67
End: 2022-06-23
Payer: COMMERCIAL

## 2022-06-23 DIAGNOSIS — D48.5 NEOPLASM OF UNCERTAIN BEHAVIOR OF SKIN: ICD-10-CM

## 2022-07-11 ENCOUNTER — ANNUAL EXAM (OUTPATIENT)
Dept: OBGYN CLINIC | Facility: CLINIC | Age: 67
End: 2022-07-11
Payer: COMMERCIAL

## 2022-07-11 VITALS
SYSTOLIC BLOOD PRESSURE: 100 MMHG | HEIGHT: 63 IN | DIASTOLIC BLOOD PRESSURE: 68 MMHG | BODY MASS INDEX: 34.84 KG/M2 | WEIGHT: 196.6 LBS

## 2022-07-11 DIAGNOSIS — Z13.820 ENCOUNTER FOR SCREENING FOR OSTEOPOROSIS: Primary | ICD-10-CM

## 2022-07-11 DIAGNOSIS — Z12.11 ENCOUNTER FOR SCREENING FOR MALIGNANT NEOPLASM OF COLON: ICD-10-CM

## 2022-07-11 DIAGNOSIS — Z12.31 ENCOUNTER FOR SCREENING MAMMOGRAM FOR MALIGNANT NEOPLASM OF BREAST: ICD-10-CM

## 2022-07-11 PROCEDURE — S0612 ANNUAL GYNECOLOGICAL EXAMINA: HCPCS | Performed by: OBSTETRICS & GYNECOLOGY

## 2022-07-11 NOTE — ASSESSMENT & PLAN NOTE
Pap/HPV not indicated  Mammogram ordered  Colonoscopy : GI referral given  DEXA Ordered    Encourage healthy diet, exercise, Calcium 1200mg per day and at least 800 iu Vitamin D daily

## 2022-07-11 NOTE — PROGRESS NOTES
Assessment/Plan:    Encounter for gynecological examination without abnormal finding  Pap/HPV not indicated  Mammogram ordered  Colonoscopy : GI referral given  DEXA Ordered    Encourage healthy diet, exercise, Calcium 1200mg per day and at least 800 iu Vitamin D daily  Diagnoses and all orders for this visit:    Encounter for screening for osteoporosis  -     DXA bone density spine hip and pelvis; Future    Encounter for screening mammogram for malignant neoplasm of breast  -     Mammo screening bilateral w 3d & cad; Future    Encounter for screening for malignant neoplasm of colon  -     Ambulatory Referral to Gastroenterology; Future          Subjective:      Patient ID: Donna Teixeira is a 74 y o  female  Patient presents for a routine annual visit  Menarche- 13 Y/O  Hysterectomy  Mammogram-22-normal  Colonoscopy-19-seven polyps, repeat in 3 years  Dexa-20-osteopenia    Former smoker  Social drinker  Currently sexually active  Maternal aunt with breast cancer and mother with endometrial cancer  No other family history of colon, cervical, ovarian, or uterine cancer  No concerns/questions for today's visit    Recent exacerbation of Interstitial Cystitis - was seen by Dr Kirk Curry for treatment recommendations  Gynecologic Exam  She reports no genital itching, genital lesions, genital odor, genital rash, pelvic pain, vaginal bleeding or vaginal discharge  The patient is experiencing no pain  Pertinent negatives include no chills, constipation, diarrhea, dysuria, fever, frequency, headaches, hematuria, nausea, sore throat, urgency or vomiting  She is sexually active         The following portions of the patient's history were reviewed and updated as appropriate:   She  has a past medical history of Acid reflux, Allergic rhinitis, Anesthesia complication, Arthritis, Asthma, Bigeminy, DVT (deep venous thrombosis) ([facility] [address] ) (10/23/2018), DVT (deep venous thrombosis) ([facility] [address] ), DVT of leg (deep venous thrombosis) ([facility] ) (), Female pelvic pain, Hyperlipidemia, Intermittent palpitations, Irregular heart beat, Migraines, Obesity (BMI 30 0-34 9), Palpitations, Pes planus, Thyroid nodule, Trigeminy, and Wears glasses  She   Patient Active Problem List    Diagnosis Date Noted    Myxoid cyst 2022    Chest tightness 2022    At increased risk of exposure to COVID-19 virus 2021    Seasonal allergic rhinitis due to pollen 2021    Neck pain 2021    Cystitis, interstitial 2021    Moderate persistent asthma without complication     Gastroesophageal reflux disease without esophagitis 10/22/2019    Encounter for gynecological examination without abnormal finding 2019    Hypertriglyceridemia 2019    Palpitations 2019    Posterior tibial tendinitis of right lower extremity 10/26/2018    History of deep venous thrombosis (DVT) of distal vein of right lower extremity 10/26/2018    Thyroid nodule 2018    Chronic pain of right knee 2018    Primary osteoarthritis of right knee 2018    Effusion of right knee 2018    Memory difficulty 2017    Obesity (BMI 30 0-34 9) 2017    Mild intermittent asthma 2017    Osteopenia of multiple sites 2016    Arthralgia of hip, left 2014    Migraine, unspecified, not intractable, without status migrainosus 11/10/2014    DVT of leg (deep venous thrombosis) ([facility] ) 2014    Arthritis of left knee 10/03/2014     She  has a past surgical history that includes Replacement total knee (); Appendectomy; Knee surgery (Left);  section; Joint replacement; Dilation and curettage of uterus; Colonoscopy; [place] tooth extraction; Cholecystectomy; pr laparoscopy w tot hysterectuterus <=250 gram  w tube/ovary (Bilateral, 2016); pr esophagogastroduodenoscopy transoral diagnostic (N/A, 3/14/2016);  Cataract extraction; US guided breast biopsy left complete (Left, 2/6/2019); and Breast biopsy (Left, 2019)  Her family history includes Alzheimer's disease in her maternal aunt and maternal uncle; Breast cancer (age of onset: 72) in her maternal aunt; Cancer in her family; Diabetes in her father and paternal aunt; Diabetes type II in her brother; Endometrial cancer (age of onset: 76) in her mother; Growth hormone deficiency in her daughter; Hashimoto's thyroiditis in her paternal aunt; Heart attack in her father; Heart disease in her father; Hypertension in her father and paternal aunt; Migraines in her mother; No Known Problems in her daughter, maternal aunt, maternal aunt, maternal aunt, maternal aunt, maternal grandmother, paternal aunt, and paternal grandmother; Other in her maternal uncle; Prostate cancer in her father; Stroke in her paternal uncle; Thyroid cancer in her brother and maternal aunt; Thyroid cancer (age of onset: 80) in her maternal aunt  She  reports that she quit smoking about 44 years ago  Her smoking use included cigarettes  She smoked 0 50 packs per day  She has never used smokeless tobacco  She reports current alcohol use  She reports that she does not use drugs    Current Outpatient Medications   Medication Sig Dispense Refill    albuterol (PROVENTIL HFA,VENTOLIN HFA) 90 mcg/act inhaler INHALE 2 PUFFS EVERY 4-6 HOURS AS NEEDED 108 g 3    ALPRAZolam (XANAX) 0 25 mg tablet Take one tablet twice daily as needed for anxiety 30 tablet 0    butalbital-acetaminophen-caffeine (FIORICET,ESGIC) -40 mg per tablet Take 1 tablet by mouth every 4 (four) hours as needed for headaches 20 tablet 0    calcium-vitamin D (OSCAL) 250-125 MG-UNIT per tablet Take 1 tablet by mouth daily      cycloSPORINE (RESTASIS) 0 05 % ophthalmic emulsion Apply 1 drop to eye 2 (two) times a day      diazepam (VALIUM) 5 mg tablet Take 1 tablet (5 mg total) by mouth every 8 (eight) hours as needed for anxiety 30 tablet 0    diphenhydrAMINE (BENADRYL) 25 mg capsule Take by mouth daily at bedtime as needed        Elastic Bandages & Supports (Medical Compression Socks) MISC Use daily 5 each 2    Flovent  MCG/ACT inhaler INHALE 1 PUFF 2 TIMES A DAY RINSE MOUTH AFTER USE  12 g 1    fluticasone (FLONASE) 50 mcg/act nasal spray 2 sprays into each nostril daily 16 g 3    methocarbamol (ROBAXIN) 750 mg tablet Take 1 tablet (750 mg total) by mouth every 6 (six) hours as needed for muscle spasms 30 tablet 2    montelukast (SINGULAIR) 10 mg tablet TAKE 1 TABLET BY MOUTH EVERY DAY IN THE EVENING 30 tablet 2    Multiple Vitamin (MULTIVITAMIN) capsule Take 1 capsule by mouth daily       pantoprazole (PROTONIX) 40 mg tablet TAKE ONE TABLET BY MOUTH 2 TIMES A DAY 30 MINUTES PRIOR TO BREAKFAST AND DINNER 180 tablet 0    rivaroxaban (XARELTO) 10 mg tablet Take 1 tablet (10 mg total) by mouth daily 90 tablet 3    ZOLMitriptan (ZOMIG) 5 MG tablet Take 1 tablet (5 mg total) by mouth once as needed for migraine for up to 1 dose 20 tablet 3     No current facility-administered medications for this visit       Current Outpatient Medications on File Prior to Visit   Medication Sig    albuterol (PROVENTIL HFA,VENTOLIN HFA) 90 mcg/act inhaler INHALE 2 PUFFS EVERY 4-6 HOURS AS NEEDED    ALPRAZolam (XANAX) 0 25 mg tablet Take one tablet twice daily as needed for anxiety    butalbital-acetaminophen-caffeine (FIORICET,ESGIC) -40 mg per tablet Take 1 tablet by mouth every 4 (four) hours as needed for headaches    calcium-vitamin D (OSCAL) 250-125 MG-UNIT per tablet Take 1 tablet by mouth daily    cycloSPORINE (RESTASIS) 0 05 % ophthalmic emulsion Apply 1 drop to eye 2 (two) times a day    diazepam (VALIUM) 5 mg tablet Take 1 tablet (5 mg total) by mouth every 8 (eight) hours as needed for anxiety    diphenhydrAMINE (BENADRYL) 25 mg capsule Take by mouth daily at bedtime as needed      Elastic Bandages & Supports (Medical Compression Socks) MISC Use daily    Flovent HFA 220 MCG/ACT inhaler INHALE 1 PUFF 2 TIMES A DAY RINSE MOUTH AFTER USE   fluticasone (FLONASE) 50 mcg/act nasal spray 2 sprays into each nostril daily    methocarbamol (ROBAXIN) 750 mg tablet Take 1 tablet (750 mg total) by mouth every 6 (six) hours as needed for muscle spasms    montelukast (SINGULAIR) 10 mg tablet TAKE 1 TABLET BY MOUTH EVERY DAY IN THE EVENING    Multiple Vitamin (MULTIVITAMIN) capsule Take 1 capsule by mouth daily     pantoprazole (PROTONIX) 40 mg tablet TAKE ONE TABLET BY MOUTH 2 TIMES A DAY 30 MINUTES PRIOR TO BREAKFAST AND DINNER    rivaroxaban (XARELTO) 10 mg tablet Take 1 tablet (10 mg total) by mouth daily    ZOLMitriptan (ZOMIG) 5 MG tablet Take 1 tablet (5 mg total) by mouth once as needed for migraine for up to 1 dose     No current facility-administered medications on file prior to visit  She is allergic to naproxen, iv contrast [iodinated diagnostic agents], seasonal ic [cholestatin], and phentermine       Review of Systems   Constitutional: Negative for activity change, appetite change, chills, fatigue and fever  HENT: Negative for rhinorrhea, sneezing and sore throat  Eyes: Negative for visual disturbance  Respiratory: Negative for cough, shortness of breath and wheezing  Cardiovascular: Negative for chest pain, palpitations and leg swelling  Gastrointestinal: Negative for abdominal distention, constipation, diarrhea, nausea and vomiting  Genitourinary: Negative for difficulty urinating, dysuria, frequency, hematuria, pelvic pain, urgency and vaginal discharge  Neurological: Negative for syncope, light-headedness and headaches  Objective:      /68 (BP Location: Left arm, Patient Position: Sitting, Cuff Size: Large)   Ht 5' 3" (1 6 m)   Wt 89 2 kg (196 lb 9 6 oz)   LMP  (LMP Unknown)   BMI 34 83 kg/m²          Physical Exam  Constitutional:       General: She is not in acute distress  Appearance: Normal appearance   She is well-developed  She is not diaphoretic  Chest:   Breasts: Breasts are symmetrical       Right: No inverted nipple, mass, nipple discharge, skin change or tenderness  Left: No inverted nipple, mass, nipple discharge, skin change or tenderness  Abdominal:      Palpations: Abdomen is soft  Abdomen is not rigid  Tenderness: There is no abdominal tenderness  There is no guarding or rebound  Hernia: There is no hernia in the left inguinal area  Genitourinary:     Labia:         Right: No rash, tenderness, lesion or injury  Left: No rash, tenderness, lesion or injury  Vagina: No vaginal discharge or bleeding  Adnexa:         Right: No mass, tenderness or fullness  Left: No mass, tenderness or fullness  Comments: Urethral meatus: no lesions, non tender  Urethra: non tender    Vaginal mucosa atrophic  Skin:     General: Skin is warm and dry  Coloration: Skin is not pale  Findings: No erythema or rash

## 2022-07-18 ENCOUNTER — APPOINTMENT (OUTPATIENT)
Dept: LAB | Facility: CLINIC | Age: 67
End: 2022-07-18
Payer: COMMERCIAL

## 2022-07-18 DIAGNOSIS — Z00.00 ANNUAL PHYSICAL EXAM: ICD-10-CM

## 2022-07-18 LAB
ALBUMIN SERPL BCP-MCNC: 3.5 G/DL (ref 3.5–5)
ALP SERPL-CCNC: 108 U/L (ref 46–116)
ALT SERPL W P-5'-P-CCNC: 50 U/L (ref 12–78)
ANION GAP SERPL CALCULATED.3IONS-SCNC: 3 MMOL/L (ref 4–13)
AST SERPL W P-5'-P-CCNC: 32 U/L (ref 5–45)
BASOPHILS # BLD AUTO: 0.07 THOUSANDS/ΜL (ref 0–0.1)
BASOPHILS NFR BLD AUTO: 1 % (ref 0–1)
BILIRUB SERPL-MCNC: 0.44 MG/DL (ref 0.2–1)
BUN SERPL-MCNC: 17 MG/DL (ref 5–25)
CALCIUM SERPL-MCNC: 9.2 MG/DL (ref 8.3–10.1)
CHLORIDE SERPL-SCNC: 112 MMOL/L (ref 96–108)
CHOLEST SERPL-MCNC: 213 MG/DL
CO2 SERPL-SCNC: 27 MMOL/L (ref 21–32)
CREAT SERPL-MCNC: 0.77 MG/DL (ref 0.6–1.3)
EOSINOPHIL # BLD AUTO: 0.2 THOUSAND/ΜL (ref 0–0.61)
EOSINOPHIL NFR BLD AUTO: 3 % (ref 0–6)
ERYTHROCYTE [DISTWIDTH] IN BLOOD BY AUTOMATED COUNT: 14 % (ref 11.6–15.1)
GFR SERPL CREATININE-BSD FRML MDRD: 79 ML/MIN/1.73SQ M
GLUCOSE P FAST SERPL-MCNC: 97 MG/DL (ref 65–99)
HCT VFR BLD AUTO: 44 % (ref 34.8–46.1)
HDLC SERPL-MCNC: 45 MG/DL
HGB BLD-MCNC: 13.8 G/DL (ref 11.5–15.4)
IMM GRANULOCYTES # BLD AUTO: 0.03 THOUSAND/UL (ref 0–0.2)
IMM GRANULOCYTES NFR BLD AUTO: 1 % (ref 0–2)
LDLC SERPL CALC-MCNC: 120 MG/DL (ref 0–100)
LYMPHOCYTES # BLD AUTO: 2.16 THOUSANDS/ΜL (ref 0.6–4.47)
LYMPHOCYTES NFR BLD AUTO: 33 % (ref 14–44)
MCH RBC QN AUTO: 27.1 PG (ref 26.8–34.3)
MCHC RBC AUTO-ENTMCNC: 31.4 G/DL (ref 31.4–37.4)
MCV RBC AUTO: 86 FL (ref 82–98)
MONOCYTES # BLD AUTO: 0.63 THOUSAND/ΜL (ref 0.17–1.22)
MONOCYTES NFR BLD AUTO: 10 % (ref 4–12)
NEUTROPHILS # BLD AUTO: 3.5 THOUSANDS/ΜL (ref 1.85–7.62)
NEUTS SEG NFR BLD AUTO: 52 % (ref 43–75)
NONHDLC SERPL-MCNC: 168 MG/DL
NRBC BLD AUTO-RTO: 0 /100 WBCS
PLATELET # BLD AUTO: 271 THOUSANDS/UL (ref 149–390)
PMV BLD AUTO: 10.2 FL (ref 8.9–12.7)
POTASSIUM SERPL-SCNC: 4.2 MMOL/L (ref 3.5–5.3)
PROT SERPL-MCNC: 6.9 G/DL (ref 6.4–8.4)
RBC # BLD AUTO: 5.1 MILLION/UL (ref 3.81–5.12)
SODIUM SERPL-SCNC: 142 MMOL/L (ref 135–147)
TRIGL SERPL-MCNC: 240 MG/DL
TSH SERPL DL<=0.05 MIU/L-ACNC: 1.95 UIU/ML (ref 0.45–4.5)
WBC # BLD AUTO: 6.59 THOUSAND/UL (ref 4.31–10.16)

## 2022-07-18 PROCEDURE — 36415 COLL VENOUS BLD VENIPUNCTURE: CPT

## 2022-07-18 PROCEDURE — 80053 COMPREHEN METABOLIC PANEL: CPT

## 2022-07-18 PROCEDURE — 84443 ASSAY THYROID STIM HORMONE: CPT

## 2022-07-18 PROCEDURE — 80061 LIPID PANEL: CPT

## 2022-07-18 PROCEDURE — 85025 COMPLETE CBC W/AUTO DIFF WBC: CPT

## 2022-08-11 DIAGNOSIS — G43.009 MIGRAINE WITHOUT AURA AND WITHOUT STATUS MIGRAINOSUS, NOT INTRACTABLE: ICD-10-CM

## 2022-08-11 RX ORDER — BUTALBITAL, ACETAMINOPHEN AND CAFFEINE 50; 325; 40 MG/1; MG/1; MG/1
1 TABLET ORAL EVERY 4 HOURS PRN
Qty: 20 TABLET | Refills: 0 | Status: SHIPPED | OUTPATIENT
Start: 2022-08-11 | End: 2022-09-20 | Stop reason: SDUPTHER

## 2022-08-14 ENCOUNTER — HOSPITAL ENCOUNTER (EMERGENCY)
Facility: HOSPITAL | Age: 67
Discharge: HOME | End: 2022-08-14
Attending: EMERGENCY MEDICINE | Admitting: EMERGENCY MEDICINE
Payer: COMMERCIAL

## 2022-08-14 ENCOUNTER — NURSE TRIAGE (OUTPATIENT)
Dept: OTHER | Facility: OTHER | Age: 67
End: 2022-08-14

## 2022-08-14 VITALS
HEIGHT: 63 IN | TEMPERATURE: 101.5 F | RESPIRATION RATE: 18 BRPM | OXYGEN SATURATION: 98 % | BODY MASS INDEX: 34.83 KG/M2 | SYSTOLIC BLOOD PRESSURE: 136 MMHG | DIASTOLIC BLOOD PRESSURE: 64 MMHG | HEART RATE: 92 BPM

## 2022-08-14 DIAGNOSIS — R50.9 FEVER: Primary | ICD-10-CM

## 2022-08-14 DIAGNOSIS — U07.1 COVID-19: ICD-10-CM

## 2022-08-14 LAB
ALBUMIN SERPL BCP-MCNC: 4 G/DL (ref 3.5–5)
ALP SERPL-CCNC: 92 U/L (ref 34–104)
ALT SERPL W P-5'-P-CCNC: 34 U/L (ref 7–52)
ANION GAP SERPL CALCULATED.3IONS-SCNC: 8 MMOL/L (ref 4–13)
AST SERPL W P-5'-P-CCNC: 27 U/L (ref 13–39)
BILIRUB SERPL-MCNC: 0.37 MG/DL (ref 0.2–1)
BUN SERPL-MCNC: 14 MG/DL (ref 5–25)
CALCIUM SERPL-MCNC: 9 MG/DL (ref 8.4–10.2)
CHLORIDE SERPL-SCNC: 103 MMOL/L (ref 96–108)
CO2 SERPL-SCNC: 28 MMOL/L (ref 21–32)
CREAT SERPL-MCNC: 0.94 MG/DL (ref 0.6–1.3)
GFR SERPL CREATININE-BSD FRML MDRD: 62 ML/MIN/1.73SQ M
GLUCOSE SERPL-MCNC: 93 MG/DL (ref 65–140)
POTASSIUM SERPL-SCNC: 4 MMOL/L (ref 3.5–5.3)
PROT SERPL-MCNC: 6.7 G/DL (ref 6.4–8.4)
SODIUM SERPL-SCNC: 139 MMOL/L (ref 135–147)

## 2022-08-14 PROCEDURE — 80053 COMPREHEN METABOLIC PANEL: CPT | Performed by: EMERGENCY MEDICINE

## 2022-08-14 PROCEDURE — 36415 COLL VENOUS BLD VENIPUNCTURE: CPT | Performed by: EMERGENCY MEDICINE

## 2022-08-14 PROCEDURE — 99283 EMERGENCY DEPT VISIT LOW MDM: CPT

## 2022-08-14 PROCEDURE — 99284 EMERGENCY DEPT VISIT MOD MDM: CPT | Performed by: EMERGENCY MEDICINE

## 2022-08-14 RX ORDER — ACETAMINOPHEN 325 MG/1
650 TABLET ORAL ONCE
Status: COMPLETED | OUTPATIENT
Start: 2022-08-14 | End: 2022-08-14

## 2022-08-14 RX ADMIN — ACETAMINOPHEN 650 MG: 325 TABLET ORAL at 13:48

## 2022-08-14 NOTE — TELEPHONE ENCOUNTER
Regarding: Covid - wants Paxlovid oredered    ----- Message from Angelo Roberts RN sent at 8/14/2022  8:22 AM EDT -----  "I tested Positive for Covid now and symptoms started at 0130 this morning ":

## 2022-08-14 NOTE — ED NOTES
Ambulatory pulse ox with ambulation in room 97%  No distress noted  Pt reports feeling weak/tired        Ariel Burch RN  08/14/22 [id number]

## 2022-08-14 NOTE — TELEPHONE ENCOUNTER
TC to [facility] regarding getting Paxlovid ordered  TC back from [facility] was to follow supportive care advise, ER if difficuilty breathing and stricter protocols are in place regarding ordering this  To schedule VV with PCP  I called patient and her  back and he was slightly disappointment to not get the order but understood  Virtual visit scheduled for tomorrow at 1520 with Dr Galicia Prom  Note: if there is an earlier appointment with her PCP- please call patient back and move her appointment up  Thank you

## 2022-08-14 NOTE — ED PROVIDER NOTES
History  Chief Complaint   Patient presents with    Chills     Started last PM; +COVID test at home    Fever - 9 weeks to 74 years     101 4F at home     HPI      This is a very pleasant, nontoxic, 38-year-old female presents the emergency department with her  who is a [address] [place] [address] gastroenterology attending with a chief complaint of headache, sore throat, general muscle aches, generalized fatigue, fever 101 at home, tested positive for COVID-19 B a home test   Patient is vaccinated with booster  Patient denies any chest pain, exertional shortness of breath, syncope, abdominal pain, vomiting, diaphoresis, recent travel outside the geographical area  Prior to Admission Medications   Prescriptions Last Dose Informant Patient Reported? Taking? ALPRAZolam (XANAX) 0 25 mg tablet   No No   Sig: Take one tablet twice daily as needed for anxiety   Elastic Bandages & Supports (Medical Compression Socks) MISC   No No   Sig: Use daily   Flovent  MCG/ACT inhaler   No No   Sig: INHALE 1 PUFF 2 TIMES A DAY RINSE MOUTH AFTER USE     Multiple Vitamin (MULTIVITAMIN) capsule  Self Yes No   Sig: Take 1 capsule by mouth daily    ZOLMitriptan (ZOMIG) 5 MG tablet  Self No No   Sig: Take 1 tablet (5 mg total) by mouth once as needed for migraine for up to 1 dose   albuterol (PROVENTIL HFA,VENTOLIN HFA) 90 mcg/act inhaler   No No   Sig: INHALE 2 PUFFS EVERY 4-6 HOURS AS NEEDED   butalbital-acetaminophen-caffeine (FIORICET,ESGIC) -40 mg per tablet   No No   Sig: Take 1 tablet by mouth every 4 (four) hours as needed for headaches   calcium-vitamin D (OSCAL) 250-125 MG-UNIT per tablet  Self Yes No   Sig: Take 1 tablet by mouth daily   cycloSPORINE (RESTASIS) 0 05 % ophthalmic emulsion  Self Yes No   Sig: Apply 1 drop to eye 2 (two) times a day   diazepam (VALIUM) 5 mg tablet   No No   Sig: Take 1 tablet (5 mg total) by mouth every 8 (eight) hours as needed for anxiety   diphenhydrAMINE (BENADRYL) 25 mg capsule  Self Yes No   Sig: Take by mouth daily at bedtime as needed     fluticasone (FLONASE) 50 mcg/act nasal spray   No No   Si sprays into each nostril daily   methocarbamol (ROBAXIN) 750 mg tablet   No No   Sig: Take 1 tablet (750 mg total) by mouth every 6 (six) hours as needed for muscle spasms   montelukast (SINGULAIR) 10 mg tablet   No No   Sig: TAKE 1 TABLET BY MOUTH EVERY DAY IN THE EVENING   pantoprazole (PROTONIX) 40 mg tablet   No No   Sig: TAKE ONE TABLET BY MOUTH 2 TIMES A DAY 30 MINUTES PRIOR TO BREAKFAST AND DINNER   rivaroxaban (XARELTO) 10 mg tablet   No No   Sig: Take 1 tablet (10 mg total) by mouth daily      Facility-Administered Medications: None       Past Medical History:   Diagnosis Date    Acid reflux     Allergic rhinitis     Last Assessed: 2017    Anesthesia complication     HYPOTENSION    Arthritis     Asthma     Bigeminy     history    [facility] thrombosis[facility] 10/23/2018    DVT (deep venous thrombosis) ([facility]     [facility]     left    Female pelvic pain     Hyperlipidemia     Intermittent palpitations     Irregular heart beat     Migraines     Obesity (BMI 30 0-34  9)     Palpitations     Pes planus     unspecified laterality; Last Assessed: 2014    Thyroid nodule     Trigeminy     history    Wears glasses        Past Surgical History:   Procedure Laterality Date    APPENDECTOMY      BREAST BIOPSY Left 2019    CATARACT EXTRACTION       SECTION      X 2    CHOLECYSTECTOMY      COLONOSCOPY      DILATION AND CURETTAGE OF UTERUS      HYSTERECTOMY      total    JOINT REPLACEMENT      left TKR    KNEE SURGERY Left     X 3    MT ESOPHAGOGASTRODUODENOSCOPY TRANSORAL DIAGNOSTIC N/A 2016    Procedure: ESOPHAGOGASTRODUODENOSCOPY (EGD); Surgeon: Armida Lesches, MD;  Location: [facility];   Service: Gastroenterology    MT LAPAROSCOPY W TOT HYSTERECTUTERUS <=250 Thereasa Roys TUBE/OVARY Bilateral 2016 Procedure: HYSTERECTOMY LAPAROSCOPIC TOTAL ,BSO;  Surgeon: Juany Fernandez MD;  Location: [facility];  Service: Gynecology Oncology    REPLACEMENT TOTAL KNEE      US GUIDED BREAST BIOPSY LEFT COMPLETE Left 2019    WISDOM TOOTH EXTRACTION         Family History   Problem Relation Age of Onset    Endometrial cancer Mother 76    Migraines Mother     Diabetes Father     Heart disease Father     Heart attack Father     Prostate cancer Father     Hypertension Father     Thyroid cancer Brother         medullary    Diabetes type II Brother     Growth hormone deficiency Daughter     No Known Problems Daughter     No Known Problems Maternal Grandmother     No Known Problems Paternal Grandmother     Alzheimer's disease Maternal Aunt     Thyroid cancer Maternal Aunt     Breast cancer Maternal Aunt 72    Thyroid cancer Maternal Aunt 80    No Known Problems Maternal Aunt     No Known Problems Maternal Aunt     No Known Problems Maternal Aunt     No Known Problems Maternal Aunt     Other Maternal Uncle         Brain Tumor    Alzheimer's disease Maternal Uncle     Diabetes Paternal Aunt     Hashimoto's thyroiditis Paternal Aunt         Total Thyroidectomy    Hypertension Paternal Aunt     No Known Problems Paternal Aunt     Stroke Paternal Uncle     Cancer Family     Colon cancer Neg Hx     Cervical cancer Neg Hx     Ovarian cancer Neg Hx     Uterine cancer Neg Hx      I have reviewed and agree with the history as documented      E-Cigarette/Vaping    E-Cigarette Use Never User      E-Cigarette/Vaping Substances    Nicotine No     THC No     CBD No     Flavoring No     Other No     Unknown No      Social History     Tobacco Use    Smoking status: Former Smoker     Packs/day: 0 50     Types: Cigarettes     Quit date: 1978     Years since quittin 1    Smokeless tobacco: Never Used    Tobacco comment: as teenager   Vaping Use    Vaping Use: Never used   Substance Use Topics    Alcohol use: Yes     Alcohol/week: 2 0 standard drinks     Types: 2 Glasses of wine per week    Drug use: No       Review of Systems   Constitutional: Positive for chills, fatigue and fever  Negative for diaphoresis  HENT: Positive for congestion and sore throat  Negative for trouble swallowing  Eyes: Negative  Respiratory: Positive for cough  Cardiovascular: Negative  Negative for chest pain, palpitations and leg swelling  Gastrointestinal: Negative  Negative for abdominal pain  Endocrine: Negative  Genitourinary: Negative  Negative for dysuria and urgency  Musculoskeletal: Negative  Negative for back pain and neck pain  Skin: Negative  Allergic/Immunologic: Negative  Neurological: Negative  Hematological: Negative  Psychiatric/Behavioral: Negative  Physical Exam  Physical Exam  Vitals and nursing note reviewed  Constitutional:       Appearance: She is normal weight  Neurological:      Mental Status: She is alert           Vital Signs  ED Triage Vitals   Temperature Pulse Respirations Blood Pressure SpO2   08/14/22 1227 08/14/22 1227 08/14/22 1227 08/14/22 1227 08/14/22 1227   100 1 °F (37 8 °C) 92 18 136/64 98 %      Temp Source Heart Rate Source Patient Position - Orthostatic VS BP Location FiO2 (%)   08/14/22 1227 08/14/22 1227 08/14/22 1227 08/14/22 1227 --   Oral Monitor Sitting Left arm       Pain Score       08/14/22 1348       6           Vitals:    08/14/22 1227   BP: 136/64   Pulse: 92   Patient Position - Orthostatic VS: Sitting         Visual Acuity      ED Medications  Medications   acetaminophen (TYLENOL) tablet 650 mg (650 mg Oral Given 8/14/22 1348)       Diagnostic Studies  Results Reviewed     Procedure Component Value Units Date/Time    Comprehensive metabolic panel [[id number]] Collected: 08/14/22 1255    Lab Status: Final result Specimen: Blood from Arm, Left Updated: 08/14/22 1330     Sodium 139 mmol/L      Potassium 4 0 mmol/L Chloride 103 mmol/L      CO2 28 mmol/L      ANION GAP 8 mmol/L      BUN 14 mg/dL      Creatinine 0 94 mg/dL      Glucose 93 mg/dL      Calcium 9 0 mg/dL      AST 27 U/L      ALT 34 U/L      Alkaline Phosphatase 92 U/L      Total Protein 6 7 g/dL      Albumin 4 0 g/dL      Total Bilirubin 0 37 mg/dL      eGFR 62 ml/min/1 73sq m     Narrative:      [place] guidelines for Chronic Kidney Disease (CKD):     Stage 1 with normal or high GFR (GFR > 90 mL/min/1 73 square meters)    Stage 2 Mild CKD (GFR = 60-89 mL/min/1 73 square meters)    Stage 3A Moderate CKD (GFR = 45-59 mL/min/1 73 square meters)    Stage 3B Moderate CKD (GFR = 30-44 mL/min/1 73 square meters)    Stage 4 Severe CKD (GFR = 15-29 mL/min/1 73 square meters)    Stage 5 End Stage CKD (GFR <15 mL/min/1 73 square meters)  Note: GFR calculation is accurate only with a steady state creatinine                 No orders to display              Procedures  Procedures         ED Course  ED Course as of 08/14/22 1554   Sun Aug 14, 2022   1246 Quick COVID severity index score of 0, pulse ox during my evaluation was 100%, with ambulation 96%, patient is within treatment window for paxlovid, will proceed with baseline labs  Patient has a virtual visit tomorrow with PCP  SBIRT 20yo+    Flowsheet Row Most Recent Value   SBIRT (25 yo +)    In order to provide better care to our patients, we are screening all of our patients for alcohol and drug use  Would it be okay to ask you these screening questions? Yes Filed at: 08/14/2022 1300   Initial Alcohol Screen: US AUDIT-C     1  How often do you have a drink containing alcohol? 0 Filed at: 08/14/2022 1300   2  How many drinks containing alcohol do you have on a typical day you are drinking? 0 Filed at: 08/14/2022 1300   3a  Male UNDER 65: How often do you have five or more drinks on one occasion? 0 Filed at: 08/14/2022 1300   3b  FEMALE Any Age, or MALE 65+:  How often do you have 4 or more drinks on one occassion? 0 Filed at: 08/14/2022 1300   Audit-C Score 0 Filed at: 08/14/2022 1300   NORMA: How many times in the past year have you    Used an illegal drug or used a prescription medication for non-medical reasons? Never Filed at: 08/14/2022 1300                    MDM  Number of Diagnoses or Management Options  COVID-19  Fever  Diagnosis management comments: Click COVID severity index score 0, ambulatory pulse ox is 96 %, patient is requesting paxlovid, patient is aware this is under emergency use authorization not been approved through normal regulatory approval process lateral compartment, labs unremarkable, patient's medication record was cross reference against contraindication medications used with caution with this medication  , advised patient not to take Xanax is it is metabolized is similar pathways patient stable for discharge  Portions of the record may have been created with voice recognition software  Occasional wrong word or "sound a like" substitutions may have occurred due to the inherent limitations of voice recognition software  Read the chart carefully and recognize, using context, where substitutions have occurred  Counseling: I had a detailed discussion with the patient and/or guardian regarding: the historical points, exam findings, and any diagnostic results supporting the discharge diagnosis, lab results, radiology results, discharge instructions reviewed with patient and/or family/caregiver and understanding was verbalized  Instructions given to return to the emergency department if symptoms worsen or persist, or if there are any questions or concerns that arise at home      This patient was examined during the Covid-19 pandemic, and appropriate PPE was employed as defined by OSHA to minimize exposure to the patient and to avoid spread in the event that I am an asymptomatic carrier   All efforts were made to avoid direct contact with the patient per CDC guidelines ("social distancing") unless otherwise necessary to rule out a medical emergency and/or to provide life-saving interventions  Donning and doffing of PPE was performed per recommended guidelines, and personal PPE was employed if /when institutional PPE was not readily available or was deemed to be less than the recommended as defined by OSHA  Amount and/or Complexity of Data Reviewed  Clinical lab tests: ordered and reviewed  Tests in the medicine section of CPT®: ordered and reviewed  Decide to obtain previous medical records or to obtain history from someone other than the patient: yes  Review and summarize past medical records: yes  Independent visualization of images, tracings, or specimens: yes        Disposition  Final diagnoses:   Fever   COVID-19     Time reflects when diagnosis was documented in both MDM as applicable and the Disposition within this note     Time User Action Codes Description Comment    8/14/2022 12:54 PM Power Lehman [R50 9] Fever     8/14/2022 12:54 PM Suzy Moralez, [id number] Michelle Damon [U07 1] COVID-19       ED Disposition     ED Disposition   Discharge    Condition   Stable    Date/Time   Sun Aug 14, 2022  1:00 PM    Comment   Blima Layer discharge to home/self care                 Follow-up Information    None         Discharge Medication List as of 8/14/2022  1:00 PM      CONTINUE these medications which have CHANGED    Details   nirmatrelvir & ritonavir (Paxlovid) tablet therapy pack Take 3 tablets by mouth 2 (two) times a day for 5 days Take 2 nirmatrelvir tablets + 1 ritonavir tablet together per dose, Starting Sun 8/14/2022, Until Fri 8/19/2022, Print         CONTINUE these medications which have NOT CHANGED    Details   albuterol (PROVENTIL HFA,VENTOLIN HFA) 90 mcg/act inhaler INHALE 2 PUFFS EVERY 4-6 HOURS AS NEEDED, Normal      ALPRAZolam (XANAX) 0 25 mg tablet Take one tablet twice daily as needed for anxiety, Normal butalbital-acetaminophen-caffeine (FIORICET,ESGIC) -40 mg per tablet Take 1 tablet by mouth every 4 (four) hours as needed for headaches, Starting Thu 8/11/2022, Normal      calcium-vitamin D (OSCAL) 250-125 MG-UNIT per tablet Take 1 tablet by mouth daily, Historical Med      cycloSPORINE (RESTASIS) 0 05 % ophthalmic emulsion Apply 1 drop to eye 2 (two) times a day, Historical Med      diazepam (VALIUM) 5 mg tablet Take 1 tablet (5 mg total) by mouth every 8 (eight) hours as needed for anxiety, Starting Fri 6/3/2022, Normal      diphenhydrAMINE (BENADRYL) 25 mg capsule Take by mouth daily at bedtime as needed  , Historical Med      Elastic Bandages & Supports (Medical Compression Socks) MISC Use daily, Starting Fri 12/10/2021, Normal      Flovent  MCG/ACT inhaler INHALE 1 PUFF 2 TIMES A DAY RINSE MOUTH AFTER USE , Normal      fluticasone (FLONASE) 50 mcg/act nasal spray 2 sprays into each nostril daily, Starting Wed 1/5/2022, Normal      methocarbamol (ROBAXIN) 750 mg tablet Take 1 tablet (750 mg total) by mouth every 6 (six) hours as needed for muscle spasms, Starting Fri 12/10/2021, Normal      montelukast (SINGULAIR) 10 mg tablet TAKE 1 TABLET BY MOUTH EVERY DAY IN THE EVENING, Normal      Multiple Vitamin (MULTIVITAMIN) capsule Take 1 capsule by mouth daily , Historical Med      pantoprazole (PROTONIX) 40 mg tablet TAKE ONE TABLET BY MOUTH 2 TIMES A DAY 30 MINUTES PRIOR TO BREAKFAST AND DINNER, Normal      rivaroxaban (XARELTO) 10 mg tablet Take 1 tablet (10 mg total) by mouth daily, Starting Mon 6/20/2022, Normal      ZOLMitriptan (ZOMIG) 5 MG tablet Take 1 tablet (5 mg total) by mouth once as needed for migraine for up to 1 dose, Starting Wed 11/25/2020, Normal             No discharge procedures on file      PDMP Review       Value Time User    PDMP Reviewed  Yes 8/11/2022 11:43 AM [address]          ED Provider  Electronically Signed by           Flip Trotter III DO  08/14/22 1554

## 2022-08-14 NOTE — TELEPHONE ENCOUNTER
1  Were you within 6 feet or less, for up to 15 minutes or more with a person that has a confirmed COVID-19 test? Unsure  2  What was the date of your exposure? Recently at a funereal of family member with hundreds of people  3  Are you experiencing any symptoms attributed to the virus?  (Assess for SOB, cough, fever, difficulty breathing) chills first then fever -101 2, sore trhoat, body aches  No cough or SOB  4  HIGH RISK: Do you have any history heart or lung conditions, weakened immune system, diabetes, Asthma, CHF, HIV, COPD, Chemo, renal failure, sickle cell, etc? Asthma  5  PREGNANCY: Are you pregnant or did you recently give birth? N/A  6  VACCINE: "Have you gotten the COVID-19 vaccine?" If Yes ask: "Which one, how many shots, when did you get it?" Pfizer and boostered  Home Covid test Positive at 0130 this morning

## 2022-08-14 NOTE — ED NOTES
Patient's  at bedside answering triage questions for patient  This nurse to assess patient and communicate with patient and  speaking for her   coming out to nurse's station and asking for blanket, bathroom, etc  Both made aware that patient is covid positive and should not have visitors   Supervisor made aware and at bedside       La Johnson, [address] [facility]  08/14/22 9696

## 2022-08-15 ENCOUNTER — TELEMEDICINE (OUTPATIENT)
Dept: FAMILY MEDICINE CLINIC | Facility: CLINIC | Age: 67
End: 2022-08-15
Payer: COMMERCIAL

## 2022-08-15 DIAGNOSIS — U07.1 COVID-19: Primary | ICD-10-CM

## 2022-08-15 PROCEDURE — 99213 OFFICE O/P EST LOW 20 MIN: CPT | Performed by: FAMILY MEDICINE

## 2022-08-15 NOTE — PROGRESS NOTES
COVID-19 Outpatient Progress Note    Assessment/Plan:    Problem List Items Addressed This Visit        Other    COVID-19 - Primary     Fever headache cough now symptoms began at  over the weekend on Saturday into               Disposition:     I have spent 22 minutes directly with the patient  Greater than 50% of this time was spent in counseling/coordination of care regarding: diagnostic results, prognosis, risks and benefits of treatment options, instructions for management, patient and family education, importance of treatment compliance, risk factor reductions and impressions  Encounter provider Edel Blair DO    Provider located at [place] [address]  [address] [place] [address]  [phone]    Recent Visits  No visits were found meeting these conditions  Showing recent visits within past 7 days and meeting all other requirements  Today's Visits  Date Type Provider Dept   08/15/22 [facility]medicine [facility] [address] [facility]'s visits and meeting all other requirements  Future Appointments  No visits were found meeting these conditions  Showing future appointments within next 150 days and meeting all other requirements       The patient was identified by name and date of birth  Darlene Coker was informed that this is a telemedicine visit and that the visit is being conducted through [employer] and patient was informed this is a secure, HIPAA-complaint platform  She agrees to proceed     My office door was closed  No one else was in the room  She acknowledged consent and understanding of privacy and security of the video platform  The patient has agreed to participate and understands they can discontinue the visit at any time  Patient is aware this is a billable service  This virtual check-in was done via [employer] and patient was informed that this is a secure, HIPAA-compliant platform   She agrees to proceed  Patient agrees to participate in a virtual check in via telephone or video visit instead of presenting to the office to address urgent/immediate medical needs  Patient is aware this is a billable service  After connecting through [facility], the patient was identified by name and date of birth  Polo Simmons was informed that this was a telemedicine visit and that the exam was being conducted confidentially over secure lines  Polo Simmons acknowledged consent and understanding of privacy and security of the telemedicine visit  I informed the patient that I have reviewed her record in Epic and presented the opportunity for her to ask any questions regarding the visit today  The patient agreed to participate  Verification of patient location:  Patient is located in the following state in which I hold an active license: [place]    Subjective: Polo Simmons is a 74 y o  female who has been screened for COVID-19  Patient's symptoms include fever, chills, nasal congestion, sore throat and headache  Patient denies fatigue, rhinorrhea, cough, shortness of breath, abdominal pain, nausea and myalgias  COVID-19 vaccination status: Fully vaccinated with booster    Lab Results   Component Value Date    SARSCOV2 Negative 11/10/2021    [facility] Not Detected 11/23/2020    [facility] [place] Negative 01/11/2022     Past Medical History:   Diagnosis Date    Acid reflux     Allergic rhinitis     Last Assessed: 11/2/2017    Anesthesia complication     HYPOTENSION    Arthritis     Asthma     Bigeminy     history    DVT [facility]deep venous thrombosis) ([facility] [address] ) 10/23/2018    [facility]) [facility]     DVT of leg (deep venous thrombosis) ([facility] [address] ) 2001    left    Female pelvic pain     Hyperlipidemia     Intermittent palpitations     Irregular heart beat     Migraines     Obesity (BMI 30 0-34  9)     Palpitations     Pes planus     unspecified laterality;  Last Assessed: 4/25/2014    Thyroid nodule     Trigeminy     history    Wears glasses      Past Surgical History:   Procedure Laterality Date    APPENDECTOMY      BREAST BIOPSY Left 2019    CATARACT EXTRACTION       SECTION      X 2    CHOLECYSTECTOMY      COLONOSCOPY      DILATION AND CURETTAGE OF UTERUS      HYSTERECTOMY      total    JOINT REPLACEMENT      left TKR    KNEE SURGERY Left     X 3    MS ESOPHAGOGASTRODUODENOSCOPY TRANSORAL DIAGNOSTIC N/A 2016    Procedure: ESOPHAGOGASTRODUODENOSCOPY (EGD); Surgeon: Elias Ugalde MD;  [facility];   Service: Gastroenterology    [place] LAPAROSCOPY W TOT HYSTERECTUTERUS <=[address] [place] TUBE/OVARY Bilateral 2016    Procedure: HYSTERECTOMY LAPAROSCOPIC TOTAL ,BSO;  Surgeon: Adelia Diaz MD;  [facility];  Service: Gynecology Oncology    REPLACEMENT TOTAL KNEE      US GUIDED BREAST BIOPSY LEFT COMPLETE Left 2019    WISDOM TOOTH EXTRACTION       Current Outpatient Medications   Medication Sig Dispense Refill    albuterol (PROVENTIL HFA,VENTOLIN HFA) 90 mcg/act inhaler INHALE 2 PUFFS EVERY 4-6 HOURS AS NEEDED 108 g 3    ALPRAZolam (XANAX) 0 25 mg tablet Take one tablet twice daily as needed for anxiety 30 tablet 0    butalbital-acetaminophen-caffeine (FIORICET,ESGIC) -40 mg per tablet Take 1 tablet by mouth every 4 (four) hours as needed for headaches 20 tablet 0    calcium-vitamin D (OSCAL) 250-125 MG-UNIT per tablet Take 1 tablet by mouth daily      cycloSPORINE (RESTASIS) 0 05 % ophthalmic emulsion Apply 1 drop to eye 2 (two) times a day      diazepam (VALIUM) 5 mg tablet Take 1 tablet (5 mg total) by mouth every 8 (eight) hours as needed for anxiety 30 tablet 0    diphenhydrAMINE (BENADRYL) 25 mg capsule Take by mouth daily at bedtime as needed        Elastic Bandages & Supports (Medical Compression Socks) MISC Use daily 5 each 2    Flovent  MCG/ACT inhaler INHALE 1 PUFF 2 TIMES A DAY RINSE MOUTH AFTER USE  12 g 1  fluticasone (FLONASE) 50 mcg/act nasal spray 2 sprays into each nostril daily 16 g 3    methocarbamol (ROBAXIN) 750 mg tablet Take 1 tablet (750 mg total) by mouth every 6 (six) hours as needed for muscle spasms 30 tablet 2    montelukast (SINGULAIR) 10 mg tablet TAKE 1 TABLET BY MOUTH EVERY DAY IN THE EVENING 30 tablet 2    Multiple Vitamin (MULTIVITAMIN) capsule Take 1 capsule by mouth daily       nirmatrelvir & ritonavir (Paxlovid) tablet therapy pack Take 3 tablets by mouth 2 (two) times a day for 5 days Take 2 nirmatrelvir tablets + 1 ritonavir tablet together per dose 30 tablet 0    pantoprazole (PROTONIX) 40 mg tablet TAKE ONE TABLET BY MOUTH 2 TIMES A DAY 30 MINUTES PRIOR TO BREAKFAST AND DINNER 180 tablet 0    rivaroxaban (XARELTO) 10 mg tablet Take 1 tablet (10 mg total) by mouth daily 90 tablet 3    ZOLMitriptan (ZOMIG) 5 MG tablet Take 1 tablet (5 mg total) by mouth once as needed for migraine for up to 1 dose 20 tablet 3     No current facility-administered medications for this visit  Allergies   Allergen Reactions    Naproxen Shortness Of Breath    Iv Contrast [Iodinated Diagnostic Agents] Itching    Seasonal Ic [Cholestatin]     Phentermine Palpitations       Review of Systems   Constitutional: Positive for chills and fever  Negative for fatigue  HENT: Positive for congestion and sore throat  Negative for nosebleeds, rhinorrhea and sinus pressure  Eyes: Negative for discharge and redness  Respiratory: Negative for cough and shortness of breath  Cardiovascular: Negative for chest pain, palpitations and leg swelling  Gastrointestinal: Negative for abdominal pain, blood in stool and nausea  Endocrine: Negative for cold intolerance, heat intolerance and polyuria  Genitourinary: Negative for dysuria and frequency  Musculoskeletal: Negative for arthralgias, back pain and myalgias  Skin: Negative for rash  Neurological: Positive for headaches   Negative for dizziness and weakness  Hematological: Negative for adenopathy  Psychiatric/Behavioral: Negative for behavioral problems and sleep disturbance  The patient is not nervous/anxious  Objective: There were no vitals filed for this visit  Physical Exam  Vitals and nursing note reviewed  Constitutional:       General: She is not in acute distress  Appearance: She is well-developed  HENT:      Head: Normocephalic and atraumatic  Eyes:      Conjunctiva/sclera: Conjunctivae normal    Cardiovascular:      Rate and Rhythm: Normal rate and regular rhythm  Heart sounds: No murmur heard  Pulmonary:      Effort: Pulmonary effort is normal  No respiratory distress  Breath sounds: Normal breath sounds  Abdominal:      Palpations: Abdomen is soft  Tenderness: There is no abdominal tenderness  Musculoskeletal:      Cervical back: Neck supple  Skin:     General: Skin is warm and dry  Neurological:      Mental Status: She is alert

## 2022-08-18 ENCOUNTER — TELEMEDICINE (OUTPATIENT)
Dept: FAMILY MEDICINE CLINIC | Facility: CLINIC | Age: 67
End: 2022-08-18
Payer: COMMERCIAL

## 2022-08-18 VITALS — OXYGEN SATURATION: 96 %

## 2022-08-18 DIAGNOSIS — U07.1 COVID-19: Primary | ICD-10-CM

## 2022-08-18 DIAGNOSIS — J30.1 ALLERGIC RHINITIS DUE TO POLLEN, UNSPECIFIED SEASONALITY: ICD-10-CM

## 2022-08-18 PROCEDURE — 99213 OFFICE O/P EST LOW 20 MIN: CPT | Performed by: FAMILY MEDICINE

## 2022-08-18 RX ORDER — FLUTICASONE PROPIONATE 50 MCG
SPRAY, SUSPENSION (ML) NASAL
Qty: 16 ML | Refills: 3 | Status: SHIPPED | OUTPATIENT
Start: 2022-08-18 | End: 2022-09-12

## 2022-08-18 NOTE — PROGRESS NOTES
COVID-19 Outpatient Progress Note    Assessment/Plan:    Problem List Items Addressed This Visit        Other    COVID-19 - Primary     Improving with Paxlovid Zinc, vit D , Vitamin C  Remain out of work for remainder of 2 week period  Disposition:     Recommended patient to come to the office to test for COVID-19  I have spent 25 minutes directly with the patient  Greater than 50% of this time was spent in counseling/coordination of care regarding: diagnostic results, prognosis, risks and benefits of treatment options, instructions for management, patient and family education, importance of treatment compliance, risk factor reductions and impressions  Encounter provider: Marta Hoyos DO     Provider located at: [address] [place] [address],[address] [place] [address]  [address] [place] [address]  [phone]     Recent Visits  Date Type Provider Dept   08/15/22 Telemedicine Marta Hoyos DO [address] recent visits within past 7 days and meeting all other requirements  Today's Visits  Date Type Provider Dept   08/18/22 Telemedicine Marta Hoyos DO [address] today's visits and meeting all other requirements  Future Appointments  No visits were found meeting these conditions  Showing future appointments within next 150 days and meeting all other requirements     This virtual check-in was done via [facility] and patient was informed that this is a secure, HIPAA-compliant platform  She agrees to proceed  Patient agrees to participate in a virtual check in via telephone or video visit instead of presenting to the office to address urgent/immediate medical needs  Patient is aware this is a billable service  She acknowledged consent and understanding of privacy and security of the video platform  The patient has agreed to participate and understands they can discontinue the visit at any time      After connecting through [employer], the patient was identified by name and date of birth  Shay Patterson was informed that this was a telemedicine visit and that the exam was being conducted confidentially over secure lines  My office door was closed  No one else was in the room  Shay Patterson acknowledged consent and understanding of privacy and security of the telemedicine visit  I informed the patient that I have reviewed her record in Epic and presented the opportunity for her to ask any questions regarding the visit today  The patient agreed to participate  Verification of patient location:  Patient is located in the following state in which I hold an active license: [place]    Subjective: Shay Doctor is a 74 y o  female who is concerned about COVID-19  Patient's symptoms include fever, chills, cough and nausea  Patient denies fatigue, congestion, rhinorrhea, sore throat, shortness of breath, abdominal pain, myalgias and headaches  COVID-19 vaccination status: Fully vaccinated with booster    Lab Results   Component Value Date    SARSCOV2 Negative 11/10/2021    [place] [place] Not Detected 2020    [address] Negative 2022     Past Medical History:   Diagnosis Date    Acid reflux     Allergic rhinitis     Last Assessed: 2017    Anesthesia complication     HYPOTENSION    Arthritis     Asthma     Bigeminy     history    [facility]) ([facility] [address] ) 10/23/2018    [facility]     DVT of leg (deep venous thrombosis) ([facility] [address] )     left    Female pelvic pain     Hyperlipidemia     Intermittent palpitations     Irregular heart beat     Migraines     Obesity (BMI 30 0-34  9)     Palpitations     Pes planus     unspecified laterality;  Last Assessed: 2014    Thyroid nodule     Trigeminy     history    Wears glasses      Past Surgical History:   Procedure Laterality Date    APPENDECTOMY      BREAST BIOPSY Left 2019    CATARACT EXTRACTION       SECTION      X 2    CHOLECYSTECTOMY      COLONOSCOPY      DILATION AND CURETTAGE OF UTERUS      HYSTERECTOMY      total    JOINT REPLACEMENT      left TKR    KNEE SURGERY Left     X 3    [place] ESOPHAGOGASTRODUODENOSCOPY TRANSORAL DIAGNOSTIC N/A 03/14/2016    Procedure: ESOPHAGOGASTRODUODENOSCOPY (EGD); Surgeon: Nel Parrish MD;  [facility];   Service: Gastroenterology    [place] LAPAROSCOPY W TOT HYSTERECTUTERUS <=250 [address] TUBE/OVARY Bilateral 07/08/2016    Procedure: HYSTERECTOMY LAPAROSCOPIC TOTAL ,BSO;  Surgeon: Maud Carrel, MD;  [facility];  Service: Gynecology Oncology    REPLACEMENT TOTAL KNEE  2014    US GUIDED BREAST BIOPSY LEFT COMPLETE Left 02/06/2019    WISDOM TOOTH EXTRACTION       Current Outpatient Medications   Medication Sig Dispense Refill    albuterol (PROVENTIL HFA,VENTOLIN HFA) 90 mcg/act inhaler INHALE 2 PUFFS EVERY 4-6 HOURS AS NEEDED 108 g 3    ALPRAZolam (XANAX) 0 25 mg tablet Take one tablet twice daily as needed for anxiety 30 tablet 0    butalbital-acetaminophen-caffeine (FIORICET,ESGIC) -40 mg per tablet Take 1 tablet by mouth every 4 (four) hours as needed for headaches 20 tablet 0    calcium-vitamin D (OSCAL) 250-125 MG-UNIT per tablet Take 1 tablet by mouth daily      cycloSPORINE (RESTASIS) 0 05 % ophthalmic emulsion Apply 1 drop to eye 2 (two) times a day      diazepam (VALIUM) 5 mg tablet Take 1 tablet (5 mg total) by mouth every 8 (eight) hours as needed for anxiety 30 tablet 0    diphenhydrAMINE (BENADRYL) 25 mg capsule Take by mouth daily at bedtime as needed        Elastic Bandages & Supports (Medical Compression Socks) MISC Use daily 5 each 2    Flovent  MCG/ACT inhaler INHALE 1 PUFF 2 TIMES A DAY RINSE MOUTH AFTER USE  12 g 1    fluticasone (FLONASE) 50 mcg/act nasal spray SPRAY 2 SPRAYS INTO EACH NOSTRIL EVERY DAY 16 mL 3    methocarbamol (ROBAXIN) 750 mg tablet Take 1 tablet (750 mg total) by mouth every 6 (six) hours as needed for muscle spasms 30 tablet 2    montelukast (SINGULAIR) 10 mg tablet TAKE 1 TABLET BY MOUTH EVERY DAY IN THE EVENING 30 tablet 2    Multiple Vitamin (MULTIVITAMIN) capsule Take 1 capsule by mouth daily       nirmatrelvir & ritonavir (Paxlovid) tablet therapy pack Take 3 tablets by mouth 2 (two) times a day for 5 days Take 2 nirmatrelvir tablets + 1 ritonavir tablet together per dose 30 tablet 0    pantoprazole (PROTONIX) 40 mg tablet TAKE ONE TABLET BY MOUTH 2 TIMES A DAY 30 MINUTES PRIOR TO BREAKFAST AND DINNER 180 tablet 0    rivaroxaban (XARELTO) 10 mg tablet Take 1 tablet (10 mg total) by mouth daily 90 tablet 3    ZOLMitriptan (ZOMIG) 5 MG tablet Take 1 tablet (5 mg total) by mouth once as needed for migraine for up to 1 dose 20 tablet 3     No current facility-administered medications for this visit  Allergies   Allergen Reactions    Naproxen Shortness Of Breath    Iv Contrast [Iodinated Diagnostic Agents] Itching    Seasonal Ic [Cholestatin]     Phentermine Palpitations       Review of Systems   Constitutional: Positive for chills and fever  Negative for fatigue  HENT: Negative for congestion, nosebleeds, rhinorrhea, sinus pressure and sore throat  Eyes: Negative for discharge and redness  Respiratory: Positive for cough  Negative for shortness of breath  Cardiovascular: Negative for chest pain, palpitations and leg swelling  Gastrointestinal: Positive for nausea  Negative for abdominal pain and blood in stool  Endocrine: Negative for cold intolerance, heat intolerance and polyuria  Genitourinary: Negative for dysuria and frequency  Musculoskeletal: Negative for arthralgias, back pain and myalgias  Skin: Negative for rash  Neurological: Negative for dizziness, weakness and headaches  Hematological: Negative for adenopathy  Psychiatric/Behavioral: Negative for behavioral problems and sleep disturbance  The patient is not nervous/anxious  Objective:    Vitals:    08/18/22 1132   SpO2: 96%       Physical Exam  Vitals and nursing note reviewed  Constitutional:       General: She is not in acute distress  Appearance: She is well-developed  HENT:      Head: Normocephalic and atraumatic  Eyes:      Conjunctiva/sclera: Conjunctivae normal    Cardiovascular:      Rate and Rhythm: Normal rate and regular rhythm  Heart sounds: No murmur heard  Pulmonary:      Effort: Pulmonary effort is normal  No respiratory distress  Breath sounds: Normal breath sounds  Abdominal:      Palpations: Abdomen is soft  Tenderness: There is no abdominal tenderness  Musculoskeletal:      Cervical back: Neck supple  Skin:     General: Skin is warm and dry  Neurological:      Mental Status: She is alert

## 2022-08-25 ENCOUNTER — APPOINTMENT (OUTPATIENT)
Dept: LAB | Facility: CLINIC | Age: 67
End: 2022-08-25

## 2022-08-25 DIAGNOSIS — Z00.8 HEALTH EXAMINATION IN POPULATION SURVEY: ICD-10-CM

## 2022-08-25 LAB
CHOLEST SERPL-MCNC: 190 MG/DL
EST. AVERAGE GLUCOSE BLD GHB EST-MCNC: 120 MG/DL
HBA1C MFR BLD: 5.8 %
HDLC SERPL-MCNC: 38 MG/DL
NONHDLC SERPL-MCNC: 152 MG/DL
TRIGL SERPL-MCNC: 556 MG/DL

## 2022-08-25 PROCEDURE — 36415 COLL VENOUS BLD VENIPUNCTURE: CPT

## 2022-08-25 PROCEDURE — 80061 LIPID PANEL: CPT

## 2022-08-25 PROCEDURE — 83036 HEMOGLOBIN GLYCOSYLATED A1C: CPT

## 2022-08-26 ENCOUNTER — OFFICE VISIT (OUTPATIENT)
Dept: FAMILY MEDICINE CLINIC | Facility: CLINIC | Age: 67
End: 2022-08-26
Payer: COMMERCIAL

## 2022-08-26 VITALS
RESPIRATION RATE: 16 BRPM | OXYGEN SATURATION: 99 % | WEIGHT: 197.5 LBS | TEMPERATURE: 98.1 F | SYSTOLIC BLOOD PRESSURE: 124 MMHG | DIASTOLIC BLOOD PRESSURE: 68 MMHG | HEART RATE: 80 BPM | HEIGHT: 63 IN | BODY MASS INDEX: 34.99 KG/M2

## 2022-08-26 DIAGNOSIS — Z00.00 ANNUAL PHYSICAL EXAM: Primary | ICD-10-CM

## 2022-08-26 PROCEDURE — 99397 PER PM REEVAL EST PAT 65+ YR: CPT | Performed by: FAMILY MEDICINE

## 2022-08-26 RX ORDER — FEXOFENADINE HCL 180 MG/1
180 TABLET ORAL DAILY
COMMUNITY

## 2022-08-26 NOTE — PATIENT INSTRUCTIONS
Meal Planning with Diabetes Exchanges   AMBULATORY CARE:   Diabetes exchanges  are servings of food that contain similar amounts of carbohydrate, fat, protein, and calories within a food group  The exchanges can be used to develop a healthy meal plan that helps to keep your blood sugar within the recommended levels  A meal plan with the right amount of carbohydrates is especially important  Your blood sugar naturally rises after you eat carbohydrates  Too many carbohydrates in 1 meal or snack can raise your blood sugar level  Carbohydrates are found in starches, fruit, milk, yogurt, and sweets  Call your doctor if:   You have high blood sugar levels during a certain time of day, or almost all of the time  You often have low blood sugar levels  You have questions or concerns about your condition or care  Create a meal plan with exchanges:  A dietitian will work with you to develop a healthy meal plan that is right for you  This meal plan will include the amount of exchanges you can have from each food group throughout the day  Follow your meal plan by keeping track of the amount of exchanges you eat for each meal and snack  Your meal plan will be based on your age, weight, blood sugar levels, medicine, and activity level  Starch food group exchanges:  Each exchange below contains about 15 grams of carbohydrate , 3 grams of protein, 1 gram of fat, and 80 calories  1 ounce of white, whole wheat or rye bread (1 slice)    1 ounce of bagel (about ¼ of a bagel)    1 6-inch flour or corn tortilla or 1 4-inch pancake (about ¼ inch thick)    ?  cup of cooked pasta or rice    ¾ cup of dry, ready-to-eat cereal with no sugar added     ½ cup of cooked cereal, such as oatmeal    3 hipolito cracker squares or 8 animal crackers    6 saltine-type crackers or     3 cups of popcorn or ¾ ounce of pretzels     Starchy vegetables and cooked legumes:      ½ cup of corn, green peas, sweet potatoes, or mashed potatoes     ¼ of a large baked potato     1 cup of acorn, butternut squash, or pumpkin     ½ cup of beans, lentils, or peas (such as hodges, kidney, or black-eyed)    ? cup of lima beans    Fruit group exchanges:  Each exchange contains about 15 grams of carbohydrate  and 60 calories  1 small (4 ounce) apple, banana orange, or nectarine    ½ cup of canned or fresh fruit    ½ cup (4 ounces) of unsweetened fruit juice    2 tablespoons of dried fruit    Milk group exchanges:  Each exchange contains about 12 grams of carbohydrate  and 8 grams of protein  The amount of fat and calories in each serving depends on the type of milk (such as whole, low-fat, or fat-free)  1 cup fat-free or low-fat milk    ¾ cup of plain, nonfat yogurt    1 cup fat-free, flavored yogurt with artificial (no calorie) sweetener    Non-starchy vegetable group exchanges:  Each exchange contains about 5 grams of carbohydrate , 2 grams of protein, and 25 calories  Examples include beets, broccoli, cabbage, carrots, cauliflower, cucumber, mushrooms, tomatoes, and zucchini  ½ cup of cooked vegetables or 1 cup of raw vegetables     ½ cup of vegetable juice    Meat and meat substitute group exchanges:  Each exchange of a lean meat  listed below contains about 7 grams of protein, 0 to 3 grams of fat, and 45 calories  The meat and meat substitutes food group does not contain any carbohydrates  Medium and high-fat meats have more calories  1 ounce of chicken or turkey without skin, or 1 ounce of fish (not breaded or fried)     1 ounce of lean beef, pork, or lamb     1-inch cube or 1 ounce of low-fat cheese     2 egg whites or ¼ cup of egg substitute     ½ cup of tofu    Sweets, desserts, and other carbohydrate group exchanges:   Sweets and other desserts:  Each exchange has about 15 grams of carbohydrate       1 ounce of gilbert food cake or 2-inch square cake (unfrosted)    2 small cookies     ½ cup of sugar-free, fat-free ice cream    1 tablespoon of syrup, jam, jelly, table sugar, or honey    Combination foods:     1 cup of an entrée, such as lasagna, spaghetti with meatballs, macaroni and cheese, and chili with beans (each serving counts as 2 carbohydrate exchanges )     1 cup of tomato or vegetable beef soup (each serving counts as 1 carbohydrate exchange )    Fat group exchanges:  Each exchange contains 5 grams of fat and 45 calories  1 teaspoon of oil (such as canola, olive, or corn oil)     6 almonds or cashews, 10 peanuts, or 4 pecan halves     2 tablespoons of avocado     ½ tablespoon of peanut butter     1 teaspoon of regular margarine or 2 teaspoons of low-fat margarine     1 teaspoon of regular butter or 1 tablespoon of low-fat butter     1 teaspoon of regular mayonnaise or 1 tablespoon of low-fat mayonnaise     1 tablespoon of regular salad dressing or 2 tablespoons of low-fat salad dressing    Free foods: The foods on this list are called free foods because they have very few calories  Free foods usually do not increase your blood sugar if you limit them  1 tablespoon of catsup or taco sauce     ¼ cup of salsa     2 tablespoons of sugar-free syrup or 2 teaspoons of light jam or jelly     1 tablespoon of fat-free salad dressing     4 tablespoons of fat-free margarine or fat-free mayonnaise     Sugar-free drinks: diet soda, sugar-free drink mixes, or mineral water     Low-sodium bouillon or fat-free broth     Mustard     Seasonings such as spices, herbs, and garlic     Sugar-free gelatin without added fruit    Other healthy nutrition guidelines:   Limit drinks with sugar substitutes  Your dietitian or healthcare provider will encourage you to drink water  Water helps your kidneys to function properly  Ask how much water you should drink every day  Eat more fiber  Choose foods that are good sources of fiber, such as fruits, vegetables, and whole grains  Cereals that contain 5 or more grams of fiber per serving are good sources of fiber   Legumes such as garbanzo, hodges beans, kidney beans, and lentils are also good sources  Limit fat  Ask your dietitian or healthcare provider how much fat you should eat each day  Choose foods low in fat, saturated fat, trans fat, and cholesterol  Examples include turkey or chicken without the skin, fish, lean cuts of meat, and beans  Low-fat dairy foods, such as low-fat or fat-free milk and low-fat yogurt are also good choices  Omega-3 fatty acids are healthy fats that are found in canola oil, soybean oil and fatty fish  [place], albacore tuna, and sardines are good sources of omega 3 fatty acids  Eat 2 servings of these types of fish each week  Do not eat fried fish  Limit sugar  Sugar and sweets must be counted toward the carbohydrate exchanges that you can have within your meal plan  Limit sugar and sweets because they are usually also high in calories and fat  Eat smaller portions of sweets by sharing a dessert or asking for a child-size portion at a restaurant  Limit sodium  (salt) to about 2,300 mg per day  You may need to eat even less sodium if you have certain medical conditions  Foods high in sodium include soy sauce, potato chips, and soup  Limit alcohol  Ask your healthcare provider if it is safe for you to drink alcohol  If alcohol is safe for you to have, eat a meal when you drink alcohol  If you drink alcohol on an empty stomach, your blood sugar may drop to a low level  Women 21 years or older and men 72 years or older should limit alcohol to 1 drink a day  Men aged 24 to 59 years should limit alcohol to 2 drinks a day  A drink of alcohol is 5 ounces of wine, 12 ounces of beer, or 1½ ounces of liquor  Other ways to manage your diabetes:   Control your blood sugar level  Test your blood sugar level regularly and keep a record of the results  Ask your healthcare provider when and how often to test your blood sugar  You may need to check your blood sugar level at least 3 times each day       Talk to your healthcare provider about your weight  Ask if you need to lose weight, and how much you need to lose  If you are overweight, you may need to make other changes to lose weight  Ask your healthcare provider to help you create a weight loss program      Get regular physical activity  Physical activity can help decrease your blood sugar level  It can also help to decrease your risk for heart disease and help you lose weight  Adults should have moderate intensity physical activity for at least 150 minutes every week  Spread the amount of activity over at least 3 days a week  Do not skip more than 2 days in a row  Children should get at least 60 minutes of moderate physical activity on most days of the week  Examples of moderate physical activity include brisk walking, running, and swimming  Do not sit for longer than 30 minutes  Work with your healthcare provider to create a plan for physical activity  © Copyright [employer] 2022 Information is for End User's use only and may not be sold, redistributed or otherwise used for commercial purposes  All illustrations and images included in CareNotes® are the copyrighted property of [employer]  or [facility] Letitia Caldwell   The above information is an  only  It is not intended as medical advice for individual conditions or treatments  Talk to your doctor, nurse or pharmacist before following any medical regimen to see if it is safe and effective for you

## 2022-08-26 NOTE — PROGRESS NOTES
ADULT ANNUAL [address]    NAME: Jevon Base  AGE: 74 y o  SEX: female  : 1955     DATE: 2022     Assessment and Plan:     Problem List Items Addressed This Visit        Other    Annual physical exam - Primary     Discussed all health concerns with patient-she brought a list of concerns vaccines , post covid fatigue  Weight reduction etc               Immunizations and preventive care screenings were discussed with patient today  Appropriate education was printed on patient's after visit summary  Counseling:  Alcohol/drug use: discussed moderation in alcohol intake, the recommendations for healthy alcohol use, and avoidance of illicit drug use  Dental Health: discussed importance of regular tooth brushing, flossing, and dental visits  Injury prevention: discussed safety/seat belts, safety helmets, smoke detectors, carbon dioxide detectors, and smoking near bedding or upholstery  · Exercise: the importance of regular exercise/physical activity was discussed  Recommend exercise 3-5 times per week for at least 30 minutes  No follow-ups on file  Chief Complaint:     Chief Complaint   Patient presents with    Physical Exam      History of Present Illness:     Adult Annual Physical   Patient here for a comprehensive physical exam  The patient reports no problems  Diet and Physical Activity  · Diet/Nutrition: well balanced diet  · Exercise: no formal exercise  Depression Screening  PHQ-2/9 Depression Screening         General Health  · Sleep: sleeps well  · Hearing: normal - bilateral   · Vision: no vision problems  · Dental: regular dental visits  /GYN Health  · Patient is: postmenopausal  · Last menstrual period:   · Contraceptive method:       Review of Systems:     Review of Systems   Constitutional: Negative for chills, fatigue and fever     HENT: Negative for congestion, nosebleeds, rhinorrhea, sinus pressure and sore throat  Eyes: Negative for discharge and redness  Respiratory: Negative for cough and shortness of breath  Cardiovascular: Negative for chest pain, palpitations and leg swelling  Gastrointestinal: Negative for abdominal pain, blood in stool and nausea  Endocrine: Negative for cold intolerance, heat intolerance and polyuria  Genitourinary: Negative for dysuria and frequency  Musculoskeletal: Negative for arthralgias, back pain and myalgias  Skin: Negative for rash  Neurological: Negative for dizziness, weakness and headaches  Hematological: Negative for adenopathy  Psychiatric/Behavioral: Negative for behavioral problems and sleep disturbance  The patient is not nervous/anxious  Past Medical History:     Past Medical History:   Diagnosis Date    Acid reflux     Allergic rhinitis     Last Assessed: 2017    Anesthesia complication     HYPOTENSION    Arthritis     Asthma     Bigeminy     history    [facility] ([facility] ) 10/23/2018    [facility]     DVT of leg (deep venous thrombosis) ([facility] ) 2001    left    Female pelvic pain     Hyperlipidemia     Intermittent palpitations     Irregular heart beat     Migraines     Obesity (BMI 30 0-34  9)     Palpitations     Pes planus     unspecified laterality; Last Assessed: 2014    Thyroid nodule     Trigeminy     history    Wears glasses       Past Surgical History:     Past Surgical History:   Procedure Laterality Date    APPENDECTOMY      BREAST BIOPSY Left 2019    CATARACT EXTRACTION       SECTION      X 2    CHOLECYSTECTOMY      COLONOSCOPY      DILATION AND CURETTAGE OF UTERUS      HYSTERECTOMY      total    JOINT REPLACEMENT      left TKR    KNEE SURGERY Left     X 3    GA ESOPHAGOGASTRODUODENOSCOPY TRANSORAL DIAGNOSTIC N/A 2016    Procedure: ESOPHAGOGASTRODUODENOSCOPY (EGD);   Surgeon: Kevin Davis MD; Location: [facility]; Service: Gastroenterology    NE LAPAROSCOPY W TOT HYSTERECTUTERUS <=250 Tad Patter TUBE/OVARY Bilateral 2016    Procedure: HYSTERECTOMY LAPAROSCOPIC TOTAL ,BSO;  Surgeon: Courtney Reno MD;  Location: [facility];  Service: Gynecology Oncology    REPLACEMENT TOTAL KNEE  2014    US GUIDED BREAST BIOPSY LEFT COMPLETE Left 2019    WISDOM TOOTH EXTRACTION        Social History:     Social History     Socioeconomic History    Marital status: /Civil Union     Spouse name: None    Number of children: None    Years of education: None    Highest education level: None   Occupational History    Occupation: Pediatrics   Tobacco Use    Smoking status: Former Smoker     Packs/day: 0 50     Types: Cigarettes     Quit date: 1978     Years since quittin 2    Smokeless tobacco: Never Used    Tobacco comment: as teenager   Vaping Use    Vaping Use: Never used   Substance and Sexual Activity    Alcohol use:  Yes     Alcohol/week: 2 0 standard drinks     Types: 2 Glasses of wine per week    Drug use: No    Sexual activity: Not Currently     Birth control/protection: Surgical   Other Topics Concern    None   Social History Narrative    None     Social Determinants of Health     Financial Resource Strain: Not on file   Food Insecurity: Not on file   Transportation Needs: Not on file   Physical Activity: Unknown    Days of Exercise per Week: 0 days   [place] of Exercise per Session: Not on file   Stress: Not on file   Social Connections: Not on file   Intimate Partner Violence: Not on file   Housing Stability: Not on file      Family History:     Family History   Problem Relation Age of Onset    Endometrial cancer Mother 76    Migraines Mother     Diabetes Father     Heart disease Father     Heart attack Father     Prostate cancer Father     Hypertension Father     Thyroid cancer Brother         medullary    Diabetes type II Brother     Growth hormone deficiency Daughter     No Known Problems Daughter     No Known Problems Maternal Grandmother     No Known Problems Paternal Grandmother     Alzheimer's disease Maternal Aunt     Thyroid cancer Maternal Aunt     Breast cancer Maternal Aunt 72    Thyroid cancer Maternal Aunt 80    No Known Problems Maternal Aunt     No Known Problems Maternal Aunt     No Known Problems Maternal Aunt     No Known Problems Maternal Aunt     Other Maternal Uncle         Brain Tumor    Alzheimer's disease Maternal Uncle     Diabetes Paternal Aunt     Hashimoto's thyroiditis Paternal Aunt         Total Thyroidectomy    Hypertension Paternal Aunt     No Known Problems Paternal Aunt     Stroke Paternal Uncle     Cancer Family     Colon cancer Neg Hx     Cervical cancer Neg Hx     Ovarian cancer Neg Hx     Uterine cancer Neg Hx       Current Medications:     Current Outpatient Medications   Medication Sig Dispense Refill    cycloSPORINE (RESTASIS) 0 05 % ophthalmic emulsion Apply 1 drop to eye 2 (two) times a day      Elastic Bandages & Supports (Medical Compression Socks) MISC Use daily 5 each 2    fexofenadine (ALLEGRA) 180 MG tablet Take 180 mg by mouth daily      fluticasone (FLONASE) 50 mcg/act nasal spray SPRAY 2 SPRAYS INTO EACH NOSTRIL EVERY DAY 16 mL 3    pantoprazole (PROTONIX) 40 mg tablet TAKE ONE TABLET BY MOUTH 2 TIMES A DAY 30 MINUTES PRIOR TO BREAKFAST AND DINNER 180 tablet 0    rivaroxaban (XARELTO) 10 mg tablet Take 1 tablet (10 mg total) by mouth daily 90 tablet 3    albuterol (PROVENTIL HFA,VENTOLIN HFA) 90 mcg/act inhaler INHALE 2 PUFFS EVERY 4-6 HOURS AS NEEDED (Patient not taking: Reported on 8/26/2022) 108 g 3    ALPRAZolam (XANAX) 0 25 mg tablet Take one tablet twice daily as needed for anxiety (Patient not taking: Reported on 8/26/2022) 30 tablet 0    butalbital-acetaminophen-caffeine (FIORICET,ESGIC) -40 mg per tablet Take 1 tablet by mouth every 4 (four) hours as needed for headaches (Patient not taking: Reported on 8/26/2022) 20 tablet 0    calcium-vitamin D (OSCAL) 250-125 MG-UNIT per tablet Take 1 tablet by mouth daily (Patient not taking: Reported on 8/26/2022)      diazepam (VALIUM) 5 mg tablet Take 1 tablet (5 mg total) by mouth every 8 (eight) hours as needed for anxiety (Patient not taking: Reported on 8/26/2022) 30 tablet 0    diphenhydrAMINE (BENADRYL) 25 mg capsule Take by mouth daily at bedtime as needed   (Patient not taking: Reported on 8/26/2022)      Flovent  MCG/ACT inhaler INHALE 1 PUFF 2 TIMES A DAY RINSE MOUTH AFTER USE  (Patient not taking: Reported on 8/26/2022) 12 g 1    methocarbamol (ROBAXIN) 750 mg tablet Take 1 tablet (750 mg total) by mouth every 6 (six) hours as needed for muscle spasms (Patient not taking: Reported on 8/26/2022) 30 tablet 2    montelukast (SINGULAIR) 10 mg tablet TAKE 1 TABLET BY MOUTH EVERY DAY IN THE EVENING (Patient not taking: Reported on 8/26/2022) 30 tablet 2    Multiple Vitamin (MULTIVITAMIN) capsule Take 1 capsule by mouth daily  (Patient not taking: Reported on 8/26/2022)      ZOLMitriptan (ZOMIG) 5 MG tablet Take 1 tablet (5 mg total) by mouth once as needed for migraine for up to 1 dose (Patient not taking: Reported on 8/26/2022) 20 tablet 3     No current facility-administered medications for this visit  Allergies: Allergies   Allergen Reactions    Naproxen Shortness Of Breath    Iv Contrast [Iodinated Diagnostic Agents] Itching    Seasonal Ic [Cholestatin]     Phentermine Palpitations      Physical Exam:     /68 (BP Location: Left arm, Patient Position: Sitting, Cuff Size: Large)   Pulse 80   Temp 98 1 °F (36 7 °C) (Tympanic)   Resp 16   Ht 5' 3 25" (1 607 m)   Wt 89 6 kg (197 lb 8 oz)   LMP  (LMP Unknown)   SpO2 99%   BMI 34 71 kg/m²     Physical Exam  Vitals and nursing note reviewed  Constitutional:       General: She is not in acute distress  Appearance: Normal appearance  She is well-developed  HENT:      Head: Normocephalic and atraumatic  Right Ear: Tympanic membrane, ear canal and external ear normal       Left Ear: Tympanic membrane, ear canal and external ear normal       Nose: Nose normal       Mouth/Throat:      Mouth: Mucous membranes are moist       Pharynx: Oropharynx is clear  Eyes:      Extraocular Movements: Extraocular movements intact  Conjunctiva/sclera: Conjunctivae normal       Pupils: Pupils are equal, round, and reactive to light  Cardiovascular:      Rate and Rhythm: Normal rate and regular rhythm  Pulses: Normal pulses  Heart sounds: Normal heart sounds  No murmur heard  Pulmonary:      Effort: Pulmonary effort is normal  No respiratory distress  Breath sounds: Normal breath sounds  Abdominal:      General: Bowel sounds are normal       Palpations: Abdomen is soft  Tenderness: There is no abdominal tenderness  Musculoskeletal:         General: Normal range of motion  Cervical back: Normal range of motion and neck supple  Skin:     General: Skin is warm and dry  Capillary Refill: Capillary refill takes less than 2 seconds  Neurological:      General: No focal deficit present  Mental Status: She is alert and oriented to person, place, and time  Mental status is at baseline  Psychiatric:         Mood and Affect: Mood normal          Behavior: Behavior normal          Thought Content:  Thought content normal          Judgment: Judgment normal           DO PAULINO Slater 99

## 2022-08-26 NOTE — ASSESSMENT & PLAN NOTE
Discussed all health concerns with patient-she brought a list of concerns vaccines , post covid fatigue   Weight reduction etc

## 2022-09-02 ENCOUNTER — TELEPHONE (OUTPATIENT)
Dept: FAMILY MEDICINE CLINIC | Facility: CLINIC | Age: 67
End: 2022-09-02

## 2022-09-02 NOTE — TELEPHONE ENCOUNTER
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

## 2022-09-02 NOTE — TELEPHONE ENCOUNTER
Patient called in and stated she only wanted to speak with a nurse  No nurse was available at the time of her call  She stated she is still testing positive for Covid, she is worried she needs to reach out to infectious disease in regards to this  She would like to speak with the doctor this morning  She would like to get this resolved so she can stop testing positive

## 2022-09-12 DIAGNOSIS — J30.1 ALLERGIC RHINITIS DUE TO POLLEN, UNSPECIFIED SEASONALITY: ICD-10-CM

## 2022-09-12 RX ORDER — FLUTICASONE PROPIONATE 50 MCG
SPRAY, SUSPENSION (ML) NASAL
Qty: 48 ML | Refills: 2 | Status: SHIPPED | OUTPATIENT
Start: 2022-09-12

## 2022-09-19 ENCOUNTER — TELEPHONE (OUTPATIENT)
Dept: OBGYN CLINIC | Facility: OTHER | Age: 67
End: 2022-09-19

## 2022-09-19 NOTE — TELEPHONE ENCOUNTER
Patient called in , she would like to be seen with Dr Thedora Spatz for Myxoid Cyst of Right Middle Finger   Please call her to get her scheduled       C/b # [phone]

## 2022-09-20 DIAGNOSIS — G43.009 MIGRAINE WITHOUT AURA AND WITHOUT STATUS MIGRAINOSUS, NOT INTRACTABLE: ICD-10-CM

## 2022-09-20 RX ORDER — BUTALBITAL, ACETAMINOPHEN AND CAFFEINE 50; 325; 40 MG/1; MG/1; MG/1
1 TABLET ORAL EVERY 4 HOURS PRN
Qty: 30 TABLET | Refills: 0 | Status: SHIPPED | OUTPATIENT
Start: 2022-09-20 | End: 2022-10-20 | Stop reason: SDUPTHER

## 2022-09-20 NOTE — TELEPHONE ENCOUNTER
Patient called , and left a vm on the med line requesting 30-50 dispense of Fioricet with refills   Please refill is appropriate

## 2022-09-21 ENCOUNTER — COMPLETE EYE EXAM (OUTPATIENT)
Dept: URBAN - METROPOLITAN AREA CLINIC 6 | Facility: CLINIC | Age: 67
End: 2022-09-21

## 2022-09-21 DIAGNOSIS — H04.123: ICD-10-CM

## 2022-09-21 DIAGNOSIS — Z96.1: ICD-10-CM

## 2022-09-21 DIAGNOSIS — H35.372: ICD-10-CM

## 2022-09-21 PROCEDURE — 92134 CPTRZ OPH DX IMG PST SGM RTA: CPT

## 2022-09-21 PROCEDURE — 92014 COMPRE OPH EXAM EST PT 1/>: CPT

## 2022-09-21 ASSESSMENT — VISUAL ACUITY
OS_SC: 20/30-1
OD_SC: 20/25+2

## 2022-09-21 ASSESSMENT — TONOMETRY
OD_IOP_MMHG: 12
OS_IOP_MMHG: 10

## 2022-10-05 ENCOUNTER — CLINICAL SUPPORT (OUTPATIENT)
Dept: FAMILY MEDICINE CLINIC | Facility: CLINIC | Age: 67
End: 2022-10-05
Payer: COMMERCIAL

## 2022-10-05 DIAGNOSIS — Z23 ENCOUNTER FOR IMMUNIZATION: Primary | ICD-10-CM

## 2022-10-05 PROCEDURE — 90471 IMMUNIZATION ADMIN: CPT | Performed by: FAMILY MEDICINE

## 2022-10-05 PROCEDURE — 90662 IIV NO PRSV INCREASED AG IM: CPT | Performed by: FAMILY MEDICINE

## 2022-10-20 DIAGNOSIS — G43.009 MIGRAINE WITHOUT AURA AND WITHOUT STATUS MIGRAINOSUS, NOT INTRACTABLE: ICD-10-CM

## 2022-10-21 RX ORDER — BUTALBITAL, ACETAMINOPHEN AND CAFFEINE 50; 325; 40 MG/1; MG/1; MG/1
1 TABLET ORAL EVERY 4 HOURS PRN
Qty: 30 TABLET | Refills: 0 | Status: SHIPPED | OUTPATIENT
Start: 2022-10-21

## 2022-11-14 ENCOUNTER — OFFICE VISIT (OUTPATIENT)
Dept: OBGYN CLINIC | Facility: CLINIC | Age: 67
End: 2022-11-14

## 2022-11-14 VITALS
DIASTOLIC BLOOD PRESSURE: 82 MMHG | BODY MASS INDEX: 34.91 KG/M2 | HEIGHT: 63 IN | WEIGHT: 197 LBS | SYSTOLIC BLOOD PRESSURE: 126 MMHG

## 2022-11-14 DIAGNOSIS — M67.40 MUCOID CYST OF JOINT: Primary | ICD-10-CM

## 2022-11-14 NOTE — PROGRESS NOTES
ASSESSMENT/PLAN:    Assessment:   Mucoid cyst of right long finger DIP joint    Plan:   Aspiration vs excision of mass 90% success rate  Do not recommend fusion of finger    Follow Up: After Surgery    To Do Next Visit:  Sutures out    General Discussions:  Ganglion Cysts: The anatomy and physiology of the ganglion was discussed with the patient today in the office  Fluid leaking out of the joint surface typically creates a small sac, which can enlarge and cause pain or limitation of motion  Treatment options include observation, aspiration, or surgical incision were discussed with the patient today  Observation typically lead to resolution and approximately 10% of patients, aspiration least resolution approximately 50% of patients, and surgical excision lead to resolution in approximately 97% of patients  After discussion with the patient today, the patient voiced understanding of all treatment options  Operative Discussions:  Standard Consent: The risks and benefits of the procedure were explained to the patient, which include, but are not limited to: Bleeding, infection, recurrence, pain, scar, damage to tendons, damage to nerves, and damage to blood vessels, failure to give desired results and complications related to anesthesia  These risks, along with alternative conservative treatment options, and postoperative protocols were voiced back and understood by the patient  All questions were answered to the patient's satisfaction  The patient agrees to comply with a standard postoperative protocol, and is willing to proceed  Education was provided via written and auditory forms  There were no barriers to learning  Written handouts regarding wound care, incision and scar care, and general preoperative information was provided to the patient  Prior to surgery, the patient may be requested to stop all anti-inflammatory medications    Prophylactic aspirin, Plavix, and Coumadin may be allowed to be continued  Medications including vitamin E , ginkgo, and fish oil are requested to be stopped approximately one week prior to surgery  Hypertensive medications and beta blockers, if taken, should be continued  _____________________________________________________  CHIEF COMPLAINT:  Chief Complaint   Patient presents with   • Right Middle Finger - Mass         SUBJECTIVE:  Polo Simmons is a 69 y o  female who presents with Pain  Moderate  Intermittant  Sharp to the right long finger  This started  1 year(s) ago as Insidious  She notes a mass over the DIP join that flucuates in size  She states that the cyst has drained synovial fluid at times  She states that she was seen at an outside facility and was offered either excision or a fusion of the finger  Radiation: None  Previous Treatments: None without relief  Associated symptoms: No Complaints  Handedness: right  Work status:  Pediatrician    PAST MEDICAL HISTORY:  Past Medical History:   Diagnosis Date   • Acid reflux    • Allergic rhinitis     Last Assessed: 2017   • Anesthesia complication     HYPOTENSION   • Arthritis    • Asthma    • Bigeminy     history   • DVT (deep venous thrombosis) ([facility] ) 10/23/2018   • DVT (deep venous thrombosis) ([facility] )    • [facility]     left   • Female pelvic pain    • Hyperlipidemia    • Intermittent palpitations    • Irregular heart beat    • Migraines    • Obesity (BMI 30 0-34  9)    • Palpitations    • Pes planus     unspecified laterality;  Last Assessed: 2014   • Thyroid nodule    • Trigeminy     history   • Wears glasses        PAST SURGICAL HISTORY:  Past Surgical History:   Procedure Laterality Date   • APPENDECTOMY     • BREAST BIOPSY Left 2019   • CATARACT EXTRACTION     •  SECTION      X 2   • CHOLECYSTECTOMY     • COLONOSCOPY     • DILATION AND CURETTAGE OF UTERUS     • HYSTERECTOMY      total   • JOINT REPLACEMENT      left TKR   • KNEE SURGERY Left     X 3   • MI ESOPHAGOGASTRODUODENOSCOPY TRANSORAL DIAGNOSTIC N/A 2016    Procedure: ESOPHAGOGASTRODUODENOSCOPY (EGD); Surgeon: Parminder Massey MD;  Location: [facility];   Service: Gastroenterology   • MI LAPAROSCOPY W TOT HYSTERECTUTERUS <=250 GRAM  W TUBE/OVARY Bilateral 2016    Procedure: HYSTERECTOMY LAPAROSCOPIC TOTAL ,BSO;  Surgeon: Wally Hinojosa MD;  Location: [facility];  Service: Gynecology Oncology   • REPLACEMENT TOTAL KNEE     • US GUIDED BREAST BIOPSY LEFT COMPLETE Left 2019   • WISDOM TOOTH EXTRACTION         FAMILY HISTORY:  Family History   Problem Relation Age of Onset   • Endometrial cancer Mother 76   • Migraines Mother    • Diabetes Father    • Heart disease Father    • Heart attack Father    • Prostate cancer Father    • Hypertension Father    • Thyroid cancer Brother         medullary   • Diabetes type II Brother    • Growth hormone deficiency Daughter    • No Known Problems Daughter    • No Known Problems Maternal Grandmother    • No Known Problems Paternal Grandmother    • Alzheimer's disease Maternal Aunt    • Thyroid cancer Maternal Aunt    • Breast cancer Maternal Aunt 65   • Thyroid cancer Maternal Aunt 85   • No Known Problems Maternal Aunt    • No Known Problems Maternal Aunt    • No Known Problems Maternal Aunt    • No Known Problems Maternal Aunt    • Other Maternal Uncle         Brain Tumor   • Alzheimer's disease Maternal Uncle    • Diabetes Paternal Aunt    • Hashimoto's thyroiditis Paternal Aunt         Total Thyroidectomy   • Hypertension Paternal Aunt    • No Known Problems Paternal Aunt    • Stroke Paternal Uncle    • Cancer Family    • Colon cancer Neg Hx    • Cervical cancer Neg Hx    • Ovarian cancer Neg Hx    • Uterine cancer Neg Hx        SOCIAL HISTORY:  Social History     Tobacco Use   • Smoking status: Former Smoker     Packs/day: 0 50     Types: Cigarettes     Quit date: 1978     Years since quittin 4   • Smokeless tobacco: Never Used   • Tobacco comment: as teenager   Vaping Use   • Vaping Use: Never used   Substance Use Topics   • Alcohol use:  Yes     Alcohol/week: 2 0 standard drinks     Types: 2 Glasses of wine per week   • Drug use: No       MEDICATIONS:    Current Outpatient Medications:   •  albuterol (PROVENTIL HFA,VENTOLIN HFA) 90 mcg/act inhaler, INHALE 2 PUFFS EVERY 4-6 HOURS AS NEEDED (Patient not taking: Reported on 8/26/2022), Disp: 108 g, Rfl: 3  •  ALPRAZolam (XANAX) 0 25 mg tablet, Take one tablet twice daily as needed for anxiety (Patient not taking: Reported on 8/26/2022), Disp: 30 tablet, Rfl: 0  •  butalbital-acetaminophen-caffeine (FIORICET,ESGIC) -40 mg per tablet, Take 1 tablet by mouth every 4 (four) hours as needed for headaches, Disp: 30 tablet, Rfl: 0  •  calcium-vitamin D (OSCAL) 250-125 MG-UNIT per tablet, Take 1 tablet by mouth daily (Patient not taking: Reported on 8/26/2022), Disp: , Rfl:   •  cycloSPORINE (RESTASIS) 0 05 % ophthalmic emulsion, Apply 1 drop to eye 2 (two) times a day, Disp: , Rfl:   •  diazepam (VALIUM) 5 mg tablet, Take 1 tablet (5 mg total) by mouth every 8 (eight) hours as needed for anxiety (Patient not taking: Reported on 8/26/2022), Disp: 30 tablet, Rfl: 0  •  diphenhydrAMINE (BENADRYL) 25 mg capsule, Take by mouth daily at bedtime as needed   (Patient not taking: Reported on 8/26/2022), Disp: , Rfl:   •  Elastic Bandages & Supports (Medical Compression Socks) MISC, Use daily, Disp: 5 each, Rfl: 2  •  fexofenadine (ALLEGRA) 180 MG tablet, Take 180 mg by mouth daily, Disp: , Rfl:   •  Flovent  MCG/ACT inhaler, INHALE 1 PUFF 2 TIMES A DAY RINSE MOUTH AFTER USE  (Patient not taking: Reported on 8/26/2022), Disp: 12 g, Rfl: 1  •  fluticasone (FLONASE) 50 mcg/act nasal spray, SPRAY 2 SPRAYS INTO EACH NOSTRIL EVERY DAY, Disp: 48 mL, Rfl: 2  •  methocarbamol (ROBAXIN) 750 mg tablet, Take 1 tablet (750 mg total) by mouth every 6 (six) hours as needed for muscle spasms (Patient not taking: Reported on 8/26/2022), Disp: 30 tablet, Rfl: 2  •  montelukast (SINGULAIR) 10 mg tablet, TAKE 1 TABLET BY MOUTH EVERY DAY IN THE EVENING (Patient not taking: Reported on 8/26/2022), Disp: 30 tablet, Rfl: 2  •  Multiple Vitamin (MULTIVITAMIN) capsule, Take 1 capsule by mouth daily  (Patient not taking: Reported on 8/26/2022), Disp: , Rfl:   •  pantoprazole (PROTONIX) 40 mg tablet, TAKE ONE TABLET BY MOUTH 2 TIMES A DAY 30 MINUTES PRIOR TO BREAKFAST AND DINNER, Disp: 180 tablet, Rfl: 0  •  rivaroxaban (XARELTO) 10 mg tablet, Take 1 tablet (10 mg total) by mouth daily, Disp: 90 tablet, Rfl: 3  •  ZOLMitriptan (ZOMIG) 5 MG tablet, Take 1 tablet (5 mg total) by mouth once as needed for migraine for up to 1 dose (Patient not taking: Reported on 8/26/2022), Disp: 20 tablet, Rfl: 3    ALLERGIES:  Allergies   Allergen Reactions   • Naproxen Shortness Of Breath   • Iv Contrast [Iodinated Diagnostic Agents] Itching   • Seasonal Ic [Cholestatin]    • Phentermine Palpitations       REVIEW OF SYSTEMS:  Pertinent items are noted in HPI  A comprehensive review of systems was negative      LABS:  HgA1c:   Lab Results   Component Value Date    HGBA1C 5 8 (H) 08/25/2022     BMP:   Lab Results   Component Value Date    GLUCOSE 88 08/20/2015    CALCIUM 9 0 08/14/2022     08/20/2015    K 4 0 08/14/2022    CO2 28 08/14/2022     08/14/2022    BUN 14 08/14/2022    CREATININE 0 94 08/14/2022         _____________________________________________________  PHYSICAL EXAMINATION:  Vital signs: LMP  (LMP Unknown)   General: well developed and well nourished, alert, oriented times 3 and appears comfortable  Psychiatric: Normal  HEENT: Trachea Midline, No torticollis  Cardiovascular: No discernable arrhythmia  Pulmonary: No wheezing or stridor  Abdomen: No rebound or guarding  Extremities: No peripheral edema  Skin: Ganglion right long finger DIP  Neurovascular: Sensation Intact to the Median, Ulnar, Radial Nerve, Motor Intact to the Median, Ulnar, Radial Nerve and Pulses Intact    MUSCULOSKELETAL EXAMINATION:  RIGHT SIDE:  Finger:  Tenderness DIP joint, small mucoid cyst over DIP joint, full range of motion FDS FDP extensors intact     _____________________________________________________  STUDIES REVIEWED:  No Studies to review      PROCEDURES PERFORMED:  Procedures  No Procedures performed today    Scribe Attestation    I,:  Alva Ulloa am acting as a scribe while in the presence of the attending physician :       I,:  George Mann MD personally performed the services described in this documentation    as scribed in my presence :         Scribe Attestation    I,:  Alva Ulloa am acting as a scribe while in the presence of the attending physician :       I,:  George Mann MD personally performed the services described in this documentation    as scribed in my presence :

## 2022-11-28 ENCOUNTER — OFFICE VISIT (OUTPATIENT)
Dept: FAMILY MEDICINE CLINIC | Facility: CLINIC | Age: 67
End: 2022-11-28

## 2022-11-28 VITALS — OXYGEN SATURATION: 98 % | HEART RATE: 71 BPM | TEMPERATURE: 97.3 F

## 2022-11-28 DIAGNOSIS — R09.81 NASAL CONGESTION: Primary | ICD-10-CM

## 2022-11-28 DIAGNOSIS — I82.441 ACUTE DEEP VEIN THROMBOSIS (DVT) OF TIBIAL VEIN OF RIGHT LOWER EXTREMITY (HCC): ICD-10-CM

## 2022-11-28 DIAGNOSIS — S46.811A TRAPEZIUS MUSCLE STRAIN, RIGHT, INITIAL ENCOUNTER: ICD-10-CM

## 2022-11-28 DIAGNOSIS — G43.009 MIGRAINE WITHOUT AURA AND WITHOUT STATUS MIGRAINOSUS, NOT INTRACTABLE: ICD-10-CM

## 2022-11-28 DIAGNOSIS — K21.9 GASTROESOPHAGEAL REFLUX DISEASE WITHOUT ESOPHAGITIS: ICD-10-CM

## 2022-11-28 DIAGNOSIS — M54.2 NECK PAIN: ICD-10-CM

## 2022-11-28 DIAGNOSIS — J01.00 ACUTE NON-RECURRENT MAXILLARY SINUSITIS: ICD-10-CM

## 2022-11-28 LAB
SARS-COV-2 AG UPPER RESP QL IA: NEGATIVE
VALID CONTROL: NORMAL

## 2022-11-28 RX ORDER — PANTOPRAZOLE SODIUM 40 MG/1
TABLET, DELAYED RELEASE ORAL
Qty: 180 TABLET | Refills: 0 | Status: SHIPPED | OUTPATIENT
Start: 2022-11-28

## 2022-11-28 RX ORDER — BUTALBITAL, ACETAMINOPHEN AND CAFFEINE 50; 325; 40 MG/1; MG/1; MG/1
1 TABLET ORAL EVERY 4 HOURS PRN
Qty: 30 TABLET | Refills: 0 | Status: SHIPPED | OUTPATIENT
Start: 2022-11-28 | End: 2022-11-28 | Stop reason: SDUPTHER

## 2022-11-28 RX ORDER — METHOCARBAMOL 750 MG/1
750 TABLET, FILM COATED ORAL EVERY 6 HOURS PRN
Qty: 30 TABLET | Refills: 2 | Status: SHIPPED | OUTPATIENT
Start: 2022-11-28

## 2022-11-28 RX ORDER — AMOXICILLIN 500 MG/1
1000 TABLET, FILM COATED ORAL 2 TIMES DAILY
Qty: 28 TABLET | Refills: 1 | Status: SHIPPED | OUTPATIENT
Start: 2022-11-28 | End: 2022-12-05

## 2022-11-28 RX ORDER — BUTALBITAL, ACETAMINOPHEN AND CAFFEINE 50; 325; 40 MG/1; MG/1; MG/1
1 TABLET ORAL EVERY 4 HOURS PRN
Qty: 30 TABLET | Refills: 0 | Status: SHIPPED | OUTPATIENT
Start: 2022-11-28

## 2022-11-28 RX ORDER — METHOCARBAMOL 750 MG/1
750 TABLET, FILM COATED ORAL EVERY 6 HOURS PRN
Qty: 30 TABLET | Refills: 2 | Status: CANCELLED | OUTPATIENT
Start: 2022-11-28

## 2022-11-28 NOTE — PROGRESS NOTES
Assessment/Plan:       Problem List Items Addressed This Visit        Digestive    Gastroesophageal reflux disease without esophagitis     Continue pantoprazole twice daily medication renewed at this time         Relevant Medications    pantoprazole (PROTONIX) 40 mg tablet       Respiratory    Acute non-recurrent maxillary sinusitis    Relevant Medications    amoxicillin (AMOXIL) 500 MG tablet       Cardiovascular and Mediastinum    DVT of leg (deep venous thrombosis) (HCC)    Relevant Medications    rivaroxaban (XARELTO) 10 mg tablet    Migraine, unspecified, not intractable, without status migrainosus     Fioricet refill take as directed         Relevant Medications    methocarbamol (ROBAXIN) 750 mg tablet    butalbital-acetaminophen-caffeine (FIORICET,ESGIC) -40 mg per tablet       Musculoskeletal and Integument    Trapezius muscle strain, right, initial encounter     Right trapezius spasm and pain radiating from her neck into the right scapula and shoulder region she would benefit from physical therapy with scapular mobilization now and cervical traction         Relevant Orders    Ambulatory Referral to Physical Therapy       Other    Neck pain     Referral for physical therapy         Relevant Medications    methocarbamol (ROBAXIN) 750 mg tablet    Other Relevant Orders    Ambulatory Referral to Physical Therapy   Other Visit Diagnoses     Nasal congestion    -  Primary    Relevant Orders    POCT Rapid Covid Ag (Completed)            Subjective:      Patient ID: Kendra Phoenix is a 69 y o  female  Sinus congestion pressure and neck pain with trapezius spasm on the right side over the last week      The following portions of the patient's history were reviewed and updated as appropriate: allergies, current medications, past family history, past medical history, past social history, past surgical history and problem list     Review of Systems   Constitutional: Negative for chills, fatigue and fever  HENT: Positive for congestion  Negative for nosebleeds, rhinorrhea, sinus pressure and sore throat  Eyes: Negative for discharge and redness  Respiratory: Negative for cough and shortness of breath  Cardiovascular: Negative for chest pain, palpitations and leg swelling  Gastrointestinal: Negative for abdominal pain, blood in stool and nausea  Endocrine: Negative for cold intolerance, heat intolerance and polyuria  Genitourinary: Negative for dysuria and frequency  Musculoskeletal: Positive for arthralgias, neck pain and neck stiffness  Negative for back pain and myalgias  Skin: Negative for rash  Neurological: Negative for dizziness, weakness and headaches  Hematological: Negative for adenopathy  Psychiatric/Behavioral: Negative for behavioral problems and sleep disturbance  The patient is not nervous/anxious  Objective:      Pulse 71   Temp (!) 97 3 °F (36 3 °C)   LMP  (LMP Unknown)   SpO2 98%        Physical Exam  Vitals and nursing note reviewed  Constitutional:       General: She is not in acute distress  Appearance: She is well-developed and well-nourished  HENT:      Head: Normocephalic and atraumatic  Right Ear: External ear normal       Left Ear: External ear normal       Nose: Congestion present  Mouth/Throat:      Mouth: Oropharynx is clear and moist       Pharynx: No oropharyngeal exudate  Eyes:      General: No scleral icterus  Right eye: No discharge  Left eye: No discharge  Extraocular Movements: EOM normal       Conjunctiva/sclera: Conjunctivae normal       Pupils: Pupils are equal, round, and reactive to light  Neck:      Thyroid: No thyromegaly  Vascular: No JVD  Cardiovascular:      Rate and Rhythm: Normal rate and regular rhythm  Heart sounds: Normal heart sounds  No murmur heard  Pulmonary:      Effort: Pulmonary effort is normal       Breath sounds: No wheezing or rales     Chest:      Chest wall: No tenderness  Abdominal:      General: Bowel sounds are normal  There is no distension  Palpations: Abdomen is soft  There is no mass  Tenderness: There is no abdominal tenderness  Musculoskeletal:         General: No tenderness, deformity or edema  Normal range of motion  Cervical back: Normal range of motion  Comments: Muscle spasm and pain right trapezius to scapula   Lymphadenopathy:      Cervical: No cervical adenopathy  Skin:     General: Skin is warm and dry  Findings: No rash  Neurological:      Mental Status: She is alert and oriented to person, place, and time  Cranial Nerves: No cranial nerve deficit  Coordination: Coordination normal       Deep Tendon Reflexes: Reflexes are normal and symmetric  Reflexes normal    Psychiatric:         Mood and Affect: Mood and affect normal          Behavior: Behavior normal          Thought Content: Thought content normal          Judgment: Judgment normal           Data:    Laboratory Results: I have personally reviewed the pertinent laboratory results/reports   Radiology/Other Diagnostic Testing Results: I have personally reviewed pertinent reports         Lab Results   Component Value Date    WBC 6 59 07/18/2022    HGB 13 8 07/18/2022    HCT 44 0 07/18/2022    MCV 86 07/18/2022     07/18/2022     Lab Results   Component Value Date     08/20/2015    K 4 0 08/14/2022     08/14/2022    CO2 28 08/14/2022    ANIONGAP 8 6 08/20/2015    BUN 14 08/14/2022    CREATININE 0 94 08/14/2022    GLUCOSE 88 08/20/2015    GLUF 97 07/18/2022    CALCIUM 9 0 08/14/2022    AST 27 08/14/2022    ALT 34 08/14/2022    ALKPHOS 92 08/14/2022    PROT 7 1 08/20/2015    BILITOT 0 3 08/20/2015    EGFR 62 08/14/2022     Lab Results   Component Value Date    CHOLESTEROL 190 08/25/2022    CHOLESTEROL 213 (H) 07/18/2022    CHOLESTEROL 230 (H) 08/23/2021     Lab Results   Component Value Date    HDL 38 (L) 08/25/2022    HDL 45 (L) 07/18/2022 HDL 49 08/23/2021     Lab Results   Component Value Date    [address]  08/25/2022      Comment:      Calculated LDL invalid, triglycerides >400 mg/dl  This screening LDL is a calculated result  It does not have the accuracy of the Direct Measured LDL in the monitoring of patients with hyperlipidemia and/or statin therapy  Direct Measure LDL (PPN532) must be ordered separately in these patients      LDLCALC 120 (H) 07/18/2022    LDLCALC 129 (H) 08/23/2021     Lab Results   Component Value Date    TRIG 556 (H) 08/25/2022    TRIG 240 (H) 07/18/2022    TRIG 259 (H) 08/23/2021     No results found for: [place], [place]  Lab Results   Component Value Date    ROE2WFGDLSJW 1 950 07/18/2022     Lab Results   Component Value Date    HGBA1C 5 8 (H) 08/25/2022     No results found for: MATT Cali,

## 2022-11-28 NOTE — ASSESSMENT & PLAN NOTE
Right trapezius spasm and pain radiating from her neck into the right scapula and shoulder region she would benefit from physical therapy with scapular mobilization now and cervical traction

## 2022-11-30 ENCOUNTER — OFFICE VISIT (OUTPATIENT)
Dept: PHYSICAL THERAPY | Age: 67
End: 2022-11-30

## 2022-11-30 DIAGNOSIS — M54.2 NECK PAIN: Primary | ICD-10-CM

## 2022-11-30 DIAGNOSIS — S46.811D STRAIN OF RIGHT TRAPEZIUS MUSCLE, SUBSEQUENT ENCOUNTER: ICD-10-CM

## 2022-11-30 NOTE — PROGRESS NOTES
PT Evaluation     Today's date: 2022  Patient name: Sandy Colon  : 1955  MRN: [id number]  Referring provider: Edison Field, PT  Dx:   Encounter Diagnosis     ICD-10-CM    1  Neck pain  M54 2       2  Strain of right trapezius muscle, subsequent encounter  [id number]           Start Time: 1530  Stop Time: 1630  Total time in clinic (min): 60 minutes    Assessment  Assessment details: Sandy Colon is a 69 y o  female who presents with pain, decreased strength, decreased ROM and postural dysfunction  Due to these impairments, Patient has difficulty performing a/iadls and work-related activities  Patient's clinical presentation is consistent with their referring diagnosis of neck pain  Patient would benefit from skilled physical therapy to address their aforementioned impairments, improve their level of function and to improve their overall quality of life  Patient was evaluated today for neck pain as a direct access patient  Impairments: abnormal muscle firing, abnormal muscle tone, abnormal or restricted ROM, abnormal movement, activity intolerance, impaired physical strength, lacks appropriate home exercise program, pain with function, scapular dyskinesis, poor posture  and poor body mechanics  Understanding of Dx/Px/POC: good   Prognosis: good    Goals  ST-3 WEEKS  1  Decrease pain by 2 points on VAS at its worst and improve posture  2  Increase ROM by > 5 deg in all deficients planes  3   Increase UE by 1/2 MMT grade in all deficient planes  LT-6 WEEKS  1  Patient to be independent with a/iadls especially with computer work  2  Increase functional activities for leisure and home activities to previous LOF    3  Independent with HEP and/or fitness program     Plan  Patient would benefit from: skilled physical therapy  Planned modality interventions: cryotherapy, electrical stimulation/Russian stimulation, thermotherapy: hydrocollator packs and unattended electrical stimulation  Planned therapy interventions: activity modification, behavior modification, body mechanics training, aquatic therapy, flexibility, functional ROM exercises, home exercise program, IADL retraining, joint mobilization, manual therapy, neuromuscular re-education, patient education, postural training, strengthening, stretching, therapeutic activities and therapeutic exercise  Frequency: 2x week (2-3x week)  Duration in weeks: 12  Plan of Care beginning date: 2022  Plan of Care expiration date: 2023  Treatment plan discussed with: patient        Subjective Evaluation    History of Present Illness  Date of onset: 12/3/2021  Mechanism of injury: Patient was evaluated today for neck pain as a direct access patient, patient reports right sided cervical pain x 1 year secondary to work and stress, complains of tightness pain and HA+          Not a recurrent problem   Quality of life: good    Pain  Current pain ratin  At best pain ratin  At worst pain ratin  Quality: dull ache  Relieving factors: relaxation, heat and medications  Aggravating factors: overhead activity and keyboarding  Progression: no change    Hand dominance: right      Diagnostic Tests  CT scan: normal  Treatments  Previous treatment: physical therapy  Patient Goals  Patient goals for therapy: decreased pain, increased motion, increased strength and independence with ADLs/IADLs          Objective     Static Posture     Head  Forward  Thoracic Spine  Hyperkyphosis  Palpation     Right   Hypertonic in the levator scapulae, pectoralis minor, rhomboids, scalenes, suboccipitals and upper trapezius  Tenderness of the rhomboids and upper trapezius  Tenderness   Cervical Spine   Tenderness in the spinous process       Active Range of Motion   Cervical/Thoracic Spine       Cervical    Flexion: 35 degrees   Extension: 65 degrees      Left lateral flexion: 20 degrees      Right lateral flexion: 20 degrees      Left rotation: 45 degrees  Right rotation: 60 degrees         Left Shoulder   Internal rotation 90°: 65 degrees     Right Shoulder   Internal rotation 90°: 50 degrees     Additional Active Range of Motion Details  Patient demonstrates a tight posterior capsule with an increased anterior glide    Strength/Myotome Testing   Cervical Spine   Neck flexion: 4    Right Shoulder     Planes of Motion   Flexion: 4   Extension: 4   Abduction: 4   Adduction: 4+   External rotation at 0°: 4   Internal rotation at 0°: 4+     Isolated Muscles   Supraspinatus: 4-     Right Elbow   Flexion: 4+  Extension: 4+    Right Wrist/Hand   Wrist extension: 4+  Wrist flexion: 4+    Tests   Cervical     Left   Negative Spurling's Test A  Right   Negative Spurling's Test A         Flowsheet Rows    Flowsheet Row Most Recent Value   PT/OT G-Codes    Current Score 47   Projected Score 56             Precautions:       Manuals 11/30                         MT cervical 15 min                                      Neuro Re-Ed                                                                                                        Ther Ex             UBE 4 min            TB rows 30x            RTC exs 10x ea            Cervical isometrics nv                                                                Ther Activity                                       Gait Training                                       Modalities

## 2022-12-07 ENCOUNTER — OFFICE VISIT (OUTPATIENT)
Dept: PHYSICAL THERAPY | Age: 67
End: 2022-12-07

## 2022-12-07 DIAGNOSIS — S46.811D STRAIN OF RIGHT TRAPEZIUS MUSCLE, SUBSEQUENT ENCOUNTER: ICD-10-CM

## 2022-12-07 DIAGNOSIS — S46.811A TRAPEZIUS MUSCLE STRAIN, RIGHT, INITIAL ENCOUNTER: ICD-10-CM

## 2022-12-07 DIAGNOSIS — M54.2 NECK PAIN: Primary | ICD-10-CM

## 2022-12-07 NOTE — PROGRESS NOTES
Daily Note     Today's date: 2022  Patient name: Darlene Coker  : 1955  MRN: [id number]  Referring provider: Kt Diaz, PT  Dx:   Encounter Diagnosis     ICD-10-CM    1  Neck pain  M54 2 Ambulatory Referral to Physical Therapy      2  Strain of right trapezius muscle, subsequent encounter  S46 811D       3  Trapezius muscle strain, right, initial encounter  S46 811A Ambulatory Referral to Physical Therapy          Start Time:   Stop Time:   Total time in clinic (min): 45 minutes    Subjective: patient reports HA+ this AM but stiff and sore now      Objective: See treatment diary below      Assessment: Tolerated treatment fair  Patient demonstrated fatigue post treatment, exhibited good technique with therapeutic exercises and would benefit from continued PT, tenderness to upper traps but decreased pain with MT also tender to pec minor bilaterally      Plan: Progress treatment as tolerated         Precautions:       Manuals                         MT cervical 15 min 15 min                                     Neuro Re-Ed                                                                                                        Ther Ex             UBE 4 min 4 min           TB rows 30x 30x           RTC exs 10x ea 10x ea           Cervical isometrics nv nv           Nu step  5 min                                                  Ther Activity                                       Gait Training                                       Modalities

## 2022-12-12 ENCOUNTER — OFFICE VISIT (OUTPATIENT)
Dept: PHYSICAL THERAPY | Age: 67
End: 2022-12-12

## 2022-12-12 DIAGNOSIS — M54.2 NECK PAIN: Primary | ICD-10-CM

## 2022-12-12 DIAGNOSIS — G89.29 CHRONIC LOW BACK PAIN: ICD-10-CM

## 2022-12-12 DIAGNOSIS — M62.838 MUSCLE SPASM: ICD-10-CM

## 2022-12-12 DIAGNOSIS — S46.811D STRAIN OF RIGHT TRAPEZIUS MUSCLE, SUBSEQUENT ENCOUNTER: ICD-10-CM

## 2022-12-12 DIAGNOSIS — M54.50 CHRONIC LOW BACK PAIN: ICD-10-CM

## 2022-12-12 RX ORDER — DIAZEPAM 5 MG/1
5 TABLET ORAL EVERY 8 HOURS PRN
Qty: 30 TABLET | Refills: 0 | Status: SHIPPED | OUTPATIENT
Start: 2022-12-12

## 2022-12-12 NOTE — PROGRESS NOTES
Daily Note     Today's date: 2022  Patient name: Georgi Casillas  : 1955  MRN: [id number]  Referring provider: Noemy Mejia, PT  Dx:   Encounter Diagnosis     ICD-10-CM    1  Neck pain  M54 2       2  Strain of right trapezius muscle, subsequent encounter  S46 811D           Start Time: 1500  Stop Time: 1545  Total time in clinic (min): 45 minutes    Subjective: same ,also complains of HA+ today      Objective: See treatment diary below      Assessment: Tolerated treatment fair  Patient demonstrated fatigue post treatment, exhibited good technique with therapeutic exercises and would benefit from continued PT, tender to first rib on the left today as well as decreased PA glide C7      Plan: Progress treatment as tolerated         Precautions:       Manuals                        MT cervical 15 min 15 min 15 min                                    Neuro Re-Ed                                                                                                        Ther Ex             UBE 4 min 4 min 4 min          TB rows 30x 30x 30x          RTC exs 10x ea 10x ea 10x ea          Cervical isometrics nv nv           Nu step  5 min 5 min                                                 Ther Activity                                       Gait Training                                       Modalities

## 2022-12-14 ENCOUNTER — OFFICE VISIT (OUTPATIENT)
Dept: PHYSICAL THERAPY | Age: 67
End: 2022-12-14

## 2022-12-14 DIAGNOSIS — M54.2 NECK PAIN: Primary | ICD-10-CM

## 2022-12-14 DIAGNOSIS — S46.811D STRAIN OF RIGHT TRAPEZIUS MUSCLE, SUBSEQUENT ENCOUNTER: ICD-10-CM

## 2022-12-14 NOTE — PROGRESS NOTES
Daily Note     Today's date: 2022  Patient name: Donna Teixeira  : 1955  MRN: [id number]  Referring provider: Pat Tenorio PT  Dx:   Encounter Diagnosis     ICD-10-CM    1  Neck pain  M54 2       2  Strain of right trapezius muscle, subsequent encounter  S46 811D           Start Time: 1530  Stop Time: 1615  Total time in clinic (min): 45 minutes    Subjective: still stiff with HA+      Objective: See treatment diary below      Assessment: Tolerated treatment fair  Patient demonstrated fatigue post treatment, exhibited good technique with therapeutic exercises and would benefit from continued PT, still with upper trap hypertonicity as well as anterior neck tightness      Plan: Progress treatment as tolerated         Precautions:       Manuals                       MT cervical 15 min 15 min 15 min 15 min                                   Neuro Re-Ed                                                                                                        Ther Ex             UBE 4 min 4 min 4 min 5 min         TB rows 30x 30x 30x 30x         RTC exs 10x ea 10x ea 10x ea 10x ea         Cervical isometrics nv nv           Nu step  5 min 5 min 5 min                                                Ther Activity                                       Gait Training                                       Modalities

## 2022-12-15 ENCOUNTER — LAB REQUISITION (OUTPATIENT)
Dept: LAB | Facility: HOSPITAL | Age: 67
End: 2022-12-15

## 2022-12-15 DIAGNOSIS — N30.10 INTERSTITIAL CYSTITIS (CHRONIC) WITHOUT HEMATURIA: ICD-10-CM

## 2022-12-16 LAB — BACTERIA UR CULT: NORMAL

## 2022-12-19 ENCOUNTER — HOSPITAL ENCOUNTER (OUTPATIENT)
Dept: CT IMAGING | Facility: HOSPITAL | Age: 67
Discharge: HOME | End: 2022-12-19

## 2022-12-19 DIAGNOSIS — R10.0 ACUTE ABDOMINAL PAIN SYNDROME: ICD-10-CM

## 2022-12-19 DIAGNOSIS — R10.2 PELVIC PAIN: ICD-10-CM

## 2022-12-20 ENCOUNTER — OFFICE VISIT (OUTPATIENT)
Dept: PHYSICAL THERAPY | Age: 67
End: 2022-12-20

## 2022-12-20 DIAGNOSIS — M54.2 NECK PAIN: Primary | ICD-10-CM

## 2022-12-20 DIAGNOSIS — S46.811D STRAIN OF RIGHT TRAPEZIUS MUSCLE, SUBSEQUENT ENCOUNTER: ICD-10-CM

## 2022-12-20 NOTE — PROGRESS NOTES
Daily Note     Today's date: 2022  Patient name: Austin Parker  : 1955  MRN: [id number]  Referring provider: Mary Giraldo PT  Dx:   Encounter Diagnosis     ICD-10-CM    1  Neck pain  M54 2       2  Strain of right trapezius muscle, subsequent encounter  S46 811D           Start Time: 1515  Stop Time: 1555  Total time in clinic (min): 40 minutes    Subjective: patient reports low grade HA+ today      Objective: See treatment diary below      Assessment: Tolerated treatment fair  Patient demonstrated fatigue post treatment, exhibited good technique with therapeutic exercises and would benefit from continued PT, tenderness to SO/upper trap and Pec minor      Plan: Progress treatment as tolerated         Precautions:       Manuals                      MT cervical 15 min 15 min 15 min 15 min 15 min                                  Neuro Re-Ed                                                                                                        Ther Ex             UBE 4 min 4 min 4 min 5 min 5 min        TB rows 30x 30x 30x 30x 30x        RTC exs 10x ea 10x ea 10x ea 10x ea 10x ea        Cervical isometrics nv nv           Nu step  5 min 5 min 5 min 5 min                                               Ther Activity                                       Gait Training                                       Modalities

## 2023-01-03 ENCOUNTER — OFFICE VISIT (OUTPATIENT)
Dept: PHYSICAL THERAPY | Age: 68
End: 2023-01-03

## 2023-01-03 DIAGNOSIS — S46.811D STRAIN OF RIGHT TRAPEZIUS MUSCLE, SUBSEQUENT ENCOUNTER: ICD-10-CM

## 2023-01-03 DIAGNOSIS — M54.2 NECK PAIN: Primary | ICD-10-CM

## 2023-01-03 NOTE — PROGRESS NOTES
Daily Note     Today's date: 1/3/2023  Patient name: Karson Merlos  : 1955  MRN: [id number]  Referring provider: Torrey Bailey, PT  Dx:   Encounter Diagnosis     ICD-10-CM    1  Neck pain  M54 2       2  Strain of right trapezius muscle, subsequent encounter  S46 811D           Start Time: 1300  Stop Time: 1345  Total time in clinic (min): 45 minutes    Subjective: patient reports some improvement but neck is still tight      Objective: See treatment diary below      Assessment: Tolerated treatment fair  Patient demonstrated fatigue post treatment, exhibited good technique with therapeutic exercises and would benefit from continued PT, tender to right upper and left pec minor some relief with MT      Plan: Progress treatment as tolerated         Precautions:       Manuals 11/30 12/5 12/12 12/14 12/20 1/3                    MT cervical 15 min 15 min 15 min 15 min 15 min 15 min                                 Neuro Re-Ed                                                                                                        Ther Ex             UBE 4 min 4 min 4 min 5 min 5 min 6 min       TB rows 30x 30x 30x 30x 30x 30x       RTC exs 10x ea 10x ea 10x ea 10x ea 10x ea 10x ea       Cervical isometrics nv nv           Nu step  5 min 5 min 5 min 5 min 5 min                                              Ther Activity                                       Gait Training                                       Modalities

## 2023-01-06 ENCOUNTER — OFFICE VISIT (OUTPATIENT)
Dept: PHYSICAL THERAPY | Age: 68
End: 2023-01-06

## 2023-01-06 DIAGNOSIS — S46.811D STRAIN OF RIGHT TRAPEZIUS MUSCLE, SUBSEQUENT ENCOUNTER: ICD-10-CM

## 2023-01-06 DIAGNOSIS — M54.2 NECK PAIN: Primary | ICD-10-CM

## 2023-01-06 NOTE — PROGRESS NOTES
Daily Note     Today's date: 2023  Patient name: Al Farnsworth  : 1955  MRN: [id number]  Referring provider: Vasquez Chavez, PT  Dx:   Encounter Diagnosis     ICD-10-CM    1  Neck pain  M54 2       2  Strain of right trapezius muscle, subsequent encounter  S46 811D           Start Time: 1545  Stop Time: 1625  Total time in clinic (min): 40 minutes    Subjective: some improvement      Objective: See treatment diary below      Assessment: Tolerated treatment fair  Patient demonstrated fatigue post treatment, exhibited good technique with therapeutic exercises and would benefit from continued PT, still with decreased PA glide at C7, also complains of low back pain today      Plan: Progress treatment as tolerated         Precautions:       Manuals 11/30 12/5 12/12 12/14 12/20 1/3 1/6                   MT cervical 15 min 15 min 15 min 15 min 15 min 15 min 20                                Neuro Re-Ed                                                                                                        Ther Ex             UBE 4 min 4 min 4 min 5 min 5 min 6 min 6      TB rows 30x 30x 30x 30x 30x 30x 30x      RTC exs 10x ea 10x ea 10x ea 10x ea 10x ea 10x ea 10x ea      Cervical isometrics nv nv           Nu step  5 min 5 min 5 min 5 min 5 min 5 min      pec stretch       10x                                Ther Activity                                       Gait Training                                       Modalities

## 2023-01-10 ENCOUNTER — OFFICE VISIT (OUTPATIENT)
Dept: PHYSICAL THERAPY | Age: 68
End: 2023-01-10

## 2023-01-10 DIAGNOSIS — S46.811D STRAIN OF RIGHT TRAPEZIUS MUSCLE, SUBSEQUENT ENCOUNTER: ICD-10-CM

## 2023-01-10 DIAGNOSIS — M54.2 NECK PAIN: Primary | ICD-10-CM

## 2023-01-10 NOTE — PROGRESS NOTES
Daily Note     Today's date: 1/10/2023  Patient name: Heri Lundy  : 1955  MRN: [id number]  Referring provider: Ruchi Jauregui PT  Dx:   Encounter Diagnosis     ICD-10-CM    1  Neck pain  M54 2       2  Strain of right trapezius muscle, subsequent encounter  S46 811D           Start Time: 1515  Stop Time: 1600  Total time in clinic (min): 45 minutes    Subjective: patient complains of cervical soreness from working today      Objective: See treatment diary below      Assessment: Tolerated treatment fair  Patient demonstrated fatigue post treatment, exhibited good technique with therapeutic exercises and would benefit from continued PT, decreased right lateral glides C5/C6, and tender to right upper trap today      Plan: Progress treatment as tolerated         Precautions:       Manuals 11/30 12/5 12/12 12/14 12/20 1/3 1/6 1/10                  MT cervical 15 min 15 min 15 min 15 min 15 min 15 min 20 20                               Neuro Re-Ed                                                                                                        Ther Ex             UBE 4 min 4 min 4 min 5 min 5 min 6 min 6 6 min     TB rows 30x 30x 30x 30x 30x 30x 30x 30x     RTC exs 10x ea 10x ea 10x ea 10x ea 10x ea 10x ea 10x ea 10x ea     Cervical isometrics nv nv           Nu step  5 min 5 min 5 min 5 min 5 min 5 min 5 min     pec stretch       10x 10x                               Ther Activity                                       Gait Training                                       Modalities PAST MEDICAL HISTORY:  No pertinent past medical history

## 2023-01-13 ENCOUNTER — EVALUATION (OUTPATIENT)
Dept: PHYSICAL THERAPY | Age: 68
End: 2023-01-13

## 2023-01-13 DIAGNOSIS — M54.2 NECK PAIN: Primary | ICD-10-CM

## 2023-01-13 DIAGNOSIS — S46.811D STRAIN OF RIGHT TRAPEZIUS MUSCLE, SUBSEQUENT ENCOUNTER: ICD-10-CM

## 2023-01-13 NOTE — PROGRESS NOTES
PT Re-Evaluation     Today's date: 2023  Patient name: Leighton Polo  : 1955  MRN: [id number]  Referring provider: Emerson Ladd, PT  Dx:   Encounter Diagnosis     ICD-10-CM    1  Neck pain  M54 2       2  Strain of right trapezius muscle, subsequent encounter  S46 811D           Start Time: 1515  Stop Time: 1600  Total time in clinic (min): 45 minutes    Assessment  Assessment details: 2023, patient demonstrates  Decreased pain and increased ROM as well as improved FOTO scores  Patient also notes improved sleeping habits and working and reaching with less difficulty after 9 PT visits  Impairments: abnormal muscle firing, abnormal muscle tone, abnormal or restricted ROM, abnormal movement, activity intolerance, impaired physical strength, lacks appropriate home exercise program, pain with function, scapular dyskinesis, poor posture  and poor body mechanics  Understanding of Dx/Px/POC: good   Prognosis: good    Goals  ST-3 WEEKS  1  Decrease pain by 2 points on VAS at its worst and improve posture  2  Increase ROM by > 5 deg in all deficients planes  3   Increase UE by 1/2 MMT grade in all deficient planes  LT-6 WEEKS  1  Patient to be independent with a/iadls especially with computer work  2  Increase functional activities for leisure and home activities to previous LOF    3  Independent with HEP and/or fitness program     Plan  Patient would benefit from: skilled physical therapy  Planned modality interventions: cryotherapy, electrical stimulation/Russian stimulation, thermotherapy: hydrocollator packs and unattended electrical stimulation  Planned therapy interventions: activity modification, behavior modification, body mechanics training, aquatic therapy, flexibility, functional ROM exercises, home exercise program, IADL retraining, joint mobilization, manual therapy, neuromuscular re-education, patient education, postural training, strengthening, stretching, therapeutic activities and therapeutic exercise  Frequency: 2x week (2-3x week)  Duration in weeks: 12  Plan of Care beginning date: 2022  Plan of Care expiration date: 2023  Treatment plan discussed with: patient        Subjective Evaluation    History of Present Illness  Date of onset: 12/3/2021  Mechanism of injury: 2023,patient reports decreased pain and increased ROM and strength after 9 PT visits  Not a recurrent problem   Quality of life: good    Pain  Current pain ratin  At best pain ratin  At worst pain ratin  Quality: dull ache  Relieving factors: relaxation, heat and medications  Aggravating factors: overhead activity and keyboarding  Progression: no change    Hand dominance: right      Diagnostic Tests  CT scan: normal  Treatments  Previous treatment: physical therapy  Patient Goals  Patient goals for therapy: decreased pain, increased motion, increased strength and independence with ADLs/IADLs          Objective     Static Posture     Head  Forward  Thoracic Spine  Hyperkyphosis  Palpation     Right   Hypertonic in the levator scapulae, pectoralis minor, rhomboids, scalenes, suboccipitals and upper trapezius  Tenderness of the rhomboids and upper trapezius  Tenderness   Cervical Spine   Tenderness in the spinous process       Active Range of Motion   Cervical/Thoracic Spine       Cervical    Flexion: 35 degrees   Extension: 65 degrees      Left lateral flexion: 20 degrees      Right lateral flexion: 20 degrees      Left rotation: 45 degrees  Right rotation: 60 degrees         Left Shoulder   Internal rotation 90°: 65 degrees     Right Shoulder   Internal rotation 90°: 50 degrees     Additional Active Range of Motion Details  Patient demonstrates a tight posterior capsule with an increased anterior glide    Strength/Myotome Testing   Cervical Spine   Neck flexion: 4    Right Shoulder     Planes of Motion   Flexion: 4   Extension: 4   Abduction: 4   Adduction: 4+ External rotation at 0°: 4   Internal rotation at 0°: 4+     Isolated Muscles   Supraspinatus: 4-     Right Elbow   Flexion: 4+  Extension: 4+    Right Wrist/Hand   Wrist extension: 4+  Wrist flexion: 4+    Tests   Cervical     Left   Negative Spurling's Test A  Right   Negative Spurling's Test A             Precautions:       Manuals 11/30 12/5 12/12 12/14 12/20 1/3 1/6 1/10 1/13                 MT cervical 15 min 15 min 15 min 15 min 15 min 15 min 20 20 20                              Neuro Re-Ed                                                                                                        Ther Ex             UBE 4 min 4 min 4 min 5 min 5 min 6 min 6 6 min 6    TB rows 30x 30x 30x 30x 30x 30x 30x 30x 30x    RTC exs 10x ea 10x ea 10x ea 10x ea 10x ea 10x ea 10x ea 10x ea 10x    Cervical isometrics nv nv           Nu step  5 min 5 min 5 min 5 min 5 min 5 min 5 min 5 min    pec stretch       10x 10x 10x                              Ther Activity                                       Gait Training                                       Modalities

## 2023-01-27 PROBLEM — J01.00 ACUTE NON-RECURRENT MAXILLARY SINUSITIS: Status: RESOLVED | Noted: 2022-11-28 | Resolved: 2023-01-27

## 2023-02-13 ENCOUNTER — TELEPHONE (OUTPATIENT)
Dept: OBGYN CLINIC | Facility: HOSPITAL | Age: 68
End: 2023-02-13

## 2023-02-13 NOTE — TELEPHONE ENCOUNTER
Pt is having surgery on her finger for a cyst on 2/21/23  Does she need to stop it Xarelto 2 days prior surgery? When you look at finger, you can see bump and is no longer red  Does she need pre-op clearance from her medical doctor? Please advise

## 2023-02-13 NOTE — TELEPHONE ENCOUNTER
While on phone w/pt; she states that her right knee is now bothering her  Since she is on Korin Raquel, she can only take tylenol for pain which is not helping  Pain level ranges 3-6  She tries to keep it elevated  No using any ice or heat to area  Worried about an effusion  She was told that it would be a while before she could get in with Dr Molly Abebe  She is having surgery w/Dr Hosea Argueta on her R middle finger myxoid cysts removal 2/21/23  Please advise

## 2023-02-13 NOTE — TELEPHONE ENCOUNTER
Caller: Patient    Doctor: [facility]    Reason for call: Patient called in and wanted some clarity on her medication Maria Isabel Ramey if she needs to discontinue 2 days prior to surgery on 2/21  Also would like to know if she should take her regular meds  Patient is also a dr Hannah Velázquez patient and is having pain in the right knee in which she has been taking tylenol but still in pain  Would like to know what are dr Lino Ovalles thought on this since she is on blood thinners  Patient would like to speak with a nurse      Call back#: [phone]

## 2023-02-13 NOTE — TELEPHONE ENCOUNTER
Called and notified pt and she states that she will not go to ortho immediate care for evaluation  She wants appt w/Dr Artemio Starks  Can office schedule her? 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 Please advise and thanks for your assistance

## 2023-02-14 RX ORDER — IBUPROFEN 200 MG
TABLET ORAL EVERY 6 HOURS PRN
COMMUNITY

## 2023-02-14 NOTE — TELEPHONE ENCOUNTER
Called and notified pt  Please see if you can schedule her at [id number] [facility] [address] this Friday  Please call her at [phone]  See Bita Kuhn's msg  She would like to have knee pain addressed before hand surgery  Thank you for your assistance!

## 2023-02-14 NOTE — PRE-PROCEDURE INSTRUCTIONS
Pre-Surgery Instructions:   Medication Instructions   • albuterol (PROVENTIL HFA,VENTOLIN HFA) 90 mcg/act inhaler Uses PRN- OK to take day of surgery   • ALPRAZolam (XANAX) 0 25 mg tablet Uses PRN- OK to take day of surgery   • butalbital-acetaminophen-caffeine (FIORICET,ESGIC) -40 mg per tablet Uses PRN- OK to take day of surgery   • calcium-vitamin D (OSCAL) 250-125 MG-UNIT per tablet Stop taking 7 days prior to surgery  • cycloSPORINE (RESTASIS) 0 05 % ophthalmic emulsion Take day of surgery  • diazepam (VALIUM) 5 mg tablet Uses PRN- OK to take day of surgery   • diphenhydrAMINE (BENADRYL) 25 mg capsule Uses PRN- OK to take day of surgery   • fexofenadine (ALLEGRA) 180 MG tablet Uses PRN- OK to take day of surgery   • Flovent  MCG/ACT inhaler Uses PRN- OK to take day of surgery   • fluticasone (FLONASE) 50 mcg/act nasal spray Uses PRN- OK to take day of surgery   • ibuprofen (Advil) 200 mg tablet Stop taking 7 days prior to surgery  • methocarbamol (ROBAXIN) 750 mg tablet Uses PRN- OK to take day of surgery   • montelukast (SINGULAIR) 10 mg tablet Uses PRN- OK to take day of surgery   • Multiple Vitamin (MULTIVITAMIN) capsule Stop taking 7 days prior to surgery  • pantoprazole (PROTONIX) 40 mg tablet Take day of surgery  • rivaroxaban (XARELTO) 10 mg tablet Hold day of surgery  • ZOLMitriptan (ZOMIG) 5 MG tablet Uses PRN- OK to take day of surgery    [facility] preop instructions reviewed with pt  Pt has surgical soap

## 2023-02-17 ENCOUNTER — OFFICE VISIT (OUTPATIENT)
Dept: OBGYN CLINIC | Facility: MEDICAL CENTER | Age: 68
End: 2023-02-17

## 2023-02-17 VITALS
HEIGHT: 63 IN | DIASTOLIC BLOOD PRESSURE: 81 MMHG | HEART RATE: 74 BPM | BODY MASS INDEX: 34.38 KG/M2 | WEIGHT: 194 LBS | SYSTOLIC BLOOD PRESSURE: 131 MMHG

## 2023-02-17 DIAGNOSIS — G89.29 CHRONIC PAIN OF RIGHT KNEE: ICD-10-CM

## 2023-02-17 DIAGNOSIS — M17.11 PRIMARY OSTEOARTHRITIS OF RIGHT KNEE: ICD-10-CM

## 2023-02-17 DIAGNOSIS — M25.561 CHRONIC PAIN OF RIGHT KNEE: ICD-10-CM

## 2023-02-17 DIAGNOSIS — M72.2 PLANTAR FASCIITIS, BILATERAL: Primary | ICD-10-CM

## 2023-02-17 RX ORDER — LIDOCAINE HYDROCHLORIDE 10 MG/ML
2 INJECTION, SOLUTION INFILTRATION; PERINEURAL
Status: COMPLETED | OUTPATIENT
Start: 2023-02-17 | End: 2023-02-17

## 2023-02-17 RX ORDER — BETAMETHASONE SODIUM PHOSPHATE AND BETAMETHASONE ACETATE 3; 3 MG/ML; MG/ML
12 INJECTION, SUSPENSION INTRA-ARTICULAR; INTRALESIONAL; INTRAMUSCULAR; SOFT TISSUE
Status: COMPLETED | OUTPATIENT
Start: 2023-02-17 | End: 2023-02-17

## 2023-02-17 RX ORDER — BUPIVACAINE HYDROCHLORIDE 2.5 MG/ML
2 INJECTION, SOLUTION INFILTRATION; PERINEURAL
Status: COMPLETED | OUTPATIENT
Start: 2023-02-17 | End: 2023-02-17

## 2023-02-17 RX ADMIN — BUPIVACAINE HYDROCHLORIDE 2 ML: 2.5 INJECTION, SOLUTION INFILTRATION; PERINEURAL at 09:44

## 2023-02-17 RX ADMIN — BETAMETHASONE SODIUM PHOSPHATE AND BETAMETHASONE ACETATE 12 MG: 3; 3 INJECTION, SUSPENSION INTRA-ARTICULAR; INTRALESIONAL; INTRAMUSCULAR; SOFT TISSUE at 09:44

## 2023-02-17 RX ADMIN — LIDOCAINE HYDROCHLORIDE 2 ML: 10 INJECTION, SOLUTION INFILTRATION; PERINEURAL at 09:44

## 2023-02-17 NOTE — PROGRESS NOTES
Assessment:  1  Plantar fasciitis, bilateral        2  Chronic pain of right knee        3  Primary osteoarthritis of right knee            Plan:  Right knee osteoarthritis  The patient was provided with right knee steroid injection  The patient tolerated the procedure well  She was provided with bilateral shoe insert for plantar fasciitis prescription  The patient can follow up as needed and is welcome to return at any point with any new or old issue  To do next visit:  Return if symptoms worsen or fail to improve  The above stated was discussed in layman's terms and the patient expressed understanding  All questions were answered to the patient's satisfaction  Scribe Attestation    I,:  Lorraine Contreras am acting as a scribe while in the presence of the attending physician :       I,:  Tee Olivares MD personally performed the services described in this documentation    as scribed in my presence :             Subjective: Princess Flowers is a 69 y o  female who presents for follow up of right knee  She had last been seen in 2021 including steroid injection to IA right knee and pes anserine  Today she complains of return of right medial knee pain and bilateral plantar foot pain  She does use shoe insert for plantar fasciitis  She does use Xarelto  She did use IBU 200mg x2 with significant benefit  She has history of left TKA performed several years ago          Review of systems negative unless otherwise specified in HPI    Past Medical History:   Diagnosis Date   • Acid reflux    • Allergic rhinitis     Last Assessed: 11/2/2017   • Anesthesia complication     HYPOTENSION   • Arthritis    • Asthma    • Bigeminy     history   • DVT (deep venous thrombosis) ([address] ) 10/23/2018   • DVT (deep venous thrombosis) ([address] )    • DVT of leg (deep venous thrombosis) ([address] ) 2001    left   • Fatty liver    • Female pelvic pain    • GERD (gastroesophageal reflux disease)    • Hyperlipidemia high trigrlycerides   • Intermittent palpitations    • Interstitial cystitis    • Irregular heart beat    • Migraines    • Neck pain    • Obesity (BMI 30 0-34  9)    • Palpitations    • Pes planus     unspecified laterality; Last Assessed: 2014   • Thyroid nodule    • Tinnitus    • Trigeminy     history   • Wears glasses    • Wears reading eyeglasses        Past Surgical History:   Procedure Laterality Date   • APPENDECTOMY     • BREAST BIOPSY Left 2019   • CATARACT EXTRACTION Bilateral    •  SECTION      X 2   • CHOLECYSTECTOMY     • COLONOSCOPY     • DILATION AND CURETTAGE OF UTERUS     • HYSTERECTOMY      total   • JOINT REPLACEMENT      left TKR   • KNEE SURGERY Left     X 3   • MA ESOPHAGOGASTRODUODENOSCOPY TRANSORAL DIAGNOSTIC N/A 2016    Procedure: ESOPHAGOGASTRODUODENOSCOPY (EGD); Surgeon: Taylor Cabrales MD;  Location: [facility];   Service: Gastroenterology   • MA LAPS TOTAL HYSTERECT 250 GM/< W/RMVL TUBE/OVARY Bilateral 2016    Procedure: HYSTERECTOMY LAPAROSCOPIC TOTAL ,BSO;  Surgeon: Hansa Martino MD;  Location: [facility];  Service: Gynecology Oncology   • REPLACEMENT TOTAL KNEE     • US GUIDED BREAST BIOPSY LEFT COMPLETE Left 2019   • WISDOM TOOTH EXTRACTION         Family History   Problem Relation Age of Onset   • Endometrial cancer Mother 76   • Migraines Mother    • Diabetes Father    • Heart disease Father    • Heart attack Father    • Prostate cancer Father    • Hypertension Father    • Thyroid cancer Brother         medullary   • Diabetes type II Brother    • Growth hormone deficiency Daughter    • No Known Problems Daughter    • No Known Problems Maternal Grandmother    • No Known Problems Paternal Grandmother    • Alzheimer's disease Maternal Aunt    • Thyroid cancer Maternal Aunt    • Breast cancer Maternal Aunt 65   • Thyroid cancer Maternal Aunt 85   • No Known Problems Maternal Aunt    • No Known Problems Maternal Aunt    • No Known Problems Maternal Aunt    • No Known Problems Maternal Aunt    • Other Maternal Uncle         Brain Tumor   • Alzheimer's disease Maternal Uncle    • Diabetes Paternal Aunt    • Hashimoto's thyroiditis Paternal Aunt         Total Thyroidectomy   • Hypertension Paternal Aunt    • No Known Problems Paternal Aunt    • Stroke Paternal Uncle    • Cancer Family    • Colon cancer Neg Hx    • Cervical cancer Neg Hx    • Ovarian cancer Neg Hx    • Uterine cancer Neg Hx        Social History     Occupational History   • Occupation: Pediatrics   Tobacco Use   • Smoking status: Former     Packs/day: 0 50     Types: Cigarettes     Quit date: 1978     Years since quittin 6   • Smokeless tobacco: Never   • Tobacco comments:     as teenager   Vaping Use   • Vaping Use: Never used   Substance and Sexual Activity   • Alcohol use:  Yes   • Drug use: No   • Sexual activity: Not Currently     Birth control/protection: Surgical         Current Outpatient Medications:   •  albuterol (PROVENTIL HFA,VENTOLIN HFA) 90 mcg/act inhaler, INHALE 2 PUFFS EVERY 4-6 HOURS AS NEEDED, Disp: 108 g, Rfl: 3  •  ALPRAZolam (XANAX) 0 25 mg tablet, Take one tablet twice daily as needed for anxiety, Disp: 30 tablet, Rfl: 0  •  butalbital-acetaminophen-caffeine (FIORICET,ESGIC) -40 mg per tablet, Take 1 tablet by mouth every 4 (four) hours as needed for headaches, Disp: 30 tablet, Rfl: 0  •  calcium-vitamin D (OSCAL) 250-125 MG-UNIT per tablet, Take 1 tablet by mouth daily, Disp: , Rfl:   •  cycloSPORINE (RESTASIS) 0 05 % ophthalmic emulsion, Apply 1 drop to eye 2 (two) times a day, Disp: , Rfl:   •  diazepam (VALIUM) 5 mg tablet, Take 1 tablet (5 mg total) by mouth every 8 (eight) hours as needed for anxiety, Disp: 30 tablet, Rfl: 0  •  diphenhydrAMINE (BENADRYL) 25 mg capsule, Take by mouth daily at bedtime as needed, Disp: , Rfl:   •  Elastic Bandages & Supports (Medical Compression Socks) MISC, Use daily, Disp: 5 each, Rfl: 2  •  fexofenadine (ALLEGRA) 180 MG tablet, Take 180 mg by mouth daily, Disp: , Rfl:   •  Flovent  MCG/ACT inhaler, INHALE 1 PUFF 2 TIMES A DAY RINSE MOUTH AFTER USE , Disp: 12 g, Rfl: 1  •  fluticasone (FLONASE) 50 mcg/act nasal spray, SPRAY 2 SPRAYS INTO EACH NOSTRIL EVERY DAY (Patient taking differently: As needed), Disp: 48 mL, Rfl: 2  •  ibuprofen (Advil) 200 mg tablet, Take by mouth every 6 (six) hours as needed for mild pain, Disp: , Rfl:   •  methocarbamol (ROBAXIN) 750 mg tablet, Take 1 tablet (750 mg total) by mouth every 6 (six) hours as needed for muscle spasms, Disp: 30 tablet, Rfl: 2  •  montelukast (SINGULAIR) 10 mg tablet, TAKE 1 TABLET BY MOUTH EVERY DAY IN THE EVENING (Patient taking differently: if needed), Disp: 30 tablet, Rfl: 2  •  Multiple Vitamin (MULTIVITAMIN) capsule, Take 1 capsule by mouth daily, Disp: , Rfl:   •  pantoprazole (PROTONIX) 40 mg tablet, 2 times daily, Disp: 180 tablet, Rfl: 0  •  rivaroxaban (XARELTO) 10 mg tablet, Take 1 tablet (10 mg total) by mouth daily, Disp: 90 tablet, Rfl: 3  •  ZOLMitriptan (ZOMIG) 5 MG tablet, Take 1 tablet (5 mg total) by mouth once as needed for migraine for up to 1 dose, Disp: 20 tablet, Rfl: 3    Allergies   Allergen Reactions   • Naproxen Shortness Of Breath   • Iv Contrast [Iodinated Contrast Media] Itching   • Seasonal Ic [Cholestatin]    • Phentermine Palpitations            Vitals:    02/17/23 0904   BP: 131/81   Pulse: 74       Objective:  Physical exam  · General: Awake, Alert, Oriented  · Eyes: Pupils equal, round and reactive to light  · Heart: regular rate and rhythm  · Lungs: No audible wheezing  · Abdomen: soft                    Ortho Exam  Right knee:  TTP over medial joint line  No erythema or ecchymosis  No effusion or swelling  Normal strength  Good ROM   Calf compartments soft and supple  Sensation intact  Toes are warm sensate and mobile        Diagnostics, reviewed and taken today if performed as documented:    None performed    Procedures, if performed today:    Large joint arthrocentesis: R knee  Universal Protocol:  Consent: Verbal consent obtained  Risks and benefits: risks, benefits and alternatives were discussed  Consent given by: patient  Time out: Immediately prior to procedure a "time out" was called to verify the correct patient, procedure, equipment, support staff and site/side marked as required  Timeout called at: 2/17/2023 9:39 AM   Patient understanding: patient states understanding of the procedure being performed  Site marked: the operative site was marked  Patient identity confirmed: verbally with patient    Supporting Documentation  Indications: pain   Procedure Details  Location: knee - R knee  Preparation: Patient was prepped and draped in the usual sterile fashion  Needle size: 22 G  Ultrasound guidance: no  Approach: anterolateral  Medications administered: 12 mg betamethasone acetate-betamethasone sodium phosphate 6 (3-3) mg/mL; 2 mL bupivacaine 0 25 %; 2 mL lidocaine 1 %    Patient tolerance: patient tolerated the procedure well with no immediate complications  Dressing:  Sterile dressing applied            Portions of the record may have been created with voice recognition software  Occasional wrong word or "sound a like" substitutions may have occurred due to the inherent limitations of voice recognition software  Read the chart carefully and recognize, using context, where substitutions have occurred

## 2023-02-20 ENCOUNTER — TELEPHONE (OUTPATIENT)
Dept: OBGYN CLINIC | Facility: HOSPITAL | Age: 68
End: 2023-02-20

## 2023-02-20 NOTE — TELEPHONE ENCOUNTER
Caller: Patient    Doctor/Office: [address]    Call regarding :  Patient called stating she has not received a call regarding surgery for tomorrow, while I was trying to connect with a surgery coordinator patient disconnected        Call was transferred to: N/A

## 2023-02-21 ENCOUNTER — ANESTHESIA EVENT (OUTPATIENT)
Dept: PERIOP | Facility: HOSPITAL | Age: 68
End: 2023-02-21

## 2023-02-21 ENCOUNTER — ANESTHESIA (OUTPATIENT)
Dept: PERIOP | Facility: HOSPITAL | Age: 68
End: 2023-02-21

## 2023-02-21 ENCOUNTER — HOSPITAL ENCOUNTER (OUTPATIENT)
Facility: HOSPITAL | Age: 68
Setting detail: OUTPATIENT SURGERY
Discharge: HOME | End: 2023-02-21
Attending: ORTHOPAEDIC SURGERY | Admitting: ORTHOPAEDIC SURGERY

## 2023-02-21 VITALS
TEMPERATURE: 97.6 F | DIASTOLIC BLOOD PRESSURE: 47 MMHG | HEART RATE: 57 BPM | OXYGEN SATURATION: 99 % | WEIGHT: 194 LBS | BODY MASS INDEX: 34.38 KG/M2 | HEIGHT: 63 IN | SYSTOLIC BLOOD PRESSURE: 121 MMHG | RESPIRATION RATE: 16 BRPM

## 2023-02-21 DIAGNOSIS — M67.40 MUCOID CYST OF JOINT: Primary | ICD-10-CM

## 2023-02-21 RX ORDER — ACETAMINOPHEN 325 MG/1
650 TABLET ORAL EVERY 6 HOURS PRN
Status: CANCELLED | OUTPATIENT
Start: 2023-02-21

## 2023-02-21 RX ORDER — FENTANYL CITRATE/PF 50 MCG/ML
50 SYRINGE (ML) INJECTION
Status: DISCONTINUED | OUTPATIENT
Start: 2023-02-21 | End: 2023-02-21 | Stop reason: HOSPADM

## 2023-02-21 RX ORDER — TRAMADOL HYDROCHLORIDE 50 MG/1
50 TABLET ORAL EVERY 6 HOURS PRN
Status: CANCELLED | OUTPATIENT
Start: 2023-02-21

## 2023-02-21 RX ORDER — SODIUM CHLORIDE, SODIUM LACTATE, POTASSIUM CHLORIDE, CALCIUM CHLORIDE 600; 310; 30; 20 MG/100ML; MG/100ML; MG/100ML; MG/100ML
125 INJECTION, SOLUTION INTRAVENOUS CONTINUOUS
Status: DISCONTINUED | OUTPATIENT
Start: 2023-02-21 | End: 2023-02-21

## 2023-02-21 RX ORDER — MIDAZOLAM HYDROCHLORIDE 2 MG/2ML
INJECTION, SOLUTION INTRAMUSCULAR; INTRAVENOUS AS NEEDED
Status: DISCONTINUED | OUTPATIENT
Start: 2023-02-21 | End: 2023-02-21

## 2023-02-21 RX ORDER — SODIUM CHLORIDE, SODIUM LACTATE, POTASSIUM CHLORIDE, CALCIUM CHLORIDE 600; 310; 30; 20 MG/100ML; MG/100ML; MG/100ML; MG/100ML
20 INJECTION, SOLUTION INTRAVENOUS CONTINUOUS
Status: DISCONTINUED | OUTPATIENT
Start: 2023-02-21 | End: 2023-02-21 | Stop reason: HOSPADM

## 2023-02-21 RX ORDER — EPHEDRINE SULFATE 50 MG/ML
INJECTION INTRAVENOUS AS NEEDED
Status: DISCONTINUED | OUTPATIENT
Start: 2023-02-21 | End: 2023-02-21

## 2023-02-21 RX ORDER — ONDANSETRON 2 MG/ML
4 INJECTION INTRAMUSCULAR; INTRAVENOUS EVERY 6 HOURS PRN
Status: CANCELLED | OUTPATIENT
Start: 2023-02-21

## 2023-02-21 RX ORDER — LIDOCAINE HYDROCHLORIDE 10 MG/ML
INJECTION, SOLUTION EPIDURAL; INFILTRATION; INTRACAUDAL; PERINEURAL AS NEEDED
Status: DISCONTINUED | OUTPATIENT
Start: 2023-02-21 | End: 2023-02-21

## 2023-02-21 RX ORDER — GLYCOPYRROLATE 0.2 MG/ML
INJECTION INTRAMUSCULAR; INTRAVENOUS AS NEEDED
Status: DISCONTINUED | OUTPATIENT
Start: 2023-02-21 | End: 2023-02-21

## 2023-02-21 RX ORDER — ONDANSETRON 2 MG/ML
4 INJECTION INTRAMUSCULAR; INTRAVENOUS ONCE AS NEEDED
Status: DISCONTINUED | OUTPATIENT
Start: 2023-02-21 | End: 2023-02-21 | Stop reason: HOSPADM

## 2023-02-21 RX ORDER — TRAMADOL HYDROCHLORIDE 50 MG/1
50 TABLET ORAL EVERY 6 HOURS PRN
Qty: 5 TABLET | Refills: 0 | Status: SHIPPED | OUTPATIENT
Start: 2023-02-21 | End: 2023-03-03

## 2023-02-21 RX ORDER — CEFAZOLIN SODIUM 2 G/50ML
2000 SOLUTION INTRAVENOUS ONCE
Status: COMPLETED | OUTPATIENT
Start: 2023-02-21 | End: 2023-02-21

## 2023-02-21 RX ORDER — PROPOFOL 10 MG/ML
INJECTION, EMULSION INTRAVENOUS CONTINUOUS PRN
Status: DISCONTINUED | OUTPATIENT
Start: 2023-02-21 | End: 2023-02-21

## 2023-02-21 RX ORDER — LIDOCAINE HYDROCHLORIDE AND EPINEPHRINE 10; 10 MG/ML; UG/ML
INJECTION, SOLUTION INFILTRATION; PERINEURAL AS NEEDED
Status: DISCONTINUED | OUTPATIENT
Start: 2023-02-21 | End: 2023-02-21 | Stop reason: HOSPADM

## 2023-02-21 RX ORDER — FENTANYL CITRATE 50 UG/ML
INJECTION, SOLUTION INTRAMUSCULAR; INTRAVENOUS AS NEEDED
Status: DISCONTINUED | OUTPATIENT
Start: 2023-02-21 | End: 2023-02-21

## 2023-02-21 RX ORDER — MAGNESIUM HYDROXIDE 1200 MG/15ML
LIQUID ORAL AS NEEDED
Status: DISCONTINUED | OUTPATIENT
Start: 2023-02-21 | End: 2023-02-21 | Stop reason: HOSPADM

## 2023-02-21 RX ORDER — OXYCODONE HYDROCHLORIDE AND ACETAMINOPHEN 5; 325 MG/1; MG/1
1 TABLET ORAL EVERY 4 HOURS PRN
Qty: 5 TABLET | Refills: 0 | Status: SHIPPED | OUTPATIENT
Start: 2023-02-21 | End: 2023-02-21

## 2023-02-21 RX ORDER — ONDANSETRON 2 MG/ML
INJECTION INTRAMUSCULAR; INTRAVENOUS AS NEEDED
Status: DISCONTINUED | OUTPATIENT
Start: 2023-02-21 | End: 2023-02-21

## 2023-02-21 RX ORDER — SENNOSIDES 8.6 MG
650 CAPSULE ORAL EVERY 8 HOURS PRN
Qty: 30 TABLET | Refills: 0 | Status: SHIPPED | OUTPATIENT
Start: 2023-02-21

## 2023-02-21 RX ORDER — PROPOFOL 10 MG/ML
INJECTION, EMULSION INTRAVENOUS AS NEEDED
Status: DISCONTINUED | OUTPATIENT
Start: 2023-02-21 | End: 2023-02-21

## 2023-02-21 RX ADMIN — MIDAZOLAM 2 MG: 1 INJECTION INTRAMUSCULAR; INTRAVENOUS at 07:56

## 2023-02-21 RX ADMIN — SODIUM CHLORIDE, SODIUM LACTATE, POTASSIUM CHLORIDE, AND CALCIUM CHLORIDE: .6; .31; .03; .02 INJECTION, SOLUTION INTRAVENOUS at 07:51

## 2023-02-21 RX ADMIN — CEFAZOLIN SODIUM 2000 MG: 2 SOLUTION INTRAVENOUS at 07:56

## 2023-02-21 RX ADMIN — FENTANYL CITRATE 25 MCG: 50 INJECTION, SOLUTION INTRAMUSCULAR; INTRAVENOUS at 08:06

## 2023-02-21 RX ADMIN — LIDOCAINE HYDROCHLORIDE 20 MG: 10 INJECTION, SOLUTION EPIDURAL; INFILTRATION; INTRACAUDAL; PERINEURAL at 08:08

## 2023-02-21 RX ADMIN — PROPOFOL 50 MG: 10 INJECTION, EMULSION INTRAVENOUS at 08:08

## 2023-02-21 RX ADMIN — ONDANSETRON 4 MG: 2 INJECTION INTRAMUSCULAR; INTRAVENOUS at 08:02

## 2023-02-21 RX ADMIN — PROPOFOL 100 MCG/KG/MIN: 10 INJECTION, EMULSION INTRAVENOUS at 08:10

## 2023-02-21 RX ADMIN — EPHEDRINE SULFATE 5 MG: 50 INJECTION INTRAVENOUS at 08:30

## 2023-02-21 RX ADMIN — GLYCOPYRROLATE 0.1 MG: 0.2 INJECTION, SOLUTION INTRAMUSCULAR; INTRAVENOUS at 07:56

## 2023-02-21 RX ADMIN — FENTANYL CITRATE 25 MCG: 50 INJECTION, SOLUTION INTRAMUSCULAR; INTRAVENOUS at 08:23

## 2023-02-21 RX ADMIN — FENTANYL CITRATE 25 MCG: 50 INJECTION, SOLUTION INTRAMUSCULAR; INTRAVENOUS at 08:38

## 2023-02-21 RX ADMIN — FENTANYL CITRATE 25 MCG: 50 INJECTION, SOLUTION INTRAMUSCULAR; INTRAVENOUS at 08:17

## 2023-02-21 NOTE — ANESTHESIA POSTPROCEDURE EVALUATION
Post-Op Assessment Note    CV Status:  Stable    Pain management: adequate     Mental Status:  Awake and sleepy   Hydration Status:  Euvolemic   PONV Controlled:  Controlled   Airway Patency:  Patent      Post Op Vitals Reviewed: Yes      Staff: Anesthesiologist, CRNA         No notable events documented      /53 (02/21/23 0841)    Temp 97 6 °F (36 4 °C) (02/21/23 0841)    Pulse 75 (02/21/23 0841)   Resp 16 (02/21/23 0841)    SpO2 96 % (02/21/23 0841)

## 2023-02-21 NOTE — DISCHARGE INSTR - AVS FIRST PAGE
Post Operative Instructions    You have had surgery on your arm today, please read and follow the information below:  Elevate your hand above your elbow during the next 24-48 hours to help with swelling  Place your hand and arm over your head with motion at your shoulder three times a day  Do not apply any cream/ointment/oil to your incisions including antibiotics  Do not soak your hands in standing water (dishwater, tubs, Jacuzzi's, pools, etc ) until given permission (typically 2-3 weeks after injury)    Call the office if you notice any:  Increased numbness or tingling of your hand or fingers that is not relieved with elevation  Increasing pain that is not controlled with medication  Difficulty chewing, breathing, swallowing  Chest pains or shortness of breath  Fever over 101 4 degrees  Bandage: Remove bandage after 5 days  Motion: Move fingers into a fist 5 times a day, DO NOT move any splinted fingers  Weight bearing status: Avoid heavy lifting (>5 pounds) with the extremity that was operated on until follow up appointment  Normal activities of daily living are OK  Ice: Ice for 10 minutes every hour as needed for swelling x 24 hours  Sling: No sling necessary  Medications:   Tylenol Extended Release 650 mg every 8 hours  Tramadol 1 tab every 6 hours AS needed for pain     Follow-up Appointment: 7-10 days  Please call the office if you have any questions or concerns regarding your post-operative care

## 2023-02-21 NOTE — ANESTHESIA PREPROCEDURE EVALUATION
Procedure:  EXCISION GANGLION CYST-right long finger (Right: Finger)    Relevant Problems   CARDIO   (+) DVT of leg (deep venous thrombosis) (HCC)   (+) Hypertriglyceridemia   (+) Migraine, unspecified, not intractable, without status migrainosus      GI/HEPATIC   (+) Gastroesophageal reflux disease without esophagitis      MUSCULOSKELETAL   (+) Arthritis of left knee   (+) Primary osteoarthritis of right knee   (+) Trapezius muscle strain, right, initial encounter      NEURO/PSYCH   (+) History of deep venous thrombosis (DVT) of distal vein of right lower extremity   (+) Migraine, unspecified, not intractable, without status migrainosus      PULMONARY   (+) Mild intermittent asthma   (+) Moderate persistent asthma without complication      Other   (+) Obesity (BMI 30 0-34  9)        Physical Exam    Airway    Mallampati score: III  TM Distance: >3 FB       Dental       Cardiovascular      Pulmonary      Other Findings        Anesthesia Plan  ASA Score- 3     Anesthesia Type- IV sedation with anesthesia with ASA Monitors  Additional Monitors:   Airway Plan:           Plan Factors-    Chart reviewed  EKG reviewed  Existing labs reviewed  Patient summary reviewed  Induction- intravenous  Postoperative Plan- Plan for postoperative opioid use  Informed Consent- Anesthetic plan and risks discussed with patient  I personally reviewed this patient with the CRNA  Discussed and agreed on the Anesthesia Plan with the CRNA  Jody Lopez

## 2023-02-21 NOTE — OP NOTE
OPERATIVE REPORT  PATIENT NAME: Georgi Casillas  :  1955  MRN: [id number]  Pt Location: [facility]    SURGERY DATE: 23    Surgeon(s) and Role:     * Luis Estevez MD - Primary     * Bridgett Hamilton PA-C - Assisting    Pre-Op Diagnosis:  Mucoid cyst of joint [M67 40]    Post-Op Diagnosis:    Mucoid cyst of joint [M67 40]    Procedure(s) (LRB):  Right long finger mucoid cyst excision distal interphalangeal joint (Right)    Specimen(s):  Order Name Source Comment Collection Info Order Time   TISSUE EXAM Finger, Right  Collected By: Luis Estevez MD 2023  8:24 AM     Release to patient through Mychart   Immediate            Estimated Blood Loss:   Minimal      Anesthesia Type:   IV Sedation with Anesthesia    Operative Indications: The patient has a history of a right hand long finger distal interphalangeal joint mucoid cyst that was recalcitrant to conservative management  The decision was made to bring the patient to the operating room for right hand long finger distal interphalangeal joint mucoid cyst excision  Risks of the procedure were explained which include, but are not limited to bleeding; infection; damage to nerves, arteries,veins, tendons; scar; pain; need for reoperation; failure to give desired result; and risks of anaesthesia  All questions were answered to satisfaction and they were willing to proceed  Operative Findings:  Small 4 x 4 x 4 mm mucoid cyst from the distal interphalangeal joint of the right long finger    Complications:   None    Procedure and Technique:  After the patient, site, and procedure were identified, the patient was brought into the operating room in a supine position  Local anaesthesia and sedation were provided  A tourniquet was not used  The  right upper extremity was then prepped and drapped in a normal, sterile, orthopedic fashion      After the patient, site, and procedure were once again identified, attention was turned to the right long finger  A champagne incision was made to the dorsal aspect of the finger centered over the distal interphalangeal joint  Care was taken to elevate flaps on both the radial, ulnar, and distal aspect of the incision with care taken to protect the germinal matrix as well as the extensor tendon  The mucoid cyst was identified on the dorsal and ulnar side side of the distal interphalangeal joint and circumferentially dissected free from all surrounding structures  A small capsulotomy was then made over the distal interphalangeal joint and a portion of the capsule and underlying osteophyte were excised  At the completion of the procedure, hemostasis was obtained with cautery and direct pressure  The wounds were copiously irrigated with sterile solution  The wounds were closed with Prolene  Sterile dressings were applied, including Xeroform, Gauze and Finger Dressing  Please note, all sponge, needle, and instrument counts were correct prior to closure  Loupe magnification was utilized  The patient tolerated the procedure well       I was present for all critical portions of the procedure, A qualified resident physician was not available and A physician assistant was required during the procedure for retraction tissue handling,dissection and suturing    Patient Disposition:  PACU  and hemodynamically stable    SIGNATURE: Jearldine Goodell, MD  DATE: 02/21/23  TIME: 8:32 AM

## 2023-02-21 NOTE — H&P
H&P Exam - Orthopedics   Reynaldo Rubio 69 y o  female MRN: [id number]  Unit/Bed#: P309 B PRE    Assessment/Plan   Assessment:  Right long finger mucoid cyst    Plan:  Right long finger mucoid cyst excision with sedation    History of Present Illness   HPI:  Reynaldo Rubio is a 69 y o  female who presents with right long finger bump over the distal end of her finger  She states that it has been there for some time  She states that the size comes and goes  She states that she would like to proceed with surgical intervention  Historical Information  Review Of Systems:   · Skin: Normal  · Neuro: See HPI  · Musculoskeletal: See HPI  · 14 point review of systems negative except as stated above     Past Medical History:   Past Medical History:   Diagnosis Date   • Acid reflux    • Allergic rhinitis     Last Assessed: 2017   • Anesthesia complication     HYPOTENSION   • Arthritis    • Asthma    • Bigeminy     history   • DVT (deep venous thrombosis) ([facility] [address] ) 10/23/2018   • DVT (deep venous thrombosis) ([facility] [facility] )    • DVT of leg (deep venous thrombosis) ([facility] [address] )     left   • Fatty liver    • Female pelvic pain    • GERD (gastroesophageal reflux disease)    • Hyperlipidemia     high trigrlycerides   • Intermittent palpitations    • Interstitial cystitis    • Irregular heart beat    • Migraines    • Neck pain    • Obesity (BMI 30 0-34  9)    • Palpitations    • Pes planus     unspecified laterality;  Last Assessed: 2014   • Thyroid nodule    • Tinnitus    • Trigeminy     history   • Wears glasses    • Wears reading eyeglasses        Past Surgical History:   Past Surgical History:   Procedure Laterality Date   • APPENDECTOMY     • BREAST BIOPSY Left 2019   • CATARACT EXTRACTION Bilateral    •  SECTION      X 2   • CHOLECYSTECTOMY     • COLONOSCOPY     • DILATION AND CURETTAGE OF UTERUS     • HYSTERECTOMY      total   • JOINT REPLACEMENT      left TKR   • KNEE SURGERY Left     X 3   • MT ESOPHAGOGASTRODUODENOSCOPY TRANSORAL DIAGNOSTIC N/A 03/14/2016    Procedure: ESOPHAGOGASTRODUODENOSCOPY (EGD); Surgeon: Vivi Berg MD;  Location: [facility];   Service: Gastroenterology   • ND LAPS TOTAL HYSTERECT 250 GM/< W/RMVL TUBE/OVARY Bilateral 07/08/2016    Procedure: HYSTERECTOMY LAPAROSCOPIC TOTAL ,BSO;  Surgeon: Ellen So MD;  Location: [facility];  Service: Gynecology Oncology   • REPLACEMENT TOTAL KNEE  2014   • US GUIDED BREAST BIOPSY LEFT COMPLETE Left 02/06/2019   • WISDOM TOOTH EXTRACTION         Family History:  Family history reviewed and non-contributory  Family History   Problem Relation Age of Onset   • Endometrial cancer Mother 76   • Migraines Mother    • Diabetes Father    • Heart disease Father    • Heart attack Father    • Prostate cancer Father    • Hypertension Father    • Thyroid cancer Brother         medullary   • Diabetes type II Brother    • Growth hormone deficiency Daughter    • No Known Problems Daughter    • No Known Problems Maternal Grandmother    • No Known Problems Paternal Grandmother    • Alzheimer's disease Maternal Aunt    • Thyroid cancer Maternal Aunt    • Breast cancer Maternal Aunt 65   • Thyroid cancer Maternal Aunt 85   • No Known Problems Maternal Aunt    • No Known Problems Maternal Aunt    • No Known Problems Maternal Aunt    • No Known Problems Maternal Aunt    • Other Maternal Uncle         Brain Tumor   • Alzheimer's disease Maternal Uncle    • Diabetes Paternal Aunt    • Hashimoto's thyroiditis Paternal Aunt         Total Thyroidectomy   • Hypertension Paternal Aunt    • No Known Problems Paternal Aunt    • Stroke Paternal Uncle    • Cancer Family    • Colon cancer Neg Hx    • Cervical cancer Neg Hx    • Ovarian cancer Neg Hx    • Uterine cancer Neg Hx        Social History:  Social History     Socioeconomic History   • Marital status: /Civil Union     Spouse name: None   • Number of children: None   • Years of education: None   • Highest education level: None   Occupational History   • Occupation: Pediatrics   Tobacco Use   • Smoking status: Former     Packs/day: 0 50     Types: Cigarettes     Quit date: 1978     Years since quittin 7   • Smokeless tobacco: Never   • Tobacco comments:     as teenager   Vaping Use   • Vaping Use: Never used   Substance and Sexual Activity   • Alcohol use: Yes   • Drug use: No   • Sexual activity: Not Currently     Birth control/protection: Surgical   Other Topics Concern   • None   Social History Narrative   • None     Social Determinants of Health     Financial Resource Strain: Not on file   Food Insecurity: Not on file   Transportation Needs: Not on file   Physical Activity: Unknown   • Days of Exercise per Week: 0 days   • Minutes of Exercise per Session: Not on file   Stress: Not on file   Social Connections: Not on file   Intimate Partner Violence: Not on file   Housing Stability: Not on file       Allergies:    Allergies   Allergen Reactions   • Naproxen Shortness Of Breath   • Iv Contrast [Iodinated Contrast Media] Itching   • Seasonal Ic [Cholestatin]    • Phentermine Palpitations           Labs:  0   Lab Value Date/Time    HCT 44 0 2022 0757    HCT 43 5 2020 0736    HCT 42 8 10/20/2019 1832    HCT 42 6 2015 0828    HCT 40 2 2015 0755    HCT 28 6 (L) 2014 0622    HGB 13 8 2022 0757    HGB 13 7 2020 0736    HGB 13 6 10/20/2019 1832    HGB 14 2 2015 0828    HGB 13 3 2015 0755    HGB 8 9 (L) 2014 0622    INR 1 06 2018 1108    INR 1 05 11/10/2014 1148    WBC 6 59 2022 0757    WBC 6 25 2020 0736    WBC 8 95 10/20/2019 1832    WBC 5 6 2015 0828    WBC 5 70 2015 0755    WBC 7 35 2014 0622    ESR 9 2016 0845    ESR 7 2015 0828       Meds:    Current Facility-Administered Medications:   •  lactated ringers infusion, 125 mL/hr, Intravenous, Continuous, Melba Eliza Luong MD    Blood Culture:   No results found for: BLOODCX    Wound Culture:   No results found for: WOUNDCULT    Ins and Outs:  No intake/output data recorded  Physical Exam  /50   Pulse 70   Temp 97 9 °F (36 6 °C) (Temporal)   Resp 20   Ht 5' 3" (1 6 m)   Wt 88 kg (194 lb)   LMP  (LMP Unknown)   SpO2 99%   BMI 34 37 kg/m²   /50   Pulse 70   Temp 97 9 °F (36 6 °C) (Temporal)   Resp 20   Ht 5' 3" (1 6 m)   Wt 88 kg (194 lb)   LMP  (LMP Unknown)   SpO2 99%   BMI 34 37 kg/m²   Gen: No acute distress, resting comfortably in bed  HEENT: Eyes clear, moist mucus membranes, hearing intact  Respiratory: No audible wheezing or stridor  Cardiovascular: Well Perfused peripherally, 2+ distal pulse  Abdomen: nondistended, no peritoneal signs  Ortho Exam: Right long finger  Visible mucoid cyst appreciated over the DIP joint dorsally, she has full range of motion of the digit  No tenderness to palpation  Skin is translucent  Neuro Exam: Patient is neurovascular intact for median, radial, ulnar nerve distribution  Capillary refill less than 2 seconds      Lab Results: Reviewed  Imaging: Reviewed

## 2023-02-23 DIAGNOSIS — G43.009 MIGRAINE WITHOUT AURA AND WITHOUT STATUS MIGRAINOSUS, NOT INTRACTABLE: ICD-10-CM

## 2023-02-23 RX ORDER — BUTALBITAL, ACETAMINOPHEN AND CAFFEINE 50; 325; 40 MG/1; MG/1; MG/1
1 TABLET ORAL EVERY 4 HOURS PRN
Qty: 30 TABLET | Refills: 0 | Status: SHIPPED | OUTPATIENT
Start: 2023-02-23

## 2023-02-26 ENCOUNTER — NURSE TRIAGE (OUTPATIENT)
Dept: OTHER | Facility: OTHER | Age: 68
End: 2023-02-26

## 2023-02-26 NOTE — TELEPHONE ENCOUNTER
Reason for Disposition  • Sutured or stapled surgical incision, questions about    Answer Assessment - Initial Assessment Questions  1  SYMPTOM: "What's the main symptom you're concerned about?" (e g , redness, pain, drainage)      I have dried blood all over my sutures  2  ONSET: "When did symptoms  start?"      Tuesday  3  SURGERY: "What surgery was performed?"      Finger surgery  4  DATE of SURGERY: "When was surgery performed?"       2/21/2023  5  INCISION SITE: "Where is the incision located?"       Right middle finger  6  REDNESS: "Is there any redness at the incision site?" If yes, ask: "How wide across is the redness?" (Inches, centimeters)       no  7  PAIN: "Is there any pain?" If Yes, ask: "How bad is it?"  (Scale 1-10; or mild, moderate, severe)      no  8  BLEEDING: "Is there any bleeding?" If Yes, ask: "How much?" and "Where?"      no  9  DRAINAGE: "Is there any drainage from the incision site?" If yes, ask: "What color and how much?" (e g , red, cloudy, pus; drops, teaspoon)      no  10  FEVER: "Do you have a fever?" If Yes, ask: "What is your temperature, how was it measured, and when did it start?"        no  11   OTHER SYMPTOMS: "Do you have any other symptoms?" (e g , shaking chills, weakness, rash elsewhere on body)        no    Protocols used: POST-OP INCISION SYMPTOMS AND QUESTIONS-ADULT-AH

## 2023-02-27 NOTE — TELEPHONE ENCOUNTER
Please advise the patient that she should continue to take her blood thinner  She can clean the wound with warm soapy water and allowing the water to run over the incision and pat dry  No soaking  Thank you

## 2023-02-27 NOTE — TELEPHONE ENCOUNTER
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 Or will they just deal with it    Please advise

## 2023-03-03 ENCOUNTER — OFFICE VISIT (OUTPATIENT)
Dept: OBGYN CLINIC | Facility: HOSPITAL | Age: 68
End: 2023-03-03

## 2023-03-03 VITALS — SYSTOLIC BLOOD PRESSURE: 121 MMHG | DIASTOLIC BLOOD PRESSURE: 66 MMHG | HEART RATE: 66 BPM

## 2023-03-03 DIAGNOSIS — Z47.89 AFTERCARE FOLLOWING SURGERY OF THE MUSCULOSKELETAL SYSTEM: Primary | ICD-10-CM

## 2023-03-04 NOTE — PROGRESS NOTES
Assessment:   S/P Right long finger mucoid cyst excision distal interphalangeal joint - Right on 2/21/2023    Plan:   Resume activities as tolerated  - use pain as a guide  Patient can resume normal hygiene activities  Discussed wound care  Patient can work on active ROM, no passive motion    Follow Up:  PRN      CHIEF COMPLAINT:  Chief Complaint   Patient presents with   • Right Middle Finger - Post-op, Suture / Staple Removal     mucoid cyst excision distal interphalangeal joint- 2/21/23         SUBJECTIVE:  Tati Trotter is a 69 y o  female who presents for follow up after Right long finger mucoid cyst excision distal interphalangeal joint - Right on 2/21/2023  Today patient has some soreness and swelling in the finger  She denies any drainage  She denies any other acute complaints         PHYSICAL EXAMINATION:  Vital signs: /66   Pulse 66   LMP  (LMP Unknown)   General: well developed and well nourished, alert, oriented times 3 and appears comfortable  Psychiatric: Normal    MUSCULOSKELETAL EXAMINATION:  Incision: Clean, dry, intact  Range of Motion: Limited due to stiffness  Neurovascular status: Neuro intact, good cap refill  Activity Restrictions: No restrictions  Done today: Sutures out      STUDIES REVIEWED:  No Studies to review      PROCEDURES PERFORMED:  Procedures  No Procedures performed today

## 2023-03-06 ENCOUNTER — HOSPITAL ENCOUNTER (OUTPATIENT)
Dept: RADIOLOGY | Facility: MEDICAL CENTER | Age: 68
Discharge: HOME | End: 2023-03-06

## 2023-03-06 DIAGNOSIS — Z13.820 ENCOUNTER FOR SCREENING FOR OSTEOPOROSIS: ICD-10-CM

## 2023-03-14 ENCOUNTER — TELEPHONE (OUTPATIENT)
Dept: OTHER | Facility: OTHER | Age: 68
End: 2023-03-14

## 2023-03-14 ENCOUNTER — TELEPHONE (OUTPATIENT)
Dept: GASTROENTEROLOGY | Facility: CLINIC | Age: 68
End: 2023-03-14

## 2023-03-14 NOTE — TELEPHONE ENCOUNTER
FYI This is Dr Ivett Baker wife     She will be due in December for her colonscopy/ egd  Teresa Blight She wants to be shceduled on a Monday or Wednesday     These are the days Dr Kerrie Francis also, does procedures  Teresa Blight He will be her   Teresa Monique

## 2023-03-14 NOTE — TELEPHONE ENCOUNTER
Patient aware the schedules are not available for December yet  Will call her back when it gets closer and the schedule is finalized

## 2023-03-14 NOTE — TELEPHONE ENCOUNTER
Patient called today regarding when will her next Colonoscopy / EGD be due  Patient is concerned because she had 7 Polyps removed 3 years ago  Please call Patient back to Advise

## 2023-04-03 DIAGNOSIS — G43.009 MIGRAINE WITHOUT AURA AND WITHOUT STATUS MIGRAINOSUS, NOT INTRACTABLE: ICD-10-CM

## 2023-04-03 DIAGNOSIS — I82.441 ACUTE DEEP VEIN THROMBOSIS (DVT) OF TIBIAL VEIN OF RIGHT LOWER EXTREMITY (HCC): ICD-10-CM

## 2023-04-03 RX ORDER — BUTALBITAL, ACETAMINOPHEN AND CAFFEINE 50; 325; 40 MG/1; MG/1; MG/1
1 TABLET ORAL EVERY 4 HOURS PRN
Qty: 90 TABLET | Refills: 0 | Status: SHIPPED | OUTPATIENT
Start: 2023-04-03

## 2023-04-21 ENCOUNTER — HOSPITAL ENCOUNTER (OUTPATIENT)
Dept: MAMMOGRAPHY | Facility: CLINIC | Age: 68
Discharge: HOME | End: 2023-04-21

## 2023-04-21 VITALS — BODY MASS INDEX: 34.73 KG/M2 | HEIGHT: 63 IN | WEIGHT: 196 LBS

## 2023-04-21 DIAGNOSIS — Z12.31 ENCOUNTER FOR SCREENING MAMMOGRAM FOR MALIGNANT NEOPLASM OF BREAST: ICD-10-CM

## 2023-04-28 ENCOUNTER — OFFICE VISIT (OUTPATIENT)
Dept: OBGYN CLINIC | Facility: MEDICAL CENTER | Age: 68
End: 2023-04-28

## 2023-04-28 ENCOUNTER — APPOINTMENT (OUTPATIENT)
Dept: RADIOLOGY | Facility: MEDICAL CENTER | Age: 68
End: 2023-04-28

## 2023-04-28 VITALS
SYSTOLIC BLOOD PRESSURE: 131 MMHG | HEART RATE: 77 BPM | WEIGHT: 196 LBS | BODY MASS INDEX: 34.73 KG/M2 | DIASTOLIC BLOOD PRESSURE: 80 MMHG | HEIGHT: 63 IN

## 2023-04-28 DIAGNOSIS — G89.29 CHRONIC PAIN OF RIGHT KNEE: ICD-10-CM

## 2023-04-28 DIAGNOSIS — M25.561 CHRONIC PAIN OF RIGHT KNEE: ICD-10-CM

## 2023-04-28 DIAGNOSIS — M17.11 PRIMARY OSTEOARTHRITIS OF RIGHT KNEE: Primary | ICD-10-CM

## 2023-04-28 DIAGNOSIS — M17.11 PRIMARY OSTEOARTHRITIS OF RIGHT KNEE: ICD-10-CM

## 2023-04-28 RX ORDER — LIDOCAINE HYDROCHLORIDE 10 MG/ML
2 INJECTION, SOLUTION INFILTRATION; PERINEURAL
Status: COMPLETED | OUTPATIENT
Start: 2023-04-28 | End: 2023-04-28

## 2023-04-28 RX ORDER — KETOROLAC TROMETHAMINE 30 MG/ML
60 INJECTION, SOLUTION INTRAMUSCULAR; INTRAVENOUS
Status: COMPLETED | OUTPATIENT
Start: 2023-04-28 | End: 2023-04-28

## 2023-04-28 RX ORDER — BUPIVACAINE HYDROCHLORIDE 2.5 MG/ML
2 INJECTION, SOLUTION INFILTRATION; PERINEURAL
Status: COMPLETED | OUTPATIENT
Start: 2023-04-28 | End: 2023-04-28

## 2023-04-28 RX ADMIN — KETOROLAC TROMETHAMINE 60 MG: 30 INJECTION, SOLUTION INTRAMUSCULAR; INTRAVENOUS at 09:57

## 2023-04-28 RX ADMIN — BUPIVACAINE HYDROCHLORIDE 2 ML: 2.5 INJECTION, SOLUTION INFILTRATION; PERINEURAL at 09:57

## 2023-04-28 RX ADMIN — LIDOCAINE HYDROCHLORIDE 2 ML: 10 INJECTION, SOLUTION INFILTRATION; PERINEURAL at 09:57

## 2023-04-28 NOTE — PROGRESS NOTES
Assessment:  No diagnosis found  Plan:      To do next visit:  No follow-ups on file  The above stated was discussed in layman's terms and the patient expressed understanding  All questions were answered to the patient's satisfaction  Subjective: Rosalba Shelton is a 69 y o  female who presents       Review of systems negative unless otherwise specified in HPI    Past Medical History:   Diagnosis Date   • Acid reflux    • Allergic rhinitis     Last Assessed: 2017   • Anesthesia complication     HYPOTENSION   • Arthritis    • Asthma    • Bigeminy     history   • DVT (deep venous thrombosis) ([facility] ) 10/23/2018   • DVT (deep venous thrombosis) ([facility])    • DVT of leg (deep venous thrombosis) ([facility] )     left   • Fatty liver    • Female pelvic pain    • GERD (gastroesophageal reflux disease)    • Hyperlipidemia     high trigrlycerides   • Intermittent palpitations    • Interstitial cystitis    • Irregular heart beat    • Migraines    • Neck pain    • Obesity (BMI 30 0-34  9)    • Palpitations    • Pes planus     unspecified laterality; Last Assessed: 2014   • Thyroid nodule    • Tinnitus    • Trigeminy     history   • Wears glasses    • Wears reading eyeglasses        Past Surgical History:   Procedure Laterality Date   • APPENDECTOMY     • BREAST BIOPSY Left 2019   • CATARACT EXTRACTION Bilateral    •  SECTION      X 2   • CHOLECYSTECTOMY     • COLONOSCOPY     • DILATION AND CURETTAGE OF UTERUS     • HYSTERECTOMY      total   • JOINT REPLACEMENT      left TKR   • KNEE SURGERY Left     X 3   • MS ESOPHAGOGASTRODUODENOSCOPY TRANSORAL DIAGNOSTIC N/A 2016    Procedure: ESOPHAGOGASTRODUODENOSCOPY (EGD); Surgeon: Klaudia Chavez MD;  [facility];   Service: Gastroenterology   • MS EXCISION GANGLION WRIST DORSAL/VOLAR PRIMARY Right 2023    Procedure: Right long finger mucoid cyst excision distal interphalangeal joint;  Surgeon: Valente Peace MD;  Location: [facility];  Service: Orthopedics   • CT LAPS TOTAL HYSTERECT 250 GM/< W/RMVL TUBE/OVARY Bilateral 2016    Procedure: HYSTERECTOMY LAPAROSCOPIC TOTAL ,BSO;  Surgeon: Ania Quintana MD;  [facility];  Service: Gynecology Oncology   • REPLACEMENT TOTAL KNEE     • US GUIDED BREAST BIOPSY LEFT COMPLETE Left 2019   • WISDOM TOOTH EXTRACTION         Family History   Problem Relation Age of Onset   • Endometrial cancer Mother 76   • Migraines Mother    • Diabetes Father    • Heart disease Father    • Heart attack Father    • Prostate cancer Father    • Hypertension Father    • Thyroid cancer Brother         medullary   • Diabetes type II Brother    • Growth hormone deficiency Daughter    • No Known Problems Daughter    • No Known Problems Maternal Grandmother    • No Known Problems Paternal Grandmother    • Alzheimer's disease Maternal Aunt    • Thyroid cancer Maternal Aunt    • Breast cancer Maternal Aunt 65   • Thyroid cancer Maternal Aunt 85   • No Known Problems Maternal Aunt    • No Known Problems Maternal Aunt    • No Known Problems Maternal Aunt    • No Known Problems Maternal Aunt    • Other Maternal Uncle         Brain Tumor   • Alzheimer's disease Maternal Uncle    • Diabetes Paternal Aunt    • Hashimoto's thyroiditis Paternal Aunt         Total Thyroidectomy   • Hypertension Paternal Aunt    • No Known Problems Paternal Aunt    • Stroke Paternal Uncle    • Cancer Family    • Colon cancer Neg Hx    • Cervical cancer Neg Hx    • Ovarian cancer Neg Hx    • Uterine cancer Neg Hx        Social History     Occupational History   • Occupation: Pediatrics   Tobacco Use   • Smoking status: Former     Packs/day: 0 50     Types: Cigarettes     Quit date: 1978     Years since quittin 8   • Smokeless tobacco: Never   • Tobacco comments:     as teenager   Vaping Use   • Vaping Use: Never used   Substance and Sexual Activity   • Alcohol use:  Yes   • Drug use: No   • Sexual activity: Not Currently Birth control/protection: Surgical         Current Outpatient Medications:   •  acetaminophen (TYLENOL) 650 mg CR tablet, Take 1 tablet (650 mg total) by mouth every 8 (eight) hours as needed for mild pain, Disp: 30 tablet, Rfl: 0  •  albuterol (PROVENTIL HFA,VENTOLIN HFA) 90 mcg/act inhaler, INHALE 2 PUFFS EVERY 4-6 HOURS AS NEEDED, Disp: 108 g, Rfl: 3  •  ALPRAZolam (XANAX) 0 25 mg tablet, Take one tablet twice daily as needed for anxiety, Disp: 30 tablet, Rfl: 0  •  butalbital-acetaminophen-caffeine (FIORICET,ESGIC) -40 mg per tablet, Take 1 tablet by mouth every 4 (four) hours as needed for headaches, Disp: 90 tablet, Rfl: 0  •  calcium-vitamin D (OSCAL) 250-125 MG-UNIT per tablet, Take 1 tablet by mouth daily, Disp: , Rfl:   •  cycloSPORINE (RESTASIS) 0 05 % ophthalmic emulsion, Apply 1 drop to eye 2 (two) times a day, Disp: , Rfl:   •  diazepam (VALIUM) 5 mg tablet, Take 1 tablet (5 mg total) by mouth every 8 (eight) hours as needed for anxiety, Disp: 30 tablet, Rfl: 0  •  diphenhydrAMINE (BENADRYL) 25 mg capsule, Take by mouth daily at bedtime as needed, Disp: , Rfl:   •  Elastic Bandages & Supports (Medical Compression Socks) MISC, Use daily, Disp: 5 each, Rfl: 2  •  fexofenadine (ALLEGRA) 180 MG tablet, Take 180 mg by mouth daily, Disp: , Rfl:   •  Flovent  MCG/ACT inhaler, INHALE 1 PUFF 2 TIMES A DAY RINSE MOUTH AFTER USE , Disp: 12 g, Rfl: 1  •  fluticasone (FLONASE) 50 mcg/act nasal spray, SPRAY 2 SPRAYS INTO EACH NOSTRIL EVERY DAY (Patient taking differently: As needed), Disp: 48 mL, Rfl: 2  •  methocarbamol (ROBAXIN) 750 mg tablet, Take 1 tablet (750 mg total) by mouth every 6 (six) hours as needed for muscle spasms, Disp: 30 tablet, Rfl: 2  •  montelukast (SINGULAIR) 10 mg tablet, TAKE 1 TABLET BY MOUTH EVERY DAY IN THE EVENING (Patient taking differently: if needed), Disp: 30 tablet, Rfl: 2  •  Multiple Vitamin (MULTIVITAMIN) capsule, Take 1 capsule by mouth daily, Disp: , Rfl:   • "pantoprazole (PROTONIX) 40 mg tablet, 2 times daily, Disp: 180 tablet, Rfl: 0  •  rivaroxaban (XARELTO) 10 mg tablet, Take 1 tablet (10 mg total) by mouth daily, Disp: 90 tablet, Rfl: 3  •  traMADol (Ultram) 50 mg tablet, Take 1 tablet (50 mg total) by mouth every 6 (six) hours as needed for moderate pain, Disp: 30 tablet, Rfl: 0  •  ZOLMitriptan (ZOMIG) 5 MG tablet, Take 1 tablet (5 mg total) by mouth once as needed for migraine for up to 1 dose, Disp: 20 tablet, Rfl: 3  •  ibuprofen (MOTRIN) 200 mg tablet, Take by mouth every 6 (six) hours as needed for mild pain (Patient not taking: Reported on 4/21/2023), Disp: , Rfl:     Allergies   Allergen Reactions   • Naproxen Shortness Of Breath   • Iv Contrast [Iodinated Contrast Media] Itching   • Seasonal Ic [Cholestatin]    • Phentermine Palpitations            Vitals:    04/28/23 0921   BP: 131/80   Pulse: 77       Objective:                    Ortho Exam    Diagnostics, reviewed and taken today if performed as documented:    None performed      The attending physician has personally reviewed the pertinent films in PACS and interpretation is as follows:      Procedures, if performed today:    Procedures    None performed      Portions of the record may have been created with voice recognition software  Occasional wrong word or \"sound a like\" substitutions may have occurred due to the inherent limitations of voice recognition software  Read the chart carefully and recognize, using context, where substitutions have occurred        "

## 2023-04-28 NOTE — PROGRESS NOTES
Assessment:  1  Primary osteoarthritis of right knee  XR knee 1 or 2 vw right      2  Chronic pain of right knee            Plan:  Right knee osteoarthritis  The patient was provided with right knee Toradol injection  The patient tolerated the procedure well  She is scheduled for right knee visco-supplement 5/19/2023  The idea of total knee arthroplasty was discussed today  The patient should follow up for scheduled visit  To do next visit:  Return for visco-supplementation  The above stated was discussed in layman's terms and the patient expressed understanding  All questions were answered to the patient's satisfaction  Scribe Attestation    I,:  Elysa Lefort am acting as a scribe while in the presence of the attending physician :       I,:  Russell Schaeffer MD personally performed the services described in this documentation    as scribed in my presence :             Subjective: Karlo May is a 69 y o  female who presents for follow up of right knee  The patient comes to office for add-on appointment as she has had significant return of symptoms  She is s/p right knee steroid injection with short lived good benefit, 4/21/2023  Today she complains of right medial and generalized knee pain  Most activity aggravates while rest alleviates  She does use Xarelto  She does use Tramadol daily for pain   She has history of left TKA performed several years ago        Review of systems negative unless otherwise specified in HPI    Past Medical History:   Diagnosis Date   • Acid reflux    • Allergic rhinitis     Last Assessed: 11/2/2017   • Anesthesia complication     HYPOTENSION   • Arthritis    • Asthma    • Bigeminy     history   • DVT (deep venous thrombosis) ([address] ) 10/23/2018   • DVT (deep venous thrombosis) ([address] )    • DVT of leg (deep venous thrombosis) ([address] ) 2001    left   • Fatty liver    • Female pelvic pain    • GERD (gastroesophageal reflux disease)    • Hyperlipidemia high trigrlycerides   • Intermittent palpitations    • Interstitial cystitis    • Irregular heart beat    • Migraines    • Neck pain    • Obesity (BMI 30 0-34  9)    • Palpitations    • Pes planus     unspecified laterality; Last Assessed: 2014   • Thyroid nodule    • Tinnitus    • Trigeminy     history   • Wears glasses    • Wears reading eyeglasses        Past Surgical History:   Procedure Laterality Date   • APPENDECTOMY     • BREAST BIOPSY Left 2019   • CATARACT EXTRACTION Bilateral    •  SECTION      X 2   • CHOLECYSTECTOMY     • COLONOSCOPY     • DILATION AND CURETTAGE OF UTERUS     • HYSTERECTOMY      total   • JOINT REPLACEMENT      left TKR   • KNEE SURGERY Left     X 3   • MT ESOPHAGOGASTRODUODENOSCOPY TRANSORAL DIAGNOSTIC N/A 2016    Procedure: ESOPHAGOGASTRODUODENOSCOPY (EGD); Surgeon: Milan Reyna MD;  [facility];   Service: Gastroenterology   • MT EXCISION GANGLION WRIST DORSAL/VOLAR PRIMARY Right 2023    Procedure: Right long finger mucoid cyst excision distal interphalangeal joint;  Surgeon: Lisa Russo MD;  [facility];  Service: Orthopedics   • MT LAPS TOTAL HYSTERECT 250 GM/< W/RMVL TUBE/OVARY Bilateral 2016    Procedure: HYSTERECTOMY LAPAROSCOPIC TOTAL ,BSO;  Surgeon: Georgette Arguello MD;  [facility];  Service: Gynecology Oncology   • REPLACEMENT TOTAL KNEE     • US GUIDED BREAST BIOPSY LEFT COMPLETE Left 2019   • WISDOM TOOTH EXTRACTION         Family History   Problem Relation Age of Onset   • Endometrial cancer Mother 76   • Migraines Mother    • Diabetes Father    • Heart disease Father    • Heart attack Father    • Prostate cancer Father    • Hypertension Father    • Thyroid cancer Brother         medullary   • Diabetes type II Brother    • Growth hormone deficiency Daughter    • No Known Problems Daughter    • No Known Problems Maternal Grandmother    • No Known Problems Paternal Grandmother    • Alzheimer's disease Maternal Aunt    • Thyroid cancer Maternal Aunt    • Breast cancer Maternal Aunt 65   • Thyroid cancer Maternal Aunt 85   • No Known Problems Maternal Aunt    • No Known Problems Maternal Aunt    • No Known Problems Maternal Aunt    • No Known Problems Maternal Aunt    • Other Maternal Uncle         Brain Tumor   • Alzheimer's disease Maternal Uncle    • Diabetes Paternal Aunt    • Hashimoto's thyroiditis Paternal Aunt         Total Thyroidectomy   • Hypertension Paternal Aunt    • No Known Problems Paternal Aunt    • Stroke Paternal Uncle    • Cancer Family    • Colon cancer Neg Hx    • Cervical cancer Neg Hx    • Ovarian cancer Neg Hx    • Uterine cancer Neg Hx        Social History     Occupational History   • Occupation: Pediatrics   Tobacco Use   • Smoking status: Former     Packs/day: 0 50     Types: Cigarettes     Quit date: 1978     Years since quittin 8   • Smokeless tobacco: Never   • Tobacco comments:     as teenager   Vaping Use   • Vaping Use: Never used   Substance and Sexual Activity   • Alcohol use:  Yes   • Drug use: No   • Sexual activity: Not Currently     Birth control/protection: Surgical         Current Outpatient Medications:   •  acetaminophen (TYLENOL) 650 mg CR tablet, Take 1 tablet (650 mg total) by mouth every 8 (eight) hours as needed for mild pain, Disp: 30 tablet, Rfl: 0  •  albuterol (PROVENTIL HFA,VENTOLIN HFA) 90 mcg/act inhaler, INHALE 2 PUFFS EVERY 4-6 HOURS AS NEEDED, Disp: 108 g, Rfl: 3  •  ALPRAZolam (XANAX) 0 25 mg tablet, Take one tablet twice daily as needed for anxiety, Disp: 30 tablet, Rfl: 0  •  butalbital-acetaminophen-caffeine (FIORICET,ESGIC) -40 mg per tablet, Take 1 tablet by mouth every 4 (four) hours as needed for headaches, Disp: 90 tablet, Rfl: 0  •  calcium-vitamin D (OSCAL) 250-125 MG-UNIT per tablet, Take 1 tablet by mouth daily, Disp: , Rfl:   •  cycloSPORINE (RESTASIS) 0 05 % ophthalmic emulsion, Apply 1 drop to eye 2 (two) times a day, Disp: , Rfl:   •  diazepam (VALIUM) 5 mg tablet, Take 1 tablet (5 mg total) by mouth every 8 (eight) hours as needed for anxiety, Disp: 30 tablet, Rfl: 0  •  diphenhydrAMINE (BENADRYL) 25 mg capsule, Take by mouth daily at bedtime as needed, Disp: , Rfl:   •  Elastic Bandages & Supports (Medical Compression Socks) MISC, Use daily, Disp: 5 each, Rfl: 2  •  fexofenadine (ALLEGRA) 180 MG tablet, Take 180 mg by mouth daily, Disp: , Rfl:   •  Flovent  MCG/ACT inhaler, INHALE 1 PUFF 2 TIMES A DAY RINSE MOUTH AFTER USE , Disp: 12 g, Rfl: 1  •  fluticasone (FLONASE) 50 mcg/act nasal spray, SPRAY 2 SPRAYS INTO EACH NOSTRIL EVERY DAY (Patient taking differently: As needed), Disp: 48 mL, Rfl: 2  •  methocarbamol (ROBAXIN) 750 mg tablet, Take 1 tablet (750 mg total) by mouth every 6 (six) hours as needed for muscle spasms, Disp: 30 tablet, Rfl: 2  •  montelukast (SINGULAIR) 10 mg tablet, TAKE 1 TABLET BY MOUTH EVERY DAY IN THE EVENING (Patient taking differently: if needed), Disp: 30 tablet, Rfl: 2  •  Multiple Vitamin (MULTIVITAMIN) capsule, Take 1 capsule by mouth daily, Disp: , Rfl:   •  pantoprazole (PROTONIX) 40 mg tablet, 2 times daily, Disp: 180 tablet, Rfl: 0  •  rivaroxaban (XARELTO) 10 mg tablet, Take 1 tablet (10 mg total) by mouth daily, Disp: 90 tablet, Rfl: 3  •  traMADol (Ultram) 50 mg tablet, Take 1 tablet (50 mg total) by mouth every 6 (six) hours as needed for moderate pain, Disp: 30 tablet, Rfl: 0  •  ZOLMitriptan (ZOMIG) 5 MG tablet, Take 1 tablet (5 mg total) by mouth once as needed for migraine for up to 1 dose, Disp: 20 tablet, Rfl: 3  •  ibuprofen (MOTRIN) 200 mg tablet, Take by mouth every 6 (six) hours as needed for mild pain (Patient not taking: Reported on 4/21/2023), Disp: , Rfl:     Allergies   Allergen Reactions   • Naproxen Shortness Of Breath   • Iv Contrast [Iodinated Contrast Media] Itching   • Seasonal Ic [Cholestatin]    • Phentermine Palpitations            Vitals:    04/28/23 "0921   BP: 131/80   Pulse: 77       Objective:  Physical exam  · General: Awake, Alert, Oriented  · Eyes: Pupils equal, round and reactive to light  · Heart: regular rate and rhythm  · Lungs: No audible wheezing  · Abdomen: soft                    Ortho Exam  Right knee:  TTP over medial joint line  No erythema or ecchymosis  No effusion or swelling  Normal strength  Good ROM   Calf compartments soft and supple  Sensation intact  Toes are warm sensate and mobile    Diagnostics, reviewed and taken today if performed as documented: The attending physician has personally reviewed the pertinent films in PACS and interpretation is as follows:  Right knee x-ray: Moderate-severe medial and patellofemoral arthritic changes  Procedures, if performed today:    Large joint arthrocentesis: R knee  Universal Protocol:  Consent: Verbal consent obtained  Risks and benefits: risks, benefits and alternatives were discussed  Consent given by: patient  Time out: Immediately prior to procedure a \"time out\" was called to verify the correct patient, procedure, equipment, support staff and site/side marked as required  Timeout called at: 4/28/2023 9:56 AM   Patient understanding: patient states understanding of the procedure being performed  Site marked: the operative site was marked  Patient identity confirmed: verbally with patient    Supporting Documentation  Indications: pain   Procedure Details  Location: knee - R knee  Preparation: Patient was prepped and draped in the usual sterile fashion  Needle size: 22 G  Ultrasound guidance: no  Approach: anterolateral  Medications administered: 2 mL bupivacaine 0 25 %; 2 mL lidocaine 1 %; 60 mg ketorolac 60 mg/2 mL    Patient tolerance: patient tolerated the procedure well with no immediate complications  Dressing:  Sterile dressing applied               Portions of the record may have been created with voice recognition software    Occasional wrong word or \"sound a like\" " substitutions may have occurred due to the inherent limitations of voice recognition software  Read the chart carefully and recognize, using context, where substitutions have occurred

## 2023-04-30 ENCOUNTER — NURSE TRIAGE (OUTPATIENT)
Dept: OTHER | Facility: OTHER | Age: 68
End: 2023-04-30

## 2023-04-30 DIAGNOSIS — I82.401 ACUTE DEEP VEIN THROMBOSIS (DVT) OF RIGHT LOWER EXTREMITY, UNSPECIFIED VEIN (HCC): Primary | ICD-10-CM

## 2023-04-30 NOTE — TELEPHONE ENCOUNTER
"  Reason for Disposition   [1] Caller requesting a prescription renewal (no refills left), no triage required, AND [2] triager able to renew prescription per department policy    Answer Assessment - Initial Assessment Questions  1  DRUG NAME: \"What medicine do you need to have refilled? \"      rivaroxaban (Xarelto) 10 mg / tablet- take one tablet by mouth once daily  2  REFILLS REMAINING: \"How many refills are remaining? \" (Note: The label on the medicine or pill bottle will show how many refills are remaining  If there are no refills remaining, then a renewal may be needed )      No    3  EXPIRATION DATE: Netta Richards is the expiration date? \" (Note: The label states when the prescription will , and thus can no longer be refilled )      N/A    4  PRESCRIBING HCP: \"Who prescribed it? \" Reason: If prescribed by specialist, call should be referred to that group  Dr Elizabet Brownlee    Protocols used: MEDICATION REFILL AND RENEWAL CALL-ADULT-      Temporary 7 day refill given per after hour standing order protocol     "

## 2023-04-30 NOTE — TELEPHONE ENCOUNTER
Regarding: medication refill rivaroxaban (XARELTO) 10 mg tablet  ----- Message from Yannick Lundy sent at 4/30/2023 10:54 AM EDT -----  I am completely out of my rivaroxaban (XARELTO) 10 mg tablet and I need at least a few to hold me off until I can reach out to my doctor tomorrow   I would need the script sent to my local pharmacy [facility]

## 2023-05-01 ENCOUNTER — TELEPHONE (OUTPATIENT)
Dept: FAMILY MEDICINE CLINIC | Facility: CLINIC | Age: 68
End: 2023-05-01

## 2023-05-01 DIAGNOSIS — K21.9 GASTROESOPHAGEAL REFLUX DISEASE WITHOUT ESOPHAGITIS: ICD-10-CM

## 2023-05-01 DIAGNOSIS — I82.401 ACUTE DEEP VEIN THROMBOSIS (DVT) OF RIGHT LOWER EXTREMITY, UNSPECIFIED VEIN (HCC): ICD-10-CM

## 2023-05-01 RX ORDER — PANTOPRAZOLE SODIUM 40 MG/1
TABLET, DELAYED RELEASE ORAL
Qty: 180 TABLET | Refills: 0 | Status: SHIPPED | OUTPATIENT
Start: 2023-05-01

## 2023-05-01 NOTE — TELEPHONE ENCOUNTER
Patient called in and stated she experienced horrible heartburn over the weekend  Patient is taking Pantoprazole in morning and Pepcid at night  She is not drinking coffee at the morning  She is inquiring what she should do

## 2023-05-05 ENCOUNTER — TELEPHONE (OUTPATIENT)
Dept: GASTROENTEROLOGY | Facility: CLINIC | Age: 68
End: 2023-05-05

## 2023-05-05 ENCOUNTER — OFFICE VISIT (OUTPATIENT)
Dept: BARIATRICS | Facility: CLINIC | Age: 68
End: 2023-05-05

## 2023-05-05 VITALS — BODY MASS INDEX: 34.05 KG/M2 | HEIGHT: 63 IN | WEIGHT: 192.2 LBS

## 2023-05-05 DIAGNOSIS — E66.9 OBESITY (BMI 30.0-34.9): ICD-10-CM

## 2023-05-05 NOTE — TELEPHONE ENCOUNTER
I spoke with her at length  She was driving in the car and had substernal chest pain  It responded and resolved to Gaviscon  She has had similar episodes in the past   She has longstanding gastroesophageal reflux disease and is maintained on pantoprazole  She is trying to lose weight and saw the bariatric group today for medical weight loss  Endoscopy with me with LA grade a reflux esophagitis in 2019 and in February 2020 had 7 polyps removed  She is due for surveillance  Saw her primary care physician who increased her pantoprazole to 40 mg p o  twice daily and I told her to stay on this regimen for at least 8 to 12 weeks    Please put her on for an EGD colonoscopy in my call week in May    She can go on Monday or Thursday or Friday or Saturday the MiraLAX bowel prep for history of colon polyps and GERD    This is Dr Marquis  wife

## 2023-05-05 NOTE — TELEPHONE ENCOUNTER
Patient stopped in the office to schedule a colonoscopy and EGD  I offered her your first available opening, but she said she needs to talk to you as she has been having some symptoms that she wants to discuss with you directly and definitely need to be seen prior to my openings  She states she had an episode recently of terrible heartburn that made her feel like she was having a heart attack  Please contact her at [phone]  She only wants to speak with you

## 2023-05-05 NOTE — PROGRESS NOTES
"Weight Management Medical Nutrition Assessment   Ayesha was here for meal planning  Today's weight 192 4  lbs and has has a goal weight of 160 lbs  She is not currently food logging or exercising  She is concerned that her triglycerides are elevated  She also has GERD  She has a list of foods from GI that she should avoid, but does not seem to be following that  She continues to still have tomato products, wine and coffee but admits that she will just take antacids before hand  Recommend that she start to exercise regularly, and she should log everything she is eating  Discussed portion sizes  Not clear if she is ready to make changes  She kept saying \"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  \"  \"my  is Virgil and makes amazing food, and I have to eat that  \"  Discussed the importance of moderation  Anthropometric Measurements  Start Weight (lbs): 194 6, lbs  Current Weight (lbs): 192 2 lbs  TBW % Change from start weight:  1%  Ideal Body Weight (lbs):117 5 lbs  Goal Weight (lbs):160 lbs     Weight Loss History  Previous weight loss attempts: Counseling with  MD  Self Created Diets (Portion Control, Healthy Food Choices, etc )  [facility]      Food and Nutrition Related History     Food Recall  Breakfast: coffee, shake          Snack:potato roll (to prevent acid reflux?)  Lunch: shake  Snack: string cheese or HB egg if needed  Dinner: meat, vegetables, pasta  Snack:         Beverages: water  Volume of beverage intake: 60 to 80 oz     Weekends: Same  Cravings: none reported  Trouble area of day:none reported     Frequency of Eating out: irregularly  Food restrictions:none reported  Cooking: self   Food Shopping: self     Physical Activity Intake  Activity:not currently exercising  Frequency: plans to start exercising  Physical limitations/barriers to exercise: none reported     Estimated Needs  Energy  B[employer] Energy Needs:  BMR : 1386  calories  1# loss weekly sedentary:  1164    calories  1-2# " loss weekly lightly active: 909 -1409 calories  Protein:64-80gm      (1 2-1 5g/kg IBW)  Fluid: 62oz     (35mL/kg IBW)     Nutrition Diagnosis  Yes;    Overweight/obesity  related to Excess energy intake as evidenced by  BMI more than normative standard for age and sex (obesity-grade I 26-30  9)     Nutrition Intervention     Nutrition Prescription  Calories: 1000 calories   Protein: 75 gm   Fluid:62oz     Meal Plan  2 meal replacements, a bar, a food snack (string cheese or hard boiled egg) and a lean protein with non-starchy vegetables       Nutrition Education:    Calorie controlled menu  Lean protein food choices  Healthy snack options  Food journaling tips        Nutrition Counseling:  Strategies: meal planning, portion sizes, healthy snack choices, hydration, fiber intake, protein intake, exercise, food journal        Monitoring and Evaluation:  Evaluation criteria:  Energy Intake  Meet protein needs  Maintain adequate hydration  Monitor weekly weight  Meal planning/preparation  Food journal   Decreased portions at mealtimes and snacks  Physical activity      Barriers to learning:none  Readiness to change: Action  Comprehension: good  Expected Compliance: fair

## 2023-05-08 ENCOUNTER — PREP FOR PROCEDURE (OUTPATIENT)
Dept: GASTROENTEROLOGY | Facility: CLINIC | Age: 68
End: 2023-05-08

## 2023-05-08 DIAGNOSIS — K21.9 GASTROESOPHAGEAL REFLUX DISEASE WITHOUT ESOPHAGITIS: Primary | ICD-10-CM

## 2023-05-08 DIAGNOSIS — Z86.010 HISTORY OF COLON POLYPS: ICD-10-CM

## 2023-05-08 DIAGNOSIS — Z12.11 SCREENING FOR COLON CANCER: ICD-10-CM

## 2023-05-08 NOTE — TELEPHONE ENCOUNTER
Spoke with the patient and she scheduled for 5/26/23  Prep instructions were given to her when she stopped in the office  Scheduled date of EGD/colonoscopy (as of today):5/26/23  Physician performing EGD/colonoscopy: Bret  Location of EGD/colonoscopy:[facility]  Desired bowel prep reviewed with patient:miralax/dulcolax  Instructions reviewed with patient by:Nydia REGAN  Clearances:   none

## 2023-05-09 ENCOUNTER — TELEPHONE (OUTPATIENT)
Dept: FAMILY MEDICINE CLINIC | Facility: CLINIC | Age: 68
End: 2023-05-09

## 2023-05-19 ENCOUNTER — PROCEDURE VISIT (OUTPATIENT)
Dept: OBGYN CLINIC | Facility: MEDICAL CENTER | Age: 68
End: 2023-05-19

## 2023-05-19 VITALS
SYSTOLIC BLOOD PRESSURE: 120 MMHG | HEART RATE: 77 BPM | HEIGHT: 63 IN | DIASTOLIC BLOOD PRESSURE: 75 MMHG | WEIGHT: 197 LBS | BODY MASS INDEX: 34.91 KG/M2

## 2023-05-19 DIAGNOSIS — M25.561 CHRONIC PAIN OF RIGHT KNEE: Primary | ICD-10-CM

## 2023-05-19 DIAGNOSIS — G89.29 CHRONIC PAIN OF RIGHT KNEE: Primary | ICD-10-CM

## 2023-05-19 DIAGNOSIS — M17.11 PRIMARY OSTEOARTHRITIS OF RIGHT KNEE: ICD-10-CM

## 2023-05-19 NOTE — PROGRESS NOTES
Assessment:  1  Chronic pain of right knee        2  Primary osteoarthritis of right knee            Plan:  Right knee Durolane  The patient was provided with right knee Durloane injection  The patient tolerated the procedure well  The patient should follow up in 3 months  To do next visit:  Return in about 3 months (around 8/19/2023)  The above stated was discussed in layman's terms and the patient expressed understanding  All questions were answered to the patient's satisfaction  Scribe Attestation    I,:  Jackie Hernandez am acting as a scribe while in the presence of the attending physician :       I,:  Cleone Olszewski, MD personally performed the services described in this documentation    as scribed in my presence :             Subjective: Jocelyn Roque is a 69 y o  female who presents for follow up of right knee  She is s/p right knee Toradol injection 4/28/2023 with good benefit, 4/28/2023  Today she complains of occasional right generalized knee pain  She pain levels are low yet can reach 10/10 at its worse  Most activity aggravates while rest alleviates  She does use Xarelto  She does use Tramadol daily for pain   She has history of left TKA performed several years ago  Review of systems negative unless otherwise specified in HPI    Past Medical History:   Diagnosis Date   • Acid reflux    • Allergic rhinitis     Last Assessed: 11/2/2017   • Anesthesia complication     HYPOTENSION   • Arthritis    • Asthma    • Bigeminy     history   • DVT (deep venous thrombosis) ([facility] [address] ) 10/23/2018   • DVT (deep venous thrombosis) ([facility] [address] )    • DVT of leg (deep venous thrombosis) ([facility] [address] ) 2001    left   • Fatty liver    • Female pelvic pain    • GERD (gastroesophageal reflux disease)    • Hyperlipidemia     high trigrlycerides   • Intermittent palpitations    • Interstitial cystitis    • Irregular heart beat    • Migraines    • Neck pain    • Obesity (BMI 30 0-34  9)    • Palpitations    • Pes planus     unspecified laterality; Last Assessed: 2014   • Thyroid nodule    • Tinnitus    • Trigeminy     history   • Wears glasses    • Wears reading eyeglasses        Past Surgical History:   Procedure Laterality Date   • APPENDECTOMY     • BREAST BIOPSY Left 2019   • CATARACT EXTRACTION Bilateral    •  SECTION      X 2   • CHOLECYSTECTOMY     • COLONOSCOPY     • DILATION AND CURETTAGE OF UTERUS     • HYSTERECTOMY      total   • JOINT REPLACEMENT      left TKR   • KNEE SURGERY Left     X 3   • WA ESOPHAGOGASTRODUODENOSCOPY TRANSORAL DIAGNOSTIC N/A 2016    Procedure: ESOPHAGOGASTRODUODENOSCOPY (EGD); Surgeon: Kasia Arora MD;  Location: [facility];   Service: Gastroenterology   • WA EXCISION GANGLION WRIST DORSAL/VOLAR PRIMARY Right 2023    Procedure: Right long finger mucoid cyst excision distal interphalangeal joint;  Surgeon: Breann Jyo MD;  Location: [facility];  Service: Orthopedics   • WA LAPS TOTAL HYSTERECT 250 GM/< W/RMVL TUBE/OVARY Bilateral 2016    Procedure: HYSTERECTOMY LAPAROSCOPIC TOTAL ,BSO;  Surgeon: Elza Almonte MD;  Location: [facility];  Service: Gynecology Oncology   • REPLACEMENT TOTAL KNEE     • US GUIDED BREAST BIOPSY LEFT COMPLETE Left 2019   • WISDOM TOOTH EXTRACTION         Family History   Problem Relation Age of Onset   • Endometrial cancer Mother 76   • Migraines Mother    • Diabetes Father    • Heart disease Father    • Heart attack Father    • Prostate cancer Father    • Hypertension Father    • Thyroid cancer Brother         medullary   • Diabetes type II Brother    • Growth hormone deficiency Daughter    • No Known Problems Daughter    • No Known Problems Maternal Grandmother    • No Known Problems Paternal Grandmother    • Alzheimer's disease Maternal Aunt    • Thyroid cancer Maternal Aunt    • Breast cancer Maternal Aunt 65   • Thyroid cancer Maternal Aunt 85   • No Known Problems Maternal Aunt    • No Known Problems Maternal Aunt    • No Known Problems Maternal Aunt    • No Known Problems Maternal Aunt    • Other Maternal Uncle         Brain Tumor   • Alzheimer's disease Maternal Uncle    • Diabetes Paternal Aunt    • Hashimoto's thyroiditis Paternal Aunt         Total Thyroidectomy   • Hypertension Paternal Aunt    • No Known Problems Paternal Aunt    • Stroke Paternal Uncle    • Cancer Family    • Colon cancer Neg Hx    • Cervical cancer Neg Hx    • Ovarian cancer Neg Hx    • Uterine cancer Neg Hx        Social History     Occupational History   • Occupation: Pediatrics   Tobacco Use   • Smoking status: Former     Packs/day: 0 50     Types: Cigarettes     Quit date: 1978     Years since quittin 9   • Smokeless tobacco: Never   • Tobacco comments:     as teenager   Vaping Use   • Vaping Use: Never used   Substance and Sexual Activity   • Alcohol use:  Yes   • Drug use: No   • Sexual activity: Not Currently     Birth control/protection: Surgical         Current Outpatient Medications:   •  acetaminophen (TYLENOL) 650 mg CR tablet, Take 1 tablet (650 mg total) by mouth every 8 (eight) hours as needed for mild pain, Disp: 30 tablet, Rfl: 0  •  albuterol (PROVENTIL HFA,VENTOLIN HFA) 90 mcg/act inhaler, INHALE 2 PUFFS EVERY 4-6 HOURS AS NEEDED, Disp: 108 g, Rfl: 3  •  ALPRAZolam (XANAX) 0 25 mg tablet, Take one tablet twice daily as needed for anxiety, Disp: 30 tablet, Rfl: 0  •  butalbital-acetaminophen-caffeine (FIORICET,ESGIC) -40 mg per tablet, Take 1 tablet by mouth every 4 (four) hours as needed for headaches, Disp: 90 tablet, Rfl: 0  •  calcium-vitamin D (OSCAL) 250-125 MG-UNIT per tablet, Take 1 tablet by mouth daily, Disp: , Rfl:   •  cycloSPORINE (RESTASIS) 0 05 % ophthalmic emulsion, Apply 1 drop to eye 2 (two) times a day, Disp: , Rfl:   •  diazepam (VALIUM) 5 mg tablet, Take 1 tablet (5 mg total) by mouth every 8 (eight) hours as needed for anxiety, Disp: 30 tablet, Rfl: 0  •  diphenhydrAMINE (BENADRYL) 25 mg capsule, Take by mouth daily at bedtime as needed, Disp: , Rfl:   •  Elastic Bandages & Supports (Medical Compression Socks) MISC, Use daily, Disp: 5 each, Rfl: 2  •  fexofenadine (ALLEGRA) 180 MG tablet, Take 180 mg by mouth daily, Disp: , Rfl:   •  Flovent  MCG/ACT inhaler, INHALE 1 PUFF 2 TIMES A DAY RINSE MOUTH AFTER USE , Disp: 12 g, Rfl: 1  •  fluticasone (FLONASE) 50 mcg/act nasal spray, SPRAY 2 SPRAYS INTO EACH NOSTRIL EVERY DAY (Patient taking differently: As needed), Disp: 48 mL, Rfl: 2  •  ibuprofen (MOTRIN) 200 mg tablet, Take by mouth every 6 (six) hours as needed for mild pain, Disp: , Rfl:   •  methocarbamol (ROBAXIN) 750 mg tablet, Take 1 tablet (750 mg total) by mouth every 6 (six) hours as needed for muscle spasms, Disp: 30 tablet, Rfl: 2  •  montelukast (SINGULAIR) 10 mg tablet, TAKE 1 TABLET BY MOUTH EVERY DAY IN THE EVENING (Patient taking differently: if needed), Disp: 30 tablet, Rfl: 2  •  Multiple Vitamin (MULTIVITAMIN) capsule, Take 1 capsule by mouth daily, Disp: , Rfl:   •  pantoprazole (PROTONIX) 40 mg tablet, TAKE TWO TABLETS BY MOUTH EVERY DAY, Disp: 180 tablet, Rfl: 0  •  rivaroxaban (XARELTO) 10 mg tablet, Take 1 tablet (10 mg total) by mouth daily, Disp: 90 tablet, Rfl: 3  •  rivaroxaban (Xarelto) 10 mg tablet, Take 1 tablet (10 mg total) by mouth daily, Disp: 7 tablet, Rfl: 0  •  traMADol (Ultram) 50 mg tablet, Take 1 tablet (50 mg total) by mouth every 6 (six) hours as needed for moderate pain, Disp: 30 tablet, Rfl: 0  •  ZOLMitriptan (ZOMIG) 5 MG tablet, Take 1 tablet (5 mg total) by mouth once as needed for migraine for up to 1 dose, Disp: 20 tablet, Rfl: 3    Allergies   Allergen Reactions   • Naproxen Shortness Of Breath   • Iv Contrast [Iodinated Contrast Media] Itching   • Seasonal Ic [Cholestatin]    • Phentermine Palpitations            Vitals:    05/19/23 0828   BP: 120/75   Pulse: 77       Objective:  Physical exam  · General: Awake, Alert, Oriented  · Eyes: "Pupils equal, round and reactive to light  · Heart: regular rate and rhythm  · Lungs: No audible wheezing  · Abdomen: soft                    Ortho Exam  Right knee:  TTP over medial joint line  No erythema or ecchymosis  No effusion or swelling  Normal strength  Good ROM with crepitus   Calf compartments soft and supple  Sensation intact  Toes are warm sensate and mobile    Diagnostics, reviewed and taken today if performed as documented:    None performed    Procedures, if performed today:    Large joint arthrocentesis: R knee  Universal Protocol:  Consent: Verbal consent obtained  Risks and benefits: risks, benefits and alternatives were discussed  Consent given by: patient  Time out: Immediately prior to procedure a \"time out\" was called to verify the correct patient, procedure, equipment, support staff and site/side marked as required  Timeout called at: 5/19/2023 8:44 AM   Patient understanding: patient states understanding of the procedure being performed  Patient identity confirmed: verbally with patient    Supporting Documentation  Indications: pain   Procedure Details  Location: knee - R knee  Preparation: Patient was prepped and draped in the usual sterile fashion  Needle size: 22 G  Ultrasound guidance: no  Approach: anterolateral  Medications administered: 3 mL sodium hyaluronate 60 MG/3ML    Patient tolerance: patient tolerated the procedure well with no immediate complications  Dressing:  Sterile dressing applied            Portions of the record may have been created with voice recognition software  Occasional wrong word or \"sound a like\" substitutions may have occurred due to the inherent limitations of voice recognition software  Read the chart carefully and recognize, using context, where substitutions have occurred      "

## 2023-05-23 ENCOUNTER — CONSULT (OUTPATIENT)
Dept: BARIATRICS | Facility: CLINIC | Age: 68
End: 2023-05-23

## 2023-05-23 ENCOUNTER — TELEPHONE (OUTPATIENT)
Dept: OBGYN CLINIC | Facility: HOSPITAL | Age: 68
End: 2023-05-23

## 2023-05-23 VITALS
HEIGHT: 63 IN | BODY MASS INDEX: 34.8 KG/M2 | DIASTOLIC BLOOD PRESSURE: 72 MMHG | OXYGEN SATURATION: 96 % | HEART RATE: 74 BPM | SYSTOLIC BLOOD PRESSURE: 112 MMHG | RESPIRATION RATE: 20 BRPM | TEMPERATURE: 97.7 F | WEIGHT: 196.4 LBS

## 2023-05-23 DIAGNOSIS — K21.9 GASTROESOPHAGEAL REFLUX DISEASE WITHOUT ESOPHAGITIS: ICD-10-CM

## 2023-05-23 DIAGNOSIS — I82.402 DEEP VEIN THROMBOSIS (DVT) OF LEFT LOWER EXTREMITY, UNSPECIFIED CHRONICITY, UNSPECIFIED VEIN (HCC): ICD-10-CM

## 2023-05-23 DIAGNOSIS — E78.1 HYPERTRIGLYCERIDEMIA: ICD-10-CM

## 2023-05-23 DIAGNOSIS — G43.009 MIGRAINE WITHOUT AURA AND WITHOUT STATUS MIGRAINOSUS, NOT INTRACTABLE: ICD-10-CM

## 2023-05-23 DIAGNOSIS — E66.9 CLASS 1 OBESITY: ICD-10-CM

## 2023-05-23 DIAGNOSIS — R63.5 ABNORMAL WEIGHT GAIN: Primary | ICD-10-CM

## 2023-05-23 PROBLEM — R73.03 PREDIABETES: Status: ACTIVE | Noted: 2023-05-23

## 2023-05-23 NOTE — TELEPHONE ENCOUNTER
Caller: Patient    Doctor/Office: Dr Aviles     Call regarding :  Returning call      Call was transferred to: Triage Nurse

## 2023-05-23 NOTE — TELEPHONE ENCOUNTER
Spoke with patient and provided above information from PA  Patient verbalized understanding but is having a colonoscopy on Friday  If needed would Thursday late morning be an option in the [place] office? She needs to prep by 2pm and is an hour away  She will call Thursday with how she is doing

## 2023-05-23 NOTE — PATIENT INSTRUCTIONS
Goals: Food log (ie ) www myfitnesspal com,sparkpeople  com,loseit com,calorieking  com,etc    No sugary beverages  At least 64oz of water daily  Increase physical activity by 10 minutes daily   Gradually increase physical activity to a goal of 5 days per week for 30 minutes of MODERATE intensity PLUS 2 days per week of FULL BODY resistance training  1000 calories per day  5-10 servings of fruits and vegetables per day  At least 60 grams of protein per day  Food logging   Increase water to goal

## 2023-05-23 NOTE — PROGRESS NOTES
Assessment/Plan:    Class 1 obesity  -Discussed options of HealthyCORE-Intensive Lifestyle Intervention Program, Very Low Calorie Diet-VLCD and Conservative Program and the role of weight loss medications   -Initial weight loss goal of 5-10% weight loss for improved health  -Screening labs: reviewed CMP, Lipid panel, TSH, hgbA1c  -Patient is interested in pursuing conservative program  -has already met with RD and will continue follow up with her  Will use partial meal replacement   -Interested in AOM  Patient is not a candidate for GLP-1 due to family hx medullary thyroid cancer  Discussed use of Topamax, Wellbutrin and Metformin  Patient is not interested in any oral medications at this time    Initial:  191 6 lbs (2019)  Current: 196 4 lbs  Goal weight: 140-150s    Gastroesophageal reflux disease without esophagitis  -On Protonix  -avoid food triggers, large portion sizes  -can improve with weight loss    Hypertriglyceridemia  -avoid fried foods, refined carbohydrates  -can improve with weight loss and dietary changes    Migraine, unspecified, not intractable, without status migrainosus  -Uses Fioricet  -Topamax can be considered  Patient defers currently    DVT of leg (deep venous thrombosis) (HCC)  -On Xarelto    Prediabetes  -hgbA1c 5 8  Due for repeat  -limit refined carbohydrates  -can consider Metformin    Goals:    Food log (ie ) www myfitnesspal com,sparkpeople  com,loseit com,calorieking  com,etc    No sugary beverages  At least 64oz of water daily  Increase physical activity by 10 minutes daily  Gradually increase physical activity to a goal of 5 days per week for 30 minutes of MODERATE intensity PLUS 2 days per week of FULL BODY resistance training  1000 calories per day  5-10 servings of fruits and vegetables per day  At least 60 grams of protein per day    Follow up in approximately 1 month with Non-Surgical Dietician       Diagnoses and all orders for this visit:    Abnormal weight gain  Comments:  see plan under CLass 1 obesity    Class 1 obesity    Gastroesophageal reflux disease without esophagitis    Hypertriglyceridemia    Migraine without aura and without status migrainosus, not intractable    Deep vein thrombosis (DVT) of left lower extremity, unspecified chronicity, unspecified vein (HCC)          Subjective:   Chief Complaint   Patient presents with   • Consult        Patient ID: Enrike Preciado  is a 69 y o  female with excess weight/obesity here to pursue weight managment  HPI Patient presents for overdue MWM follow up  Last seen by provider in 2021  Recetly saw RD  Having a lot of pain in right knee due to arthritis and wants to lose weight as she feels this can improve her joint pain  Patient also concerned about triglyceride level    -unable to tolerate Phentermine in the past  -struggles with overeating when anxious at times  -Food logging: denies    Goal weight : 140-150s  Hydration: water minimal, ICE beverage,  Sweetened tea,  1 cup coffee + light cream + sugar  ETOH: occasional wine   Exercise: not currently, plans to start swimming  Limited by right knee pain  SLeep:  7-8 hours  Smoking: denies  Occupation: Pediatrician; just retired and doing per Jeeran Readings from Last 10 Encounters:   05/23/23 89 1 kg (196 lb 6 4 oz)   05/19/23 89 4 kg (197 lb)   05/05/23 87 2 kg (192 lb 3 2 oz)   04/28/23 88 9 kg (196 lb)   04/21/23 88 9 kg (196 lb)   04/21/23 89 kg (196 lb 3 2 oz)   04/21/23 88 9 kg (196 lb)   02/21/23 88 kg (194 lb)   02/17/23 88 kg (194 lb)   11/14/22 89 4 kg (197 lb)          Colonoscopy: having one completed this week    The following portions of the patient's history were reviewed and updated as appropriate: allergies, current medications, past family history, past medical history, past social history, past surgical history and problem list     Review of Systems   Constitutional: Negative for chills and fever  HENT: Negative for sore throat  "  Respiratory: Negative for cough and shortness of breath  Cardiovascular: Negative for chest pain and palpitations  Gastrointestinal: Negative for abdominal pain, nausea and vomiting  Genitourinary: Negative for dysuria  Musculoskeletal: Positive for arthralgias  Skin: Negative for rash  Neurological: Positive for headaches (hx Migraine)  Negative for dizziness  Psychiatric/Behavioral: Negative for suicidal ideas  The patient is nervous/anxious  Reports mood is up and down       Objective:    /72 (BP Location: Right arm, Patient Position: Sitting, Cuff Size: Large)   Pulse 74   Temp 97 7 °F (36 5 °C)   Resp 20   Ht 5' 3\" (1 6 m)   Wt 89 1 kg (196 lb 6 4 oz)   LMP  (LMP Unknown)   SpO2 96%   BMI 34 79 kg/m²      Physical Exam  Vitals and nursing note reviewed  Constitutional   General appearance: Abnormal   well developed and obese  Eyes No conjunctival pallor  Ears, Nose, Mouth, and Throat Oral mucosa moist    Pulmonary   Respiratory effort: No increased work of breathing or signs of respiratory distress  Auscultation of lungs: Clear to auscultation, equal breath sounds bilaterally, no wheezes, no rales, no rhonci  Cardiovascular   Auscultation of heart: Normal rate and rhythm, normal S1 and S2, without murmurs  Examination of extremities for edema and/or varicosities: Normal   no edema  Abdomen   Abdomen: Abnormal   The abdomen was obese  Bowel sounds were normal  The abdomen was soft and nontender     Musculoskeletal   Gait and station: Normal     Psychiatric   Orientation to person, place and time: Normal     Affect: appropriate    "

## 2023-05-23 NOTE — TELEPHONE ENCOUNTER
Caller: Patient    Doctor: Dr Truman Yu    Reason for call: Patient calling stating that she had a visco on 5/19/23 and she has been having a flare-up  She has increased pain  Patient reporting that the knee is not swollen, red, or warm and the pain is located medially  Patient currently taking Tramadol when she has increased pain at night  She cannot take motrin because she is on Xarelto  Patient asking to speak to the clinical team for advisement        Call back#: [phone]

## 2023-05-23 NOTE — ASSESSMENT & PLAN NOTE
-Discussed options of HealthyCORE-Intensive Lifestyle Intervention Program, Very Low Calorie Diet-VLCD and Conservative Program and the role of weight loss medications   -Initial weight loss goal of 5-10% weight loss for improved health  -Screening labs: reviewed CMP, Lipid panel, TSH, hgbA1c  -Patient is interested in pursuing conservative program  -has already met with RD and will continue follow up with her  Will use partial meal replacement   -Interested in AOM  Patient is not a candidate for GLP-1 due to family hx medullary thyroid cancer  Discussed use of Topamax, Wellbutrin and Metformin   Patient is not interested in any oral medications at this time    Initial:  191 6 lbs (2019)  Current: 196 4 lbs  Goal weight: 140-150s

## 2023-05-25 ENCOUNTER — TELEPHONE (OUTPATIENT)
Dept: OBGYN CLINIC | Facility: CLINIC | Age: 68
End: 2023-05-25

## 2023-05-25 NOTE — TELEPHONE ENCOUNTER
Caller: Patient    Doctor: Tracey Schulte    Reason for call: States she is returning call to triage nurse who advised she call back at this time           Call back#: [phone]

## 2023-05-25 NOTE — TELEPHONE ENCOUNTER
Spoke to patient and she is off her Xarelto for colonoscopy  She did try taking ibuprofen and this really helped with her pain  She was advised that due to colonoscopy she should not take NSAIDS so she will be taking the Tramadol and Tylenol as directed  Advised that she should speak to her PCP who is managing her Xarelto if he would allow a short course of oral NSAID or perhaps a topical NSAID  She will do this after colonoscopy is completed  She will continue wth RICE method as well  She will defer coming today due to prep  She will call in on Monday with how she is doing

## 2023-05-26 ENCOUNTER — ANESTHESIA (OUTPATIENT)
Dept: GASTROENTEROLOGY | Facility: HOSPITAL | Age: 68
End: 2023-05-26

## 2023-05-26 ENCOUNTER — HOSPITAL ENCOUNTER (OUTPATIENT)
Dept: GASTROENTEROLOGY | Facility: HOSPITAL | Age: 68
Setting detail: OUTPATIENT SURGERY
End: 2023-05-26
Attending: INTERNAL MEDICINE

## 2023-05-26 ENCOUNTER — ANESTHESIA EVENT (OUTPATIENT)
Dept: GASTROENTEROLOGY | Facility: HOSPITAL | Age: 68
End: 2023-05-26

## 2023-05-26 VITALS
OXYGEN SATURATION: 8 % | WEIGHT: 193.78 LBS | BODY MASS INDEX: 34.34 KG/M2 | TEMPERATURE: 97.2 F | RESPIRATION RATE: 22 BRPM | DIASTOLIC BLOOD PRESSURE: 60 MMHG | HEIGHT: 63 IN | SYSTOLIC BLOOD PRESSURE: 112 MMHG | HEART RATE: 56 BPM

## 2023-05-26 DIAGNOSIS — Z12.11 SCREENING FOR COLON CANCER: ICD-10-CM

## 2023-05-26 DIAGNOSIS — K21.9 GASTROESOPHAGEAL REFLUX DISEASE WITHOUT ESOPHAGITIS: ICD-10-CM

## 2023-05-26 DIAGNOSIS — Z86.010 HISTORY OF COLON POLYPS: ICD-10-CM

## 2023-05-26 RX ORDER — PROPOFOL 10 MG/ML
INJECTION, EMULSION INTRAVENOUS AS NEEDED
Status: DISCONTINUED | OUTPATIENT
Start: 2023-05-26 | End: 2023-05-26

## 2023-05-26 RX ORDER — SODIUM CHLORIDE, SODIUM LACTATE, POTASSIUM CHLORIDE, CALCIUM CHLORIDE 600; 310; 30; 20 MG/100ML; MG/100ML; MG/100ML; MG/100ML
INJECTION, SOLUTION INTRAVENOUS CONTINUOUS PRN
Status: DISCONTINUED | OUTPATIENT
Start: 2023-05-26 | End: 2023-05-26

## 2023-05-26 RX ADMIN — PROPOFOL 150 MG: 10 INJECTION, EMULSION INTRAVENOUS at 07:42

## 2023-05-26 RX ADMIN — SODIUM CHLORIDE, SODIUM LACTATE, POTASSIUM CHLORIDE, AND CALCIUM CHLORIDE: .6; .31; .03; .02 INJECTION, SOLUTION INTRAVENOUS at 07:32

## 2023-05-26 RX ADMIN — PROPOFOL 150 MG: 10 INJECTION, EMULSION INTRAVENOUS at 07:36

## 2023-05-26 RX ADMIN — PROPOFOL 100 MG: 10 INJECTION, EMULSION INTRAVENOUS at 07:39

## 2023-05-26 RX ADMIN — PROPOFOL 100 MG: 10 INJECTION, EMULSION INTRAVENOUS at 07:49

## 2023-05-26 NOTE — ANESTHESIA POSTPROCEDURE EVALUATION
Post-Op Assessment Note    CV Status:  Stable  Pain Score: 0    Pain management: adequate     Mental Status:  Awake   PONV Controlled:  None      Post Op Vitals Reviewed: Yes      Staff: Anesthesiologist, CRNA         No notable events documented      BP      Temp      Pulse     Resp      SpO2

## 2023-05-26 NOTE — H&P
History and Physical - [facility]  Rachana Villalpando 69 y o  female MRN: [id number]                  HPI: Rachana Villalpando is a 69y o  year old female who presents for endoscopy and colonoscopy for chest pain GERD and history of colon polyps      REVIEW OF SYSTEMS: Per the HPI, and otherwise unremarkable  Historical Information   Past Medical History:   Diagnosis Date   • Acid reflux    • Allergic rhinitis     Last Assessed: 2017   • Anesthesia complication     HYPOTENSION   • Arthritis    • Asthma    • Bigeminy     history   • DVT (deep venous thrombosis) ([facility] ) 10/23/2018   • DVT (deep venous thrombosis) ([facility])    • DVT of leg (deep venous thrombosis) ([facility] )     left   • Fatty liver    • Female pelvic pain    • GERD (gastroesophageal reflux disease)    • Hyperlipidemia     high trigrlycerides   • Intermittent palpitations    • Interstitial cystitis    • Irregular heart beat    • Migraines    • Neck pain    • Obesity (BMI 30 0-34  9)    • Palpitations    • Pes planus     unspecified laterality; Last Assessed: 2014   • Thyroid nodule    • Tinnitus    • Trigeminy     history   • Wears glasses    • Wears reading eyeglasses      Past Surgical History:   Procedure Laterality Date   • APPENDECTOMY     • BREAST BIOPSY Left 2019   • CATARACT EXTRACTION Bilateral    •  SECTION      X 2   • CHOLECYSTECTOMY     • COLONOSCOPY     • DILATION AND CURETTAGE OF UTERUS     • HYSTERECTOMY      total   • JOINT REPLACEMENT      left TKR   • KNEE SURGERY Left     X 3   • OR ESOPHAGOGASTRODUODENOSCOPY TRANSORAL DIAGNOSTIC N/A 2016    Procedure: ESOPHAGOGASTRODUODENOSCOPY (EGD); Surgeon: Akash Dyson MD;  Location: [facility];   Service: Gastroenterology   • OR EXCISION GANGLION WRIST DORSAL/VOLAR PRIMARY Right 2023    Procedure: Right long finger mucoid cyst excision distal interphalangeal joint;  Surgeon: Ruth Ann Singh MD;  Location: [facility];  Service: Orthopedics   • OR LAPS TOTAL HYSTERECT 250 GM/< W/RMVL TUBE/OVARY Bilateral 2016    Procedure: HYSTERECTOMY LAPAROSCOPIC TOTAL ,BSO;  Surgeon: Jessica Jacobo MD;  Location: [facility];  Service: Gynecology Oncology   • REPLACEMENT TOTAL KNEE     • US GUIDED BREAST BIOPSY LEFT COMPLETE Left 2019   • WISDOM TOOTH EXTRACTION       Social History   Social History     Substance and Sexual Activity   Alcohol Use Yes     Social History     Substance and Sexual Activity   Drug Use No     Social History     Tobacco Use   Smoking Status Former   • Packs/day: 0 50   • Types: Cigarettes   • Quit date: 1978   • Years since quittin 9   Smokeless Tobacco Never   Tobacco Comments    as teenager     Family History   Problem Relation Age of Onset   • Endometrial cancer Mother 76   • Migraines Mother    • Obesity Father    • Diabetes Father    • Heart disease Father    • Heart attack Father    • Prostate cancer Father    • Hypertension Father    • Diabetes Brother    • Thyroid cancer Brother         medullary   • Diabetes type II Brother    • Growth hormone deficiency Daughter    • No Known Problems Daughter    • Alzheimer's disease Maternal Aunt    • Thyroid cancer Maternal Aunt    • Breast cancer Maternal Aunt 65   • Thyroid cancer Maternal Aunt 85   • No Known Problems Maternal Aunt    • No Known Problems Maternal Aunt    • No Known Problems Maternal Aunt    • No Known Problems Maternal Aunt    • Other Maternal Uncle         Brain Tumor   • Alzheimer's disease Maternal Uncle    • Diabetes Paternal Aunt    • Hashimoto's thyroiditis Paternal Aunt         Total Thyroidectomy   • Hypertension Paternal Aunt    • No Known Problems Paternal Aunt    • Stroke Paternal Uncle    • No Known Problems Maternal Grandmother    • No Known Problems Paternal Grandmother    • Cancer Family    • Colon cancer Neg Hx    • Cervical cancer Neg Hx    • Ovarian cancer Neg Hx    • Uterine cancer Neg Hx        Meds/Allergies     (Not in a hospital "admission)      Allergies   Allergen Reactions   • Naproxen Shortness Of Breath   • Prednisone Facial Swelling   • Iv Contrast [Iodinated Contrast Media] Itching   • Seasonal Ic [Cholestatin]    • Phentermine Palpitations       Objective     Blood pressure 121/58, pulse 73, temperature 97 9 °F (36 6 °C), temperature source Temporal, resp  rate 18, height 5' 3\" (1 6 m), weight 87 9 kg (193 lb 12 6 oz), SpO2 99 %, not currently breastfeeding  PHYSICAL EXAM    /58   Pulse 73   Temp 97 9 °F (36 6 °C) (Temporal)   Resp 18   Ht 5' 3\" (1 6 m)   Wt 87 9 kg (193 lb 12 6 oz)   LMP  (LMP Unknown)   SpO2 99%   BMI 34 33 kg/m²       Gen: NAD  CV: RRR  CHEST: Clear  ABD: soft, NT/ND  EXT: no edema      ASSESSMENT/PLAN:  This is a 69y o  year old female here for endoscopy and colonoscopy, and she is stable and optimized for her procedure        "

## 2023-05-26 NOTE — ANESTHESIA PREPROCEDURE EVALUATION
Procedure:  COLONOSCOPY  EGD    Relevant Problems   CARDIO   (+) DVT of leg (deep venous thrombosis) (HCC)   (+) Hypertriglyceridemia   (+) Migraine, unspecified, not intractable, without status migrainosus      GI/HEPATIC   (+) Gastroesophageal reflux disease without esophagitis      MUSCULOSKELETAL   (+) Arthritis of left knee   (+) Primary osteoarthritis of right knee   (+) Trapezius muscle strain, right, initial encounter      NEURO/PSYCH   (+) Migraine, unspecified, not intractable, without status migrainosus      PULMONARY   (+) Mild intermittent asthma   (+) Moderate persistent asthma without complication     Asthma    Migraines    Arthritis    DVT of leg (deep venous thrombosis) (HCC) left   Bigeminy history   Trigeminy history   Irregular heart beat    Intermittent palpitations    Acid reflux    Female pelvic pain    Thyroid nodule    Wears glasses    Anesthesia complication HYPOTENSION   Allergic rhinitis Last Assessed: 11/2/2017   Pes planus unspecified laterality; Last Assessed: 4/25/2014   Palpitations    DVT (deep venous thrombosis) (HCC)    Hyperlipidemia high trigrlycerides   Obesity (BMI 30 0-34  9)    DVT (deep venous thrombosis) (HCC)    GERD (gastroesophageal reflux disease)    Fatty liver    Neck pain    Interstitial cystitis    Wears reading eyeglasses    Tinnitus        Physical Exam    Airway    Mallampati score: I  TM Distance: >3 FB  Neck ROM: full     Dental       Cardiovascular  Cardiovascular exam normal    Pulmonary  Pulmonary exam normal     Other Findings        Anesthesia Plan  ASA Score- 3     Anesthesia Type- IV sedation with anesthesia with ASA Monitors  Additional Monitors:   Airway Plan:           Plan Factors-Exercise tolerance (METS): >4 METS  Chart reviewed  EKG reviewed  Imaging results reviewed  Existing labs reviewed  Patient summary reviewed  Induction- intravenous      Postoperative Plan-     Informed Consent- Anesthetic plan and risks discussed with patient  I personally reviewed this patient with the CRNA  Discussed and agreed on the Anesthesia Plan with the CRNA  Jessica Kasper

## 2023-06-01 ENCOUNTER — TELEPHONE (OUTPATIENT)
Dept: FAMILY MEDICINE CLINIC | Facility: CLINIC | Age: 68
End: 2023-06-01

## 2023-06-08 DIAGNOSIS — J30.1 SEASONAL ALLERGIC RHINITIS DUE TO POLLEN: Primary | ICD-10-CM

## 2023-06-08 DIAGNOSIS — M25.461 EFFUSION OF RIGHT KNEE: ICD-10-CM

## 2023-06-08 RX ORDER — TRAMADOL HYDROCHLORIDE 50 MG/1
50 TABLET ORAL EVERY 6 HOURS PRN
Qty: 30 TABLET | Refills: 0 | Status: SHIPPED | OUTPATIENT
Start: 2023-06-08

## 2023-06-08 RX ORDER — CYCLOSPORINE 0.5 MG/ML
1 EMULSION OPHTHALMIC 2 TIMES DAILY
Qty: 0.4 ML | Refills: 0 | Status: SHIPPED | OUTPATIENT
Start: 2023-06-08

## 2023-06-09 ENCOUNTER — APPOINTMENT (OUTPATIENT)
Dept: LAB | Facility: CLINIC | Age: 68
End: 2023-06-09
Payer: COMMERCIAL

## 2023-06-09 ENCOUNTER — APPOINTMENT (OUTPATIENT)
Dept: LAB | Facility: CLINIC | Age: 68
End: 2023-06-09

## 2023-06-09 DIAGNOSIS — E78.1 HYPERTRIGLYCERIDEMIA: ICD-10-CM

## 2023-06-09 DIAGNOSIS — Z00.8 HEALTH EXAMINATION IN POPULATION SURVEY: ICD-10-CM

## 2023-06-09 LAB
ALBUMIN SERPL BCP-MCNC: 3.5 G/DL (ref 3.5–5)
ALP SERPL-CCNC: 104 U/L (ref 46–116)
ALT SERPL W P-5'-P-CCNC: 43 U/L (ref 12–78)
ANION GAP SERPL CALCULATED.3IONS-SCNC: 5 MMOL/L (ref 4–13)
AST SERPL W P-5'-P-CCNC: 25 U/L (ref 5–45)
BASOPHILS # BLD AUTO: 0.08 THOUSANDS/ÂΜL (ref 0–0.1)
BASOPHILS NFR BLD AUTO: 1 % (ref 0–1)
BILIRUB SERPL-MCNC: 0.39 MG/DL (ref 0.2–1)
BUN SERPL-MCNC: 17 MG/DL (ref 5–25)
CALCIUM SERPL-MCNC: 8.8 MG/DL (ref 8.3–10.1)
CHLORIDE SERPL-SCNC: 108 MMOL/L (ref 96–108)
CHOLEST SERPL-MCNC: 206 MG/DL
CO2 SERPL-SCNC: 25 MMOL/L (ref 21–32)
CREAT SERPL-MCNC: 0.7 MG/DL (ref 0.6–1.3)
EOSINOPHIL # BLD AUTO: 0.22 THOUSAND/ÂΜL (ref 0–0.61)
EOSINOPHIL NFR BLD AUTO: 3 % (ref 0–6)
ERYTHROCYTE [DISTWIDTH] IN BLOOD BY AUTOMATED COUNT: 14.1 % (ref 11.6–15.1)
EST. AVERAGE GLUCOSE BLD GHB EST-MCNC: 114 MG/DL
GFR SERPL CREATININE-BSD FRML MDRD: 88 ML/MIN/1.73SQ M
GLUCOSE P FAST SERPL-MCNC: 95 MG/DL (ref 65–99)
HBA1C MFR BLD: 5.6 %
HCT VFR BLD AUTO: 42.4 % (ref 34.8–46.1)
HDLC SERPL-MCNC: 47 MG/DL
HGB BLD-MCNC: 13.4 G/DL (ref 11.5–15.4)
IMM GRANULOCYTES # BLD AUTO: 0.06 THOUSAND/UL (ref 0–0.2)
IMM GRANULOCYTES NFR BLD AUTO: 1 % (ref 0–2)
LDLC SERPL CALC-MCNC: 117 MG/DL (ref 0–100)
LYMPHOCYTES # BLD AUTO: 1.78 THOUSANDS/ÂΜL (ref 0.6–4.47)
LYMPHOCYTES NFR BLD AUTO: 25 % (ref 14–44)
MCH RBC QN AUTO: 27.3 PG (ref 26.8–34.3)
MCHC RBC AUTO-ENTMCNC: 31.6 G/DL (ref 31.4–37.4)
MCV RBC AUTO: 86 FL (ref 82–98)
MONOCYTES # BLD AUTO: 0.59 THOUSAND/ÂΜL (ref 0.17–1.22)
MONOCYTES NFR BLD AUTO: 8 % (ref 4–12)
NEUTROPHILS # BLD AUTO: 4.39 THOUSANDS/ÂΜL (ref 1.85–7.62)
NEUTS SEG NFR BLD AUTO: 62 % (ref 43–75)
NONHDLC SERPL-MCNC: 159 MG/DL
NRBC BLD AUTO-RTO: 0 /100 WBCS
PLATELET # BLD AUTO: 280 THOUSANDS/UL (ref 149–390)
PMV BLD AUTO: 10 FL (ref 8.9–12.7)
POTASSIUM SERPL-SCNC: 4.1 MMOL/L (ref 3.5–5.3)
PROT SERPL-MCNC: 7 G/DL (ref 6.4–8.4)
RBC # BLD AUTO: 4.91 MILLION/UL (ref 3.81–5.12)
SODIUM SERPL-SCNC: 138 MMOL/L (ref 135–147)
TRIGL SERPL-MCNC: 208 MG/DL
TSH SERPL DL<=0.05 MIU/L-ACNC: 1.99 UIU/ML (ref 0.45–4.5)
WBC # BLD AUTO: 7.12 THOUSAND/UL (ref 4.31–10.16)

## 2023-06-09 PROCEDURE — 80061 LIPID PANEL: CPT

## 2023-06-09 PROCEDURE — 80053 COMPREHEN METABOLIC PANEL: CPT

## 2023-06-09 PROCEDURE — 83036 HEMOGLOBIN GLYCOSYLATED A1C: CPT

## 2023-06-09 PROCEDURE — 85025 COMPLETE CBC W/AUTO DIFF WBC: CPT

## 2023-06-09 PROCEDURE — 84443 ASSAY THYROID STIM HORMONE: CPT

## 2023-06-09 PROCEDURE — 36415 COLL VENOUS BLD VENIPUNCTURE: CPT

## 2023-06-14 ENCOUNTER — ESTABLISHED COMPREHENSIVE EXAM (OUTPATIENT)
Dept: URBAN - METROPOLITAN AREA CLINIC 6 | Facility: CLINIC | Age: 68
End: 2023-06-14

## 2023-06-14 DIAGNOSIS — Z96.1: ICD-10-CM

## 2023-06-14 DIAGNOSIS — H04.123: ICD-10-CM

## 2023-06-14 DIAGNOSIS — H35.372: ICD-10-CM

## 2023-06-14 PROCEDURE — 92014 COMPRE OPH EXAM EST PT 1/>: CPT

## 2023-06-14 PROCEDURE — 92134 CPTRZ OPH DX IMG PST SGM RTA: CPT

## 2023-06-14 ASSESSMENT — VISUAL ACUITY
OU_OTHER: OTC READERS +2.50
OD_SC: 20/30
OS_SC: 20/40
OU_CC: J1+
OS_PH: 20/40+1

## 2023-06-14 ASSESSMENT — TONOMETRY
OD_IOP_MMHG: 9
OS_IOP_MMHG: 12

## 2023-06-28 DIAGNOSIS — G43.009 MIGRAINE WITHOUT AURA AND WITHOUT STATUS MIGRAINOSUS, NOT INTRACTABLE: ICD-10-CM

## 2023-06-28 DIAGNOSIS — M25.461 EFFUSION OF RIGHT KNEE: ICD-10-CM

## 2023-06-28 RX ORDER — BUTALBITAL, ACETAMINOPHEN AND CAFFEINE 50; 325; 40 MG/1; MG/1; MG/1
1 TABLET ORAL EVERY 4 HOURS PRN
Qty: 90 TABLET | Refills: 0 | Status: SHIPPED | OUTPATIENT
Start: 2023-06-28

## 2023-06-28 RX ORDER — TRAMADOL HYDROCHLORIDE 50 MG/1
50 TABLET ORAL EVERY 6 HOURS PRN
Qty: 30 TABLET | Refills: 0 | Status: SHIPPED | OUTPATIENT
Start: 2023-06-28

## 2023-06-28 NOTE — TELEPHONE ENCOUNTER
Pt will need a refill on meds while you are out of the office   Can you please fill med on 06/30/2023

## 2023-07-10 DIAGNOSIS — K21.9 GASTROESOPHAGEAL REFLUX DISEASE WITHOUT ESOPHAGITIS: ICD-10-CM

## 2023-07-10 RX ORDER — PANTOPRAZOLE SODIUM 40 MG/1
40 TABLET, DELAYED RELEASE ORAL 2 TIMES DAILY
Qty: 180 TABLET | Refills: 0 | Status: SHIPPED | OUTPATIENT
Start: 2023-07-10 | End: 2023-07-10 | Stop reason: SDUPTHER

## 2023-07-11 RX ORDER — PANTOPRAZOLE SODIUM 40 MG/1
40 TABLET, DELAYED RELEASE ORAL 2 TIMES DAILY
Qty: 180 TABLET | Refills: 0 | Status: SHIPPED | OUTPATIENT
Start: 2023-07-11

## 2023-07-24 DIAGNOSIS — M62.838 MUSCLE SPASM: ICD-10-CM

## 2023-07-24 DIAGNOSIS — M54.50 CHRONIC LOW BACK PAIN: ICD-10-CM

## 2023-07-24 DIAGNOSIS — G89.29 CHRONIC LOW BACK PAIN: ICD-10-CM

## 2023-07-24 RX ORDER — DIAZEPAM 5 MG/1
5 TABLET ORAL EVERY 8 HOURS PRN
Qty: 30 TABLET | Refills: 0 | Status: SHIPPED | OUTPATIENT
Start: 2023-07-24

## 2023-07-26 ENCOUNTER — OFFICE VISIT (OUTPATIENT)
Dept: FAMILY MEDICINE CLINIC | Facility: CLINIC | Age: 68
End: 2023-07-26
Payer: COMMERCIAL

## 2023-07-26 VITALS
BODY MASS INDEX: 34.62 KG/M2 | HEIGHT: 63 IN | HEART RATE: 72 BPM | SYSTOLIC BLOOD PRESSURE: 110 MMHG | WEIGHT: 195.4 LBS | OXYGEN SATURATION: 97 % | TEMPERATURE: 97.5 F | DIASTOLIC BLOOD PRESSURE: 64 MMHG

## 2023-07-26 DIAGNOSIS — M76.821 POSTERIOR TIBIAL TENDINITIS OF RIGHT LOWER EXTREMITY: ICD-10-CM

## 2023-07-26 DIAGNOSIS — E78.5 HYPERLIPIDEMIA, UNSPECIFIED HYPERLIPIDEMIA TYPE: ICD-10-CM

## 2023-07-26 DIAGNOSIS — E04.1 THYROID NODULE: ICD-10-CM

## 2023-07-26 DIAGNOSIS — M54.2 NECK PAIN: ICD-10-CM

## 2023-07-26 DIAGNOSIS — K21.9 GASTROESOPHAGEAL REFLUX DISEASE WITHOUT ESOPHAGITIS: ICD-10-CM

## 2023-07-26 DIAGNOSIS — M54.9 BACK PAIN, UNSPECIFIED BACK LOCATION, UNSPECIFIED BACK PAIN LATERALITY, UNSPECIFIED CHRONICITY: ICD-10-CM

## 2023-07-26 DIAGNOSIS — E66.9 CLASS 1 OBESITY: ICD-10-CM

## 2023-07-26 DIAGNOSIS — F11.20 CONTINUOUS OPIOID DEPENDENCE (HCC): ICD-10-CM

## 2023-07-26 DIAGNOSIS — G43.009 MIGRAINE WITHOUT AURA AND WITHOUT STATUS MIGRAINOSUS, NOT INTRACTABLE: ICD-10-CM

## 2023-07-26 DIAGNOSIS — Z02.83 ENCOUNTER FOR DRUG SCREENING: ICD-10-CM

## 2023-07-26 DIAGNOSIS — E66.9 OBESITY (BMI 30.0-34.9): ICD-10-CM

## 2023-07-26 DIAGNOSIS — I82.402 DEEP VEIN THROMBOSIS (DVT) OF LEFT LOWER EXTREMITY, UNSPECIFIED CHRONICITY, UNSPECIFIED VEIN (HCC): ICD-10-CM

## 2023-07-26 DIAGNOSIS — M17.11 PRIMARY OSTEOARTHRITIS OF RIGHT KNEE: Primary | ICD-10-CM

## 2023-07-26 DIAGNOSIS — R73.03 PREDIABETES: ICD-10-CM

## 2023-07-26 DIAGNOSIS — M25.461 EFFUSION OF RIGHT KNEE: ICD-10-CM

## 2023-07-26 PROCEDURE — 99214 OFFICE O/P EST MOD 30 MIN: CPT | Performed by: FAMILY MEDICINE

## 2023-07-26 RX ORDER — TRAMADOL HYDROCHLORIDE 50 MG/1
50 TABLET ORAL EVERY 6 HOURS PRN
Qty: 30 TABLET | Refills: 0 | Status: SHIPPED | OUTPATIENT
Start: 2023-07-26

## 2023-07-26 NOTE — PATIENT INSTRUCTIONS
Goals of care:  Maximize your health and quality of life by: Increasing your level of function and activity  Decreasing the negative effects of pain on your life  Minimizing the risks and side effects of medications and ensuring safe use of opioid medication     Ways for you to help meet your goals:  Maintain a healthy lifestyle. This includes proper nutrition, regular physical activity as able, try for 8 hours of sleep per night, use stress reduction strategies, avoid triggers. Risks and side effects of opioid use:  Prescription opioids carry serious risks of addiction and  overdose, especially with prolonged use. An opioid overdose,  often marked by slowed breathing, can cause sudden death. The  use of prescription opioids can have a number of side effects as  well, even when taken as directed: Tolerance--meaning you might need to take more of a medication for the same pain relief  Physical dependence--meaning you have symptoms of withdrawal when a medication is stopped  Increased sensitivity to pain  Constipation  Nausea, vomiting, dry mouth  Sleepiness and dizziness   Confusion  Depression  Low levels of testosterone that can result in lower sex drive, energy, and strength  Itching and sweating    If you are prescribed opioids for pain:  Never take opioids in greater amounts or more often than prescribed. Help prevent misuse and abuse. - Never sell or share prescription opioids.        - Never use another person’s prescription opioids. ‡Store prescription opioids in a secure place and out of reach of others (this may include visitors, children, friends, and family). Safely dispose of unused prescription opioids: Find your community drug take-back program or your pharmacy mail-back program, or flush them down the toilet, following guidance from the Food and Drug Administration (www.fda.gov/Drugs/ResourcesForYou).   ‡Visit www.cdc.gov/drugoverdose to learn about the risks of opioid abuse and overdose. If you believe you may be struggling with addiction, tell your health care provider and ask for guidance or call [facility]’s National Helpline at 1-614-580-MRQA. Goals of care:  Maximize your health and quality of life by: Increasing your level of function and activity  Decreasing the negative effects of pain on your life  Minimizing the risks and side effects of medications and ensuring safe use of opioid medication     Ways for you to help meet your goals:  Maintain a healthy lifestyle. This includes proper nutrition, regular physical activity as able, try for 8 hours of sleep per night, use stress reduction strategies, avoid triggers. Risks and side effects of opioid use:  Prescription opioids carry serious risks of addiction and  overdose, especially with prolonged use. An opioid overdose,  often marked by slowed breathing, can cause sudden death. The  use of prescription opioids can have a number of side effects as  well, even when taken as directed: Tolerance--meaning you might need to take more of a medication for the same pain relief  Physical dependence--meaning you have symptoms of withdrawal when a medication is stopped  Increased sensitivity to pain  Constipation  Nausea, vomiting, dry mouth  Sleepiness and dizziness   Confusion  Depression  Low levels of testosterone that can result in lower sex drive, energy, and strength  Itching and sweating    If you are prescribed opioids for pain:  Never take opioids in greater amounts or more often than prescribed. Help prevent misuse and abuse. - Never sell or share prescription opioids.        - Never use another person’s prescription opioids. ‡Store prescription opioids in a secure place and out of reach of others (this may include visitors, children, friends, and family).   Safely dispose of unused prescription opioids: Find your community drug take-back program or your pharmacy mail-back program, or flush them down the toilet, following guidance from the Food and Drug Administration (www.fda.gov/Drugs/ResourcesForYou). ‡Visit www.cdc.gov/drugoverdose to learn about the risks of opioid abuse and overdose. If you believe you may be struggling with addiction, tell your health care provider and ask for guidance or call [facility]’s National Helpline at 1-042-348-BUGB. Heart Healthy Diet   WHAT YOU NEED TO KNOW:   A heart healthy diet is an eating plan low in unhealthy fats and sodium (salt). The plan is high in healthy fats and fiber. A heart healthy diet helps improve your cholesterol levels and lowers your risk for heart disease and stroke. A dietitian will teach you how to read and understand food labels. DISCHARGE INSTRUCTIONS:   Heart healthy diet guidelines to follow:   Choose foods that contain healthy fats:      Unsaturated fats  include monounsaturated and polyunsaturated fats. Unsaturated fat is found in foods such as soybean, canola, olive, corn, and safflower oils. It is also found in soft tub margarine that is made with liquid vegetable oil. Omega-3 fat  is found in certain fish, such as salmon, tuna, and trout, and in walnuts and flaxseed. Eat fish high in omega-3 fats at least 2 times a week. Limit or do not have unhealthy fats:      Cholesterol  is found in animal foods, such as eggs and lobster, and in dairy products made from whole milk. Limit cholesterol to less than 200 mg each day. Saturated fat  is found in meats, such as jorgensen and hamburger. It is also found in chicken or turkey skin, whole milk, and butter. Limit saturated fat to less than 7% of your total daily calories. Trans fat  is found in packaged foods, such as potato chips and cookies. It is also in hard margarine, some fried foods, and shortening. Do not eat foods that contain trans fats. Get 20 to 30 grams of fiber each day. Fruits, vegetables, whole-grain foods, and legumes (cooked beans) are good sources of fiber.          Limit sodium as directed. You may be told to limit sodium, such as to 2,000 mg or less each day. Choose low-sodium or no-salt-added foods. Add little or no salt to food you prepare. Use herbs and spices in place of salt.        Include the following in your heart healthy plan:  Ask your dietitian or healthcare provider how many servings to have each day from the following food groups:  Grains:      Whole-wheat breads, cereals, and pastas, and brown rice    Low-fat, low-sodium crackers and chips    Vegetables:      Broccoli, green beans, green peas, and spinach    Collards, kale, and lima beans    Carrots, sweet potatoes, tomatoes, and peppers    Canned vegetables with no salt added    Fruits:      Bananas, peaches, pears, and pineapple    Grapes, raisins, and dates    Oranges, tangerines, grapefruit, orange juice, and grapefruit juice    Apricots, mangoes, melons, and papaya    Raspberries and strawberries    Canned fruit with no added sugar    Low-fat dairy:      Nonfat (skim) milk, 1% milk, and low-fat almond, cashew, or soy milks fortified with calcium    Low-fat cheese, regular or frozen yogurt, and cottage cheese    Meats and proteins:      Lean cuts of beef and pork (loin, leg, round), skinless chicken and turkey    Legumes, soy products, egg whites, or nuts    Limit or do not include the following in your heart healthy plan:   Foods and liquids that contain unhealthy fats and oils:      Whole or 2% milk, cream cheese, sour cream, or cheese    High-fat cuts of beef (T-bone steaks, ribs), chicken or turkey with skin, and organ meats such as liver    Butter, stick margarine, shortening, and cooking oils such as coconut or palm oil    Foods and liquids high in sodium:      Packaged foods, such as frozen dinners, cookies, macaroni and cheese, and cereals with more than 300 mg of sodium per serving    Vegetables with added sodium, such as instant potatoes, vegetables with added sauces, or regular canned vegetables    Cured or smoked meats, such as hot dogs, jorgensen, and sausage    High-sodium ketchup, barbecue sauce, salad dressing, pickles, olives, soy sauce, or miso    Foods and liquids high in sugar:      Candy, cake, cookies, pies, or doughnuts    Soft drinks (soda), sports drinks, or sweetened tea    Canned or dry mixes for cakes, soups, sauces, or gravies    Other healthy heart guidelines:   Do not smoke. Nicotine and other chemicals in cigarettes and cigars can cause lung and heart damage. Ask your healthcare provider for information if you currently smoke and need help to quit. E-cigarettes or smokeless tobacco still contain nicotine. Talk to your provider before you use these products. Limit or do not drink alcohol as directed. Alcohol can damage your heart and raise your blood pressure. Your provider may give you specific daily and weekly limits. The general recommended limit is 1 drink a day for women 21 or older and for men 72 or older. Do not have more than 3 drinks within 24 hours or 7 within a week. The recommended limit is 2 drinks a day for men 24to 59years of age. Do not have more than 4 drinks within 24 hours or 14 within a week. A drink of alcohol is 12 ounces of beer, 5 ounces of wine, or 1½ ounces of liquor. Maintain a healthy weight. Extra body weight makes your heart work harder. Ask your provider what a healthy weight is for you. He or she can help you create a safe weight loss plan, if needed. Exercise regularly. Exercise can help you maintain a healthy weight and improve your blood pressure and cholesterol levels. Regular exercise can also decrease your risk for heart problems. Ask your provider about the best exercise plan for you. Do not start an exercise program without asking your provider. Follow up with your doctor or cardiologist as directed:  Write down your questions so you remember to ask them during your visits.   © Copyright [employer] 2022 Information is for End User's use only and may not be sold, redistributed or otherwise used for commercial purposes. The above information is an  only. It is not intended as medical advice for individual conditions or treatments. Talk to your doctor, nurse or pharmacist before following any medical regimen to see if it is safe and effective for you.

## 2023-07-26 NOTE — PROGRESS NOTES
Assessment/Plan     Problem List Items Addressed This Visit        Digestive    Gastroesophageal reflux disease without esophagitis     Sees Dr Coleen Chaves and takes Pantoprazole 2 x daily. Relevant Medications    Elastic Bandages & Supports (Medical Compression Socks) MISC    Other Relevant Orders    Varicella Zoster Virus Antibodies (IgG,IgM)       Endocrine    Thyroid nodule     Left lower lobe nodule unchanged in 2018 and 2020 ultrasound will repeat at this time thyroid ultrasound         Relevant Orders    Varicella Zoster Virus Antibodies (IgG,IgM)       Cardiovascular and Mediastinum    Migraine, unspecified, not intractable, without status migrainosus     Headaches are more frequent due to stress now she continues with Fioricet         Relevant Medications    traMADol (Ultram) 50 mg tablet    Elastic Bandages & Supports (Medical Compression Socks) MISC    Other Relevant Orders    Varicella Zoster Virus Antibodies (IgG,IgM)    DVT of leg (deep venous thrombosis) ([facility])     Sees Ortho Dr Aman Merida now right knee pain continues. Relevant Medications    Elastic Bandages & Supports (Medical Compression Socks) MISC    Other Relevant Orders    Varicella Zoster Virus Antibodies (IgG,IgM)       Musculoskeletal and Integument    Effusion of right knee    Relevant Medications    traMADol (Ultram) 50 mg tablet    Other Relevant Orders    Varicella Zoster Virus Antibodies (IgG,IgM)       Other    Prediabetes     Stable and A1C is 5.6         Relevant Orders    Varicella Zoster Virus Antibodies (IgG,IgM)    Neck pain    Relevant Medications    Elastic Bandages & Supports (Medical Compression Socks) MISC    Other Relevant Orders    Varicella Zoster Virus Antibodies (IgG,IgM)    Class 1 obesity - Primary     Stable given diet plan low fat diet low carbs.          Relevant Medications    Elastic Bandages & Supports (Medical Compression Socks) MISC    Other Relevant Orders    Varicella Zoster Virus Antibodies (IgG,IgM)   Other Visit Diagnoses     Obesity (BMI 30.0-34. 9)        Relevant Medications    Elastic Bandages & Supports (Medical Compression Socks) MISC    Other Relevant Orders    Varicella Zoster Virus Antibodies (IgG,IgM)    Continuous opioid dependence (HCC)             Opioid Management Plan      Subjective     Opioid Management HPI  HPI  Pain Medications             acetaminophen (TYLENOL) 650 mg CR tablet Take 1 tablet (650 mg total) by mouth every 8 (eight) hours as needed for mild pain    butalbital-acetaminophen-caffeine (FIORICET,ESGIC) -40 mg per tablet Take 1 tablet by mouth every 4 (four) hours as needed for headaches    methocarbamol (ROBAXIN) 750 mg tablet Take 1 tablet (750 mg total) by mouth every 6 (six) hours as needed for muscle spasms    traMADol (Ultram) 50 mg tablet Take 1 tablet (50 mg total) by mouth every 6 (six) hours as needed for moderate pain    ibuprofen (MOTRIN) 200 mg tablet Take by mouth every 6 (six) hours as needed for mild pain         Outpatient Morphine Milligram Equivalents Per Day     7/26/23 and after 20 MME/Day    Order Name Dose Route Frequency Maximum MME/Day     traMADol (Ultram) 50 mg tablet 50 mg Oral Every 6 hours PRN 20 MME/Day    Total Potential Morphine Milligram Equivalents Per Day 20 MME/Day    Calculation Information        traMADol (Ultram) 50 mg tablet    traMADol 50 mg Tabs: single dose of 50 mg * 4 doses per day * morphine equivalence factor of 0.1 = 20 MME/Day                         PDMP Review       Value Time User    PDMP Reviewed  Yes 8/11/2022 11:43 AM MEGHNA Nettles         Review of Systems  Objective     /64 (BP Location: Left arm, Patient Position: Sitting)   Pulse 72   Temp 97.5 °F (36.4 °C)   Ht 5' 3" (1.6 m)   Wt 88.6 kg (195 lb 6.4 oz)   LMP  (LMP Unknown)   SpO2 97%   BMI 34.61 kg/m²     Physical Exam    Land O'Jeremi, DO

## 2023-07-26 NOTE — PROGRESS NOTES
Assessment/Plan:       Problem List Items Addressed This Visit        Digestive    Gastroesophageal reflux disease without esophagitis     Sees Dr Apolinar Stoddard and takes Pantoprazole 2 x daily. Relevant Medications    Elastic Bandages & Supports (Medical Compression Socks) MISC    Other Relevant Orders    Varicella Zoster Virus Antibodies (IgG,IgM)    TSH, 3rd generation with Free T4 reflex       Endocrine    Thyroid nodule     Left lower lobe nodule unchanged in 2018 and 2020 ultrasound will repeat at this time thyroid ultrasound         Relevant Orders    Varicella Zoster Virus Antibodies (IgG,IgM)    Comprehensive metabolic panel    TSH, 3rd generation with Free T4 reflex    CBC and differential    US thyroid    TSH, 3rd generation with Free T4 reflex    Lipid panel    Comprehensive metabolic panel    CBC and differential    US thyroid       Cardiovascular and Mediastinum    DVT of leg (deep venous thrombosis) (HCC)     Gloriann Organ Dr Margoth Lehman now right knee pain continues. Relevant Medications    Elastic Bandages & Supports (Medical Compression Socks) MISC    Other Relevant Orders    Varicella Zoster Virus Antibodies (IgG,IgM)    Migraine, unspecified, not intractable, without status migrainosus     Headaches are more frequent due to stress now she continues with Fioricet         Relevant Medications    traMADol (Ultram) 50 mg tablet    Elastic Bandages & Supports (Medical Compression Socks) MISC    Other Relevant Orders    Varicella Zoster Virus Antibodies (IgG,IgM)       Musculoskeletal and Integument    Effusion of right knee    Relevant Medications    traMADol (Ultram) 50 mg tablet    Other Relevant Orders    Varicella Zoster Virus Antibodies (IgG,IgM)    Ambulatory Referral to Physical Therapy       Other    Class 1 obesity     Stable given diet plan low fat diet low carbs.          Relevant Medications    Elastic Bandages & Supports (Medical Compression Socks) MISC    Other Relevant Orders Varicella Zoster Virus Antibodies (IgG,IgM)    CBC and differential    Neck pain    Relevant Medications    Elastic Bandages & Supports (Medical Compression Socks) MISC    Other Relevant Orders    Varicella Zoster Virus Antibodies (IgG,IgM)    Ambulatory Referral to Physical Therapy    Prediabetes     Stable and A1C is 5.6         Relevant Orders    Varicella Zoster Virus Antibodies (IgG,IgM)    Comprehensive metabolic panel    TSH, 3rd generation with Free T4 reflex    CBC and differential   Other Visit Diagnoses     Obesity (BMI 30.0-34. 9)        Relevant Medications    Elastic Bandages & Supports (Medical Compression Socks) MISC    Other Relevant Orders    Varicella Zoster Virus Antibodies (IgG,IgM)    Continuous opioid dependence ([address])        Encounter for drug screening        Relevant Orders    [facility] All Prescribed Meds and Special Instructions    Amphetamines, Methamphetamines    Butalbital    Phenobarbital    Secobarbital    Alprazolam    Clonazepam    Diazepam, Temazepam, Oxazepam    Lorazepam    Gabapentin    Pregabalin    Cocaine    Heroin    Buprenorphine    Levorphanol    Meperidine    Naltrexone    Fentanyl    Methadone    Oxycodone, Oxymorphone    Tapentadol    THC    Tramadol    Codeine, Hydrocodone, Hydropmorphone, Morphine    Bath Salts    Ethyl Glucuronide/Ethyl Sulfate    Kratom    Spice    Methylphenidate    Phentermine    Validity Oxidant    Validity Creatinine    Validity pH    Validity Specific    [facility] Pres[facility] Meds and Special Instructions    Amphetamines, Methamphetamines    Butalbital    Phenobarbital    Secobarbital    Alprazolam    Clonazepam    Diazepam, Temazepam, Oxazepam    Lorazepam    Gabapentin    Pregabalin    Cocaine    Heroin    Buprenorphine    Levorphanol    Meperidine    Naltrexone    Fentanyl    Methadone    Oxycodone, Oxymorphone    Tapentadol    THC    Tramadol    Codeine, Hydrocodone, Hydropmorphone, Morphine    Bath Salts    Ethyl Glucuronide/Ethyl Sulfate    Kratom    Spice    Methylphenidate    Phentermine    Validity Oxidant    Validity Creatinine    Validity pH    Validity Specific    Back pain, unspecified back location, unspecified back pain laterality, unspecified chronicity        Relevant Orders    Comprehensive metabolic panel    TSH, 3rd generation with Free T4 reflex    CBC and differential    Ambulatory Referral to Physical Therapy    Ambulatory Referral to Physical Therapy    Hyperlipidemia, unspecified hyperlipidemia type        Relevant Orders    Comprehensive metabolic panel    Lipid Panel with Direct LDL reflex    TSH, 3rd generation with Free T4 reflex    Comprehensive metabolic panel            Subjective:      Patient ID: Romelia Guerrier is a 69 y.o. female. Several pages notes problems to review today. The following portions of the patient's history were reviewed and updated as appropriate: allergies, current medications, past family history, past medical history, past social history, past surgical history and problem list.    Review of Systems   Constitutional: Negative for chills, fatigue and fever. HENT: Negative for congestion, nosebleeds, rhinorrhea, sinus pressure and sore throat. Eyes: Negative for discharge and redness. Respiratory: Negative for cough and shortness of breath. Cardiovascular: Negative for chest pain, palpitations and leg swelling. Gastrointestinal: Negative for abdominal pain, blood in stool and nausea. Endocrine: Negative for cold intolerance, heat intolerance and polyuria. Genitourinary: Negative for dysuria and frequency. Musculoskeletal: Negative for arthralgias, back pain and myalgias. Skin: Negative for rash. Neurological: Positive for headaches. Negative for dizziness and weakness. Hematological: Negative for adenopathy. Psychiatric/Behavioral: Negative for behavioral problems and sleep disturbance. The patient is not nervous/anxious.           Objective:      /64 (BP Location: Left arm, Patient Position: Sitting)   Pulse 72   Temp 97.5 °F (36.4 °C)   Ht 5' 3" (1.6 m)   Wt 88.6 kg (195 lb 6.4 oz)   LMP  (LMP Unknown)   SpO2 97%   BMI 34.61 kg/m²        Physical Exam  Vitals and nursing note reviewed. Constitutional:       General: She is not in acute distress. Appearance: Normal appearance. She is well-developed. HENT:      Head: Normocephalic and atraumatic. Right Ear: Tympanic membrane, ear canal and external ear normal.      Left Ear: Tympanic membrane, ear canal and external ear normal.      Nose: Nose normal.      Mouth/Throat:      Mouth: Mucous membranes are moist.      Pharynx: Oropharynx is clear. No oropharyngeal exudate. Eyes:      General: No scleral icterus. Right eye: No discharge. Left eye: No discharge. Conjunctiva/sclera: Conjunctivae normal.      Pupils: Pupils are equal, round, and reactive to light. Neck:      Thyroid: No thyromegaly. Vascular: No JVD. Cardiovascular:      Rate and Rhythm: Normal rate and regular rhythm. Pulses: Normal pulses. Heart sounds: Normal heart sounds. No murmur heard. Pulmonary:      Effort: Pulmonary effort is normal.      Breath sounds: No wheezing or rales. Chest:      Chest wall: No tenderness. Abdominal:      General: Bowel sounds are normal. There is no distension. Palpations: Abdomen is soft. There is no mass. Tenderness: There is no abdominal tenderness. Musculoskeletal:         General: No tenderness or deformity. Normal range of motion. Cervical back: Normal range of motion. Lymphadenopathy:      Cervical: No cervical adenopathy. Skin:     General: Skin is warm and dry. Capillary Refill: Capillary refill takes less than 2 seconds. Findings: No rash. Neurological:      General: No focal deficit present. Mental Status: She is alert and oriented to person, place, and time. Mental status is at baseline.       Cranial Nerves: No cranial nerve deficit. Coordination: Coordination normal.      Deep Tendon Reflexes: Reflexes are normal and symmetric. Reflexes normal.   Psychiatric:         Mood and Affect: Mood normal.         Behavior: Behavior normal.         Thought Content: Thought content normal.         Judgment: Judgment normal.          Data:    Laboratory Results: I have personally reviewed the pertinent laboratory results/reports   Radiology/Other Diagnostic Testing Results: I have personally reviewed pertinent reports. Lab Results   Component Value Date    WBC 7.12 06/09/2023    HGB 13.4 06/09/2023    HCT 42.4 06/09/2023    MCV 86 06/09/2023     06/09/2023     Lab Results   Component Value Date     08/20/2015    K 4.1 06/09/2023     06/09/2023    CO2 25 06/09/2023    ANIONGAP 8.6 08/20/2015    BUN 17 06/09/2023    CREATININE 0.70 06/09/2023    GLUCOSE 88 08/20/2015    GLUF 95 06/09/2023    CALCIUM 8.8 06/09/2023    AST 25 06/09/2023    ALT 43 06/09/2023    ALKPHOS 104 06/09/2023    PROT 7.1 08/20/2015    BILITOT 0.3 08/20/2015    EGFR 88 06/09/2023     Lab Results   Component Value Date    CHOLESTEROL 206 (H) 06/09/2023    CHOLESTEROL 190 08/25/2022    CHOLESTEROL 213 (H) 07/18/2022     Lab Results   Component Value Date    HDL 47 (L) 06/09/2023    HDL 38 (L) 08/25/2022    HDL 45 (L) 07/18/2022     Lab Results   Component Value Date    LDLCALC 117 (H) 06/09/2023    [facility] [place]  08[place]25/2022      Comment:      Calculated LDL invalid, triglycerides >400 mg/dl  This screening LDL is a calculated result. It does not have the accuracy of the Direct Measured LDL in the monitoring of patients with hyperlipidemia and/or statin therapy. Direct Measure LDL (LEE802) must be ordered separately in these patients.     LDLCALC 120 (H) 07/18/2022     Lab Results   Component Value Date    TRIG 208 (H) 06/09/2023    TRIG 556 (H) 08/25/2022    TRIG 240 (H) 07/18/2022     No results found for: "CHOLHDL"  Lab Results Component Value Date    ECV7HOSWVPBH 1.985 06/09/2023     Lab Results   Component Value Date    HGBA1C 5.6 06/09/2023     No results found for: "PSA"    Cherry Lugo DO

## 2023-07-26 NOTE — ASSESSMENT & PLAN NOTE
Left lower lobe nodule unchanged in 2018 and 2020 ultrasound will repeat at this time thyroid ultrasound

## 2023-07-28 ENCOUNTER — TELEPHONE (OUTPATIENT)
Dept: FAMILY MEDICINE CLINIC | Facility: CLINIC | Age: 68
End: 2023-07-28

## 2023-07-28 DIAGNOSIS — H90.0 CONDUCTIVE HEARING LOSS, BILATERAL: ICD-10-CM

## 2023-07-28 DIAGNOSIS — H93.13 TINNITUS OF BOTH EARS: Primary | ICD-10-CM

## 2023-07-28 NOTE — TELEPHONE ENCOUNTER
Patient requesting a hearing test. She is unsure where she can go for this. She believe she has tinnitus of the ears. Please advise.

## 2023-07-29 LAB — A-OH ALPRAZ UR QL CFM: NEGATIVE NG/ML

## 2023-08-10 DIAGNOSIS — J45.21 MILD INTERMITTENT ASTHMA WITH ACUTE EXACERBATION: ICD-10-CM

## 2023-08-10 DIAGNOSIS — F41.9 ANXIETY: ICD-10-CM

## 2023-08-11 ENCOUNTER — EVALUATION (OUTPATIENT)
Dept: PHYSICAL THERAPY | Age: 68
End: 2023-08-11
Payer: COMMERCIAL

## 2023-08-11 DIAGNOSIS — M54.40 BILATERAL LOW BACK PAIN WITH SCIATICA, SCIATICA LATERALITY UNSPECIFIED, UNSPECIFIED CHRONICITY: ICD-10-CM

## 2023-08-11 DIAGNOSIS — M25.561 RIGHT KNEE PAIN, UNSPECIFIED CHRONICITY: ICD-10-CM

## 2023-08-11 DIAGNOSIS — M54.2 NECK PAIN: Primary | ICD-10-CM

## 2023-08-11 PROCEDURE — 97162 PT EVAL MOD COMPLEX 30 MIN: CPT | Performed by: PHYSICAL THERAPIST

## 2023-08-11 PROCEDURE — 97140 MANUAL THERAPY 1/> REGIONS: CPT | Performed by: PHYSICAL THERAPIST

## 2023-08-11 PROCEDURE — 97110 THERAPEUTIC EXERCISES: CPT | Performed by: PHYSICAL THERAPIST

## 2023-08-11 RX ORDER — ALPRAZOLAM 0.25 MG/1
TABLET ORAL
Qty: 30 TABLET | Refills: 0 | Status: SHIPPED | OUTPATIENT
Start: 2023-08-11

## 2023-08-11 RX ORDER — FLUTICASONE PROPIONATE 220 UG/1
1 AEROSOL, METERED RESPIRATORY (INHALATION) 2 TIMES DAILY
Qty: 12 G | Refills: 3 | Status: SHIPPED | OUTPATIENT
Start: 2023-08-11

## 2023-08-11 NOTE — PROGRESS NOTES
PT Evaluation     Today's date: 2023  Patient name: Jorge Gatica  : 1955  MRN: [id number]  Referring provider: Belkis Diego DO  Dx:   Encounter Diagnosis     ICD-10-CM    1. Neck pain  M54.2       2. Bilateral low back pain with sciatica, sciatica laterality unspecified, unspecified chronicity  M54.40       3. Right knee pain, unspecified chronicity  M25.561           Start Time: 1345  Stop Time: 1445  Total time in clinic (min): 60 minutes    Assessment  Assessment details: Jorge Gatica is a 69 y.o. female who presents with pain, decreased strength, decreased ROM, joint effusion, ambulatory dysfunction, postural dysfunction and balance dysfunction. Due to these impairments, Patient has difficulty performing a/iadls and work-related activities. Patient's clinical presentation is consistent with their referring diagnosis of neck,back, knee pain. Patient would benefit from skilled physical therapy to address their aforementioned impairments, improve their level of function and to improve their overall quality of life. Impairments: abnormal gait, abnormal muscle firing, abnormal muscle tone, abnormal or restricted ROM, abnormal movement, activity intolerance, impaired physical strength, lacks appropriate home exercise program, pain with function, weight-bearing intolerance, poor posture  and poor body mechanics  Understanding of Dx/Px/POC: good   Prognosis: fair    Goals  ST-3 WEEKS  1. Decrease pain by 2 points on VAS at its worst.  2.  Increase ROM by > 5 deg in all deficients planes. 3.  Increase UE/CORE/LE by 1/2 MMT grade in all deficient planes. LT-6 WEEKS  1. Patient to be independent with a/iadls especially walking and reaching and steps pain free  2. Increase functional activities for leisure and home activities to previous LOF.   3. Independent with HEP and/or fitness program.    Plan  Patient would benefit from: skilled physical therapy  Planned modality interventions: cryotherapy, electrical stimulation/Russian stimulation, thermotherapy: hydrocollator packs and unattended electrical stimulation  Planned therapy interventions: activity modification, behavior modification, body mechanics training, aquatic therapy, flexibility, functional ROM exercises, home exercise program, IADL retraining, joint mobilization, manual therapy, neuromuscular re-education, patient education, postural training, strengthening, stretching, therapeutic activities and therapeutic exercise  Frequency: 2x week (2-3x week)  Duration in weeks: 12  Plan of Care beginning date: 2023  Plan of Care expiration date: 2023  Treatment plan discussed with: patient        Subjective Evaluation    History of Present Illness  Mechanism of injury: Left TKR and right knee pain/swelling secondary to DJD some relief with injection also notes increased right low back pain and upper back pain as well HA+          Not a recurrent problem   Quality of life: good    Patient Goals  Patient goals for therapy: decreased pain, increased motion, increased strength and independence with ADLs/IADLs    Pain  Current pain ratin  At best pain ratin  At worst pain ratin  Quality: dull ache  Relieving factors: rest and relaxation  Aggravating factors: stair climbing, sitting, overhead activity and walking  Progression: worsening    Social Support  Steps to enter house: yes  Stairs in house: yes   Lives in: multiple-level home  Lives with: spouse and adult children      Diagnostic Tests  X-ray: abnormal  MRI studies: abnormal  Treatments  Previous treatment: injection treatment and physical therapy        Objective     Static Posture     Thoracic Spine  Hyperkyphosis. Lumbar Spine   Increased lordosis. Ankle/Foot   Ankle/Foot (Left): Pes planus. Ankle/Foot (Right): Pes planus.      Palpation   Left   Hypertonic in the erector spinae, levator scapulae, lumbar paraspinals, pectoralis minor, scalenes, suboccipitals and upper trapezius. Tenderness of the upper trapezius. Right   Hypertonic in the erector spinae, levator scapulae, lumbar paraspinals, pectoralis minor, scalenes, suboccipitals and upper trapezius. Tenderness of the upper trapezius. Tenderness     Left Hip   Tenderness in the PSIS. Right Hip   Tenderness in the PSIS. Right Knee   Tenderness in the medial joint line.      Active Range of Motion   Cervical/Thoracic Spine       Cervical    Flexion: 35 degrees   Extension: 60 degrees      Left lateral flexion: 15 degrees      Right lateral flexion: 15 degrees      Left rotation: 40 degrees  Right rotation: 60 degrees         Thoracic    Extension:  Restriction level: moderate    Lumbar   Flexion: 75 degrees   Extension: 15 degrees   Left lateral flexion: 30 degrees       Right lateral flexion: 25 degrees   Left Knee   Flexion: 89 degrees   Extension: 0 degrees     Right Knee   Flexion: 115 degrees   Extension: 0 degrees     Strength/Myotome Testing   Cervical Spine   Neck flexion: 4-    Left Shoulder     Planes of Motion   Flexion: 4   Extension: 4   Abduction: 4   Adduction: 4+   External rotation at 0°: 4   Internal rotation at 0°: 4+     Isolated Muscles   Lower trapezius: 4     Right Shoulder     Planes of Motion   Flexion: 4+   Extension: 4+   Abduction: 4+   Adduction: 4+   External rotation at 0°: 4+   Internal rotation at 0°: 4+     Isolated Muscles   Lower trapezius: 4     Left Elbow   Flexion: 4+  Extension: 4+    Right Elbow   Flexion: 4+  Extension: 4+    Left Hip   Planes of Motion   Flexion: 4  Extension: 4  Abduction: 4  Adduction: 4+    Right Hip   Planes of Motion   Flexion: 4  Extension: 4  Abduction: 4  Adduction: 4+    Left Knee   Flexion: 4+  Extension: 4    Right Knee   Flexion: 4+  Extension: 4    Left Ankle/Foot   Dorsiflexion: 4+  Plantar flexion: 4+    Right Ankle/Foot   Dorsiflexion: 4+  Plantar flexion: 4+    Tests     Lumbar     Left   Negative passive SLR and slump test.     Right   Negative passive SLR and slump test.     Right Knee   Positive Apley's compression and medial Brian. Ambulation     Observational Gait   Gait: antalgic   Decreased walking speed and stride length.      Functional Assessment        Single Leg Stance   Left: 3 seconds  Right: 3 seconds      Flowsheet Rows    Flowsheet Row Most Recent Value   PT/OT G-Codes    Current Score 49   Projected Score 61             Precautions:       Manuals 8/11                         MT LB/LE             MT cervical                          Neuro Re-Ed                                                                                                        Ther Ex             Nu step 5 min            slant 30"/4x            SAQ 2/20            Heel slides 30x            BTB rows nv            Bolster stretch             Chin tucks             pulleys             Ther Activity                                       Gait Training                                       Modalities

## 2023-08-16 ENCOUNTER — OFFICE VISIT (OUTPATIENT)
Dept: PHYSICAL THERAPY | Age: 68
End: 2023-08-16
Payer: COMMERCIAL

## 2023-08-16 DIAGNOSIS — M25.561 RIGHT KNEE PAIN, UNSPECIFIED CHRONICITY: ICD-10-CM

## 2023-08-16 DIAGNOSIS — M54.40 BILATERAL LOW BACK PAIN WITH SCIATICA, SCIATICA LATERALITY UNSPECIFIED, UNSPECIFIED CHRONICITY: ICD-10-CM

## 2023-08-16 DIAGNOSIS — M54.2 NECK PAIN: Primary | ICD-10-CM

## 2023-08-16 PROCEDURE — 97110 THERAPEUTIC EXERCISES: CPT | Performed by: PHYSICAL THERAPIST

## 2023-08-16 PROCEDURE — 97140 MANUAL THERAPY 1/> REGIONS: CPT | Performed by: PHYSICAL THERAPIST

## 2023-08-16 NOTE — PROGRESS NOTES
Daily Note     Today's date: 2023  Patient name: Aakash Prather  : 1955  MRN: [id number]  Referring provider: Stephanie Murphy DO  Dx:   Encounter Diagnosis     ICD-10-CM    1. Neck pain  M54.2       2. Bilateral low back pain with sciatica, sciatica laterality unspecified, unspecified chronicity  M54.40       3. Right knee pain, unspecified chronicity  M25.561           Start Time: 09  Stop Time: 1015  Total time in clinic (min): 45 minutes    Subjective: patient complains of low back pain and knee pain and neck stiffness      Objective: See treatment diary below      Assessment: Tolerated treatment fair. Patient demonstrated fatigue post treatment, exhibited good technique with therapeutic exercises and would benefit from continued PT, tender to right piriformis muscle today after ambulating on TM x 6 minutes      Plan: Progress treatment as tolerated.        Precautions:       Manuals 16                        MT LB/LE  10           MT cervical  10                        Neuro Re-Ed                                                                                                        Ther Ex             Nu step 5 min 6 min           slant 30"/4x 4x           SAQ            Heel slides 30x 30x           BTB rows nv 20x           Bolster stretch  10x           Chin tucks  10x           pulleys  30x           Ther Activity             TM  1.2 mph 6 min                        Gait Training                                       Modalities

## 2023-08-17 ENCOUNTER — OFFICE VISIT (OUTPATIENT)
Dept: PHYSICAL THERAPY | Age: 68
End: 2023-08-17
Payer: COMMERCIAL

## 2023-08-17 DIAGNOSIS — M54.40 BILATERAL LOW BACK PAIN WITH SCIATICA, SCIATICA LATERALITY UNSPECIFIED, UNSPECIFIED CHRONICITY: ICD-10-CM

## 2023-08-17 DIAGNOSIS — M54.2 NECK PAIN: Primary | ICD-10-CM

## 2023-08-17 DIAGNOSIS — M25.561 RIGHT KNEE PAIN, UNSPECIFIED CHRONICITY: ICD-10-CM

## 2023-08-17 PROCEDURE — 97113 AQUATIC THERAPY/EXERCISES: CPT | Performed by: PHYSICAL THERAPIST

## 2023-08-17 NOTE — PROGRESS NOTES
Daily Note     Today's date: 2023  Patient name: Amber Hedrick  : 1955  MRN: [id number]  Referring provider: Bonifacio Lawson DO  Dx:   Encounter Diagnosis     ICD-10-CM    1. Neck pain  M54.2       2. Bilateral low back pain with sciatica, sciatica laterality unspecified, unspecified chronicity  M54.40       3. Right knee pain, unspecified chronicity  M25.561                      Subjective: patient reports left knee is stiff and right low back still sore      Objective: See treatment diary below      Assessment: Tolerated treatment fair. Patient demonstrated fatigue post treatment, exhibited good technique with therapeutic exercises and would benefit from continued PT, decreased pain with  [address]: Progress treatment as tolerated.        Precautions:     Precautions:     Daily Treatment Diary     Manual                                                                                   Exercise Diary              Water walking 10            Postural training 2            Gait training 2            Home exercise pgm/patient education             Wall: t/h raises 1            Hip abd/add 2            Marching 1            squats 1            Knee flex/ext 1            Step-ups (fwd/bkwd/ss)             SLS (eyes open/closed)             SLS w UE mvmt  AROM/ball toss             Weight shifting             UE Noodle work x 4              UE AROM             Resistive UE work (paddles, bells, TB) 3            Core work on noodle (sitting/stdg)             Sit on noodle with movement             Seated on pool bench w proper posture 1            Ankle df/pf             marching             Hip Ab/add 2            Knee flex/ext             Deep water mvmt 5            Deep water tx/stretching 10            Specific self - stretches wall/steps 5                Modalities              whirlpool 10

## 2023-08-18 ENCOUNTER — OFFICE VISIT (OUTPATIENT)
Dept: OBGYN CLINIC | Facility: MEDICAL CENTER | Age: 68
End: 2023-08-18

## 2023-08-18 VITALS
HEIGHT: 63 IN | HEART RATE: 71 BPM | SYSTOLIC BLOOD PRESSURE: 121 MMHG | DIASTOLIC BLOOD PRESSURE: 71 MMHG | WEIGHT: 193 LBS | BODY MASS INDEX: 34.2 KG/M2

## 2023-08-18 DIAGNOSIS — G89.29 CHRONIC PAIN OF RIGHT KNEE: ICD-10-CM

## 2023-08-18 DIAGNOSIS — M25.561 CHRONIC PAIN OF RIGHT KNEE: ICD-10-CM

## 2023-08-18 DIAGNOSIS — M72.2 PLANTAR FASCIITIS, BILATERAL: ICD-10-CM

## 2023-08-18 DIAGNOSIS — M17.11 PRIMARY OSTEOARTHRITIS OF RIGHT KNEE: Primary | ICD-10-CM

## 2023-08-18 NOTE — PROGRESS NOTES
Assessment:  1. Primary osteoarthritis of right knee        2. Chronic pain of right knee        3. Plantar fasciitis, bilateral  Brace    Custom shoe insert DME          Plan:  May do liberal use of Advil for pain PRN  Discussed once conservative treatments fail she may need a Rigth TKA  DME scripts provided for B/L [place] ankle braces and custom shoe inserts    F/U PRN    To do next visit:  Return if symptoms worsen or fail to improve. The above stated was discussed in layman's terms and the patient expressed understanding. All questions were answered to the patient's satisfaction. Scribe Attestation    I,:  Leny Feliciano am acting as a scribe while in the presence of the attending physician.:       I,:  Felix García MD personally performed the services described in this documentation    as scribed in my presence.:             Subjective: Daniela Lucio is a 69 y.o. female who presents for follow up of right knee. She is s/p right knee Visco injection 5/19/2023   Pt states she has some pain wakes her up at night but no pain at present time of office visit  She is now going to Physical Therapy. Started yesterday and feels it has helped. She is doing land and aqua   Does not want injection today after the pain she had after the Visco injection. The 4/28/2023 Toradol injection helped the most with her pain. Would like two Eleazar braces for her ankle and someone who does custom shoe inserts  She has history of left TKA performed several years ago.       Review of systems negative unless otherwise specified in HPI    Past Medical History:   Diagnosis Date   • Acid reflux    • Allergic rhinitis     Last Assessed: 11/2/2017   • Anesthesia complication     HYPOTENSION   • Arthritis    • Asthma    • Bigeminy     history   • DVT (deep venous thrombosis) ([address]) 10/23/2018   • DVT (deep venous thrombosis) ([address])    • DVT of leg (deep venous thrombosis) ([address]) 2001    left   • Fatty liver    • Female pelvic pain    • GERD (gastroesophageal reflux disease)    • Hyperlipidemia     high trigrlycerides   • Intermittent palpitations    • Interstitial cystitis    • Irregular heart beat    • Migraines    • Neck pain    • Obesity (BMI 30.0-34. 9)    • Palpitations    • Pes planus     unspecified laterality; Last Assessed: 2014   • Thyroid nodule    • Tinnitus    • Trigeminy     history   • Wears glasses    • Wears reading eyeglasses        Past Surgical History:   Procedure Laterality Date   • APPENDECTOMY     • BREAST BIOPSY Left 2019   • CATARACT EXTRACTION Bilateral    •  SECTION      X 2   • CHOLECYSTECTOMY     • COLONOSCOPY     • DILATION AND CURETTAGE OF UTERUS     • HYSTERECTOMY      total   • JOINT REPLACEMENT      left TKR   • KNEE SURGERY Left     X 3   • MA ESOPHAGOGASTRODUODENOSCOPY TRANSORAL DIAGNOSTIC N/A 2016    Procedure: ESOPHAGOGASTRODUODENOSCOPY (EGD); Surgeon: Elke Jean Baptiste MD;  Location: [facility];   Service: Gastroenterology   • MA EXCISION GANGLION WRIST DORSAL/VOLAR PRIMARY Right 2023    Procedure: Right long finger mucoid cyst excision distal interphalangeal joint;  Surgeon: Marino Sanchez MD;  Location: [facility];  Service: Orthopedics   • MA LAPS TOTAL HYSTERECT 250 GM/< W/RMVL TUBE/OVARY Bilateral 2016    Procedure: HYSTERECTOMY LAPAROSCOPIC TOTAL ,BSO;  Surgeon: Jesse Santacruz MD;  Location: [facility];  Service: Gynecology Oncology   • REPLACEMENT TOTAL KNEE     • US GUIDED BREAST BIOPSY LEFT COMPLETE Left 2019   • WISDOM TOOTH EXTRACTION         Family History   Problem Relation Age of Onset   • Endometrial cancer Mother 76   • Migraines Mother    • Obesity Father    • Diabetes Father    • Heart disease Father    • Heart attack Father    • Prostate cancer Father    • Hypertension Father    • Diabetes Brother    • Thyroid cancer Brother         medullary   • Diabetes type II Brother    • Growth hormone deficiency Daughter    • No Known Problems Daughter    • Alzheimer's disease Maternal Aunt    • Thyroid cancer Maternal Aunt    • Breast cancer Maternal Aunt 72   • Thyroid cancer Maternal Aunt 85   • No Known Problems Maternal Aunt    • No Known Problems Maternal Aunt    • No Known Problems Maternal Aunt    • No Known Problems Maternal Aunt    • Other Maternal Uncle         Brain Tumor   • Alzheimer's disease Maternal Uncle    • Diabetes Paternal Aunt    • Hashimoto's thyroiditis Paternal Aunt         Total Thyroidectomy   • Hypertension Paternal Aunt    • No Known Problems Paternal Aunt    • Stroke Paternal Uncle    • No Known Problems Maternal Grandmother    • No Known Problems Paternal Grandmother    • Cancer Family    • Colon cancer Neg Hx    • Cervical cancer Neg Hx    • Ovarian cancer Neg Hx    • Uterine cancer Neg Hx        Social History     Occupational History   • Occupation: Pediatrics   Tobacco Use   • Smoking status: Former     Packs/day: 0.50     Types: Cigarettes     Quit date: 1978     Years since quittin.1   • Smokeless tobacco: Never   • Tobacco comments:     as teenager   Vaping Use   • Vaping Use: Never used   Substance and Sexual Activity   • Alcohol use:  Yes   • Drug use: No   • Sexual activity: Not Currently     Birth control/protection: Surgical         Current Outpatient Medications:   •  acetaminophen (TYLENOL) 650 mg CR tablet, Take 1 tablet (650 mg total) by mouth every 8 (eight) hours as needed for mild pain, Disp: 30 tablet, Rfl: 0  •  albuterol (PROVENTIL HFA,VENTOLIN HFA) 90 mcg/act inhaler, INHALE 2 PUFFS EVERY 4-6 HOURS AS NEEDED, Disp: 108 g, Rfl: 3  •  ALPRAZolam (XANAX) 0.25 mg tablet, Take one tablet twice daily as needed for anxiety, Disp: 30 tablet, Rfl: 0  •  butalbital-acetaminophen-caffeine (FIORICET,ESGIC) -40 mg per tablet, Take 1 tablet by mouth every 4 (four) hours as needed for headaches, Disp: 90 tablet, Rfl: 0  •  calcium-vitamin D (OSCAL) 250-125 MG-UNIT per tablet, Take 1 tablet by mouth daily, Disp: , Rfl:   •  cycloSPORINE (RESTASIS) 0.05 % ophthalmic emulsion, Apply 1 drop to eye 2 (two) times a day, Disp: 0.4 mL, Rfl: 0  •  diazepam (VALIUM) 5 mg tablet, Take 1 tablet (5 mg total) by mouth every 8 (eight) hours as needed for anxiety, Disp: 30 tablet, Rfl: 0  •  diphenhydrAMINE (BENADRYL) 25 mg capsule, Take by mouth daily at bedtime as needed, Disp: , Rfl:   •  Elastic Bandages & Supports (Medical Compression Socks) MISC, Use daily, Disp: 5 each, Rfl: 2  •  fexofenadine (ALLEGRA) 180 MG tablet, Take 180 mg by mouth daily, Disp: , Rfl:   •  fluticasone (FLONASE) 50 mcg/act nasal spray, SPRAY 2 SPRAYS INTO EACH NOSTRIL EVERY DAY (Patient taking differently: As needed), Disp: 48 mL, Rfl: 2  •  fluticasone (Flovent HFA) 220 mcg/act inhaler, Inhale 1 puff 2 (two) times a day Rinse mouth after use., Disp: 12 g, Rfl: 3  •  ibuprofen (MOTRIN) 200 mg tablet, Take by mouth every 6 (six) hours as needed for mild pain (Patient not taking: Reported on 7/26/2023), Disp: , Rfl:   •  methocarbamol (ROBAXIN) 750 mg tablet, Take 1 tablet (750 mg total) by mouth every 6 (six) hours as needed for muscle spasms, Disp: 30 tablet, Rfl: 2  •  montelukast (SINGULAIR) 10 mg tablet, TAKE 1 TABLET BY MOUTH EVERY DAY IN THE EVENING (Patient taking differently: if needed), Disp: 30 tablet, Rfl: 2  •  Multiple Vitamin (MULTIVITAMIN) capsule, Take 1 capsule by mouth daily, Disp: , Rfl:   •  pantoprazole (PROTONIX) 40 mg tablet, Take 1 tablet (40 mg total) by mouth 2 (two) times a day, Disp: 180 tablet, Rfl: 0  •  rivaroxaban (XARELTO) 10 mg tablet, Take 1 tablet (10 mg total) by mouth daily, Disp: 90 tablet, Rfl: 3  •  rivaroxaban (Xarelto) 10 mg tablet, Take 1 tablet (10 mg total) by mouth daily, Disp: 7 tablet, Rfl: 0  •  traMADol (Ultram) 50 mg tablet, Take 1 tablet (50 mg total) by mouth every 6 (six) hours as needed for moderate pain, Disp: 30 tablet, Rfl: 0  •  ZOLMitriptan (ZOMIG) 5 MG tablet, Take 1 tablet (5 mg total) by mouth once as needed for migraine for up to 1 dose, Disp: 20 tablet, Rfl: 3    Allergies   Allergen Reactions   • Naproxen Shortness Of Breath   • Prednisone Facial Swelling   • Iv Contrast [Iodinated Contrast Media] Itching   • Seasonal Ic [Cholestatin]    • Phentermine Palpitations            Vitals:    08/18/23 0857   BP: 121/71   Pulse: 71       Objective:  Physical exam  · General: Awake, Alert, Oriented  · Eyes: Pupils equal, round and reactive to light  · Heart: regular rate and rhythm  · Lungs: No audible wheezing  · Abdomen: soft                    Ortho Exam  Right knee:  TTP over medial joint line  No erythema or ecchymosis  No effusion or swelling  Normal strength  Good ROM with crepitus   Calf compartments soft and supple  Sensation intact  Toes are warm sensate and mobile    Diagnostics, reviewed and taken today if performed as documented:    None performed    Procedures, if performed today:    Procedures      Portions of the record may have been created with voice recognition software. Occasional wrong word or "sound a like" substitutions may have occurred due to the inherent limitations of voice recognition software. Read the chart carefully and recognize, using context, where substitutions have occurred.

## 2023-08-21 ENCOUNTER — OFFICE VISIT (OUTPATIENT)
Dept: PHYSICAL THERAPY | Age: 68
End: 2023-08-21
Payer: COMMERCIAL

## 2023-08-21 DIAGNOSIS — M54.2 NECK PAIN: Primary | ICD-10-CM

## 2023-08-21 DIAGNOSIS — M25.561 RIGHT KNEE PAIN, UNSPECIFIED CHRONICITY: ICD-10-CM

## 2023-08-21 DIAGNOSIS — M54.40 BILATERAL LOW BACK PAIN WITH SCIATICA, SCIATICA LATERALITY UNSPECIFIED, UNSPECIFIED CHRONICITY: ICD-10-CM

## 2023-08-21 PROCEDURE — 97140 MANUAL THERAPY 1/> REGIONS: CPT | Performed by: PHYSICAL THERAPIST

## 2023-08-21 PROCEDURE — 97110 THERAPEUTIC EXERCISES: CPT | Performed by: PHYSICAL THERAPIST

## 2023-08-21 NOTE — PROGRESS NOTES
Daily Note     Today's date: 2023  Patient name: Kristan Yoder  : 1955  MRN: [id number]  Referring provider: Yandel Hoover DO  Dx:   Encounter Diagnosis     ICD-10-CM    1. Neck pain  M54.2       2. Bilateral low back pain with sciatica, sciatica laterality unspecified, unspecified chronicity  M54.40       3. Right knee pain, unspecified chronicity  M25.561           Start Time: 1345  Stop Time: 1430  Total time in clinic (min): 45 minutes    Subjective: patient complains of Ha+      Objective: See treatment diary below      Assessment: Tolerated treatment fair. Patient demonstrated fatigue post treatment, exhibited good technique with therapeutic exercises and would benefit from continued PT, tenderness to bilateral upper traps and SO area with HA+      Plan: Progress treatment as tolerated.        Precautions:     Precautions:       Manuals                        MT LB/LE  10 10          MT cervical  10 10                       Neuro Re-Ed                                                                                                        Ther Ex             Nu step 5 min 6 min 6 min          slant 30"/4x 4x 4x          SAQ 2/20 2/30 3/30          Heel slides 30x 30x 30x          BTB rows nv 20x 30x          Bolster stretch  10x 10x          Chin tucks  10x 10x          pulleys  30x 30x          Ther Activity             TM  1.2 mph 6 min 1.2  mph                       Gait Training                                       Modalities                                              Precautions:     Daily Treatment Diary     Manual                                                                                   Exercise Diary              Water walking 10            Postural training 2            Gait training 2            Home exercise pgm/patient education             Wall: t/h raises 1            Hip abd/add 2            Marching 1            squats 1            Knee flex/ext 1            Step-ups (fwd/bkwd/ss)             SLS (eyes open/closed)             SLS w UE mvmt  AROM/ball toss             Weight shifting             UE Noodle work x 4              UE AROM             Resistive UE work (paddles, bells, TB) 3            Core work on noodle (sitting/stdg)             Sit on noodle with movement             Seated on pool bench w proper posture 1            Ankle df/pf             marching             Hip Ab/add 2            Knee flex/ext             Deep water mvmt 5            Deep water tx/stretching 10            Specific self - stretches wall/steps 5                Modalities  8/17            whirlpool 10

## 2023-08-22 ENCOUNTER — ANNUAL EXAM (OUTPATIENT)
Dept: OBGYN CLINIC | Facility: CLINIC | Age: 68
End: 2023-08-22
Payer: COMMERCIAL

## 2023-08-22 VITALS
DIASTOLIC BLOOD PRESSURE: 76 MMHG | WEIGHT: 195.6 LBS | HEIGHT: 63 IN | SYSTOLIC BLOOD PRESSURE: 98 MMHG | BODY MASS INDEX: 34.66 KG/M2

## 2023-08-22 DIAGNOSIS — Z01.419 ENCOUNTER FOR ANNUAL ROUTINE GYNECOLOGICAL EXAMINATION: Primary | ICD-10-CM

## 2023-08-22 DIAGNOSIS — Z12.31 ENCOUNTER FOR SCREENING MAMMOGRAM FOR MALIGNANT NEOPLASM OF BREAST: ICD-10-CM

## 2023-08-22 DIAGNOSIS — Z01.419 ENCOUNTER FOR GYNECOLOGICAL EXAMINATION WITHOUT ABNORMAL FINDING: ICD-10-CM

## 2023-08-22 PROCEDURE — S0612 ANNUAL GYNECOLOGICAL EXAMINA: HCPCS | Performed by: OBSTETRICS & GYNECOLOGY

## 2023-08-22 NOTE — PROGRESS NOTES
Assessment/Plan:    Encounter for gynecological examination without abnormal finding  Pap/HPV not indicated  Mammogram ordered  Colonoscopy current  DEXA current    Encourage healthy diet, exercise, Calcium 1200mg per day and at least 800 iu Vitamin D daily. Diagnoses and all orders for this visit:    Encounter for annual routine gynecological examination    Encounter for screening mammogram for malignant neoplasm of breast  -     Mammo screening bilateral w 3d & cad; Future    Encounter for gynecological examination without abnormal finding          Subjective:      Patient ID: Chan Correa is a 69 y.o. female. Patient presents for a routine annual visit  Hysterectomy  Mammogram-4/21/23   Colonoscopy-5/8/23 recall 3 years. Dr. Ramona Howe  DEXA-3/6/23 osteopenia  [id number]  Former smoker  Social drinker  Currently sexually active  Mother with endometrial cancer  Maternal aunt with breast cancer    No concerns/questions for today's visit  Gynecologic Exam  She reports no genital itching, genital lesions, genital odor, genital rash, pelvic pain, vaginal bleeding or vaginal discharge. The patient is experiencing no pain. Pertinent negatives include no chills, constipation, diarrhea, fever, nausea, sore throat or vomiting. The following portions of the patient's history were reviewed and updated as appropriate:   She  has a past medical history of Acid reflux, Allergic rhinitis, Anesthesia complication, Arthritis, Asthma, Bigeminy, DVT (deep venous thrombosis) ([address]) (10/23/2018), DVT (deep venous thrombosis) ([address]), DVT of leg (deep venous thrombosis) ([address]) (2001), Fatty liver, Female pelvic pain, GERD (gastroesophageal reflux disease), Hyperlipidemia, Intermittent palpitations, Interstitial cystitis, Irregular heart beat, Migraines, Neck pain, Obesity (BMI 30.0-34.9), Palpitations, Pes planus, Thyroid nodule, Tinnitus, Trigeminy, Wears glasses, and Wears reading eyeglasses.   She   Patient Active Problem List    Diagnosis Date Noted   • Prediabetes 2023   • Plantar fasciitis, bilateral 2023   • Trapezius muscle strain, right, initial encounter 2022   • Annual physical exam 2022   • COVID-19 08/15/2022   • Myxoid cyst 2022   • Chest tightness 2022   • At increased risk of exposure to COVID-19 virus 2021   • Seasonal allergic rhinitis due to pollen 2021   • Neck pain 2021   • Cystitis, interstitial 2021   • Moderate persistent asthma without complication    • Gastroesophageal reflux disease without esophagitis 10/22/2019   • Encounter for gynecological examination without abnormal finding 2019   • Hypertriglyceridemia 2019   • Palpitations 2019   • Posterior tibial tendinitis of right lower extremity 10/26/2018   • History of deep venous thrombosis (DVT) of distal vein of right lower extremity 10/26/2018   • Thyroid nodule 2018   • Chronic pain of right knee 2018   • Primary osteoarthritis of right knee 2018   • Effusion of right knee 2018   • Memory difficulty 2017   • Class 1 obesity 2017   • Mild intermittent asthma 2017   • Osteopenia of multiple sites 2016   • Arthralgia of hip, left 2014   • Migraine, unspecified, not intractable, without status migrainosus 11/10/2014   • DVT of leg (deep venous thrombosis) ([address]) 2014   • Arthritis of left knee 10/03/2014     She  has a past surgical history that includes Replacement total knee (); Appendectomy; Knee surgery (Left);  section; Joint replacement; Dilation and curettage of uterus; Colonoscopy; [place] tooth extraction; Cholecystectomy; pr laps total hysterect 250 gm/< w/rmvl tube/ovary (Bilateral, 2016); pr esophagogastroduodenoscopy transoral diagnostic (N/A, 2016); Cataract extraction (Bilateral); US guided breast biopsy left complete (Left, 2019);  Breast biopsy (Left, ); Hysterectomy; and pr excision ganglion wrist dorsal/volar primary (Right, 2/21/2023). Her family history includes Alzheimer's disease in her maternal aunt and maternal uncle; Breast cancer (age of onset: 72) in her maternal aunt; Cancer in her family; Diabetes in her brother, father, and paternal aunt; Diabetes type II in her brother; Endometrial cancer (age of onset: 76) in her mother; Growth hormone deficiency in her daughter; Hashimoto's thyroiditis in her paternal aunt; Heart attack in her father; Heart disease in her father; Hypertension in her father and paternal aunt; Migraines in her mother; No Known Problems in her daughter, maternal aunt, maternal aunt, maternal aunt, maternal aunt, maternal grandmother, paternal aunt, and paternal grandmother; Obesity in her father; Other in her maternal uncle; Prostate cancer in her father; Stroke in her paternal uncle; Thyroid cancer in her brother and maternal aunt; Thyroid cancer (age of onset: 80) in her maternal aunt. She  reports that she quit smoking about 45 years ago. Her smoking use included cigarettes. She smoked an average of .5 packs per day. She has never used smokeless tobacco. She reports current alcohol use. She reports that she does not use drugs.   Current Outpatient Medications   Medication Sig Dispense Refill   • pantoprazole (PROTONIX) 40 mg tablet Take 1 tablet (40 mg total) by mouth 2 (two) times a day 180 tablet 0   • rivaroxaban (Xarelto) 10 mg tablet Take 1 tablet (10 mg total) by mouth daily 7 tablet 0   • acetaminophen (TYLENOL) 650 mg CR tablet Take 1 tablet (650 mg total) by mouth every 8 (eight) hours as needed for mild pain 30 tablet 0   • albuterol (PROVENTIL HFA,VENTOLIN HFA) 90 mcg/act inhaler INHALE 2 PUFFS EVERY 4-6 HOURS AS NEEDED 108 g 3   • ALPRAZolam (XANAX) 0.25 mg tablet Take one tablet twice daily as needed for anxiety 30 tablet 0   • butalbital-acetaminophen-caffeine (FIORICET,ESGIC) -40 mg per tablet Take 1 tablet by mouth every 4 (four) hours as needed for headaches 90 tablet 0   • calcium-vitamin D (OSCAL) 250-125 MG-UNIT per tablet Take 1 tablet by mouth daily     • cycloSPORINE (RESTASIS) 0.05 % ophthalmic emulsion Apply 1 drop to eye 2 (two) times a day 0.4 mL 0   • diazepam (VALIUM) 5 mg tablet Take 1 tablet (5 mg total) by mouth every 8 (eight) hours as needed for anxiety 30 tablet 0   • diphenhydrAMINE (BENADRYL) 25 mg capsule Take by mouth daily at bedtime as needed     • Elastic Bandages & Supports (Medical Compression Socks) MISC Use daily 5 each 2   • fexofenadine (ALLEGRA) 180 MG tablet Take 180 mg by mouth daily     • fluticasone (FLONASE) 50 mcg/act nasal spray SPRAY 2 SPRAYS INTO EACH NOSTRIL EVERY DAY (Patient taking differently: As needed) 48 mL 2   • fluticasone (Flovent HFA) 220 mcg/act inhaler Inhale 1 puff 2 (two) times a day Rinse mouth after use. 12 g 3   • ibuprofen (MOTRIN) 200 mg tablet Take by mouth every 6 (six) hours as needed for mild pain (Patient not taking: Reported on 7/26/2023)     • methocarbamol (ROBAXIN) 750 mg tablet Take 1 tablet (750 mg total) by mouth every 6 (six) hours as needed for muscle spasms 30 tablet 2   • montelukast (SINGULAIR) 10 mg tablet TAKE 1 TABLET BY MOUTH EVERY DAY IN THE EVENING (Patient taking differently: if needed) 30 tablet 2   • Multiple Vitamin (MULTIVITAMIN) capsule Take 1 capsule by mouth daily     • rivaroxaban (XARELTO) 10 mg tablet Take 1 tablet (10 mg total) by mouth daily 90 tablet 3   • traMADol (Ultram) 50 mg tablet Take 1 tablet (50 mg total) by mouth every 6 (six) hours as needed for moderate pain 30 tablet 0   • ZOLMitriptan (ZOMIG) 5 MG tablet Take 1 tablet (5 mg total) by mouth once as needed for migraine for up to 1 dose 20 tablet 3     No current facility-administered medications for this visit.      Current Outpatient Medications on File Prior to Visit   Medication Sig   • pantoprazole (PROTONIX) 40 mg tablet Take 1 tablet (40 mg total) by mouth 2 (two) times a day   • rivaroxaban (Xarelto) 10 mg tablet Take 1 tablet (10 mg total) by mouth daily   • acetaminophen (TYLENOL) 650 mg CR tablet Take 1 tablet (650 mg total) by mouth every 8 (eight) hours as needed for mild pain   • albuterol (PROVENTIL HFA,VENTOLIN HFA) 90 mcg/act inhaler INHALE 2 PUFFS EVERY 4-6 HOURS AS NEEDED   • ALPRAZolam (XANAX) 0.25 mg tablet Take one tablet twice daily as needed for anxiety   • butalbital-acetaminophen-caffeine (FIORICET,ESGIC) -40 mg per tablet Take 1 tablet by mouth every 4 (four) hours as needed for headaches   • calcium-vitamin D (OSCAL) 250-125 MG-UNIT per tablet Take 1 tablet by mouth daily   • cycloSPORINE (RESTASIS) 0.05 % ophthalmic emulsion Apply 1 drop to eye 2 (two) times a day   • diazepam (VALIUM) 5 mg tablet Take 1 tablet (5 mg total) by mouth every 8 (eight) hours as needed for anxiety   • diphenhydrAMINE (BENADRYL) 25 mg capsule Take by mouth daily at bedtime as needed   • Elastic Bandages & Supports (Medical Compression Socks) MISC Use daily   • fexofenadine (ALLEGRA) 180 MG tablet Take 180 mg by mouth daily   • fluticasone (FLONASE) 50 mcg/act nasal spray SPRAY 2 SPRAYS INTO EACH NOSTRIL EVERY DAY (Patient taking differently: As needed)   • fluticasone (Flovent HFA) 220 mcg/act inhaler Inhale 1 puff 2 (two) times a day Rinse mouth after use.    • ibuprofen (MOTRIN) 200 mg tablet Take by mouth every 6 (six) hours as needed for mild pain (Patient not taking: Reported on 7/26/2023)   • methocarbamol (ROBAXIN) 750 mg tablet Take 1 tablet (750 mg total) by mouth every 6 (six) hours as needed for muscle spasms   • montelukast (SINGULAIR) 10 mg tablet TAKE 1 TABLET BY MOUTH EVERY DAY IN THE EVENING (Patient taking differently: if needed)   • Multiple Vitamin (MULTIVITAMIN) capsule Take 1 capsule by mouth daily   • rivaroxaban (XARELTO) 10 mg tablet Take 1 tablet (10 mg total) by mouth daily   • traMADol (Ultram) 50 mg tablet Take 1 tablet (50 mg total) by mouth every 6 (six) hours as needed for moderate pain   • ZOLMitriptan (ZOMIG) 5 MG tablet Take 1 tablet (5 mg total) by mouth once as needed for migraine for up to 1 dose     No current facility-administered medications on file prior to visit. She is allergic to naproxen, prednisone, iv contrast [iodinated contrast media], seasonal ic [cholestatin], and phentermine. .    Review of Systems   Constitutional: Negative for activity change, appetite change, chills, fatigue and fever. HENT: Negative for rhinorrhea, sneezing and sore throat. Eyes: Negative for visual disturbance. Respiratory: Negative for cough, shortness of breath and wheezing. Cardiovascular: Negative for chest pain, palpitations and leg swelling. Gastrointestinal: Negative for abdominal distention, constipation, diarrhea, nausea and vomiting. Genitourinary: Negative for difficulty urinating, pelvic pain and vaginal discharge. Neurological: Negative for syncope and light-headedness. Objective:      BP 98/76 (BP Location: Left arm, Patient Position: Sitting, Cuff Size: Standard)   Ht 5' 3" (1.6 m)   Wt 88.7 kg (195 lb 9.6 oz)   LMP  (LMP Unknown)   BMI 34.65 kg/m²          Physical Exam  Constitutional:       General: She is not in acute distress. Appearance: Normal appearance. She is well-developed. She is not diaphoretic. Chest:   Breasts:     Breasts are symmetrical.      Right: No inverted nipple, mass, nipple discharge, skin change or tenderness. Left: No inverted nipple, mass, nipple discharge, skin change or tenderness. Abdominal:      Palpations: Abdomen is soft. Abdomen is not rigid. Tenderness: There is no abdominal tenderness. There is no guarding or rebound. Hernia: There is no hernia in the left inguinal area. Genitourinary:     Labia:         Right: No rash, tenderness, lesion or injury. Left: No rash, tenderness, lesion or injury.        Vagina: No vaginal discharge or bleeding. Adnexa:         Right: No mass, tenderness or fullness. Left: No mass, tenderness or fullness. Comments: Urethral meatus: no lesions, non tender  Urethra: non tender    Vaginal mucosa atrophic  Skin:     General: Skin is warm and dry. Coloration: Skin is not pale. Findings: No erythema or rash.

## 2023-08-23 NOTE — PATIENT INSTRUCTIONS
Wellness Visit for Adults   WHAT YOU NEED TO KNOW:   What is a wellness visit? A wellness visit is when you see your healthcare provider to get screened for health problems. Your healthcare provider will also give you advice on how to stay healthy. Write down your questions so you remember to ask them. Ask your healthcare provider how often you should have a wellness visit. What happens at a wellness visit? Your healthcare provider will ask about your health, and your family history of health problems. This includes high blood pressure, heart disease, and cancer. He or she will ask if you have symptoms that concern you, if you smoke, and about your mood. You may also be asked about your intake of medicines, supplements, food, and alcohol. Any of the following may be done: Your weight  will be checked. Your height may also be checked so your body mass index (BMI) can be calculated. Your BMI shows if you are at a healthy weight. Your blood pressure  and heart rate will be checked. Your temperature may also be checked. Blood and urine tests  may be done. Blood tests may be done to check your cholesterol levels. Abnormal cholesterol levels increase your risk for heart disease and stroke. You may also need a blood or urine test to check for diabetes if you are at increased risk. Urine tests may be done to look for signs of an infection or kidney disease. A physical exam  includes checking your heartbeat and lungs with a stethoscope. Your healthcare provider may also check your skin to look for sun damage. Screening tests  may be recommended. A screening test is done to check for diseases that may not cause symptoms. The screening tests you may need depend on your age, gender, family history, and lifestyle habits. For example, colorectal screening may be recommended if you are 48years old or older. What screening tests do I need if I am a woman? A Pap smear  is used to screen for cervical cancer.  Pap smears are usually done every 3 to 5 years depending on your age. You may need them more often if you have had abnormal Pap smear test results in the past. Ask your healthcare provider how often you should have a Pap smear. A mammogram  is an x-ray of your breasts to screen for breast cancer. Experts recommend mammograms every 2 years starting at age 48 years. You may need a mammogram at age 52 years or younger if you have an increased risk for breast cancer. Talk to your healthcare provider about when you should start having mammograms and how often you need them. What vaccines might I need? Get an influenza vaccine  every year. The influenza vaccine protects you from the flu. Several types of viruses cause the flu. The viruses change over time, so new vaccines are made each year. Get a tetanus-diphtheria (Td) booster vaccine  every 10 years. This vaccine protects you against tetanus and diphtheria. Tetanus is a severe infection that may cause painful muscle spasms and lockjaw. Diphtheria is a severe bacterial infection that causes a thick covering in the back of your mouth and throat. Get a human papillomavirus (HPV) vaccine  if you are female and aged 23 to 32 or male 23 to 24 and never received it. This vaccine protects you from HPV infection. HPV is the most common infection spread by sexual contact. HPV may also cause vaginal, penile, and anal cancers. Get a pneumococcal vaccine  if you are aged 72 years or older. The pneumococcal vaccine is an injection given to protect you from pneumococcal disease. Pneumococcal disease is an infection caused by pneumococcal bacteria. The infection may cause pneumonia, meningitis, or an ear infection. Get a shingles vaccine  if you are 60 or older, even if you have had shingles before. The shingles vaccine is an injection to protect you from the varicella-zoster virus. This is the same virus that causes chickenpox.  Shingles is a painful rash that develops in people who had chickenpox or have been exposed to the virus. How can I eat healthy? My Plate is a model for planning healthy meals. It shows the types and amounts of foods that should go on your plate. Fruits and vegetables make up about half of your plate, and grains and protein make up the other half. A serving of dairy is included on the side of your plate. The amount of calories and serving sizes you need depends on your age, gender, weight, and height. Examples of healthy foods are listed below:  Eat a variety of vegetables  such as dark green, red, and orange vegetables. You can also include canned vegetables low in sodium (salt) and frozen vegetables without added butter or sauces. Eat a variety of fresh fruits , canned fruit in 100% juice, frozen fruit, and dried fruit. Include whole grains. At least half of the grains you eat should be whole grains. Examples include whole-wheat bread, wheat pasta, brown rice, and whole-grain cereals such as oatmeal.    Eat a variety of protein foods such as seafood (fish and shellfish), lean meat, and poultry without skin (turkey and chicken). Examples of lean meats include pork leg, shoulder, or tenderloin, and beef round, sirloin, tenderloin, and extra lean ground beef. Other protein foods include eggs and egg substitutes, beans, peas, soy products, nuts, and seeds. Choose low-fat dairy products such as skim or 1% milk or low-fat yogurt, cheese, and cottage cheese. Limit unhealthy fats  such as butter, hard margarine, and shortening. How much exercise do I need? Exercise at least 30 minutes per day on most days of the week. Some examples of exercise include walking, biking, dancing, and swimming. You can also fit in more physical activity by taking the stairs instead of the elevator or parking farther away from stores. Include muscle strengthening activities 2 days each week. Regular exercise provides many health benefits.  It helps you manage your weight, and decreases your risk for type 2 diabetes, heart disease, stroke, and high blood pressure. Exercise can also help improve your mood. Ask your healthcare provider about the best exercise plan for you. What are some general health and safety guidelines I should follow? Do not smoke. Nicotine and other chemicals in cigarettes and cigars can cause lung damage. Ask your healthcare provider for information if you currently smoke and need help to quit. E-cigarettes or smokeless tobacco still contain nicotine. Talk to your healthcare provider before you use these products. Limit alcohol. A drink of alcohol is 12 ounces of beer, 5 ounces of wine, or 1½ ounces of liquor. Lose weight, if needed. Being overweight increases your risk of certain health conditions. These include heart disease, high blood pressure, type 2 diabetes, and certain types of cancer. Protect your skin. Do not sunbathe or use tanning beds. Use sunscreen with a SPF 15 or higher. Apply sunscreen at least 15 minutes before you go outside. Reapply sunscreen every 2 hours. Wear protective clothing, hats, and sunglasses when you are outside. Drive safely. Always wear your seatbelt. Make sure everyone in your car wears a seatbelt. A seatbelt can save your life if you are in an accident. Do not use your cell phone when you are driving. This could distract you and cause an accident. Pull over if you need to make a call or send a text message. Practice safe sex. Use latex condoms if are sexually active and have more than one partner. Your healthcare provider may recommend screening tests for sexually transmitted infections (STIs). Wear helmets, lifejackets, and protective gear. Always wear a helmet when you ride a bike or motorcycle, go skiing, or play sports that could cause a head injury. Wear protective equipment when you play sports. Wear a lifejacket when you are on a boat or doing water sports.     CARE AGREEMENT: You have the right to help plan your care. Learn about your health condition and how it may be treated. Discuss treatment options with your healthcare providers to decide what care you want to receive. You always have the right to refuse treatment. The above information is an  only. It is not intended as medical advice for individual conditions or treatments. Talk to your doctor, nurse or pharmacist before following any medical regimen to see if it is safe and effective for you. © Copyright [facility] [employer] 2022 Information is for End User's use only and may not be sold, redistributed or otherwise used for commercial purposes.

## 2023-08-23 NOTE — ASSESSMENT & PLAN NOTE
Pap/HPV not indicated  Mammogram ordered  Colonoscopy current  DEXA current    Encourage healthy diet, exercise, Calcium 1200mg per day and at least 800 iu Vitamin D daily.

## 2023-08-24 ENCOUNTER — OFFICE VISIT (OUTPATIENT)
Dept: PHYSICAL THERAPY | Age: 68
End: 2023-08-24
Payer: COMMERCIAL

## 2023-08-24 DIAGNOSIS — M54.2 NECK PAIN: Primary | ICD-10-CM

## 2023-08-24 DIAGNOSIS — M54.40 BILATERAL LOW BACK PAIN WITH SCIATICA, SCIATICA LATERALITY UNSPECIFIED, UNSPECIFIED CHRONICITY: ICD-10-CM

## 2023-08-24 DIAGNOSIS — M25.561 RIGHT KNEE PAIN, UNSPECIFIED CHRONICITY: ICD-10-CM

## 2023-08-24 PROCEDURE — 97113 AQUATIC THERAPY/EXERCISES: CPT

## 2023-08-24 NOTE — PROGRESS NOTES
Daily Note     Today's date: 2023  Patient name: Radha Kenny  : 1955  MRN: [id number]  Referring provider: Shanda Quarles DO  Dx:   Encounter Diagnosis     ICD-10-CM    1. Neck pain  M54.2       2. Bilateral low back pain with sciatica, sciatica laterality unspecified, unspecified chronicity  M54.40       3. Right knee pain, unspecified chronicity  M25.561           Start Time: 1100  Stop Time: 1200  Total time in clinic (min): 60 minutes    Subjective: patient reports she is in pain today, she needed to take tylenol this morning. Objective: See treatment diary below      Assessment: Tolerated treatment fairly well, some challenges in the water with her standing wall exercises. Patient required verbal cueing throughout prescribed exercises for proper execution and dosage. Patient demonstrated fatigue post treatment and would benefit from continued PT      Plan: Continue per plan of care.       Precautions:         Exercise Diary             Water walking 10 10           Postural training 2            Gait training 2 2           Home exercise pgm/patient education             Wall: t/h raises 1 10x           Hip abd/add 2 2           Marching 1 1           squats 1 1           Knee flex/ext 1 1           Hip flex/ext swing  2           SLS (eyes open/closed)             SLS w UE mvmt  AROM/ball toss             Weight shifting             UE Noodle work x 4   3'           UE AROM             Resistive UE work (paddles, bells, TB) 3 2'beige           Core work on noodle (sitting/stdg)             Sit on noodle with movement             Seated on pool bench w proper posture 1 1           Ankle df/pf  1           marching  1           Hip Ab/add 2 1           Knee flex/ext  1           Deep water mvmt 5 5           Deep water tx/stretching 10 10           Specific self - stretches wall/steps 5 5               Modalities             whirlpool 10 10

## 2023-08-25 ENCOUNTER — HOSPITAL ENCOUNTER (OUTPATIENT)
Dept: ULTRASOUND IMAGING | Facility: HOSPITAL | Age: 68
End: 2023-08-25
Payer: COMMERCIAL

## 2023-08-25 DIAGNOSIS — E04.1 THYROID NODULE: ICD-10-CM

## 2023-08-25 PROCEDURE — 76536 US EXAM OF HEAD AND NECK: CPT

## 2023-08-28 ENCOUNTER — OFFICE VISIT (OUTPATIENT)
Dept: PHYSICAL THERAPY | Age: 68
End: 2023-08-28
Payer: COMMERCIAL

## 2023-08-28 DIAGNOSIS — M54.40 BILATERAL LOW BACK PAIN WITH SCIATICA, SCIATICA LATERALITY UNSPECIFIED, UNSPECIFIED CHRONICITY: ICD-10-CM

## 2023-08-28 DIAGNOSIS — M54.2 NECK PAIN: Primary | ICD-10-CM

## 2023-08-28 DIAGNOSIS — M25.561 RIGHT KNEE PAIN, UNSPECIFIED CHRONICITY: ICD-10-CM

## 2023-08-28 PROCEDURE — 97110 THERAPEUTIC EXERCISES: CPT | Performed by: PHYSICAL THERAPIST

## 2023-08-28 PROCEDURE — 97140 MANUAL THERAPY 1/> REGIONS: CPT | Performed by: PHYSICAL THERAPIST

## 2023-08-28 NOTE — PROGRESS NOTES
Daily Note     Today's date: 2023  Patient name: Marcy Arnett  : 1955  MRN: [id number]  Referring provider: Katharine Perez DO  Dx:   Encounter Diagnosis     ICD-10-CM    1. Neck pain  M54.2       2. Bilateral low back pain with sciatica, sciatica laterality unspecified, unspecified chronicity  M54.40       3. Right knee pain, unspecified chronicity  M25.561           Start Time: 1345  Stop Time: 1430  Total time in clinic (min): 45 minutes    Subjective: patient complains left low back pain secondary to bending      Objective: See treatment diary below      Assessment: Tolerated treatment fair. Patient demonstrated fatigue post treatment, exhibited good technique with therapeutic exercises and would benefit from continued PT, tender to left iliocostalis muscle      Plan: Progress treatment as tolerated.        Precautions:               Manuals                       MT LB/LE  10 10 10         MT cervical  10 10 10                      Neuro Re-Ed                                                                                                        Ther Ex             Nu step 5 min 6 min 6 min 6 min         slant 30"/4x 4x 4x 4x         SAQ 2/20 2/30 3/30 3/30         Heel slides 30x 30x 30x 30x         BTB rows nv 20x 30x 30x         Bolster stretch  10x 10x 10x         Chin tucks  10x 10x 10x         pulleys  30x 30x 30x         Ther Activity             TM  1.2 mph 6 min 1.2  mph 1.2  Mph  6 min                      Gait Training                                       Modalities

## 2023-08-29 ENCOUNTER — EVALUATION (OUTPATIENT)
Dept: PHYSICAL THERAPY | Facility: CLINIC | Age: 68
End: 2023-08-29

## 2023-08-29 DIAGNOSIS — M72.2 PLANTAR FASCIITIS, BILATERAL: Primary | ICD-10-CM

## 2023-08-29 PROCEDURE — 97760 ORTHOTIC MGMT&TRAING 1ST ENC: CPT

## 2023-08-29 NOTE — PROGRESS NOTES
Orthotic Evaluation    Today's date: 23  Patient name: Katie Shirley  : 1955  MRN: [id number]  Referring provider: Auburn Lundborg, DO  Dx:   Encounter Diagnosis     ICD-10-CM    1. Plantar fasciitis, bilateral  M72.2           Start Time:   Stop Time: 3851  Total time in clinic (min): 30 minutes     Subjective: Ji Schaefer presents today for orthotic evaluation. she complains of pain, decreased strength, decreased ROM, decreased endurance and ambulatory dysfunction with functional activities including ambulation, leisure, athletics and work. The patient plans to use her orthotic for athletics, walking, standing and work. The orthotic will be placed in a wide, size 10.5 [place]. Objective:    Age: 69 y.o.   Weight: 195lbs    Foot Posture Index Score    Right Foot  Talar dome - +2  Malleolar curve - +2   Calcaneal eversion - +2  Talonavicular bulge - +2  Medial longitudinal arch - +2  Too many toes - +2  Total Score R = +12    Left Foot  Talar dome - +2  Malleolar curve - +2   Calcaneal eversion - +2  Talonavicular bulge - +2  Medial longitudinal arch - +2  Too many toes - +2  Total Score L = +12    Reference Values  Normal =    0 to +5  Pronated =  +6 to +9 Highly Pronated =  10+  Supinated =   -1 to -4 Highly Supinated = -5 to -12    Objective     Tenderness     Additional Tenderness Details  TTP on medial plantar surface bilaterally    Neurological Testing     Sensation     Ankle/Foot   Left Ankle/Foot   Intact: light touch    Right Ankle/Foot   Intact: light touch     Passive Range of Motion   Left Ankle/Foot  Normal passive range of motion    Right Ankle/Foot  Normal passive range of motion    Joint Play     Additional Joint Play Details  WFL joint mobility    Strength/Myotome Testing     Left Ankle/Foot   Dorsiflexion: 4+  Plantar flexion: 4+  Inversion: 4-  Eversion: 4  Great toe flexion: 4-  Great toe extension: 4    Right Ankle/Foot   Dorsiflexion: 4+  Plantar flexion: 4+  Inversion: 4-  Eversion: 4  Great toe flexion: 4-  Great toe extension: 4      Assessment:    Patient requires custom foot orthosis with medial clip, deepened heel cup, plantar fasciitis groove, and poron top cover to correct altered gait mechanics. Patient is not currently controlled by her motion-controlled shoe. Orthotic goals:  1) Patient will have custom foot orthoses fitted to her shoe. 2) Patient will be compliant with break-in period of custom foot orthoses as prescribed by PT. 3) Patient will be compliant with custom foot orthoses use as prescribed by PT.      Plan:    Planned therapy interventions: orthotic fitting/training  Duration in visits: 2

## 2023-08-30 ENCOUNTER — OFFICE VISIT (OUTPATIENT)
Dept: PHYSICAL THERAPY | Age: 68
End: 2023-08-30
Payer: COMMERCIAL

## 2023-08-30 DIAGNOSIS — M54.2 NECK PAIN: Primary | ICD-10-CM

## 2023-08-30 DIAGNOSIS — M25.561 RIGHT KNEE PAIN, UNSPECIFIED CHRONICITY: ICD-10-CM

## 2023-08-30 DIAGNOSIS — M54.40 BILATERAL LOW BACK PAIN WITH SCIATICA, SCIATICA LATERALITY UNSPECIFIED, UNSPECIFIED CHRONICITY: ICD-10-CM

## 2023-08-30 PROCEDURE — 97113 AQUATIC THERAPY/EXERCISES: CPT

## 2023-08-30 NOTE — PROGRESS NOTES
Daily Note     Today's date: 2023  Patient name: Zuly Cedillo  : 1955  MRN: [id number]  Referring provider: Alison Hernandez DO  Dx:   Encounter Diagnosis     ICD-10-CM    1. Neck pain  M54.2       2. Bilateral low back pain with sciatica, sciatica laterality unspecified, unspecified chronicity  M54.40       3. Right knee pain, unspecified chronicity  M25.561           Start Time: 0900  Stop Time: 1000  Total time in clinic (min): 60 minutes    Subjective: pt notes R ankle is very tender today and sore from her eval yesterday to be fitted for Orthotics. Pt c/o R knee pain during session. Objective: See treatment diary below      Assessment: Tolerated treatment fair. Pain in R knee continued during session. Difficulty w/ UE ex' s today, painful during noodle and band ex's. Patient would benefit from continued PT      Plan: Continue per plan of care.       Precautions:                 Exercise Diary            Water walking 10 10 10          Postural training 2            Gait training 2 2           Home exercise pgm/patient education             Wall: t/h raises 1 10x x10          Hip abd/add 2 2 2           1 1 1          squats 1 1 1          Knee flex/ext 1 1 1          Hip flex/ext swing  2 2          SLS (eyes open/closed)             SLS w UE mvmt  AROM/ball toss             Weight shifting             UE Noodle work x 4   3' 3'          UE AROM             Resistive UE work (paddles, bells, TB) 3 2'beige x10 ea          Core work on noodle (sitting/stdg)             Sit on noodle with movement             Seated on pool bench w proper posture 1 1           Ankle df/pf  1 2            1 1          Hip Ab/add 2 1 1          Knee flex/ext  1 1          Deep water mvmt 5 5 5,1          Deep water tx/stretching 10 10 10          Specific self - stretches wall/steps 5 5 5              Modalities            whirlpool 10 10 10

## 2023-09-06 ENCOUNTER — EVALUATION (OUTPATIENT)
Dept: PHYSICAL THERAPY | Age: 68
End: 2023-09-06
Payer: COMMERCIAL

## 2023-09-06 DIAGNOSIS — M54.40 BILATERAL LOW BACK PAIN WITH SCIATICA, SCIATICA LATERALITY UNSPECIFIED, UNSPECIFIED CHRONICITY: ICD-10-CM

## 2023-09-06 DIAGNOSIS — M54.2 NECK PAIN: Primary | ICD-10-CM

## 2023-09-06 DIAGNOSIS — M25.561 RIGHT KNEE PAIN, UNSPECIFIED CHRONICITY: ICD-10-CM

## 2023-09-06 PROCEDURE — 97140 MANUAL THERAPY 1/> REGIONS: CPT | Performed by: PHYSICAL THERAPIST

## 2023-09-06 PROCEDURE — 97110 THERAPEUTIC EXERCISES: CPT | Performed by: PHYSICAL THERAPIST

## 2023-09-06 NOTE — PROGRESS NOTES
PT Re-Evaluation     Today's date: 2023  Patient name: Bina Martel  : 1955  MRN: [id number]  Referring provider: Nicolas Bonilla DO  Dx:   Encounter Diagnosis     ICD-10-CM    1. Neck pain  M54.2       2. Bilateral low back pain with sciatica, sciatica laterality unspecified, unspecified chronicity  M54.40       3. Right knee pain, unspecified chronicity  M25.561           Start Time: 1400  Stop Time: 1445  Total time in clinic (min): 45 minutes    Assessment  Assessment details: 2023, patient demonstrates increased ROM and strength and pain  to back and knee and cervical areas after 8 PT visits. Patient also notes improved FOTO scores and is continues to work part time  Impairments: abnormal gait, abnormal muscle firing, abnormal muscle tone, abnormal or restricted ROM, abnormal movement, activity intolerance, impaired physical strength, lacks appropriate home exercise program, pain with function, weight-bearing intolerance, poor posture  and poor body mechanics  Understanding of Dx/Px/POC: good   Prognosis: fair    Goals  ST-3 WEEKS  1. Decrease pain by 2 points on VAS at its worst.MET  2. Increase ROM by > 5 deg in all deficients planes. MET  3. Increase UE/CORE/LE by 1/2 MMT grade in all deficient planes. WORKING TOWARDS    LT-6 WEEKS  1. Patient to be independent with a/iadls especially walking and reaching and steps pain free. WORKING TOWARDS  2. Increase functional activities for leisure and home activities to previous LOF.   3. Independent with HEP and/or fitness program.    Plan  Patient would benefit from: skilled physical therapy  Planned modality interventions: cryotherapy, electrical stimulation/Russian stimulation, thermotherapy: hydrocollator packs and unattended electrical stimulation  Planned therapy interventions: activity modification, behavior modification, body mechanics training, aquatic therapy, flexibility, functional ROM exercises, home exercise program, IADL retraining, joint mobilization, manual therapy, neuromuscular re-education, patient education, postural training, strengthening, stretching, therapeutic activities and therapeutic exercise  Frequency: 2x week (2-3x week)  Duration in weeks: 6  Plan of Care beginning date: 2023  Plan of Care expiration date: 2023  Treatment plan discussed with: patient        Subjective Evaluation    History of Present Illness  Mechanism of injury: 2023, patient making slow steady progress with decreased pain and increased mobility and strength noted to lumbar,cervical and knee joints after 8 PT visits          Not a recurrent problem   Quality of life: good    Patient Goals  Patient goals for therapy: decreased pain, increased motion, increased strength and independence with ADLs/IADLs    Pain  Current pain ratin  At best pain ratin  At worst pain ratin  Quality: dull ache  Relieving factors: rest and relaxation  Aggravating factors: stair climbing, sitting, overhead activity and walking  Progression: improved    Social Support  Steps to enter house: yes  Stairs in house: yes   Lives in: multiple-level home  Lives with: spouse and adult children      Diagnostic Tests  X-ray: abnormal  MRI studies: abnormal  Treatments  Previous treatment: injection treatment and physical therapy    Objective     Static Posture     Thoracic Spine  Hyperkyphosis. Lumbar Spine   Increased lordosis. Ankle/Foot   Ankle/Foot (Left): Pes planus. Ankle/Foot (Right): Pes planus. Palpation   Left   Hypertonic in the erector spinae, levator scapulae, lumbar paraspinals, pectoralis minor, scalenes, suboccipitals and upper trapezius. Tenderness of the upper trapezius. Right   Hypertonic in the erector spinae, levator scapulae, lumbar paraspinals, pectoralis minor, scalenes, suboccipitals and upper trapezius. Tenderness of the upper trapezius. Tenderness     Left Hip   Tenderness in the PSIS.      Right Hip Tenderness in the PSIS. Right Knee   Tenderness in the medial joint line. Active Range of Motion   Cervical/Thoracic Spine       Cervical    Flexion: 35 degrees   Extension: 60 degrees      Left lateral flexion: 20 degrees      Right lateral flexion: 20 degrees      Left rotation: 50 degrees  Right rotation: 65 degrees         Thoracic    Extension:  Restriction level: moderate    Lumbar   Flexion: 75 degrees   Extension: 20 degrees   Left lateral flexion: 30 degrees       Right lateral flexion: 25 degrees   Left Knee   Flexion: 89 degrees   Extension: 0 degrees     Right Knee   Flexion: 115 degrees   Extension: 0 degrees     Strength/Myotome Testing   Cervical Spine   Neck flexion: 4-    Left Shoulder     Planes of Motion   Flexion: 4   Extension: 4+   Abduction: 4   Adduction: 4+   External rotation at 0°: 4   Internal rotation at 0°: 4+     Isolated Muscles   Lower trapezius: 4     Right Shoulder     Planes of Motion   Flexion: 4+   Extension: 4+   Abduction: 4+   Adduction: 4+   External rotation at 0°: 4+   Internal rotation at 0°: 4+     Isolated Muscles   Lower trapezius: 4     Left Elbow   Flexion: 4+  Extension: 4+    Right Elbow   Flexion: 4+  Extension: 4+    Left Hip   Planes of Motion   Flexion: 4+  Extension: 4  Abduction: 4  Adduction: 4+    Right Hip   Planes of Motion   Flexion: 4+  Extension: 4  Abduction: 4  Adduction: 4+    Left Knee   Flexion: 4+  Extension: 4    Right Knee   Flexion: 4+  Extension: 4    Left Ankle/Foot   Dorsiflexion: 4+  Plantar flexion: 4+    Right Ankle/Foot   Dorsiflexion: 4+  Plantar flexion: 4+    Tests     Lumbar     Left   Negative passive SLR and slump test.     Right   Negative passive SLR and slump test.     Right Knee   Positive Apley's compression and medial Brian. Ambulation     Observational Gait   Gait: antalgic   Decreased walking speed and stride length.      Functional Assessment        Single Leg Stance   Left: 3 seconds  Right: 3 seconds                 Precautions:               Manuals 8/11 8/16 8/21 8/28 9/6                     MT LB/LE  10 10 10 10        MT cervical  10 10 10 10                     Neuro Re-Ed                                                                                                        Ther Ex             Nu step 5 min 6 min 6 min 6 min 6 min        slant 30"/4x 4x 4x 4x 4x        SAQ 2/20 2/30 3/30 3/30 3/30        Heel slides 30x 30x 30x 30x 30x        BTB rows nv 20x 30x 30x 30x        Bolster stretch  10x 10x 10x 10x        Chin tucks  10x 10x 10x 10x        pulleys  30x 30x 30x 30x        Ther Activity             TM  1.2 mph 6 min 1.2  mph 1.2  Mph  6 min 1.2  Mph  6 min        Hip abd             Ball squeeze     20x                                  Modalities

## 2023-09-08 ENCOUNTER — OFFICE VISIT (OUTPATIENT)
Dept: PHYSICAL THERAPY | Age: 68
End: 2023-09-08
Payer: COMMERCIAL

## 2023-09-08 DIAGNOSIS — M72.2 PLANTAR FASCIITIS, BILATERAL: ICD-10-CM

## 2023-09-08 DIAGNOSIS — M54.40 BILATERAL LOW BACK PAIN WITH SCIATICA, SCIATICA LATERALITY UNSPECIFIED, UNSPECIFIED CHRONICITY: ICD-10-CM

## 2023-09-08 DIAGNOSIS — M54.2 NECK PAIN: Primary | ICD-10-CM

## 2023-09-08 DIAGNOSIS — M25.561 RIGHT KNEE PAIN, UNSPECIFIED CHRONICITY: ICD-10-CM

## 2023-09-08 PROCEDURE — 97113 AQUATIC THERAPY/EXERCISES: CPT

## 2023-09-08 NOTE — PROGRESS NOTES
Daily Note     Today's date: 2023  Patient name: Berenice Valencia  : 1955  MRN: [id number]  Referring provider: Kaitlin Rabago DO  Dx:   Encounter Diagnosis     ICD-10-CM    1. Neck pain  M54.2       2. Bilateral low back pain with sciatica, sciatica laterality unspecified, unspecified chronicity  M54.40       3. Right knee pain, unspecified chronicity  M25.561       4. Plantar fasciitis, bilateral  M72.2           Start Time: 1500  Stop Time: 1600  Total time in clinic (min): 60 minutes    Subjective: Patient reports that she worked today and was bending down a lot at work, c/o increased pain today in her LB and neck today. Objective: See treatment diary below      Assessment: Tolerated treatment fairly well, some pain and discomfort in her LB and neck throughout session. Completed program with fair tolerance. Patient required verbal cueing throughout prescribed exercises for proper execution and dosage. Patient demonstrated fatigue post treatment and would benefit from continued PT   Patient seen 1:1 for 30 minutes with treating therapist, 15 minutes with JK and rest of time group setting. Plan: Continue per plan of care.       Precautions:       Exercise Diary           Water walking 10 10 10 10         Postural training 2            Gait training 2 2           Home exercise pgm/patient education             Wall: t/h raises 1 10x x10 10x         Hip abd/add 2 2 2 2         Marching 1 1 1 1         squats 1 1 1 1         Knee flex/ext 1 1 1 1         Hip flex/ext swing  2 2 2         SLS (eyes open/closed)             SLS w UE mvmt  AROM/ball toss             Weight shifting             UE Noodle work x 4   3' 3' 3'         UE AROM             Resistive UE work (paddles, bells, TB) 3 2'beige x10 ea 10x ea         Core work on noodle (sitting/stdg)             Sit on noodle with movement             Seated on pool bench w proper posture 1 1           Ankle df/pf 1 2 2         marching  1 1 1         Hip Ab/add 2 1 1 1         Knee flex/ext  1 1 1         Deep water mvmt 5 5 5,1 6'         Deep water tx/stretching 10 10 10 10         Specific self - stretches wall/steps 5 5 5 3             Modalities  8/17 8/24 8/30 9/8         whirlpool 10 10 10 10

## 2023-09-11 ENCOUNTER — OFFICE VISIT (OUTPATIENT)
Dept: PHYSICAL THERAPY | Age: 68
End: 2023-09-11
Payer: COMMERCIAL

## 2023-09-11 DIAGNOSIS — M54.2 NECK PAIN: Primary | ICD-10-CM

## 2023-09-11 DIAGNOSIS — M25.561 RIGHT KNEE PAIN, UNSPECIFIED CHRONICITY: ICD-10-CM

## 2023-09-11 DIAGNOSIS — M54.40 BILATERAL LOW BACK PAIN WITH SCIATICA, SCIATICA LATERALITY UNSPECIFIED, UNSPECIFIED CHRONICITY: ICD-10-CM

## 2023-09-11 PROCEDURE — 97110 THERAPEUTIC EXERCISES: CPT | Performed by: PHYSICAL THERAPIST

## 2023-09-11 PROCEDURE — 97140 MANUAL THERAPY 1/> REGIONS: CPT | Performed by: PHYSICAL THERAPIST

## 2023-09-11 NOTE — PROGRESS NOTES
Daily Note     Today's date: 2023  Patient name: Katie Shirley  : 1955  MRN: [id number]  Referring provider: Auburn Lundborg, DO  Dx:   Encounter Diagnosis     ICD-10-CM    1. Neck pain  M54.2       2. Bilateral low back pain with sciatica, sciatica laterality unspecified, unspecified chronicity  M54.40       3. Right knee pain, unspecified chronicity  M25.561           Start Time: 1400  Stop Time: 1445  Total time in clinic (min): 45 minutes    Subjective: patient reports she has a Ha+ today      Objective: See treatment diary below      Assessment: Tolerated treatment fair. Patient demonstrated fatigue post treatment, exhibited good technique with therapeutic exercises and would benefit from continued PT, still with complaints of cervical pain and Ha+      Plan: Progress treatment as tolerated.        Precautions:             Manuals                     MT LB/LE  10 10 10 10 10       MT cervical  10 10 10 10 10                    Neuro Re-Ed                                                                                                        Ther Ex             Nu step 5 min 6 min 6 min 6 min 6 min 6 min       slant 30"/4x 4x 4x 4x 4x 4x       SAQ 2/20 2/30 3/30 3/30 3/30 3/30       Heel slides 30x 30x 30x 30x 30x 3/30       BTB rows nv 20x 30x 30x 30x 30x       Bolster stretch  10x 10x 10x 10x 10x       Chin tucks  10x 10x 10x 10x 10x       pulleys  30x 30x 30x 30x 30x       Ther Activity             TM  1.2 mph 6 min 1.2  mph 1.2  Mph  6 min 1.2  Mph  6 min 1.2  Mph  6 min       Hip abd             Ball squeeze     20x 30x                                 Modalities

## 2023-09-13 ENCOUNTER — OFFICE VISIT (OUTPATIENT)
Dept: PHYSICAL THERAPY | Age: 68
End: 2023-09-13
Payer: COMMERCIAL

## 2023-09-13 DIAGNOSIS — M25.561 RIGHT KNEE PAIN, UNSPECIFIED CHRONICITY: ICD-10-CM

## 2023-09-13 DIAGNOSIS — M54.40 BILATERAL LOW BACK PAIN WITH SCIATICA, SCIATICA LATERALITY UNSPECIFIED, UNSPECIFIED CHRONICITY: ICD-10-CM

## 2023-09-13 DIAGNOSIS — M54.2 NECK PAIN: Primary | ICD-10-CM

## 2023-09-13 PROCEDURE — 97113 AQUATIC THERAPY/EXERCISES: CPT

## 2023-09-13 NOTE — PROGRESS NOTES
Daily Note     Today's date: 2023  Patient name: Pancho Davis  : 1955  MRN: [id number]  Referring provider: Eddie Sharma DO  Dx:   Encounter Diagnosis     ICD-10-CM    1. Neck pain  M54.2       2. Bilateral low back pain with sciatica, sciatica laterality unspecified, unspecified chronicity  M54.40       3. Right knee pain, unspecified chronicity  M25.561           Start Time: 1005  Stop Time: 1100  Total time in clinic (min): 55 minutes    Subjective: pt notes she woke up w/ a headache this morning and took some pain meds for it. She also c/o difficulty w/ her asthma and needed to take her asthma medication this morning too. " Last session killed me"       Objective: See treatment diary below      Assessment: Tolerated treatment fair. Pt was able to do gentle movements today on the bench, standing and in DW. Worked on relaxing with breathing ex's during ex's. Added some UE movements for neck and shoulders. Patient would benefit from continued PT      Plan: Continue per plan of care.       Precautions:           Exercise Diary          Water walking 10 10 10 10 13        Postural training 2            Gait training 2 2           Home exercise pgm/patient education             Wall: t/h raises 1 10x x10 10x         Hip abd/add 2 2 2 2 2         1 1 1 1 1        squats 1 1 1 1 1        Knee flex/ext 1 1 1 1 1        Hip flex/ext swing  2 2 2 2        SLS (eyes open/closed)             SLS w UE mvmt  AROM/ball toss             Weight shifting             UE Noodle work x 4   3' 3' 3' 2        UE AROM             Resistive UE work (paddles, bells, TB) 3 2'beige x10 ea 10x ea 5'        Core work on noodle (sitting/stdg)             Sit on noodle with movement             Seated on pool bench w proper posture 1 1           Ankle df/pf  1 2 2 2        marching  1 1 1 1        Hip Ab/add 2 1 1 1 1        Knee flex/ext  1 1 1 1        Deep water mvmt 5 5 5,1 6' 1 Deep water tx/stretching 10 10 10 10 10        Specific self - stretches wall/steps 5 5 5 3 3            Modalities  8/17 8/24 8/30 9/8 9/13        whirlpool 10 10 10 10 10

## 2023-09-18 ENCOUNTER — OFFICE VISIT (OUTPATIENT)
Dept: PHYSICAL THERAPY | Age: 68
End: 2023-09-18
Payer: COMMERCIAL

## 2023-09-18 DIAGNOSIS — M54.40 BILATERAL LOW BACK PAIN WITH SCIATICA, SCIATICA LATERALITY UNSPECIFIED, UNSPECIFIED CHRONICITY: ICD-10-CM

## 2023-09-18 DIAGNOSIS — M54.2 NECK PAIN: Primary | ICD-10-CM

## 2023-09-18 DIAGNOSIS — M25.561 RIGHT KNEE PAIN, UNSPECIFIED CHRONICITY: ICD-10-CM

## 2023-09-18 DIAGNOSIS — M72.2 PLANTAR FASCIITIS, BILATERAL: ICD-10-CM

## 2023-09-18 PROCEDURE — 97110 THERAPEUTIC EXERCISES: CPT | Performed by: PHYSICAL THERAPIST

## 2023-09-18 PROCEDURE — 97140 MANUAL THERAPY 1/> REGIONS: CPT | Performed by: PHYSICAL THERAPIST

## 2023-09-18 NOTE — PROGRESS NOTES
Daily Note     Today's date: 2023  Patient name: Maninder Oscar  : 1955  MRN: [id number]  Referring provider: Halima Hernandez DO  Dx:   Encounter Diagnosis     ICD-10-CM    1. Neck pain  M54.2       2. Right knee pain, unspecified chronicity  M25.561       3. Plantar fasciitis, bilateral  M72.2       4. Bilateral low back pain with sciatica, sciatica laterality unspecified, unspecified chronicity  M54.40           Start Time: 1300  Stop Time: 1345  Total time in clinic (min): 45 minutes    Subjective: patient complains of Ha+ today      Objective: See treatment diary below      Assessment: Tolerated treatment fair. Patient demonstrated fatigue post treatment, exhibited good technique with therapeutic exercises, and would benefit from continued PT, tender to upper traps and SO as well as positive spring test at C7    Plan: Progress treatment as tolerated.        Precautions:             Manuals                    MT LB/LE  10 10 10 10 10 10      MT cervical  10 10 10 10 10 10                   Neuro Re-Ed                                                                                                        Ther Ex             Nu step 5 min 6 min 6 min 6 min 6 min 6 min 6 min      slant 30"/4x 4x 4x 4x 4x 4x 4x      SAQ 2/20 2/30 3/30 3/30 3/30 3/30 3/30      Heel slides 30x 30x 30x 30x 30x 3/30 3/30      BTB rows nv 20x 30x 30x 30x 30x 30x      Bolster stretch  10x 10x 10x 10x 10x 10x      Chin tucks  10x 10x 10x 10x 10x 10x      pulleys  30x 30x 30x 30x 30x 30x      Ther Activity             TM  1.2 mph 6 min 1.2  mph 1.2  Mph  6 min 1.2  Mph  6 min 1.2  Mph  6 min       Hip abd             Ball squeeze     20x 30x 30x                                Modalities

## 2023-09-19 ENCOUNTER — OFFICE VISIT (OUTPATIENT)
Dept: PHYSICAL THERAPY | Facility: CLINIC | Age: 68
End: 2023-09-19
Payer: COMMERCIAL

## 2023-09-19 DIAGNOSIS — M72.2 PLANTAR FASCIITIS, BILATERAL: Primary | ICD-10-CM

## 2023-09-19 PROCEDURE — L3010 FOOT LONGITUDINAL ARCH SUPPO: HCPCS

## 2023-09-20 ENCOUNTER — OFFICE VISIT (OUTPATIENT)
Dept: PHYSICAL THERAPY | Age: 68
End: 2023-09-20
Payer: COMMERCIAL

## 2023-09-20 DIAGNOSIS — M25.561 RIGHT KNEE PAIN, UNSPECIFIED CHRONICITY: ICD-10-CM

## 2023-09-20 DIAGNOSIS — M54.40 BILATERAL LOW BACK PAIN WITH SCIATICA, SCIATICA LATERALITY UNSPECIFIED, UNSPECIFIED CHRONICITY: ICD-10-CM

## 2023-09-20 DIAGNOSIS — M54.2 NECK PAIN: Primary | ICD-10-CM

## 2023-09-20 PROCEDURE — 97113 AQUATIC THERAPY/EXERCISES: CPT

## 2023-09-20 NOTE — PROGRESS NOTES
Daily Note     Today's date: 2023  Patient name: Dorian Butts  : 1955  MRN: [id number]  Referring provider: Del Chavez DO  Dx:   Encounter Diagnosis     ICD-10-CM    1. Neck pain  M54.2       2. Right knee pain, unspecified chronicity  M25.561       3. Bilateral low back pain with sciatica, sciatica laterality unspecified, unspecified chronicity  M54.40           Start Time: 0950  Stop Time: 1050  Total time in clinic (min): 60 minutes    Subjective: pt notes she is struggling w/ her asthma again today. Her headache is better today. She woke up w/ sig pain 8/10 but after taking some Tylenol it's better. Objective: See treatment diary below    Pt seen 1:1 for 38 minutes and group therapy for remainder    Assessment: Tolerated treatment fair. Pt continues w/ limited ex's and stretches today due to SOB. Unable to progress pt, poor tolerance for ex's. Pt making little progress w/ aquatic PT. Pt would benefit from continued PT      Plan: Continue per plan of care.       Precautions:           Exercise Diary         Water walking 10 10 10 10 13 10       Postural training 2            Gait training 2 2           Home exercise pgm/patient education             Wall: t/h raises 1 10x x10 10x         Hip abd/add 2 2 2 2 2 2       Marching 1 1 1 1 1 1       squats 1 1 1 1 1 1       Knee flex/ext 1 1 1 1 1 1       Hip flex/ext swing  2 2 2 2 2       SLS (eyes open/closed)             SLS w UE mvmt  AROM/ball toss             Weight shifting             UE Noodle work x 4   3' 3' 3' 2 2       UE AROM             Resistive UE work (paddles, bells, TB) 3 2'beige x10 ea 10x ea 5'arom 5' AROM       Core work on noodle (sitting/stdg)             Sit on noodle with movement             Seated on pool bench w proper posture 1 1    1       Ankle df/pf  1 2 2 2 1       marching  1 1 1 1 1       Hip Ab/add 2 1 1 1 1 1       Knee flex/ext  1 1 1 1 1       Deep water mvmt 5 5 5,1 6' 1 2,1       Deep water tx/stretching 10 10 10 10 10 10       Specific self - stretches wall/steps 5 5 5 3 3 2           Modalities  8/17 8/24 8/30 9/8 9/13 9/20       whirlpool 10 10 10 10 10 10

## 2023-09-22 ENCOUNTER — TELEPHONE (OUTPATIENT)
Dept: FAMILY MEDICINE CLINIC | Facility: CLINIC | Age: 68
End: 2023-09-22

## 2023-09-22 NOTE — TELEPHONE ENCOUNTER
Patient called in and stated she is experiencing a sore throat and not actively wheezing but she is worried about her asthma. She does have inhalers in the home. Her daughter visited with her and was not feeling well and her daughter did go to urgent care and had negative Covid and Strep testing. She is unaware if she caught something from her daughter. She would like to speak with the doctor to see what can be done in regards to this. I did make patient aware I cannot guarantee the doctor will call her back as he does have a full schedule that one of the nurses may be giving her a call back. Patient requesting that she gets a call back before the end of the day before the weekend.

## 2023-09-22 NOTE — TELEPHONE ENCOUNTER
Will start inhalers (both w and w/o steroids) twice daily and is asking if she should start taking the singular at night as she is already taking Allegra every day, please advise

## 2023-09-25 ENCOUNTER — EVALUATION (OUTPATIENT)
Dept: PHYSICAL THERAPY | Age: 68
End: 2023-09-25
Payer: COMMERCIAL

## 2023-09-25 DIAGNOSIS — J30.1 ALLERGIC RHINITIS DUE TO POLLEN, UNSPECIFIED SEASONALITY: ICD-10-CM

## 2023-09-25 DIAGNOSIS — M54.2 NECK PAIN: Primary | ICD-10-CM

## 2023-09-25 DIAGNOSIS — M54.40 BILATERAL LOW BACK PAIN WITH SCIATICA, SCIATICA LATERALITY UNSPECIFIED, UNSPECIFIED CHRONICITY: ICD-10-CM

## 2023-09-25 DIAGNOSIS — M25.561 RIGHT KNEE PAIN, UNSPECIFIED CHRONICITY: ICD-10-CM

## 2023-09-25 DIAGNOSIS — G43.009 MIGRAINE WITHOUT AURA AND WITHOUT STATUS MIGRAINOSUS, NOT INTRACTABLE: ICD-10-CM

## 2023-09-25 PROCEDURE — 97140 MANUAL THERAPY 1/> REGIONS: CPT | Performed by: PHYSICAL THERAPIST

## 2023-09-25 PROCEDURE — 97112 NEUROMUSCULAR REEDUCATION: CPT | Performed by: PHYSICAL THERAPIST

## 2023-09-25 RX ORDER — BUTALBITAL, ACETAMINOPHEN AND CAFFEINE 50; 325; 40 MG/1; MG/1; MG/1
1 TABLET ORAL EVERY 4 HOURS PRN
Qty: 90 TABLET | Refills: 0 | Status: SHIPPED | OUTPATIENT
Start: 2023-09-25 | End: 2023-09-26 | Stop reason: SDUPTHER

## 2023-09-25 RX ORDER — FLUTICASONE PROPIONATE 50 MCG
2 SPRAY, SUSPENSION (ML) NASAL DAILY
Qty: 48 ML | Refills: 0 | Status: SHIPPED | OUTPATIENT
Start: 2023-09-25

## 2023-09-25 NOTE — PROGRESS NOTES
DISCHARGE    Today's date: 2023  Patient name: Trae Guardado  : 1955  MRN: [id number]  Referring provider: Cole Mccoy DO  Dx:   Encounter Diagnosis     ICD-10-CM    1. Neck pain  M54.2       2. Right knee pain, unspecified chronicity  M25.561       3. Bilateral low back pain with sciatica, sciatica laterality unspecified, unspecified chronicity  M54.40           Start Time: 1000  Stop Time: 1045  Total time in clinic (min): 45 minutes    Subjective: some improvement      Objective: See treatment diary below      Assessment: Tolerated treatment fair. Patient exhibited good technique with therapeutic exercises, At this time, patient has achieved their maximum functional benefit from skilled physical therapy services and will be discharged to their HE[facility]. Patient is in agreement with the plan of care. As a result, patient is discharged from physical therapy.        Plan:  D/C to HEP/pool fitness     Precautions:               Manuals                   MT LB/LE  10 10 10 10 10 10 10     MT cervical  10 10 10 10 10 10 10                  Neuro Re-Ed                                                                                                        Ther Ex             Nu step 5 min 6 min 6 min 6 min 6 min 6 min 6 min 6 min     slant 30"/4x 4x 4x 4x 4x 4x 4x 4x     SAQ 2/20 2/30 3/30 3/30 3/30 3/30 3/30 3/30     Heel slides 30x 30x 30x 30x 30x 3/30 3/30 3/30     BTB rows nv 20x 30x 30x 30x 30x 30x 30x     Bolster stretch  10x 10x 10x 10x 10x 10x 10x     Chin tucks  10x 10x 10x 10x 10x 10x 10x     pulleys  30x 30x 30x 30x 30x 30x 30x     Ther Activity             TM  1.2 mph 6 min 1.2  mph 1.2  Mph  6 min 1.2  Mph  6 min 1.2  Mph  6 min       Hip abd             Ball squeeze     20x 30x 30x 30x                               Modalities

## 2023-09-26 DIAGNOSIS — K21.9 GASTROESOPHAGEAL REFLUX DISEASE WITHOUT ESOPHAGITIS: ICD-10-CM

## 2023-09-26 DIAGNOSIS — I82.441 ACUTE DEEP VEIN THROMBOSIS (DVT) OF TIBIAL VEIN OF RIGHT LOWER EXTREMITY (HCC): ICD-10-CM

## 2023-09-26 RX ORDER — PANTOPRAZOLE SODIUM 40 MG/1
40 TABLET, DELAYED RELEASE ORAL 2 TIMES DAILY
Qty: 180 TABLET | Refills: 0 | Status: SHIPPED | OUTPATIENT
Start: 2023-09-26

## 2023-09-27 ENCOUNTER — CLINICAL SUPPORT (OUTPATIENT)
Dept: FAMILY MEDICINE CLINIC | Facility: CLINIC | Age: 68
End: 2023-09-27
Payer: COMMERCIAL

## 2023-09-27 DIAGNOSIS — Z23 ENCOUNTER FOR IMMUNIZATION: Primary | ICD-10-CM

## 2023-09-27 PROCEDURE — 90662 IIV NO PRSV INCREASED AG IM: CPT | Performed by: FAMILY MEDICINE

## 2023-09-27 PROCEDURE — 90471 IMMUNIZATION ADMIN: CPT | Performed by: FAMILY MEDICINE

## 2023-10-05 ENCOUNTER — OFFICE VISIT (OUTPATIENT)
Dept: FAMILY MEDICINE CLINIC | Facility: CLINIC | Age: 68
End: 2023-10-05
Payer: COMMERCIAL

## 2023-10-05 VITALS
OXYGEN SATURATION: 94 % | WEIGHT: 193.3 LBS | RESPIRATION RATE: 18 BRPM | BODY MASS INDEX: 34.25 KG/M2 | HEART RATE: 67 BPM | SYSTOLIC BLOOD PRESSURE: 110 MMHG | TEMPERATURE: 97.4 F | HEIGHT: 63 IN | DIASTOLIC BLOOD PRESSURE: 62 MMHG

## 2023-10-05 DIAGNOSIS — R07.89 CHEST TIGHTNESS: ICD-10-CM

## 2023-10-05 DIAGNOSIS — H61.21 HEARING LOSS OF RIGHT EAR DUE TO CERUMEN IMPACTION: ICD-10-CM

## 2023-10-05 DIAGNOSIS — J45.20 MILD INTERMITTENT ASTHMA, UNSPECIFIED WHETHER COMPLICATED: Primary | ICD-10-CM

## 2023-10-05 DIAGNOSIS — S61.209A FINGER WOUND, SIMPLE, OPEN, INITIAL ENCOUNTER: ICD-10-CM

## 2023-10-05 DIAGNOSIS — M79.644 FINGER PAIN, RIGHT: ICD-10-CM

## 2023-10-05 PROCEDURE — 69210 REMOVE IMPACTED EAR WAX UNI: CPT | Performed by: FAMILY MEDICINE

## 2023-10-05 PROCEDURE — 90471 IMMUNIZATION ADMIN: CPT

## 2023-10-05 PROCEDURE — 99214 OFFICE O/P EST MOD 30 MIN: CPT | Performed by: FAMILY MEDICINE

## 2023-10-05 PROCEDURE — 90715 TDAP VACCINE 7 YRS/> IM: CPT

## 2023-10-05 RX ORDER — CEPHALEXIN 500 MG/1
500 CAPSULE ORAL EVERY 8 HOURS SCHEDULED
Qty: 21 CAPSULE | Refills: 0 | Status: SHIPPED | OUTPATIENT
Start: 2023-10-05 | End: 2023-10-12

## 2023-10-05 NOTE — ASSESSMENT & PLAN NOTE
Tdap given at this time patient will start antibiotics and if not improved follow-up.   She will soak the finger in warm water Epsom salt and peroxide over the next 2 days

## 2023-10-05 NOTE — ASSESSMENT & PLAN NOTE
Finger pain right side after gardening with small opening over the dorsal surface of her DIP joint fifth finger right hand swelling and redness- we will start Keflex now and provided with tetanus vaccine Tdap given at this time

## 2023-10-17 DIAGNOSIS — J45.909 MODERATE ASTHMA, UNSPECIFIED WHETHER COMPLICATED, UNSPECIFIED WHETHER PERSISTENT: ICD-10-CM

## 2023-10-17 RX ORDER — ALBUTEROL SULFATE 90 UG/1
AEROSOL, METERED RESPIRATORY (INHALATION)
Qty: 108 G | Refills: 0 | Status: SHIPPED | OUTPATIENT
Start: 2023-10-17

## 2023-10-21 PROBLEM — Z01.419 ENCOUNTER FOR GYNECOLOGICAL EXAMINATION WITHOUT ABNORMAL FINDING: Status: RESOLVED | Noted: 2019-04-01 | Resolved: 2023-10-21

## 2023-10-27 DIAGNOSIS — J45.909 ALLERGIC BRONCHITIS: ICD-10-CM

## 2023-10-27 DIAGNOSIS — J45.909 MODERATE ASTHMA, UNSPECIFIED WHETHER COMPLICATED, UNSPECIFIED WHETHER PERSISTENT: ICD-10-CM

## 2023-10-27 RX ORDER — ALBUTEROL SULFATE 90 UG/1
AEROSOL, METERED RESPIRATORY (INHALATION)
Qty: 108 G | Refills: 1 | Status: SHIPPED | OUTPATIENT
Start: 2023-10-27

## 2023-10-27 RX ORDER — MONTELUKAST SODIUM 10 MG/1
10 TABLET ORAL EVERY EVENING
Qty: 30 TABLET | Refills: 2 | Status: SHIPPED | OUTPATIENT
Start: 2023-10-27

## 2023-11-06 ENCOUNTER — APPOINTMENT (OUTPATIENT)
Dept: LAB | Facility: CLINIC | Age: 68
End: 2023-11-06
Payer: COMMERCIAL

## 2023-11-06 DIAGNOSIS — E78.5 HYPERLIPIDEMIA, UNSPECIFIED HYPERLIPIDEMIA TYPE: ICD-10-CM

## 2023-11-06 DIAGNOSIS — E04.1 THYROID NODULE: ICD-10-CM

## 2023-11-06 LAB
ALBUMIN SERPL BCP-MCNC: 4.2 G/DL (ref 3.5–5)
ALP SERPL-CCNC: 101 U/L (ref 34–104)
ALT SERPL W P-5'-P-CCNC: 24 U/L (ref 7–52)
ANION GAP SERPL CALCULATED.3IONS-SCNC: 7 MMOL/L
AST SERPL W P-5'-P-CCNC: 21 U/L (ref 13–39)
BASOPHILS # BLD AUTO: 0.07 THOUSANDS/ÂΜL (ref 0–0.1)
BASOPHILS NFR BLD AUTO: 1 % (ref 0–1)
BILIRUB SERPL-MCNC: 0.42 MG/DL (ref 0.2–1)
BUN SERPL-MCNC: 17 MG/DL (ref 5–25)
CALCIUM SERPL-MCNC: 9.2 MG/DL (ref 8.4–10.2)
CHLORIDE SERPL-SCNC: 106 MMOL/L (ref 96–108)
CHOLEST SERPL-MCNC: 176 MG/DL
CO2 SERPL-SCNC: 28 MMOL/L (ref 21–32)
CREAT SERPL-MCNC: 0.66 MG/DL (ref 0.6–1.3)
EOSINOPHIL # BLD AUTO: 0.13 THOUSAND/ÂΜL (ref 0–0.61)
EOSINOPHIL NFR BLD AUTO: 2 % (ref 0–6)
ERYTHROCYTE [DISTWIDTH] IN BLOOD BY AUTOMATED COUNT: 14 % (ref 11.6–15.1)
GFR SERPL CREATININE-BSD FRML MDRD: 90 ML/MIN/1.73SQ M
GLUCOSE P FAST SERPL-MCNC: 86 MG/DL (ref 65–99)
HCT VFR BLD AUTO: 44.4 % (ref 34.8–46.1)
HDLC SERPL-MCNC: 42 MG/DL
HGB BLD-MCNC: 14.1 G/DL (ref 11.5–15.4)
IMM GRANULOCYTES # BLD AUTO: 0.02 THOUSAND/UL (ref 0–0.2)
IMM GRANULOCYTES NFR BLD AUTO: 0 % (ref 0–2)
LDLC SERPL CALC-MCNC: 79 MG/DL (ref 0–100)
LYMPHOCYTES # BLD AUTO: 1.85 THOUSANDS/ÂΜL (ref 0.6–4.47)
LYMPHOCYTES NFR BLD AUTO: 32 % (ref 14–44)
MCH RBC QN AUTO: 27.2 PG (ref 26.8–34.3)
MCHC RBC AUTO-ENTMCNC: 31.8 G/DL (ref 31.4–37.4)
MCV RBC AUTO: 86 FL (ref 82–98)
MONOCYTES # BLD AUTO: 0.38 THOUSAND/ÂΜL (ref 0.17–1.22)
MONOCYTES NFR BLD AUTO: 7 % (ref 4–12)
NEUTROPHILS # BLD AUTO: 3.43 THOUSANDS/ÂΜL (ref 1.85–7.62)
NEUTS SEG NFR BLD AUTO: 58 % (ref 43–75)
NONHDLC SERPL-MCNC: 134 MG/DL
NRBC BLD AUTO-RTO: 0 /100 WBCS
PLATELET # BLD AUTO: 268 THOUSANDS/UL (ref 149–390)
PMV BLD AUTO: 10.1 FL (ref 8.9–12.7)
POTASSIUM SERPL-SCNC: 3.9 MMOL/L (ref 3.5–5.3)
PROT SERPL-MCNC: 6.7 G/DL (ref 6.4–8.4)
RBC # BLD AUTO: 5.19 MILLION/UL (ref 3.81–5.12)
SODIUM SERPL-SCNC: 141 MMOL/L (ref 135–147)
TRIGL SERPL-MCNC: 273 MG/DL
TSH SERPL DL<=0.05 MIU/L-ACNC: 2.02 UIU/ML (ref 0.45–4.5)
VZV IGG SER QL IA: NORMAL
WBC # BLD AUTO: 5.88 THOUSAND/UL (ref 4.31–10.16)

## 2023-11-06 PROCEDURE — 84443 ASSAY THYROID STIM HORMONE: CPT

## 2023-11-06 PROCEDURE — 36415 COLL VENOUS BLD VENIPUNCTURE: CPT

## 2023-11-06 PROCEDURE — 80053 COMPREHEN METABOLIC PANEL: CPT

## 2023-11-06 PROCEDURE — 86787 VARICELLA-ZOSTER ANTIBODY: CPT

## 2023-11-06 PROCEDURE — 80061 LIPID PANEL: CPT

## 2023-11-06 PROCEDURE — 85025 COMPLETE CBC W/AUTO DIFF WBC: CPT

## 2023-11-07 LAB — VZV IGM SER IA-ACNC: 1.58 INDEX (ref 0–0.9)

## 2023-11-08 ENCOUNTER — OFFICE VISIT (OUTPATIENT)
Dept: FAMILY MEDICINE CLINIC | Facility: CLINIC | Age: 68
End: 2023-11-08
Payer: COMMERCIAL

## 2023-11-08 ENCOUNTER — TELEPHONE (OUTPATIENT)
Dept: FAMILY MEDICINE CLINIC | Facility: CLINIC | Age: 68
End: 2023-11-08

## 2023-11-08 VITALS
HEART RATE: 71 BPM | BODY MASS INDEX: 34.52 KG/M2 | OXYGEN SATURATION: 99 % | WEIGHT: 194.8 LBS | DIASTOLIC BLOOD PRESSURE: 78 MMHG | SYSTOLIC BLOOD PRESSURE: 122 MMHG | RESPIRATION RATE: 18 BRPM | HEIGHT: 63 IN | TEMPERATURE: 98.5 F

## 2023-11-08 DIAGNOSIS — J45.909 ALLERGIC BRONCHITIS: ICD-10-CM

## 2023-11-08 DIAGNOSIS — I82.401 ACUTE DEEP VEIN THROMBOSIS (DVT) OF RIGHT LOWER EXTREMITY, UNSPECIFIED VEIN (HCC): ICD-10-CM

## 2023-11-08 DIAGNOSIS — J45.909 MODERATE ASTHMA, UNSPECIFIED WHETHER COMPLICATED, UNSPECIFIED WHETHER PERSISTENT: ICD-10-CM

## 2023-11-08 DIAGNOSIS — K21.9 GASTROESOPHAGEAL REFLUX DISEASE WITHOUT ESOPHAGITIS: ICD-10-CM

## 2023-11-08 DIAGNOSIS — E78.1 HYPERTRIGLYCERIDEMIA: ICD-10-CM

## 2023-11-08 DIAGNOSIS — J45.20 MILD INTERMITTENT ASTHMA, UNSPECIFIED WHETHER COMPLICATED: Primary | ICD-10-CM

## 2023-11-08 DIAGNOSIS — Z00.00 ANNUAL PHYSICAL EXAM: ICD-10-CM

## 2023-11-08 PROCEDURE — 99397 PER PM REEVAL EST PAT 65+ YR: CPT | Performed by: FAMILY MEDICINE

## 2023-11-08 RX ORDER — MONTELUKAST SODIUM 10 MG/1
10 TABLET ORAL EVERY EVENING
Qty: 30 TABLET | Refills: 2 | Status: CANCELLED | OUTPATIENT
Start: 2023-11-08

## 2023-11-08 RX ORDER — MONTELUKAST SODIUM 10 MG/1
10 TABLET ORAL EVERY EVENING
Qty: 90 TABLET | Refills: 3 | Status: SHIPPED | OUTPATIENT
Start: 2023-11-08

## 2023-11-08 NOTE — ASSESSMENT & PLAN NOTE
Patient is currently using either Flovent or albuterol as needed and has used these products more frequently throughout this past fall due to weather changes.   At this time she is stable continue medications on as-needed basis

## 2023-11-08 NOTE — PROGRESS NOTES
ADULT ANNUAL [place]    NAME: Sylvester Betts  AGE: 69 y.o. SEX: female  : 1955     DATE: 2023     Assessment and Plan:     Problem List Items Addressed This Visit          Digestive    Gastroesophageal reflux disease without esophagitis     Continue with pantoprazole            Respiratory    Mild intermittent asthma - Primary     Patient is currently using either Flovent or albuterol as needed and has used these products more frequently throughout this past fall due to weather changes. At this time she is stable continue medications on as-needed basis         Relevant Medications    montelukast (SINGULAIR) 10 mg tablet       Cardiovascular and Mediastinum    DVT of leg (deep venous thrombosis) (HCC)     Continue with Xarelto         Relevant Medications    rivaroxaban (Xarelto) 10 mg tablet       Other    Hypertriglyceridemia     Patient is working on a diet plan now and would like to continue this and follow-up with laboratory work in the spring         Relevant Orders    Comprehensive metabolic panel    Lipid panel    TSH, 3rd generation with Free T4 reflex    CBC and differential    Annual physical exam     Other Visit Diagnoses       Moderate asthma, unspecified whether complicated, unspecified whether persistent        Relevant Medications    montelukast (SINGULAIR) 10 mg tablet    Allergic bronchitis        Relevant Medications    montelukast (SINGULAIR) 10 mg tablet            Immunizations and preventive care screenings were discussed with patient today. Appropriate education was printed on patient's after visit summary. Counseling:  Alcohol/drug use: discussed moderation in alcohol intake, the recommendations for healthy alcohol use, and avoidance of illicit drug use. Dental Health: discussed importance of regular tooth brushing, flossing, and dental visits.   Injury prevention: discussed safety/seat belts, safety helmets, smoke detectors, carbon dioxide detectors, and smoking near bedding or upholstery. Sexual health: discussed sexually transmitted diseases, partner selection, use of condoms, avoidance of unintended pregnancy, and contraceptive alternatives. Exercise: the importance of regular exercise/physical activity was discussed. Recommend exercise 3-5 times per week for at least 30 minutes. Return in about 4 months (around 3/8/2024). Chief Complaint:     Chief Complaint   Patient presents with    Follow-up    Physical Exam     Recent labs done  Script for stockings 22/30      History of Present Illness:     Adult Annual Physical   Patient here for a comprehensive physical exam. The patient reports no problems. Diet and Physical Activity  Diet/Nutrition: well balanced diet. Exercise: no formal exercise. Depression Screening  PHQ-2/9 Depression Screening           General Health  Sleep: sleeps well. Hearing: normal - bilateral.  Vision: no vision problems. Dental: regular dental visits. /GYN Health  Patient is: postmenopausal  Last menstrual period:   Contraceptive method: . Advanced Care Planning  Do you have an advanced directive? Do you have a durable medical power of ? Review of Systems:     Review of Systems   Constitutional:  Negative for chills, fatigue and fever. HENT:  Negative for congestion, nosebleeds, rhinorrhea, sinus pressure and sore throat. Eyes:  Negative for discharge and redness. Respiratory:  Negative for cough and shortness of breath. Cardiovascular:  Negative for chest pain, palpitations and leg swelling. Gastrointestinal:  Negative for abdominal pain, blood in stool and nausea. Endocrine: Negative for cold intolerance, heat intolerance and polyuria. Genitourinary:  Negative for dysuria and frequency. Musculoskeletal:  Negative for arthralgias, back pain and myalgias. Skin:  Negative for rash.    Neurological: Negative for dizziness, weakness and headaches. Hematological:  Negative for adenopathy. Psychiatric/Behavioral:  Negative for behavioral problems and sleep disturbance. The patient is not nervous/anxious. Past Medical History:     Past Medical History:   Diagnosis Date    Acid reflux     Allergic rhinitis     Last Assessed: 2017    Anesthesia complication     HYPOTENSION    Arthritis     Asthma     Bigeminy     history    DVT (deep venous thrombosis) ([address]) 10/23/2018    DVT (deep venous thrombosis) ([facility])     DVT of leg (deep venous thrombosis) ([address])     left    Fatty liver     Female pelvic pain     GERD (gastroesophageal reflux disease)     Hyperlipidemia     high trigrlycerides    Intermittent palpitations     Interstitial cystitis     Irregular heart beat     Migraines     Neck pain     Obesity (BMI 30.0-34. 9)     Palpitations     Pes planus     unspecified laterality; Last Assessed: 2014    Thyroid nodule     Tinnitus     Trigeminy     history    Wears glasses     Wears reading eyeglasses       Past Surgical History:     Past Surgical History:   Procedure Laterality Date    APPENDECTOMY      BREAST BIOPSY Left 2019    CATARACT EXTRACTION Bilateral      SECTION      X 2    CHOLECYSTECTOMY      COLONOSCOPY      DILATION AND CURETTAGE OF UTERUS      HYSTERECTOMY      total    JOINT REPLACEMENT      left TKR    KNEE SURGERY Left     X 3    DE ESOPHAGOGASTRODUODENOSCOPY TRANSORAL DIAGNOSTIC N/A 2016    Procedure: ESOPHAGOGASTRODUODENOSCOPY (EGD); Surgeon: Weyman Cranker, MD;  Location: [facility];   Service: Gastroenterology    DE EXCISION GANGLION WRIST DORSAL/VOLAR PRIMARY Right 2023    Procedure: Right long finger mucoid cyst excision distal interphalangeal joint;  Surgeon: Cj Kirk MD;  Location: [facility];  Service: Orthopedics    DE LAPS TOTAL HYSTERECT 250 GM/< W/RMVL TUBE/OVARY Bilateral 2016    Procedure: HYSTERECTOMY LAPAROSCOPIC TOTAL ,BSO; Surgeon: Escobar Chavez MD;  Location: [facility];  Service: Gynecology Oncology    REPLACEMENT TOTAL KNEE  2014    US GUIDED BREAST BIOPSY LEFT COMPLETE Left 2019    WISDOM TOOTH EXTRACTION        Social History:     Social History     Socioeconomic History    Marital status: /Civil Union     Spouse name: None    Number of children: None    Years of education: None    Highest education level: None   Occupational History    Occupation: Pediatrics   Tobacco Use    Smoking status: Former     Packs/day: 0.50     Types: Cigarettes     Quit date: 1978     Years since quittin.4    Smokeless tobacco: Never    Tobacco comments:     as teenager   Vaping Use    Vaping Use: Never used   Substance and Sexual Activity    Alcohol use: Yes    Drug use: No    Sexual activity: Not Currently     Birth control/protection: Surgical   Other Topics Concern    None   Social History Narrative    None     Social Determinants of Health     Financial Resource Strain: Not on file   Food Insecurity: Not on file   Transportation Needs: Not on file   Physical Activity: Unknown (2022)    Exercise Vital Sign     Days of Exercise per Week: 0 days     Minutes of Exercise per Session: Not on file   Stress: Stress Concern Present (9/15/2020)    [facility]     Feeling of Stress : Very much   Social Connections: Not on file   Intimate Partner Violence: Not on file   Housing Stability: Not on file      Family History:     Family History   Problem Relation Age of Onset    Endometrial cancer Mother 76    Migraines Mother     Obesity Father     Diabetes Father     Heart disease Father     Heart attack Father     Prostate cancer Father     Hypertension Father     Diabetes Brother     Thyroid cancer Brother         medullary    Diabetes type II Brother     Growth hormone deficiency Daughter     No Known Problems Daughter     Alzheimer's disease Maternal Aunt Thyroid cancer Maternal Aunt     Breast cancer Maternal Aunt 65    Thyroid cancer Maternal Aunt 85    No Known Problems Maternal Aunt     No Known Problems Maternal Aunt     No Known Problems Maternal Aunt     No Known Problems Maternal Aunt     Other Maternal Uncle         Brain Tumor    Alzheimer's disease Maternal Uncle     Diabetes Paternal Aunt     Hashimoto's thyroiditis Paternal Aunt         Total Thyroidectomy    Hypertension Paternal Aunt     No Known Problems Paternal Aunt     Stroke Paternal Uncle     No Known Problems Maternal Grandmother     No Known Problems Paternal Grandmother     Cancer Family     Colon cancer Neg Hx     Cervical cancer Neg Hx     Ovarian cancer Neg Hx     Uterine cancer Neg Hx       Current Medications:     Current Outpatient Medications   Medication Sig Dispense Refill    acetaminophen (TYLENOL) 650 mg CR tablet Take 1 tablet (650 mg total) by mouth every 8 (eight) hours as needed for mild pain 30 tablet 0    albuterol (PROVENTIL HFA,VENTOLIN HFA) 90 mcg/act inhaler INHALE 2 PUFFS EVERY 4-6 HOURS AS NEEDED 108 g 1    ALPRAZolam (XANAX) 0.25 mg tablet Take one tablet twice daily as needed for anxiety 30 tablet 0    butalbital-acetaminophen-caffeine (FIORICET,ESGIC) -40 mg per tablet Take 1 tablet by mouth every 4 (four) hours as needed for headaches 90 tablet 0    cycloSPORINE (RESTASIS) 0.05 % ophthalmic emulsion Apply 1 drop to eye 2 (two) times a day 0.4 mL 0    diazepam (VALIUM) 5 mg tablet Take 1 tablet (5 mg total) by mouth every 8 (eight) hours as needed for anxiety 30 tablet 0    diphenhydrAMINE (BENADRYL) 25 mg capsule Take by mouth daily at bedtime as needed      Elastic Bandages & Supports (Medical Compression Socks) MISC Use daily 5 each 2    fexofenadine (ALLEGRA) 180 MG tablet Take 180 mg by mouth daily      fluticasone (FLONASE) 50 mcg/act nasal spray 2 sprays into each nostril daily 48 mL 0    fluticasone (Flovent HFA) 220 mcg/act inhaler Inhale 1 puff 2 (two) times a day Rinse mouth after use. 12 g 3    montelukast (SINGULAIR) 10 mg tablet Take 1 tablet (10 mg total) by mouth every evening 90 tablet 3    Multiple Vitamin (MULTIVITAMIN) capsule Take 1 capsule by mouth daily      pantoprazole (PROTONIX) 40 mg tablet Take 1 tablet (40 mg total) by mouth 2 (two) times a day 180 tablet 0    rivaroxaban (Xarelto) 10 mg tablet Take 1 tablet (10 mg total) by mouth daily 90 tablet 3    ZOLMitriptan (ZOMIG) 5 MG tablet Take 1 tablet (5 mg total) by mouth once as needed for migraine for up to 1 dose 20 tablet 3    calcium-vitamin D (OSCAL) 250-125 MG-UNIT per tablet Take 1 tablet by mouth daily (Patient not taking: Reported on 10/5/2023)      ibuprofen (MOTRIN) 200 mg tablet Take by mouth every 6 (six) hours as needed for mild pain (Patient not taking: Reported on 7/26/2023)      methocarbamol (ROBAXIN) 750 mg tablet Take 1 tablet (750 mg total) by mouth every 6 (six) hours as needed for muscle spasms (Patient not taking: Reported on 10/5/2023) 30 tablet 2    rivaroxaban (XARELTO) 10 mg tablet Take 1 tablet (10 mg total) by mouth daily (Patient not taking: Reported on 11/8/2023) 90 tablet 0    traMADol (Ultram) 50 mg tablet Take 1 tablet (50 mg total) by mouth every 6 (six) hours as needed for moderate pain (Patient not taking: Reported on 11/8/2023) 30 tablet 0     No current facility-administered medications for this visit. Allergies: Allergies   Allergen Reactions    Naproxen Shortness Of Breath    Prednisone Facial Swelling    Iv Contrast [Iodinated Contrast Media] Itching    Seasonal Ic [Cholestatin]     Phentermine Palpitations      Physical Exam:     /78 (BP Location: Left arm, Patient Position: Sitting, Cuff Size: Standard)   Pulse 71   Temp 98.5 °F (36.9 °C) (Tympanic)   Resp 18   Ht 5' 3" (1.6 m)   Wt 88.4 kg (194 lb 12.8 oz)   LMP  (LMP Unknown)   SpO2 99%   BMI 34.51 kg/m²     Physical Exam  Vitals and nursing note reviewed.    Constitutional: General: She is not in acute distress. Appearance: Normal appearance. She is well-developed. HENT:      Head: Normocephalic and atraumatic. Right Ear: Tympanic membrane, ear canal and external ear normal.      Left Ear: Tympanic membrane, ear canal and external ear normal.      Nose: Nose normal.      Mouth/Throat:      Mouth: Mucous membranes are moist.      Pharynx: Oropharynx is clear. Eyes:      Extraocular Movements: Extraocular movements intact. Conjunctiva/sclera: Conjunctivae normal.      Pupils: Pupils are equal, round, and reactive to light. Cardiovascular:      Rate and Rhythm: Normal rate and regular rhythm. Heart sounds: No murmur heard. Pulmonary:      Effort: Pulmonary effort is normal. No respiratory distress. Breath sounds: Normal breath sounds. Abdominal:      General: Bowel sounds are normal.      Palpations: Abdomen is soft. Tenderness: There is no abdominal tenderness. Musculoskeletal:         General: No swelling. Normal range of motion. Cervical back: Normal range of motion and neck supple. Skin:     General: Skin is warm and dry. Capillary Refill: Capillary refill takes less than 2 seconds. Neurological:      General: No focal deficit present. Mental Status: She is alert and oriented to person, place, and time. Mental status is at baseline. Psychiatric:         Mood and Affect: Mood normal.         Behavior: Behavior normal.         Thought Content:  Thought content normal.         Judgment: Judgment normal.          Jael Dixon DO  [address] [facility]

## 2023-11-08 NOTE — TELEPHONE ENCOUNTER
Was told by  that the pt wanted to be screened to see if she had varicella as she was looking into getting the shingles shot

## 2023-11-08 NOTE — ASSESSMENT & PLAN NOTE
Patient is working on a diet plan now and would like to continue this and follow-up with laboratory work in the spring

## 2023-11-08 NOTE — TELEPHONE ENCOUNTER
BEHAVIORAL [facility]ICINE AT [facility] called asking why this pt was order the Varicella antibody testing ?

## 2023-11-26 DIAGNOSIS — G43.009 MIGRAINE WITHOUT AURA AND WITHOUT STATUS MIGRAINOSUS, NOT INTRACTABLE: ICD-10-CM

## 2023-11-27 RX ORDER — BUTALBITAL, ACETAMINOPHEN AND CAFFEINE 50; 325; 40 MG/1; MG/1; MG/1
1 TABLET ORAL EVERY 4 HOURS PRN
Qty: 90 TABLET | Refills: 0 | Status: SHIPPED | OUTPATIENT
Start: 2023-11-27

## 2023-12-04 PROBLEM — H61.21 HEARING LOSS OF RIGHT EAR DUE TO CERUMEN IMPACTION: Status: RESOLVED | Noted: 2023-10-05 | Resolved: 2023-12-04

## 2023-12-05 DIAGNOSIS — J45.909 MODERATE ASTHMA, UNSPECIFIED WHETHER COMPLICATED, UNSPECIFIED WHETHER PERSISTENT: ICD-10-CM

## 2023-12-05 DIAGNOSIS — I82.441 ACUTE DEEP VEIN THROMBOSIS (DVT) OF TIBIAL VEIN OF RIGHT LOWER EXTREMITY (HCC): ICD-10-CM

## 2023-12-05 RX ORDER — ALBUTEROL SULFATE 90 UG/1
AEROSOL, METERED RESPIRATORY (INHALATION)
Qty: 108 G | Refills: 0 | Status: SHIPPED | OUTPATIENT
Start: 2023-12-05

## 2023-12-14 DIAGNOSIS — G43.009 MIGRAINE WITHOUT AURA AND WITHOUT STATUS MIGRAINOSUS, NOT INTRACTABLE: ICD-10-CM

## 2023-12-14 RX ORDER — BUTALBITAL, ACETAMINOPHEN AND CAFFEINE 50; 325; 40 MG/1; MG/1; MG/1
1 TABLET ORAL EVERY 4 HOURS PRN
Qty: 90 TABLET | Refills: 0 | Status: SHIPPED | OUTPATIENT
Start: 2023-12-14

## 2024-01-02 ENCOUNTER — TELEPHONE (OUTPATIENT)
Dept: FAMILY MEDICINE CLINIC | Facility: CLINIC | Age: 69
End: 2024-01-02

## 2024-01-02 DIAGNOSIS — J45.40 MODERATE PERSISTENT ASTHMA WITHOUT COMPLICATION: Primary | ICD-10-CM

## 2024-01-02 RX ORDER — AZITHROMYCIN 250 MG/1
TABLET, FILM COATED ORAL
Qty: 6 TABLET | Refills: 0 | Status: SHIPPED | OUTPATIENT
Start: 2024-01-02 | End: 2024-01-07

## 2024-01-02 NOTE — TELEPHONE ENCOUNTER
Patient stated her daughter has covid and also she thinks she has whooping cough. Patient want to know if she should have a antibiotic sent in cause she is caring for her daughter.

## 2024-01-23 ENCOUNTER — OFFICE VISIT (OUTPATIENT)
Dept: FAMILY MEDICINE CLINIC | Facility: CLINIC | Age: 69
End: 2024-01-23
Payer: COMMERCIAL

## 2024-01-23 VITALS
DIASTOLIC BLOOD PRESSURE: 68 MMHG | RESPIRATION RATE: 18 BRPM | BODY MASS INDEX: 34.73 KG/M2 | WEIGHT: 196 LBS | TEMPERATURE: 96.9 F | HEART RATE: 64 BPM | OXYGEN SATURATION: 99 % | HEIGHT: 63 IN | SYSTOLIC BLOOD PRESSURE: 118 MMHG

## 2024-01-23 DIAGNOSIS — K12.0 APHTHOUS ULCER OF MOUTH: Primary | ICD-10-CM

## 2024-01-23 DIAGNOSIS — M54.2 NECK PAIN: ICD-10-CM

## 2024-01-23 PROCEDURE — 99213 OFFICE O/P EST LOW 20 MIN: CPT | Performed by: FAMILY MEDICINE

## 2024-01-23 NOTE — ASSESSMENT & PLAN NOTE
Recommend massage therapy.  Patient has stiffness tenderness and increased lordotic curvature.  She will work on a home exercise and stretching program as well

## 2024-01-23 NOTE — ASSESSMENT & PLAN NOTE
Right inner cheek lining along tooth line reveals a 1 x 1.5 cm ulceration superficial with surrounding erythema.  She will use peroxide warm water and salt combination rinse 3 times per day and apply Anbesol or other OTC products for topical pain relief and avoid all and acidic type foods and beverages

## 2024-01-23 NOTE — PROGRESS NOTES
Assessment/Plan:       Problem List Items Addressed This Visit          Digestive    Aphthous ulcer of mouth - Primary     Right inner cheek lining along tooth line reveals a 1 x 1.5 cm ulceration superficial with surrounding erythema.  She will use peroxide warm water and salt combination rinse 3 times per day and apply Anbesol or other OTC products for topical pain relief and avoid all and acidic type foods and beverages            Other    Neck pain     Recommend massage therapy.  Patient has stiffness tenderness and increased lordotic curvature.  She will work on a home exercise and stretching program as well              Subjective:      Patient ID: Ayesha Lockwood is a 68 y.o. female.    Neck pain and burning inside her mouth after eating hot soup    Oral Pain   Pertinent negatives include no fever or sinus pressure.       The following portions of the patient's history were reviewed and updated as appropriate: allergies, current medications, past family history, past medical history, past social history, past surgical history and problem list.    Review of Systems   Constitutional:  Negative for chills, fatigue and fever.   HENT:  Negative for congestion, nosebleeds, rhinorrhea, sinus pressure and sore throat.    Eyes:  Negative for discharge and redness.   Respiratory:  Negative for cough and shortness of breath.    Cardiovascular:  Negative for chest pain, palpitations and leg swelling.   Gastrointestinal:  Negative for abdominal pain, blood in stool and nausea.   Endocrine: Negative for cold intolerance, heat intolerance and polyuria.   Genitourinary:  Negative for dysuria and frequency.   Musculoskeletal:  Positive for neck pain and neck stiffness. Negative for arthralgias, back pain and myalgias.   Skin:  Negative for rash.   Neurological:  Negative for dizziness, weakness and headaches.   Hematological:  Negative for adenopathy.   Psychiatric/Behavioral:  Negative for behavioral problems and sleep  "disturbance. The patient is not nervous/anxious.          Objective:      /68 (BP Location: Left arm, Patient Position: Sitting, Cuff Size: Standard)   Pulse 64   Temp (!) 96.9 °F (36.1 °C) (Tympanic)   Resp 18   Ht 5' 3\" (1.6 m)   Wt 88.9 kg (196 lb)   LMP  (LMP Unknown)   SpO2 99%   BMI 34.72 kg/m²        Physical Exam  Vitals and nursing note reviewed.   Constitutional:       General: She is not in acute distress.     Appearance: She is well-developed.   HENT:      Head: Normocephalic and atraumatic.      Right Ear: External ear normal.      Left Ear: External ear normal.      Nose: Nose normal.      Mouth/Throat:      Mouth: Mucous membranes are moist.      Pharynx: Oropharynx is clear. No oropharyngeal exudate.      Comments: Aphthous ulcer inside right cheek  Eyes:      General: No scleral icterus.        Right eye: No discharge.         Left eye: No discharge.      Conjunctiva/sclera: Conjunctivae normal.      Pupils: Pupils are equal, round, and reactive to light.   Neck:      Thyroid: No thyromegaly.      Vascular: No JVD.   Cardiovascular:      Rate and Rhythm: Normal rate and regular rhythm.      Heart sounds: Normal heart sounds. No murmur heard.  Pulmonary:      Effort: Pulmonary effort is normal.      Breath sounds: No wheezing or rales.   Chest:      Chest wall: No tenderness.   Abdominal:      General: Bowel sounds are normal. There is no distension.      Palpations: Abdomen is soft. There is no mass.      Tenderness: There is no abdominal tenderness.   Musculoskeletal:         General: No tenderness or deformity. Normal range of motion.      Cervical back: Normal range of motion.   Lymphadenopathy:      Cervical: No cervical adenopathy.   Skin:     General: Skin is warm and dry.      Findings: No rash.   Neurological:      General: No focal deficit present.      Mental Status: She is alert and oriented to person, place, and time.      Cranial Nerves: No cranial nerve deficit.      " "Coordination: Coordination normal.      Deep Tendon Reflexes: Reflexes are normal and symmetric. Reflexes normal.   Psychiatric:         Mood and Affect: Mood normal.         Behavior: Behavior normal.         Thought Content: Thought content normal.         Judgment: Judgment normal.          Data:    Laboratory Results: I have personally reviewed the pertinent laboratory results/reports   Radiology/Other Diagnostic Testing Results: I have personally reviewed pertinent reports.       Lab Results   Component Value Date    WBC 5.88 11/06/2023    HGB 14.1 11/06/2023    HCT 44.4 11/06/2023    MCV 86 11/06/2023     11/06/2023     Lab Results   Component Value Date     08/20/2015    K 3.9 11/06/2023     11/06/2023    CO2 28 11/06/2023    ANIONGAP 8.6 08/20/2015    BUN 17 11/06/2023    CREATININE 0.66 11/06/2023    GLUCOSE 88 08/20/2015    GLUF 86 11/06/2023    CALCIUM 9.2 11/06/2023    AST 21 11/06/2023    ALT 24 11/06/2023    ALKPHOS 101 11/06/2023    PROT 7.1 08/20/2015    BILITOT 0.3 08/20/2015    EGFR 90 11/06/2023     Lab Results   Component Value Date    CHOLESTEROL 176 11/06/2023    CHOLESTEROL 206 (H) 06/09/2023    CHOLESTEROL 190 08/25/2022     Lab Results   Component Value Date    HDL 42 (L) 11/06/2023    HDL 47 (L) 06/09/2023    HDL 38 (L) 08/25/2022     Lab Results   Component Value Date    LDLCALC 79 11/06/2023    LDLCALC 117 (H) 06/09/2023    LDLCALC  08/25/2022      Comment:      Calculated LDL invalid, triglycerides >400 mg/dl  This screening LDL is a calculated result.   It does not have the accuracy of the Direct Measured LDL in the monitoring of patients with hyperlipidemia and/or statin therapy.   Direct Measure LDL (KYX670) must be ordered separately in these patients.     Lab Results   Component Value Date    TRIG 273 (H) 11/06/2023    TRIG 208 (H) 06/09/2023    TRIG 556 (H) 08/25/2022     No results found for: \"CHOLHDL\"  Lab Results   Component Value Date    [id number] 2.017 " "11/06/2023     Lab Results   Component Value Date    HGBA1C 5.6 06/09/2023     No results found for: \"PSA\"    Lenny Zamarripa, DO      "

## 2024-02-05 DIAGNOSIS — G43.009 MIGRAINE WITHOUT AURA AND WITHOUT STATUS MIGRAINOSUS, NOT INTRACTABLE: ICD-10-CM

## 2024-02-06 RX ORDER — BUTALBITAL, ACETAMINOPHEN AND CAFFEINE 50; 325; 40 MG/1; MG/1; MG/1
1 TABLET ORAL EVERY 4 HOURS PRN
Qty: 90 TABLET | Refills: 0 | Status: SHIPPED | OUTPATIENT
Start: 2024-02-06

## 2024-03-04 DIAGNOSIS — K21.9 GASTROESOPHAGEAL REFLUX DISEASE WITHOUT ESOPHAGITIS: ICD-10-CM

## 2024-03-04 RX ORDER — PANTOPRAZOLE SODIUM 40 MG/1
40 TABLET, DELAYED RELEASE ORAL 2 TIMES DAILY
Qty: 180 TABLET | Refills: 0 | Status: SHIPPED | OUTPATIENT
Start: 2024-03-04

## 2024-03-06 DIAGNOSIS — M54.2 NECK PAIN: ICD-10-CM

## 2024-03-06 DIAGNOSIS — G89.29 CHRONIC LOW BACK PAIN: ICD-10-CM

## 2024-03-06 DIAGNOSIS — M62.838 MUSCLE SPASM: ICD-10-CM

## 2024-03-06 DIAGNOSIS — M54.50 CHRONIC LOW BACK PAIN: ICD-10-CM

## 2024-03-06 RX ORDER — METHOCARBAMOL 750 MG/1
750 TABLET, FILM COATED ORAL EVERY 6 HOURS PRN
Qty: 30 TABLET | Refills: 0 | Status: SHIPPED | OUTPATIENT
Start: 2024-03-06

## 2024-03-06 RX ORDER — DIAZEPAM 5 MG/1
5 TABLET ORAL EVERY 8 HOURS PRN
Qty: 30 TABLET | Refills: 0 | Status: SHIPPED | OUTPATIENT
Start: 2024-03-06

## 2024-03-22 ENCOUNTER — OFFICE VISIT (OUTPATIENT)
Dept: PHYSICAL THERAPY | Age: 69
End: 2024-03-22
Payer: COMMERCIAL

## 2024-03-22 DIAGNOSIS — M25.511 ACUTE PAIN OF RIGHT SHOULDER: Primary | ICD-10-CM

## 2024-03-22 PROCEDURE — 97162 PT EVAL MOD COMPLEX 30 MIN: CPT | Performed by: PHYSICAL THERAPIST

## 2024-03-22 PROCEDURE — 97110 THERAPEUTIC EXERCISES: CPT | Performed by: PHYSICAL THERAPIST

## 2024-03-22 NOTE — PROGRESS NOTES
PT Evaluation     Today's date: 3/22/2024  Patient name: Ayesha Lockwood  : 1955  MRN: [id number]  Referring provider: Nydia Giles PT  Dx:   Encounter Diagnosis     ICD-10-CM    1. Acute pain of right shoulder  M25.511           Start Time: 945  Stop Time: 1030  Total time in clinic (min): 45 minutes    Assessment  Assessment details: Ayesha Lockwood is a 68 y.o. female who presents with pain, decreased strength, decreased ROM, decreased joint mobility, and postural dysfunction. Due to these impairments, Patient has difficulty performing a/iadls. Patient's clinical presentation is consistent with their referring diagnosis of right shoulder pain. Patient would benefit from skilled physical therapy to address their aforementioned impairments, improve their level of function and to improve their overall quality of life. Patient presents to PT for right shoulder pain via direct acces for evaluation and treatment   Impairments: abnormal muscle firing, abnormal muscle tone, abnormal or restricted ROM, abnormal movement, activity intolerance, impaired physical strength, lacks appropriate home exercise program, pain with function, scapular dyskinesis, poor posture  and poor body mechanics  Understanding of Dx/Px/POC: good   Prognosis: good    Goals  ST-3 WEEKS  1.  Decrease pain by 2 points on VAS at its worst.  2.  Increase ROM by > 5 deg in all deficients planes.  3.  Increase UE by 1/2 MMT grade in all deficient planes.    LT-6 WEEKS  1. Patient to be independent with a/iadls  especially with reaching and right side lying position to sleep  2. Increase functional activities for leisure and home activities to previous LOF.  3. Independent with HEP and/or fitness program.    Plan  Patient would benefit from: skilled physical therapy  Planned modality interventions: cryotherapy, electrical stimulation/Russian stimulation, thermotherapy: hydrocollator packs and unattended electrical  stimulation  Planned therapy interventions: activity modification, behavior modification, body mechanics training, aquatic therapy, flexibility, functional ROM exercises, home exercise program, IADL retraining, joint mobilization, manual therapy, neuromuscular re-education, patient education, postural training, strengthening, stretching, therapeutic activities and therapeutic exercise  Frequency: 2x week (2-3x week)  Duration in weeks: 12  Plan of Care beginning date: 3/22/2024  Plan of Care expiration date: 2024  Treatment plan discussed with: patient    Subjective Evaluation    History of Present Illness  Mechanism of injury: Patient presents to PT via direct access for shoulder pain that started 2 weeks ago secondary to sleeping on right side, complains of right shoulder pain with reaching and right upper trap tightness          Not a recurrent problem   Quality of life: good    Patient Goals  Patient goals for therapy: decreased pain, increased motion, increased strength and independence with ADLs/IADLs    Pain  Current pain ratin  At best pain rating: 3  At worst pain ratin  Quality: dull ache  Relieving factors: heat and relaxation  Aggravating factors: overhead activity  Progression: no change    Social Support  Lives with: spouse and adult children    Hand dominance: right      Objective     Static Posture     Head  Forward.    Shoulders  Rounded.    Postural Observations  Seated posture: fair  Standing posture: fair      Palpation     Right   Hypertonic in the pectoralis minor and upper trapezius.     Tenderness     Right Shoulder  Tenderness in the AC joint, bicipital groove, subacromial bursa and supraspinatus tendon.     Active Range of Motion   Left Shoulder   Flexion: 165 degrees   External rotation 90°: 90 degrees   Internal rotation 90°: 65 degrees     Right Shoulder   Flexion: 160 degrees   External rotation 90°: 90 degrees  Internal rotation 90°: 50 degrees     Strength/Myotome Testing      Right Shoulder     Planes of Motion   Flexion: 4   Extension: 4   Abduction: 4-   Adduction: 4   External rotation at 0°: 3+   Internal rotation at 0°: 4     Tests     Right Shoulder   Positive anterior slide, empty can, Hawkin's and anterior slide.     Flowsheet Rows      Flowsheet Row Most Recent Value   PT/OT G-Codes    Current Score 36   Projected Score 60               Precautions: left TKR, OA, GERD  POC expires: 6/22/2024  Date 3/22            Visit count 1            FOTO 36/60                   Manuals 3/22                         MT right shoulder/ right upper trap                                       Neuro Re-Ed                                                                                                        Ther Ex             UBE             pulleys             GTB ER             BTB IR             BTB rows                                                    Ther Activity             HEP 10 min                         Gait Training                                       Modalities             laser

## 2024-03-25 ENCOUNTER — OFFICE VISIT (OUTPATIENT)
Dept: PHYSICAL THERAPY | Age: 69
End: 2024-03-25
Payer: COMMERCIAL

## 2024-03-25 DIAGNOSIS — M25.511 ACUTE PAIN OF RIGHT SHOULDER: Primary | ICD-10-CM

## 2024-03-25 DIAGNOSIS — G43.009 MIGRAINE WITHOUT AURA AND WITHOUT STATUS MIGRAINOSUS, NOT INTRACTABLE: ICD-10-CM

## 2024-03-25 PROCEDURE — 97110 THERAPEUTIC EXERCISES: CPT | Performed by: PHYSICAL THERAPIST

## 2024-03-25 PROCEDURE — 97140 MANUAL THERAPY 1/> REGIONS: CPT | Performed by: PHYSICAL THERAPIST

## 2024-03-25 RX ORDER — BUTALBITAL, ACETAMINOPHEN AND CAFFEINE 50; 325; 40 MG/1; MG/1; MG/1
1 TABLET ORAL EVERY 4 HOURS PRN
Qty: 90 TABLET | Refills: 0 | Status: SHIPPED | OUTPATIENT
Start: 2024-03-25

## 2024-03-25 NOTE — PROGRESS NOTES
Daily Note     Today's date: 3/25/2024  Patient name: Ayesha Lockwood  : 1955  MRN: [id number]  Referring provider: Nydia Giles PT  Dx:   Encounter Diagnosis     ICD-10-CM    1. Acute pain of right shoulder  M25.511                      Subjective: still with right shoulder pain at 6/10      Objective: See treatment diary below      Assessment: Tolerated treatment fair. Patient demonstrated fatigue post treatment, exhibited good technique with therapeutic exercises, and would benefit from continued PT, tender to right upper trap and decreased right shoulder IR ROM and decreased right shoulder ER strength      Plan: Progress treatment as tolerated.       Precautions: left TKR, OA, GERD  POC expires: 2024  Date 3/22 3/25           Visit count 1 2           FOTO 36/60                   Manuals 3/22 3/25                        MT right shoulder/ right upper trap  15 min                                     Neuro Re-Ed                                                                                                        Ther Ex             UBE  4 min           pulleys  30x           GTB ER  30x           BTB IR  30x           BTB rows  30x                                                  Ther Activity             HEP 10 min                         Gait Training                                       Modalities             laser  4 min

## 2024-03-27 ENCOUNTER — OFFICE VISIT (OUTPATIENT)
Dept: FAMILY MEDICINE CLINIC | Facility: CLINIC | Age: 69
End: 2024-03-27
Payer: COMMERCIAL

## 2024-03-27 VITALS
RESPIRATION RATE: 18 BRPM | OXYGEN SATURATION: 97 % | WEIGHT: 195 LBS | TEMPERATURE: 96.4 F | BODY MASS INDEX: 34.55 KG/M2 | HEIGHT: 63 IN | SYSTOLIC BLOOD PRESSURE: 104 MMHG | DIASTOLIC BLOOD PRESSURE: 54 MMHG | HEART RATE: 73 BPM

## 2024-03-27 DIAGNOSIS — I82.402 DEEP VEIN THROMBOSIS (DVT) OF LEFT LOWER EXTREMITY, UNSPECIFIED CHRONICITY, UNSPECIFIED VEIN (HCC): ICD-10-CM

## 2024-03-27 DIAGNOSIS — J45.909 MODERATE ASTHMA, UNSPECIFIED WHETHER COMPLICATED, UNSPECIFIED WHETHER PERSISTENT: ICD-10-CM

## 2024-03-27 DIAGNOSIS — J30.1 ALLERGIC RHINITIS DUE TO POLLEN, UNSPECIFIED SEASONALITY: ICD-10-CM

## 2024-03-27 DIAGNOSIS — G43.009 MIGRAINE WITHOUT AURA AND WITHOUT STATUS MIGRAINOSUS, NOT INTRACTABLE: ICD-10-CM

## 2024-03-27 DIAGNOSIS — J30.1 SEASONAL ALLERGIC RHINITIS DUE TO POLLEN: ICD-10-CM

## 2024-03-27 DIAGNOSIS — M54.2 NECK PAIN: ICD-10-CM

## 2024-03-27 DIAGNOSIS — K21.9 GASTROESOPHAGEAL REFLUX DISEASE WITHOUT ESOPHAGITIS: ICD-10-CM

## 2024-03-27 DIAGNOSIS — J45.20 MILD INTERMITTENT ASTHMA, UNSPECIFIED WHETHER COMPLICATED: Primary | ICD-10-CM

## 2024-03-27 DIAGNOSIS — E78.2 MIXED HYPERLIPIDEMIA: ICD-10-CM

## 2024-03-27 PROCEDURE — 99214 OFFICE O/P EST MOD 30 MIN: CPT | Performed by: FAMILY MEDICINE

## 2024-03-27 RX ORDER — FLUTICASONE PROPIONATE 50 MCG
2 SPRAY, SUSPENSION (ML) NASAL DAILY
Qty: 48 ML | Refills: 0 | Status: SHIPPED | OUTPATIENT
Start: 2024-03-27

## 2024-03-27 RX ORDER — ALBUTEROL SULFATE 90 UG/1
AEROSOL, METERED RESPIRATORY (INHALATION)
Qty: 108 G | Refills: 0 | Status: SHIPPED | OUTPATIENT
Start: 2024-03-27

## 2024-03-27 NOTE — ASSESSMENT & PLAN NOTE
Continue with Ventolin inhaler patient did not do well with the albuterol generic and would like to brand-name.  Symptoms worsen during the spring she will prepare for this now Ventolin inhaler prescribed now

## 2024-03-27 NOTE — ASSESSMENT & PLAN NOTE
Seasonal allergic rhinitis flares up at times patient will use Flonase and continue this along with antihistamines and Singulair

## 2024-03-27 NOTE — PROGRESS NOTES
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            Respiratory    Mild intermittent asthma - Primary     Continue with Ventolin inhaler patient did not do well with the albuterol generic and would like to brand-name.  Symptoms worsen during the spring she will prepare for this now Ventolin inhaler prescribed now         Relevant Medications    albuterol (PROVENTIL HFA,VENTOLIN HFA) 90 mcg/act inhaler    Seasonal allergic rhinitis due to pollen     Seasonal allergic rhinitis flares up at times patient will use Flonase and continue this along with antihistamines and Singulair            Digestive    Gastroesophageal reflux disease without esophagitis     GERD symptoms currently stable with pantoprazole twice daily.  She understands that she can reduce the dosing to once daily but has breakthrough acid reflux at times            Surgery/Wound/Pain    Neck pain     Neck spasm and trapezius, Rhomboids and scapular pain. Refer to PT         Relevant Orders    Ambulatory Referral to Physical Therapy     Other Visit Diagnoses       Allergic rhinitis due to pollen, unspecified seasonality        Relevant Medications    fluticasone (FLONASE) 50 mcg/act nasal spray    Moderate asthma, unspecified whether complicated, unspecified whether persistent        Relevant Medications    albuterol (PROVENTIL HFA,VENTOLIN HFA) 90 mcg/act inhaler    Mixed hyperlipidemia        Relevant Orders    CBC and differential    Comprehensive metabolic panel    Lipid panel    TSH, 3rd generation with Free T4 reflex              Subjective:      Patient ID: Ayesha Fabián is a 68 y.o.  "female.    Follow-up evaluation today and medication review concerned about asthma symptoms flaring up in the spring        The following portions of the patient's history were reviewed and updated as appropriate: allergies, current medications, past family history, past medical history, past social history, past surgical history and problem list.    Review of Systems   Constitutional:  Negative for chills, fatigue and fever.   HENT:  Negative for congestion, nosebleeds, rhinorrhea, sinus pressure and sore throat.    Eyes:  Negative for discharge and redness.   Respiratory:  Negative for cough and shortness of breath.    Cardiovascular:  Negative for chest pain, palpitations and leg swelling.   Gastrointestinal:  Negative for abdominal pain, blood in stool and nausea.   Endocrine: Negative for cold intolerance, heat intolerance and polyuria.   Genitourinary:  Negative for dysuria and frequency.   Musculoskeletal:  Negative for arthralgias, back pain and myalgias.   Skin:  Negative for rash.   Neurological:  Negative for dizziness, weakness and headaches.   Hematological:  Negative for adenopathy.   Psychiatric/Behavioral:  Negative for behavioral problems and sleep disturbance. The patient is not nervous/anxious.          Objective:      /54 (BP Location: Left arm, Patient Position: Sitting, Cuff Size: Standard)   Pulse 73   Temp (!) 96.4 °F (35.8 °C) (Tympanic)   Resp 18   Ht 5' 3\" (1.6 m)   Wt 88.5 kg (195 lb)   LMP  (LMP Unknown)   SpO2 97%   BMI 34.54 kg/m²        Physical Exam  Vitals and nursing note reviewed.   Constitutional:       General: She is not in acute distress.     Appearance: Normal appearance. She is well-developed and normal weight.   HENT:      Head: Normocephalic and atraumatic.      Right Ear: Tympanic membrane and external ear normal.      Left Ear: Tympanic membrane and external ear normal.      Nose: Rhinorrhea present.      Mouth/Throat:      Mouth: Mucous membranes are " moist.      Pharynx: Oropharynx is clear. No oropharyngeal exudate.   Eyes:      General: No scleral icterus.        Right eye: No discharge.         Left eye: No discharge.      Conjunctiva/sclera: Conjunctivae normal.      Pupils: Pupils are equal, round, and reactive to light.   Neck:      Thyroid: No thyromegaly.      Vascular: No JVD.   Cardiovascular:      Rate and Rhythm: Normal rate and regular rhythm.      Heart sounds: Normal heart sounds. No murmur heard.  Pulmonary:      Effort: Pulmonary effort is normal.      Breath sounds: No wheezing or rales.   Chest:      Chest wall: No tenderness.   Abdominal:      General: Bowel sounds are normal. There is no distension.      Palpations: Abdomen is soft. There is no mass.      Tenderness: There is no abdominal tenderness.   Musculoskeletal:         General: No tenderness or deformity. Normal range of motion.      Cervical back: Normal range of motion.   Lymphadenopathy:      Cervical: No cervical adenopathy.   Skin:     General: Skin is warm and dry.      Findings: No rash.   Neurological:      General: No focal deficit present.      Mental Status: She is alert and oriented to person, place, and time.      Cranial Nerves: No cranial nerve deficit.      Coordination: Coordination normal.      Deep Tendon Reflexes: Reflexes are normal and symmetric. Reflexes normal.   Psychiatric:         Mood and Affect: Mood normal.         Behavior: Behavior normal.         Thought Content: Thought content normal.         Judgment: Judgment normal.          Data:    Laboratory Results: I have personally reviewed the pertinent laboratory results/reports   Radiology/Other Diagnostic Testing Results: I have personally reviewed pertinent reports.       Lab Results   Component Value Date    WBC 5.88 11/06/2023    HGB 14.1 11/06/2023    HCT 44.4 11/06/2023    MCV 86 11/06/2023     11/06/2023     Lab Results   Component Value Date     08/20/2015    K 3.9 11/06/2023    CL  "106 11/06/2023    CO2 28 11/06/2023    ANIONGAP 8.6 08/20/2015    BUN 17 11/06/2023    CREATININE 0.66 11/06/2023    GLUCOSE 88 08/20/2015    GLUF 86 11/06/2023    CALCIUM 9.2 11/06/2023    AST 21 11/06/2023    ALT 24 11/06/2023    ALKPHOS 101 11/06/2023    PROT 7.1 08/20/2015    BILITOT 0.3 08/20/2015    EGFR 90 11/06/2023     Lab Results   Component Value Date    CHOLESTEROL 176 11/06/2023    CHOLESTEROL 206 (H) 06/09/2023    CHOLESTEROL 190 08/25/2022     Lab Results   Component Value Date    HDL 42 (L) 11/06/2023    HDL 47 (L) 06/09/2023    HDL 38 (L) 08/25/2022     Lab Results   Component Value Date    LDLCALC 79 11/06/2023    LDLCALC 117 (H) 06/09/2023    LDLCALC  08/25/2022      Comment:      Calculated LDL invalid, triglycerides >400 mg/dl  This screening LDL is a calculated result.   It does not have the accuracy of the Direct Measured LDL in the monitoring of patients with hyperlipidemia and/or statin therapy.   Direct Measure LDL (RKA518) must be ordered separately in these patients.     Lab Results   Component Value Date    TRIG 273 (H) 11/06/2023    TRIG 208 (H) 06/09/2023    TRIG 556 (H) 08/25/2022     No results found for: \"CHOLHDL\"  Lab Results   Component Value Date    IBH7MHILZFMO 2.017 11/06/2023     Lab Results   Component Value Date    HGBA1C 5.6 06/09/2023     No results found for: \"PSA\"    Lenny Zamarripa, DO      "

## 2024-03-27 NOTE — ASSESSMENT & PLAN NOTE
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

## 2024-03-27 NOTE — ASSESSMENT & PLAN NOTE
GERD symptoms currently stable with pantoprazole twice daily.  She understands that she can reduce the dosing to once daily but has breakthrough acid reflux at times

## 2024-03-27 NOTE — PATIENT INSTRUCTIONS
Low Fat Diet   WHAT YOU NEED TO KNOW:   A low-fat diet is an eating plan that is low in total fat, unhealthy fat, and cholesterol. You may need to follow a low-fat diet if you have trouble digesting or absorbing fat. You may also need to follow this diet if you have high cholesterol. You can also lower your cholesterol by increasing the amount of fiber in your diet. Soluble fiber is a type of fiber that helps to decrease cholesterol levels.  DISCHARGE INSTRUCTIONS:   Different types of fat in food:   Limit unhealthy fats.  A diet that is high in cholesterol, saturated fat, and trans fat may cause unhealthy cholesterol levels. Unhealthy cholesterol levels increase your risk of heart disease.    Cholesterol:  Limit intake of cholesterol to less than 200 mg per day. Cholesterol is found in meat, eggs, and dairy.    Saturated fat:  Limit saturated fat to less than 7% of your total daily calories. Ask your dietitian how many calories you need each day. Saturated fat is found in butter, cheese, ice cream, whole milk, and palm oil. Saturated fat is also found in meat, such as beef, pork, chicken skin, and processed meats. Processed meats include sausage, hot dogs, and bologna.     Trans fat:  Avoid trans fat as much as possible. Trans fat is used in fried and baked foods. Foods that say trans fat free on the label may still have up to 0.5 grams of trans fat per serving.    Include healthy fats.  Replace foods that are high in saturated and trans fat with foods high in healthy fats. This may help to decrease high cholesterol levels.     Monounsaturated fats:  These are found in avocados, nuts, and vegetable oils, such as olive, canola, and sunflower oil.    Polyunsaturated fats:  These can be found in vegetable oils, such as soybean or corn oil. Omega-3 fats can help to decrease the risk of heart disease. Omega-3 fats are found in fish, such as salmon, herring, trout, and tuna. Omega-3 fats can also be found in plant foods,  such as walnuts, flaxseed, soybeans, and canola oil.       Foods to limit or avoid:   Grains:      Snacks that are made with partially hydrogenated oils, such as chips, regular crackers, and butter-flavored popcorn    High-fat baked goods, such as biscuits, croissants, doughnuts, pies, cookies, and pastries    Dairy:      Whole milk, 2% milk, and yogurt and ice cream made with whole milk    Half and half creamer, heavy cream, and whipping cream    Cheese, cream cheese, and sour cream    Meats and proteins:      High-fat cuts of meat (T-bone steak, regular hamburger, and ribs)    Fried meat, poultry (turkey and chicken), and fish    Poultry (chicken and turkey) with skin    Cold cuts (salami or bologna), hot dogs, jorgensen, and sausage    Whole eggs and egg yolks    Vegetables and fruits with added fat:      Fried vegetables or vegetables in butter or high-fat sauces, such as cream or cheese sauces    Fried fruit or fruit served with butter or cream    Fats:      Butter, stick margarine, and shortening    Coconut, palm oil, and palm kernel oil    Foods to include:       Grains:      Whole-grain breads, cereals, pasta, and brown rice    Low-fat crackers and pretzels    Vegetables and fruits:      Fresh, frozen, or canned vegetables (no salt or low-sodium)    Fresh, frozen, dried, or canned fruit (canned in light syrup or fruit juice)    Avocado    Low-fat dairy products:      Nonfat (skim) or 1% milk    Nonfat or low-fat cheese, yogurt, and cottage cheese    Meats and proteins:      Chicken or turkey with no skin    Baked or broiled fish    Lean beef and pork (loin, round, extra lean hamburger)    Beans and peas, unsalted nuts, soy products    Egg whites and substitutes    Seeds and nuts    Fats:      Unsaturated oil, such as canola, olive, peanut, soybean, or sunflower oil    Soft or liquid margarine and vegetable oil spread    Low-fat salad dressing  Other ways to decrease fat:   Read food labels before you buy foods.   Choose foods that have less than 30% of calories from fat. Choose low-fat or fat-free dairy products. Remember that fat free does not mean calorie free. These foods still contain calories, and too many calories can lead to weight gain.     Trim fat from meat and avoid fried food.  Trim all visible fat from meat before you cook it. Remove the skin from poultry. Do not arcos meat, fish, or poultry. Bake, roast, boil, or broil these foods instead. Avoid fried foods. Eat a baked potato instead of French fries. Steam vegetables instead of sautéing them in butter.     Add less fat to foods.  Use imitation jorgensen bits on salads and baked potatoes instead of regular jorgensen bits. Use fat-free or low-fat salad dressings instead of regular dressings. Use low-fat or nonfat butter-flavored topping instead of regular butter or margarine on popcorn and other foods.    Ways to decrease fat in recipes:  Replace high-fat ingredients with low-fat or nonfat ones. This may cause baked goods to be drier than usual. You may need to use nonfat cooking spray on pans to prevent food from sticking. You also may need to change the amount of other ingredients, such as water, in the recipe. Try the following:  Use low-fat or light margarine instead of regular margarine or shortening.     Use lean ground turkey breast or chicken, or lean ground beef (less than 5% fat) instead of hamburger.     Add 1 teaspoon of canola oil to 8 ounces of skim milk instead of using cream or half and half.     Use grated zucchini, carrots, or apples in breads instead of coconut.    Use blenderized, low-fat cottage cheese, plain tofu, or low-fat ricotta cheese instead of cream cheese.     Use 1 egg white and 1 teaspoon of canola oil, or use ¼ cup (2 ounces) of fat-free egg substitute instead of a whole egg.     Replace half of the oil that is called for in a recipe with applesauce when you bake. Use 3 tablespoons of cocoa powder and 1 tablespoon of canola oil instead of  a square of baking chocolate.    How to increase fiber:  Eat enough high-fiber foods to get 20 to 30 grams of fiber every day. Slowly increase your fiber intake to avoid stomach cramps, gas, and other problems.  Eat 3 ounces of whole-grain foods each day.  An ounce is about 1 slice of bread. Eat whole-grain breads, such as whole-wheat bread. Whole wheat, whole-wheat flour, or other whole grains should be listed as the first ingredient on the food label. Replace white flour with whole-grain flour or use half of each in recipes. Whole-grain flour is heavier than white flour, so you may have to add more yeast or baking powder.     Eat a high-fiber cereal for breakfast.  Oatmeal is a good source of soluble fiber. Look for cereals that have bran or fiber in the name. Choose whole-grain products, such as brown rice, barley, and whole-wheat pasta.     Eat more beans, peas, and lentils.  For example, add beans to soups or salads. Eat at least 5 cups of fruits and vegetables each day. Eat fruits and vegetables with the peel because the peel is high in fiber.       © Copyright [employer] 2023 Information is for End User's use only and may not be sold, redistributed or otherwise used for commercial purposes.  The above information is an  only. It is not intended as medical advice for individual conditions or treatments. Talk to your doctor, nurse or pharmacist before following any medical regimen to see if it is safe and effective for you.

## 2024-03-28 DIAGNOSIS — J45.909 ALLERGIC BRONCHITIS: ICD-10-CM

## 2024-03-29 ENCOUNTER — OFFICE VISIT (OUTPATIENT)
Dept: PHYSICAL THERAPY | Age: 69
End: 2024-03-29
Payer: COMMERCIAL

## 2024-03-29 DIAGNOSIS — M25.461 EFFUSION OF RIGHT KNEE: ICD-10-CM

## 2024-03-29 DIAGNOSIS — M25.511 ACUTE PAIN OF RIGHT SHOULDER: Primary | ICD-10-CM

## 2024-03-29 PROCEDURE — 97140 MANUAL THERAPY 1/> REGIONS: CPT | Performed by: PHYSICAL THERAPIST

## 2024-03-29 PROCEDURE — 97110 THERAPEUTIC EXERCISES: CPT | Performed by: PHYSICAL THERAPIST

## 2024-03-29 RX ORDER — MONTELUKAST SODIUM 10 MG/1
10 TABLET ORAL EVERY EVENING
Qty: 90 TABLET | Refills: 0 | Status: SHIPPED | OUTPATIENT
Start: 2024-03-29

## 2024-03-29 RX ORDER — TRAMADOL HYDROCHLORIDE 50 MG/1
50 TABLET ORAL EVERY 6 HOURS PRN
Qty: 30 TABLET | Refills: 0 | Status: SHIPPED | OUTPATIENT
Start: 2024-03-29

## 2024-03-29 NOTE — PROGRESS NOTES
Daily Note     Today's date: 3/29/2024  Patient name: Ayesha Lockwood  : 1955  MRN: [id number]  Referring provider: Lenny Zamarripa DO  Dx:   Encounter Diagnosis     ICD-10-CM    1. Acute pain of right shoulder  M25.511           Start Time: 1345  Stop Time: 1430  Total time in clinic (min): 45 minutes    Subjective: neck is also sore      Objective: See treatment diary below      Assessment: Tolerated treatment fair. Patient demonstrated fatigue post treatment, exhibited good technique with therapeutic exercises, and would benefit from continued PT, still with RTC weakness and complaints of neck pain      Plan: Progress treatment as tolerated.       Precautions: left TKR, OA, GERD  POC expires: 2024  Date 3/22 3/25 3/29          Visit count 1 2 3          FOTO 36/60                   Manuals 3/22 3/25 3/29                       MT right shoulder/ right upper trap  15 min 15 min                                    Neuro Re-Ed                                                                                                        Ther Ex             UBE  4 min 5 min          pulleys  30x 30x          GTB ER  30x 30x          BTB IR  30x 30x          BTB rows  30x 30x                                                 Ther Activity             HEP 10 min                         Gait Training                                       Modalities             laser  4 min 4 min

## 2024-04-01 ENCOUNTER — EVALUATION (OUTPATIENT)
Dept: PHYSICAL THERAPY | Age: 69
End: 2024-04-01
Payer: COMMERCIAL

## 2024-04-01 DIAGNOSIS — M54.2 NECK PAIN: Primary | ICD-10-CM

## 2024-04-01 PROCEDURE — 97110 THERAPEUTIC EXERCISES: CPT | Performed by: PHYSICAL THERAPIST

## 2024-04-01 PROCEDURE — 97140 MANUAL THERAPY 1/> REGIONS: CPT | Performed by: PHYSICAL THERAPIST

## 2024-04-01 PROCEDURE — 97161 PT EVAL LOW COMPLEX 20 MIN: CPT | Performed by: PHYSICAL THERAPIST

## 2024-04-01 NOTE — PROGRESS NOTES
PT Evaluation     Today's date: 2024  Patient name: Ayesha Lockwood  : 1955  MRN: [id number]  Referring provider: Lenny Zamarripa DO  Dx:   Encounter Diagnosis     ICD-10-CM    1. Neck pain  M54.2           Start Time: 945  Stop Time: 1045  Total time in clinic (min): 60 minutes    Assessment  Assessment details: Ayesha Lockwood is a 68 y.o. female who presents with pain, decreased strength, decreased ROM, decreased joint mobility, and postural dysfunction. Due to these impairments, Patient has difficulty performing a/iadls. Patient's clinical presentation is consistent with their referring diagnosis of neck pain. Patient would benefit from skilled physical therapy to address their aforementioned impairments, improve their level of function and to improve their overall quality of life.  Impairments: abnormal muscle tone, abnormal or restricted ROM, abnormal movement, activity intolerance, impaired physical strength, lacks appropriate home exercise program, pain with function, poor posture  and poor body mechanics  Understanding of Dx/Px/POC: good   Prognosis: good    Goals  ST-3 WEEKS  1.  Decrease pain by 2 points on VAS at its worst.  2.  Increase ROM by > 5 deg in all deficients planes.  3.  Increase UE/postural strength by 1/2 MMT grade in all deficient planes.    LT-6 WEEKS  1. Patient to be independent with a/iadls especially with reaching and lifting  2. Increase functional activities for leisure and home activities to previous LOF.  3. Independent with HEP and/or fitness program.    Plan  Patient would benefit from: skilled physical therapy  Planned modality interventions: cryotherapy, electrical stimulation/Russian stimulation, thermotherapy: hydrocollator packs and unattended electrical stimulation  Planned therapy interventions: activity modification, behavior modification, body mechanics training, aquatic therapy, flexibility, functional ROM exercises, home exercise  program, IADL retraining, joint mobilization, manual therapy, neuromuscular re-education, patient education, postural training, strengthening, stretching, therapeutic activities and therapeutic exercise  Frequency: 2x week (2-3x week)  Duration in weeks: 12  Plan of Care beginning date: 2024  Plan of Care expiration date: 2024  Treatment plan discussed with: patient    Subjective Evaluation    History of Present Illness  Mechanism of injury: Patient reports increased neck pain x 1 month secondary to unknown, currently complains of posterior neck pain as well as right shoulder and HA          Not a recurrent problem   Quality of life: good    Patient Goals  Patient goals for therapy: decreased pain, increased motion, increased strength and independence with ADLs/IADLs    Pain  Current pain ratin  At best pain ratin  At worst pain rating: 10  Quality: tight and dull ache  Relieving factors: heat and rest  Aggravating factors: overhead activity and lifting  Progression: worsening    Hand dominance: right      Diagnostic Tests  X-ray: abnormal  Treatments  Previous treatment: physical therapy and massage      Objective     Static Posture     Head  Forward.    Palpation   Left   Hypertonic in the levator scapulae, pectoralis minor, suboccipitals and upper trapezius.   Tenderness of the rhomboids and upper trapezius.     Right   Hypertonic in the levator scapulae, pectoralis minor, suboccipitals and upper trapezius.   Tenderness of the rhomboids and upper trapezius.     Tenderness   Cervical Spine   Tenderness in the spinous process.     Active Range of Motion   Cervical/Thoracic Spine       Cervical    Flexion: 40 degrees   Extension: 50 degrees      Left lateral flexion: 15 degrees      Right lateral flexion: 15 degrees      Left rotation: 45 degrees  Right rotation: 55 degrees       Strength/Myotome Testing   Cervical Spine   Neck flexion: 4-    Left Shoulder     Planes of Motion   Flexion: 4    Extension: 4   Abduction: 4   Adduction: 4+   External rotation at 0°: 4   Internal rotation at 0°: 4+     Isolated Muscles   Lower trapezius: 4     Right Shoulder     Planes of Motion   Flexion: 4   Extension: 4   Abduction: 4   Adduction: 4+   External rotation at 0°: 4-   Internal rotation at 0°: 4+     Isolated Muscles   Lower trapezius: 4     Left Elbow   Flexion: 4+  Extension: 4+    Right Elbow   Flexion: 4+  Extension: 4+    Tests   Cervical     Right   Positive Spurling's Test A.     Right Shoulder   Positive empty can.              POC expires Unit limit Auth Expiration date PT/OT + Visit Limit?   7/1                                 Precautions: left TKR, OA, GERD  POC expires: 7/1/2024  Date 3/22 3/25 3/29 4/1         Visit count 1 2 3          FOTO 36/60   37/54            Visit/Unit Tracking  AUTH Status:  Date                Used                Remaining                 FOTO                      Manuals 3/22 3/25 3/29 4/1                      MT right shoulder/ right upper trap  15 min 15 min 5         Gentle cervical traction    5         MT cervical    5         Neuro Re-Ed                                                                                                        Ther Ex             UBE  4 min 5 min          pulleys  30x 30x 30x         GTB ER  30x 30x 30x         BTB IR  30x 30x 30x         BTB rows  30x 30x 30x                                                Ther Activity             HEP 10 min                         Gait Training                                       Modalities             laser  4 min 4 min 4 min                               Manuals 4/1                                                                Neuro Re-Ed                                                                                                        Ther Ex                                                                                                                     Ther Activity                                        Gait Training                                       Modalities

## 2024-04-05 ENCOUNTER — OFFICE VISIT (OUTPATIENT)
Dept: PHYSICAL THERAPY | Age: 69
End: 2024-04-05
Payer: COMMERCIAL

## 2024-04-05 DIAGNOSIS — M54.2 NECK PAIN: Primary | ICD-10-CM

## 2024-04-05 DIAGNOSIS — M25.511 ACUTE PAIN OF RIGHT SHOULDER: ICD-10-CM

## 2024-04-05 PROCEDURE — 97110 THERAPEUTIC EXERCISES: CPT | Performed by: PHYSICAL THERAPIST

## 2024-04-05 PROCEDURE — 97140 MANUAL THERAPY 1/> REGIONS: CPT | Performed by: PHYSICAL THERAPIST

## 2024-04-05 NOTE — PROGRESS NOTES
Daily Note     Today's date: 2024  Patient name: Ayesha Lockwood  : 1955  MRN: [id number]  Referring provider: Lenny Zamarripa DO  Dx:   Encounter Diagnosis     ICD-10-CM    1. Neck pain  M54.2       2. Acute pain of right shoulder  M25.511           Start Time: 0845  Stop Time: 0930  Total time in clinic (min): 45 minutes    Subjective: some improvement      Objective: See treatment diary below      Assessment: Tolerated treatment fair. Patient demonstrated fatigue post treatment, exhibited good technique with therapeutic exercises, and would benefit from continued PT, increased right shoulder ROM post treatment but  to upper trap and right infraspinatus      Plan: Progress treatment as tolerated.       POC expires Unit limit Auth Expiration date PT/OT + Visit Limit?                                    Precautions: left TKR, OA, GERD  POC expires: 2024  Date 3/22 3/25 3/29 4/1 4/5        Visit count 1 2 3          FOTO 36/   37/54            Visit/Unit Tracking  AUTH Status:  Date                Used                Remaining                 FOTO                      Manuals 3/22 3/25 3/29 4/1 4/5                     MT right shoulder/ right upper trap  15 min 15 min 5 10        Gentle cervical traction    5 5        MT cervical    5 5        Neuro Re-Ed                                                                                                        Ther Ex             UBE  4 min 5 min  5 min        pulleys  30x 30x 30x 30x        GTB ER  30x 30x 30x 30x        BTB IR  30x 30x 30x 30x        BTB rows  30x 30x 30x 30x        Chin tucks     10x        Thoracic bolster stretch     10x                     Ther Activity             HEP 10 min                         Gait Training                                       Modalities             laser  4 min 4 min 4 min                               Manuals                                                                 Neuro  Re-Ed                                                                                                        Ther Ex                                                                                                                     Ther Activity                                       Gait Training                                       Modalities

## 2024-04-08 ENCOUNTER — OFFICE VISIT (OUTPATIENT)
Dept: PHYSICAL THERAPY | Age: 69
End: 2024-04-08
Payer: COMMERCIAL

## 2024-04-08 DIAGNOSIS — M25.511 ACUTE PAIN OF RIGHT SHOULDER: ICD-10-CM

## 2024-04-08 DIAGNOSIS — M54.2 NECK PAIN: Primary | ICD-10-CM

## 2024-04-08 PROCEDURE — 97110 THERAPEUTIC EXERCISES: CPT | Performed by: PHYSICAL THERAPIST

## 2024-04-08 PROCEDURE — 97140 MANUAL THERAPY 1/> REGIONS: CPT | Performed by: PHYSICAL THERAPIST

## 2024-04-08 NOTE — PROGRESS NOTES
Daily Note     Today's date: 2024  Patient name: Ayesha Lockwood  : 1955  MRN: [id number]  Referring provider: Lenny Zamarripa DO  Dx:   Encounter Diagnosis     ICD-10-CM    1. Neck pain  M54.2       2. Acute pain of right shoulder  M25.511           Start Time: 1030  Stop Time: 1115  Total time in clinic (min): 45 minutes    Subjective: some improvement      Objective: See treatment diary below      Assessment: Tolerated treatment fair. Patient demonstrated fatigue post treatment, exhibited good technique with therapeutic exercises, and would benefit from continued PT, increased right shoulder ROM with slight licking to right GH joint      Plan: Progress treatment as tolerated.       POC expires Unit limit Auth Expiration date PT/OT + Visit Limit?                                    Precautions: left TKR, OA, GERD  POC expires: 2024  Date 3/22 3/25 3/29 4/1 4/       Visit count 1 2 3   6       FOTO 36/60   37/54            Visit/Unit Tracking  AUTH Status:  Date                Used                Remaining                 FOTO                      Manuals 3/22 3/25 3/29 4/1 4/5 4/8                    MT right shoulder/ right upper trap  15 min 15 min 5 10 10       Gentle cervical traction    5 5 5       MT cervical    5 5 5       Neuro Re-Ed                                                                                                        Ther Ex             UBE  4 min 5 min  5 min 6 min       pulleys  30x 30x 30x 30x 30x       GTB ER  30x 30x 30x 30x 30x       BTB IR  30x 30x 30x 30x 30x       BTB rows  30x 30x 30x 30x 30x       Chin tucks     10x 10x       Thoracic bolster stretch     10x 10x                    Ther Activity             HEP 10 min                         Gait Training                                       Modalities             laser  4 min 4 min 4 min                               Manuals                                                                 Neuro  Re-Ed                                                                                                        Ther Ex                                                                                                                     Ther Activity                                       Gait Training                                       Modalities

## 2024-04-12 ENCOUNTER — OFFICE VISIT (OUTPATIENT)
Dept: PHYSICAL THERAPY | Age: 69
End: 2024-04-12
Payer: COMMERCIAL

## 2024-04-12 DIAGNOSIS — M25.511 ACUTE PAIN OF RIGHT SHOULDER: ICD-10-CM

## 2024-04-12 DIAGNOSIS — M54.2 NECK PAIN: Primary | ICD-10-CM

## 2024-04-12 PROCEDURE — 97140 MANUAL THERAPY 1/> REGIONS: CPT | Performed by: PHYSICAL THERAPIST

## 2024-04-12 PROCEDURE — 97110 THERAPEUTIC EXERCISES: CPT | Performed by: PHYSICAL THERAPIST

## 2024-04-12 NOTE — PROGRESS NOTES
Daily Note     Today's date: 2024  Patient name: Ayesha Lockwood  : 1955  MRN: [id number]  Referring provider: Lenny Zamarripa DO  Dx:   Encounter Diagnosis     ICD-10-CM    1. Neck pain  M54.2       2. Acute pain of right shoulder  M25.511           Start Time: 1030  Stop Time: 1115  Total time in clinic (min): 45 minutes    Subjective: some improvement      Objective: See treatment diary below      Assessment: Tolerated treatment fair. Patient demonstrated fatigue post treatment, exhibited good technique with therapeutic exercises, and would benefit from continued PT, decreased pain and increased ROM and strength to neck and right shoulder      Plan: Progress treatment as tolerated.       POC expires Unit limit Auth Expiration date PT/OT + Visit Limit?                                    Precautions: left TKR, OA, GERD  POC expires: 2024  Date 3/22 3/25 3/29 4/1 4/      Visit count 1 2 3   6 7      FOTO 36/60   37/54            Visit/Unit Tracking  AUTH Status:  Date                Used                Remaining                 FOTO                      Manuals 3/22 3/25 3/29 4/1 4/                   MT right shoulder/ right upper trap  15 min 15 min 5 10 10 10      Gentle cervical traction    5 5 5 5      MT cervical    5 5 5 5      Neuro Re-Ed                                                                                                        Ther Ex             UBE  4 min 5 min  5 min 6 min 6 min      pulleys  30x 30x 30x 30x 30x 30x      GTB ER  30x 30x 30x 30x 30x 30x      BTB IR  30x 30x 30x 30x 30x 30x      BTB rows  30x 30x 30x 30x 30x 30x      Chin tucks     10x 10x 10x      Thoracic bolster stretch     10x 10x 10x                   Ther Activity             HEP 10 min                         Gait Training                                       Modalities             laser  4 min 4 min 4 min                               Manuals                                                                  Neuro Re-Ed                                                                                                        Ther Ex                                                                                                                     Ther Activity                                       Gait Training                                       Modalities

## 2024-04-13 ENCOUNTER — OFFICE VISIT (OUTPATIENT)
Dept: URGENT CARE | Facility: CLINIC | Age: 69
End: 2024-04-13
Payer: COMMERCIAL

## 2024-04-13 VITALS
OXYGEN SATURATION: 98 % | HEART RATE: 83 BPM | RESPIRATION RATE: 16 BRPM | DIASTOLIC BLOOD PRESSURE: 72 MMHG | SYSTOLIC BLOOD PRESSURE: 110 MMHG | BODY MASS INDEX: 34.79 KG/M2 | TEMPERATURE: 98.1 F | WEIGHT: 196.38 LBS

## 2024-04-13 DIAGNOSIS — J02.9 SORE THROAT: Primary | ICD-10-CM

## 2024-04-13 LAB — S PYO AG THROAT QL: NEGATIVE

## 2024-04-13 PROCEDURE — 99213 OFFICE O/P EST LOW 20 MIN: CPT | Performed by: ORTHOPAEDIC SURGERY

## 2024-04-13 PROCEDURE — 87880 STREP A ASSAY W/OPTIC: CPT | Performed by: ORTHOPAEDIC SURGERY

## 2024-04-13 PROCEDURE — 87636 SARSCOV2 & INF A&B AMP PRB: CPT | Performed by: ORTHOPAEDIC SURGERY

## 2024-04-13 PROCEDURE — 87070 CULTURE OTHR SPECIMN AEROBIC: CPT | Performed by: ORTHOPAEDIC SURGERY

## 2024-04-13 RX ORDER — CEPHALEXIN 500 MG/1
500 CAPSULE ORAL EVERY 12 HOURS SCHEDULED
Qty: 14 CAPSULE | Refills: 0 | Status: SHIPPED | OUTPATIENT
Start: 2024-04-13 | End: 2024-04-20

## 2024-04-13 NOTE — PATIENT INSTRUCTIONS
"If Strep is suspected we may have obtained a throat swab from you today, which will be sent to our lab for culture. Our office will contact you once the culture results are available only if positive to begin appropriate antibiotic therapy. Alternatively, you may follow your results on MyChart. In the interim please start taking antibiotics as prescribed  For pain relief you may try:  Warm water and salt gargles  Chloraseptic spray  Cepacol lozenges  \"Throat Coat\" tea  Over-the-counter Tylenol/ibuprofen for pain and fever  Stay well hydrated and get plenty of rest  Follow up with your PCP in 3-5 days  Proceed to ER if symptoms worsen      "

## 2024-04-14 LAB
FLUAV RNA RESP QL NAA+PROBE: NEGATIVE
FLUBV RNA RESP QL NAA+PROBE: NEGATIVE
SARS-COV-2 RNA RESP QL NAA+PROBE: NEGATIVE

## 2024-04-14 NOTE — TELEPHONE ENCOUNTER
Regarding: post op - suture wound cleaning instructions  ----- Message from Radha Bautista sent at 2/26/2023  8:47 AM EST -----  " I had surgery on 2/21/23, I just took of my bandages and there is a lot of dry blood on the sutures   Should I continue to take my blood thinner, how can I clean the wound?" discussed with patient   discussed with covering ACP discussed with renal attending discussed with covering ACP discussed with patient in detail, expresses understanding of treatment plans. discharge planning with outpatient hemodialysis discussed with covering ACP stable for discharge

## 2024-04-15 ENCOUNTER — TELEPHONE (OUTPATIENT)
Dept: FAMILY MEDICINE CLINIC | Facility: CLINIC | Age: 69
End: 2024-04-15

## 2024-04-15 ENCOUNTER — APPOINTMENT (OUTPATIENT)
Dept: PHYSICAL THERAPY | Age: 69
End: 2024-04-15
Payer: COMMERCIAL

## 2024-04-15 DIAGNOSIS — M54.2 NECK PAIN: Primary | ICD-10-CM

## 2024-04-15 DIAGNOSIS — M25.511 ACUTE PAIN OF RIGHT SHOULDER: ICD-10-CM

## 2024-04-15 NOTE — TELEPHONE ENCOUNTER
Patient was at Urgent Care over weekend.  She had Covid/strep type symptoms but covid/flu/strep was negative.  She was started on Keflex.  She stated today she took her Pantoprazole and allegra and keflex for sinus pain she did the flovent hsa and it calmed down the coughing.  night.  She has a combination of sneezing dry cough.  Not sure if allergies or something else.  Do you want to do a Virtual visit or face to face visit?  Recommendations?

## 2024-04-15 NOTE — PROGRESS NOTES
Daily Note     Today's date: 4/15/2024  Patient name: Ayesha Lockwood  : 1955  MRN: [id number]  Referring provider: Lenny Zamarripa DO  Dx:   Encounter Diagnosis     ICD-10-CM    1. Neck pain  M54.2       2. Acute pain of right shoulder  M25.511                      Subjective: ***      Objective: See treatment diary below      Assessment: Tolerated treatment {Tolerated treatment :1244023939}. Patient {assessment:1093964510}      Plan: {PLAN:0154164217}     POC expires Unit limit Auth Expiration date PT/OT + Visit Limit?                                    Precautions: left TKR, OA, GERD  POC expires: 2024  Date 3/22 3/25 3/29 4/1 4      Visit count 1 2 3   6 7      FOTO 36/60   37/54            Visit/Unit Tracking  AUTH Status:  Date                Used                Remaining                 FOTO                      Manuals 3/22 3/25 3/29 4/1 4                   MT right shoulder/ right upper trap  15 min 15 min 5 10 10 10      Gentle cervical traction    5 5 5 5      MT cervical    5 5 5 5      Neuro Re-Ed                                                                                                        Ther Ex             UBE  4 min 5 min  5 min 6 min 6 min      pulleys  30x 30x 30x 30x 30x 30x      GTB ER  30x 30x 30x 30x 30x 30x      BTB IR  30x 30x 30x 30x 30x 30x      BTB rows  30x 30x 30x 30x 30x 30x      Chin tucks     10x 10x 10x      Thoracic bolster stretch     10x 10x 10x                   Ther Activity             HEP 10 min                         Gait Training                                       Modalities             laser  4 min 4 min 4 min                               Manuals                                                                 Neuro Re-Ed                                                                                                        Ther Ex                                                                                                                      Ther Activity                                       Gait Training                                       Modalities

## 2024-04-16 ENCOUNTER — OFFICE VISIT (OUTPATIENT)
Dept: FAMILY MEDICINE CLINIC | Facility: CLINIC | Age: 69
End: 2024-04-16
Payer: COMMERCIAL

## 2024-04-16 VITALS
TEMPERATURE: 97.1 F | RESPIRATION RATE: 18 BRPM | HEART RATE: 66 BPM | OXYGEN SATURATION: 97 % | WEIGHT: 195.8 LBS | SYSTOLIC BLOOD PRESSURE: 108 MMHG | HEIGHT: 63 IN | BODY MASS INDEX: 34.69 KG/M2 | DIASTOLIC BLOOD PRESSURE: 62 MMHG

## 2024-04-16 DIAGNOSIS — R07.89 CHEST TIGHTNESS: ICD-10-CM

## 2024-04-16 DIAGNOSIS — R00.2 PALPITATIONS: ICD-10-CM

## 2024-04-16 DIAGNOSIS — J30.1 SEASONAL ALLERGIC RHINITIS DUE TO POLLEN: ICD-10-CM

## 2024-04-16 DIAGNOSIS — J45.40 MODERATE PERSISTENT ASTHMA WITHOUT COMPLICATION: Primary | ICD-10-CM

## 2024-04-16 DIAGNOSIS — I82.402 DEEP VEIN THROMBOSIS (DVT) OF LEFT LOWER EXTREMITY, UNSPECIFIED CHRONICITY, UNSPECIFIED VEIN (HCC): ICD-10-CM

## 2024-04-16 LAB — BACTERIA THROAT CULT: NORMAL

## 2024-04-16 PROCEDURE — 93000 ELECTROCARDIOGRAM COMPLETE: CPT | Performed by: FAMILY MEDICINE

## 2024-04-16 PROCEDURE — 99214 OFFICE O/P EST MOD 30 MIN: CPT | Performed by: FAMILY MEDICINE

## 2024-04-16 NOTE — ASSESSMENT & PLAN NOTE
Patient describes chest tightness at times now more recently flared up as a result of reactive airway disease from underlying viral infection slowly improving.

## 2024-04-16 NOTE — PROGRESS NOTES
Assessment/Plan:       Problem List Items Addressed This Visit          Cardiovascular and Mediastinum    DVT of leg (deep venous thrombosis) (HCC)     Old DVT with continuation of Xarelto no change            Respiratory    Moderate persistent asthma without complication - Primary     Patient recently developed upper respiratory infection with lower respiratory symptoms exacerbating underlying asthma making her more short of breath she describes chest tightness now and is continuing on the Flovent inhaler along with Allegra and Singulair.  Recently started on cephalexin through urgent care.         Seasonal allergic rhinitis due to pollen     It is continuing with Allegra and Singulair and also will use Flonase if needed            Orthopedic/Musculoskeletal    Chest tightness     Patient describes chest tightness at times now more recently flared up as a result of reactive airway disease from underlying viral infection slowly improving.            Other    Palpitations     Stable now and needs to use Ventolin intermittently              Subjective:      Patient ID: Ayesha Lockwood is a 68 y.o. female.    Sore Throat   Associated symptoms include shortness of breath. Pertinent negatives include no abdominal pain, congestion, coughing or headaches.   Shortness of Breath  Associated symptoms include chest pain and a sore throat. Pertinent negatives include no coughing, dizziness, fatigue, leg swelling, palpitations or rhinorrhea.       The following portions of the patient's history were reviewed and updated as appropriate: allergies, current medications, past family history, past medical history, past social history, past surgical history and problem list.    Review of Systems   Constitutional:  Negative for chills, fatigue and fever.   HENT:  Positive for sore throat. Negative for congestion, nosebleeds, rhinorrhea and sinus pressure.    Eyes:  Negative for discharge and redness.   Respiratory:  Positive for  "shortness of breath. Negative for cough.    Cardiovascular:  Positive for chest pain. Negative for palpitations and leg swelling.   Gastrointestinal:  Negative for abdominal pain, blood in stool and nausea.   Endocrine: Negative for cold intolerance, heat intolerance and polyuria.   Genitourinary:  Negative for dysuria and frequency.   Musculoskeletal:  Negative for arthralgias, back pain and myalgias.   Skin:  Negative for rash.   Neurological:  Negative for dizziness, weakness and headaches.   Hematological:  Negative for adenopathy.   Psychiatric/Behavioral:  Negative for behavioral problems and sleep disturbance. The patient is not nervous/anxious.          Objective:      /62 (BP Location: Right arm, Patient Position: Sitting, Cuff Size: Standard)   Pulse 66   Temp (!) 97.1 °F (36.2 °C) (Tympanic)   Resp 18   Ht 5' 3\" (1.6 m)   Wt 88.8 kg (195 lb 12.8 oz)   LMP  (LMP Unknown)   SpO2 97%   BMI 34.68 kg/m²        Physical Exam  Vitals and nursing note reviewed.   Constitutional:       General: She is not in acute distress.     Appearance: She is well-developed.   HENT:      Head: Normocephalic and atraumatic.      Right Ear: Tympanic membrane and external ear normal.      Left Ear: Tympanic membrane and external ear normal.      Nose: Congestion present.      Mouth/Throat:      Mouth: Mucous membranes are moist.      Pharynx: Oropharynx is clear. No oropharyngeal exudate.   Eyes:      General: No scleral icterus.        Right eye: No discharge.         Left eye: No discharge.      Conjunctiva/sclera: Conjunctivae normal.      Pupils: Pupils are equal, round, and reactive to light.   Neck:      Thyroid: No thyromegaly.      Vascular: No JVD.   Cardiovascular:      Rate and Rhythm: Normal rate and regular rhythm.      Heart sounds: Normal heart sounds. No murmur heard.  Pulmonary:      Effort: Pulmonary effort is normal.      Breath sounds: No wheezing or rales.   Chest:      Chest wall: No " tenderness.   Abdominal:      General: Bowel sounds are normal. There is no distension.      Palpations: Abdomen is soft. There is no mass.      Tenderness: There is no abdominal tenderness.   Musculoskeletal:         General: No tenderness or deformity. Normal range of motion.      Cervical back: Normal range of motion.   Lymphadenopathy:      Cervical: No cervical adenopathy.   Skin:     General: Skin is warm and dry.      Findings: No rash.   Neurological:      General: No focal deficit present.      Mental Status: She is alert and oriented to person, place, and time.      Cranial Nerves: No cranial nerve deficit.      Coordination: Coordination normal.      Deep Tendon Reflexes: Reflexes are normal and symmetric. Reflexes normal.   Psychiatric:         Mood and Affect: Mood normal.         Behavior: Behavior normal.         Thought Content: Thought content normal.         Judgment: Judgment normal.          Data:    Laboratory Results: I have personally reviewed the pertinent laboratory results/reports   Radiology/Other Diagnostic Testing Results: I have personally reviewed pertinent reports.       Lab Results   Component Value Date    WBC 5.88 11/06/2023    HGB 14.1 11/06/2023    HCT 44.4 11/06/2023    MCV 86 11/06/2023     11/06/2023     Lab Results   Component Value Date     08/20/2015    K 3.9 11/06/2023     11/06/2023    CO2 28 11/06/2023    ANIONGAP 8.6 08/20/2015    BUN 17 11/06/2023    CREATININE 0.66 11/06/2023    GLUCOSE 88 08/20/2015    GLUF 86 11/06/2023    CALCIUM 9.2 11/06/2023    AST 21 11/06/2023    ALT 24 11/06/2023    ALKPHOS 101 11/06/2023    PROT 7.1 08/20/2015    BILITOT 0.3 08/20/2015    EGFR 90 11/06/2023     Lab Results   Component Value Date    CHOLESTEROL 176 11/06/2023    CHOLESTEROL 206 (H) 06/09/2023    CHOLESTEROL 190 08/25/2022     Lab Results   Component Value Date    HDL 42 (L) 11/06/2023    HDL 47 (L) 06/09/2023    HDL 38 (L) 08/25/2022     Lab Results  "  Component Value Date    LDLCALC 79 11/06/2023    LDLCALC 117 (H) 06/09/2023    LDLCALC  08/25/2022      Comment:      Calculated LDL invalid, triglycerides >400 mg/dl  This screening LDL is a calculated result.   It does not have the accuracy of the Direct Measured LDL in the monitoring of patients with hyperlipidemia and/or statin therapy.   Direct Measure LDL (AXV077) must be ordered separately in these patients.     Lab Results   Component Value Date    TRIG 273 (H) 11/06/2023    TRIG 208 (H) 06/09/2023    TRIG 556 (H) 08/25/2022     No results found for: \"CHOLHDL\"  Lab Results   Component Value Date    ZFK8ULIOPIVY 2.017 11/06/2023     Lab Results   Component Value Date    HGBA1C 5.6 06/09/2023     No results found for: \"PSA\"    Lenny Zamarripa, DO      "

## 2024-04-16 NOTE — ASSESSMENT & PLAN NOTE
Patient recently developed upper respiratory infection with lower respiratory symptoms exacerbating underlying asthma making her more short of breath she describes chest tightness now and is continuing on the Flovent inhaler along with Allegra and Singulair.  Recently started on cephalexin through urgent care.

## 2024-04-17 ENCOUNTER — APPOINTMENT (OUTPATIENT)
Dept: PHYSICAL THERAPY | Age: 69
End: 2024-04-17
Payer: COMMERCIAL

## 2024-04-17 DIAGNOSIS — M25.511 ACUTE PAIN OF RIGHT SHOULDER: ICD-10-CM

## 2024-04-17 DIAGNOSIS — M54.2 NECK PAIN: Primary | ICD-10-CM

## 2024-04-17 NOTE — PROGRESS NOTES
Daily Note     Today's date: 2024  Patient name: Ayesha Lockwood  : 1955  MRN: [id number]  Referring provider: Lenny Zamarripa DO  Dx:   Encounter Diagnosis     ICD-10-CM    1. Neck pain  M54.2       2. Acute pain of right shoulder  M25.511                      Subjective: ***      Objective: See treatment diary below      Assessment: Tolerated treatment {Tolerated treatment :6481564652}. Patient {assessment:0930872629}      Plan: {PLAN:1431213696}     POC expires Unit limit Auth Expiration date PT/OT + Visit Limit?                                    Precautions: left TKR, OA, GERD  POC expires: 2024  Date 3/22 3/25 3/29 4/1 4      Visit count 1 2 3   6 7      FOTO 36/60   37/54            Visit/Unit Tracking  AUTH Status:  Date                Used                Remaining                 FOTO                      Manuals 3/22 3/25 3/29 4/1 4                   MT right shoulder/ right upper trap  15 min 15 min 5 10 10 10      Gentle cervical traction    5 5 5 5      MT cervical    5 5 5 5      Neuro Re-Ed                                                                                                        Ther Ex             UBE  4 min 5 min  5 min 6 min 6 min      pulleys  30x 30x 30x 30x 30x 30x      GTB ER  30x 30x 30x 30x 30x 30x      BTB IR  30x 30x 30x 30x 30x 30x      BTB rows  30x 30x 30x 30x 30x 30x      Chin tucks     10x 10x 10x      Thoracic bolster stretch     10x 10x 10x                   Ther Activity             HEP 10 min                         Gait Training                                       Modalities             laser  4 min 4 min 4 min                               Manuals                                                                 Neuro Re-Ed                                                                                                        Ther Ex                                                                                                                      Ther Activity                                       Gait Training                                       Modalities

## 2024-04-22 ENCOUNTER — HOSPITAL ENCOUNTER (OUTPATIENT)
Dept: MAMMOGRAPHY | Facility: CLINIC | Age: 69
Discharge: HOME | End: 2024-04-22
Payer: COMMERCIAL

## 2024-04-22 ENCOUNTER — APPOINTMENT (OUTPATIENT)
Dept: PHYSICAL THERAPY | Age: 69
End: 2024-04-22
Payer: COMMERCIAL

## 2024-04-22 VITALS — HEIGHT: 63 IN | BODY MASS INDEX: 34.55 KG/M2 | WEIGHT: 195 LBS

## 2024-04-22 DIAGNOSIS — Z12.31 ENCOUNTER FOR SCREENING MAMMOGRAM FOR MALIGNANT NEOPLASM OF BREAST: ICD-10-CM

## 2024-04-22 DIAGNOSIS — M54.2 NECK PAIN: Primary | ICD-10-CM

## 2024-04-22 DIAGNOSIS — M25.511 ACUTE PAIN OF RIGHT SHOULDER: ICD-10-CM

## 2024-04-22 PROCEDURE — 77063 BREAST TOMOSYNTHESIS BI: CPT

## 2024-04-22 PROCEDURE — 77067 SCR MAMMO BI INCL CAD: CPT

## 2024-04-22 NOTE — PROGRESS NOTES
Daily Note     Today's date: 2024  Patient name: Ayesha Lockwood  : 1955  MRN: [id number]  Referring provider: Lenny Zamarripa DO  Dx:   Encounter Diagnosis     ICD-10-CM    1. Neck pain  M54.2       2. Acute pain of right shoulder  M25.511                      Subjective: ***      Objective: See treatment diary below      Assessment: Tolerated treatment {Tolerated treatment :8096309380}. Patient {assessment:5375879102}      Plan: {PLAN:6987837695}     POC expires Unit limit Auth Expiration date PT/OT + Visit Limit?                                    Precautions: left TKR, OA, GERD  POC expires: 2024  Date 3/22 3/25 3/29 4/1 4/      Visit count 1 2 3   6 7      FOTO 36/60   37/54            Visit/Unit Tracking  AUTH Status:  Date                Used                Remaining                 FOTO                      Manuals 3/22 3/25 3/29 4/1 4                   MT right shoulder/ right upper trap  15 min 15 min 5 10 10 10      Gentle cervical traction    5 5 5 5      MT cervical    5 5 5 5      Neuro Re-Ed                                                                                                        Ther Ex             UBE  4 min 5 min  5 min 6 min 6 min      pulleys  30x 30x 30x 30x 30x 30x      GTB ER  30x 30x 30x 30x 30x 30x      BTB IR  30x 30x 30x 30x 30x 30x      BTB rows  30x 30x 30x 30x 30x 30x      Chin tucks     10x 10x 10x      Thoracic bolster stretch     10x 10x 10x                   Ther Activity             HEP 10 min                         Gait Training                                       Modalities             laser  4 min 4 min 4 min                               Manuals                                                                 Neuro Re-Ed                                                                                                        Ther Ex                                                                                                                      Ther Activity                                       Gait Training                                       Modalities

## 2024-04-26 ENCOUNTER — TELEPHONE (OUTPATIENT)
Dept: CARDIOLOGY CLINIC | Facility: CLINIC | Age: 69
End: 2024-04-26

## 2024-04-26 ENCOUNTER — APPOINTMENT (OUTPATIENT)
Dept: PHYSICAL THERAPY | Age: 69
End: 2024-04-26
Payer: COMMERCIAL

## 2024-04-26 NOTE — TELEPHONE ENCOUNTER
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 Please advise if you can see her.

## 2024-04-29 ENCOUNTER — OFFICE VISIT (OUTPATIENT)
Dept: CARDIOLOGY CLINIC | Facility: CLINIC | Age: 69
End: 2024-04-29
Payer: COMMERCIAL

## 2024-04-29 ENCOUNTER — APPOINTMENT (OUTPATIENT)
Dept: PHYSICAL THERAPY | Age: 69
End: 2024-04-29
Payer: COMMERCIAL

## 2024-04-29 VITALS
RESPIRATION RATE: 16 BRPM | OXYGEN SATURATION: 99 % | HEART RATE: 85 BPM | BODY MASS INDEX: 34.38 KG/M2 | WEIGHT: 194 LBS | SYSTOLIC BLOOD PRESSURE: 122 MMHG | HEIGHT: 63 IN | DIASTOLIC BLOOD PRESSURE: 72 MMHG

## 2024-04-29 DIAGNOSIS — R06.02 SHORTNESS OF BREATH: Primary | ICD-10-CM

## 2024-04-29 DIAGNOSIS — R94.31 ABNORMAL EKG: ICD-10-CM

## 2024-04-29 DIAGNOSIS — E78.2 MIXED HYPERLIPIDEMIA: ICD-10-CM

## 2024-04-29 PROCEDURE — 99203 OFFICE O/P NEW LOW 30 MIN: CPT | Performed by: INTERNAL MEDICINE

## 2024-04-29 NOTE — PROGRESS NOTES
Cardiology Consultation     Ayesha Lockwood  [id number]  1955  [address] [place] [address] [facility] [address] [id number]    Patient presents for cardiology consultation and evaluation.  Patient has been having symptoms of chest pain and shortness of breath.  Patient did have a respiratory illness to start.  She was treated for the same.  Patient does have history of DVT and is on Xarelto prophylaxis.  Patient does not have any history of coronary artery disease or MI in the past.  Patient did have ventricular arrhythmia bigeminy after taking phentermine many years ago but was subsequently stopped with resolution.  Patient was seen by primary care physician recently and had an abnormal EKG.  No recent functional cardiac evaluation.  She also has history of mild intermittent asthma.  She uses inhalers as needed.    1. Shortness of breath        2. Abnormal EKG          Patient Active Problem List   Diagnosis    Arthralgia of hip, left    Arthritis of left knee    DVT of leg (deep venous thrombosis) (HCC)    Memory difficulty    Migraine, unspecified, not intractable, without status migrainosus    Mild intermittent asthma    Class 1 obesity    Osteopenia of multiple sites    Chronic pain of right knee    Primary osteoarthritis of right knee    Effusion of right knee    Thyroid nodule    Posterior tibial tendinitis of right lower extremity    History of deep venous thrombosis (DVT) of distal vein of right lower extremity    Hypertriglyceridemia    Palpitations    Gastroesophageal reflux disease without esophagitis    Moderate persistent asthma without complication    Neck pain    Cystitis, interstitial    Seasonal allergic rhinitis due to pollen    At increased risk of exposure to COVID-19 virus    Chest tightness    Myxoid cyst    COVID-19    Annual physical exam    Trapezius muscle strain, right, initial  encounter    Plantar fasciitis, bilateral    Prediabetes    Finger pain, right    Finger wound, simple, open, initial encounter    Aphthous ulcer of mouth     Past Medical History:   Diagnosis Date    Acid reflux     Allergic rhinitis     Last Assessed: 2017    Anesthesia complication     HYPOTENSION    Arthritis     Asthma     Bigeminy     history    DVT (deep venous thrombosis) ([facility]) 10/23/2018    DVT (deep venous thrombosis) ([facility])     DVT of leg (deep venous thrombosis) ([facility])     left    Fatty liver     Female pelvic pain     GERD (gastroesophageal reflux disease)     Hyperlipidemia     high trigrlycerides    Intermittent palpitations     Interstitial cystitis     Irregular heart beat     Migraines     Neck pain     Obesity (BMI 30.0-34.9)     Palpitations     Pes planus     unspecified laterality; Last Assessed: 2014    Thyroid nodule     Tinnitus     Trigeminy     history    Wears glasses     Wears reading eyeglasses      Social History     Socioeconomic History    Marital status: /Civil Union     Spouse name: Not on file    Number of children: Not on file    Years of education: Not on file    Highest education level: Not on file   Occupational History    Occupation: Pediatrics   Tobacco Use    Smoking status: Former     Current packs/day: 0.00     Average packs/day: 0.5 packs/day for 7.5 years (3.7 ttl pk-yrs)     Types: Cigarettes     Start date:      Quit date: 1978     Years since quittin.8    Smokeless tobacco: Never    Tobacco comments:     as teenager   Vaping Use    Vaping status: Never Used   Substance and Sexual Activity    Alcohol use: Yes     Alcohol/week: 1.0 standard drink of alcohol     Types: 1 Glasses of wine per week     Comment: occasional    Drug use: No    Sexual activity: Not Currently     Birth control/protection: Surgical   Other Topics Concern    Not on file   Social History Narrative    Not on file     Social Determinants of Health     Financial  Resource Strain: Not on file   Food Insecurity: Not on file   Transportation Needs: Not on file   Physical Activity: Unknown (2022)    Exercise Vital Sign     Days of Exercise per Week: 0 days     Minutes of Exercise per Session: Not on file   Stress: Stress Concern Present (9/15/2020)    [place] citizen [place] [employer] - Occupational Stress Questionnaire     Feeling of Stress : Very much   Social Connections: Not on file   Intimate Partner Violence: Not on file   Housing Stability: Not on file      Family History   Problem Relation Age of Onset    Endometrial cancer Mother 68    Migraines Mother     Obesity Father     Diabetes Father     Heart disease Father     Heart attack Father     Prostate cancer Father     Hypertension Father     Diabetes Brother     Thyroid cancer Brother         medullary    Diabetes type II Brother     Growth hormone deficiency Daughter     No Known Problems Daughter     Alzheimer's disease Maternal Aunt     Thyroid cancer Maternal Aunt     Breast cancer Maternal Aunt 65    Thyroid cancer Maternal Aunt 85    No Known Problems Maternal Aunt     No Known Problems Maternal Aunt     No Known Problems Maternal Aunt     No Known Problems Maternal Aunt     Other Maternal Uncle         Brain Tumor    Alzheimer's disease Maternal Uncle     Diabetes Paternal Aunt     Hashimoto's thyroiditis Paternal Aunt         Total Thyroidectomy    Hypertension Paternal Aunt     No Known Problems Paternal Aunt     Stroke Paternal Uncle     No Known Problems Maternal Grandmother     No Known Problems Paternal Grandmother     Cancer Family     Colon cancer Neg Hx     Cervical cancer Neg Hx     Ovarian cancer Neg Hx     Uterine cancer Neg Hx      Past Surgical History:   Procedure Laterality Date    APPENDECTOMY      BREAST BIOPSY Left 2019    CATARACT EXTRACTION Bilateral      SECTION      X 2    CHOLECYSTECTOMY      COLONOSCOPY      DILATION AND CURETTAGE OF UTERUS      HYSTERECTOMY       total    JOINT REPLACEMENT      left TKR    KNEE SURGERY Left     X 3    NJ ESOPHAGOGASTRODUODENOSCOPY TRANSORAL DIAGNOSTIC N/A 03/14/2016    Procedure: ESOPHAGOGASTRODUODENOSCOPY (EGD);  Surgeon: Jason Martinez MD;  Location: [facility];  Service: Gastroenterology    NJ EXCISION GANGLION WRIST DORSAL/VOLAR PRIMARY Right 2/21/2023    Procedure: Right long finger mucoid cyst excision distal interphalangeal joint;  Surgeon: Олег Her MD;  Location: [facility];  Service: Orthopedics    NJ LAPS TOTAL HYSTERECT 250 GM/< W/RMVL TUBE/OVARY Bilateral 07/08/2016    Procedure: HYSTERECTOMY LAPAROSCOPIC TOTAL ,BSO;  Surgeon: Hermes Moran MD;  Location: [facility];  Service: Gynecology Oncology    REPLACEMENT TOTAL KNEE  2014    US GUIDED BREAST BIOPSY LEFT COMPLETE Left 02/06/2019    WISDOM TOOTH EXTRACTION         Current Outpatient Medications:     acetaminophen (TYLENOL) 650 mg CR tablet, Take 1 tablet (650 mg total) by mouth every 8 (eight) hours as needed for mild pain, Disp: 30 tablet, Rfl: 0    albuterol (PROVENTIL HFA,VENTOLIN HFA) 90 mcg/act inhaler, INHALE 2 PUFFS EVERY 4-6 HOURS AS NEEDED, Disp: 108 g, Rfl: 0    ALPRAZolam (XANAX) 0.25 mg tablet, Take one tablet twice daily as needed for anxiety, Disp: 30 tablet, Rfl: 0    butalbital-acetaminophen-caffeine (FIORICET,ESGIC) -40 mg per tablet, Take 1 tablet by mouth every 4 (four) hours as needed for headaches, Disp: 90 tablet, Rfl: 0    calcium-vitamin D (OSCAL) 250-125 MG-UNIT per tablet, Take 1 tablet by mouth daily, Disp: , Rfl:     cycloSPORINE (RESTASIS) 0.05 % ophthalmic emulsion, Apply 1 drop to eye 2 (two) times a day, Disp: 0.4 mL, Rfl: 0    diazepam (VALIUM) 5 mg tablet, Take 1 tablet (5 mg total) by mouth every 8 (eight) hours as needed for anxiety, Disp: 30 tablet, Rfl: 0    diphenhydrAMINE (BENADRYL) 25 mg capsule, Take by mouth daily at bedtime as needed Taking 50mg at bedtime, Disp: , Rfl:     Elastic Bandages & Supports (Medical  "Compression Socks) MISC, Use daily, Disp: 5 each, Rfl: 2    fexofenadine (ALLEGRA) 180 MG tablet, Take 180 mg by mouth daily, Disp: , Rfl:     fluticasone (Flovent HFA) 220 mcg/act inhaler, Inhale 1 puff 2 (two) times a day Rinse mouth after use., Disp: 12 g, Rfl: 3    methocarbamol (ROBAXIN) 750 mg tablet, Take 1 tablet (750 mg total) by mouth every 6 (six) hours as needed for muscle spasms, Disp: 30 tablet, Rfl: 0    montelukast (SINGULAIR) 10 mg tablet, Take 1 tablet (10 mg total) by mouth every evening, Disp: 90 tablet, Rfl: 0    Multiple Vitamin (MULTIVITAMIN) capsule, Take 1 capsule by mouth daily, Disp: , Rfl:     pantoprazole (PROTONIX) 40 mg tablet, Take 1 tablet (40 mg total) by mouth 2 (two) times a day, Disp: 180 tablet, Rfl: 0    rivaroxaban (Xarelto) 10 mg tablet, Take 1 tablet (10 mg total) by mouth daily, Disp: 90 tablet, Rfl: 3    traMADol (Ultram) 50 mg tablet, Take 1 tablet (50 mg total) by mouth every 6 (six) hours as needed for moderate pain, Disp: 30 tablet, Rfl: 0  Allergies   Allergen Reactions    Naproxen Shortness Of Breath    Prednisone Facial Swelling    Iv Contrast [Iodinated Contrast Media] Itching    Seasonal Ic [Cholestatin]     Phentermine Palpitations     Vitals:    04/29/24 0947   BP: 122/72   BP Location: Left arm   Patient Position: Sitting   Cuff Size: Standard   Pulse: 85   Resp: 16   SpO2: 99%   Weight: 88 kg (194 lb)   Height: 5' 3\" (1.6 m)       Labs:  Office Visit on 04/13/2024   Component Date Value     RAPID STREP A 04/13/2024 Negative     SARS-CoV-2 04/13/2024 Negative     INFLUENZA A PCR 04/13/2024 Negative     INFLUENZA B PCR 04/13/2024 Negative     Throat Culture 04/13/2024 Negative for beta-hemolytic Streptococcus      Imaging: Mammo screening bilateral w 3d & cad    Result Date: 4/23/2024  Narrative: DIAGNOSIS: Encounter for screening mammogram for malignant neoplasm of breast TECHNIQUE: Digital screening mammography was performed. Computer Aided Detection (CAD) " analyzed all applicable images. COMPARISONS: Prior breast imaging dated: 04/21/2023, 04/19/2022, 04/14/2021, 03/10/2020, 02/06/2019, 02/06/2019, 02/05/2019, 02/05/2019, 02/01/2019, 11/14/2016, and 03/30/2015 RELEVANT HISTORY: Family Breast Cancer History: History of breast cancer in Maternal Aunt. Family Medical History: Family medical history includes breast cancer in maternal aunt and endometrial cancer in mother. Personal History: Hormone history includes birth control. Surgical history includes breast biopsy and hysterectomy. No known relevant medical history. The patient is scheduled in a reminder system for screening mammography. 8-10% of cancers will be missed on mammography. Management of a palpable abnormality must be based on clinical grounds.  Patients will be notified of their results via letter from our facility. Accredited by [employer] and FDA. RISK ASSESSMENT: 5 Year Tyrer-Cuzick: 2.35% 10 Year Tyrer-Cuzick: 4.95% Lifetime Tyrer-Cuzick: 7.8% TISSUE DENSITY: There are scattered areas of fibroglandular density. INDICATION: Ayesha Lockwood is a 68 y.o. female presenting for screening mammography. FINDINGS: Bilateral Stable parenchymal pattern.  There is a biopsy clip in the outer mid left breast.  There are a few scattered benign-appearing calcifications on the left, which are mammographically stable.  There are no suspicious masses, grouped microcalcifications or areas of unexplained architectural distortion. The skin and nipple areolar complex are unremarkable.      Impression: No mammographic evidence of malignancy. ASSESSMENT/BI-RADS CATEGORY: Left: 2 - Benign Right: 1 - Negative Overall: 2 - Benign RECOMMENDATION:      - Routine screening mammogram in 1 year for both breasts. Workstation ID: [id number]       Review of Systems:  Review of Systems  REVIEW OF SYSTEMS:  Constitutional:  Denies fever or chills   Eyes:  Denies change in visual acuity   HENT:  Denies nasal  congestion or sore throat   Respiratory:  shortness of breath   Cardiovascular: Chest discomfort  GI:  Denies abdominal pain, nausea, vomiting, bloody stools or diarrhea   :  Denies dysuria, frequency, difficulty in micturition and nocturia  Musculoskeletal: Back and knee issues  Neurologic:  Denies headache, focal weakness or sensory changes   Endocrine:  Denies polyuria or polydipsia   Lymphatic:  Denies swollen glands   Psychiatric:  Denies depression or anxiety    Physical Exam:  Physical Exam  PHYSICAL EXAM:  General:  Patient is not in acute distress   Head: Normocephalic, Atraumatic.  HEENT:  Both pupils normal-size atraumatic, normocephalic, nonicteric  Neck:  JVP not raised. Trachea central. No carotid bruit  Respiratory:  normal breath sounds no crackles. no rhonchi  Cardiovascular:  Regular rate and rhythm no S3 no murmurs  GI:  Abdomen soft nontender. No organomegaly.   Lymphatic:  No cervical or inguinal lymphadenopathy  Neurologic:  Patient is awake alert, oriented . Grossly nonfocal  Extremities no edema    EKG done in primary care physician's office reviewed.  Sinus rhythm with nonspecific T wave abnormalities.  No major change from previous EKG from 2022.    Patient does have history of hyper triglyceridemia.  She is presently not on any medications.      Discussion/Summary: Patient with symptoms of chest pain and shortness of breath of unclear etiology.    Clearly there was a respiratory component to the same.  However given her advanced age and risk factors, will need cardiac evaluation.    Echocardiogram to evaluate ejection fraction and assess for any evidence of wall motion abnormalities given abnormal EKG.  Also to assess for any evidence of pulmonary hypertension.    Pharmacological nuclear stress test to assess for ischemia given symptoms of chest pain and shortness of breath.  Patient is unable to exercise on the treadmill secondary to back issues as well as respiratory  issues.    Sensitivity and specificity of different modalities of cardiac imaging discussed with patient.    Also discussed with patient about further risk stratification with CT coronary calcium score.  She understands that this is not covered by insurance and cost approximately $100.  She is willing to proceed.    Repeat lipid panel prior to next visit.  Follow-up in 2 months or earlier as needed.  She is agreeable with the plan of care.

## 2024-05-03 ENCOUNTER — APPOINTMENT (OUTPATIENT)
Dept: PHYSICAL THERAPY | Age: 69
End: 2024-05-03
Payer: COMMERCIAL

## 2024-05-06 ENCOUNTER — OFFICE VISIT (OUTPATIENT)
Dept: PHYSICAL THERAPY | Age: 69
End: 2024-05-06
Payer: COMMERCIAL

## 2024-05-06 DIAGNOSIS — M25.511 ACUTE PAIN OF RIGHT SHOULDER: ICD-10-CM

## 2024-05-06 DIAGNOSIS — M54.2 NECK PAIN: Primary | ICD-10-CM

## 2024-05-06 PROCEDURE — 97140 MANUAL THERAPY 1/> REGIONS: CPT | Performed by: PHYSICAL THERAPIST

## 2024-05-06 PROCEDURE — 97110 THERAPEUTIC EXERCISES: CPT | Performed by: PHYSICAL THERAPIST

## 2024-05-06 NOTE — PROGRESS NOTES
Daily Note     Today's date: 2024  Patient name: Ayesha Lockwood  : 1955  MRN: [id number]  Referring provider: Lenny Zamarripa DO  Dx:   Encounter Diagnosis     ICD-10-CM    1. Neck pain  M54.2       2. Acute pain of right shoulder  M25.511           Start Time: 1020  Stop Time: 1100  Total time in clinic (min): 40 minutes    Subjective: I have been sick      Objective: See treatment diary below      Assessment: Tolerated treatment fair. Patient demonstrated fatigue post treatment, exhibited good technique with therapeutic exercises, and would benefit from continued PT, good right shoulder ROM but still with tenderness to right upper trap and thoracic paravertebral's as well as decreased right RTC strength      Plan: Progress treatment as tolerated.       POC expires Unit limit Auth Expiration date PT/OT + Visit Limit?                                    Precautions: left TKR, OA, GERD  POC expires: 2024  Date 3/22 3/25 3/29 4/1 4/5 4/ 5/6     Visit count 1 2 3   6 7 8     FOTO 36/60   37/54            Visit/Unit Tracking  AUTH Status:  Date                Used                Remaining                 FOTO                      Manuals 3/22 3/25 3/29 4/1 4/5 4/8  5/6                  MT right shoulder/ right upper trap  15 min 15 min 5 10 10 10 10     Gentle cervical traction    5 5 5 5 5     MT cervical    5 5 5 5 5     Neuro Re-Ed                                                                                                        Ther Ex             UBE  4 min 5 min  5 min 6 min 6 min 6 min     pulleys  30x 30x 30x 30x 30x 30x 30x     GTB ER  30x 30x 30x 30x 30x 30x 30x     BTB IR  30x 30x 30x 30x 30x 30x 30x     BTB rows  30x 30x 30x 30x 30x 30x 30x     Chin tucks     10x 10x 10x 10x     Thoracic bolster stretch     10x 10x 10x 10x                  Ther Activity             HEP 10 min                         Gait Training                                       Modalities              laser  4 min 4 min 4 min    4 min                           Manuals 4/1                                                                Neuro Re-Ed                                                                                                        Ther Ex                                                                                                                     Ther Activity                                       Gait Training                                       Modalities

## 2024-05-07 DIAGNOSIS — J45.909 ALLERGIC BRONCHITIS: ICD-10-CM

## 2024-05-07 DIAGNOSIS — I82.401 ACUTE DEEP VEIN THROMBOSIS (DVT) OF RIGHT LOWER EXTREMITY, UNSPECIFIED VEIN (HCC): ICD-10-CM

## 2024-05-07 DIAGNOSIS — F41.9 ANXIETY: ICD-10-CM

## 2024-05-08 RX ORDER — ALPRAZOLAM 0.25 MG/1
TABLET ORAL
Qty: 30 TABLET | Refills: 0 | Status: SHIPPED | OUTPATIENT
Start: 2024-05-08

## 2024-05-08 RX ORDER — MONTELUKAST SODIUM 10 MG/1
10 TABLET ORAL EVERY EVENING
Qty: 90 TABLET | Refills: 1 | Status: SHIPPED | OUTPATIENT
Start: 2024-05-08

## 2024-05-09 ENCOUNTER — HOSPITAL ENCOUNTER (OUTPATIENT)
Dept: NON INVASIVE DIAGNOSTICS | Facility: CLINIC | Age: 69
Discharge: HOME | End: 2024-05-09
Payer: COMMERCIAL

## 2024-05-09 VITALS
WEIGHT: 194 LBS | HEART RATE: 69 BPM | DIASTOLIC BLOOD PRESSURE: 86 MMHG | SYSTOLIC BLOOD PRESSURE: 154 MMHG | BODY MASS INDEX: 34.38 KG/M2 | OXYGEN SATURATION: 99 % | HEIGHT: 63 IN

## 2024-05-09 VITALS
HEIGHT: 63 IN | SYSTOLIC BLOOD PRESSURE: 154 MMHG | DIASTOLIC BLOOD PRESSURE: 86 MMHG | WEIGHT: 194 LBS | BODY MASS INDEX: 34.38 KG/M2 | HEART RATE: 64 BPM

## 2024-05-09 DIAGNOSIS — R06.02 SHORTNESS OF BREATH: ICD-10-CM

## 2024-05-09 DIAGNOSIS — R94.31 ABNORMAL EKG: ICD-10-CM

## 2024-05-09 LAB
AORTIC ROOT: 3.1 CM
APICAL FOUR CHAMBER EJECTION FRACTION: 61 %
ASCENDING AORTA: 2.8 CM
BSA FOR ECHO PROCEDURE: 1.91 M2
E WAVE DECELERATION TIME: 214 MS
E/A RATIO: 0.99
FRACTIONAL SHORTENING: 33 (ref 28–44)
INTERVENTRICULAR SEPTUM IN DIASTOLE (PARASTERNAL SHORT AXIS VIEW): 1.1 CM
INTERVENTRICULAR SEPTUM: 1.1 CM (ref 0.6–1.1)
LAAS-AP2: 13.5 CM2
LAAS-AP4: 13.7 CM2
LEFT ATRIUM SIZE: 3.3 CM
LEFT ATRIUM VOLUME (MOD BIPLANE): 30 ML
LEFT ATRIUM VOLUME INDEX (MOD BIPLANE): 15.7 ML/M2
LEFT INTERNAL DIMENSION IN SYSTOLE: 2.9 CM (ref 2.1–4)
LEFT VENTRICULAR INTERNAL DIMENSION IN DIASTOLE: 4.3 CM (ref 3.5–6)
LEFT VENTRICULAR POSTERIOR WALL IN END DIASTOLE: 1.1 CM
LEFT VENTRICULAR STROKE VOLUME: 50 ML
LVSV (TEICH): 50 ML
MV E'TISSUE VEL-SEP: 10 CM/S
MV PEAK A VEL: 0.76 M/S
MV PEAK E VEL: 75 CM/S
MV STENOSIS PRESSURE HALF TIME: 62 MS
MV VALVE AREA P 1/2 METHOD: 3.55
NUC STRESS DIASTOLIC VOLUME INDEX: 41 ML/M2
NUC STRESS EJECTION FRACTION: 85 %
NUC STRESS SYSTOLIC VOLUME INDEX: 6 ML/M2
RATE PRESSURE PRODUCT: NORMAL
RIGHT ATRIUM AREA SYSTOLE A4C: 11.7 CM2
RIGHT VENTRICLE ID DIMENSION: 2.3 CM
SL CV LEFT ATRIUM LENGTH A2C: 5 CM
SL CV LV EF: 60
SL CV PED ECHO LEFT VENTRICLE DIASTOLIC VOLUME (MOD BIPLANE) 2D: 82 ML
SL CV PED ECHO LEFT VENTRICLE SYSTOLIC VOLUME (MOD BIPLANE) 2D: 32 ML
SL CV REST NUCLEAR ISOTOPE DOSE: 10.39 MCI
SL CV STRESS NUCLEAR ISOTOPE DOSE: 32.7 MCI
SL CV STRESS RECOVERY BP: NORMAL MMHG
SL CV STRESS RECOVERY HR: 85 BPM
SL CV STRESS RECOVERY O2 SAT: 100 %
STRESS ANGINA INDEX: 0
STRESS BASELINE BP: NORMAL MMHG
STRESS BASELINE HR: 69 BPM
STRESS O2 SAT REST: 99 %
STRESS PEAK HR: 114 BPM
STRESS POST O2 SAT PEAK: 96 %
STRESS POST PEAK BP: 162 MMHG
STRESS/REST PERFUSION RATIO: 1.19
TRICUSPID ANNULAR PLANE SYSTOLIC EXCURSION: 2 CM

## 2024-05-09 PROCEDURE — 93306 TTE W/DOPPLER COMPLETE: CPT | Performed by: INTERNAL MEDICINE

## 2024-05-09 PROCEDURE — A9502 TC99M TETROFOSMIN: HCPCS

## 2024-05-09 PROCEDURE — 78452 HT MUSCLE IMAGE SPECT MULT: CPT

## 2024-05-09 PROCEDURE — 93306 TTE W/DOPPLER COMPLETE: CPT

## 2024-05-09 PROCEDURE — 93018 CV STRESS TEST I&R ONLY: CPT | Performed by: INTERNAL MEDICINE

## 2024-05-09 PROCEDURE — 78452 HT MUSCLE IMAGE SPECT MULT: CPT | Performed by: INTERNAL MEDICINE

## 2024-05-09 PROCEDURE — 93017 CV STRESS TEST TRACING ONLY: CPT

## 2024-05-09 PROCEDURE — 93016 CV STRESS TEST SUPVJ ONLY: CPT | Performed by: INTERNAL MEDICINE

## 2024-05-09 RX ORDER — AMINOPHYLLINE 25 MG/ML
50 INJECTION, SOLUTION INTRAVENOUS ONCE
Status: COMPLETED | OUTPATIENT
Start: 2024-05-09 | End: 2024-05-09

## 2024-05-09 RX ORDER — REGADENOSON 0.08 MG/ML
0.4 INJECTION, SOLUTION INTRAVENOUS ONCE
Status: COMPLETED | OUTPATIENT
Start: 2024-05-09 | End: 2024-05-09

## 2024-05-09 RX ADMIN — AMINOPHYLLINE 50 MG: 25 INJECTION, SOLUTION INTRAVENOUS at 13:28

## 2024-05-09 RX ADMIN — REGADENOSON 0.4 MG: 0.08 INJECTION, SOLUTION INTRAVENOUS at 13:06

## 2024-05-13 DIAGNOSIS — G43.009 MIGRAINE WITHOUT AURA AND WITHOUT STATUS MIGRAINOSUS, NOT INTRACTABLE: ICD-10-CM

## 2024-05-14 LAB
CHEST PAIN STATEMENT: NORMAL
MAX DIASTOLIC BP: 86 MMHG
MAX PREDICTED HEART RATE: 150 BPM
PROTOCOL NAME: NORMAL
REASON FOR TERMINATION: NORMAL
STRESS POST EXERCISE DUR MIN: 3 MIN
STRESS POST EXERCISE DUR SEC: 0 SEC
STRESS POST PEAK HR: 114 BPM
STRESS POST PEAK SYSTOLIC BP: 162 MMHG
TARGET HR FORMULA: NORMAL

## 2024-05-14 RX ORDER — BUTALBITAL, ACETAMINOPHEN AND CAFFEINE 50; 325; 40 MG/1; MG/1; MG/1
1 TABLET ORAL EVERY 4 HOURS PRN
Qty: 90 TABLET | Refills: 0 | Status: SHIPPED | OUTPATIENT
Start: 2024-05-14

## 2024-05-15 ENCOUNTER — HOSPITAL ENCOUNTER (OUTPATIENT)
Dept: CT IMAGING | Facility: HOSPITAL | Age: 69
Discharge: HOME | End: 2024-05-15
Attending: INTERNAL MEDICINE
Payer: COMMERCIAL

## 2024-05-15 DIAGNOSIS — R06.02 SHORTNESS OF BREATH: ICD-10-CM

## 2024-05-15 PROCEDURE — 75571 CT HRT W/O DYE W/CA TEST: CPT

## 2024-05-30 DIAGNOSIS — F41.9 ANXIETY: ICD-10-CM

## 2024-05-30 RX ORDER — ALPRAZOLAM 0.25 MG/1
TABLET ORAL
Qty: 30 TABLET | Refills: 0 | Status: SHIPPED | OUTPATIENT
Start: 2024-05-30

## 2024-06-14 ENCOUNTER — ESTABLISHED COMPREHENSIVE EXAM (OUTPATIENT)
Dept: URBAN - METROPOLITAN AREA CLINIC 6 | Facility: CLINIC | Age: 69
End: 2024-06-14

## 2024-06-14 DIAGNOSIS — H04.123: ICD-10-CM

## 2024-06-14 DIAGNOSIS — H35.372: ICD-10-CM

## 2024-06-14 PROCEDURE — 92014 COMPRE OPH EXAM EST PT 1/>: CPT

## 2024-06-14 PROCEDURE — 92134 CPTRZ OPH DX IMG PST SGM RTA: CPT

## 2024-06-14 ASSESSMENT — TONOMETRY
OD_IOP_MMHG: 8
OS_IOP_MMHG: 9

## 2024-06-14 ASSESSMENT — VISUAL ACUITY
OD_SC: 20/30
OS_SC: 20/25

## 2024-06-19 ENCOUNTER — OFFICE VISIT (OUTPATIENT)
Dept: PHYSICAL THERAPY | Age: 69
End: 2024-06-19
Payer: COMMERCIAL

## 2024-06-19 DIAGNOSIS — M54.2 NECK PAIN: Primary | ICD-10-CM

## 2024-06-19 PROCEDURE — 97162 PT EVAL MOD COMPLEX 30 MIN: CPT | Performed by: PHYSICAL THERAPIST

## 2024-06-19 PROCEDURE — 97110 THERAPEUTIC EXERCISES: CPT | Performed by: PHYSICAL THERAPIST

## 2024-06-19 PROCEDURE — 97140 MANUAL THERAPY 1/> REGIONS: CPT | Performed by: PHYSICAL THERAPIST

## 2024-06-19 NOTE — PROGRESS NOTES
PT Evaluation     Today's date: 2024  Patient name: Ayesha Lockwood  : 1955  MRN: [id number]  Referring provider: Lenny Zamarripa DO  Dx:   Encounter Diagnosis     ICD-10-CM    1. Neck pain  M54.2           Start Time: 1100  Stop Time: 1200  Total time in clinic (min): 60 minutes    Assessment  Impairments: abnormal gait, abnormal muscle firing, abnormal muscle tone, abnormal or restricted ROM, abnormal movement, activity intolerance, impaired physical strength, lacks appropriate home exercise program, pain with function, scapular dyskinesis, weight-bearing intolerance, poor posture  and poor body mechanics  Symptom irritability: moderate    Assessment details: Ayesha Lockwood is a 68 y.o. female who presents with pain, decreased strength, decreased ROM, and postural dysfunction. Due to these impairments, Patient has difficulty performing a/iadls. Patient's clinical presentation is consistent with their referring diagnosis of neck pain. Patient would benefit from skilled physical therapy to address their aforementioned impairments, improve their level of function and to improve their overall quality of life. Patient was evaluated today via direct access for cervical pain  Understanding of Dx/Px/POC: good     Prognosis: good    Goals  ST-3 WEEKS  1.  Decrease pain by 2 points on VAS at its worst and eliminate HA  2.  Increase ROM by > 5 deg in all deficients planes.  3.  Increase UE by 1/2 MMT grade in all deficient planes.    LT-6 WEEKS  1. Patient to be independent with a/iadls especially with reaching , sleeping, and cervical movements  2. Increase functional activities for leisure and home activities to previous LOF.  3. Independent with HEP and/or fitness program.    Plan  Patient would benefit from: skilled physical therapy  Planned modality interventions: cryotherapy, electrical stimulation/Russian stimulation, thermotherapy: hydrocollator packs and unattended electrical  stimulation    Planned therapy interventions: activity modification, behavior modification, body mechanics training, aquatic therapy, flexibility, functional ROM exercises, home exercise program, IADL retraining, joint mobilization, manual therapy, neuromuscular re-education, patient education, postural training, strengthening, stretching, therapeutic activities and therapeutic exercise    Frequency: 2x week (2-3x week)  Duration in weeks: 12  Plan of Care beginning date: 2024  Plan of Care expiration date: 2024  Treatment plan discussed with: patient      Subjective Evaluation    History of Present Illness  Mechanism of injury: Patient stopped PT secondary to illness with sore throat and bedridden x 2 weeks with breathing difficulties, patient complains of neck pain and tightness with upper trap and shoulder tightness with HA ,also has massage therapy. Patient is evaluated today via direct acces for cervical pain          Recurrent probem    Quality of life: good    Patient Goals  Patient goals for therapy: decreased pain, increased motion, increased strength and independence with ADLs/IADLs    Pain  Current pain ratin  At best pain ratin  At worst pain ratin  Quality: tight and dull ache  Relieving factors: relaxation and medications  Aggravating factors: overhead activity  Progression: no change    Hand dominance: right      Diagnostic Tests  X-ray: abnormal  MRI studies: abnormal  Treatments  Previous treatment: physical therapy        Objective     Static Posture     Head  Forward.    Shoulders  Rounded.    Palpation   Left   Hypertonic in the levator scapulae, rhomboids, scalenes and upper trapezius.   Tenderness of the rhomboids and upper trapezius.     Right   Hypertonic in the levator scapulae, rhomboids, scalenes and upper trapezius.   Tenderness of the rhomboids and upper trapezius.     Tenderness   Cervical Spine   Tenderness in the spinous process.     Active Range of Motion    Cervical/Thoracic Spine       Cervical    Flexion: 35 degrees   Extension: 50 degrees      Left lateral flexion: 20 degrees      Right lateral flexion: 20 degrees      Left rotation: 50 degrees  Right rotation: 60 degrees       Strength/Myotome Testing   Cervical Spine   Neck flexion: 4-    Left Shoulder     Planes of Motion   Flexion: 4   Extension: 4   Abduction: 4   Adduction: 4+   External rotation at 0°: 4-   Internal rotation at 0°: 4+     Right Shoulder     Planes of Motion   Flexion: 4   Extension: 4   Abduction: 4   Adduction: 4+   External rotation at 0°: 4-   Internal rotation at 0°: 4+     Left Elbow   Flexion: 4+  Extension: 4+    Right Elbow   Flexion: 4+  Extension: 4+    Left Wrist/Hand   Wrist extension: 4+  Wrist flexion: 4+    Right Wrist/Hand   Wrist extension: 4+  Wrist flexion: 4+    Tests   Cervical     Left   Negative Spurling's Test A.     Right   Negative Spurling's Test A.       Flowsheet Rows      Flowsheet Row Most Recent Value   PT/OT G-Codes    Current Score 40   Projected Score 52               Precautions:     POC expires Unit limit Auth Expiration date PT/OT + Visit Limit?   9/19/2024                              Visit/Unit Tracking  AUTH Status:  Date 6/19               Used 1               Remaining                 FOTO                     Manuals 6/19                         MT cervical 15 min            Gentle tx             MFR             Neuro Re-Ed                                                                                                        Ther Ex                          UBE   3 min            pulleys 30x            Chair extension over bolster 10x            Chin tucks 10x            BTB rows 30x            GTB ER 15x                         Ther Activity                                       Gait Training                                       Modalities

## 2024-06-19 NOTE — PROGRESS NOTES
PT Evaluation     Today's date: 2024  Patient name: Ayesha Lockwood  : 1955  MRN: [id number]  Referring provider: Lenny Zamarripa DO  Dx:   Encounter Diagnosis     ICD-10-CM    1. Neck pain  M54.2                      Assessment  Impairments: abnormal gait, abnormal muscle firing, abnormal muscle tone, abnormal or restricted ROM, abnormal movement, activity intolerance, impaired physical strength, lacks appropriate home exercise program, pain with function, scapular dyskinesis, weight-bearing intolerance, poor posture  and poor body mechanics  Symptom irritability: moderate    Assessment details: Ayesha Lockwood is a 68 y.o. female who presents with pain, decreased strength, decreased ROM, and postural dysfunction. Due to these impairments, Patient has difficulty performing a/iadls. Patient's clinical presentation is consistent with their referring diagnosis of neck pain. Patient would benefit from skilled physical therapy to address their aforementioned impairments, improve their level of function and to improve their overall quality of life.  Understanding of Dx/Px/POC: good     Prognosis: good    Goals  ST-3 WEEKS  1.  Decrease pain by 2 points on VAS at its worst and eliminate HA  2.  Increase ROM by > 5 deg in all deficients planes.  3.  Increase UE by 1/2 MMT grade in all deficient planes.    LT-6 WEEKS  1. Patient to be independent with a/iadls especially with reaching , sleeping, and cervical movements  2. Increase functional activities for leisure and home activities to previous LOF.  3. Independent with HEP and/or fitness program.    Plan  Patient would benefit from: skilled physical therapy  Planned modality interventions: cryotherapy, electrical stimulation/Russian stimulation, thermotherapy: hydrocollator packs and unattended electrical stimulation    Planned therapy interventions: activity modification, behavior modification, body mechanics training, aquatic therapy,  flexibility, functional ROM exercises, home exercise program, IADL retraining, joint mobilization, manual therapy, neuromuscular re-education, patient education, postural training, strengthening, stretching, therapeutic activities and therapeutic exercise    Frequency: 2x week (2-3x week)  Duration in weeks: 12  Plan of Care beginning date: 2024  Plan of Care expiration date: 2024  Treatment plan discussed with: patient    Subjective Evaluation    History of Present Illness  Mechanism of injury: Patient stopped PT secondary to illness with sore throat and bedridden x 2 weeks with breathing difficulties, patient complains of neck pain and tightness with upper trap and shoulder tightness with HA ,also has massages          Recurrent probem    Quality of life: good    Patient Goals  Patient goals for therapy: decreased pain, increased motion, increased strength and independence with ADLs/IADLs    Pain  Current pain ratin  At best pain ratin  At worst pain ratin  Quality: tight and dull ache  Relieving factors: relaxation and medications  Aggravating factors: overhead activity  Progression: no change    Hand dominance: right      Diagnostic Tests  X-ray: abnormal  MRI studies: abnormal  Treatments  Previous treatment: physical therapy      Objective     Static Posture     Head  Forward.    Shoulders  Rounded.    Palpation   Left   Hypertonic in the levator scapulae, rhomboids, scalenes and upper trapezius.   Tenderness of the rhomboids and upper trapezius.     Right   Hypertonic in the levator scapulae, rhomboids, scalenes and upper trapezius.   Tenderness of the rhomboids and upper trapezius.     Tenderness   Cervical Spine   Tenderness in the spinous process.     Active Range of Motion   Cervical/Thoracic Spine       Cervical    Flexion: 35 degrees   Extension: 50 degrees      Left lateral flexion: 20 degrees      Right lateral flexion: 20 degrees      Left rotation: 50 degrees  Right rotation:  60 degrees       Strength/Myotome Testing   Cervical Spine   Neck flexion: 4-    Left Shoulder     Planes of Motion   Flexion: 4   Extension: 4   Abduction: 4   Adduction: 4+   External rotation at 0°: 4-   Internal rotation at 0°: 4+     Right Shoulder     Planes of Motion   Flexion: 4   Extension: 4   Abduction: 4   Adduction: 4+   External rotation at 0°: 4-   Internal rotation at 0°: 4+     Left Elbow   Flexion: 4+  Extension: 4+    Right Elbow   Flexion: 4+  Extension: 4+    Left Wrist/Hand   Wrist extension: 4+  Wrist flexion: 4+    Right Wrist/Hand   Wrist extension: 4+  Wrist flexion: 4+    Tests   Cervical     Left   Negative Spurling's Test A.     Right   Negative Spurling's Test A.            Precautions: ***    POC expires Unit limit Auth Expiration date PT/OT + Visit Limit?   *** *** *** ***                           Visit/Unit Tracking  AUTH Status:  Date               *** Used                Remaining                 FOTO                     Manuals                                                                 Neuro Re-Ed                                                                                                        Ther Ex                                                                                                                     Ther Activity                                       Gait Training                                       Modalities

## 2024-06-21 ENCOUNTER — OFFICE VISIT (OUTPATIENT)
Dept: PHYSICAL THERAPY | Age: 69
End: 2024-06-21
Payer: COMMERCIAL

## 2024-06-21 DIAGNOSIS — M54.2 NECK PAIN: Primary | ICD-10-CM

## 2024-06-21 PROCEDURE — 97140 MANUAL THERAPY 1/> REGIONS: CPT | Performed by: PHYSICAL THERAPIST

## 2024-06-21 PROCEDURE — 97110 THERAPEUTIC EXERCISES: CPT | Performed by: PHYSICAL THERAPIST

## 2024-06-21 NOTE — PROGRESS NOTES
Daily Note     Today's date: 2024  Patient name: Ayesha Lockwood  : 1955  MRN: [id number]  Referring provider: Lenny Zamarripa DO  Dx:   Encounter Diagnosis     ICD-10-CM    1. Neck pain  M54.2           Start Time: 1515  Stop Time: 1600  Total time in clinic (min): 45 minutes    Subjective: sore today      Objective: See treatment diary below      Assessment: Tolerated treatment fair. Patient demonstrated fatigue post treatment, exhibited good technique with therapeutic exercises, and would benefit from continued PT, some relief with MFR to upper trap area today      Plan: Progress treatment as tolerated.       Precautions:     POC expires Unit limit Auth Expiration date PT/OT + Visit Limit?   2024                              Visit/Unit Tracking  AUTH Status:  Date               Used 1 2              Remaining                 FOTO                     Manuals                         MT cervical 15 min 15 min           Gentle tx             MFR             Neuro Re-Ed                                                                                                        Ther Ex                          UBE   3 min 4 min           pulleys 30x 30x           Chair extension over bolster 10x 10x           Chin tucks 10x 10x           BTB rows 30x 30x           GTB ER 15x 15x                        Ther Activity                                       Gait Training                                       Modalities

## 2024-06-25 ENCOUNTER — APPOINTMENT (OUTPATIENT)
Dept: LAB | Facility: CLINIC | Age: 69
End: 2024-06-25
Payer: COMMERCIAL

## 2024-06-25 ENCOUNTER — OFFICE VISIT (OUTPATIENT)
Dept: PHYSICAL THERAPY | Age: 69
End: 2024-06-25
Payer: COMMERCIAL

## 2024-06-25 DIAGNOSIS — E78.1 HYPERTRIGLYCERIDEMIA: ICD-10-CM

## 2024-06-25 DIAGNOSIS — M54.2 NECK PAIN: Primary | ICD-10-CM

## 2024-06-25 DIAGNOSIS — E78.2 MIXED HYPERLIPIDEMIA: ICD-10-CM

## 2024-06-25 LAB
ALBUMIN SERPL BCG-MCNC: 4 G/DL (ref 3.5–5)
ALP SERPL-CCNC: 85 U/L (ref 34–104)
ALT SERPL W P-5'-P-CCNC: 29 U/L (ref 7–52)
ANION GAP SERPL CALCULATED.3IONS-SCNC: 8 MMOL/L (ref 4–13)
AST SERPL W P-5'-P-CCNC: 24 U/L (ref 13–39)
BASOPHILS # BLD AUTO: 0.08 THOUSANDS/ÂΜL (ref 0–0.1)
BASOPHILS NFR BLD AUTO: 1 % (ref 0–1)
BILIRUB SERPL-MCNC: 0.45 MG/DL (ref 0.2–1)
BUN SERPL-MCNC: 20 MG/DL (ref 5–25)
CALCIUM SERPL-MCNC: 8.9 MG/DL (ref 8.4–10.2)
CHLORIDE SERPL-SCNC: 106 MMOL/L (ref 96–108)
CHOLEST SERPL-MCNC: 190 MG/DL
CO2 SERPL-SCNC: 26 MMOL/L (ref 21–32)
CREAT SERPL-MCNC: 0.65 MG/DL (ref 0.6–1.3)
EOSINOPHIL # BLD AUTO: 0.19 THOUSAND/ÂΜL (ref 0–0.61)
EOSINOPHIL NFR BLD AUTO: 3 % (ref 0–6)
ERYTHROCYTE [DISTWIDTH] IN BLOOD BY AUTOMATED COUNT: 14.3 % (ref 11.6–15.1)
GFR SERPL CREATININE-BSD FRML MDRD: 90 ML/MIN/1.73SQ M
GLUCOSE P FAST SERPL-MCNC: 84 MG/DL (ref 65–99)
HCT VFR BLD AUTO: 42.5 % (ref 34.8–46.1)
HDLC SERPL-MCNC: 45 MG/DL
HGB BLD-MCNC: 13.3 G/DL (ref 11.5–15.4)
IMM GRANULOCYTES # BLD AUTO: 0.03 THOUSAND/UL (ref 0–0.2)
IMM GRANULOCYTES NFR BLD AUTO: 1 % (ref 0–2)
LDLC SERPL CALC-MCNC: 74 MG/DL (ref 0–100)
LYMPHOCYTES # BLD AUTO: 2.26 THOUSANDS/ÂΜL (ref 0.6–4.47)
LYMPHOCYTES NFR BLD AUTO: 37 % (ref 14–44)
MCH RBC QN AUTO: 27.2 PG (ref 26.8–34.3)
MCHC RBC AUTO-ENTMCNC: 31.3 G/DL (ref 31.4–37.4)
MCV RBC AUTO: 87 FL (ref 82–98)
MONOCYTES # BLD AUTO: 0.47 THOUSAND/ÂΜL (ref 0.17–1.22)
MONOCYTES NFR BLD AUTO: 8 % (ref 4–12)
NEUTROPHILS # BLD AUTO: 3.15 THOUSANDS/ÂΜL (ref 1.85–7.62)
NEUTS SEG NFR BLD AUTO: 50 % (ref 43–75)
NONHDLC SERPL-MCNC: 145 MG/DL
NRBC BLD AUTO-RTO: 0 /100 WBCS
PLATELET # BLD AUTO: 249 THOUSANDS/UL (ref 149–390)
PMV BLD AUTO: 10.7 FL (ref 8.9–12.7)
POTASSIUM SERPL-SCNC: 4 MMOL/L (ref 3.5–5.3)
PROT SERPL-MCNC: 6.4 G/DL (ref 6.4–8.4)
RBC # BLD AUTO: 4.89 MILLION/UL (ref 3.81–5.12)
SODIUM SERPL-SCNC: 140 MMOL/L (ref 135–147)
TRIGL SERPL-MCNC: 353 MG/DL
TSH SERPL DL<=0.05 MIU/L-ACNC: 2.04 UIU/ML (ref 0.45–4.5)
WBC # BLD AUTO: 6.18 THOUSAND/UL (ref 4.31–10.16)

## 2024-06-25 PROCEDURE — 85025 COMPLETE CBC W/AUTO DIFF WBC: CPT

## 2024-06-25 PROCEDURE — 36415 COLL VENOUS BLD VENIPUNCTURE: CPT

## 2024-06-25 PROCEDURE — 84443 ASSAY THYROID STIM HORMONE: CPT

## 2024-06-25 PROCEDURE — 80053 COMPREHEN METABOLIC PANEL: CPT

## 2024-06-25 PROCEDURE — 97140 MANUAL THERAPY 1/> REGIONS: CPT | Performed by: PHYSICAL THERAPIST

## 2024-06-25 PROCEDURE — 97110 THERAPEUTIC EXERCISES: CPT | Performed by: PHYSICAL THERAPIST

## 2024-06-26 ENCOUNTER — OFFICE VISIT (OUTPATIENT)
Dept: CARDIOLOGY CLINIC | Facility: CLINIC | Age: 69
End: 2024-06-26
Payer: COMMERCIAL

## 2024-06-26 VITALS
SYSTOLIC BLOOD PRESSURE: 122 MMHG | DIASTOLIC BLOOD PRESSURE: 68 MMHG | WEIGHT: 195 LBS | RESPIRATION RATE: 16 BRPM | BODY MASS INDEX: 34.55 KG/M2 | HEIGHT: 63 IN | HEART RATE: 72 BPM | OXYGEN SATURATION: 99 %

## 2024-06-26 DIAGNOSIS — R06.02 SHORTNESS OF BREATH: Primary | ICD-10-CM

## 2024-06-26 DIAGNOSIS — E78.2 MIXED HYPERLIPIDEMIA: ICD-10-CM

## 2024-06-26 PROCEDURE — 99213 OFFICE O/P EST LOW 20 MIN: CPT | Performed by: INTERNAL MEDICINE

## 2024-06-26 NOTE — PROGRESS NOTES
[facility] [place]  [address]  [place] [address]  Cardiology Follow Up    Ayesha ChaidezSalvatore  1955  [id number]      1. Shortness of breath        2. Mixed hyperlipidemia            Chief Complaint   Patient presents with    Follow-up       Interval History: Patient presents for follow-up visit.  Patient denies any history of chest pain shortness of breath.  Patient denies any history of leg edema or orthopnea PND.  No history of presyncope syncope.  Patient states compliance with the present list of medications.  Patient had a lipid panel which showed elevated triglycerides.    Patient Active Problem List   Diagnosis    Arthralgia of hip, left    Arthritis of left knee    DVT of leg (deep venous thrombosis) (Formerly [facility])    Memory difficulty    Migraine, unspecified, not intractable, without status migrainosus    Mild intermittent asthma    Class 1 obesity    Osteopenia of multiple sites    Chronic pain of right knee    Primary osteoarthritis of right knee    Effusion of right knee    Thyroid nodule    Posterior tibial tendinitis of right lower extremity    History of deep venous thrombosis (DVT) of distal vein of right lower extremity    Hypertriglyceridemia    Palpitations    Gastroesophageal reflux disease without esophagitis    Moderate persistent asthma without complication    Neck pain    Cystitis, interstitial    Seasonal allergic rhinitis due to pollen    At increased risk of exposure to COVID-19 virus    Chest tightness    Myxoid cyst    COVID-19    Annual physical exam    Trapezius muscle strain, right, initial encounter    Plantar fasciitis, bilateral    Prediabetes    Finger pain, right    Finger wound, simple, open, initial encounter    Aphthous ulcer of mouth     Past Medical History:   Diagnosis Date    Acid reflux     Allergic rhinitis     Last Assessed: 11/2/2017    Anesthesia complication     HYPOTENSION    Arthritis     Asthma     Bigeminy     history    DVT  (deep venous thrombosis) (HCC) 10/23/2018    DVT (deep venous thrombosis) (HCC)     DVT of leg (deep venous thrombosis) (HCC)     left    Fatty liver     Female pelvic pain     GERD (gastroesophageal reflux disease)     Hyperlipidemia     high trigrlycerides    Intermittent palpitations     Interstitial cystitis     Irregular heart beat     Migraines     Neck pain     Obesity (BMI 30.0-34.9)     Palpitations     Pes planus     unspecified laterality; Last Assessed: 2014    Thyroid nodule     Tinnitus     Trigeminy     history    Wears glasses     Wears reading eyeglasses      Social History     Socioeconomic History    Marital status: /Civil Union     Spouse name: Not on file    Number of children: Not on file    Years of education: Not on file    Highest education level: Not on file   Occupational History    Occupation: Pediatrics   Tobacco Use    Smoking status: Former     Current packs/day: 0.00     Average packs/day: 0.5 packs/day for 7.5 years (3.7 ttl pk-yrs)     Types: Cigarettes     Start date:      Quit date: 1978     Years since quittin.0    Smokeless tobacco: Never    Tobacco comments:     as teenager   Vaping Use    Vaping status: Never Used   Substance and Sexual Activity    Alcohol use: Yes     Alcohol/week: 1.0 standard drink of alcohol     Types: 1 Glasses of wine per week     Comment: occasional    Drug use: No    Sexual activity: Not Currently     Birth control/protection: Surgical   Other Topics Concern    Not on file   Social History Narrative    Not on file     Social Determinants of Health     Financial Resource Strain: Not on file   Food Insecurity: Not on file   Transportation Needs: Not on file   Physical Activity: Unknown (2022)    Exercise Vital Sign     Days of Exercise per Week: 0 days     Minutes of Exercise per Session: Not on file   Stress: Stress Concern Present (9/15/2020)    [facility] [employer] - Occupational Stress  Questionnaire     Feeling of Stress : Very much   Social Connections: Not on file   Intimate Partner Violence: Not on file   Housing Stability: Not on file      Family History   Problem Relation Age of Onset    Endometrial cancer Mother 68    Migraines Mother     Obesity Father     Diabetes Father     Heart disease Father     Heart attack Father     Prostate cancer Father     Hypertension Father     Diabetes Brother     Thyroid cancer Brother         medullary    Diabetes type II Brother     Growth hormone deficiency Daughter     No Known Problems Daughter     Alzheimer's disease Maternal Aunt     Thyroid cancer Maternal Aunt     Breast cancer Maternal Aunt 65    Thyroid cancer Maternal Aunt 85    No Known Problems Maternal Aunt     No Known Problems Maternal Aunt     No Known Problems Maternal Aunt     No Known Problems Maternal Aunt     Other Maternal Uncle         Brain Tumor    Alzheimer's disease Maternal Uncle     Diabetes Paternal Aunt     Hashimoto's thyroiditis Paternal Aunt         Total Thyroidectomy    Hypertension Paternal Aunt     No Known Problems Paternal Aunt     Stroke Paternal Uncle     No Known Problems Maternal Grandmother     No Known Problems Paternal Grandmother     Cancer Family     Colon cancer Neg Hx     Cervical cancer Neg Hx     Ovarian cancer Neg Hx     Uterine cancer Neg Hx      Past Surgical History:   Procedure Laterality Date    APPENDECTOMY      BREAST BIOPSY Left 2019    CATARACT EXTRACTION Bilateral      SECTION      X 2    CHOLECYSTECTOMY      COLONOSCOPY      DILATION AND CURETTAGE OF UTERUS      HYSTERECTOMY      total    JOINT REPLACEMENT      left TKR    KNEE SURGERY Left     X 3    IL ESOPHAGOGASTRODUODENOSCOPY TRANSORAL DIAGNOSTIC N/A 2016    Procedure: ESOPHAGOGASTRODUODENOSCOPY (EGD);  Surgeon: Jason Martinez MD;  Location: [facility];  Service: Gastroenterology    IL EXCISION GANGLION WRIST DORSAL/VOLAR PRIMARY Right 2023    Procedure: Right long finger  mucoid cyst excision distal interphalangeal joint;  Surgeon: Олег Her MD;  Location: [facility];  Service: Orthopedics    WI LAPS TOTAL HYSTERECT 250 GM/< W/RMVL TUBE/OVARY Bilateral 07/08/2016    Procedure: HYSTERECTOMY LAPAROSCOPIC TOTAL ,BSO;  Surgeon: Hermes Moran MD;  Location: [facility];  Service: Gynecology Oncology    REPLACEMENT TOTAL KNEE  2014    US GUIDED BREAST BIOPSY LEFT COMPLETE Left 02/06/2019    WISDOM TOOTH EXTRACTION         Current Outpatient Medications:     acetaminophen (TYLENOL) 650 mg CR tablet, Take 1 tablet (650 mg total) by mouth every 8 (eight) hours as needed for mild pain, Disp: 30 tablet, Rfl: 0    albuterol (PROVENTIL HFA,VENTOLIN HFA) 90 mcg/act inhaler, INHALE 2 PUFFS EVERY 4-6 HOURS AS NEEDED, Disp: 108 g, Rfl: 0    ALPRAZolam (XANAX) 0.25 mg tablet, Take one tablet twice daily as needed for anxiety, Disp: 30 tablet, Rfl: 0    butalbital-acetaminophen-caffeine (FIORICET,ESGIC) -40 mg per tablet, Take 1 tablet by mouth every 4 (four) hours as needed for headaches, Disp: 90 tablet, Rfl: 0    calcium-vitamin D (OSCAL) 250-125 MG-UNIT per tablet, Take 1 tablet by mouth daily, Disp: , Rfl:     cycloSPORINE (RESTASIS) 0.05 % ophthalmic emulsion, Apply 1 drop to eye 2 (two) times a day, Disp: 0.4 mL, Rfl: 0    diazepam (VALIUM) 5 mg tablet, Take 1 tablet (5 mg total) by mouth every 8 (eight) hours as needed for anxiety, Disp: 30 tablet, Rfl: 0    diphenhydrAMINE (BENADRYL) 25 mg capsule, Take by mouth daily at bedtime as needed Taking 50mg at bedtime, Disp: , Rfl:     Elastic Bandages & Supports (Medical Compression Socks) MISC, Use daily, Disp: 5 each, Rfl: 2    fexofenadine (ALLEGRA) 180 MG tablet, Take 180 mg by mouth daily, Disp: , Rfl:     fluticasone (Flovent HFA) 220 mcg/act inhaler, Inhale 1 puff 2 (two) times a day Rinse mouth after use., Disp: 12 g, Rfl: 3    methocarbamol (ROBAXIN) 750 mg tablet, Take 1 tablet (750 mg total) by mouth every 6 (six) hours  as needed for muscle spasms, Disp: 30 tablet, Rfl: 0    montelukast (SINGULAIR) 10 mg tablet, Take 1 tablet (10 mg total) by mouth every evening, Disp: 90 tablet, Rfl: 1    Multiple Vitamin (MULTIVITAMIN) capsule, Take 1 capsule by mouth daily, Disp: , Rfl:     pantoprazole (PROTONIX) 40 mg tablet, Take 1 tablet (40 mg total) by mouth 2 (two) times a day, Disp: 180 tablet, Rfl: 0    rivaroxaban (Xarelto) 10 mg tablet, Take 1 tablet (10 mg total) by mouth daily, Disp: 90 tablet, Rfl: 0    traMADol (Ultram) 50 mg tablet, Take 1 tablet (50 mg total) by mouth every 6 (six) hours as needed for moderate pain, Disp: 30 tablet, Rfl: 0  Allergies   Allergen Reactions    Naproxen Shortness Of Breath    Prednisone Facial Swelling    Iv Contrast [Iodinated Contrast Media] Itching    Seasonal Ic [Cholestatin]     Phentermine Palpitations       Labs:  Appointment on 06/25/2024   Component Date Value    WBC 06/25/2024 6.18     RBC 06/25/2024 4.89     Hemoglobin 06/25/2024 13.3     Hematocrit 06/25/2024 42.5     MCV 06/25/2024 87     MCH 06/25/2024 27.2     MCHC 06/25/2024 31.3 (L)     RDW 06/25/2024 14.3     MPV 06/25/2024 10.7     Platelets 06/25/2024 249     nRBC 06/25/2024 0     Segmented % 06/25/2024 50     Immature Grans % 06/25/2024 1     Lymphocytes % 06/25/2024 37     Monocytes % 06/25/2024 8     Eosinophils Relative 06/25/2024 3     Basophils Relative 06/25/2024 1     Absolute Neutrophils 06/25/2024 3.15     Absolute Immature Grans 06/25/2024 0.03     Absolute Lymphocytes 06/25/2024 2.26     Absolute Monocytes 06/25/2024 0.47     Eosinophils Absolute 06/25/2024 0.19     Basophils Absolute 06/25/2024 0.08     Sodium 06/25/2024 140     Potassium 06/25/2024 4.0     Chloride 06/25/2024 106     CO2 06/25/2024 26     ANION GAP 06/25/2024 8     BUN 06/25/2024 20     Creatinine 06/25/2024 0.65     Glucose, Fasting 06/25/2024 84     Calcium 06/25/2024 8.9     AST 06/25/2024 24     ALT 06/25/2024 29     Alkaline Phosphatase  06/25/2024 85     Total Protein 06/25/2024 6.4     Albumin 06/25/2024 4.0     Total Bilirubin 06/25/2024 0.45     eGFR 06/25/2024 90     TSH 3RD GENERATON 06/25/2024 2.040    Hospital Outpatient Visit on 05/09/2024   Component Date Value    RAA A4C 05/09/2024 11.7     LA Volume Index (BP) 05/09/2024 15.7     MV Peak A [place] 05/09/2024 0.76     MV stenosis pressure 1/2* 05/09/2024 62     MV Peak E [place] 05/09/2024 75     E wave deceleration time 05/09/2024 214     E/A ratio 05/09/2024 0.99     MV valve area p 1/2 meth* 05/09/2024 3.55     RVID d 05/09/2024 2.3     A4C EF 05/09/2024 61     Left ventricular stroke * 05/09/2024 50.00     IVSd 05/09/2024 1.10     Tricuspid annular plane * 05/09/2024 2.00     Ao root 05/09/2024 3.10     LVPWd 05/09/2024 1.10     LA size 05/09/2024 3.3     Asc Ao 05/09/2024 2.8     LA volume (BP) 05/09/2024 30     FS 05/09/2024 33     LVIDS 05/09/2024 2.90     IVS 05/09/2024 1.1     LVIDd 05/09/2024 4.30     LA length (A2C) 05/09/2024 5.00     LEFT VENTRICLE SYSTOLIC * 05/09/2024 32     LV DIASTOLIC VOLUME (MOD* 05/09/2024 82     Left Atrium Area-systoli* 05/09/2024 13.7     Left Atrium Area-systoli* 05/09/2024 13.5     MV E' Tissue Velocity Se* 05/09/2024 10     LVSV, 2D 05/09/2024 50     BSA 05/09/2024 1.91     LV EF 05/09/2024 60    Hospital Outpatient Visit on 05/09/2024   Component Date Value    Protocol Name 05/09/2024 [facility]- WALK     Exercise duration (min) 05/09/2024 3     Exercise duration (sec) 05/09/2024 0     Post Peak Systolic BP 05/09/2024 162     Max Diastolic Bp 05/09/2024 86     Peak HR 05/09/2024 114     Max Predicted Heart Rate 05/09/2024 150     Reason for Termination 05/09/2024 Protocol Complete     Test Indication 05/09/2024                      Value:Abnormal ECG  SOB      Target Hr Formular 05/09/2024 (220 - Age)*85%     Chest Pain Statement 05/09/2024 none    Hospital Outpatient Visit on 05/09/2024   Component Date Value    Rest Nuclear Isotope Dose 05/09/2024 10.39      "Stress Nuclear Isotope D* 05/09/2024 32.70     Baseline HR 05/09/2024 69     Baseline BP 05/09/2024 154/86     O2 sat rest 05/09/2024 99     Stress peak HR 05/09/2024 114     Post peak BP 05/09/2024 162     O2 sat peak 05/09/2024 96     Recovery HR 05/09/2024 85     Recovery BP 05/09/2024 148/66     O2 sat recovery 05/09/2024 100     Rate Pressure Product 05/09/2024 18,468.0     Angina Index 05/09/2024 0     Stress/rest perfusion ra* 05/09/2024 1.19     End diastolic index (mL/* 05/09/2024 41.0     EF (%) 05/09/2024 85     End systolic index (mL/m* 05/09/2024 6.0    Office Visit on 04/29/2024   Component Date Value    Cholesterol 06/25/2024 190     Triglycerides 06/25/2024 353 (H)     HDL, Direct 06/25/2024 45 (L)     LDL Calculated 06/25/2024 74     Non-HDL-Chol (CHOL-HDL) 06/25/2024 145      Imaging: No results found.    Review of Systems:  Review of Systems  REVIEW OF SYSTEMS:  Constitutional:  Denies fever or chills   Eyes:  Denies change in visual acuity   HENT:  Denies nasal congestion or sore throat   Respiratory:  Denies cough or shortness of breath   Cardiovascular:  Denies chest pain or edema   GI:  Denies abdominal pain, nausea, vomiting, bloody stools or diarrhea   :  Denies dysuria, frequency, difficulty in micturition and nocturia  Musculoskeletal:  Denies back pain or joint pain   Neurologic:  Denies headache, focal weakness or sensory changes   Endocrine:  Denies polyuria or polydipsia   Lymphatic:  Denies swollen glands   Psychiatric:  Denies depression or anxiety    Physical Exam:    /68 (BP Location: Left arm, Patient Position: Sitting, Cuff Size: Standard)   Pulse 72   Resp 16   Ht 5' 3\" (1.6 m)   Wt 88.5 kg (195 lb) Comment: pt reported; pt refused in office weight  LMP  (LMP Unknown)   SpO2 99%   BMI 34.54 kg/m²     Physical Exam  PHYSICAL EXAM:  General:  Patient is not in acute distress   Head: Normocephalic, Atraumatic.  HEENT:  Both pupils normal-size atraumatic, " normocephalic, nonicteric  Neck:  JVP not raised. Trachea central. No carotid bruit  Respiratory:  normal breath sounds no crackles. no rhonchi  Cardiovascular:  Regular rate and rhythm no S3 no murmurs  GI:  Abdomen soft nontender. No organomegaly.   Lymphatic:  No cervical or inguinal lymphadenopathy  Neurologic:  Patient is awake alert, oriented . Grossly nonfocal  Extremities no edema    Echocardiogram shows normal ejection fraction with no significant valvular abnormalities.    Patient had pharmacological nuclear stress test which showed no evidence of ischemia with normal ejection fraction.    CT coronary calcium score 0.    Lipid panel showed elevated triglycerides in the 350 range.    Discussion/Summary:    Patient with multiple medical problems who seems to be doing reasonably well from cardiac standpoint. Previous studies reviewed with patient. Medications reviewed and possible side effects discussed. concepts of cardiovascular disease , signs and symptoms of heart disease. Dietary and risk factor modification reinforced. All questions answered.  Safety measures reviewed. Patient advised to report any problems prompting medical attention.    Results of echocardiogram as well as stress test and CT coronary calcium score reviewed with patient.  Symptoms to watch out from cardiac standpoint which would indicate the need for further cardiac evaluation also discussed.  Patient would like to try diet control for elevated triglycerides.  To consider fenofibrate if patient is agreeable in the future.  Follow-up in 1 year or earlier as needed.  Patient is agreeable with the plan of care.

## 2024-06-27 DIAGNOSIS — G43.009 MIGRAINE WITHOUT AURA AND WITHOUT STATUS MIGRAINOSUS, NOT INTRACTABLE: ICD-10-CM

## 2024-06-27 RX ORDER — BUTALBITAL, ACETAMINOPHEN AND CAFFEINE 50; 325; 40 MG/1; MG/1; MG/1
1 TABLET ORAL EVERY 4 HOURS PRN
Qty: 90 TABLET | Refills: 0 | Status: SHIPPED | OUTPATIENT
Start: 2024-06-27

## 2024-06-28 DIAGNOSIS — J45.21 MILD INTERMITTENT ASTHMA WITH ACUTE EXACERBATION: ICD-10-CM

## 2024-06-28 RX ORDER — FLUTICASONE PROPIONATE 220 UG/1
1 AEROSOL, METERED RESPIRATORY (INHALATION) 2 TIMES DAILY
Qty: 12 G | Refills: 1 | Status: SHIPPED | OUTPATIENT
Start: 2024-06-28 | End: 2024-07-01

## 2024-07-01 RX ORDER — FLUTICASONE PROPIONATE 220 UG/1
AEROSOL, METERED RESPIRATORY (INHALATION)
Qty: 16 G | Refills: 3 | Status: SHIPPED | OUTPATIENT
Start: 2024-07-01

## 2024-07-05 ENCOUNTER — OFFICE VISIT (OUTPATIENT)
Dept: PHYSICAL THERAPY | Age: 69
End: 2024-07-05
Payer: COMMERCIAL

## 2024-07-05 DIAGNOSIS — M54.2 NECK PAIN: Primary | ICD-10-CM

## 2024-07-05 PROCEDURE — 97110 THERAPEUTIC EXERCISES: CPT | Performed by: PHYSICAL THERAPIST

## 2024-07-05 PROCEDURE — 97140 MANUAL THERAPY 1/> REGIONS: CPT | Performed by: PHYSICAL THERAPIST

## 2024-07-05 NOTE — PROGRESS NOTES
Daily Note     Today's date: 2024  Patient name: Ayesha [place]  : 1955  MRN: [id number]  Referring provider: Lenny Zamarripa DO  Dx:   Encounter Diagnosis     ICD-10-CM    1. Neck pain  M54.2           Start Time: 1300  Stop Time: 1345  Total time in clinic (min): 45 minutes    Subjective: same      Objective: See treatment diary below      Assessment: Tolerated treatment fair. Patient demonstrated fatigue post treatment, exhibited good technique with therapeutic exercises, and would benefit from continued PT, patient still with upper trap hypertonicity and forward head posture and tight pec minor but good response to MFR      Plan: Progress treatment as tolerated.       Precautions:     POC expires Unit limit Auth Expiration date PT/OT + Visit Limit?   2024                              Visit/Unit Tracking  AUTH Status:  Date /            Used 1 2 3 4            Remaining                 FOTO                     Manuals  7/5                      MT cervical 15 min 15 min 15 min 15 min         Gentle tx             MFR    5 min         Neuro Re-Ed                                                                                                        Ther Ex                          UBE   3 min 4 min 4 min 5 min         pulleys 30x 30x 30x 30x         Chair extension over bolster 10x 10x 10x 10x         Chin tucks 10x 10x 10x 10x         BTB rows 30x 30x 30x 30x         GTB ER 15x 15x 15x 15x                       Ther Activity                                       Gait Training                                       Modalities

## 2024-07-08 ENCOUNTER — OFFICE VISIT (OUTPATIENT)
Dept: PHYSICAL THERAPY | Age: 69
End: 2024-07-08
Payer: COMMERCIAL

## 2024-07-08 DIAGNOSIS — M54.2 NECK PAIN: Primary | ICD-10-CM

## 2024-07-08 PROCEDURE — 97140 MANUAL THERAPY 1/> REGIONS: CPT | Performed by: PHYSICAL THERAPIST

## 2024-07-08 PROCEDURE — 97110 THERAPEUTIC EXERCISES: CPT | Performed by: PHYSICAL THERAPIST

## 2024-07-08 NOTE — PROGRESS NOTES
Daily Note     Today's date: 2024  Patient name: Ayesha Lockwood  : 1955  MRN: [id number]  Referring provider: Lenny Zamarripa DO  Dx:   Encounter Diagnosis     ICD-10-CM    1. Neck pain  M54.2           Start Time: 1115  Stop Time: 1200  Total time in clinic (min): 45 minutes    Subjective: patient complains of increased left low back pain today      Objective: See treatment diary below      Assessment: Tolerated treatment fair. Patient demonstrated fatigue post treatment, exhibited good technique with therapeutic exercises, and would benefit from continued PT, tenderness to left piriformis, also with complaints of bilateral upper trap tightness and tenderness, some relief with MFR      Plan: Progress treatment as tolerated.       Precautions:     POC expires Unit limit Auth Expiration date PT/OT + Visit Limit?   2024                              Visit/Unit Tracking  AUTH Status:  Date  7/           Used 1 2 3 4 5           Remaining                 FOTO                     Manuals / 7/8                     MT cervical 15 min 15 min 15 min 15 min 15 min        Gentle tx             MFR    5 min 5 min        Neuro Re-Ed                                                                                                        Ther Ex                          UBE   3 min 4 min 4 min 5 min 4 min        pulleys 30x 30x 30x 30x 30x        Chair extension over bolster 10x 10x 10x 10x 10x        Chin tucks 10x 10x 10x 10x 10x        BTB rows 30x 30x 30x 30x nt        GTB ER 15x 15x 15x 15x  nt                     Ther Activity                                       Gait Training                                       Modalities

## 2024-07-10 ENCOUNTER — OFFICE VISIT (OUTPATIENT)
Dept: FAMILY MEDICINE CLINIC | Facility: CLINIC | Age: 69
End: 2024-07-10
Payer: COMMERCIAL

## 2024-07-10 ENCOUNTER — OFFICE VISIT (OUTPATIENT)
Dept: PHYSICAL THERAPY | Age: 69
End: 2024-07-10
Payer: COMMERCIAL

## 2024-07-10 ENCOUNTER — TELEPHONE (OUTPATIENT)
Age: 69
End: 2024-07-10

## 2024-07-10 VITALS
HEIGHT: 63 IN | BODY MASS INDEX: 34.55 KG/M2 | OXYGEN SATURATION: 100 % | SYSTOLIC BLOOD PRESSURE: 120 MMHG | DIASTOLIC BLOOD PRESSURE: 70 MMHG | WEIGHT: 195 LBS | RESPIRATION RATE: 18 BRPM | TEMPERATURE: 98.3 F | HEART RATE: 60 BPM

## 2024-07-10 DIAGNOSIS — K21.9 GASTROESOPHAGEAL REFLUX DISEASE WITHOUT ESOPHAGITIS: ICD-10-CM

## 2024-07-10 DIAGNOSIS — S76.312A STRAIN OF LEFT PIRIFORMIS MUSCLE, INITIAL ENCOUNTER: Primary | ICD-10-CM

## 2024-07-10 DIAGNOSIS — G89.29 CHRONIC LEFT-SIDED LOW BACK PAIN WITHOUT SCIATICA: ICD-10-CM

## 2024-07-10 DIAGNOSIS — J45.40 MODERATE PERSISTENT ASTHMA WITHOUT COMPLICATION: ICD-10-CM

## 2024-07-10 DIAGNOSIS — M54.50 CHRONIC LEFT-SIDED LOW BACK PAIN WITHOUT SCIATICA: ICD-10-CM

## 2024-07-10 DIAGNOSIS — M54.2 NECK PAIN: Primary | ICD-10-CM

## 2024-07-10 PROCEDURE — 97110 THERAPEUTIC EXERCISES: CPT | Performed by: PHYSICAL THERAPIST

## 2024-07-10 PROCEDURE — 97140 MANUAL THERAPY 1/> REGIONS: CPT | Performed by: PHYSICAL THERAPIST

## 2024-07-10 PROCEDURE — 99214 OFFICE O/P EST MOD 30 MIN: CPT | Performed by: FAMILY MEDICINE

## 2024-07-10 RX ORDER — CHLORZOXAZONE 500 MG/1
500 TABLET ORAL 4 TIMES DAILY PRN
Qty: 30 TABLET | Refills: 0 | Status: SHIPPED | OUTPATIENT
Start: 2024-07-10

## 2024-07-10 RX ORDER — MELOXICAM 15 MG/1
15 TABLET ORAL DAILY PRN
Qty: 30 TABLET | Refills: 0 | Status: SHIPPED | OUTPATIENT
Start: 2024-07-10

## 2024-07-10 NOTE — ASSESSMENT & PLAN NOTE
Recommend piriformis strain exercises for her physical therapy be can be initiated add additional medication for pain relief and reduce inflammation

## 2024-07-10 NOTE — PROGRESS NOTES
Daily Note     Today's date: 7/10/2024  Patient name: Ayesha Lockwood  : 1955  MRN: [id number]  Referring provider: Lenny Zamarripa DO  Dx:   Encounter Diagnosis     ICD-10-CM    1. Neck pain  M54.2           Start Time: 1015  Stop Time: 1100  Total time in clinic (min): 45 minutes    Subjective: still sore      Objective: See treatment diary below      Assessment: Tolerated treatment fair. Patient demonstrated fatigue post treatment, exhibited good technique with therapeutic exercises, and would benefit from continued PT, still with forward head posture and bilateral upper trap tightness, patient to call Dr Zamarripa regarding left posterior buttock pain and has a slow antalgic gait pattern      Plan: Progress treatment as tolerated.       Precautions:     POC expires Unit limit Auth Expiration date PT/OT + Visit Limit?   2024                              Visit/Unit Tracking  AUTH Status:  Date /5 7/8 7/10          Used 1 2 3 4 5 6          Remaining                 FOTO                     Manuals  7/ 7/10                    MT cervical 15 min 15 min 15 min 15 min 15 min 15 min       Gentle tx      5 min       MFR    5 min 5 min 5 min       Neuro Re-Ed                                                                                                        Ther Ex                          UBE   3 min 4 min 4 min 5 min 4 min        pulleys 30x 30x 30x 30x 30x 30x       Chair extension over bolster 10x 10x 10x 10x 10x 10x       Chin tucks 10x 10x 10x 10x 10x 10x       BTB rows 30x 30x 30x 30x nt        GTB ER 15x 15x 15x 15x  nt                     Ther Activity                                       Gait Training                                       Modalities

## 2024-07-10 NOTE — TELEPHONE ENCOUNTER
PT called in stating that she is still experiencing a lot of pain (8-9) in her neck & back.     PT stated that the tramadol didn't work well for her pain and wanted to know if Dr. Zamarripa could prescribe her something else that can help?    PT wanted to be seen today, as she is currently going to her Physical Therapy appt. Informed her that he only has 07/11 available.     Please advise & call pt back with update on her inquiry/request. Thank you!    Ayesha Lockwood   [phone]

## 2024-07-10 NOTE — PROGRESS NOTES
Assessment/Plan:       Problem List Items Addressed This Visit          Respiratory    Moderate persistent asthma without complication     No worsening breathing issues now continue with Proventil albuterol         Relevant Medications    meloxicam (MOBIC) 15 mg tablet    chlorzoxazone (PARAFON FORTE) 500 mg tablet       Digestive    Gastroesophageal reflux disease without esophagitis     Continue with pantoprazole increased dose twice daily if any symptomatology during treatment for back pain         Relevant Medications    meloxicam (MOBIC) 15 mg tablet    chlorzoxazone (PARAFON FORTE) 500 mg tablet       Musculoskeletal and Integument    Strain of left piriformis muscle - Primary     Recommend piriformis strain exercises for her physical therapy be can be initiated add additional medication for pain relief and reduce inflammation         Relevant Medications    meloxicam (MOBIC) 15 mg tablet    chlorzoxazone (PARAFON FORTE) 500 mg tablet       Surgery/Wound/Pain    Chronic left-sided low back pain without sciatica     Consider short course meloxicam monitoring for any GERD symptoms understanding that she should take this on a full stomach and only for 2 weeks in light of Xarelto and continue with pantoprazole         Relevant Medications    meloxicam (MOBIC) 15 mg tablet    chlorzoxazone (PARAFON FORTE) 500 mg tablet         Subjective:      Patient ID: Ayesha Lockwood is a 68 y.o. female.    HPI    The following portions of the patient's history were reviewed and updated as appropriate: allergies, current medications, past family history, past medical history, past social history, past surgical history and problem list.    Review of Systems   Constitutional:  Negative for chills, fatigue and fever.   HENT:  Negative for congestion, nosebleeds, rhinorrhea, sinus pressure and sore throat.    Eyes:  Negative for discharge and redness.   Respiratory:  Negative for cough and shortness of breath.   "  Cardiovascular:  Negative for chest pain, palpitations and leg swelling.   Gastrointestinal:  Negative for abdominal pain, blood in stool and nausea.   Endocrine: Negative for cold intolerance, heat intolerance and polyuria.   Genitourinary:  Negative for dysuria and frequency.   Musculoskeletal:  Positive for back pain. Negative for arthralgias and myalgias.   Skin:  Negative for rash.   Neurological:  Negative for dizziness, weakness and headaches.   Hematological:  Negative for adenopathy.   Psychiatric/Behavioral:  Negative for behavioral problems and sleep disturbance. The patient is not nervous/anxious.          Objective:      /70 (BP Location: Left arm, Patient Position: Sitting, Cuff Size: Standard)   Pulse 60   Temp 98.3 °F (36.8 °C) (Tympanic)   Resp 18   Ht 5' 3\" (1.6 m)   Wt 88.5 kg (195 lb)   LMP  (LMP Unknown)   SpO2 100%   BMI 34.54 kg/m²        Physical Exam  Vitals and nursing note reviewed.   Constitutional:       General: She is not in acute distress.     Appearance: Normal appearance. She is well-developed.   HENT:      Head: Normocephalic and atraumatic.      Right Ear: Tympanic membrane and external ear normal.      Left Ear: Tympanic membrane and external ear normal.      Nose: Nose normal.      Mouth/Throat:      Mouth: Mucous membranes are moist.      Pharynx: Oropharynx is clear. No oropharyngeal exudate.   Eyes:      General: No scleral icterus.        Right eye: No discharge.         Left eye: No discharge.      Conjunctiva/sclera: Conjunctivae normal.      Pupils: Pupils are equal, round, and reactive to light.   Neck:      Thyroid: No thyromegaly.      Vascular: No JVD.   Cardiovascular:      Rate and Rhythm: Normal rate and regular rhythm.      Heart sounds: Normal heart sounds. No murmur heard.  Pulmonary:      Effort: Pulmonary effort is normal.      Breath sounds: No wheezing or rales.   Chest:      Chest wall: No tenderness.   Abdominal:      General: Bowel sounds " are normal. There is no distension.      Palpations: Abdomen is soft. There is no mass.      Tenderness: There is no abdominal tenderness.   Musculoskeletal:         General: No tenderness or deformity. Normal range of motion.      Cervical back: Normal range of motion.      Comments: Piriformis pain sacroiliac pain left side thoracic 10/11/2012 and L1-5 pain left side negative straight leg raise testing and negative radicular symptoms.   Lymphadenopathy:      Cervical: No cervical adenopathy.   Skin:     General: Skin is warm and dry.      Findings: No rash.   Neurological:      General: No focal deficit present.      Mental Status: She is alert and oriented to person, place, and time.      Cranial Nerves: No cranial nerve deficit.      Coordination: Coordination normal.      Deep Tendon Reflexes: Reflexes are normal and symmetric. Reflexes normal.   Psychiatric:         Mood and Affect: Mood normal.         Behavior: Behavior normal.         Thought Content: Thought content normal.         Judgment: Judgment normal.          Data:    Laboratory Results: I have personally reviewed the pertinent laboratory results/reports   Radiology/Other Diagnostic Testing Results: I have personally reviewed pertinent reports.       Lab Results   Component Value Date    WBC 6.18 06/25/2024    HGB 13.3 06/25/2024    HCT 42.5 06/25/2024    MCV 87 06/25/2024     06/25/2024     Lab Results   Component Value Date     08/20/2015    K 4.0 06/25/2024     06/25/2024    CO2 26 06/25/2024    ANIONGAP 8.6 08/20/2015    BUN 20 06/25/2024    CREATININE 0.65 06/25/2024    GLUCOSE 88 08/20/2015    GLUF 84 06/25/2024    CALCIUM 8.9 06/25/2024    AST 24 06/25/2024    ALT 29 06/25/2024    ALKPHOS 85 06/25/2024    PROT 7.1 08/20/2015    BILITOT 0.3 08/20/2015    EGFR 90 06/25/2024     Lab Results   Component Value Date    CHOLESTEROL 190 06/25/2024    CHOLESTEROL 176 11/06/2023    CHOLESTEROL 206 (H) 06/09/2023     Lab Results  "  Component Value Date    HDL 45 (L) 06/25/2024    HDL 42 (L) 11/06/2023    HDL 47 (L) 06/09/2023     Lab Results   Component Value Date    LDLCALC 74 06/25/2024    LDLCALC 79 11/06/2023    LDLCALC 117 (H) 06/09/2023     Lab Results   Component Value Date    TRIG 353 (H) 06/25/2024    TRIG 273 (H) 11/06/2023    TRIG 208 (H) 06/09/2023     No results found for: \"CHOLHDL\"  Lab Results   Component Value Date    BVS6OMRNYAEH 2.040 06/25/2024     Lab Results   Component Value Date    HGBA1C 5.6 06/09/2023     No results found for: \"PSA\"    Lenny Zamarripa, DO      "

## 2024-07-10 NOTE — ASSESSMENT & PLAN NOTE
Continue with pantoprazole increased dose twice daily if any symptomatology during treatment for back pain

## 2024-07-10 NOTE — ASSESSMENT & PLAN NOTE
Consider short course meloxicam monitoring for any GERD symptoms understanding that she should take this on a full stomach and only for 2 weeks in light of Xarelto and continue with pantoprazole

## 2024-07-10 NOTE — PATIENT INSTRUCTIONS
"Patient Education     Low-fat diet   The Basics   Written by the doctors and editors at [facility]   What are fats? -- The fat in the food you eat is often classified into 2 groups:   \"Healthy\" fats - These are monounsaturated or polyunsaturated fats. They tend to be more liquid at room temperature. Healthy fats are found in things like olive oil, canola oil, and sesame oil. They are also found in nuts, seeds, avocados, and nut butters.   \"Unhealthy\" fats - These are saturated and trans fats. Saturated fats are animal fats. Trans fats are artificial fats, like partially hydrogenated oils. These fats raise your cholesterol. Unhealthy fats tend to be more solid at room temperature. They are found in meats, egg yolks, butter, cheese, and full-fat milk products. They are also found in some fried foods, butter, margarine, and baked goods like cookies or cakes.  Why do I need a low-fat diet? -- The amounts and kinds of fats you eat can affect your health. This is especially true if you have specific conditions such as blocked arteries or gallbladder problems. Eating fewer unhealthy fats is 1 way to improve your health.  Some people try to eat less fat because they want to lose weight or avoid gaining weight. But this does not always work. That's because weight gain is related to how many calories you eat, no matter what foods they come from. Eating fewer unhealthy fats can be an important part of a weight loss plan. But it's also important to be aware of how many calories you are getting from other foods.  What can I eat and drink on a low-fat diet? -- Choose foods with healthy fats.  Foods with healthy fats include:   Canola, peanut, and olive oil   Safflower, sunflower, soybean, and corn oil   Walnuts, almonds, pecans, hazelnuts, cashews, and peanuts   Pumpkin, sesame, flax, and sunflower seeds   [place], tuna, and some other fish   Tofu   Soy milk   Avocado  Other healthy foods with lower amounts of fats include:   " "Fat-free (non-fat) or low-fat milk, yogurt, and cheese   Light, low-fat or fat-free cream cheese and sour cream   Dried beans, lentils, and tofu   Fruits and vegetables   Whole-grain breads, cereals, pastas, and rice   High-fiber foods, like oatmeal, fruits, beans, and nuts. These have soluble fiber, which helps lower cholesterol in the body.   Deli ham, turkey, chicken breast, and lean roast beef  What foods and drinks should I limit on a low-fat diet? -- It is important to limit certain foods with unhealthy fats.  Limit these types of foods that have saturated fats:   Whole-fat dairy products like cheese, ice cream, whole milk, and cream   High-fat meats like beef, lamb, poultry with the skin, jorgensen, hot dogs, and sausage   Processed deli meats like bologna, pepperoni, and salami   Butter and lard   Palm and coconut oils   Mayonnaise, salad dressings, gravies, and sauces  Limit these types of foods that might have trans fats:   Granola, candy, and baked goods like cookies, cakes, doughnuts, and muffins   Pizza dough, and pie crusts that are packaged   Fried foods   Frozen dinners   Chips and crackers   Microwave popcorn   Stick margarine and vegetable shortenings  What else should I know? -- You do not have to remove all fat from your diet. Instead, pay attention to the amount and kinds of fats that you eat.   Read the labels of store-bought foods (figure 1) to find out how much fat is in each. Under 5 percent of total fat on a label means that it is \"low fat.\" Over 20 percent of total fat on a label means that it is \"high fat.\" Avoid foods with \"partially hydrogenated oil\" in the ingredient list. This means that there is trans fat in the food.   Change how you cook to help lower the amount of fat in your food:   Remove the fatty parts of meat and the skin from poultry before cooking   Bake, broil, grill, poach, or roast poultry, fish, and lean meats   Drain and throw away the fat that comes out of meat as you " "cook it   Try to add little or no fat to foods   Use olive or canola oil for cooking or baking   Steam your vegetables   Use herbs or no-oil marinades to flavor foods  All topics are updated as new evidence becomes available and our peer review process is complete.  This topic retrieved from [employer] on: Mar 13, 2024.  Topic 505940 Version 4.0  Release: 32.2.4 - C32.71  © 2024 UpToDate, Inc. and/or its affiliates. All rights reserved.  figure 1: Nutrition label - Fats     This is an example of a nutrition label. To figure out how much and what kinds of fats are in a food, look for the lines that say \"Saturated Fat\" and \"Trans Fat.\" It's also important to look at the serving size. This food has 3 grams of saturated fat and 2 grams of trans fat in each serving, and each serving is 2 tablespoons (32 grams).  Graphic 457145 Version 1.0  Consumer Information Use and Disclaimer   Disclaimer: This generalized information is a limited summary of diagnosis, treatment, and/or medication information. It is not meant to be comprehensive and should be used as a tool to help the user understand and/or assess potential diagnostic and treatment options. It does NOT include all information about conditions, treatments, medications, side effects, or risks that may apply to a specific patient. It is not intended to be medical advice or a substitute for the medical advice, diagnosis, or treatment of a health care provider based on the health care provider's examination and assessment of a patient's specific and unique circumstances. Patients must speak with a health care provider for complete information about their health, medical questions, and treatment options, including any risks or benefits regarding use of medications. This information does not endorse any treatments or medications as safe, effective, or approved for treating a specific patient. UpToDate, Inc. and its affiliates disclaim any warranty or liability relating to " this information or the use thereof.The use of this information is governed by the Terms of Use, available at https://www.wolterskluwer.com/en/know/clinical-effectiveness-terms. 2024© UpToDate, Inc. and its affiliates and/or licensors. All rights reserved.  Copyright   © 2024 [employer] and/or its affiliates. All rights reserved.

## 2024-07-15 DIAGNOSIS — M62.838 MUSCLE SPASM: ICD-10-CM

## 2024-07-15 DIAGNOSIS — M54.50 CHRONIC LOW BACK PAIN: ICD-10-CM

## 2024-07-15 DIAGNOSIS — G89.29 CHRONIC LOW BACK PAIN: ICD-10-CM

## 2024-07-15 RX ORDER — DIAZEPAM 5 MG/1
5 TABLET ORAL EVERY 8 HOURS PRN
Qty: 30 TABLET | Refills: 0 | Status: SHIPPED | OUTPATIENT
Start: 2024-07-15

## 2024-07-16 ENCOUNTER — OFFICE VISIT (OUTPATIENT)
Dept: PHYSICAL THERAPY | Age: 69
End: 2024-07-16
Payer: COMMERCIAL

## 2024-07-16 DIAGNOSIS — M54.2 NECK PAIN: Primary | ICD-10-CM

## 2024-07-16 PROCEDURE — 97110 THERAPEUTIC EXERCISES: CPT | Performed by: PHYSICAL THERAPIST

## 2024-07-16 PROCEDURE — 97140 MANUAL THERAPY 1/> REGIONS: CPT | Performed by: PHYSICAL THERAPIST

## 2024-07-16 NOTE — PROGRESS NOTES
Daily Note     Today's date: 2024  Patient name: Ayesha Lockwood  : 1955  MRN: [id number]  Referring provider: Lenny Zamarripa DO  Dx:   Encounter Diagnosis     ICD-10-CM    1. Neck pain  M54.2           Start Time: 1500  Stop Time: 1545  Total time in clinic (min): 45 minutes    Subjective: MD said to continue PT and add piriformis      Objective: See treatment diary below      Assessment: Tolerated treatment fair. Patient demonstrated fatigue post treatment, exhibited good technique with therapeutic exercises, and would benefit from continued PT, slowly progressing with neck mobility and MD ordered Piriformis exercises to be added to PT program      Plan: Progress treatment as tolerated.       Precautions:     POC expires Unit limit Auth Expiration date PT/OT + Visit Limit?   2024                              Visit/Unit Tracking  AUTH Status:  Date 6/19 6/21 6/25 7/5 7/8 7/10 7/16         Used 1 2 3 4 5 6 7         Remaining                 FOTO                     Manuals  7/8 7/10 7/16                   MT cervical 15 min 15 min 15 min 15 min 15 min 15 min 15 min      Gentle tx      5 min 5 min      MFR    5 min 5 min 5 min 5 min      Neuro Re-Ed                                                                                                        Ther Ex                          UBE   3 min 4 min 4 min 5 min 4 min  4 min      pulleys 30x 30x 30x 30x 30x 30x 30x      Chair extension over bolster 10x 10x 10x 10x 10x 10x 10x      Chin tucks 10x 10x 10x 10x 10x 10x 10x      BTB rows 30x 30x 30x 30x nt  30x      GTB ER 15x 15x 15x 15x  nt  15x                   Ther Activity                                       Gait Training                                       Modalities

## 2024-07-22 ENCOUNTER — OFFICE VISIT (OUTPATIENT)
Dept: FAMILY MEDICINE CLINIC | Facility: CLINIC | Age: 69
End: 2024-07-22
Payer: COMMERCIAL

## 2024-07-22 ENCOUNTER — APPOINTMENT (EMERGENCY)
Dept: RADIOLOGY | Facility: HOSPITAL | Age: 69
End: 2024-07-22
Payer: COMMERCIAL

## 2024-07-22 ENCOUNTER — APPOINTMENT (EMERGENCY)
Dept: CT IMAGING | Facility: HOSPITAL | Age: 69
End: 2024-07-22
Payer: COMMERCIAL

## 2024-07-22 ENCOUNTER — HOSPITAL ENCOUNTER (EMERGENCY)
Facility: HOSPITAL | Age: 69
Discharge: HOME | End: 2024-07-22
Attending: EMERGENCY MEDICINE | Admitting: EMERGENCY MEDICINE
Payer: COMMERCIAL

## 2024-07-22 VITALS
TEMPERATURE: 97.3 F | RESPIRATION RATE: 18 BRPM | SYSTOLIC BLOOD PRESSURE: 105 MMHG | BODY MASS INDEX: 34.54 KG/M2 | DIASTOLIC BLOOD PRESSURE: 70 MMHG | OXYGEN SATURATION: 98 % | HEIGHT: 63 IN | HEART RATE: 77 BPM

## 2024-07-22 VITALS
RESPIRATION RATE: 14 BRPM | OXYGEN SATURATION: 96 % | HEART RATE: 62 BPM | TEMPERATURE: 97.3 F | DIASTOLIC BLOOD PRESSURE: 57 MMHG | SYSTOLIC BLOOD PRESSURE: 121 MMHG

## 2024-07-22 DIAGNOSIS — G43.009 MIGRAINE WITHOUT AURA AND WITHOUT STATUS MIGRAINOSUS, NOT INTRACTABLE: ICD-10-CM

## 2024-07-22 DIAGNOSIS — K21.9 GASTROESOPHAGEAL REFLUX DISEASE WITHOUT ESOPHAGITIS: ICD-10-CM

## 2024-07-22 DIAGNOSIS — S46.912A STRAIN OF LEFT SHOULDER, INITIAL ENCOUNTER: ICD-10-CM

## 2024-07-22 DIAGNOSIS — J45.40 MODERATE PERSISTENT ASTHMA WITHOUT COMPLICATION: ICD-10-CM

## 2024-07-22 DIAGNOSIS — S09.90XA CHI (CLOSED HEAD INJURY): ICD-10-CM

## 2024-07-22 DIAGNOSIS — G89.29 CHRONIC LEFT-SIDED LOW BACK PAIN WITHOUT SCIATICA: ICD-10-CM

## 2024-07-22 DIAGNOSIS — S76.312A STRAIN OF LEFT PIRIFORMIS MUSCLE, INITIAL ENCOUNTER: Primary | ICD-10-CM

## 2024-07-22 DIAGNOSIS — Z00.00 ANNUAL PHYSICAL EXAM: ICD-10-CM

## 2024-07-22 DIAGNOSIS — S16.1XXA STRAIN OF NECK MUSCLE, INITIAL ENCOUNTER: ICD-10-CM
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
## 2024-07-22 DIAGNOSIS — M54.50 CHRONIC LEFT-SIDED LOW BACK PAIN WITHOUT SCIATICA: ICD-10-CM

## 2024-07-22 LAB
ATRIAL RATE: 71 BPM
P AXIS: 60 DEGREES
PR INTERVAL: 198 MS
QRS AXIS: -32 DEGREES
QRSD INTERVAL: 82 MS
QT INTERVAL: 418 MS
QTC INTERVAL: 454 MS
T WAVE AXIS: 24 DEGREES
VENTRICULAR RATE: 71 BPM

## 2024-07-22 PROCEDURE — 99214 OFFICE O/P EST MOD 30 MIN: CPT | Performed by: FAMILY MEDICINE

## 2024-07-22 PROCEDURE — 99284 EMERGENCY DEPT VISIT MOD MDM: CPT | Performed by: EMERGENCY MEDICINE

## 2024-07-22 PROCEDURE — 72125 CT NECK SPINE W/O DYE: CPT

## 2024-07-22 PROCEDURE — 99284 EMERGENCY DEPT VISIT MOD MDM: CPT

## 2024-07-22 PROCEDURE — 93005 ELECTROCARDIOGRAM TRACING: CPT

## 2024-07-22 PROCEDURE — 70450 CT HEAD/BRAIN W/O DYE: CPT

## 2024-07-22 PROCEDURE — 73030 X-RAY EXAM OF SHOULDER: CPT

## 2024-07-22 PROCEDURE — 93010 ELECTROCARDIOGRAM REPORT: CPT | Performed by: INTERNAL MEDICINE

## 2024-07-22 RX ORDER — BUTALBITAL, ACETAMINOPHEN AND CAFFEINE 50; 325; 40 MG/1; MG/1; MG/1
1 TABLET ORAL ONCE
Status: DISCONTINUED | OUTPATIENT
Start: 2024-07-22 | End: 2024-07-22

## 2024-07-22 RX ORDER — BUTALBITAL, ACETAMINOPHEN AND CAFFEINE 50; 325; 40 MG/1; MG/1; MG/1
2 TABLET ORAL ONCE
Status: COMPLETED | OUTPATIENT
Start: 2024-07-22 | End: 2024-07-22

## 2024-07-22 RX ORDER — OXYCODONE HYDROCHLORIDE AND ACETAMINOPHEN 5; 325 MG/1; MG/1
1 TABLET ORAL ONCE
Status: COMPLETED | OUTPATIENT
Start: 2024-07-22 | End: 2024-07-22

## 2024-07-22 RX ADMIN — BUTALBITAL, ACETAMINOPHEN, AND CAFFEINE 2 TABLET: 50; 325; 40 TABLET ORAL at 15:53

## 2024-07-22 RX ADMIN — OXYCODONE HYDROCHLORIDE AND ACETAMINOPHEN 1 TABLET: 5; 325 TABLET ORAL at 13:36

## 2024-07-22 NOTE — ASSESSMENT & PLAN NOTE
GERD symptoms stable currently on pantoprazole 40 mg twice daily continue and low-dose Xarelto prescription

## 2024-07-22 NOTE — ASSESSMENT & PLAN NOTE
Recent exposure to her daughter with COVID however she is asymptomatic now she develops cough or shortness of breath she will get tested and for now use albuterol inhaler as needed basis.  Patient currently asymptomatic

## 2024-07-22 NOTE — PROGRESS NOTES
Assessment/Plan:       Problem List Items Addressed This Visit          Cardiovascular and Mediastinum    Migraine, unspecified, not intractable, without status migrainosus     Migraine flareup at times worse and Fioricet is helpful she did take 2 this morning and notes that it is improving now            Respiratory    Moderate persistent asthma without complication     Recent exposure to her daughter with COVID however she is asymptomatic now she develops cough or shortness of breath she will get tested and for now use albuterol inhaler as needed basis.  Patient currently asymptomatic            Digestive    Gastroesophageal reflux disease without esophagitis     GERD symptoms stable currently on pantoprazole 40 mg twice daily continue and low-dose Xarelto prescription            Musculoskeletal and Integument    Strain of left piriformis muscle - Primary     Improved with stretching and therapeutic exercises she did not use the meloxicam now in light of potential side effects.  Continue with stretching daily and exercise program            Surgery/Wound/Pain    Chronic left-sided low back pain without sciatica     Continue with physical therapy            Other    Annual physical exam    Relevant Orders    Hemoglobin A1C         Subjective:      Patient ID: Ayesha Lockwood is a 68 y.o. female.    2-week follow-up evaluation for piriformis syndrome and migraine headaches and with recent exposure to COVID from her daughter        The following portions of the patient's history were reviewed and updated as appropriate: allergies, current medications, past family history, past medical history, past social history, past surgical history and problem list.    Review of Systems   Constitutional:  Negative for chills, fatigue and fever.   HENT:  Negative for congestion, nosebleeds, rhinorrhea, sinus pressure and sore throat.    Eyes:  Negative for discharge and redness.   Respiratory:  Negative for cough and shortness  "of breath.    Cardiovascular:  Negative for chest pain, palpitations and leg swelling.   Gastrointestinal:  Negative for abdominal pain, blood in stool and nausea.   Endocrine: Negative for cold intolerance, heat intolerance and polyuria.   Genitourinary:  Negative for dysuria and frequency.   Musculoskeletal:  Negative for arthralgias, back pain and myalgias.   Skin:  Negative for rash.   Neurological:  Positive for headaches. Negative for dizziness and weakness.   Hematological:  Negative for adenopathy.   Psychiatric/Behavioral:  Negative for behavioral problems and sleep disturbance. The patient is not nervous/anxious.          Objective:      /70 (BP Location: Left arm, Patient Position: Sitting, Cuff Size: Standard)   Pulse 77   Temp (!) 97.3 °F (36.3 °C) (Tympanic)   Resp 18   Ht 5' 3\" (1.6 m)   LMP  (LMP Unknown)   SpO2 98%   BMI 34.54 kg/m²        Physical Exam  Vitals and nursing note reviewed.   Constitutional:       General: She is not in acute distress.     Appearance: Normal appearance. She is well-developed.   HENT:      Head: Normocephalic and atraumatic.      Right Ear: Tympanic membrane and external ear normal.      Left Ear: Tympanic membrane and external ear normal.      Nose: Nose normal.      Mouth/Throat:      Mouth: Mucous membranes are moist.      Pharynx: Oropharynx is clear. No oropharyngeal exudate.   Eyes:      General: No scleral icterus.        Right eye: No discharge.         Left eye: No discharge.      Conjunctiva/sclera: Conjunctivae normal.      Pupils: Pupils are equal, round, and reactive to light.   Neck:      Thyroid: No thyromegaly.      Vascular: No JVD.   Cardiovascular:      Rate and Rhythm: Normal rate and regular rhythm.      Heart sounds: Normal heart sounds. No murmur heard.  Pulmonary:      Effort: Pulmonary effort is normal.      Breath sounds: No wheezing or rales.   Chest:      Chest wall: No tenderness.   Abdominal:      General: Bowel sounds are " normal. There is no distension.      Palpations: Abdomen is soft. There is no mass.      Tenderness: There is no abdominal tenderness.   Musculoskeletal:         General: No tenderness or deformity. Normal range of motion.      Cervical back: Normal range of motion.   Lymphadenopathy:      Cervical: No cervical adenopathy.   Skin:     General: Skin is warm and dry.      Findings: No rash.   Neurological:      General: No focal deficit present.      Mental Status: She is alert and oriented to person, place, and time.      Cranial Nerves: No cranial nerve deficit.      Coordination: Coordination normal.      Deep Tendon Reflexes: Reflexes are normal and symmetric. Reflexes normal.   Psychiatric:         Mood and Affect: Mood normal.         Behavior: Behavior normal.         Thought Content: Thought content normal.         Judgment: Judgment normal.          Data:    Laboratory Results: I have personally reviewed the pertinent laboratory results/reports   Radiology/Other Diagnostic Testing Results: I have personally reviewed pertinent reports.       Lab Results   Component Value Date    WBC 6.18 06/25/2024    HGB 13.3 06/25/2024    HCT 42.5 06/25/2024    MCV 87 06/25/2024     06/25/2024     Lab Results   Component Value Date     08/20/2015    K 4.0 06/25/2024     06/25/2024    CO2 26 06/25/2024    ANIONGAP 8.6 08/20/2015    BUN 20 06/25/2024    CREATININE 0.65 06/25/2024    GLUCOSE 88 08/20/2015    GLUF 84 06/25/2024    CALCIUM 8.9 06/25/2024    AST 24 06/25/2024    ALT 29 06/25/2024    ALKPHOS 85 06/25/2024    PROT 7.1 08/20/2015    BILITOT 0.3 08/20/2015    EGFR 90 06/25/2024     Lab Results   Component Value Date    CHOLESTEROL 190 06/25/2024    CHOLESTEROL 176 11/06/2023    CHOLESTEROL 206 (H) 06/09/2023     Lab Results   Component Value Date    HDL 45 (L) 06/25/2024    HDL 42 (L) 11/06/2023    HDL 47 (L) 06/09/2023     Lab Results   Component Value Date    LDLCALC 74 06/25/2024    LDLCALC 79  "11/06/2023    LDLCALC 117 (H) 06/09/2023     Lab Results   Component Value Date    TRIG 353 (H) 06/25/2024    TRIG 273 (H) 11/06/2023    TRIG 208 (H) 06/09/2023     No results found for: \"CHOLHDL\"  Lab Results   Component Value Date    [id number] 2.040 06/25/2024     Lab Results   Component Value Date    HGBA1C 5.6 06/09/2023     No results found for: \"PSA\"    Lenny Zamarripa, DO      "

## 2024-07-22 NOTE — ASSESSMENT & PLAN NOTE
Improved with stretching and therapeutic exercises she did not use the meloxicam now in light of potential side effects.  Continue with stretching daily and exercise program

## 2024-07-22 NOTE — ASSESSMENT & PLAN NOTE
Migraine flareup at times worse and Fioricet is helpful she did take 2 this morning and notes that it is improving now
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
## 2024-07-22 NOTE — TRAUMA DOCUMENTATION
Pt on & off bedpan; pericare provided; pt's spouse remains at bedside; call bell remains within reach; will continue to monitor

## 2024-07-23 ENCOUNTER — TELEPHONE (OUTPATIENT)
Age: 69
End: 2024-07-23

## 2024-07-23 ENCOUNTER — OFFICE VISIT (OUTPATIENT)
Dept: FAMILY MEDICINE CLINIC | Facility: CLINIC | Age: 69
End: 2024-07-23
Payer: COMMERCIAL

## 2024-07-23 VITALS
HEART RATE: 71 BPM | RESPIRATION RATE: 18 BRPM | SYSTOLIC BLOOD PRESSURE: 108 MMHG | TEMPERATURE: 96.5 F | DIASTOLIC BLOOD PRESSURE: 62 MMHG | OXYGEN SATURATION: 97 %

## 2024-07-23 DIAGNOSIS — M25.512 ACUTE PAIN OF LEFT SHOULDER: Primary | ICD-10-CM

## 2024-07-23 DIAGNOSIS — S13.9XXA ACUTE SPRAIN OF LIGAMENT OF NECK, INITIAL ENCOUNTER: ICD-10-CM

## 2024-07-23 PROCEDURE — 99213 OFFICE O/P EST LOW 20 MIN: CPT | Performed by: FAMILY MEDICINE

## 2024-07-23 RX ORDER — OXYCODONE HYDROCHLORIDE AND ACETAMINOPHEN 5; 325 MG/1; MG/1
1 TABLET ORAL EVERY 4 HOURS PRN
Qty: 30 TABLET | Refills: 0 | Status: SHIPPED | OUTPATIENT
Start: 2024-07-23

## 2024-07-23 NOTE — PROGRESS NOTES
Assessment/Plan:       Problem List Items Addressed This Visit          Musculoskeletal and Integument    Acute neck sprain     Post emergency room follow-up fell yesterday as indicated and discussed.  Emergency department records reviewed imaging studies reviewed.  Patient did see and feel some relief from Percocet and this will be provided now for short course of pain relief through the week            Surgery/Wound/Pain    Acute pain of left shoulder - Primary     Post trip and fall injury yesterday at [facility] as she tripped on a kneeler and fell into the pew injuring left shoulder clavicle neck and head seen in the emergency department had CT scans done and x-rays negative for fractures or acute trauma showing any type of hematoma.  But she now has all sprain and strain injuries difficulty moving the left arm she will use a sling for this at rest and I will provide her with additional Percocet for this week as she is having difficulty carrying out activities of daily living sleeping and resting without significant pain on all movement of the left upper extremity and neck              Subjective:      Patient ID: Ayesha Lockwood is a 68 y.o. female.    Patient presents today after a fall injury at [facility] yesterday        The following portions of the patient's history were reviewed and updated as appropriate: allergies, current medications, past family history, past medical history, past social history, past surgical history and problem list.    Review of Systems   Constitutional:  Negative for chills, fatigue and fever.   HENT:  Negative for congestion, nosebleeds, rhinorrhea, sinus pressure and sore throat.    Eyes:  Negative for discharge and redness.   Respiratory:  Negative for cough and shortness of breath.    Cardiovascular:  Negative for chest pain, palpitations and leg swelling.   Gastrointestinal:  Negative for abdominal pain, blood in stool and nausea.   Endocrine: Negative for cold intolerance,  heat intolerance and polyuria.   Genitourinary:  Negative for dysuria and frequency.   Musculoskeletal:  Positive for arthralgias, back pain and neck pain. Negative for myalgias.   Skin:  Negative for rash.   Neurological:  Negative for dizziness, weakness and headaches.   Hematological:  Negative for adenopathy.   Psychiatric/Behavioral:  Negative for behavioral problems and sleep disturbance. The patient is not nervous/anxious.          Objective:      /62 (BP Location: Right arm, Patient Position: Sitting, Cuff Size: Standard)   Pulse 71   Temp (!) 96.5 °F (35.8 °C) (Tympanic)   Resp 18   LMP  (LMP Unknown)   SpO2 97%        Physical Exam  Vitals and nursing note reviewed.   Constitutional:       General: She is not in acute distress.     Appearance: Normal appearance. She is well-developed.   HENT:      Head: Normocephalic and atraumatic.      Right Ear: External ear normal.      Left Ear: External ear normal.      Nose: Nose normal.      Mouth/Throat:      Mouth: Mucous membranes are moist.      Pharynx: Oropharynx is clear. No oropharyngeal exudate.   Eyes:      General: No scleral icterus.        Right eye: No discharge.         Left eye: No discharge.      Conjunctiva/sclera: Conjunctivae normal.      Pupils: Pupils are equal, round, and reactive to light.   Neck:      Thyroid: No thyromegaly.      Vascular: No JVD.   Cardiovascular:      Rate and Rhythm: Normal rate and regular rhythm.      Heart sounds: Normal heart sounds. No murmur heard.  Pulmonary:      Effort: Pulmonary effort is normal.      Breath sounds: No wheezing or rales.   Chest:      Chest wall: No tenderness.   Abdominal:      General: Bowel sounds are normal. There is no distension.      Palpations: Abdomen is soft. There is no mass.      Tenderness: There is no abdominal tenderness.   Musculoskeletal:         General: No tenderness or deformity. Normal range of motion.      Cervical back: Normal range of motion.      Comments:  Pain in her neck decreased range of motion on rotation left and right and pain with flexion she has pain at the left clavicle shoulder trapezius and scapular region   Lymphadenopathy:      Cervical: No cervical adenopathy.   Skin:     General: Skin is warm and dry.      Findings: No rash.   Neurological:      General: No focal deficit present.      Mental Status: She is alert and oriented to person, place, and time.      Cranial Nerves: No cranial nerve deficit.      Coordination: Coordination normal.      Deep Tendon Reflexes: Reflexes are normal and symmetric. Reflexes normal.   Psychiatric:         Mood and Affect: Mood normal.         Behavior: Behavior normal.         Thought Content: Thought content normal.         Judgment: Judgment normal.          Data:    Laboratory Results: I have personally reviewed the pertinent laboratory results/reports   Radiology/Other Diagnostic Testing Results: I have personally reviewed pertinent reports.       Lab Results   Component Value Date    WBC 6.18 06/25/2024    HGB 13.3 06/25/2024    HCT 42.5 06/25/2024    MCV 87 06/25/2024     06/25/2024     Lab Results   Component Value Date     08/20/2015    K 4.0 06/25/2024     06/25/2024    CO2 26 06/25/2024    ANIONGAP 8.6 08/20/2015    BUN 20 06/25/2024    CREATININE 0.65 06/25/2024    GLUCOSE 88 08/20/2015    GLUF 84 06/25/2024    CALCIUM 8.9 06/25/2024    AST 24 06/25/2024    ALT 29 06/25/2024    ALKPHOS 85 06/25/2024    PROT 7.1 08/20/2015    BILITOT 0.3 08/20/2015    EGFR 90 06/25/2024     Lab Results   Component Value Date    CHOLESTEROL 190 06/25/2024    CHOLESTEROL 176 11/06/2023    CHOLESTEROL 206 (H) 06/09/2023     Lab Results   Component Value Date    HDL 45 (L) 06/25/2024    HDL 42 (L) 11/06/2023    HDL 47 (L) 06/09/2023     Lab Results   Component Value Date    LDLCALC 74 06/25/2024    LDLCALC 79 11/06/2023    LDLCALC 117 (H) 06/09/2023     Lab Results   Component Value Date    TRIG 353 (H)  "06/25/2024    TRIG 273 (H) 11/06/2023    TRIG 208 (H) 06/09/2023     No results found for: \"CHOLHDL\"  Lab Results   Component Value Date    QIO3STNHBNAJ 2.040 06/25/2024     Lab Results   Component Value Date    HGBA1C 5.6 06/09/2023     No results found for: \"PSA\"    Lenny Zamarripa, DO      "
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
## 2024-07-23 NOTE — ASSESSMENT & PLAN NOTE
Post trip and fall injury yesterday at [facility] as she tripped on a kneeler and fell into the pew injuring left shoulder clavicle neck and head seen in the emergency department had CT scans done and x-rays negative for fractures or acute trauma showing any type of hematoma.  But she now has all sprain and strain injuries difficulty moving the left arm she will use a sling for this at rest and I will provide her with additional Percocet for this week as she is having difficulty carrying out activities of daily living sleeping and resting without significant pain on all movement of the left upper extremity and neck

## 2024-07-23 NOTE — ASSESSMENT & PLAN NOTE
Post emergency room follow-up fell yesterday as indicated and discussed.  Emergency department records reviewed imaging studies reviewed.  Patient did see and feel some relief from Percocet and this will be provided now for short course of pain relief through the week

## 2024-08-02 ENCOUNTER — OFFICE VISIT (OUTPATIENT)
Dept: FAMILY MEDICINE CLINIC | Facility: CLINIC | Age: 69
End: 2024-08-02
Payer: COMMERCIAL

## 2024-08-02 VITALS — TEMPERATURE: 97.1 F | HEIGHT: 63 IN | RESPIRATION RATE: 98 BRPM | BODY MASS INDEX: 34.54 KG/M2 | HEART RATE: 74 BPM

## 2024-08-02 DIAGNOSIS — M25.512 ACUTE PAIN OF LEFT SHOULDER: ICD-10-CM

## 2024-08-02 DIAGNOSIS — G43.009 MIGRAINE WITHOUT AURA AND WITHOUT STATUS MIGRAINOSUS, NOT INTRACTABLE: ICD-10-CM

## 2024-08-02 DIAGNOSIS — S80.861A TICK BITE OF RIGHT LOWER LEG, INITIAL ENCOUNTER: Primary | ICD-10-CM

## 2024-08-02 DIAGNOSIS — S13.9XXA ACUTE SPRAIN OF LIGAMENT OF NECK, INITIAL ENCOUNTER: ICD-10-CM
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
## 2024-08-02 PROCEDURE — 99213 OFFICE O/P EST LOW 20 MIN: CPT | Performed by: FAMILY MEDICINE

## 2024-08-02 RX ORDER — DOXYCYCLINE HYCLATE 100 MG
100 TABLET ORAL 2 TIMES DAILY
Qty: 28 TABLET | Refills: 0 | Status: SHIPPED | OUTPATIENT
Start: 2024-08-02 | End: 2024-08-16

## 2024-08-02 RX ORDER — BUTALBITAL, ACETAMINOPHEN AND CAFFEINE 50; 325; 40 MG/1; MG/1; MG/1
1 TABLET ORAL EVERY 4 HOURS PRN
Qty: 90 TABLET | Refills: 0 | Status: SHIPPED | OUTPATIENT
Start: 2024-08-02

## 2024-08-02 RX ORDER — OXYCODONE HYDROCHLORIDE AND ACETAMINOPHEN 5; 325 MG/1; MG/1
1 TABLET ORAL EVERY 4 HOURS PRN
Qty: 30 TABLET | Refills: 0 | Status: CANCELLED | OUTPATIENT
Start: 2024-08-02

## 2024-08-02 NOTE — PROGRESS NOTES
Assessment/Plan:       Problem List Items Addressed This Visit          Cardiovascular and Mediastinum    Migraine, unspecified, not intractable, without status migrainosus     Fioricet renewed         Relevant Medications    butalbital-acetaminophen-caffeine (FIORICET,ESGIC) -40 mg per tablet       Musculoskeletal and Integument    Acute neck sprain    Tick bite of right lower leg - Primary     Doxycycline ordered prophylactically patient was send take 4 testing at [facility] a [facility].  I cleaned the area and no sign of tick parts and she has the tick with her here examined and shows no missing head         Relevant Medications    doxycycline hyclate (VIBRA-TABS) 100 mg tablet    Other Relevant Orders    Lyme Total AB W Reflex to IGM/IGG       Surgery/Wound/Pain    Acute pain of left shoulder         Subjective:      Patient ID: Ayesha Lockwood is a 68 y.o. female.    Patient presents for tick on her right leg behind the knee she removed it and save the tick and came in for examination to watch for tick parts and discuss treatment options        The following portions of the patient's history were reviewed and updated as appropriate: allergies, current medications, past family history, past medical history, past social history, past surgical history and problem list.    Review of Systems   Constitutional:  Negative for chills, fatigue and fever.   HENT:  Negative for congestion, nosebleeds, rhinorrhea, sinus pressure and sore throat.    Eyes:  Negative for discharge and redness.   Respiratory:  Negative for cough and shortness of breath.    Cardiovascular:  Negative for chest pain, palpitations and leg swelling.   Gastrointestinal:  Negative for abdominal pain, blood in stool and nausea.   Endocrine: Negative for cold intolerance, heat intolerance and polyuria.   Genitourinary:  Negative for dysuria and frequency.   Musculoskeletal:  Negative for arthralgias, back pain and myalgias.   Skin:   "Negative for rash.   Neurological:  Negative for dizziness, weakness and headaches.   Hematological:  Negative for adenopathy.   Psychiatric/Behavioral:  Negative for behavioral problems and sleep disturbance. The patient is not nervous/anxious.          Objective:      Pulse 74   Temp (!) 97.1 °F (36.2 °C)   Resp (!) 98   Ht 5' 3\" (1.6 m)   LMP  (LMP Unknown)   BMI 34.54 kg/m²        Physical Exam  Vitals and nursing note reviewed.   Constitutional:       General: She is not in acute distress.     Appearance: Normal appearance. She is well-developed.   HENT:      Head: Normocephalic and atraumatic.      Right Ear: Tympanic membrane and external ear normal.      Left Ear: Tympanic membrane and external ear normal.      Nose: Nose normal.      Mouth/Throat:      Mouth: Mucous membranes are moist.      Pharynx: Oropharynx is clear. No oropharyngeal exudate.   Eyes:      General: No scleral icterus.        Right eye: No discharge.         Left eye: No discharge.      Conjunctiva/sclera: Conjunctivae normal.      Pupils: Pupils are equal, round, and reactive to light.   Neck:      Thyroid: No thyromegaly.      Vascular: No JVD.   Cardiovascular:      Rate and Rhythm: Normal rate and regular rhythm.      Heart sounds: Normal heart sounds. No murmur heard.  Pulmonary:      Effort: Pulmonary effort is normal.      Breath sounds: No wheezing or rales.   Chest:      Chest wall: No tenderness.   Abdominal:      General: Bowel sounds are normal. There is no distension.      Palpations: Abdomen is soft. There is no mass.      Tenderness: There is no abdominal tenderness.   Musculoskeletal:         General: No tenderness or deformity. Normal range of motion.      Cervical back: Normal range of motion.   Lymphadenopathy:      Cervical: No cervical adenopathy.   Skin:     General: Skin is warm and dry.      Findings: No rash.   Neurological:      General: No focal deficit present.      Mental Status: She is alert and oriented " "to person, place, and time.      Cranial Nerves: No cranial nerve deficit.      Coordination: Coordination normal.      Deep Tendon Reflexes: Reflexes are normal and symmetric. Reflexes normal.   Psychiatric:         Mood and Affect: Mood normal.         Behavior: Behavior normal.         Thought Content: Thought content normal.         Judgment: Judgment normal.          Data:    Laboratory Results: I have personally reviewed the pertinent laboratory results/reports   Radiology/Other Diagnostic Testing Results: I have personally reviewed pertinent reports.       Lab Results   Component Value Date    WBC 6.18 06/25/2024    HGB 13.3 06/25/2024    HCT 42.5 06/25/2024    MCV 87 06/25/2024     06/25/2024     Lab Results   Component Value Date     08/20/2015    K 4.0 06/25/2024     06/25/2024    CO2 26 06/25/2024    ANIONGAP 8.6 08/20/2015    BUN 20 06/25/2024    CREATININE 0.65 06/25/2024    GLUCOSE 88 08/20/2015    GLUF 84 06/25/2024    CALCIUM 8.9 06/25/2024    AST 24 06/25/2024    ALT 29 06/25/2024    ALKPHOS 85 06/25/2024    PROT 7.1 08/20/2015    BILITOT 0.3 08/20/2015    EGFR 90 06/25/2024     Lab Results   Component Value Date    CHOLESTEROL 190 06/25/2024    CHOLESTEROL 176 11/06/2023    CHOLESTEROL 206 (H) 06/09/2023     Lab Results   Component Value Date    HDL 45 (L) 06/25/2024    HDL 42 (L) 11/06/2023    HDL 47 (L) 06/09/2023     Lab Results   Component Value Date    LDLCALC 74 06/25/2024    LDLCALC 79 11/06/2023    LDLCALC 117 (H) 06/09/2023     Lab Results   Component Value Date    TRIG 353 (H) 06/25/2024    TRIG 273 (H) 11/06/2023    TRIG 208 (H) 06/09/2023     No results found for: \"CHOLHDL\"  Lab Results   Component Value Date    EWH6HXKBZZGN 2.040 06/25/2024     Lab Results   Component Value Date    HGBA1C 5.6 06/09/2023     No results found for: \"PSA\"    Lenny Zamarripa, DO      " -c/w spiriva/symbicort  -would hold off on albuterol inhaler for now given tachycardia  -xopenex if needed -patient on tenofovir alafenamide 25 mg daily which is not available inpatient; will ask patient to bring in home medication  -c/w entecavir 0.5 mg daily

## 2024-08-02 NOTE — ASSESSMENT & PLAN NOTE
Doxycycline ordered prophylactically patient was send take 4 testing at [facility].  I cleaned the area and no sign of tick parts and she has the tick with her here examined and shows no missing head

## 2024-08-05 ENCOUNTER — OFFICE VISIT (OUTPATIENT)
Dept: FAMILY MEDICINE CLINIC | Facility: CLINIC | Age: 69
End: 2024-08-05
Payer: COMMERCIAL

## 2024-08-05 VITALS
HEART RATE: 77 BPM | RESPIRATION RATE: 18 BRPM | TEMPERATURE: 96.8 F | HEIGHT: 63 IN | SYSTOLIC BLOOD PRESSURE: 108 MMHG | OXYGEN SATURATION: 97 % | BODY MASS INDEX: 34.54 KG/M2 | DIASTOLIC BLOOD PRESSURE: 68 MMHG

## 2024-08-05 DIAGNOSIS — G89.29 CHRONIC LEFT-SIDED LOW BACK PAIN WITHOUT SCIATICA: ICD-10-CM

## 2024-08-05 DIAGNOSIS — M54.50 CHRONIC LEFT-SIDED LOW BACK PAIN WITHOUT SCIATICA: ICD-10-CM

## 2024-08-05 DIAGNOSIS — S13.9XXD ACUTE SPRAIN OF LIGAMENT OF NECK, SUBSEQUENT ENCOUNTER: ICD-10-CM

## 2024-08-05 DIAGNOSIS — Z00.00 ANNUAL PHYSICAL EXAM: ICD-10-CM

## 2024-08-05 DIAGNOSIS — Z13.1 ENCOUNTER FOR SCREENING FOR DIABETES MELLITUS: Primary | ICD-10-CM

## 2024-08-05 DIAGNOSIS — M25.512 ACUTE PAIN OF LEFT SHOULDER: ICD-10-CM

## 2024-08-05 LAB — SL AMB POCT HEMOGLOBIN AIC: 5.5 (ref ?–6.5)

## 2024-08-05 PROCEDURE — 83036 HEMOGLOBIN GLYCOSYLATED A1C: CPT | Performed by: FAMILY MEDICINE

## 2024-08-05 PROCEDURE — 99214 OFFICE O/P EST MOD 30 MIN: CPT | Performed by: FAMILY MEDICINE

## 2024-08-05 NOTE — PROGRESS NOTES
Adult Annual Physical  Name: Ayesha Lockwood      : 1955      MRN: [id number]  Encounter Provider: Lenny Zamarripa DO  Encounter Date: 2024   Encounter department: [facility]    Assessment & Plan   1. Encounter for screening for diabetes mellitus  -     POCT hemoglobin A1c  2. Annual physical exam  3. Acute pain of left shoulder  Assessment & Plan:  Post fall injury patient will require physical therapy  Orders:  -     Ambulatory Referral to Physical Therapy; Future  4. Chronic left-sided low back pain without sciatica  Assessment & Plan:  Post fall injury at [facility] patient has onset of back pain after chronic sciatic pain which primarily was more left-sided.  Will need to resume physical therapy  Orders:  -     Ambulatory Referral to Physical Therapy; Future  5. Acute sprain of ligament of neck, subsequent encounter  Assessment & Plan:  Post fall injury at [facility] patient evaluated hospital imaging studies negative.  She would benefit from physical therapy now  Orders:  -     Ambulatory Referral to Physical Therapy; Future    Immunizations and preventive care screenings were discussed with patient today. Appropriate education was printed on patient's after visit summary.    Counseling:  Alcohol/drug use: discussed moderation in alcohol intake, the recommendations for healthy alcohol use, and avoidance of illicit drug use.  Dental Health: discussed importance of regular tooth brushing, flossing, and dental visits.  Injury prevention: discussed safety/seat belts, safety helmets, smoke detectors, carbon dioxide detectors, and smoking near bedding or upholstery.  Sexual health: discussed sexually transmitted diseases, partner selection, use of condoms, avoidance of unintended pregnancy, and contraceptive alternatives.  Exercise: the importance of regular exercise/physical activity was discussed. Recommend exercise 3-5 times per week for at least 30 minutes.       "    History of Present Illness     Adult Annual Physical:  Patient presents for annual physical.     Diet and Physical Activity:  - Diet/Nutrition: well balanced diet.  - Exercise: no formal exercise.    Depression Screening:  - PHQ-2 Score: 0    General Health:  - Sleep: sleeps well.  - Hearing: normal hearing bilateral ears.  - Vision: no vision problems.  - Dental: regular dental visits.    /GYN Health:    - Menopause: postmenopausal.     Review of Systems   Constitutional:  Negative for chills, fatigue and fever.   HENT:  Negative for congestion, nosebleeds, rhinorrhea, sinus pressure and sore throat.    Eyes:  Negative for discharge and redness.   Respiratory:  Negative for cough and shortness of breath.    Cardiovascular:  Negative for chest pain, palpitations and leg swelling.   Gastrointestinal:  Negative for abdominal pain, blood in stool and nausea.   Endocrine: Negative for cold intolerance, heat intolerance and polyuria.   Genitourinary:  Negative for dysuria and frequency.   Musculoskeletal:  Negative for arthralgias, back pain and myalgias.   Skin:  Negative for rash.   Neurological:  Negative for dizziness, weakness and headaches.   Hematological:  Negative for adenopathy.   Psychiatric/Behavioral:  Negative for behavioral problems and sleep disturbance. The patient is not nervous/anxious.          Objective     /68 (BP Location: Left arm, Patient Position: Sitting, Cuff Size: Standard)   Pulse 77   Temp (!) 96.8 °F (36 °C) (Tympanic)   Resp 18   Ht 5' 3\" (1.6 m)   LMP  (LMP Unknown)   SpO2 97%   BMI 34.54 kg/m²     Physical Exam  Vitals and nursing note reviewed.   Constitutional:       General: She is not in acute distress.     Appearance: Normal appearance. She is well-developed.   HENT:      Head: Normocephalic and atraumatic.      Right Ear: Tympanic membrane and external ear normal.      Left Ear: Tympanic membrane and external ear normal.      Nose: Nose normal.      " Mouth/Throat:      Mouth: Mucous membranes are moist.      Pharynx: Oropharynx is clear. No oropharyngeal exudate.   Eyes:      General: No scleral icterus.        Right eye: No discharge.         Left eye: No discharge.      Conjunctiva/sclera: Conjunctivae normal.      Pupils: Pupils are equal, round, and reactive to light.   Neck:      Thyroid: No thyromegaly.      Vascular: No JVD.   Cardiovascular:      Rate and Rhythm: Normal rate and regular rhythm.      Heart sounds: Normal heart sounds. No murmur heard.  Pulmonary:      Effort: Pulmonary effort is normal.      Breath sounds: No wheezing or rales.   Chest:      Chest wall: No tenderness.   Abdominal:      General: Bowel sounds are normal. There is no distension.      Palpations: Abdomen is soft. There is no mass.      Tenderness: There is no abdominal tenderness.   Musculoskeletal:         General: No tenderness or deformity. Normal range of motion.      Cervical back: Normal range of motion.   Lymphadenopathy:      Cervical: No cervical adenopathy.   Skin:     General: Skin is warm and dry.      Findings: No rash.   Neurological:      General: No focal deficit present.      Mental Status: She is alert and oriented to person, place, and time.      Cranial Nerves: No cranial nerve deficit.      Coordination: Coordination normal.      Deep Tendon Reflexes: Reflexes are normal and symmetric. Reflexes normal.   Psychiatric:         Mood and Affect: Mood normal.         Behavior: Behavior normal.         Thought Content: Thought content normal.         Judgment: Judgment normal.

## 2024-08-05 NOTE — ASSESSMENT & PLAN NOTE
Post fall injury at [facility] patient has onset of back pain after chronic sciatic pain which primarily was more left-sided.  Will need to resume physical therapy

## 2024-08-05 NOTE — ASSESSMENT & PLAN NOTE
Post fall injury at [facility] patient evaluated hospital imaging studies negative.  She would benefit from physical therapy now

## 2024-08-09 ENCOUNTER — EVALUATION (OUTPATIENT)
Dept: PHYSICAL THERAPY | Age: 69
End: 2024-08-09
Payer: COMMERCIAL

## 2024-08-09 DIAGNOSIS — M54.50 CHRONIC LEFT-SIDED LOW BACK PAIN WITHOUT SCIATICA: ICD-10-CM

## 2024-08-09 DIAGNOSIS — S13.9XXD ACUTE SPRAIN OF LIGAMENT OF NECK, SUBSEQUENT ENCOUNTER: ICD-10-CM

## 2024-08-09 DIAGNOSIS — M54.40 CHRONIC LEFT-SIDED LOW BACK PAIN WITH SCIATICA, SCIATICA LATERALITY UNSPECIFIED: ICD-10-CM

## 2024-08-09 DIAGNOSIS — M25.512 ACUTE PAIN OF LEFT SHOULDER: ICD-10-CM

## 2024-08-09 DIAGNOSIS — S13.9XXD ACUTE CERVICAL SPRAIN, SUBSEQUENT ENCOUNTER: Primary | ICD-10-CM

## 2024-08-09 DIAGNOSIS — G89.29 CHRONIC LEFT-SIDED LOW BACK PAIN WITH SCIATICA, SCIATICA LATERALITY UNSPECIFIED: ICD-10-CM

## 2024-08-09 DIAGNOSIS — G89.29 CHRONIC LEFT-SIDED LOW BACK PAIN WITHOUT SCIATICA: ICD-10-CM

## 2024-08-09 PROCEDURE — 97110 THERAPEUTIC EXERCISES: CPT | Performed by: PHYSICAL THERAPIST

## 2024-08-09 PROCEDURE — 97140 MANUAL THERAPY 1/> REGIONS: CPT | Performed by: PHYSICAL THERAPIST

## 2024-08-09 PROCEDURE — 97162 PT EVAL MOD COMPLEX 30 MIN: CPT | Performed by: PHYSICAL THERAPIST

## 2024-08-09 NOTE — PROGRESS NOTES
PT Evaluation     Today's date: 2024  Patient name: Ayesha Lockwood  : 1955  MRN: [id number]  Referring provider: Lenny Zamarripa DO  Dx:   Encounter Diagnosis     ICD-10-CM    1. Acute cervical sprain, subsequent encounter  S13.9XXD       2. Chronic left-sided low back pain with sciatica, sciatica laterality unspecified  M54.40     G89.29       3. Acute pain of left shoulder  M25.512 Ambulatory Referral to Physical Therapy      4. Chronic left-sided low back pain without sciatica  M54.50 Ambulatory Referral to Physical Therapy    G89.29       5. Acute sprain of ligament of neck, subsequent encounter  S13.9XXD Ambulatory Referral to Physical Therapy          Start Time: 1100  Stop Time: 1200  Total time in clinic (min): 60 minutes    Assessment  Impairments: abnormal gait, abnormal muscle firing, abnormal muscle tone, abnormal or restricted ROM, abnormal movement, activity intolerance, impaired balance, impaired physical strength, lacks appropriate home exercise program, pain with function, scapular dyskinesis, weight-bearing intolerance, poor posture  and poor body mechanics  Symptom irritability: moderate    Assessment details: Ayesha Lockwood is a 68 y.o. female who presents with pain, decreased strength, decreased ROM, ambulatory dysfunction, postural dysfunction, and balance dysfunction. Due to these impairments, Patient has difficulty performing a/iadls. Patient's clinical presentation is consistent with their referring diagnosis of neck,back , and let shoulder pain. Patient would benefit from skilled physical therapy to address their aforementioned impairments, improve their level of function and to improve their overall quality of life.  Understanding of Dx/Px/POC: good     Prognosis: fair    Goals  ST-3 WEEKS  1.  Decrease pain by 2 points on VAS at its worst.  2.  Increase ROM by > 5 deg in all deficients planes.  3.  Increase UE/LE/CORE by 1/2 MMT grade in all deficient  planes.    LT-6 WEEKS  1. Patient to be independent with a/iadls especially with reaching and neck mobility to back up car  2. Increase functional activities for leisure and home activities to previous LOF.  3. Independent with HEP and/or fitness program.    Plan  Patient would benefit from: skilled physical therapy  Planned modality interventions: cryotherapy, electrical stimulation/Russian stimulation, thermotherapy: hydrocollator packs and unattended electrical stimulation    Planned therapy interventions: activity modification, behavior modification, body mechanics training, aquatic therapy, flexibility, functional ROM exercises, home exercise program, IADL retraining, joint mobilization, manual therapy, neuromuscular re-education, patient education, postural training, strengthening, stretching, therapeutic activities and therapeutic exercise    Frequency: 2x week (2-3x week)  Duration in weeks: 12  Plan of Care beginning date: 2024  Plan of Care expiration date: 2024  Treatment plan discussed with: patient      Subjective Evaluation    History of Present Illness  Date of onset: 2024  Mechanism of injury: Patient reports falling in [employer] parking lot on left side and injured neck and shoulder on 2024, went to ER  via ambulance, and was referred by her primary for PT to neck,shoulder ,and  back areas, currently complains of left shoulder and neck pain also HA and also low back and buttock soreness          Recurrent probem    Quality of life: good    Patient Goals  Patient goals for therapy: decreased pain, increased motion, increased strength and independence with ADLs/IADLs    Pain  Current pain ratin  At best pain rating: 3  At worst pain ratin  Quality: tight, dull ache and pressure  Relieving factors: heat, rest and medications  Aggravating factors: overhead activity    Hand dominance: right      Diagnostic Tests  X-ray: abnormal  CT scan: abnormal  Treatments  Previous  treatment: physical therapy        Objective     Concurrent Complaints  Negative for disturbed sleep    Static Posture     Head  Forward.    Thoracic Spine  Hyperkyphosis.    Palpation   Left   Hypertonic in the pectoralis minor, rhomboids, scalenes, suboccipitals and upper trapezius.   Tenderness of the pectoralis minor, rhomboids, scalenes, suboccipitals and upper trapezius.     Tenderness   Cervical Spine   Tenderness in the facet joint and spinous process.     Left Shoulder   Tenderness in the AC joint.     Active Range of Motion   Cervical/Thoracic Spine       Cervical    Flexion: 35 degrees   Extension: 35 degrees      Left lateral flexion: 10 degrees      Right lateral flexion: 10 degrees      Left rotation: 30 degrees  Right rotation: 50 degrees       Thoracic    Extension:  Restriction level: moderate  Left Shoulder   Flexion: 155 degrees   External rotation 90°: 80 degrees   Internal rotation 90°: 60 degrees     Right Shoulder   Flexion: 160 degrees   External rotation 90°: 90 degrees  Internal rotation 90°: 65 degrees     Lumbar   Flexion:  WFL  Extension: 20 degrees     Strength/Myotome Testing   Cervical Spine   Neck flexion: 4-    Left Shoulder     Planes of Motion   Flexion: 4-   Extension: 4-   Abduction: 4-   Adduction: 4   External rotation at 0°: 4-   Internal rotation at 0°: 4     Right Shoulder     Planes of Motion   Flexion: 4   Extension: 4   Abduction: 4   Adduction: 4   External rotation at 0°: 4   Internal rotation at 0°: 4     Left Elbow   Flexion: 4+  Extension: 4+    Right Elbow   Flexion: 4+  Extension: 4+    Left Hip   Planes of Motion   Flexion: 4+  Extension: 4+  Abduction: 4+  Adduction: 4+    Right Hip   Planes of Motion   Flexion: 4+  Extension: 4+  Abduction: 4+  Adduction: 4+    Left Knee   Flexion: 4  Extension: 4-    Right Knee   Flexion: 4  Extension: 4-    Left Ankle/Foot   Dorsiflexion: 4  Plantar flexion: 4    Right Ankle/Foot   Dorsiflexion: 4  Plantar flexion:  4    Additional Strength Details  CORE is 3/5    Tests   Cervical   Negative Sharp-Fareed test.     Left   Negative Spurling's Test A.     Left Shoulder   Positive empty can.   Negative drop arm.     Ambulation     Observational Gait   Gait: antalgic   Decreased walking speed and stride length.     Functional Assessment        Single Leg Stance   Left: 2 seconds  Right: 3 seconds      Flowsheet Rows      Flowsheet Row Most Recent Value   PT/OT G-Codes    Current Score 52   Projected Score 63               Precautions: DVT, OA, osteoporosis      Manuals 8/9                         MT cervical /LB/LE 15 min                                      Neuro Re-Ed                                                                                                        Ther Ex             Nu step  5 min            pulleys 30x            BTB rows 30x                                                                             Ther Activity                                       Gait Training                                       Modalities             laser 200  25  15              MH PRN

## 2024-08-12 ENCOUNTER — OFFICE VISIT (OUTPATIENT)
Dept: PHYSICAL THERAPY | Age: 69
End: 2024-08-12
Payer: COMMERCIAL

## 2024-08-12 DIAGNOSIS — M54.40 CHRONIC LEFT-SIDED LOW BACK PAIN WITH SCIATICA, SCIATICA LATERALITY UNSPECIFIED: ICD-10-CM

## 2024-08-12 DIAGNOSIS — S13.9XXD ACUTE SPRAIN OF LIGAMENT OF NECK, SUBSEQUENT ENCOUNTER: ICD-10-CM

## 2024-08-12 DIAGNOSIS — M54.2 NECK PAIN: ICD-10-CM

## 2024-08-12 DIAGNOSIS — M54.50 CHRONIC LEFT-SIDED LOW BACK PAIN WITHOUT SCIATICA: ICD-10-CM

## 2024-08-12 DIAGNOSIS — G89.29 CHRONIC LEFT-SIDED LOW BACK PAIN WITH SCIATICA, SCIATICA LATERALITY UNSPECIFIED: ICD-10-CM

## 2024-08-12 DIAGNOSIS — S13.9XXD ACUTE CERVICAL SPRAIN, SUBSEQUENT ENCOUNTER: Primary | ICD-10-CM

## 2024-08-12 DIAGNOSIS — G89.29 CHRONIC LEFT-SIDED LOW BACK PAIN WITHOUT SCIATICA: ICD-10-CM

## 2024-08-12 DIAGNOSIS — M25.512 ACUTE PAIN OF LEFT SHOULDER: ICD-10-CM

## 2024-08-12 PROCEDURE — 97140 MANUAL THERAPY 1/> REGIONS: CPT | Performed by: PHYSICAL THERAPIST

## 2024-08-12 PROCEDURE — 97110 THERAPEUTIC EXERCISES: CPT | Performed by: PHYSICAL THERAPIST

## 2024-08-12 NOTE — PROGRESS NOTES
Daily Note     Today's date: 2024  Patient name: Ayesha Lockwood  : 1955  MRN: [id number]  Referring provider: Lenny Zamarripa DO  Dx:   Encounter Diagnosis     ICD-10-CM    1. Acute cervical sprain, subsequent encounter  S13.9XXD       2. Chronic left-sided low back pain without sciatica  M54.50     G89.29       3. Chronic left-sided low back pain with sciatica, sciatica laterality unspecified  M54.40     G89.29       4. Acute sprain of ligament of neck, subsequent encounter  S13.9XXD       5. Acute pain of left shoulder  M25.512       6. Neck pain  M54.2           Start Time: 1115  Stop Time: 1200  Total time in clinic (min): 45 minutes    Subjective:some improvement to neck but back is till sore      Objective: See treatment diary below      Assessment: Tolerated treatment fair. Patient demonstrated fatigue post treatment, exhibited good technique with therapeutic exercises, and would benefit from continued PT, tenderness to bilateral quadratus lumborum  muscles as   well as bilateral SI joints but increased mobility noted      Plan: Progress treatment as tolerated.       Precautions: DVT, OA, osteoporosis      Manuals                         MT cervical /LB/LE 15 min 15 min                                     Neuro Re-Ed                                                                                                        Ther Ex             Nu step  5 min 5 min           pulleys 30x 30x           BTB rows 30x 30x           Chin tucks  30x                                                               Ther Activity                                       Gait Training                                       Modalities             laser 200  25  15   200  25  15           MH PRN 10 min

## 2024-08-14 ENCOUNTER — OFFICE VISIT (OUTPATIENT)
Dept: PHYSICAL THERAPY | Age: 69
End: 2024-08-14
Payer: COMMERCIAL

## 2024-08-14 DIAGNOSIS — M25.512 ACUTE PAIN OF LEFT SHOULDER: ICD-10-CM

## 2024-08-14 DIAGNOSIS — G89.29 CHRONIC LEFT-SIDED LOW BACK PAIN WITH SCIATICA, SCIATICA LATERALITY UNSPECIFIED: ICD-10-CM

## 2024-08-14 DIAGNOSIS — M54.2 NECK PAIN: ICD-10-CM

## 2024-08-14 DIAGNOSIS — M54.50 CHRONIC LEFT-SIDED LOW BACK PAIN WITHOUT SCIATICA: ICD-10-CM

## 2024-08-14 DIAGNOSIS — G89.29 CHRONIC LEFT-SIDED LOW BACK PAIN WITHOUT SCIATICA: ICD-10-CM

## 2024-08-14 DIAGNOSIS — M54.40 CHRONIC LEFT-SIDED LOW BACK PAIN WITH SCIATICA, SCIATICA LATERALITY UNSPECIFIED: ICD-10-CM

## 2024-08-14 DIAGNOSIS — S13.9XXD ACUTE CERVICAL SPRAIN, SUBSEQUENT ENCOUNTER: Primary | ICD-10-CM

## 2024-08-14 DIAGNOSIS — S13.9XXD ACUTE SPRAIN OF LIGAMENT OF NECK, SUBSEQUENT ENCOUNTER: ICD-10-CM

## 2024-08-14 PROCEDURE — 97113 AQUATIC THERAPY/EXERCISES: CPT | Performed by: PHYSICAL THERAPIST

## 2024-08-14 NOTE — PROGRESS NOTES
Daily Note     Today's date: 2024  Patient name: Ayesha Lockwood  : 1955  MRN: [id number]  Referring provider: Lenny Zamarripa DO  Dx:   Encounter Diagnosis     ICD-10-CM    1. Acute cervical sprain, subsequent encounter  S13.9XXD       2. Chronic left-sided low back pain without sciatica  M54.50     G89.29       3. Chronic left-sided low back pain with sciatica, sciatica laterality unspecified  M54.40     G89.29       4. Neck pain  M54.2       5. Acute pain of left shoulder  M25.512       6. Acute sprain of ligament of neck, subsequent encounter  S13.9XXD           Start Time: 0800  Stop Time: 0900  Total time in clinic (min): 60 minutes    Subjective: patient reports increased right low back pain as well as cervical pain and stiffness      Objective: See treatment diary below      Assessment: Tolerated treatment fair. Patient demonstrated fatigue post treatment, exhibited good technique with therapeutic exercises, and would benefit from continued PT, difficulty progressing secondary to pain but some relief with MT      Plan: Progress treatment as tolerated.       Precautions: DVT, OA, osteoporosis      Manuals                        MT cervical /LB/LE 15 min 15 min 15 min                                    Neuro Re-Ed                                                                                                        Ther Ex             Nu step  5 min 5 min 5 min          pulleys 30x 30x 30x          BTB rows 30x 30x 30x          Chin tucks  30x 30x                                                              Ther Activity                                       Gait Training                                       Modalities             laser 200  25  15   200  25  15 200  25  15            MH PRN 10 min 10 min

## 2024-08-19 ENCOUNTER — OFFICE VISIT (OUTPATIENT)
Dept: PHYSICAL THERAPY | Age: 69
End: 2024-08-19
Payer: COMMERCIAL

## 2024-08-19 DIAGNOSIS — M54.2 NECK PAIN: ICD-10-CM

## 2024-08-19 DIAGNOSIS — G89.29 CHRONIC LEFT-SIDED LOW BACK PAIN WITH SCIATICA, SCIATICA LATERALITY UNSPECIFIED: ICD-10-CM

## 2024-08-19 DIAGNOSIS — S13.9XXD ACUTE CERVICAL SPRAIN, SUBSEQUENT ENCOUNTER: Primary | ICD-10-CM

## 2024-08-19 DIAGNOSIS — M54.50 CHRONIC LEFT-SIDED LOW BACK PAIN WITHOUT SCIATICA: ICD-10-CM

## 2024-08-19 DIAGNOSIS — M25.512 ACUTE PAIN OF LEFT SHOULDER: ICD-10-CM

## 2024-08-19 DIAGNOSIS — G89.29 CHRONIC LEFT-SIDED LOW BACK PAIN WITHOUT SCIATICA: ICD-10-CM

## 2024-08-19 DIAGNOSIS — M54.40 CHRONIC LEFT-SIDED LOW BACK PAIN WITH SCIATICA, SCIATICA LATERALITY UNSPECIFIED: ICD-10-CM

## 2024-08-19 DIAGNOSIS — S13.9XXD ACUTE SPRAIN OF LIGAMENT OF NECK, SUBSEQUENT ENCOUNTER: ICD-10-CM

## 2024-08-19 PROCEDURE — 97140 MANUAL THERAPY 1/> REGIONS: CPT | Performed by: PHYSICAL THERAPIST

## 2024-08-19 PROCEDURE — 97110 THERAPEUTIC EXERCISES: CPT | Performed by: PHYSICAL THERAPIST

## 2024-08-19 NOTE — PROGRESS NOTES
Daily Note     Today's date: 2024  Patient name: Ayesha Lockwood  : 1955  MRN: [id number]  Referring provider: Lenny Zamarripa DO  Dx:   Encounter Diagnosis     ICD-10-CM    1. Acute cervical sprain, subsequent encounter  S13.9XXD       2. Chronic left-sided low back pain without sciatica  M54.50     G89.29       3. Chronic left-sided low back pain with sciatica, sciatica laterality unspecified  M54.40     G89.29       4. Acute sprain of ligament of neck, subsequent encounter  S13.9XXD       5. Acute pain of left shoulder  M25.512       6. Neck pain  M54.2           Start Time: 1115  Stop Time: 1200  Total time in clinic (min): 45 minutes    Subjective: patient complains of right buttock pain at 7/10 today      Objective: See treatment diary below      Assessment: Tolerated treatment fair. Patient demonstrated fatigue post treatment, exhibited good technique with therapeutic exercises, and would benefit from continued PT, still with myofascial tightness to upper traps and tenderness to right piriformis      Plan: Progress treatment as tolerated.       Precautions: DVT, OA, osteoporosis      Manuals                       MT cervical /LB/LE 15 min 15 min 15 min 15 min                                   Neuro Re-Ed                                                                                                        Ther Ex             Nu step  5 min 5 min 5 min 6 min         pulleys 30x 30x 30x 30x         BTB rows 30x 30x 30x 30x         Chin tucks  30x 30x 30x                                                             Ther Activity                                       Gait Training                                       Modalities             laser 200  25  15   200  25  15 200  25  15   200  25  15           MH PRN 10 min 10 min 10 min

## 2024-08-23 ENCOUNTER — OFFICE VISIT (OUTPATIENT)
Dept: PHYSICAL THERAPY | Age: 69
End: 2024-08-23
Payer: COMMERCIAL

## 2024-08-23 DIAGNOSIS — M54.40 CHRONIC LEFT-SIDED LOW BACK PAIN WITH SCIATICA, SCIATICA LATERALITY UNSPECIFIED: ICD-10-CM

## 2024-08-23 DIAGNOSIS — M54.2 NECK PAIN: ICD-10-CM

## 2024-08-23 DIAGNOSIS — M54.50 CHRONIC LEFT-SIDED LOW BACK PAIN WITHOUT SCIATICA: ICD-10-CM

## 2024-08-23 DIAGNOSIS — M25.512 ACUTE PAIN OF LEFT SHOULDER: ICD-10-CM

## 2024-08-23 DIAGNOSIS — S13.9XXD ACUTE SPRAIN OF LIGAMENT OF NECK, SUBSEQUENT ENCOUNTER: ICD-10-CM

## 2024-08-23 DIAGNOSIS — G89.29 CHRONIC LEFT-SIDED LOW BACK PAIN WITH SCIATICA, SCIATICA LATERALITY UNSPECIFIED: ICD-10-CM

## 2024-08-23 DIAGNOSIS — S13.9XXD ACUTE CERVICAL SPRAIN, SUBSEQUENT ENCOUNTER: Primary | ICD-10-CM

## 2024-08-23 DIAGNOSIS — G89.29 CHRONIC LEFT-SIDED LOW BACK PAIN WITHOUT SCIATICA: ICD-10-CM

## 2024-08-23 PROCEDURE — 97110 THERAPEUTIC EXERCISES: CPT | Performed by: PHYSICAL THERAPIST

## 2024-08-23 PROCEDURE — 97140 MANUAL THERAPY 1/> REGIONS: CPT | Performed by: PHYSICAL THERAPIST

## 2024-08-23 NOTE — PROGRESS NOTES
Daily Note     Today's date: 2024  Patient name: Ayesha Lockwood  : 1955  MRN: [id number]  Referring provider: Lenny Zamarripa DO  Dx:   Encounter Diagnosis     ICD-10-CM    1. Acute cervical sprain, subsequent encounter  S13.9XXD       2. Acute sprain of ligament of neck, subsequent encounter  S13.9XXD       3. Chronic left-sided low back pain without sciatica  M54.50     G89.29       4. Acute pain of left shoulder  M25.512       5. Chronic left-sided low back pain with sciatica, sciatica laterality unspecified  M54.40     G89.29       6. Neck pain  M54.2           Start Time: 09  Stop Time: 1015  Total time in clinic (min): 45 minutes    Subjective: patient reports increased soreness to low back after helping her daughter go to ER      Objective: See treatment diary below      Assessment: Tolerated treatment fair. Patient demonstrated fatigue post treatment, exhibited good technique with therapeutic exercises, and would benefit from continued PT, continues to complain of right piriformis tenderness and ambulate with antalgic gait      Plan: Progress treatment as tolerated.       Precautions: DVT, OA, osteoporosis      Manuals                      MT cervical /LB/LE 15 min 15 min 15 min 15 min 15 min                                  Neuro Re-Ed                                                                                                        Ther Ex             Nu step  5 min 5 min 5 min 6 min 6 min        pulleys 30x 30x 30x 30x 30x        BTB rows 30x 30x 30x 30x 30x        Chin tucks  30x 30x 30x 30x                                                            Ther Activity                                       Gait Training                                       Modalities             laser 200  25  15   200  25  15 200  25  15   200  25  15   200  25  15          MH PRN 10 min 10 min 10 min

## 2024-08-26 ENCOUNTER — OFFICE VISIT (OUTPATIENT)
Dept: PHYSICAL THERAPY | Age: 69
End: 2024-08-26
Payer: COMMERCIAL

## 2024-08-26 DIAGNOSIS — M54.50 CHRONIC LEFT-SIDED LOW BACK PAIN WITHOUT SCIATICA: ICD-10-CM

## 2024-08-26 DIAGNOSIS — G89.29 CHRONIC LEFT-SIDED LOW BACK PAIN WITHOUT SCIATICA: ICD-10-CM

## 2024-08-26 DIAGNOSIS — M54.40 CHRONIC LEFT-SIDED LOW BACK PAIN WITH SCIATICA, SCIATICA LATERALITY UNSPECIFIED: ICD-10-CM

## 2024-08-26 DIAGNOSIS — G89.29 CHRONIC LEFT-SIDED LOW BACK PAIN WITH SCIATICA, SCIATICA LATERALITY UNSPECIFIED: ICD-10-CM

## 2024-08-26 DIAGNOSIS — S13.9XXD ACUTE SPRAIN OF LIGAMENT OF NECK, SUBSEQUENT ENCOUNTER: ICD-10-CM

## 2024-08-26 DIAGNOSIS — M25.512 ACUTE PAIN OF LEFT SHOULDER: ICD-10-CM

## 2024-08-26 DIAGNOSIS — S13.9XXD ACUTE CERVICAL SPRAIN, SUBSEQUENT ENCOUNTER: Primary | ICD-10-CM

## 2024-08-26 DIAGNOSIS — M54.2 NECK PAIN: ICD-10-CM

## 2024-08-26 PROCEDURE — 97110 THERAPEUTIC EXERCISES: CPT | Performed by: PHYSICAL THERAPIST

## 2024-08-26 PROCEDURE — 97140 MANUAL THERAPY 1/> REGIONS: CPT | Performed by: PHYSICAL THERAPIST

## 2024-08-26 NOTE — PROGRESS NOTES
Daily Note     Today's date: 2024  Patient name: Ayesha Lockwood  : 1955  MRN: [id number]  Referring provider: Lenny Zamarripa DO  Dx:   Encounter Diagnosis     ICD-10-CM    1. Acute cervical sprain, subsequent encounter  S13.9XXD       2. Acute pain of left shoulder  M25.512       3. Acute sprain of ligament of neck, subsequent encounter  S13.9XXD       4. Chronic left-sided low back pain with sciatica, sciatica laterality unspecified  M54.40     G89.29       5. Chronic left-sided low back pain without sciatica  M54.50     G89.29       6. Neck pain  M54.2           Start Time: 0845  Stop Time: 09  Total time in clinic (min): 45 minutes    Subjective: same      Objective: See treatment diary below      Assessment: Tolerated treatment fair. Patient demonstrated fatigue post treatment, exhibited good technique with therapeutic exercises, and would benefit from continued PT, tenderness to right piriformis and bilateral upper traps but increased ROM      Plan: Progress treatment as tolerated.       Precautions: DVT, OA, osteoporosis    POC expires Unit limit Auth Expiration date PT/OT + Visit Limit?   2024                              Visit/Unit Tracking  AUTH Status:  Date           Used 1 2 3 4 5 6          Remaining                 FOTO                    Manuals                     MT cervical /LB/LE 15 min 15 min 15 min 15 min 15 min 15 min                                 Neuro Re-Ed                                                                                                        Ther Ex             Nu step  5 min 5 min 5 min 6 min 6 min 6 min       pulleys 30x 30x 30x 30x 30x 30x       BTB rows 30x 30x 30x 30x 30x 30x       Chin tucks  30x 30x 30x 30x 30x       Thoracic chair stretch foam      10x                                              Ther Activity                                       Gait Training                                        Modalities             laser 200  25  15   200  25  15 200  25  15   200  25  15   200  25  15   200  25  15         MH PRN 10 min 10 min 10 min 10 min 10 min

## 2024-08-28 ENCOUNTER — APPOINTMENT (OUTPATIENT)
Dept: PHYSICAL THERAPY | Age: 69
End: 2024-08-28
Payer: COMMERCIAL

## 2024-09-04 ENCOUNTER — OFFICE VISIT (OUTPATIENT)
Dept: PHYSICAL THERAPY | Age: 69
End: 2024-09-04
Payer: COMMERCIAL

## 2024-09-04 DIAGNOSIS — S13.9XXD ACUTE CERVICAL SPRAIN, SUBSEQUENT ENCOUNTER: Primary | ICD-10-CM

## 2024-09-04 DIAGNOSIS — M54.2 NECK PAIN: ICD-10-CM

## 2024-09-04 DIAGNOSIS — G89.29 CHRONIC LEFT-SIDED LOW BACK PAIN WITHOUT SCIATICA: ICD-10-CM

## 2024-09-04 DIAGNOSIS — M25.512 ACUTE PAIN OF LEFT SHOULDER: ICD-10-CM

## 2024-09-04 DIAGNOSIS — M54.40 CHRONIC LEFT-SIDED LOW BACK PAIN WITH SCIATICA, SCIATICA LATERALITY UNSPECIFIED: ICD-10-CM

## 2024-09-04 DIAGNOSIS — M54.50 CHRONIC LEFT-SIDED LOW BACK PAIN WITHOUT SCIATICA: ICD-10-CM

## 2024-09-04 DIAGNOSIS — S13.9XXD ACUTE SPRAIN OF LIGAMENT OF NECK, SUBSEQUENT ENCOUNTER: ICD-10-CM

## 2024-09-04 DIAGNOSIS — G89.29 CHRONIC LEFT-SIDED LOW BACK PAIN WITH SCIATICA, SCIATICA LATERALITY UNSPECIFIED: ICD-10-CM

## 2024-09-04 PROBLEM — Z13.1 ENCOUNTER FOR SCREENING FOR DIABETES MELLITUS: Status: RESOLVED | Noted: 2024-08-05 | Resolved: 2024-09-04

## 2024-09-04 PROCEDURE — 97140 MANUAL THERAPY 1/> REGIONS: CPT | Performed by: PHYSICAL THERAPIST

## 2024-09-04 PROCEDURE — 97110 THERAPEUTIC EXERCISES: CPT | Performed by: PHYSICAL THERAPIST

## 2024-09-04 NOTE — PROGRESS NOTES
Daily Note     Today's date: 2024  Patient name: Ayesha Lockwood  : 1955  MRN: [id number]  Referring provider: Lenny Zamarripa DO  Dx:   Encounter Diagnosis     ICD-10-CM    1. Acute cervical sprain, subsequent encounter  S13.9XXD       2. Acute pain of left shoulder  M25.512       3. Acute sprain of ligament of neck, subsequent encounter  S13.9XXD       4. Chronic left-sided low back pain with sciatica, sciatica laterality unspecified  M54.40     G89.29       5. Chronic left-sided low back pain without sciatica  M54.50     G89.29       6. Neck pain  M54.2           Start Time: 0945  Stop Time: 1030  Total time in clinic (min): 45 minutes    Subjective: same      Objective: See treatment diary below      Assessment: Tolerated treatment fair. Patient demonstrated fatigue post treatment, exhibited good technique with therapeutic exercises, and would benefit from continued PT, still with tenderness to left piriformis and tight as well as tightness to upper traps but some relief with MT      Plan: Progress treatment as tolerated.         Precautions: DVT, OA, osteoporosis    POC expires Unit limit Auth Expiration date PT/OT + Visit Limit?   2024                              Visit/Unit Tracking  AUTH Status:  Date          Used 1 2 3 4 5 6 7         Remaining                 FOTO                    Manuals  9                   MT cervical /LB/LE 15 min 15 min 15 min 15 min 15 min 15 min 15 min                                Neuro Re-Ed                                                                                                        Ther Ex             Nu step  5 min 5 min 5 min 6 min 6 min 6 min 6 min      pulleys 30x 30x 30x 30x 30x 30x 30x      BTB rows 30x 30x 30x 30x 30x 30x 30x      Chin tucks  30x 30x 30x 30x 30x 30x      Thoracic chair stretch foam      10x 10x                                             Ther Activity                                        Gait Training                                       Modalities             laser 200  25  15   200  25  15 200  25  15   200  25  15   200  25  15   200  25  15   200  25  15        MH PRN 10 min 10 min 10 min 10 min 10 min

## 2024-09-06 ENCOUNTER — OFFICE VISIT (OUTPATIENT)
Dept: PHYSICAL THERAPY | Age: 69
End: 2024-09-06
Payer: COMMERCIAL

## 2024-09-06 DIAGNOSIS — G89.29 CHRONIC LEFT-SIDED LOW BACK PAIN WITH SCIATICA, SCIATICA LATERALITY UNSPECIFIED: ICD-10-CM

## 2024-09-06 DIAGNOSIS — M25.512 ACUTE PAIN OF LEFT SHOULDER: ICD-10-CM

## 2024-09-06 DIAGNOSIS — S13.9XXD ACUTE SPRAIN OF LIGAMENT OF NECK, SUBSEQUENT ENCOUNTER: ICD-10-CM

## 2024-09-06 DIAGNOSIS — M54.40 CHRONIC LEFT-SIDED LOW BACK PAIN WITH SCIATICA, SCIATICA LATERALITY UNSPECIFIED: ICD-10-CM

## 2024-09-06 DIAGNOSIS — M54.50 CHRONIC LEFT-SIDED LOW BACK PAIN WITHOUT SCIATICA: ICD-10-CM

## 2024-09-06 DIAGNOSIS — S13.9XXD ACUTE CERVICAL SPRAIN, SUBSEQUENT ENCOUNTER: Primary | ICD-10-CM

## 2024-09-06 DIAGNOSIS — M54.2 NECK PAIN: ICD-10-CM

## 2024-09-06 DIAGNOSIS — G89.29 CHRONIC LEFT-SIDED LOW BACK PAIN WITHOUT SCIATICA: ICD-10-CM

## 2024-09-06 PROCEDURE — 97140 MANUAL THERAPY 1/> REGIONS: CPT | Performed by: PHYSICAL THERAPIST

## 2024-09-06 PROCEDURE — 97110 THERAPEUTIC EXERCISES: CPT | Performed by: PHYSICAL THERAPIST

## 2024-09-06 NOTE — PROGRESS NOTES
Daily Note     Today's date: 2024  Patient name: Ayesha Lockwood  : 1955  MRN: [id number]  Referring provider: Lenny Zamarripa DO  Dx:   Encounter Diagnosis     ICD-10-CM    1. Acute cervical sprain, subsequent encounter  S13.9XXD       2. Chronic left-sided low back pain without sciatica  M54.50     G89.29       3. Acute pain of left shoulder  M25.512       4. Neck pain  M54.2       5. Acute sprain of ligament of neck, subsequent encounter  S13.9XXD       6. Chronic left-sided low back pain with sciatica, sciatica laterality unspecified  M54.40     G89.29           Start Time: 1030  Stop Time: 1115  Total time in clinic (min): 45 minutes    Subjective: some improvement      Objective: See treatment diary below      Assessment: Tolerated treatment fair. Patient demonstrated fatigue post treatment, exhibited good technique with therapeutic exercises, and would benefit from continued PT, tender to right upper trap and right pirifornmis but decreased pain post MT      Plan: Progress treatment as tolerated.       Precautions: DVT, OA, osteoporosis    POC expires Unit limit Auth Expiration date PT/OT + Visit Limit?   2024                              Visit/Unit Tracking  AUTH Status:  Date         Used 1 2 3 4 5 6 7 8        Remaining                 FOTO                    Manuals                   MT cervical /LB/LE 15 min 15 min 15 min 15 min 15 min 15 min 15 min 15 min                               Neuro Re-Ed                                                                                                        Ther Ex             Nu step  5 min 5 min 5 min 6 min 6 min 6 min 6 min 6 min     pulleys 30x 30x 30x 30x 30x 30x 30x 30x     BTB rows 30x 30x 30x 30x 30x 30x 30x 30x     Chin tucks  30x 30x 30x 30x 30x 30x 30x     Thoracic chair stretch foam      10x 10x 10x                                            Ther Activity                                        Gait Training                                       Modalities             laser 200  25  15   200  25  15 200  25  15   200  25  15   200  25  15   200  25  15   200  25  15   200  25  15       MH PRN 10 min 10 min 10 min 10 min 10 min  10 min

## 2024-09-09 ENCOUNTER — TELEPHONE (OUTPATIENT)
Age: 69
End: 2024-09-09

## 2024-09-09 ENCOUNTER — ANNUAL EXAM (OUTPATIENT)
Dept: OBGYN CLINIC | Facility: CLINIC | Age: 69
End: 2024-09-09
Payer: COMMERCIAL

## 2024-09-09 VITALS
SYSTOLIC BLOOD PRESSURE: 122 MMHG | DIASTOLIC BLOOD PRESSURE: 80 MMHG | WEIGHT: 199 LBS | HEART RATE: 69 BPM | BODY MASS INDEX: 35.26 KG/M2 | HEIGHT: 63 IN | OXYGEN SATURATION: 99 %

## 2024-09-09 DIAGNOSIS — Z01.419 ENCOUNTER FOR GYNECOLOGICAL EXAMINATION: Primary | ICD-10-CM

## 2024-09-09 DIAGNOSIS — Z12.31 ENCOUNTER FOR SCREENING MAMMOGRAM FOR MALIGNANT NEOPLASM OF BREAST: ICD-10-CM

## 2024-09-09 DIAGNOSIS — M62.89 PELVIC FLOOR DYSFUNCTION IN FEMALE: ICD-10-CM

## 2024-09-09 DIAGNOSIS — Z01.419 ENCOUNTER FOR GYNECOLOGICAL EXAMINATION WITHOUT ABNORMAL FINDING: ICD-10-CM

## 2024-09-09 PROCEDURE — S0612 ANNUAL GYNECOLOGICAL EXAMINA: HCPCS | Performed by: OBSTETRICS & GYNECOLOGY

## 2024-09-09 NOTE — TELEPHONE ENCOUNTER
Pt called stating she needed to reschedule appt for tomorrow's annual. Soonest available with provider is not until March of next year. Pt states she normally sets up appt with Danyell and would like for her to call her back. Informed pt I could put her on a wait list and would add Danyell in her chart notes to please contact her as well.

## 2024-09-09 NOTE — PROGRESS NOTES
Assessment/Plan:   Encounter for gynecological examination without abnormal finding  Pap/HPV not indicated  Mammogram ordered  Colonoscopy current  DEXA current    Encourage healthy diet, exercise, Calcium 1200mg per day and at least 800 iu Vitamin D daily.       Diagnoses and all orders for this visit:    Encounter for gynecological examination    Encounter for screening mammogram for malignant neoplasm of breast  -     Mammo screening bilateral w 3d and cad; Future    Pelvic floor dysfunction in female  -     Ambulatory Referral to Physical Therapy; Future    Encounter for gynecological examination without abnormal finding          Subjective:     Patient ID: Ayesha Lockwood is a 68 y.o. female.    Patient presents for a routine annual visit  Hysterectomy   Mammogram- 22  Colonoscopy- 21 recall 3 years. Dr. Queen  Dexa- 3/6/23 osteopenia    Non smoker  Social drinker    Mother with endometrial cancer  Maternal aunt with breast cancer  Fell in [employer] last month has been doing PT  Pelvic muscle tightness, states she goes to therapy but would like to know if she can do anything else. - pelvic floor PT ordered.     Gynecologic Exam  She complains of pelvic pain. She reports no genital itching, genital lesions, genital odor, genital rash, vaginal bleeding or vaginal discharge. The pain is moderate. The problem affects both sides. Pertinent negatives include no chills, constipation, diarrhea, dysuria, fever, flank pain, frequency, headaches, nausea, sore throat, urgency or vomiting.       Review of Systems   Constitutional:  Negative for activity change, appetite change, chills, fatigue and fever.   HENT:  Negative for rhinorrhea, sneezing and sore throat.    Eyes:  Negative for visual disturbance.   Respiratory:  Negative for cough, shortness of breath and wheezing.    Cardiovascular:  Negative for chest pain, palpitations and leg swelling.   Gastrointestinal:  Negative for abdominal distention,  constipation, diarrhea, nausea and vomiting.   Genitourinary:  Positive for pelvic pain. Negative for difficulty urinating, dysuria, flank pain, frequency, urgency and vaginal discharge.   Neurological:  Negative for syncope, light-headedness and headaches.         Objective:     Physical Exam  Constitutional:       General: She is not in acute distress.     Appearance: Normal appearance. She is well-developed. She is not diaphoretic.   Chest:   Breasts:     Breasts are symmetrical.      Right: No inverted nipple, mass, nipple discharge, skin change or tenderness.      Left: No inverted nipple, mass, nipple discharge, skin change or tenderness.   Abdominal:      Palpations: Abdomen is soft. Abdomen is not rigid.      Tenderness: There is no abdominal tenderness. There is no guarding or rebound.      Hernia: There is no hernia in the left inguinal area or right inguinal area.   Genitourinary:     Pubic Area: No rash.       Labia:         Right: No rash, tenderness, lesion or injury.         Left: No rash, tenderness, lesion or injury.       Urethra: No prolapse, urethral pain, urethral swelling or urethral lesion.      Vagina: No vaginal discharge, erythema, tenderness, bleeding or prolapsed vaginal walls.      Adnexa:         Right: No mass, tenderness or fullness.          Left: No mass, tenderness or fullness.        Comments: Urethral meatus: no lesions, non tender  Urethra: non tender    Vaginal mucosa atrophic  Skin:     General: Skin is warm and dry.      Coloration: Skin is not pale.      Findings: No erythema or rash.

## 2024-09-11 ENCOUNTER — OFFICE VISIT (OUTPATIENT)
Dept: PHYSICAL THERAPY | Age: 69
End: 2024-09-11
Payer: COMMERCIAL

## 2024-09-11 DIAGNOSIS — S13.9XXD ACUTE CERVICAL SPRAIN, SUBSEQUENT ENCOUNTER: Primary | ICD-10-CM

## 2024-09-11 DIAGNOSIS — M54.40 CHRONIC LEFT-SIDED LOW BACK PAIN WITH SCIATICA, SCIATICA LATERALITY UNSPECIFIED: ICD-10-CM

## 2024-09-11 DIAGNOSIS — M25.512 ACUTE PAIN OF LEFT SHOULDER: ICD-10-CM

## 2024-09-11 DIAGNOSIS — S13.9XXD ACUTE SPRAIN OF LIGAMENT OF NECK, SUBSEQUENT ENCOUNTER: ICD-10-CM

## 2024-09-11 DIAGNOSIS — M54.50 CHRONIC LEFT-SIDED LOW BACK PAIN WITHOUT SCIATICA: ICD-10-CM

## 2024-09-11 DIAGNOSIS — M54.2 NECK PAIN: ICD-10-CM

## 2024-09-11 DIAGNOSIS — G89.29 CHRONIC LEFT-SIDED LOW BACK PAIN WITHOUT SCIATICA: ICD-10-CM

## 2024-09-11 DIAGNOSIS — G89.29 CHRONIC LEFT-SIDED LOW BACK PAIN WITH SCIATICA, SCIATICA LATERALITY UNSPECIFIED: ICD-10-CM

## 2024-09-11 PROCEDURE — 97140 MANUAL THERAPY 1/> REGIONS: CPT | Performed by: PHYSICAL THERAPIST

## 2024-09-11 PROCEDURE — 97110 THERAPEUTIC EXERCISES: CPT | Performed by: PHYSICAL THERAPIST

## 2024-09-11 NOTE — PROGRESS NOTES
Daily Note     Today's date: 2024  Patient name: Ayesha Lockwood  : 1955  MRN: [id number]  Referring provider: Lenny Zamarripa DO  Dx:   Encounter Diagnosis     ICD-10-CM    1. Acute cervical sprain, subsequent encounter  S13.9XXD       2. Chronic left-sided low back pain without sciatica  M54.50     G89.29       3. Acute pain of left shoulder  M25.512       4. Neck pain  M54.2       5. Acute sprain of ligament of neck, subsequent encounter  S13.9XXD       6. Chronic left-sided low back pain with sciatica, sciatica laterality unspecified  M54.40     G89.29           Start Time: 1030  Stop Time: 1115  Total time in clinic (min): 45 minutes    Subjective: better      Objective: See treatment diary below      Assessment: Tolerated treatment fair. Patient demonstrated fatigue post treatment, exhibited good technique with therapeutic exercises, and would benefit from continued PT, decreased tenderness and increased ROM noted to cervical area      Plan: Progress treatment as tolerated.  Possible D/C NV     Precautions: DVT, OA, osteoporosis    POC expires Unit limit Auth Expiration date PT/OT + Visit Limit?   2024                              Visit/Unit Tracking  AUTH Status:  Date        Used 1 2 3 4 5 6 7 8 9       Remaining                 FOTO                    Manuals                  MT cervical /LB/LE 15 min 15 min 15 min 15 min 15 min 15 min 15 min 15 min 15 min                              Neuro Re-Ed                                                                                                        Ther Ex             Nu step  5 min 5 min 5 min 6 min 6 min 6 min 6 min 6 min 7 min    pulleys 30x 30x 30x 30x 30x 30x 30x 30x 30x    BTB rows 30x 30x 30x 30x 30x 30x 30x 30x 30x    Chin tucks  30x 30x 30x 30x 30x 30x 30x 30x    Thoracic chair stretch foam      10x 10x 10x 10x                                            Ther Activity                                       Gait Training                                       Modalities             laser 200  25  15   200  25  15 200  25  15   200  25  15   200  25  15   200  25  15   200  25  15   200  25  15   200  25  15      MH PRN 10 min 10 min 10 min 10 min 10 min  10 min

## 2024-09-16 ENCOUNTER — OFFICE VISIT (OUTPATIENT)
Dept: PHYSICAL THERAPY | Age: 69
End: 2024-09-16
Payer: COMMERCIAL

## 2024-09-16 DIAGNOSIS — G89.29 CHRONIC LEFT-SIDED LOW BACK PAIN WITH SCIATICA, SCIATICA LATERALITY UNSPECIFIED: ICD-10-CM

## 2024-09-16 DIAGNOSIS — S13.9XXD ACUTE SPRAIN OF LIGAMENT OF NECK, SUBSEQUENT ENCOUNTER: ICD-10-CM

## 2024-09-16 DIAGNOSIS — M54.40 CHRONIC LEFT-SIDED LOW BACK PAIN WITH SCIATICA, SCIATICA LATERALITY UNSPECIFIED: ICD-10-CM

## 2024-09-16 DIAGNOSIS — M25.512 ACUTE PAIN OF LEFT SHOULDER: ICD-10-CM

## 2024-09-16 DIAGNOSIS — M54.50 CHRONIC LEFT-SIDED LOW BACK PAIN WITHOUT SCIATICA: ICD-10-CM

## 2024-09-16 DIAGNOSIS — M54.2 NECK PAIN: ICD-10-CM

## 2024-09-16 DIAGNOSIS — S13.9XXD ACUTE CERVICAL SPRAIN, SUBSEQUENT ENCOUNTER: Primary | ICD-10-CM

## 2024-09-16 DIAGNOSIS — G89.29 CHRONIC LEFT-SIDED LOW BACK PAIN WITHOUT SCIATICA: ICD-10-CM

## 2024-09-16 PROCEDURE — 97110 THERAPEUTIC EXERCISES: CPT | Performed by: PHYSICAL THERAPIST

## 2024-09-16 PROCEDURE — 97140 MANUAL THERAPY 1/> REGIONS: CPT | Performed by: PHYSICAL THERAPIST

## 2024-09-16 NOTE — PROGRESS NOTES
Daily Note    Today's date: 2024  Patient name: Ayesha Lockwood  : 1955  MRN: [id number]  Referring provider: Lenny Zamarripa DO  Dx:   Encounter Diagnosis     ICD-10-CM    1. Acute cervical sprain, subsequent encounter  S13.9XXD       2. Acute pain of left shoulder  M25.512       3. Chronic left-sided low back pain without sciatica  M54.50     G89.29       4. Neck pain  M54.2       5. Acute sprain of ligament of neck, subsequent encounter  S13.9XXD       6. Chronic left-sided low back pain with sciatica, sciatica laterality unspecified  M54.40     G89.29           Start Time: 0900  Stop Time: 0945  Total time in clinic (min): 45 minutes    Subjective: some improvement      Objective: See treatment diary below      Assessment: Tolerated treatment fair. Patient demonstrated fatigue post treatment, exhibited good technique with therapeutic exercises, and would benefit from continued PT, slowly progressing , one more PT visits and D/C to pelvic PT      Plan: Potential discharge next visit.     Precautions: DVT, OA, osteoporosis    POC expires Unit limit Auth Expiration date PT/OT + Visit Limit?   2024                              Visit/Unit Tracking  AUTH Status:  Date       Used 1 2 3 4 5 6 7 8 9 10      Remaining                 FOTO                    Manuals                 MT cervical /LB/LE 15 min 15 min 15 min 15 min 15 min 15 min 15 min 15 min 15 min 15 min                             Neuro Re-Ed                                                                                                        Ther Ex             Nu step  5 min 5 min 5 min 6 min 6 min 6 min 6 min 6 min 7 min 7 min   pulleys 30x 30x 30x 30x 30x 30x 30x 30x 30x 30x   BTB rows 30x 30x 30x 30x 30x 30x 30x 30x 30x 30x   Chin tucks  30x 30x 30x 30x 30x 30x 30x 30x 30x   Thoracic chair stretch foam      10x 10x 10x 10x 10x                                           Ther Activity                                       Gait Training                                       Modalities             laser 200  25  15   200  25  15 200  25  15   200  25  15   200  25  15   200  25  15   200  25  15   200  25  15   200  25  15   200  25  15   MH PRN 10 min 10 min 10 min 10 min 10 min  10 min

## 2024-09-18 ENCOUNTER — APPOINTMENT (OUTPATIENT)
Dept: PHYSICAL THERAPY | Age: 69
End: 2024-09-18
Payer: COMMERCIAL

## 2024-09-23 ENCOUNTER — OFFICE VISIT (OUTPATIENT)
Dept: PHYSICAL THERAPY | Age: 69
End: 2024-09-23
Payer: COMMERCIAL

## 2024-09-23 DIAGNOSIS — M54.40 CHRONIC LEFT-SIDED LOW BACK PAIN WITH SCIATICA, SCIATICA LATERALITY UNSPECIFIED: ICD-10-CM

## 2024-09-23 DIAGNOSIS — M54.2 NECK PAIN: ICD-10-CM

## 2024-09-23 DIAGNOSIS — M25.512 ACUTE PAIN OF LEFT SHOULDER: ICD-10-CM

## 2024-09-23 DIAGNOSIS — S13.9XXD ACUTE CERVICAL SPRAIN, SUBSEQUENT ENCOUNTER: Primary | ICD-10-CM

## 2024-09-23 DIAGNOSIS — G89.29 CHRONIC LEFT-SIDED LOW BACK PAIN WITH SCIATICA, SCIATICA LATERALITY UNSPECIFIED: ICD-10-CM

## 2024-09-23 DIAGNOSIS — G89.29 CHRONIC LEFT-SIDED LOW BACK PAIN WITHOUT SCIATICA: ICD-10-CM

## 2024-09-23 DIAGNOSIS — S13.9XXD ACUTE SPRAIN OF LIGAMENT OF NECK, SUBSEQUENT ENCOUNTER: ICD-10-CM

## 2024-09-23 DIAGNOSIS — M54.50 CHRONIC LEFT-SIDED LOW BACK PAIN WITHOUT SCIATICA: ICD-10-CM

## 2024-09-23 PROCEDURE — 97140 MANUAL THERAPY 1/> REGIONS: CPT | Performed by: PHYSICAL THERAPIST

## 2024-09-23 PROCEDURE — 97110 THERAPEUTIC EXERCISES: CPT | Performed by: PHYSICAL THERAPIST

## 2024-09-23 NOTE — PROGRESS NOTES
DISCHARGE    Today's date: 2024  Patient name: Ayesha Lockwood  : 1955  MRN: [id number]  Referring provider: Lenny Zamarripa DO  Dx:   Encounter Diagnosis     ICD-10-CM    1. Acute cervical sprain, subsequent encounter  S13.9XXD       2. Acute sprain of ligament of neck, subsequent encounter  S13.9XXD       3. Chronic left-sided low back pain with sciatica, sciatica laterality unspecified  M54.40     G89.29       4. Acute pain of left shoulder  M25.512       5. Chronic left-sided low back pain without sciatica  M54.50     G89.29       6. Neck pain  M54.2           Start Time: 1615  Stop Time: 1700  Total time in clinic (min): 45 minutes    Subjective: some improvement,       Objective: See treatment diary below      Assessment: Tolerated treatment fair. Patient exhibited good technique with therapeutic exercises. At this time, patient has achieved their maximum functional benefit from skilled physical therapy services and will be discharged to their HE[facility].  Patient is in agreement with the plan of care.  As a result, patient is discharged from physical therapy.       Plan:  D/C to HE[facility]/pelvic PT     Precautions: DVT, OA, osteoporosis    POC expires Unit limit Auth Expiration date PT/OT + Visit Limit?   2024                              Visit/Unit Tracking  AUTH Status:  Date      Used 1 2 3 4 5 6 7 8 9 10 11     Remaining                 FOTO                    Manuals                 MT cervical /LB/LE 15 min 15 min 15 min 15 min 15 min 15 min 15 min 15 min 15 min 15 min                             Neuro Re-Ed                                                                                                        Ther Ex             Nu step  5 min 5 min 5 min 6 min 6 min 6 min 6 min 6 min 7 min 7 min   pulleys 30x 30x 30x 30x 30x 30x 30x 30x 30x 30x   BTB rows 30x 30x 30x 30x 30x 30x 30x 30x 30x 30x    Chin tucks 30x 30x 30x 30x 30x 30x 30x 30x 30x 30x   Thoracic chair stretch foam 10x     10x 10x 10x 10x 10x                                          Ther Activity                                       Gait Training                                       Modalities             laser 200  25  15   200  25  15 200  25  15   200  25  15   200  25  15   200  25  15   200  25  15   200  25  15   200  25  15   200  25  15   MH PRN 10 min 10 min 10 min 10 min 10 min  10 min

## 2024-09-24 ENCOUNTER — OFFICE VISIT (OUTPATIENT)
Dept: FAMILY MEDICINE CLINIC | Facility: CLINIC | Age: 69
End: 2024-09-24
Payer: COMMERCIAL

## 2024-09-24 VITALS
BODY MASS INDEX: 30.74 KG/M2 | WEIGHT: 173.5 LBS | TEMPERATURE: 96.7 F | HEIGHT: 63 IN | SYSTOLIC BLOOD PRESSURE: 120 MMHG | OXYGEN SATURATION: 97 % | RESPIRATION RATE: 18 BRPM | DIASTOLIC BLOOD PRESSURE: 60 MMHG | HEART RATE: 78 BPM

## 2024-09-24 DIAGNOSIS — J45.909 ALLERGIC BRONCHITIS: ICD-10-CM

## 2024-09-24 DIAGNOSIS — G43.009 MIGRAINE WITHOUT AURA AND WITHOUT STATUS MIGRAINOSUS, NOT INTRACTABLE: ICD-10-CM

## 2024-09-24 DIAGNOSIS — K21.9 GASTROESOPHAGEAL REFLUX DISEASE WITHOUT ESOPHAGITIS: ICD-10-CM

## 2024-09-24 DIAGNOSIS — J30.1 SEASONAL ALLERGIC RHINITIS DUE TO POLLEN: ICD-10-CM

## 2024-09-24 DIAGNOSIS — G89.29 CHRONIC LEFT-SIDED LOW BACK PAIN WITHOUT SCIATICA: ICD-10-CM

## 2024-09-24 DIAGNOSIS — M54.50 CHRONIC LEFT-SIDED LOW BACK PAIN WITHOUT SCIATICA: ICD-10-CM

## 2024-09-24 DIAGNOSIS — J45.40 MODERATE PERSISTENT ASTHMA WITHOUT COMPLICATION: Primary | ICD-10-CM

## 2024-09-24 DIAGNOSIS — J45.909 MODERATE ASTHMA, UNSPECIFIED WHETHER COMPLICATED, UNSPECIFIED WHETHER PERSISTENT: ICD-10-CM

## 2024-09-24 DIAGNOSIS — E78.1 HYPERTRIGLYCERIDEMIA: ICD-10-CM

## 2024-09-24 DIAGNOSIS — R73.03 PREDIABETES: ICD-10-CM

## 2024-09-24 DIAGNOSIS — J45.21 MILD INTERMITTENT ASTHMA WITH ACUTE EXACERBATION: ICD-10-CM

## 2024-09-24 DIAGNOSIS — I82.402 DEEP VEIN THROMBOSIS (DVT) OF LEFT LOWER EXTREMITY, UNSPECIFIED CHRONICITY, UNSPECIFIED VEIN (HCC): ICD-10-CM

## 2024-09-24 PROCEDURE — 99214 OFFICE O/P EST MOD 30 MIN: CPT | Performed by: FAMILY MEDICINE

## 2024-09-24 RX ORDER — FLUTICASONE PROPIONATE 220 UG/1
2 AEROSOL, METERED RESPIRATORY (INHALATION) 2 TIMES DAILY
Qty: 16 G | Refills: 3 | Status: SHIPPED | OUTPATIENT
Start: 2024-09-24

## 2024-09-24 RX ORDER — ALBUTEROL SULFATE 90 UG/1
INHALANT RESPIRATORY (INHALATION)
Qty: 108 G | Refills: 0 | Status: SHIPPED | OUTPATIENT
Start: 2024-09-24

## 2024-09-24 RX ORDER — MONTELUKAST SODIUM 10 MG/1
10 TABLET ORAL EVERY EVENING
Qty: 90 TABLET | Refills: 1 | Status: SHIPPED | OUTPATIENT
Start: 2024-09-24

## 2024-09-24 RX ORDER — BUTALBITAL, ACETAMINOPHEN AND CAFFEINE 50; 325; 40 MG/1; MG/1; MG/1
1 TABLET ORAL EVERY 4 HOURS PRN
Qty: 90 TABLET | Refills: 0 | Status: SHIPPED | OUTPATIENT
Start: 2024-09-24

## 2024-09-24 NOTE — ASSESSMENT & PLAN NOTE
Orders:    fluticasone (FLOVENT HFA) 220 mcg/act inhaler; Inhale 2 puffs 2 (two) times a day Rinse mouth after use.

## 2024-09-24 NOTE — ASSESSMENT & PLAN NOTE
Patient aware of diabetes and diet and avoidance of excess concentrated sweets monitor diet work on weight reduction repeat laboratory work with A1c every 6 months

## 2024-09-24 NOTE — ASSESSMENT & PLAN NOTE
Orders:    butalbital-acetaminophen-caffeine (FIORICET,ESGIC) -40 mg per tablet; Take 1 tablet by mouth every 4 (four) hours as needed for headaches

## 2024-09-24 NOTE — ASSESSMENT & PLAN NOTE
Stable currently using inhaler as needed basis ragweed presently causing patient symptomatology at times adding antihistamines as needed follow-up at next visit

## 2024-09-24 NOTE — ASSESSMENT & PLAN NOTE
GERD symptoms overall stable no change in medication regimen continue with pantoprazole 40 mg daily

## 2024-09-24 NOTE — ASSESSMENT & PLAN NOTE
Orders:    CBC and differential; Future    Comprehensive metabolic panel; Future    Lipid panel; Future    TSH, 3rd generation with Free T4 reflex; Future

## 2024-09-24 NOTE — ASSESSMENT & PLAN NOTE
Chronic longstanding low back pain currently without flareup she will use meloxicam on a as needed basis now monitoring for gastrointestinal upset and following up with renal function.  Ideally patient benefits from stretching and exercise and can be referred back to physical therapy when flareups occur

## 2024-09-24 NOTE — PROGRESS NOTES
Ambulatory Visit  Name: Ayesha Lockwood      : 1955      MRN: [id number]  Encounter Provider: Lenny Zamarripa DO  Encounter Date: 2024   Encounter department: [facility]    Assessment & Plan  Moderate persistent asthma without complication  Stable currently using inhaler as needed basis ragweed presently causing patient symptomatology at times adding antihistamines as needed follow-up at next visit         Seasonal allergic rhinitis due to pollen  Continue with antihistamines along with nasal spray started singulair again.         Gastroesophageal reflux disease without esophagitis  GERD symptoms overall stable no change in medication regimen continue with pantoprazole 40 mg daily         Chronic left-sided low back pain without sciatica  Chronic longstanding low back pain currently without flareup she will use meloxicam on a as needed basis now monitoring for gastrointestinal upset and following up with renal function.  Ideally patient benefits from stretching and exercise and can be referred back to physical therapy when flareups occur         Prediabetes  Patient aware of diabetes and diet and avoidance of excess concentrated sweets monitor diet work on weight reduction repeat laboratory work with A1c every 6 months         Migraine without aura and without status migrainosus, not intractable    Orders:    butalbital-acetaminophen-caffeine (FIORICET,ESGIC) -40 mg per tablet; Take 1 tablet by mouth every 4 (four) hours as needed for headaches    Allergic bronchitis    Orders:    montelukast (SINGULAIR) 10 mg tablet; Take 1 tablet (10 mg total) by mouth every evening    Mild intermittent asthma with acute exacerbation    Orders:    fluticasone (FLOVENT HFA) 220 mcg/act inhaler; Inhale 2 puffs 2 (two) times a day Rinse mouth after use.    Deep vein thrombosis (DVT) of left lower extremity, unspecified chronicity, unspecified vein (HCC)    Orders:    D-dimer,  "quantitative; Future    Hypertriglyceridemia    Orders:    CBC and differential; Future    Comprehensive metabolic panel; Future    Lipid panel; Future    TSH, 3rd generation with Free T4 reflex; Future    Moderate asthma, unspecified whether complicated, unspecified whether persistent    Orders:    albuterol (PROVENTIL HFA,VENTOLIN HFA) 90 mcg/act inhaler; INHALE 2 PUFFS EVERY 4-6 HOURS AS NEEDED       History of Present Illness     Patient presents for follow-up evaluation and labs          Review of Systems   Constitutional:  Negative for chills, fatigue and fever.   HENT:  Negative for congestion, nosebleeds, rhinorrhea, sinus pressure and sore throat.    Eyes:  Negative for discharge and redness.   Respiratory:  Negative for cough and shortness of breath.    Cardiovascular:  Negative for chest pain, palpitations and leg swelling.   Gastrointestinal:  Negative for abdominal pain, blood in stool and nausea.   Endocrine: Negative for cold intolerance, heat intolerance and polyuria.   Genitourinary:  Negative for dysuria and frequency.   Musculoskeletal:  Negative for arthralgias, back pain and myalgias.   Skin:  Negative for rash.   Neurological:  Negative for dizziness, weakness and headaches.   Hematological:  Negative for adenopathy.   Psychiatric/Behavioral:  Negative for behavioral problems and sleep disturbance. The patient is not nervous/anxious.            Objective     /60 (BP Location: Left arm, Patient Position: Sitting, Cuff Size: Standard)   Pulse 78   Temp (!) 96.7 °F (35.9 °C) (Tympanic)   Resp 18   Ht 5' 3\" (1.6 m)   Wt 78.7 kg (173 lb 8 oz)   LMP  (LMP Unknown)   SpO2 97%   BMI 30.73 kg/m²     Physical Exam  Vitals and nursing note reviewed.   Constitutional:       General: She is not in acute distress.     Appearance: Normal appearance. She is well-developed.   HENT:      Head: Normocephalic and atraumatic.      Right Ear: Tympanic membrane and external ear normal.      Left Ear: " Tympanic membrane and external ear normal.      Nose: Nose normal.      Mouth/Throat:      Mouth: Mucous membranes are moist.      Pharynx: Oropharynx is clear. No oropharyngeal exudate.   Eyes:      General: No scleral icterus.        Right eye: No discharge.         Left eye: No discharge.      Conjunctiva/sclera: Conjunctivae normal.      Pupils: Pupils are equal, round, and reactive to light.   Neck:      Thyroid: No thyromegaly.      Vascular: No JVD.   Cardiovascular:      Rate and Rhythm: Normal rate and regular rhythm.      Heart sounds: Normal heart sounds. No murmur heard.  Pulmonary:      Effort: Pulmonary effort is normal.      Breath sounds: No wheezing or rales.   Chest:      Chest wall: No tenderness.   Abdominal:      General: Bowel sounds are normal. There is no distension.      Palpations: Abdomen is soft. There is no mass.      Tenderness: There is no abdominal tenderness.   Musculoskeletal:         General: No tenderness or deformity. Normal range of motion.      Cervical back: Normal range of motion.   Lymphadenopathy:      Cervical: No cervical adenopathy.   Skin:     General: Skin is warm and dry.      Findings: No rash.   Neurological:      General: No focal deficit present.      Mental Status: She is alert and oriented to person, place, and time.      Cranial Nerves: No cranial nerve deficit.      Coordination: Coordination normal.      Deep Tendon Reflexes: Reflexes are normal and symmetric. Reflexes normal.   Psychiatric:         Mood and Affect: Mood normal.         Behavior: Behavior normal.         Thought Content: Thought content normal.         Judgment: Judgment normal.

## 2024-09-26 NOTE — PROGRESS NOTES
PT Evaluation     Today's date: 2024  Patient name: Ayesha Lockwood  : 1955  MRN: [id number]  Referring provider: Chhaya Remy, *  Dx:   Encounter Diagnosis     ICD-10-CM    1. Pelvic floor dysfunction in female  M62.89                      Assessment  Impairments: abnormal coordination, abnormal muscle tone, impaired physical strength, lacks appropriate home exercise program, pain with function and poor posture   Symptom irritability: moderate    Assessment details: Ayesha is a 68 year old female with an ongoing history of pelvic pain.  Impairments as outlined with high tone pelvic floor muscle and nonrelaxing pelvic floor.  Patient with a history of chronic pain but impairment may contribute to current pain presentation. Time spent in patient education regarding toileting body mechanics, PFM anatomy, bladder health. Patient could benefit from a trial of PFPT to address presenting impairments and functional limitations and improve overall quality of life.   Understanding of Dx/Px/POC: good     Prognosis: fair    Goals  STG 2 weeks  1.  Instruction in ongoing home exercise program.  2.  Improved body awareness particularly with awareness of pelvic floor muscle tension holding.  3.  Full PFM relaxation post activation.    LTG 10-12 weeks  1.  Decrease pain by 50% or greater  2.  Resume and ongoing exercise program including walking and or independent aquatic fitness  3.  Decreased tenderness to palpation in abdominal and pelvic floor musculature.    Plan  Patient would benefit from: skilled physical therapy    Planned therapy interventions: manual therapy, motor coordination training, neuromuscular re-education, behavior modification, patient/caregiver education, therapeutic activities, therapeutic exercise and home exercise program    Frequency: 1x week  Duration in weeks: 10  Plan of Care beginning date: 2024  Plan of Care expiration date: 2024  Treatment plan discussed with:  patient    PT Pelvic Floor Subjective:   History of Present Illness:   Patient has an ongoing history of chronic neck back and pelvic pain.  She was diagnosed with interstitial cystitis many years ago and is aware of food triggers to minimize symptoms.   Patient notes neck and back pain that became worse following a fall in [place].  She did receive physical therapy up until last week regarding pain.  She was recently seen by GYN and is not referred for outpatient pelvic floor therapy.  Patient notes some right buttock and coccyx pain as well as pain and cramping into the lower abdominal quadrants.  She notes varying vaginal discomfort that required use of Replens yesterday.  Patient notes high stress levels currently in her personal life.  Patient notes she is not currently exercising due to pain and lack of motivation.          Recurrent probem      Social Support:     Lives with:  Spouse    Relationship status: /committed    Work status: retired    Life stress severity: severe  Diet and Exercise:    Diet:interstitial cystitis diet    Not currently due to life situation    Not exercising due to: pain  OB/ gyn History    Gestational History:     Number of term pregnancies: 2    Delivery Type: vaginal delivery      Menstrual History:      Menopausal: menopause  no hormone replacement therapy  Hysterectomy  Bladder Function:     Voiding Difficulties positive for: frequent urination, hesitancy and incomplete emptying       Voiding Difficulties comments:     Urinary leakage: no urine leakage    Nocturia (episodes per night): 1    Fluid Intake Type:  Water and coffee    Intake (ounces):     Intake (ounces) comment: One cup coffee  Aloe vera one bottle per day  Water  Occasional energy drink  Bowel Function:     Bowel frequency: multiple times a day  Sexual Function:     Sexually Active:  Not sexually active  Pain:     Current pain ratin    At worst pain rating:  10    Location:  Low back, coccyx; right  piriformis ; lower abdominal cramping    Onset:  More than 2 years ago    Quality:  Dull ache and pressure    Aggravating factors:  Walking    Relieving factors:  Heat  Patient Goals:     Patient goals for therapy:  Decreased pain, improved quality of life, return to sport/leisure activities and improved comfort    Other patient goals:  Feel better on all levels;    Objective     Active Range of Motion     Lumbar   Flexion:  WFL  Extension:  Restriction level: minimal  Left lateral flexion:  Restriction level: minimal  Right lateral flexion:  Restriction level: minimal      Abdominal Assessment:      Abdominal Assessment: TTP mid and lower abdominal quadrants  Decreased soft tissue mobility to same    Diastatis   Connective tissue integrity at linea alba: boggy  no tenderness at linea alba       General Perineum Exam:   perineum intact.     Visual Inspection of Perineum:   Excursion of perineal body in cephalad direction with contraction of pelvic floor muscles (PFM): fair   Excursion of perineal body in caudal direction with relaxation of pelvic floor muscles (PFM): weak  Involuntary contraction with coughing: yes  Involuntary relaxation with bearing down: no    Pelvic Organ Prolapse   no pelvic organ prolapse        Pelvic Floor Muscle Exam:      Breathing pattern with contraction: holding breath   Pelvic floor muscle relaxation is delayed and incomplete.         PERFECT Score   Power right: 2+/5   Power left: 2+/5   Endurance (seconds to max): 3   Repetitions (before fatigue): 3   Fast flicks (in 10 seconds): 3   Perfect Score: Increased tone entire PFM with TTP both external and internal from 5-8/10  Non relaxing PFM      pelvic floor exam consent given by patient    Pelvic exam completed: vaginally              POC expires Unit limit Auth Expiration date PT/OT + Visit Limit?   12/26 4 na bomn                           Visit/Unit Tracking  AUTH Status:  Date 9/27              $20  No auth Used 1                Remaining                 PFDI done                    Precautions: IC, hysterectomy      Manuals 9/27            Direct PFM PP            Transverse plane                                       Neuro Re-Ed                                                                                                        Ther Ex             PFMC education 5'                                                                                                       Ther Activity             PFM education PP                         Gait Training                                       Modalities

## 2024-09-27 ENCOUNTER — EVALUATION (OUTPATIENT)
Dept: PHYSICAL THERAPY | Age: 69
End: 2024-09-27
Payer: COMMERCIAL

## 2024-09-27 DIAGNOSIS — M62.89 PELVIC FLOOR DYSFUNCTION IN FEMALE: Primary | ICD-10-CM

## 2024-09-27 PROCEDURE — 97110 THERAPEUTIC EXERCISES: CPT | Performed by: PHYSICAL THERAPIST

## 2024-09-27 PROCEDURE — 97162 PT EVAL MOD COMPLEX 30 MIN: CPT | Performed by: PHYSICAL THERAPIST

## 2024-09-27 PROCEDURE — 97530 THERAPEUTIC ACTIVITIES: CPT | Performed by: PHYSICAL THERAPIST

## 2024-09-30 ENCOUNTER — TELEPHONE (OUTPATIENT)
Dept: FAMILY MEDICINE CLINIC | Facility: CLINIC | Age: 69
End: 2024-09-30

## 2024-09-30 NOTE — TELEPHONE ENCOUNTER
Flovent HFA 220mg aerosol is not covered by her insurance. The following medications are covered.     Arnuity Ellipta    Budesonide    Qvar RediHaler     Spiriva Respimat

## 2024-10-01 DIAGNOSIS — J45.20 MILD INTERMITTENT ASTHMA, UNSPECIFIED WHETHER COMPLICATED: Primary | ICD-10-CM

## 2024-10-01 RX ORDER — FLUTICASONE FUROATE 100 UG/1
1 POWDER RESPIRATORY (INHALATION) DAILY
Qty: 30 BLISTER | Refills: 0 | Status: SHIPPED | OUTPATIENT
Start: 2024-10-01 | End: 2024-10-31

## 2024-10-07 DIAGNOSIS — S76.312A STRAIN OF LEFT PIRIFORMIS MUSCLE, INITIAL ENCOUNTER: ICD-10-CM

## 2024-10-07 DIAGNOSIS — G89.29 CHRONIC LEFT-SIDED LOW BACK PAIN WITHOUT SCIATICA: ICD-10-CM

## 2024-10-07 DIAGNOSIS — I82.401 ACUTE DEEP VEIN THROMBOSIS (DVT) OF RIGHT LOWER EXTREMITY, UNSPECIFIED VEIN (HCC): ICD-10-CM

## 2024-10-07 DIAGNOSIS — J45.40 MODERATE PERSISTENT ASTHMA WITHOUT COMPLICATION: ICD-10-CM

## 2024-10-07 DIAGNOSIS — M54.50 CHRONIC LEFT-SIDED LOW BACK PAIN WITHOUT SCIATICA: ICD-10-CM

## 2024-10-07 DIAGNOSIS — K21.9 GASTROESOPHAGEAL REFLUX DISEASE WITHOUT ESOPHAGITIS: ICD-10-CM

## 2024-10-07 NOTE — TELEPHONE ENCOUNTER
Reason for call:   [x] Refill   [] Prior Auth  [] Other:     Office:   [x] PCP/Provider - Lenny Zamarripa DO / SYEDA VELASQUEZ   [] Specialty/Provider -     Medication: rivaroxaban (Xarelto) 10 mg tablet      Dose/Frequency: Take 1 tablet (10 mg total) by mouth daily     Quantity: 90    Pharmacy: [facility] - [place], [place] -  [address]     Does the patient have enough for 3 days?   [x] Yes   [] No - Send as HP to POD

## 2024-10-08 RX ORDER — CHLORZOXAZONE 500 MG/1
500 TABLET ORAL 4 TIMES DAILY PRN
Qty: 30 TABLET | Refills: 0 | Status: SHIPPED | OUTPATIENT
Start: 2024-10-08 | End: 2024-10-16 | Stop reason: SDUPTHER

## 2024-10-10 NOTE — PROGRESS NOTES
Daily Note     Today's date: 10/11/2024  Patient name: Ayesha Lockwood  : 1955  MRN: [id number]  Referring provider: Chhaya Remy, *  Dx:   Encounter Diagnosis     ICD-10-CM    1. Pelvic floor dysfunction in female  M62.89           Start Time: 1130  Stop Time: 1230  Total time in clinic (min): 60 minutes    Subjective: Patient notes she has being having intense pain in pelvic region. Notes feeling of lower abdominal cramping. Pain with prolonged sitting. Contacted      Objective: See treatment diary below      Assessment: Tolerated treatment well. Patient would benefit from continued PT HEP instruction this date. Verbalized and demonstrated understanding. Written handouts provided. Emphasis on PFM relaxation. Responded well to manual therapies with decreased pain reported post session.    Plan: Continue per plan of care.      POC expires Unit limit Auth Expiration date PT/OT + Visit Limit?    4 na bomn                           Visit/Unit Tracking  AUTH Status:  Date 9/27 10/11             $20  No auth Used 1 2              Remaining                 PFDI done                    Precautions: IC, hysterectomy      Manuals 9/27 10/11           Direct PFM PP            Transverse plane  PP                                     Neuro Re-Ed                                                                                                          Ther Ex             PFMC education 5' 5           Diaphragmatic breathing  5           PFM relaxation  5           LE stretch  10                                                               Ther Activity             PFM education PP                         Gait Training                                       Modalities

## 2024-10-11 ENCOUNTER — OFFICE VISIT (OUTPATIENT)
Dept: PHYSICAL THERAPY | Age: 69
End: 2024-10-11
Payer: COMMERCIAL

## 2024-10-11 DIAGNOSIS — M62.89 PELVIC FLOOR DYSFUNCTION IN FEMALE: Primary | ICD-10-CM

## 2024-10-11 PROCEDURE — 97110 THERAPEUTIC EXERCISES: CPT | Performed by: PHYSICAL THERAPIST

## 2024-10-11 PROCEDURE — 97140 MANUAL THERAPY 1/> REGIONS: CPT | Performed by: PHYSICAL THERAPIST

## 2024-10-16 DIAGNOSIS — J45.40 MODERATE PERSISTENT ASTHMA WITHOUT COMPLICATION: ICD-10-CM

## 2024-10-16 DIAGNOSIS — M54.50 CHRONIC LEFT-SIDED LOW BACK PAIN WITHOUT SCIATICA: ICD-10-CM

## 2024-10-16 DIAGNOSIS — G89.29 CHRONIC LEFT-SIDED LOW BACK PAIN WITHOUT SCIATICA: ICD-10-CM

## 2024-10-16 DIAGNOSIS — K21.9 GASTROESOPHAGEAL REFLUX DISEASE WITHOUT ESOPHAGITIS: ICD-10-CM

## 2024-10-16 DIAGNOSIS — S76.312A STRAIN OF LEFT PIRIFORMIS MUSCLE, INITIAL ENCOUNTER: ICD-10-CM

## 2024-10-17 ENCOUNTER — LAB REQUISITION (OUTPATIENT)
Dept: LAB | Facility: HOSPITAL | Age: 69
End: 2024-10-17
Payer: COMMERCIAL

## 2024-10-17 DIAGNOSIS — N30.10 INTERSTITIAL CYSTITIS (CHRONIC) WITHOUT HEMATURIA: ICD-10-CM

## 2024-10-17 PROCEDURE — 87086 URINE CULTURE/COLONY COUNT: CPT | Performed by: OBSTETRICS & GYNECOLOGY

## 2024-10-17 PROCEDURE — 87186 SC STD MICRODIL/AGAR DIL: CPT | Performed by: OBSTETRICS & GYNECOLOGY

## 2024-10-17 PROCEDURE — 87077 CULTURE AEROBIC IDENTIFY: CPT | Performed by: OBSTETRICS & GYNECOLOGY

## 2024-10-17 NOTE — PROGRESS NOTES
Daily Note     Today's date: 10/18/2024  Patient name: Ayesha Lockwood  : 1955  MRN: [id number]  Referring provider: Chhaya Remy, *  Dx:   Encounter Diagnosis     ICD-10-CM    1. Pelvic floor dysfunction in female  M62.89                      Subjective:  Patient was to see urogyn yesterday. Will have cystoscopy in November.  Notes bilateral lower abdominal quadrant discomfort, greater right with cramping feeling. Decreased bladder pain. Has been doing breathing and PFM relaxation with reported some relief.       Objective: See treatment diary below      Assessment: Tolerated treatment well. Patient would benefit from continued PT. TTP right lower abdominal quadrant and psoas. Improved tone PFM  with decreased TTP. Palpation right PC/ AR increased LQ discomfort.       Plan: Continue per plan of care.      POC expires Unit limit Auth Expiration date PT/OT + Visit Limit?    4 na bomn                           Visit/Unit Tracking  AUTH Status:  Date 9/27 10/11 10/18            $20  No auth Used 1 2 3             Remaining                 PFDI done                    Precautions: IC, hysterectomy      Manuals 9/27 10/11 10/18          Direct PFM PP  PP          Transverse plane  PP PP                                    Neuro Re-Ed                                                                                                          Ther Ex             PFMC education 5' 5 5          Diaphragmatic breathing  5 5          PFM relaxation  5 5          LE stretch  10 10                                                              Ther Activity             PFM education PP                         Gait Training                                       Modalities

## 2024-10-18 ENCOUNTER — OFFICE VISIT (OUTPATIENT)
Dept: PHYSICAL THERAPY | Age: 69
End: 2024-10-18
Payer: COMMERCIAL

## 2024-10-18 DIAGNOSIS — M62.89 PELVIC FLOOR DYSFUNCTION IN FEMALE: Primary | ICD-10-CM

## 2024-10-18 PROCEDURE — 97140 MANUAL THERAPY 1/> REGIONS: CPT | Performed by: PHYSICAL THERAPIST

## 2024-10-18 PROCEDURE — 97110 THERAPEUTIC EXERCISES: CPT | Performed by: PHYSICAL THERAPIST

## 2024-10-18 RX ORDER — CHLORZOXAZONE 500 MG/1
500 TABLET ORAL 4 TIMES DAILY PRN
Qty: 30 TABLET | Refills: 0 | Status: SHIPPED | OUTPATIENT
Start: 2024-10-18

## 2024-10-19 LAB — BACTERIA UR CULT: ABNORMAL

## 2024-10-24 ENCOUNTER — OFFICE VISIT (OUTPATIENT)
Dept: PHYSICAL THERAPY | Age: 69
End: 2024-10-24
Payer: COMMERCIAL

## 2024-10-24 DIAGNOSIS — M62.89 PELVIC FLOOR DYSFUNCTION IN FEMALE: Primary | ICD-10-CM

## 2024-10-24 PROCEDURE — 97110 THERAPEUTIC EXERCISES: CPT | Performed by: PHYSICAL THERAPIST

## 2024-10-24 PROCEDURE — 97140 MANUAL THERAPY 1/> REGIONS: CPT | Performed by: PHYSICAL THERAPIST

## 2024-10-24 NOTE — PROGRESS NOTES
Daily Note     Today's date: 10/24/2024  Patient name: Ayesha Lockwood  : 1955  MRN: [id number]  Referring provider: Chhaya Remy, *  Dx:   Encounter Diagnosis     ICD-10-CM    1. Pelvic floor dysfunction in female  M62.89           Start Time: 1500  Stop Time: 1555  Total time in clinic (min): 55 minutes    Subjective: Patient notes she has a urinary tract infection for which she has been treating since Monday.  Patient indicates she was asymptomatic however was told that her culture came back positive.  Patient notes fatigue today with general achiness throughout the spine which she contributes to increased activity today.  Notes mild lower abdominal cramping.      Objective: See treatment diary below      Assessment: Tolerated treatment well. Patient would benefit from continued PT tenderness to palpation in bilateral adductor musculature greater on the right.  Limited core strength noted with transfers.  Right psoas tightness that eased somewhat with manual therapies.  Noted increased cramping with palpation to anterior hip musculature.  Encouraged direct pelvic floor and general relaxation exercises for home exercise.      Plan: Continue per plan of care.      POC expires Unit limit Auth Expiration date PT/OT + Visit Limit?    4 na bomn                           Visit/Unit Tracking  AUTH Status:  Date 9/27 10/11 10/18 10/24           $20  No auth Used 1 2 3 4            Remaining                 PFDI done                    Precautions: IC, hysterectomy      Manuals 9/27 10/11 10/18 10/24         Direct PFM PP  PP ind         Transverse plane  PP PP PP                                   Neuro Re-Ed                                                                                                          Ther Ex             PF education 5' 5 5 5         Diaphragmatic breathing  5 5 5         PFM relaxation  5 5 5         LE stretch  10 10 10                                                              Ther Activity             PFM education PP                         Gait Training                                       Modalities

## 2024-10-28 ENCOUNTER — CLINICAL SUPPORT (OUTPATIENT)
Dept: FAMILY MEDICINE CLINIC | Facility: CLINIC | Age: 69
End: 2024-10-28
Payer: COMMERCIAL

## 2024-10-28 DIAGNOSIS — Z23 ENCOUNTER FOR IMMUNIZATION: Primary | ICD-10-CM

## 2024-10-28 PROCEDURE — 90471 IMMUNIZATION ADMIN: CPT

## 2024-10-28 PROCEDURE — 90662 IIV NO PRSV INCREASED AG IM: CPT

## 2024-10-31 NOTE — PROGRESS NOTES
Daily Note     Today's date: 2024  Patient name: Ayesha Lockwood  : 1955  MRN: [id number]  Referring provider: Chhaya Remy, *  Dx:   Encounter Diagnosis     ICD-10-CM    1. Pelvic floor dysfunction in female  M62.89           Start Time: 930  Stop Time: 1025  Total time in clinic (min): 55 minutes    Subjective: Patient was seen by Dr. Mckeon and prescribed an internal Estim unit with setting set for OAB and urge related LUTS. Has not started using yet at home. Notes she continues to feel cramping sensations in lower abdominal/pelvic region. Continues with high stress at home.      Objective: See treatment diary below      Assessment: Tolerated treatment well. Patient would benefit from continued PT TTP right PC/NV. Responds well to transverse plane. Improve tone PFM      Plan: Continue per plan of care.      POC expires Unit limit Auth Expiration date PT/OT + Visit Limit?    4 na bomn                           Visit/Unit Tracking  AUTH Status:  Date 9/27 10/11 10/18 10/24 11/1          $20  No auth Used 1 2 3 4 5           Remaining                 PFDI done                    Precautions: IC, hysterectomy      Manuals 9/27 10/11 10/18 10/24 11/1        Direct PFM PP  PP ind PP        Transverse plane  PP PP PP PP                                  Neuro Re-Ed                                                                                                          Ther Ex             PFMC education 5' 5 5 5 5        Diaphragmatic breathing  5 5 5 5        PFM relaxation  5 5 5 5        LE stretch  10 10 10 10                                                            Ther Activity             PFM education PP                         Gait Training                                       Modalities

## 2024-11-01 ENCOUNTER — OFFICE VISIT (OUTPATIENT)
Dept: PHYSICAL THERAPY | Age: 69
End: 2024-11-01
Payer: COMMERCIAL

## 2024-11-01 DIAGNOSIS — M62.89 PELVIC FLOOR DYSFUNCTION IN FEMALE: Primary | ICD-10-CM

## 2024-11-01 PROCEDURE — 97110 THERAPEUTIC EXERCISES: CPT | Performed by: PHYSICAL THERAPIST

## 2024-11-01 PROCEDURE — 97140 MANUAL THERAPY 1/> REGIONS: CPT | Performed by: PHYSICAL THERAPIST

## 2024-11-07 NOTE — PROGRESS NOTES
Daily Note     Today's date: 2024  Patient name: Ayesha Lockwood  : 1955  MRN: [id number]  Referring provider: Chhaya Remy, *  Dx:   Encounter Diagnosis     ICD-10-CM    1. Pelvic floor dysfunction in female  M62.89                      Subjective: Patient notes she had a cystoscopy this week. Was told that her IC was mild and urethra was good without restriction. Advised by her MD to use Estim unit at home for 2x per week for 6 weeks. Noted burning with urination post procedure in to the following day.  Ok today. Notes experiencing lower abdominal/pelvic cramping that varies.       Objective: See treatment diary below      Assessment: Tolerated treatment well. Patient would benefit from continued PT Deferred direct PFM due to recent procedure. Emphasis on general and Pfm relaxation. Responds well ot manuals.      Plan: Continue per plan of care.      POC expires Unit limit Auth Expiration date PT/OT + Visit Limit?    4 na bomn                           Visit/Unit Tracking  AUTH Status:  Date 9/27 10/11 10/18 10/24 11/1 11/8         $20  No auth Used 1 2 3 4 5 6          Remaining                 PFDI done                    Precautions: IC, hysterectomy      Manuals 9/27 10/11 10/18 10/24 11/1 11/8       Direct PFM PP  PP ind PP nt       Transverse plane  PP PP PP PP PP                                 Neuro Re-Ed                                                                                                          Ther Ex             PFMC education 5' 5 5 5 5 5       Diaphragmatic breathing  5 5 5 5 5       PFM relaxation  5 5 5 5 5       LE stretch  10 10 10 10 10                                                           Ther Activity             PFM education PP                         Gait Training                                       Modalities

## 2024-11-08 ENCOUNTER — OFFICE VISIT (OUTPATIENT)
Dept: PHYSICAL THERAPY | Age: 69
End: 2024-11-08
Payer: COMMERCIAL

## 2024-11-08 DIAGNOSIS — M62.89 PELVIC FLOOR DYSFUNCTION IN FEMALE: Primary | ICD-10-CM

## 2024-11-08 PROCEDURE — 97140 MANUAL THERAPY 1/> REGIONS: CPT | Performed by: PHYSICAL THERAPIST

## 2024-11-08 PROCEDURE — 97110 THERAPEUTIC EXERCISES: CPT | Performed by: PHYSICAL THERAPIST

## 2024-11-14 DIAGNOSIS — M62.838 MUSCLE SPASM: ICD-10-CM

## 2024-11-14 DIAGNOSIS — M54.50 CHRONIC LOW BACK PAIN: ICD-10-CM

## 2024-11-14 DIAGNOSIS — G89.29 CHRONIC LOW BACK PAIN: ICD-10-CM

## 2024-11-14 RX ORDER — DIAZEPAM 5 MG/1
5 TABLET ORAL EVERY 8 HOURS PRN
Qty: 30 TABLET | Refills: 0 | Status: SHIPPED | OUTPATIENT
Start: 2024-11-14

## 2024-11-14 NOTE — PROGRESS NOTES
Daily Note     Today's date: 11/15/2024  Patient name: Ayesha Lockwood  : 1955  MRN: [id number]  Referring provider: Chhaya Remy, *  Dx:   Encounter Diagnosis     ICD-10-CM    1. Pelvic floor dysfunction in female  M62.89           Start Time: 1435  Stop Time: 1535  Total time in clinic (min): 60 minutes    Subjective: Patient notes she has not started using the Estim device. Variable urgency. Mild today. Mostly fatigued today with poor sleep last night.      Objective: See treatment diary below      Assessment: Tolerated treatment well. Patient would benefit from continued PT Improved soft tissue mobility lower abdominal quadrants.  Good piriformis mobility with bilateral adductor tightness, left greater than right.       Plan: Continue per plan of care.        POC expires Unit limit Auth Expiration date PT/OT + Visit Limit?    4 na bomn                           Visit/Unit Tracking  AUTH Status:  Date 9/27 10/11 10/18 10/24 11/1 11/8 11/15        $20  No auth Used 1 2 3 4 5 6 7         Remaining                 PFDI done                    Precautions: IC, hysterectomy      Manuals 9/27 10/11 10/18 10/24 11/1 11/8 11/15      Direct PFM PP  PP ind PP nt nt      Transverse plane  PP PP PP PP PP PP                                Neuro Re-Ed                                                                                                          Ther Ex             PFMC education 5' 5 5 5 5 5 5      Diaphragmatic breathing  5 5 5 5 5 5      PFM relaxation  5 5 5 5 5 5      LE stretch  10 10 10 10 10 10                                                          Ther Activity             PFM education PP                         Gait Training                                       Modalities

## 2024-11-15 ENCOUNTER — OFFICE VISIT (OUTPATIENT)
Dept: PHYSICAL THERAPY | Age: 69
End: 2024-11-15
Payer: COMMERCIAL

## 2024-11-15 DIAGNOSIS — M62.89 PELVIC FLOOR DYSFUNCTION IN FEMALE: Primary | ICD-10-CM

## 2024-11-15 PROCEDURE — 97140 MANUAL THERAPY 1/> REGIONS: CPT | Performed by: PHYSICAL THERAPIST

## 2024-11-15 PROCEDURE — 97110 THERAPEUTIC EXERCISES: CPT | Performed by: PHYSICAL THERAPIST

## 2024-11-18 DIAGNOSIS — G43.009 MIGRAINE WITHOUT AURA AND WITHOUT STATUS MIGRAINOSUS, NOT INTRACTABLE: ICD-10-CM

## 2024-11-19 NOTE — TELEPHONE ENCOUNTER
Pt called stating she desperately needs this prescription filled. She states the bad weather coming is going to activate her headaches. She would please like to have a [employer] message or phone call to let her know the prescription has been sent to pharmacy.

## 2024-11-20 RX ORDER — BUTALBITAL, ACETAMINOPHEN AND CAFFEINE 50; 325; 40 MG/1; MG/1; MG/1
1 TABLET ORAL EVERY 4 HOURS PRN
Qty: 90 TABLET | Refills: 0 | Status: SHIPPED | OUTPATIENT
Start: 2024-11-20

## 2024-11-20 NOTE — PROGRESS NOTES
Daily Note     Today's date: 2024  Patient name: Ayesha Lockwood  : 1955  MRN: [id number]  Referring provider: Chhaya Remy, *  Dx:   Encounter Diagnosis     ICD-10-CM    1. Pelvic floor dysfunction in female  M62.89           Start Time: 0930  Stop Time: 1030  Total time in clinic (min): 60 minutes    Subjective: Patient notes she had to take pain medication this morning due to right buttock pain.       Objective: See treatment diary below      Assessment: Tolerated treatment well. Patient would benefit from continued PT Decreased pain post treatment. TTP right lateral sacrum. Advised piriformis stretch and resuming walking for exercise.       Plan: Continue per plan of care.        POC expires Unit limit Auth Expiration date PT/OT + Visit Limit?    4 na bomn                           Visit/Unit Tracking  AUTH Status:  Date 9/27 10/11 10/18 10/24 11/1 11/8 11/15 11/22       $20  No auth Used 1 2 3 4 5 6 7 8        Remaining                 PFDI done                    Precautions: IC, hysterectomy      Manuals 9/27 10/11 10/18 10/24 11/1 11/8 11/15 11/22     Direct PFM PP  PP ind PP nt nt nt     Transverse plane  PP PP PP PP PP PP PP     diaphragm        PP                  Neuro Re-Ed                                                                                                          Ther Ex             [facility] education 5' 5 5 5 5 5 5 5     Diaphragmatic breathing  5 5 5 5 5 5 5     PFM relaxation  5 5 5 5 5 5 5     LE stretch  10 10 10 10 10 10 10                                                         Ther Activity             PFM education PP                         Gait Training                                       Modalities

## 2024-11-22 ENCOUNTER — OFFICE VISIT (OUTPATIENT)
Dept: PHYSICAL THERAPY | Age: 69
End: 2024-11-22
Payer: COMMERCIAL

## 2024-11-22 DIAGNOSIS — M62.89 PELVIC FLOOR DYSFUNCTION IN FEMALE: Primary | ICD-10-CM

## 2024-11-22 PROCEDURE — 97140 MANUAL THERAPY 1/> REGIONS: CPT | Performed by: PHYSICAL THERAPIST

## 2024-11-22 PROCEDURE — 97110 THERAPEUTIC EXERCISES: CPT | Performed by: PHYSICAL THERAPIST

## 2024-11-27 ENCOUNTER — OFFICE VISIT (OUTPATIENT)
Dept: PHYSICAL THERAPY | Age: 69
End: 2024-11-27
Payer: COMMERCIAL

## 2024-11-27 DIAGNOSIS — M62.89 PELVIC FLOOR DYSFUNCTION IN FEMALE: Primary | ICD-10-CM

## 2024-11-27 PROCEDURE — 97110 THERAPEUTIC EXERCISES: CPT | Performed by: PHYSICAL THERAPIST

## 2024-11-27 PROCEDURE — 97140 MANUAL THERAPY 1/> REGIONS: CPT | Performed by: PHYSICAL THERAPIST

## 2024-11-27 NOTE — PROGRESS NOTES
Daily Note     Today's date: 2024  Patient name: Ayesha Lockwood  : 1955  MRN: [id number]  Referring provider: Chhaya Remy, *  Dx:   Encounter Diagnosis     ICD-10-CM    1. Pelvic floor dysfunction in female  M62.89                      Subjective: Notes varying urgency of urination. Has not started using E-stim as prescribed by Dr Hardy.      Objective: See treatment diary below      Assessment: Tolerated treatment well. Patient would benefit from continued PT reviewed bladder health and urge deferment as well best voiding strategies. Improved soft tissue mobility lateral sacrum and superficial PFM. Decreased buttock pain reported post session      Plan: Continue per plan of care.  Potential discharge next visit.       POC expires Unit limit Auth Expiration date PT/OT + Visit Limit?    4 na bomn                           Visit/Unit Tracking  AUTH Status:  Date 9/27 10/11 10/18 10/24 11/1 11/8 11/15 11/22 11/27      $20  No auth Used 1 2 3 4 5 6 7 8 9       Remaining                 PFDI done                    Precautions: IC, hysterectomy      Manuals 9/27 10/11 10/18 10/24 11/1 11/8 11/15 11/22 11/27    Direct PFM PP  PP ind PP nt nt nt     Transverse plane  PP PP PP PP PP PP PP PP    diaphragm        PP PP                 Neuro Re-Ed                                                                                                          Ther Ex             [facility] education 5' 5 5 5 5 5 5 5 5    Diaphragmatic breathing  5 5 5 5 5 5 5 5    PFM relaxation  5 5 5 5 5 5 5 5    LE stretch  10 10 10 10 10 10 10 10                                                        Ther Activity             PFM education PP        PP  H/O                 Gait Training                                       Modalities

## 2024-12-03 ENCOUNTER — OFFICE VISIT (OUTPATIENT)
Dept: FAMILY MEDICINE CLINIC | Facility: CLINIC | Age: 69
End: 2024-12-03
Payer: COMMERCIAL

## 2024-12-03 VITALS
HEART RATE: 70 BPM | BODY MASS INDEX: 35.03 KG/M2 | HEIGHT: 63 IN | DIASTOLIC BLOOD PRESSURE: 80 MMHG | SYSTOLIC BLOOD PRESSURE: 122 MMHG | RESPIRATION RATE: 18 BRPM | TEMPERATURE: 98.2 F | WEIGHT: 197.7 LBS | OXYGEN SATURATION: 98 %

## 2024-12-03 DIAGNOSIS — I82.402 DEEP VEIN THROMBOSIS (DVT) OF LEFT LOWER EXTREMITY, UNSPECIFIED CHRONICITY, UNSPECIFIED VEIN (HCC): ICD-10-CM

## 2024-12-03 DIAGNOSIS — N30.10 CYSTITIS, INTERSTITIAL: ICD-10-CM

## 2024-12-03 DIAGNOSIS — K21.9 GASTROESOPHAGEAL REFLUX DISEASE WITHOUT ESOPHAGITIS: ICD-10-CM

## 2024-12-03 DIAGNOSIS — J45.40 MODERATE PERSISTENT ASTHMA WITHOUT COMPLICATION: ICD-10-CM

## 2024-12-03 DIAGNOSIS — M54.50 ACUTE RIGHT-SIDED LOW BACK PAIN WITHOUT SCIATICA: Primary | ICD-10-CM

## 2024-12-03 PROCEDURE — 99214 OFFICE O/P EST MOD 30 MIN: CPT | Performed by: FAMILY MEDICINE

## 2024-12-03 NOTE — PROGRESS NOTES
Name: Ayesha Lockwood      : 1955      MRN: [id number]  Encounter Provider: Lenny Zamarripa DO  Encounter Date: 12/3/2024   Encounter department: [facility]  :  Assessment & Plan  Acute right-sided low back pain without sciatica  Patient will continue with the physical therapy program she will incorporate pool therapy and she will continue with her massage therapist I recommend all 3 options for her to continue this along with a home stretching program and she has medication for pain as needed basis chlorzoxazone can be used along with meloxicam the meloxicam can be taken daily for up to 2 weeks watching for any symptoms of indigestion or heartburn    Orders:    D-dimer, quantitative; Future    Moderate persistent asthma without complication  Stable not currently on inhaler and less symptoms flareup at this time the year no change at this point reevaluate at next visit    Orders:    D-dimer, quantitative; Future    Cystitis, interstitial  Seen by Urogynecology and doing well now with exercises.    Orders:    D-dimer, quantitative; Future    Gastroesophageal reflux disease without esophagitis  Stable on current medications in change in dosage now.    Orders:    D-dimer, quantitative; Future    Deep vein thrombosis (DVT) of left lower extremity, unspecified chronicity, unspecified vein (HCC)    Orders:    D-dimer, quantitative; Future           History of Present Illness     Back Pain  Pertinent negatives include no abdominal pain, chest pain, dysuria, fever, headaches or weakness.     Review of Systems   Constitutional:  Negative for chills, fatigue and fever.   HENT:  Negative for congestion, nosebleeds, rhinorrhea, sinus pressure and sore throat.    Eyes:  Negative for discharge and redness.   Respiratory:  Negative for cough and shortness of breath.    Cardiovascular:  Negative for chest pain, palpitations and leg swelling.   Gastrointestinal:  Negative for abdominal  "pain, blood in stool and nausea.   Endocrine: Negative for cold intolerance, heat intolerance and polyuria.   Genitourinary:  Negative for dysuria and frequency.   Musculoskeletal:  Positive for back pain. Negative for arthralgias and myalgias.   Skin:  Negative for rash.   Neurological:  Negative for dizziness, weakness and headaches.   Hematological:  Negative for adenopathy.   Psychiatric/Behavioral:  Negative for behavioral problems and sleep disturbance. The patient is not nervous/anxious.           Objective   /80 (BP Location: Left arm, Patient Position: Sitting, Cuff Size: Standard)   Pulse 70   Temp 98.2 °F (36.8 °C) (Tympanic)   Resp 18   Ht 5' 3\" (1.6 m)   Wt 89.7 kg (197 lb 11.2 oz)   LMP  (LMP Unknown)   SpO2 98%   BMI 35.02 kg/m²      Physical Exam  Vitals and nursing note reviewed.   Constitutional:       General: She is not in acute distress.     Appearance: Normal appearance. She is well-developed.   HENT:      Head: Normocephalic and atraumatic.      Right Ear: Tympanic membrane and external ear normal.      Left Ear: Tympanic membrane and external ear normal.      Nose: Nose normal.      Mouth/Throat:      Mouth: Mucous membranes are moist.      Pharynx: Oropharynx is clear. No oropharyngeal exudate.   Eyes:      General: No scleral icterus.        Right eye: No discharge.         Left eye: No discharge.      Conjunctiva/sclera: Conjunctivae normal.      Pupils: Pupils are equal, round, and reactive to light.   Neck:      Thyroid: No thyromegaly.      Vascular: No JVD.   Cardiovascular:      Rate and Rhythm: Normal rate and regular rhythm.      Pulses: Normal pulses.      Heart sounds: Normal heart sounds. No murmur heard.  Pulmonary:      Effort: Pulmonary effort is normal.      Breath sounds: No wheezing or rales.   Chest:      Chest wall: No tenderness.   Abdominal:      General: Bowel sounds are normal. There is no distension.      Palpations: Abdomen is soft. There is no mass. "      Tenderness: There is no abdominal tenderness.   Musculoskeletal:         General: No tenderness or deformity. Normal range of motion.      Cervical back: Normal range of motion.   Lymphadenopathy:      Cervical: No cervical adenopathy.   Skin:     General: Skin is warm and dry.      Findings: No rash.   Neurological:      General: No focal deficit present.      Mental Status: She is alert and oriented to person, place, and time.      Cranial Nerves: No cranial nerve deficit.      Coordination: Coordination normal.      Deep Tendon Reflexes: Reflexes are normal and symmetric. Reflexes normal.   Psychiatric:         Mood and Affect: Mood normal.         Behavior: Behavior normal.         Thought Content: Thought content normal.         Judgment: Judgment normal.

## 2024-12-03 NOTE — ASSESSMENT & PLAN NOTE
Seen by Urogynecology and doing well now with exercises.    Orders:    D-dimer, quantitative; Future

## 2024-12-03 NOTE — ASSESSMENT & PLAN NOTE
Stable on current medications in change in dosage now.    Orders:    D-dimer, quantitative; Future

## 2024-12-03 NOTE — ASSESSMENT & PLAN NOTE
Patient will continue with the physical therapy program she will incorporate pool therapy and she will continue with her massage therapist I recommend all 3 options for her to continue this along with a home stretching program and she has medication for pain as needed basis chlorzoxazone can be used along with meloxicam the meloxicam can be taken daily for up to 2 weeks watching for any symptoms of indigestion or heartburn    Orders:    D-dimer, quantitative; Future

## 2024-12-03 NOTE — ASSESSMENT & PLAN NOTE
Stable not currently on inhaler and less symptoms flareup at this time the year no change at this point reevaluate at next visit    Orders:    D-dimer, quantitative; Future

## 2024-12-04 DIAGNOSIS — M25.461 EFFUSION OF RIGHT KNEE: ICD-10-CM

## 2024-12-04 DIAGNOSIS — M54.2 NECK PAIN: ICD-10-CM

## 2024-12-05 RX ORDER — METHOCARBAMOL 750 MG/1
750 TABLET, FILM COATED ORAL EVERY 6 HOURS PRN
Qty: 30 TABLET | Refills: 0 | Status: SHIPPED | OUTPATIENT
Start: 2024-12-05

## 2024-12-05 RX ORDER — TRAMADOL HYDROCHLORIDE 50 MG/1
50 TABLET ORAL EVERY 6 HOURS PRN
Qty: 30 TABLET | Refills: 0 | Status: SHIPPED | OUTPATIENT
Start: 2024-12-05

## 2024-12-05 NOTE — PROGRESS NOTES
Daily Note/ Discharge     Today's date: 2024  Patient name: Ayesha Lockwood  : 1955  MRN: [id number]  Referring provider: Chhaya Remy, *  Dx:   Encounter Diagnosis     ICD-10-CM    1. Pelvic floor dysfunction in female  M62.89           Start Time: 0930  Stop Time: 1030  Total time in clinic (min): 60 minutes    Subjective: Patient notes bladder pain, urgency and pelvic discomfort seems better.  Notes right sided low back pain that radiates right flank. Seen by PCP this week.       Objective: See treatment diary below      Assessment: Tolerated treatment well. Patient with reduced pain post but persists.  Tender bilateral psoas, greater right.  Patient seen for 10 PT visits to date. Independent in HEP. Recognizes stress contributes to bladder discomfort. PT goals regarding pelvic floor have been met. Pain persists with increased right sided back pain today. Patient to resume aquatic fitness program. Encouraged exercise for  stress and pain management and general conditioning.  At this time, patient has achieved their maximum functional benefit from skilled physical therapy services and will be discharged to their HEP.  Patient is in agreement with the plan of care.  As a result, patient is discharged from physical therapy.  STG 2 weeks MET  1.  Instruction in ongoing home exercise program.  2.  Improved body awareness particularly with awareness of pelvic floor muscle tension holding.  3.  Full PFM relaxation post activation.     LTG 10-12 weeks  1.  Decrease pain by 50% or greater - MEt  2.  Resume and ongoing exercise program including walking and or independent aquatic fitness - not met  3.  Decreased tenderness to palpation in abdominal and pelvic floor musculature. - met      Plan: Discharge PT       POC expires Unit limit Auth Expiration date PT/OT + Visit Limit?    4 na bomn                           Visit/Unit Tracking  AUTH Status:  Date 9/27 10/11 10/18 10/24 11/1 11/8 11/15  11/22 11/27 12/6     $20  No auth Used 1 2 3 4 5 6 7 8 9 10      Remaining                 PFDI done                    Precautions: IC, hysterectomy      Manuals 9/27 10/11 10/18 10/24 11/1 11/8 11/15 11/22 11/27 12/6   Direct PFM PP  PP ind PP nt nt nt     Transverse plane  PP PP PP PP PP PP PP PP PP   diaphragm        PP PP PP                Neuro Re-Ed                                                                                                          Ther Ex             [facility] education 5' 5 5 5 5 5 5 5 5 3   Diaphragmatic breathing  5 5 5 5 5 5 5 5 5   PFM relaxation  5 5 5 5 5 5 5 5 5   LE stretch  10 10 10 10 10 10 10 10 10                                                       Ther Activity             PFM education PP        PP  H/O                 Gait Training                                       Modalities

## 2024-12-06 ENCOUNTER — OFFICE VISIT (OUTPATIENT)
Dept: PHYSICAL THERAPY | Age: 69
End: 2024-12-06
Payer: COMMERCIAL

## 2024-12-06 DIAGNOSIS — M62.89 PELVIC FLOOR DYSFUNCTION IN FEMALE: Primary | ICD-10-CM

## 2024-12-06 PROCEDURE — 97110 THERAPEUTIC EXERCISES: CPT | Performed by: PHYSICAL THERAPIST

## 2024-12-06 PROCEDURE — 97140 MANUAL THERAPY 1/> REGIONS: CPT | Performed by: PHYSICAL THERAPIST

## 2024-12-31 DIAGNOSIS — G43.009 MIGRAINE WITHOUT AURA AND WITHOUT STATUS MIGRAINOSUS, NOT INTRACTABLE: ICD-10-CM

## 2025-01-02 RX ORDER — BUTALBITAL, ACETAMINOPHEN AND CAFFEINE 50; 325; 40 MG/1; MG/1; MG/1
1 TABLET ORAL EVERY 4 HOURS PRN
Qty: 90 TABLET | Refills: 0 | Status: SHIPPED | OUTPATIENT
Start: 2025-01-02

## 2025-01-03 ENCOUNTER — OFFICE VISIT (OUTPATIENT)
Dept: FAMILY MEDICINE CLINIC | Facility: CLINIC | Age: 70
End: 2025-01-03
Payer: COMMERCIAL

## 2025-01-03 VITALS
BODY MASS INDEX: 34.38 KG/M2 | RESPIRATION RATE: 18 BRPM | OXYGEN SATURATION: 99 % | DIASTOLIC BLOOD PRESSURE: 56 MMHG | HEIGHT: 63 IN | WEIGHT: 194 LBS | SYSTOLIC BLOOD PRESSURE: 123 MMHG | TEMPERATURE: 98.5 F | HEART RATE: 70 BPM

## 2025-01-03 DIAGNOSIS — R05.9 COUGH IN ADULT: Primary | ICD-10-CM

## 2025-01-03 DIAGNOSIS — J06.9 VIRAL UPPER RESPIRATORY INFECTION: ICD-10-CM

## 2025-01-03 LAB
SARS-COV-2 AG UPPER RESP QL IA: NEGATIVE
VALID CONTROL: NORMAL

## 2025-01-03 PROCEDURE — 87811 SARS-COV-2 COVID19 W/OPTIC: CPT | Performed by: FAMILY MEDICINE

## 2025-01-03 PROCEDURE — 99213 OFFICE O/P EST LOW 20 MIN: CPT | Performed by: FAMILY MEDICINE

## 2025-01-03 RX ORDER — NIRMATRELVIR AND RITONAVIR 300-100 MG
3 KIT ORAL 2 TIMES DAILY
Qty: 30 TABLET | Refills: 0 | Status: SHIPPED | OUTPATIENT
Start: 2025-01-03 | End: 2025-01-08

## 2025-01-03 NOTE — ASSESSMENT & PLAN NOTE
Patient has been tested positive for COVID yesterday and started Paxlovid she has mild symptoms starting today with sore throat and congestion and dry cough.  Testing is negative today she will retest tomorrow I will give her a written prescription for Paxlovid in case she worsens over the weekend and she can start this medication    Orders:    nirmatrelvir & ritonavir (Paxlovid, 300/100,) tablet therapy pack; Take 3 tablets by mouth 2 (two) times a day for 5 days

## 2025-01-03 NOTE — PROGRESS NOTES
Name: Ayesha Lockwood      : 1955      MRN: [id number]  Encounter Provider: Lenny Zamarripa DO  Encounter Date: 1/3/2025   Encounter department: [facility]  :  Assessment & Plan  Cough in adult    Orders:    POCT Rapid Covid Ag    nirmatrelvir & ritonavir (Paxlovid, 300/100,) tablet therapy pack; Take 3 tablets by mouth 2 (two) times a day for 5 days    Viral upper respiratory infection  Patient has been tested positive for COVID yesterday and started Paxlovid she has mild symptoms starting today with sore throat and congestion and dry cough.  Testing is negative today she will retest tomorrow I will give her a written prescription for Paxlovid in case she worsens over the weekend and she can start this medication    Orders:    nirmatrelvir & ritonavir (Paxlovid, 300/100,) tablet therapy pack; Take 3 tablets by mouth 2 (two) times a day for 5 days           History of Present Illness     Patient here today for upper respiratory infection after her  tested positive for COVID she came in for further testing.  She tested positive at home with an  test but negative here    Cough  Associated symptoms include a sore throat. Pertinent negatives include no chest pain, chills, eye redness, fever, headaches, myalgias, rash, rhinorrhea or shortness of breath.   Sore Throat   Associated symptoms include coughing. Pertinent negatives include no abdominal pain, congestion, headaches or shortness of breath.     Review of Systems   Constitutional:  Negative for chills, fatigue and fever.   HENT:  Positive for sore throat. Negative for congestion, nosebleeds, rhinorrhea and sinus pressure.    Eyes:  Negative for discharge and redness.   Respiratory:  Positive for cough. Negative for shortness of breath.    Cardiovascular:  Negative for chest pain, palpitations and leg swelling.   Gastrointestinal:  Negative for abdominal pain, blood in stool and nausea.   Endocrine: Negative  "for cold intolerance, heat intolerance and polyuria.   Genitourinary:  Negative for dysuria and frequency.   Musculoskeletal:  Negative for arthralgias, back pain and myalgias.   Skin:  Negative for rash.   Neurological:  Negative for dizziness, weakness and headaches.   Hematological:  Negative for adenopathy.   Psychiatric/Behavioral:  Negative for behavioral problems and sleep disturbance. The patient is not nervous/anxious.        Objective   /56 (BP Location: Left arm, Patient Position: Sitting, Cuff Size: Standard)   Pulse 70   Temp 98.5 °F (36.9 °C) (Tympanic)   Resp 18   Ht 5' 3\" (1.6 m)   Wt 88 kg (194 lb)   LMP  (LMP Unknown)   SpO2 99%   BMI 34.37 kg/m²      Physical Exam  Vitals and nursing note reviewed.   Constitutional:       General: She is not in acute distress.     Appearance: She is well-developed.   HENT:      Head: Normocephalic and atraumatic.      Right Ear: Tympanic membrane and external ear normal.      Left Ear: Tympanic membrane and external ear normal.      Nose: Nose normal.      Mouth/Throat:      Mouth: Mucous membranes are moist.      Pharynx: Oropharynx is clear. No oropharyngeal exudate.   Eyes:      General: No scleral icterus.        Right eye: No discharge.         Left eye: No discharge.      Conjunctiva/sclera: Conjunctivae normal.      Pupils: Pupils are equal, round, and reactive to light.   Neck:      Thyroid: No thyromegaly.      Vascular: No JVD.   Cardiovascular:      Rate and Rhythm: Normal rate and regular rhythm.      Heart sounds: Normal heart sounds. No murmur heard.  Pulmonary:      Effort: Pulmonary effort is normal.      Breath sounds: No wheezing or rales.   Chest:      Chest wall: No tenderness.   Abdominal:      General: Bowel sounds are normal. There is no distension.      Palpations: Abdomen is soft. There is no mass.      Tenderness: There is no abdominal tenderness.   Musculoskeletal:         General: No tenderness or deformity. Normal range " of motion.      Cervical back: Normal range of motion.   Lymphadenopathy:      Cervical: No cervical adenopathy.   Skin:     General: Skin is warm and dry.      Findings: No rash.   Neurological:      General: No focal deficit present.      Mental Status: She is alert and oriented to person, place, and time.      Cranial Nerves: No cranial nerve deficit.      Coordination: Coordination normal.      Deep Tendon Reflexes: Reflexes are normal and symmetric. Reflexes normal.   Psychiatric:         Mood and Affect: Mood normal.         Behavior: Behavior normal.         Thought Content: Thought content normal.         Judgment: Judgment normal.

## 2025-01-04 ENCOUNTER — NURSE TRIAGE (OUTPATIENT)
Dept: OTHER | Facility: OTHER | Age: 70
End: 2025-01-04

## 2025-01-04 NOTE — TELEPHONE ENCOUNTER
"Regarding: possible covid/body aches/runny nose  ----- Message from Lucia MART sent at 1/4/2025 10:14 AM EST -----  \"\"I was taking care of my  who had covid. Today I have runny nose, body aches, cold. I took a Covid test and it came out negative but I have the same symptoms he had. I have the Covid medication and wanted to know f I should take it\"    "

## 2025-01-04 NOTE — TELEPHONE ENCOUNTER
"Reason for Disposition  • [1] COVID-19 infection suspected AND [2] mild symptoms (cough, fever, or others) AND [3] has not gotten tested yet    Additional Information  • [1] Symptoms of COVID-19 (e.g., cough, fever, SOB, or others) AND [2] within 14 days of COVID-19 EXPOSURE    Answer Assessment - Initial Assessment Questions  1. COVID-19 EXPOSURE: \"Please describe how you were exposed to someone with a COVID-19 infection.\"         tested positive 2 days ago     2. PLACE of CONTACT: \"Where were you when you were exposed to COVID-19?\" (e.g., home, school, medical waiting room; which city?)    In the home from  that tested positive 2 days ago         3. TYPE of CONTACT: \"How much contact was there?\" (e.g., sitting next to, live in same house, work in same office, same building)        Lives with  and was taking care of him     4. DURATION of CONTACT: \"How long were you in contact with the COVID-19 patient?\" (e.g., a few seconds, passed by person, a few minutes, 15 minutes or longer, live with the patient)        Lives with      5. DATE of CONTACT: \"When did you have contact with a COVID-19 patient?\" (e.g., hours, days ago)        2 days ago when  tested positive,     6. MASK: \"Were you wearing a mask?\" \"Was the other person wearing a mask?\" Note: wearing a mask reduces the risk of an otherwise close contact.        No     7. SYMPTOMS: \"Do you have any symptoms?\" (e.g., fever, cough, breathing difficulty, loss of taste or smell)        Headache, sore throat, body aches     8. COVID-19 VACCINE: \"Have you had the COVID-19 vaccine?\" If Yes, ask: \"When did you last get it?\"        Did not get vaccines     10. HIGH RISK: \"Do you have any heart or lung problems?\" (e.g., asthma, COPD, heart failure) \"Do you have a weak immune system or other risk factors?\" (e.g., HIV positive, chemotherapy, renal failure, diabetes mellitus, sickle cell anemia, obesity)          History of asthma    Protocols " used: COVID-19 - Exposure-Adult-AH, COVID-19 - Diagnosed or Suspected-Adult-AH

## 2025-01-09 ENCOUNTER — TELEPHONE (OUTPATIENT)
Age: 70
End: 2025-01-09

## 2025-01-09 NOTE — TELEPHONE ENCOUNTER
Pt called stating that she has pretty much been in bed since her visit, she does become out of breath sometimes when doing things around the house, even though she does feel better since taking the course of paxlovid. If there is anything else the Dr would suggest she would appreciate it, but she wanted to let him know that she is feeling better

## 2025-01-09 NOTE — TELEPHONE ENCOUNTER
Sumit     Spoke with the pt and she stated she hasn't been feeling well since her last visit but slowly has been getting better. She will call on Monday to give an update on how she is feeling and if she would need an appt to see the provider.

## 2025-01-14 NOTE — TELEPHONE ENCOUNTER
Patient called back. Says she ended up testing positive for covid the day after her appointment. She's feeling better for the most part. But breathing has been her biggest issue.     She says she can't walk short distances without becoming out of breath. Does have a history of asthma.     She says her friend is having similar symptoms and it turned out to be influenza Type A, so she's wondering if it might be that. She did get her flu shot.     She wants to know what the Dr advises. Thank you!     Ayesha: [phone]

## 2025-01-14 NOTE — TELEPHONE ENCOUNTER
Spoke with patient who verbalized understanding, will call back in a couple of days if not improved.

## 2025-01-19 DIAGNOSIS — K21.9 GASTROESOPHAGEAL REFLUX DISEASE WITHOUT ESOPHAGITIS: ICD-10-CM

## 2025-01-19 DIAGNOSIS — I82.401 ACUTE DEEP VEIN THROMBOSIS (DVT) OF RIGHT LOWER EXTREMITY, UNSPECIFIED VEIN (HCC): ICD-10-CM

## 2025-01-19 RX ORDER — PANTOPRAZOLE SODIUM 40 MG/1
40 TABLET, DELAYED RELEASE ORAL 2 TIMES DAILY
Qty: 180 TABLET | Refills: 0 | Status: SHIPPED | OUTPATIENT
Start: 2025-01-19

## 2025-02-02 PROBLEM — J06.9 VIRAL UPPER RESPIRATORY INFECTION: Status: RESOLVED | Noted: 2025-01-03 | Resolved: 2025-02-02

## 2025-02-12 ENCOUNTER — NURSE TRIAGE (OUTPATIENT)
Age: 70
End: 2025-02-12

## 2025-02-12 NOTE — TELEPHONE ENCOUNTER
"Patient calls with reports of difficulty breathing. She states for the last 3 days she has had shortness of breath on exertion and recovers while at rest. She also noted R sided chest pain but feels it is muscular because she is able to reproduce the pain when she pushes on the area. Patient was not giving clear answers regarding duration and quality of pain.     She would like office visit instead of ED to be evaluated for her shortness of breath.    Office visit made. ED further recommended to patient should symptoms worsen in meantime. She verbalized understanding.     ---------------------------------------------        Reason for Disposition   Chest pain lasting longer than 5 minutes and occurred in last 3 days (72 hours) (Exception: Feels exactly the same as previously diagnosed heartburn and has accompanying sour taste in mouth.)    Additional Information   Chest pain    Answer Assessment - Initial Assessment Questions  1. RESPIRATORY STATUS: \"Describe your breathing?\" (e.g., wheezing, shortness of breath, unable to speak, severe coughing)       Shortness of breath with exertion    2. ONSET: \"When did this breathing problem begin?\"       3 days ago    3. PATTERN \"Does the difficult breathing come and go, or has it been constant since it started?\"       Constant    4. SEVERITY: \"How bad is your breathing?\" (e.g., mild, moderate, severe)       Mild    5. RECURRENT SYMPTOM: \"Have you had difficulty breathing before?\" If Yes, ask: \"When was the last time?\" and \"What happened that time?\"       In January    6. CARDIAC HISTORY: \"Do you have any history of heart disease?\" (e.g., heart attack, angina, bypass surgery, angioplasty)       Denies    7. LUNG HISTORY: \"Do you have any history of lung disease?\"  (e.g., pulmonary embolus, asthma, emphysema)      Asthma, clots in legs    8. CAUSE: \"What do you think is causing the breathing problem?\"       Infection    9. OTHER SYMPTOMS: \"Do you have any other symptoms?\" " "(e.g., chest pain, cough, dizziness, fever, runny nose)      Chest pain, right side.    Answer Assessment - Initial Assessment Questions  1. LOCATION: \"Where does it hurt?\"        R side of chest    2. RADIATION: \"Does the pain go anywhere else?\" (e.g., into neck, jaw, arms, back)      Denies    3. ONSET: \"When did the chest pain begin?\" (Minutes, hours or days)       3 days ago    4. PATTERN: \"Does the pain come and go, or has it been constant since it started?\"  \"Does it get worse with exertion?\"       Constant    5. DURATION: \"How long does it last\" (e.g., seconds, minutes, hours)      Seconds    6. SEVERITY: \"How bad is the pain?\"  (e.g., Scale 1-10; mild, moderate, or severe)      7/10    Patient declining additional triage.    Protocols used: Breathing Difficulty-Adult-OH, Chest Pain-Adult-[place]    "

## 2025-02-13 ENCOUNTER — OFFICE VISIT (OUTPATIENT)
Dept: FAMILY MEDICINE CLINIC | Facility: CLINIC | Age: 70
End: 2025-02-13
Payer: COMMERCIAL

## 2025-02-13 VITALS
HEART RATE: 68 BPM | TEMPERATURE: 97.9 F | DIASTOLIC BLOOD PRESSURE: 57 MMHG | WEIGHT: 190 LBS | OXYGEN SATURATION: 98 % | HEIGHT: 63 IN | BODY MASS INDEX: 33.66 KG/M2 | SYSTOLIC BLOOD PRESSURE: 126 MMHG

## 2025-02-13 DIAGNOSIS — M54.50 CHRONIC LOW BACK PAIN: ICD-10-CM

## 2025-02-13 DIAGNOSIS — J45.909 MODERATE ASTHMA, UNSPECIFIED WHETHER COMPLICATED, UNSPECIFIED WHETHER PERSISTENT: ICD-10-CM

## 2025-02-13 DIAGNOSIS — G43.009 MIGRAINE WITHOUT AURA AND WITHOUT STATUS MIGRAINOSUS, NOT INTRACTABLE: ICD-10-CM

## 2025-02-13 DIAGNOSIS — J45.40 MODERATE PERSISTENT ASTHMA WITHOUT COMPLICATION: Primary | ICD-10-CM

## 2025-02-13 DIAGNOSIS — R06.02 SHORTNESS OF BREATH: ICD-10-CM

## 2025-02-13 DIAGNOSIS — G89.29 CHRONIC LOW BACK PAIN: ICD-10-CM

## 2025-02-13 DIAGNOSIS — R07.89 CHEST TIGHTNESS: ICD-10-CM

## 2025-02-13 DIAGNOSIS — M62.838 MUSCLE SPASM: ICD-10-CM

## 2025-02-13 PROCEDURE — 99214 OFFICE O/P EST MOD 30 MIN: CPT | Performed by: FAMILY MEDICINE

## 2025-02-13 RX ORDER — BUTALBITAL, ACETAMINOPHEN AND CAFFEINE 50; 325; 40 MG/1; MG/1; MG/1
1 TABLET ORAL EVERY 4 HOURS PRN
Qty: 90 TABLET | Refills: 0 | Status: SHIPPED | OUTPATIENT
Start: 2025-02-13

## 2025-02-13 RX ORDER — DIAZEPAM 5 MG/1
5 TABLET ORAL EVERY 8 HOURS PRN
Qty: 30 TABLET | Refills: 0 | Status: SHIPPED | OUTPATIENT
Start: 2025-02-13

## 2025-02-13 RX ORDER — ALBUTEROL SULFATE 90 UG/1
INHALANT RESPIRATORY (INHALATION)
Qty: 108 G | Refills: 3 | Status: SHIPPED | OUTPATIENT
Start: 2025-02-13

## 2025-02-13 NOTE — ASSESSMENT & PLAN NOTE
Patient has been doing Arnuity inhaler along with Flovent and Ventolin.  Recommend Arnuity twice daily x 5 days add additional treatment if necessary based on response

## 2025-02-13 NOTE — PROGRESS NOTES
Name: Ayesha Lockwood      : 1955      MRN: [id number]  Encounter Provider: Lneny Zamarripa DO  Encounter Date: 2025   Encounter department: [facility]  :  Assessment & Plan  Moderate persistent asthma without complication  Patient has been doing Arnuity inhaler along with Flovent and Ventolin.  Recommend Arnuity twice daily x 5 days add additional treatment if necessary based on response         Shortness of breath  Recommend increasing Arnuity inhaler twice daily over the next 3 to 5 days and see if improvement occurs if not we will follow-up as needed and redirect treatment based on symptomatology.  Continue with humidifier at home this is most likely from reactive airway disease post viral infection         Chest tightness  Asymptomatic currently patient had full workup with cardiology no additional EKG or cardiac testing at this time she will follow-up with cardiology as needed or if symptoms change or worsen reviewed recent reports of echocardiogram and stress testing as well as coronary calcium score         Migraine without aura and without status migrainosus, not intractable    Orders:    butalbital-acetaminophen-caffeine (FIORICET,ESGIC) -40 mg per tablet; Take 1 tablet by mouth every 4 (four) hours as needed for headaches    Moderate asthma, unspecified whether complicated, unspecified whether persistent    Orders:    albuterol (PROVENTIL HFA,VENTOLIN HFA) 90 mcg/act inhaler; INHALE 2 PUFFS EVERY 4-6 HOURS AS NEEDED    Muscle spasm    Orders:    diazepam (VALIUM) 5 mg tablet; Take 1 tablet (5 mg total) by mouth every 8 (eight) hours as needed for anxiety    Chronic low back pain    Orders:    diazepam (VALIUM) 5 mg tablet; Take 1 tablet (5 mg total) by mouth every 8 (eight) hours as needed for anxiety           History of Present Illness   SOB now with chest tightness over 1 week    Shortness of Breath  Pertinent negatives include no chest pain,  "coughing, dizziness, fatigue, leg swelling, palpitations, rhinorrhea or sore throat.     Review of Systems   Constitutional:  Negative for chills, fatigue and fever.   HENT:  Negative for congestion, nosebleeds, rhinorrhea, sinus pressure and sore throat.    Eyes:  Negative for discharge and redness.   Respiratory:  Positive for shortness of breath. Negative for cough.    Cardiovascular:  Negative for chest pain, palpitations and leg swelling.   Gastrointestinal:  Negative for abdominal pain, blood in stool and nausea.   Endocrine: Negative for cold intolerance, heat intolerance and polyuria.   Genitourinary:  Negative for dysuria and frequency.   Musculoskeletal:  Negative for arthralgias, back pain and myalgias.   Skin:  Negative for rash.   Neurological:  Negative for dizziness, weakness and headaches.   Hematological:  Negative for adenopathy.   Psychiatric/Behavioral:  Negative for behavioral problems and sleep disturbance. The patient is not nervous/anxious.        Objective   /57   Pulse 68   Temp 97.9 °F (36.6 °C)   Ht 5' 3\" (1.6 m)   Wt 86.2 kg (190 lb)   LMP  (LMP Unknown)   SpO2 98%   BMI 33.66 kg/m²      Physical Exam  Vitals and nursing note reviewed.   Constitutional:       General: She is not in acute distress.     Appearance: She is well-developed.   HENT:      Head: Normocephalic and atraumatic.      Right Ear: External ear normal.      Left Ear: External ear normal.      Nose: Nose normal.      Mouth/Throat:      Mouth: Mucous membranes are moist.      Pharynx: Oropharynx is clear. No oropharyngeal exudate.   Eyes:      General: No scleral icterus.        Right eye: No discharge.         Left eye: No discharge.      Conjunctiva/sclera: Conjunctivae normal.      Pupils: Pupils are equal, round, and reactive to light.   Neck:      Thyroid: No thyromegaly.      Vascular: No JVD.   Cardiovascular:      Rate and Rhythm: Normal rate and regular rhythm.      Heart sounds: Normal heart sounds. " No murmur heard.  Pulmonary:      Effort: Pulmonary effort is normal.      Breath sounds: No wheezing or rales.   Chest:      Chest wall: No tenderness.   Abdominal:      General: Bowel sounds are normal. There is no distension.      Palpations: Abdomen is soft. There is no mass.      Tenderness: There is no abdominal tenderness.   Musculoskeletal:         General: No tenderness or deformity. Normal range of motion.      Cervical back: Normal range of motion.   Lymphadenopathy:      Cervical: No cervical adenopathy.   Skin:     General: Skin is warm and dry.      Findings: No rash.   Neurological:      General: No focal deficit present.      Mental Status: She is alert and oriented to person, place, and time.      Cranial Nerves: No cranial nerve deficit.      Coordination: Coordination normal.      Deep Tendon Reflexes: Reflexes are normal and symmetric. Reflexes normal.   Psychiatric:         Mood and Affect: Mood normal.         Behavior: Behavior normal.         Thought Content: Thought content normal.         Judgment: Judgment normal.

## 2025-02-13 NOTE — ASSESSMENT & PLAN NOTE
Recommend increasing Arnuity inhaler twice daily over the next 3 to 5 days and see if improvement occurs if not we will follow-up as needed and redirect treatment based on symptomatology.  Continue with humidifier at home this is most likely from reactive airway disease post viral infection

## 2025-02-13 NOTE — ASSESSMENT & PLAN NOTE
Asymptomatic currently patient had full workup with cardiology no additional EKG or cardiac testing at this time she will follow-up with cardiology as needed or if symptoms change or worsen reviewed recent reports of echocardiogram and stress testing as well as coronary calcium score

## 2025-02-18 ENCOUNTER — TELEPHONE (OUTPATIENT)
Dept: FAMILY MEDICINE CLINIC | Facility: CLINIC | Age: 70
End: 2025-02-18

## 2025-02-18 DIAGNOSIS — J45.21 MILD INTERMITTENT ASTHMA WITH ACUTE EXACERBATION: ICD-10-CM

## 2025-02-18 RX ORDER — FLUTICASONE PROPIONATE 220 UG/1
2 AEROSOL, METERED RESPIRATORY (INHALATION) 2 TIMES DAILY
Qty: 16 G | Refills: 3 | Status: SHIPPED | OUTPATIENT
Start: 2025-02-18

## 2025-02-18 NOTE — TELEPHONE ENCOUNTER
Pt came into office stating that she was still feeling SOB   Pt feels a bit better when using the inhaler twice daily.    Pt asked if she should still use her inhaler twice daily.

## 2025-02-18 NOTE — TELEPHONE ENCOUNTER
Pt called stating that she reacts better to the Flovents HFA 200mg   Can you please send med to pharm

## 2025-02-18 NOTE — TELEPHONE ENCOUNTER
Patient called in wanting to let the provider know that she reacts better to the Flovent  mg instead of the   fluticasone (Arnuity Ellipta) 100 MCG/ACT AEPB inhaler. She is asking for the generic brand due to the Flovent being discontinued.  Generic she is requesting is Fluticasone Prepionate 220 mg Please advise.       Pharmacy: [facility]/pharmacy #[id number] - [place] [address] - [address]

## 2025-02-18 NOTE — TELEPHONE ENCOUNTER
Patient called in stating she just spoke to Dilma earlier and she was a bit better. She said she now is not feeling as good.  Should she increase the amount she uses the inhaler?  Please contact her.

## 2025-02-19 RX ORDER — FLUTICASONE PROPIONATE 220 UG/1
AEROSOL, METERED RESPIRATORY (INHALATION)
Refills: 3 | OUTPATIENT
Start: 2025-02-19

## 2025-02-19 NOTE — TELEPHONE ENCOUNTER
Patient notified , Patient is going to call pharmacy and see if good RX is cheaper. If not patient is going to use the inhaler she has. Disregard this one.

## 2025-02-24 ENCOUNTER — TELEPHONE (OUTPATIENT)
Age: 70
End: 2025-02-24

## 2025-02-24 DIAGNOSIS — J45.20 MILD INTERMITTENT ASTHMA, UNSPECIFIED WHETHER COMPLICATED: ICD-10-CM

## 2025-02-24 RX ORDER — FLUTICASONE FUROATE 100 UG/1
1 POWDER RESPIRATORY (INHALATION) DAILY
Qty: 30 BLISTER | Refills: 0 | Status: SHIPPED | OUTPATIENT
Start: 2025-02-24 | End: 2025-03-07 | Stop reason: SDUPTHER

## 2025-02-24 NOTE — TELEPHONE ENCOUNTER
Acknowledged    Pt is aware of referral and the RX fill she will all to set up an appt and pickup her medication.

## 2025-02-24 NOTE — TELEPHONE ENCOUNTER
Phone call from patient stating she sent my chart message for refill for  Arnuity Ellipta  inhaler  using 2 times a day,  as the cost for the cost for the Proventail hfa ventolin hfa inhaler is over $400.  Patient also asking for referral to Pulmonology as her  thinks she should go see them for a consult. Her  suggested Dr Francis, but if Dr Zamarripa recommends another pulmonologist she will see who he wants her to.  Please advise. Thank you.

## 2025-02-25 RX ORDER — FLUTICASONE FUROATE 100 UG/1
1 POWDER RESPIRATORY (INHALATION) DAILY
Qty: 30 BLISTER | Refills: 0 | OUTPATIENT
Start: 2025-02-25 | End: 2025-03-27

## 2025-03-07 ENCOUNTER — CONSULT (OUTPATIENT)
Dept: PULMONOLOGY | Facility: CLINIC | Age: 70
End: 2025-03-07
Payer: COMMERCIAL

## 2025-03-07 VITALS
SYSTOLIC BLOOD PRESSURE: 116 MMHG | HEIGHT: 63 IN | WEIGHT: 190 LBS | OXYGEN SATURATION: 97 % | BODY MASS INDEX: 33.66 KG/M2 | TEMPERATURE: 97.1 F | DIASTOLIC BLOOD PRESSURE: 74 MMHG | HEART RATE: 73 BPM

## 2025-03-07 DIAGNOSIS — I82.402 DEEP VEIN THROMBOSIS (DVT) OF LEFT LOWER EXTREMITY, UNSPECIFIED CHRONICITY, UNSPECIFIED VEIN (HCC): ICD-10-CM

## 2025-03-07 DIAGNOSIS — R06.02 SHORTNESS OF BREATH: Primary | ICD-10-CM

## 2025-03-07 DIAGNOSIS — J45.20 MILD INTERMITTENT ASTHMA, UNSPECIFIED WHETHER COMPLICATED: ICD-10-CM

## 2025-03-07 PROCEDURE — 94010 BREATHING CAPACITY TEST: CPT | Performed by: INTERNAL MEDICINE

## 2025-03-07 PROCEDURE — 95012 NITRIC OXIDE EXP GAS DETER: CPT | Performed by: INTERNAL MEDICINE

## 2025-03-07 PROCEDURE — 99244 OFF/OP CNSLTJ NEW/EST MOD 40: CPT | Performed by: INTERNAL MEDICINE

## 2025-03-07 RX ORDER — FLUTICASONE FUROATE 100 UG/1
1 POWDER RESPIRATORY (INHALATION) DAILY
Qty: 30 BLISTER | Refills: 2 | Status: SHIPPED | OUTPATIENT
Start: 2025-03-07 | End: 2025-06-05

## 2025-03-07 NOTE — ASSESSMENT & PLAN NOTE
Continue ICS and prn JAIME as above  Orders:    Ambulatory Referral to Pulmonology    fluticasone (Arnuity Ellipta) 100 MCG/ACT AEPB inhaler; Inhale 1 puff daily Rinse mouth after use.    CBC and differential; Future    XR chest pa and lateral; Future    TSH, 3rd generation with Free T4 reflex; Future

## 2025-03-07 NOTE — PATIENT INSTRUCTIONS
Use Arnuity 100mcg 1puff daily - can increase to twice daily for short periods of time based upon symptoms  Use ventolin every 6 hours as needed for shortness of breath  Get lab work and chest xray completed  Your pulmonary testing was normal today  Follow up in 3 months or sooner as needed

## 2025-03-07 NOTE — PROGRESS NOTES
Pulmonary Consultation   Ayesha Lockwood 69 y.o. female MRN: [id number]  3/7/2025      Reason for Consultation:  asthma evaluation    Requested by: ISHAAN Palomino    Name: Ayesha Lockwood      : 1955      MRN: [id number]  Encounter Provider: Lenny Bennett DO  Encounter Date: 3/7/2025   Encounter department: [facility] [place]  :  Assessment & Plan  Shortness of breath  Suspect post viral dyspnea and possibly RAD  FeNO normal and spirometry normal on ICS  Will check CBC, TFTs, and CXR now for further evaluation  Reduce Arnuity 100mcg to 1puff daily and prn JAIME  Monitor JAIME needs and follow up in 3 months with repeat spirometry and FeNO  She is to call if symptoms worsen in the interim  Orders:    POCT spirometry    POCT FeNO    CBC and differential; Future    XR chest pa and lateral; Future    TSH, 3rd generation with Free T4 reflex; Future    Mild intermittent asthma, unspecified whether complicated  Continue ICS and prn JAIME as above  Orders:    Ambulatory Referral to Pulmonology    fluticasone (Arnuity Ellipta) 100 MCG/ACT AEPB inhaler; Inhale 1 puff daily Rinse mouth after use.    CBC and differential; Future    XR chest pa and lateral; Future    TSH, 3rd generation with Free T4 reflex; Future    Deep vein thrombosis (DVT) of left lower extremity, unspecified chronicity, unspecified vein (HCC)  Persistently elevated DDimer  Has remained on Xarelto             History of Present Illness   HPI:  Ayesha Lockwood is a 69 y.o. female who has asthma, migraine HAs, GERD, allergic rhinitis, prior DVT on Xarelto presents for asthma and dyspnea evaluation.     She reports having significant asthma as a child including frequent admissions as child for respiratory illnesses and infections.  She reports she improved in her 20's and as an adult only needed intermittent inhaler during respiratory infections, no OCS needs. She reports getting ill with viral syndrome in 2025  and noted cough, dyspnea, fatigue and chest tightness. She had single COVID test (+) and completed paxlovid. She noted other tests negative. She felt persistent fatigue and dyspnea. PCP has now placed her on Arnuity 100mcg 1puff twice daily and intermittent JAIME use. She has persistent symptoms of NP cough and dyspnea with some chest tightness. She denied fevers or chills, no pleurisy, no hemoptysis, no LE edema.  She does get relief from Arnuity.      Review of Systems   Constitutional:  Positive for activity change and fatigue. Negative for chills, fever and unexpected weight change.   HENT:  Negative for mouth sores, sore throat and trouble swallowing.    Respiratory:  Positive for cough, chest tightness and shortness of breath. Negative for wheezing.    Cardiovascular:  Negative for chest pain and leg swelling.   Gastrointestinal:  Negative for abdominal pain, nausea and vomiting.   Endocrine: Positive for cold intolerance.   Genitourinary:  Negative for hematuria.   Musculoskeletal:  Negative for gait problem.   Allergic/Immunologic: Positive for environmental allergies. Negative for immunocompromised state.   Neurological:  Negative for light-headedness and headaches.   Hematological:  Negative for adenopathy.   Psychiatric/Behavioral:  Negative for sleep disturbance. The patient is not nervous/anxious.        Historical Information   Past Medical History:   Diagnosis Date    Acid reflux     Allergic     Allergic rhinitis     Last Assessed: 11/2/2017    Anesthesia complication     HYPOTENSION    Arthritis     Asthma     Bigeminy     history    Cataract     DVT (deep venous thrombosis) ([facility]) 10/23/2018    DVT (deep venous thrombosis) ([facility])     DVT of leg (deep venous thrombosis) ([facility]) 2001    left    Fatty liver     Female pelvic pain     GERD (gastroesophageal reflux disease)     Hyperlipidemia     high trigrlycerides    Intermittent palpitations     Interstitial cystitis     Irregular heart beat     Migraines      Neck pain     Obesity     Obesity (BMI 30.0-34.9)     Palpitations     Pes planus     unspecified laterality; Last Assessed: 2014    Pleural effusion     At age 13 yrs    Pneumonia     Child and adolecence    Thyroid nodule     Tinnitus     Trigeminy     history    Wears glasses     Wears reading eyeglasses      Past Surgical History:   Procedure Laterality Date    APPENDECTOMY      BREAST BIOPSY Left 2019    CATARACT EXTRACTION Bilateral      SECTION      X 2    CHOLECYSTECTOMY      COLONOSCOPY      DILATION AND CURETTAGE OF UTERUS      EYE SURGERY  2019    HYSTERECTOMY      total    JOINT REPLACEMENT      left TKR    KNEE SURGERY Left     X 3    DE ESOPHAGOGASTRODUODENOSCOPY TRANSORAL DIAGNOSTIC N/A 2016    Procedure: ESOPHAGOGASTRODUODENOSCOPY (EGD);  Surgeon: Jason Martinez MD;  Location: [facility];  Service: Gastroenterology    DE EXCISION GANGLION WRIST DORSAL/VOLAR PRIMARY Right 2023    Procedure: Right long finger mucoid cyst excision distal interphalangeal joint;  Surgeon: Олег Her MD;  Location: [facility];  Service: Orthopedics    DE LAPS TOTAL HYSTERECT 250 GM/< W/RMVL TUBE/OVARY Bilateral 2016    Procedure: HYSTERECTOMY LAPAROSCOPIC TOTAL ,BSO;  Surgeon: Hermes Moran MD;  Location: [facility];  Service: Gynecology Oncology    REPLACEMENT TOTAL KNEE  2014    TUBAL LIGATION  1988    US GUIDED BREAST BIOPSY LEFT COMPLETE Left 2019    WISDOM TOOTH EXTRACTION       Family History   Problem Relation Age of Onset    Endometrial cancer Mother 68    Migraines Mother     Cancer Mother         Endometrial CA    Obesity Father     Diabetes Father     Heart disease Father     Heart attack Father     Prostate cancer Father     Hypertension Father     Vision loss Father         Retinal detachment right eye    Diabetes Brother     Thyroid cancer Brother         medullary    Diabetes type II Brother     ADD / ADHD Brother     Growth hormone deficiency Daughter      No Known Problems Daughter     Alzheimer's disease Maternal Aunt     Thyroid cancer Maternal Aunt     Breast cancer Maternal Aunt 65    Thyroid cancer Maternal Aunt 85    No Known Problems Maternal Aunt     No Known Problems Maternal Aunt     No Known Problems Maternal Aunt     No Known Problems Maternal Aunt     Alzheimer's disease Maternal Uncle     Diabetes Paternal Aunt     Hashimoto's thyroiditis Paternal Aunt         Total Thyroidectomy    Hypertension Paternal Aunt     No Known Problems Paternal Aunt     Stroke Paternal Uncle     Glaucoma Paternal Uncle     No Known Problems Maternal Grandmother     No Known Problems Paternal Grandmother     Cancer Family     Prostate cancer Paternal Uncle     Colon cancer Neg Hx     Cervical cancer Neg Hx     Ovarian cancer Neg Hx     Uterine cancer Neg Hx        Occupational History: retired pediatrician     Social History: nonsmoker, no pets or exotic animals, raised [place]    Meds/Allergies     Current Outpatient Medications:     acetaminophen (TYLENOL) 650 mg CR tablet, Take 1 tablet (650 mg total) by mouth every 8 (eight) hours as needed for mild pain, Disp: 30 tablet, Rfl: 0    albuterol (PROVENTIL HFA,VENTOLIN HFA) 90 mcg/act inhaler, INHALE 2 PUFFS EVERY 4-6 HOURS AS NEEDED, Disp: 108 g, Rfl: 3    ALPRAZolam (XANAX) 0.25 mg tablet, Take one tablet twice daily as needed for anxiety, Disp: 30 tablet, Rfl: 0    butalbital-acetaminophen-caffeine (FIORICET,ESGIC) -40 mg per tablet, Take 1 tablet by mouth every 4 (four) hours as needed for headaches, Disp: 90 tablet, Rfl: 0    calcium-vitamin D (OSCAL) 250-125 MG-UNIT per tablet, Take 1 tablet by mouth daily, Disp: , Rfl:     chlorzoxazone (PARAFON FORTE) 500 mg tablet, Take 1 tablet (500 mg total) by mouth 4 (four) times a day as needed for muscle spasms, Disp: 30 tablet, Rfl: 0    cycloSPORINE (RESTASIS) 0.05 % ophthalmic emulsion, Apply 1 drop to eye 2 (two) times a day, Disp: 0.4 mL, Rfl: 0    diazepam  (VALIUM) 5 mg tablet, Take 1 tablet (5 mg total) by mouth every 8 (eight) hours as needed for anxiety, Disp: 30 tablet, Rfl: 0    diphenhydrAMINE (BENADRYL) 25 mg capsule, Take by mouth daily at bedtime as needed Taking 50mg at bedtime, Disp: , Rfl:     Elastic Bandages & Supports (Medical Compression Socks) MISC, Use daily, Disp: 5 each, Rfl: 2    fexofenadine (ALLEGRA) 180 MG tablet, Take 180 mg by mouth daily, Disp: , Rfl:     fluticasone (Arnuity Ellipta) 100 MCG/ACT AEPB inhaler, Inhale 1 puff daily Rinse mouth after use., Disp: 30 blister, Rfl: 2    methocarbamol (ROBAXIN) 750 mg tablet, Take 1 tablet (750 mg total) by mouth every 6 (six) hours as needed for muscle spasms, Disp: 30 tablet, Rfl: 0    montelukast (SINGULAIR) 10 mg tablet, Take 1 tablet (10 mg total) by mouth every evening, Disp: 90 tablet, Rfl: 1    Multiple Vitamin (MULTIVITAMIN) capsule, Take 1 capsule by mouth daily, Disp: , Rfl:     naloxone (NARCAN) 4 mg/0.1 mL nasal spray, Administer 1 spray into a nostril. If no response after 2-3 minutes, give another dose in the other nostril using a new spray., Disp: 1 each, Rfl: 1    oxyCODONE-acetaminophen (Percocet) 5-325 mg per tablet, Take 1 tablet by mouth every 4 (four) hours as needed for moderate pain Max Daily Amount: 6 tablets, Disp: 30 tablet, Rfl: 0    pantoprazole (PROTONIX) 40 mg tablet, Take 1 tablet (40 mg total) by mouth 2 (two) times a day, Disp: 180 tablet, Rfl: 0    rivaroxaban (Xarelto) 10 mg tablet, Take 1 tablet (10 mg total) by mouth daily, Disp: 90 tablet, Rfl: 1    traMADol (Ultram) 50 mg tablet, Take 1 tablet (50 mg total) by mouth every 6 (six) hours as needed for moderate pain, Disp: 30 tablet, Rfl: 0  Allergies   Allergen Reactions    Naproxen Shortness Of Breath    Prednisone Facial Swelling    Iv Contrast [Iodinated Contrast Media] Itching    Seasonal Ic [Cholestatin]     Phentermine Palpitations       Vitals: Blood pressure 116/74, pulse 73, temperature (!) 97.1 °F  "(36.2 °C), temperature source Tympanic Core, height 5' 3\" (1.6 m), weight 86.2 kg (190 lb), SpO2 97%, not currently breastfeeding., Body mass index is 33.66 kg/m². Oxygen Therapy  SpO2: 97 %  Oxygen Therapy: None (Room air)    Physical Exam  Physical Exam  Vitals reviewed.   Constitutional:       General: She is not in acute distress.     Appearance: Normal appearance. She is well-developed and normal weight. She is not ill-appearing, toxic-appearing or diaphoretic.   HENT:      Head: Normocephalic and atraumatic.      Right Ear: External ear normal.      Left Ear: External ear normal.      Nose: Nose normal.      Mouth/Throat:      Mouth: Mucous membranes are moist.      Pharynx: Oropharynx is clear. No oropharyngeal exudate.      Comments: No thrush  Eyes:      General: No scleral icterus.        Right eye: No discharge.         Left eye: No discharge.      Conjunctiva/sclera: Conjunctivae normal.      Pupils: Pupils are equal, round, and reactive to light.   Neck:      Vascular: No JVD.      Trachea: No tracheal deviation.   Cardiovascular:      Rate and Rhythm: Normal rate and regular rhythm.      Heart sounds: Normal heart sounds. No murmur heard.     No gallop.   Pulmonary:      Effort: Pulmonary effort is normal. No respiratory distress.      Breath sounds: Normal breath sounds. No stridor. No wheezing, rhonchi or rales.   Abdominal:      General: Bowel sounds are normal. There is no distension.      Palpations: Abdomen is soft.      Tenderness: There is no abdominal tenderness. There is no guarding or rebound.   Musculoskeletal:         General: No deformity.      Right lower leg: No edema.      Left lower leg: No edema.   Lymphadenopathy:      Cervical: No cervical adenopathy.   Skin:     General: Skin is warm and dry.      Coloration: Skin is not jaundiced.      Findings: No erythema or rash.   Neurological:      General: No focal deficit present.      Mental Status: She is alert and oriented to person, " "place, and time. Mental status is at baseline.   Psychiatric:         Mood and Affect: Mood normal.         Behavior: Behavior normal.         Thought Content: Thought content normal.         Labs: I have personally reviewed pertinent lab results.  Lab Results   Component Value Date    WBC 6.18 06/25/2024    HGB 13.3 06/25/2024    HCT 42.5 06/25/2024    MCV 87 06/25/2024     06/25/2024     Lab Results   Component Value Date    GLUCOSE 88 08/20/2015    CALCIUM 8.9 06/25/2024     08/20/2015    K 4.0 06/25/2024    CO2 26 06/25/2024     06/25/2024    BUN 20 06/25/2024    CREATININE 0.65 06/25/2024     No results found for: \"IGE\"  Lab Results   Component Value Date    ALT 29 06/25/2024    AST 24 06/25/2024    ALKPHOS 85 06/25/2024    BILITOT 0.3 08/20/2015       Abs Eos since 2019 ranged 130-220    Imaging and other studies: New images and reports personally reviewed  No recent images    Pulmonary function testing:   3/7/2025 - Ratio 88%, FVC 2.30L (92%, Z -0.48), FEV1 2.02L (103%, Z 0.2) - normal spirometry    3/7/2025 - FeNO - 22 - low degree of lower respiratory tract inflammation    EKG, Pathology, and Other Studies:   TTE 5/2024 - EF 60%, normal RV size/function    Lenny Bennett, DO, FACP  [facility]  "

## 2025-03-07 NOTE — ASSESSMENT & PLAN NOTE
Suspect post viral dyspnea and possibly RAD  FeNO normal and spirometry normal on ICS  Will check CBC, TFTs, and CXR now for further evaluation  Reduce Arnuity 100mcg to 1puff daily and prn JAIME  Monitor JAIME needs and follow up in 3 months with repeat spirometry and FeNO  She is to call if symptoms worsen in the interim  Orders:    POCT spirometry    POCT FeNO    CBC and differential; Future    XR chest pa and lateral; Future    TSH, 3rd generation with Free T4 reflex; Future

## 2025-03-10 ENCOUNTER — APPOINTMENT (OUTPATIENT)
Dept: LAB | Facility: CLINIC | Age: 70
End: 2025-03-10
Payer: COMMERCIAL

## 2025-03-10 ENCOUNTER — APPOINTMENT (OUTPATIENT)
Dept: RADIOLOGY | Facility: CLINIC | Age: 70
End: 2025-03-10
Payer: COMMERCIAL

## 2025-03-10 ENCOUNTER — RESULTS FOLLOW-UP (OUTPATIENT)
Dept: PULMONOLOGY | Facility: CLINIC | Age: 70
End: 2025-03-10

## 2025-03-10 DIAGNOSIS — I82.402 DEEP VEIN THROMBOSIS (DVT) OF LEFT LOWER EXTREMITY, UNSPECIFIED CHRONICITY, UNSPECIFIED VEIN (HCC): ICD-10-CM

## 2025-03-10 DIAGNOSIS — Z00.00 ANNUAL PHYSICAL EXAM: ICD-10-CM

## 2025-03-10 DIAGNOSIS — M54.50 ACUTE RIGHT-SIDED LOW BACK PAIN WITHOUT SCIATICA: ICD-10-CM

## 2025-03-10 DIAGNOSIS — K21.9 GASTROESOPHAGEAL REFLUX DISEASE WITHOUT ESOPHAGITIS: ICD-10-CM

## 2025-03-10 DIAGNOSIS — J45.20 MILD INTERMITTENT ASTHMA, UNSPECIFIED WHETHER COMPLICATED: ICD-10-CM

## 2025-03-10 DIAGNOSIS — S80.861A TICK BITE OF RIGHT LOWER LEG, INITIAL ENCOUNTER: ICD-10-CM

## 2025-03-10 DIAGNOSIS — R06.02 SHORTNESS OF BREATH: ICD-10-CM

## 2025-03-10 DIAGNOSIS — E78.1 HYPERTRIGLYCERIDEMIA: ICD-10-CM

## 2025-03-10 DIAGNOSIS — N30.10 CYSTITIS, INTERSTITIAL: ICD-10-CM

## 2025-03-10 DIAGNOSIS — J45.40 MODERATE PERSISTENT ASTHMA WITHOUT COMPLICATION: ICD-10-CM
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
## 2025-03-10 LAB
ALBUMIN SERPL BCG-MCNC: 4.3 G/DL (ref 3.5–5)
ALP SERPL-CCNC: 112 U/L (ref 34–104)
ALT SERPL W P-5'-P-CCNC: 32 U/L (ref 7–52)
ANION GAP SERPL CALCULATED.3IONS-SCNC: 7 MMOL/L (ref 4–13)
AST SERPL W P-5'-P-CCNC: 26 U/L (ref 13–39)
BASOPHILS # BLD AUTO: 0.08 THOUSANDS/ÂΜL (ref 0–0.1)
BASOPHILS NFR BLD AUTO: 1 % (ref 0–1)
BILIRUB SERPL-MCNC: 0.44 MG/DL (ref 0.2–1)
BUN SERPL-MCNC: 15 MG/DL (ref 5–25)
CALCIUM SERPL-MCNC: 9.3 MG/DL (ref 8.4–10.2)
CHLORIDE SERPL-SCNC: 106 MMOL/L (ref 96–108)
CHOLEST SERPL-MCNC: 205 MG/DL (ref ?–200)
CO2 SERPL-SCNC: 29 MMOL/L (ref 21–32)
CREAT SERPL-MCNC: 0.73 MG/DL (ref 0.6–1.3)
EOSINOPHIL # BLD AUTO: 0.14 THOUSAND/ÂΜL (ref 0–0.61)
EOSINOPHIL NFR BLD AUTO: 2 % (ref 0–6)
ERYTHROCYTE [DISTWIDTH] IN BLOOD BY AUTOMATED COUNT: 14.3 % (ref 11.6–15.1)
EST. AVERAGE GLUCOSE BLD GHB EST-MCNC: 114 MG/DL
GFR SERPL CREATININE-BSD FRML MDRD: 83 ML/MIN/1.73SQ M
GLUCOSE P FAST SERPL-MCNC: 105 MG/DL (ref 65–99)
HBA1C MFR BLD: 5.6 %
HCT VFR BLD AUTO: 46 % (ref 34.8–46.1)
HDLC SERPL-MCNC: 47 MG/DL
HGB BLD-MCNC: 14.3 G/DL (ref 11.5–15.4)
IMM GRANULOCYTES # BLD AUTO: 0.04 THOUSAND/UL (ref 0–0.2)
IMM GRANULOCYTES NFR BLD AUTO: 1 % (ref 0–2)
LDLC SERPL CALC-MCNC: 101 MG/DL (ref 0–100)
LYMPHOCYTES # BLD AUTO: 1.96 THOUSANDS/ÂΜL (ref 0.6–4.47)
LYMPHOCYTES NFR BLD AUTO: 28 % (ref 14–44)
MCH RBC QN AUTO: 27.1 PG (ref 26.8–34.3)
MCHC RBC AUTO-ENTMCNC: 31.1 G/DL (ref 31.4–37.4)
MCV RBC AUTO: 87 FL (ref 82–98)
MONOCYTES # BLD AUTO: 0.57 THOUSAND/ÂΜL (ref 0.17–1.22)
MONOCYTES NFR BLD AUTO: 8 % (ref 4–12)
NEUTROPHILS # BLD AUTO: 4.32 THOUSANDS/ÂΜL (ref 1.85–7.62)
NEUTS SEG NFR BLD AUTO: 60 % (ref 43–75)
NONHDLC SERPL-MCNC: 158 MG/DL
NRBC BLD AUTO-RTO: 0 /100 WBCS
PLATELET # BLD AUTO: 263 THOUSANDS/UL (ref 149–390)
PMV BLD AUTO: 10.1 FL (ref 8.9–12.7)
POTASSIUM SERPL-SCNC: 4.7 MMOL/L (ref 3.5–5.3)
PROT SERPL-MCNC: 6.6 G/DL (ref 6.4–8.4)
RBC # BLD AUTO: 5.27 MILLION/UL (ref 3.81–5.12)
SODIUM SERPL-SCNC: 142 MMOL/L (ref 135–147)
TRIGL SERPL-MCNC: 283 MG/DL (ref ?–150)
TSH SERPL DL<=0.05 MIU/L-ACNC: 2.28 UIU/ML (ref 0.45–4.5)
WBC # BLD AUTO: 7.11 THOUSAND/UL (ref 4.31–10.16)

## 2025-03-10 PROCEDURE — 85025 COMPLETE CBC W/AUTO DIFF WBC: CPT

## 2025-03-10 PROCEDURE — 83036 HEMOGLOBIN GLYCOSYLATED A1C: CPT

## 2025-03-10 PROCEDURE — 71046 X-RAY EXAM CHEST 2 VIEWS: CPT

## 2025-03-10 PROCEDURE — 36415 COLL VENOUS BLD VENIPUNCTURE: CPT

## 2025-03-10 PROCEDURE — 85379 FIBRIN DEGRADATION QUANT: CPT

## 2025-03-10 PROCEDURE — 86618 LYME DISEASE ANTIBODY: CPT

## 2025-03-10 PROCEDURE — 80053 COMPREHEN METABOLIC PANEL: CPT

## 2025-03-10 PROCEDURE — 80061 LIPID PANEL: CPT

## 2025-03-10 PROCEDURE — 84443 ASSAY THYROID STIM HORMONE: CPT

## 2025-03-11 ENCOUNTER — RESULTS FOLLOW-UP (OUTPATIENT)
Dept: PULMONOLOGY | Facility: CLINIC | Age: 70
End: 2025-03-11

## 2025-03-11 LAB
B BURGDOR IGG+IGM SER QL IA: NEGATIVE
D DIMER PPP FEU-MCNC: 0.53 UG/ML FEU

## 2025-03-12 ENCOUNTER — NURSE TRIAGE (OUTPATIENT)
Dept: OTHER | Facility: OTHER | Age: 70
End: 2025-03-12

## 2025-03-12 ENCOUNTER — TELEPHONE (OUTPATIENT)
Dept: FAMILY MEDICINE CLINIC | Facility: CLINIC | Age: 70
End: 2025-03-12

## 2025-03-12 ENCOUNTER — DOCUMENTATION (OUTPATIENT)
Dept: FAMILY MEDICINE CLINIC | Facility: CLINIC | Age: 70
End: 2025-03-12

## 2025-03-12 DIAGNOSIS — H00.012 HORDEOLUM EXTERNUM OF RIGHT LOWER EYELID: Primary | ICD-10-CM

## 2025-03-12 DIAGNOSIS — H00.012 HORDEOLUM EXTERNUM OF RIGHT LOWER EYELID: ICD-10-CM

## 2025-03-12 RX ORDER — BACITRACIN 500 [USP'U]/G
OINTMENT OPHTHALMIC 4 TIMES DAILY
Qty: 3.5 G | Refills: 0 | Status: SHIPPED | OUTPATIENT
Start: 2025-03-12 | End: 2025-03-19

## 2025-03-12 NOTE — TELEPHONE ENCOUNTER
Pt came into the office asking for RX medication for a stye she has on her RLL. She is also having some pain and pressure and its also swollen.     Please advise TY.

## 2025-03-13 ENCOUNTER — TELEPHONE (OUTPATIENT)
Dept: FAMILY MEDICINE CLINIC | Facility: CLINIC | Age: 70
End: 2025-03-13

## 2025-03-13 ENCOUNTER — PROBLEM (OUTPATIENT)
Dept: URBAN - METROPOLITAN AREA CLINIC 6 | Facility: CLINIC | Age: 70
End: 2025-03-13

## 2025-03-13 DIAGNOSIS — H02.882: ICD-10-CM

## 2025-03-13 DIAGNOSIS — H00.12: ICD-10-CM

## 2025-03-13 DIAGNOSIS — H02.885: ICD-10-CM

## 2025-03-13 PROCEDURE — 92012 INTRM OPH EXAM EST PATIENT: CPT

## 2025-03-13 RX ORDER — TOBRAMYCIN/DEXAMETHASONE 0.3 %-0.1%
OINTMENT (GRAM) OPHTHALMIC (EYE)
Qty: 3.5 G | Refills: 0 | Status: SHIPPED | OUTPATIENT
Start: 2025-03-13

## 2025-03-13 ASSESSMENT — VISUAL ACUITY
OD_SC: 20/30-2
OS_SC: 20/25

## 2025-03-13 NOTE — TELEPHONE ENCOUNTER
Please review sent message to office to advise tobradex is not covered. Can an alternative be sent in or should we do a prior auth.

## 2025-03-14 ENCOUNTER — NURSE TRIAGE (OUTPATIENT)
Age: 70
End: 2025-03-14

## 2025-03-14 DIAGNOSIS — N60.12 FIBROCYSTIC BREAST CHANGES OF BOTH BREASTS: ICD-10-CM

## 2025-03-14 DIAGNOSIS — Z12.39 ENCOUNTER FOR BREAST CANCER SCREENING USING NON-MAMMOGRAM MODALITY: Primary | ICD-10-CM

## 2025-03-14 DIAGNOSIS — N60.11 FIBROCYSTIC BREAST CHANGES OF BOTH BREASTS: ICD-10-CM

## 2025-03-14 DIAGNOSIS — N64.4 NIPPLE PAIN: ICD-10-CM

## 2025-03-14 NOTE — TELEPHONE ENCOUNTER
"FOLLOW UP: ESC to Dr. Nicholson, as patient is requesting an appt with her    REASON FOR CONVERSATION: nipple pain    SYMPTOMS: left nipple pain    OTHER: patient reports today she started with pain in her left nipple.  She was in the car for a while, but the seatbelt was not over that area of her breast.  It is sharp and goes through her breast.  It comes and goes.  She denies fevers, redness, discharge and breast pain and tenderness. She is requesting an appt with Dr. Nicholson.  She does not want to see anyone else.  Advised she can take tylenol and use warm compress to affected area.  Will review with provider for schedule, as no appropriate appts available.    DISPOSITION: Discuss with provider and call back pt    Answer Assessment - Initial Assessment Questions  1. SYMPTOM: \"What's the main symptom you're concerned about?\"  (e.g., lump, nipple discharge, pain, rash )      Left nipple sharp pain that does goes through and down breast  2. LOCATION: \"Where is the pain located?\"      Left nipple  3. ONSET: \"When did symptom  start?\"      today  4. PRIOR HISTORY: \"Do you have any history of prior problems with your breasts?\" (e.g., breast cancer, breast implant, fibrocystic breast disease)      denies  5. CAUSE: \"What do you think is causing this symptom?\"      unsure  6. OTHER SYMPTOMS: \"Do you have any other symptoms?\" (e.g., fever, breast pain, nipple discharge, redness or rash)      denies    Protocols used: Breast Symptoms-Adult-[place]    "

## 2025-03-14 NOTE — TELEPHONE ENCOUNTER
Spoke with Dr. Remy, patient can come in Wednesday morning.  Will route to office to call patient Monday morning to schedule.  Patient is aware to call back if symptoms worsen or change.

## 2025-03-24 ENCOUNTER — TELEPHONE (OUTPATIENT)
Age: 70
End: 2025-03-24

## 2025-03-24 NOTE — TELEPHONE ENCOUNTER
Acknowledged    Spoke with pt and made her aware of msg from provider. She will call the office to see if she is able to come into the office tomorrow to discuss possible appt.

## 2025-03-24 NOTE — TELEPHONE ENCOUNTER
Pt called in to update PCP on her allergies starting up, and she has been using her medication(Ventolin) 3x a day.  Pt states she isnt sob or having wheezing.  Pt wanted to start herself on allergy medication but wanted the PCPs advice. PCP please call pt back.

## 2025-03-25 DIAGNOSIS — J45.909 ALLERGIC BRONCHITIS: ICD-10-CM

## 2025-03-26 RX ORDER — MONTELUKAST SODIUM 10 MG/1
10 TABLET ORAL EVERY EVENING
Qty: 90 TABLET | Refills: 1 | Status: SHIPPED | OUTPATIENT
Start: 2025-03-26

## 2025-03-28 DIAGNOSIS — G43.009 MIGRAINE WITHOUT AURA AND WITHOUT STATUS MIGRAINOSUS, NOT INTRACTABLE: ICD-10-CM

## 2025-03-28 RX ORDER — BUTALBITAL, ACETAMINOPHEN AND CAFFEINE 50; 325; 40 MG/1; MG/1; MG/1
1 TABLET ORAL EVERY 4 HOURS PRN
Qty: 90 TABLET | Refills: 0 | Status: SHIPPED | OUTPATIENT
Start: 2025-03-28

## 2025-04-10 ENCOUNTER — INTERMEDIATE FOLLOW-UP (OUTPATIENT)
Dept: URBAN - METROPOLITAN AREA CLINIC 6 | Facility: CLINIC | Age: 70
End: 2025-04-10

## 2025-04-10 DIAGNOSIS — H02.885: ICD-10-CM

## 2025-04-10 DIAGNOSIS — H02.882: ICD-10-CM

## 2025-04-10 DIAGNOSIS — H00.12: ICD-10-CM

## 2025-04-10 PROCEDURE — 92012 INTRM OPH EXAM EST PATIENT: CPT

## 2025-04-10 ASSESSMENT — VISUAL ACUITY
OS_SC: 20/25
OD_SC: 20/25

## 2025-04-10 ASSESSMENT — TONOMETRY
OS_IOP_MMHG: 11
OD_IOP_MMHG: 10

## 2025-04-23 DIAGNOSIS — I82.401 ACUTE DEEP VEIN THROMBOSIS (DVT) OF RIGHT LOWER EXTREMITY, UNSPECIFIED VEIN (HCC): ICD-10-CM

## 2025-04-23 DIAGNOSIS — M54.50 CHRONIC LOW BACK PAIN: ICD-10-CM

## 2025-04-23 DIAGNOSIS — J45.909 ALLERGIC BRONCHITIS: ICD-10-CM

## 2025-04-23 DIAGNOSIS — G89.29 CHRONIC LOW BACK PAIN: ICD-10-CM

## 2025-04-23 DIAGNOSIS — M62.838 MUSCLE SPASM: ICD-10-CM

## 2025-04-23 RX ORDER — MONTELUKAST SODIUM 10 MG/1
10 TABLET ORAL EVERY EVENING
Qty: 90 TABLET | Refills: 0 | OUTPATIENT
Start: 2025-04-23

## 2025-04-23 RX ORDER — DIAZEPAM 5 MG/1
5 TABLET ORAL EVERY 8 HOURS PRN
Qty: 30 TABLET | Refills: 0 | Status: SHIPPED | OUTPATIENT
Start: 2025-04-23

## 2025-04-24 ENCOUNTER — HOSPITAL ENCOUNTER (OUTPATIENT)
Dept: MAMMOGRAPHY | Facility: CLINIC | Age: 70
End: 2025-04-24
Payer: COMMERCIAL

## 2025-04-24 ENCOUNTER — HOSPITAL ENCOUNTER (OUTPATIENT)
Dept: ULTRASOUND IMAGING | Facility: CLINIC | Age: 70
End: 2025-04-24
Payer: COMMERCIAL

## 2025-04-24 DIAGNOSIS — Z12.39 ENCOUNTER FOR BREAST CANCER SCREENING USING NON-MAMMOGRAM MODALITY: ICD-10-CM

## 2025-04-24 DIAGNOSIS — N60.12 FIBROCYSTIC BREAST CHANGES OF BOTH BREASTS: ICD-10-CM

## 2025-04-24 DIAGNOSIS — N60.11 FIBROCYSTIC BREAST CHANGES OF BOTH BREASTS: ICD-10-CM

## 2025-04-24 DIAGNOSIS — Z12.31 ENCOUNTER FOR SCREENING MAMMOGRAM FOR MALIGNANT NEOPLASM OF BREAST: ICD-10-CM

## 2025-04-24 PROCEDURE — 76641 ULTRASOUND BREAST COMPLETE: CPT

## 2025-04-24 PROCEDURE — 77063 BREAST TOMOSYNTHESIS BI: CPT

## 2025-04-24 PROCEDURE — 77067 SCR MAMMO BI INCL CAD: CPT

## 2025-05-01 ENCOUNTER — APPOINTMENT (EMERGENCY)
Dept: CT IMAGING | Facility: HOSPITAL | Age: 70
End: 2025-05-01
Payer: COMMERCIAL

## 2025-05-01 ENCOUNTER — HOSPITAL ENCOUNTER (EMERGENCY)
Facility: HOSPITAL | Age: 70
Discharge: HOME | End: 2025-05-01
Attending: EMERGENCY MEDICINE
Payer: COMMERCIAL

## 2025-05-01 ENCOUNTER — APPOINTMENT (EMERGENCY)
Dept: RADIOLOGY | Facility: HOSPITAL | Age: 70
End: 2025-05-01
Payer: COMMERCIAL

## 2025-05-01 VITALS
OXYGEN SATURATION: 96 % | TEMPERATURE: 98 F | HEART RATE: 64 BPM | WEIGHT: 190.26 LBS | DIASTOLIC BLOOD PRESSURE: 58 MMHG | BODY MASS INDEX: 33.7 KG/M2 | RESPIRATION RATE: 18 BRPM | SYSTOLIC BLOOD PRESSURE: 117 MMHG

## 2025-05-01 DIAGNOSIS — S50.01XA CONTUSION OF RIGHT ELBOW, INITIAL ENCOUNTER: ICD-10-CM

## 2025-05-01 DIAGNOSIS — S16.1XXA STRAIN OF NECK MUSCLE, INITIAL ENCOUNTER: ICD-10-CM

## 2025-05-01 DIAGNOSIS — S09.90XA INJURY OF HEAD, INITIAL ENCOUNTER: Primary | ICD-10-CM

## 2025-05-01 PROCEDURE — 70450 CT HEAD/BRAIN W/O DYE: CPT

## 2025-05-01 PROCEDURE — 99284 EMERGENCY DEPT VISIT MOD MDM: CPT | Performed by: EMERGENCY MEDICINE

## 2025-05-01 PROCEDURE — 73080 X-RAY EXAM OF ELBOW: CPT

## 2025-05-01 PROCEDURE — 99284 EMERGENCY DEPT VISIT MOD MDM: CPT

## 2025-05-01 PROCEDURE — 72125 CT NECK SPINE W/O DYE: CPT

## 2025-05-01 RX ORDER — ACETAMINOPHEN 325 MG/1
975 TABLET ORAL ONCE
Status: COMPLETED | OUTPATIENT
Start: 2025-05-01 | End: 2025-05-01

## 2025-05-01 RX ADMIN — ACETAMINOPHEN 975 MG: 325 TABLET, FILM COATED ORAL at 12:22

## 2025-05-01 NOTE — ED PROVIDER NOTES
Emergency Department Trauma Note  Ayesha Lockwood 69 y.o. female MRN: [id number]  Unit/Bed#: ED 29/ED 29 Encounter: [id number]      Trauma Alert: Trauma Acuity: Trauma Evaluation  Model of Arrival:   via    Trauma Team: Current Providers  Attending Provider: López Forbes MD  Registered Nurse: Cinthia Skelton, RN  Registered Nurse: Dilma Cali, RN  Consultants:     None      History of Present Illness     Chief Complaint:   Chief Complaint   Patient presents with    Fall     Fall on floor after tripping about an hr ago. +BT +head strike -loc      HPI:  Ayesha Lockwood is a 69 y.o. female who presents with fall from standing.  Patient believes she tripped over a cord.  Patient reports falling forward turn to the side hitting the right side of her head against the ground.  Patient reports that she is on Xarelto and her hematologist told her that if she hit her head she needed a CAT scan immediately.  She has some mild right sided neck pain but reports she has recurrent neck pain and was going to have a massage for the pain but this has persistent pain now.  Denies any other injury.  Denies any chest or abdominal injury.  She reports some soreness in her right elbow with range of motion..  Mechanism:Details of Incident: fall after tripping Injury Date: 05/01/25 Injury Time: 1125 Injury Occurence Location - Specify County: [facility] from standing    HPI  Review of Systems   Constitutional:  Negative for fever.   HENT:  Negative for congestion.    Eyes:  Negative for pain and redness.   Respiratory:  Negative for cough and shortness of breath.    Cardiovascular:  Negative for chest pain.   Gastrointestinal:  Negative for abdominal pain and vomiting.   Musculoskeletal:  Positive for neck pain.   Neurological:  Positive for headaches.       Historical Information     Immunizations:   Immunization History   Administered Date(s) Administered    COVID-19 PFIZER VACCINE 0.3 ML IM 02/21/2021, 03/12/2021, 10/05/2021     Hep A, adult 07/03/2019    Hepatitis A 11/19/2008    INFLUENZA 10/01/2007, 01/01/2014, 10/16/2019, 10/07/2020, 10/19/2021, 10/05/2022, 09/27/2023    Influenza Quadrivalent Preservative Free 3 years and older IM 01/01/2014    Influenza Quadrivalent, 6-35 Months IM 09/01/2015    Influenza Split High Dose Preservative Free IM 10/28/2024    Influenza, high dose seasonal 0.7 mL 10/19/2021, 10/05/2022, 09/27/2023    Influenza, recombinant, quadrivalent,injectable, preservative free 10/23/2018    Pneumococcal Conjugate 13-Valent 11/01/2019    Pneumococcal Polysaccharide PPV23 1955    Tdap 1955, 07/03/2019, 10/05/2023       Past Medical History:   Diagnosis Date    Acid reflux     Allergic     Allergic rhinitis     Last Assessed: 11/2/2017    Anesthesia complication     HYPOTENSION    Arthritis     Asthma     Bigeminy     history    Cataract     DVT (deep venous thrombosis) ([facility]) 10/23/2018    DVT (deep venous thrombosis) ([facility])     DVT of leg (deep venous thrombosis) ([facility]) 2001    left    Fatty liver     Female pelvic pain     GERD (gastroesophageal reflux disease)     Hyperlipidemia     high trigrlycerides    Intermittent palpitations     Interstitial cystitis     Irregular heart beat     Migraines     Neck pain     Obesity     Obesity (BMI 30.0-34.9)     Palpitations     Pes planus     unspecified laterality; Last Assessed: 4/25/2014    Pleural effusion     At age 13 yrs    Pneumonia     Child and adolecence    Thyroid nodule     Tinnitus     Trigeminy     history    Wears glasses     Wears reading eyeglasses        Family History   Problem Relation Age of Onset    Endometrial cancer Mother 68    Migraines Mother     Cancer Mother         Endometrial CA    Obesity Father     Diabetes Father     Heart disease Father     Heart attack Father     Prostate cancer Father     Hypertension Father     Vision loss Father         Retinal detachment right eye    Diabetes Brother     Thyroid cancer Brother          medullary    Diabetes type II Brother     ADD / ADHD Brother     Growth hormone deficiency Daughter     No Known Problems Daughter     Alzheimer's disease Maternal Aunt     Thyroid cancer Maternal Aunt     Breast cancer Maternal Aunt 65    Thyroid cancer Maternal Aunt 85    No Known Problems Maternal Aunt     No Known Problems Maternal Aunt     No Known Problems Maternal Aunt     No Known Problems Maternal Aunt     Alzheimer's disease Maternal Uncle     Diabetes Paternal Aunt     Hashimoto's thyroiditis Paternal Aunt         Total Thyroidectomy    Hypertension Paternal Aunt     No Known Problems Paternal Aunt     Stroke Paternal Uncle     Glaucoma Paternal Uncle     No Known Problems Maternal Grandmother     No Known Problems Paternal Grandmother     Cancer Family     Prostate cancer Paternal Uncle     Colon cancer Neg Hx     Cervical cancer Neg Hx     Ovarian cancer Neg Hx     Uterine cancer Neg Hx      Past Surgical History:   Procedure Laterality Date    APPENDECTOMY      BREAST BIOPSY Left 2019    CATARACT EXTRACTION Bilateral      SECTION      X 2    CHOLECYSTECTOMY      COLONOSCOPY      DILATION AND CURETTAGE OF UTERUS      EYE SURGERY  2019    HYSTERECTOMY      total    JOINT REPLACEMENT      left TKR    KNEE SURGERY Left     X 3    MT ESOPHAGOGASTRODUODENOSCOPY TRANSORAL DIAGNOSTIC N/A 2016    Procedure: ESOPHAGOGASTRODUODENOSCOPY (EGD);  Surgeon: Jason Martinez MD;  Location: [facility];  Service: Gastroenterology    MT EXCISION GANGLION WRIST DORSAL/VOLAR PRIMARY Right 2023    Procedure: Right long finger mucoid cyst excision distal interphalangeal joint;  Surgeon: Олег Her MD;  Location: [facility];  Service: Orthopedics    MT LAPS TOTAL HYSTERECT 250 GM/< W/RMVL TUBE/OVARY Bilateral 2016    Procedure: HYSTERECTOMY LAPAROSCOPIC TOTAL ,BSO;  Surgeon: Hermes Moran MD;  Location: [facility];  Service: Gynecology Oncology    REPLACEMENT TOTAL KNEE  2014    TUBAL  LIGATION  1988    US GUIDED BREAST BIOPSY LEFT COMPLETE Left 2019    WISDOM TOOTH EXTRACTION       Social History     Tobacco Use    Smoking status: Former     Current packs/day: 0.00     Average packs/day: 0.5 packs/day for 7.5 years (3.7 ttl pk-yrs)     Types: Cigarettes     Start date: 1971     Quit date: 1978     Years since quittin.8     Passive exposure: Never    Smokeless tobacco: Never    Tobacco comments:     quit  42 years ago   Vaping Use    Vaping status: Never Used   Substance Use Topics    Alcohol use: Yes     Alcohol/week: 1.0 - 2.0 standard drink of alcohol     Types: 1 - 2 Glasses of wine per week     Comment: socially    Drug use: No     E-Cigarette/Vaping    E-Cigarette Use Never User      E-Cigarette/Vaping Substances    Nicotine No     THC No     CBD No     Flavoring No     Other No     Unknown No        Family History: non-contributory    Meds/Allergies   Prior to Admission Medications   Prescriptions Last Dose Informant Patient Reported? Taking?   ALPRAZolam (XANAX) 0.25 mg tablet  Self No No   Sig: Take one tablet twice daily as needed for anxiety   Elastic Bandages & Supports (Medical Compression Socks) MISC  Self No No   Sig: Use daily   Multiple Vitamin (MULTIVITAMIN) capsule  Self Yes No   Sig: Take 1 capsule by mouth daily   TobraDex ophthalmic ointment   No No   Sig: ADMINISTER 0.5 INCHES TO THE RIGHT EYE 3 (THREE) TIMES A DAY   acetaminophen (TYLENOL) 650 mg CR tablet  Self No No   Sig: Take 1 tablet (650 mg total) by mouth every 8 (eight) hours as needed for mild pain   albuterol (PROVENTIL HFA,VENTOLIN HFA) 90 mcg/act inhaler  Self No No   Sig: INHALE 2 PUFFS EVERY 4-6 HOURS AS NEEDED   butalbital-acetaminophen-caffeine (FIORICET,ESGIC) -40 mg per tablet   No No   Sig: Take 1 tablet by mouth every 4 (four) hours as needed for headaches   calcium-vitamin D (OSCAL) 250-125 MG-UNIT per tablet  Self Yes No   Sig: Take 1 tablet by mouth daily   chlorzoxazone  (PARAFON FORTE) 500 mg tablet  Self No No   Sig: Take 1 tablet (500 mg total) by mouth 4 (four) times a day as needed for muscle spasms   cycloSPORINE (RESTASIS) 0.05 % ophthalmic emulsion  Self No No   Sig: Apply 1 drop to eye 2 (two) times a day   diazepam (VALIUM) 5 mg tablet   No No   Sig: Take 1 tablet (5 mg total) by mouth every 8 (eight) hours as needed for anxiety   diphenhydrAMINE (BENADRYL) 25 mg capsule  Self Yes No   Sig: Take by mouth daily at bedtime as needed Taking 50mg at bedtime   fexofenadine (ALLEGRA) 180 MG tablet  Self Yes No   Sig: Take 180 mg by mouth daily   fluticasone (Arnuity Ellipta) 100 MCG/ACT AEPB inhaler   No No   Sig: Inhale 1 puff daily Rinse mouth after use.   methocarbamol (ROBAXIN) 750 mg tablet  Self No No   Sig: Take 1 tablet (750 mg total) by mouth every 6 (six) hours as needed for muscle spasms   montelukast (SINGULAIR) 10 mg tablet   No No   Sig: Take 1 tablet (10 mg total) by mouth every evening   naloxone (NARCAN) 4 mg/0.1 mL nasal spray  Self No No   Sig: Administer 1 spray into a nostril. If no response after 2-3 minutes, give another dose in the other nostril using a new spray.   oxyCODONE-acetaminophen (Percocet) 5-325 mg per tablet  Self No No   Sig: Take 1 tablet by mouth every 4 (four) hours as needed for moderate pain Max Daily Amount: 6 tablets   pantoprazole (PROTONIX) 40 mg tablet  Self No No   Sig: Take 1 tablet (40 mg total) by mouth 2 (two) times a day   rivaroxaban (Xarelto) 10 mg tablet  Self No No   Sig: Take 1 tablet (10 mg total) by mouth daily   traMADol (Ultram) 50 mg tablet  Self No No   Sig: Take 1 tablet (50 mg total) by mouth every 6 (six) hours as needed for moderate pain      Facility-Administered Medications: None       Allergies   Allergen Reactions    Naproxen Shortness Of Breath    Prednisone Facial Swelling    Iv Contrast [Iodinated Contrast Media] Itching    Seasonal Ic [Cholestatin]     Phentermine Palpitations       PHYSICAL EXAM    PE  limited by: Nothing    Objective   Vitals:   First set: Temperature: 98 °F (36.7 °C) (05/01/25 1123)  Pulse: 93 (05/01/25 1123)  Respirations: 17 (05/01/25 1123)  Blood Pressure: 155/71 (05/01/25 1123)  SpO2: 98 % (05/01/25 1123)    Primary Survey:   (A) Airway: Open airway normal phonation  (B) Breathing: Good bilateral breath sounds  (C) Circulation: Pulses:   radial  4/4  (D) Disabliity:  GCS Total:  15  (E) Expose:  Completed    Secondary Survey: (Click on Physical Exam tab above)  Physical Exam  Vitals and nursing note reviewed.   Constitutional:       Appearance: She is well-developed.   HENT:      Head: Normocephalic.      Comments: Right sided scalp tenderness, no laceration no hematoma     Right Ear: External ear normal.      Left Ear: External ear normal.      Nose: Nose normal.      Mouth/Throat:      Mouth: Mucous membranes are moist.      Pharynx: Oropharynx is clear.   Eyes:      General: Lids are normal.      Extraocular Movements: Extraocular movements intact.      Pupils: Pupils are equal, round, and reactive to light.   Neck:      Comments: There is some mild paraspinal neck tenderness some mild midline tenderness around C5, no step-off, no focal weakness  Cardiovascular:      Rate and Rhythm: Normal rate and regular rhythm.      Pulses: Normal pulses.      Heart sounds: Normal heart sounds.   Pulmonary:      Effort: Pulmonary effort is normal. No respiratory distress.   Musculoskeletal:         General: No deformity. Normal range of motion.      Cervical back: Normal range of motion and neck supple. Tenderness present.      Comments: Right elbow shows full range of motion, there is some minimal pain with supination inversion.  Still neurovascular tendon intact.   Skin:     General: Skin is warm and dry.   Neurological:      Mental Status: She is alert and oriented to person, place, and time.   Psychiatric:         Mood and Affect: Mood normal.         Cervical spine cleared by clinical criteria?  No (imaging required)      Invasive Devices       None                   Lab Results:   Results Reviewed       None                   Imaging Studies:   Direct to CT: No  CT head without contrast   Final Result by Cheko Alonzo MD (05/01 1311)      No acute intracranial abnormality. No sphenoid sinus mucosal disease.                  Workstation performed: [id number]         CT spine cervical without contrast   Final Result by Cheko Alonzo MD (05/01 1310)      No cervical spine fracture or traumatic malalignment.                  Workstation performed: [id number]         XR elbow 3+ vw RIGHT   Final Result by Girish Otero MD (05/01 1443)      No acute osseous abnormality.         Computerized Assisted Algorithm (CAA) may have been used to analyze all applicable images.         Workstation performed: [id number]               Procedures  Procedures         ED Course     X-ray of the right elbow showed no fracture no dislocation no bony lesions no joint effusion, interpreted by me shows initial     CT scan of the brain and cervical spine showed no fracture no intracranial bleeding.  Medical Decision Making  Amount and/or Complexity of Data Reviewed  Radiology: ordered.    Risk  OTC drugs.    Medical decision making 69-year-old female presents emergency department after a fall on anticoagulants.  CT scan of the brain cervical spine showed no fracture no intracranial bleeding.  Patient had some right elbow tenderness there was no joint effusion no fracture seen by me.  Discussed treatment of follow-up discussed indications to return.            Disposition  [facility] Transfer: No  Final diagnoses:   Injury of head, initial encounter   Strain of neck muscle, initial encounter   Contusion of right elbow, initial encounter     Time reflects when diagnosis was documented in both MDM as applicable and the Disposition within this note       Time User Action Codes Description Comment    5/1/2025  1:16  PM López Forbes Add [S09.90XA] Injury of head, initial encounter     5/1/2025  1:16 PM López Forbes [S16.1XXA] Strain of neck muscle, initial encounter     5/1/2025  1:17 PM López Forbes [S50.01XA] Contusion of right elbow, initial encounter           ED Disposition       ED Disposition   Discharge    Condition   Stable    Date/Time   Thu May 1, 2025  1:16 PM    Comment   Ayesha Lockwood discharge to home/self care.                   Follow-up Information       Follow up With Specialties Details Why Contact Info    Lenny Zamarripa DO Family Medicine   [address] [place] [address]  [phone]            Discharge Medication List as of 5/1/2025  1:18 PM        CONTINUE these medications which have NOT CHANGED    Details   acetaminophen (TYLENOL) 650 mg CR tablet Take 1 tablet (650 mg total) by mouth every 8 (eight) hours as needed for mild pain, Starting Tue 2/21/2023, Normal      albuterol (PROVENTIL HFA,VENTOLIN HFA) 90 mcg/act inhaler INHALE 2 PUFFS EVERY 4-6 HOURS AS NEEDED, Normal      ALPRAZolam (XANAX) 0.25 mg tablet Take one tablet twice daily as needed for anxiety, Normal      butalbital-acetaminophen-caffeine (FIORICET,ESGIC) -40 mg per tablet Take 1 tablet by mouth every 4 (four) hours as needed for headaches, Starting Fri 3/28/2025, Normal      calcium-vitamin D (OSCAL) 250-125 MG-UNIT per tablet Take 1 tablet by mouth daily, Historical Med      chlorzoxazone (PARAFON FORTE) 500 mg tablet Take 1 tablet (500 mg total) by mouth 4 (four) times a day as needed for muscle spasms, Starting Fri 10/18/2024, Normal      cycloSPORINE (RESTASIS) 0.05 % ophthalmic emulsion Apply 1 drop to eye 2 (two) times a day, Starting Thu 6/8/2023, Normal      diazepam (VALIUM) 5 mg tablet Take 1 tablet (5 mg total) by mouth every 8 (eight) hours as needed for anxiety, Starting Wed 4/23/2025, Normal      diphenhydrAMINE (BENADRYL) 25 mg capsule Take by mouth daily at bedtime as needed Taking  50mg at bedtime, Historical Med      Elastic Bandages & Supports (Medical Compression Socks) MISC Use daily, Starting Wed 7/26/2023, Normal      fexofenadine (ALLEGRA) 180 MG tablet Take 180 mg by mouth daily, Historical Med      fluticasone (Arnuity Ellipta) 100 MCG/ACT AEPB inhaler Inhale 1 puff daily Rinse mouth after use., Starting Fri 3/7/2025, Until Thu 6/5/2025, Normal      methocarbamol (ROBAXIN) 750 mg tablet Take 1 tablet (750 mg total) by mouth every 6 (six) hours as needed for muscle spasms, Starting Thu 12/5/2024, Normal      montelukast (SINGULAIR) 10 mg tablet Take 1 tablet (10 mg total) by mouth every evening, Starting Wed 3/26/2025, Normal      Multiple Vitamin (MULTIVITAMIN) capsule Take 1 capsule by mouth daily, Historical Med      naloxone (NARCAN) 4 mg/0.1 mL nasal spray Administer 1 spray into a nostril. If no response after 2-3 minutes, give another dose in the other nostril using a new spray., Normal      oxyCODONE-acetaminophen (Percocet) 5-325 mg per tablet Take 1 tablet by mouth every 4 (four) hours as needed for moderate pain Max Daily Amount: 6 tablets, Starting Tue 7/23/2024, Normal      pantoprazole (PROTONIX) 40 mg tablet Take 1 tablet (40 mg total) by mouth 2 (two) times a day, Starting Sun 1/19/2025, Normal      rivaroxaban (Xarelto) 10 mg tablet Take 1 tablet (10 mg total) by mouth daily, Starting Mon 1/20/2025, Normal      TobraDex ophthalmic ointment ADMINISTER 0.5 INCHES TO THE RIGHT EYE 3 (THREE) TIMES A DAY, Normal      traMADol (Ultram) 50 mg tablet Take 1 tablet (50 mg total) by mouth every 6 (six) hours as needed for moderate pain, Starting Thu 12/5/2024, Normal           No discharge procedures on file.    PDMP Review         Value Time User    PDMP Reviewed  Yes 4/23/2025  5:10 PM Lenny Zamarripa DO            ED Provider  Electronically Signed by           López Forbes MD  05/01/25 2551

## 2025-05-01 NOTE — DISCHARGE INSTRUCTIONS
Tylenol as needed  Ice to the area of soreness  Follow-up with your provider return increasing pain worsening symptoms or any problems

## 2025-05-09 ENCOUNTER — OFFICE VISIT (OUTPATIENT)
Dept: FAMILY MEDICINE CLINIC | Facility: CLINIC | Age: 70
End: 2025-05-09
Payer: COMMERCIAL

## 2025-05-09 ENCOUNTER — NURSE TRIAGE (OUTPATIENT)
Age: 70
End: 2025-05-09

## 2025-05-09 VITALS
OXYGEN SATURATION: 99 % | RESPIRATION RATE: 20 BRPM | HEART RATE: 68 BPM | WEIGHT: 190 LBS | BODY MASS INDEX: 33.66 KG/M2 | DIASTOLIC BLOOD PRESSURE: 76 MMHG | HEIGHT: 63 IN | TEMPERATURE: 97.8 F | SYSTOLIC BLOOD PRESSURE: 118 MMHG

## 2025-05-09 DIAGNOSIS — I82.401 ACUTE DEEP VEIN THROMBOSIS (DVT) OF RIGHT LOWER EXTREMITY, UNSPECIFIED VEIN (HCC): ICD-10-CM

## 2025-05-09 DIAGNOSIS — S00.03XD CONTUSION OF SCALP, SUBSEQUENT ENCOUNTER: ICD-10-CM

## 2025-05-09 DIAGNOSIS — N30.10 CYSTITIS, INTERSTITIAL: ICD-10-CM

## 2025-05-09 DIAGNOSIS — G43.009 MIGRAINE WITHOUT AURA AND WITHOUT STATUS MIGRAINOSUS, NOT INTRACTABLE: ICD-10-CM

## 2025-05-09 DIAGNOSIS — S13.9XXD ACUTE SPRAIN OF LIGAMENT OF NECK, SUBSEQUENT ENCOUNTER: Primary | ICD-10-CM

## 2025-05-09 DIAGNOSIS — E78.2 MIXED HYPERLIPIDEMIA: ICD-10-CM

## 2025-05-09 DIAGNOSIS — M99.01 CERVICAL SOMATIC DYSFUNCTION: ICD-10-CM

## 2025-05-09 DIAGNOSIS — R39.9 LOWER URINARY TRACT SYMPTOMS: ICD-10-CM

## 2025-05-09 PROBLEM — S00.03XA CONTUSION OF SCALP: Status: ACTIVE | Noted: 2025-05-09

## 2025-05-09 LAB
SL AMB  POCT GLUCOSE, UA: NORMAL
SL AMB LEUKOCYTE ESTERASE,UA: NORMAL
SL AMB POCT BILIRUBIN,UA: NORMAL
SL AMB POCT BLOOD,UA: NORMAL
SL AMB POCT CLARITY,UA: CLEAR
SL AMB POCT COLOR,UA: YELLOW
SL AMB POCT KETONES,UA: NORMAL
SL AMB POCT NITRITE,UA: NORMAL
SL AMB POCT PH,UA: 6
SL AMB POCT SPECIFIC GRAVITY,UA: 1.03
SL AMB POCT URINE PROTEIN: NORMAL
SL AMB POCT UROBILINOGEN: -0.2

## 2025-05-09 PROCEDURE — 98925 OSTEOPATH MANJ 1-2 REGIONS: CPT | Performed by: FAMILY MEDICINE

## 2025-05-09 PROCEDURE — 99214 OFFICE O/P EST MOD 30 MIN: CPT | Performed by: FAMILY MEDICINE

## 2025-05-09 PROCEDURE — 81002 URINALYSIS NONAUTO W/O SCOPE: CPT | Performed by: FAMILY MEDICINE

## 2025-05-09 RX ORDER — BUTALBITAL, ACETAMINOPHEN AND CAFFEINE 50; 325; 40 MG/1; MG/1; MG/1
1 TABLET ORAL EVERY 4 HOURS PRN
Qty: 90 TABLET | Refills: 0 | Status: SHIPPED | OUTPATIENT
Start: 2025-05-09 | End: 2025-05-13 | Stop reason: SDUPTHER

## 2025-05-09 NOTE — TELEPHONE ENCOUNTER
Regarding: medication problem  ----- Message from Lisa SELLERS sent at 5/9/2025  4:44 PM EDT -----  Please resend rx of Fioricent to [facility]/pharmacy #[id number] - [address] and Xarelto to [facility] as medications were sent to incorrect pharmacies. Dr. Cadet is requesting a call back to advise as she is currently at [facility] awaiting to fill the Fioricet.

## 2025-05-09 NOTE — ASSESSMENT & PLAN NOTE
Post emergency department visit after a fall injury she had a CT scan of the neck showing no acute injury.  She has been having neck pain since the fall and would be beneficial to go for physical therapy

## 2025-05-09 NOTE — ASSESSMENT & PLAN NOTE
Longstanding interstitial cystitis patient is noticing a flareup of symptomatology we will provide her with a short course of antibiotics and follow-up if not improving

## 2025-05-09 NOTE — ASSESSMENT & PLAN NOTE
Frequency and urgency will check urinalysis and discuss possibility for treatment based on symptoms    Orders:    POCT urine dip

## 2025-05-09 NOTE — ASSESSMENT & PLAN NOTE
Post fall injury hitting her head and injuring her neck CT scans were done which were negative for fracture she does still have pain and headaches through the occipital region and it would be recommended to go for physical therapy and continue with Tylenol as needed and use the Fioricet when symptoms really flareup or worsen.  No acute bleed or hematoma seen on CT scan in light of her Xarelto dosing

## 2025-05-09 NOTE — PROGRESS NOTES
Name: Ayesha Lockwood      : 1955      MRN: [id number]  Encounter Provider: Lenny Zamarripa DO  Encounter Date: 2025   [facility]  :  Assessment & Plan  Acute sprain of ligament of neck, subsequent encounter  Post emergency department visit after a fall injury she had a CT scan of the neck showing no acute injury.  She has been having neck pain since the fall and would be beneficial to go for physical therapy         Contusion of scalp, subsequent encounter  Post fall injury hitting her head and injuring her neck CT scans were done which were negative for fracture she does still have pain and headaches through the occipital region and it would be recommended to go for physical therapy and continue with Tylenol as needed and use the Fioricet when symptoms really flareup or worsen.  No acute bleed or hematoma seen on CT scan in light of her Xarelto dosing         Lower urinary tract symptoms  Frequency and urgency will check urinalysis and discuss possibility for treatment based on symptoms    Orders:    POCT urine dip    Cystitis, interstitial  Longstanding interstitial cystitis patient is noticing a flareup of symptomatology we will provide her with a short course of antibiotics and follow-up if not improving         Cervical somatic dysfunction    Orders:    OMT    Mixed hyperlipidemia    Orders:    Comprehensive metabolic panel; Future    CBC and differential; Future    Lipid panel; Future    TSH, 3rd generation with Free T4 reflex; Future           History of Present Illness   Follow-up evaluation from emergency department and now with developing urinary tract symptoms on top of head pain and neck pain after the fall injury    Neck Pain   Associated symptoms include headaches. Pertinent negatives include no chest pain, fever or weakness.     Review of Systems   Constitutional:  Negative for chills, fatigue and fever.   HENT:  Negative for  "congestion, nosebleeds, rhinorrhea, sinus pressure and sore throat.    Eyes:  Negative for discharge and redness.   Respiratory:  Negative for cough and shortness of breath.    Cardiovascular:  Negative for chest pain, palpitations and leg swelling.   Gastrointestinal:  Negative for abdominal pain, blood in stool and nausea.   Endocrine: Negative for cold intolerance, heat intolerance and polyuria.   Genitourinary:  Negative for dysuria and frequency.   Musculoskeletal:  Positive for neck pain. Negative for arthralgias, back pain and myalgias.   Skin:  Negative for rash.   Neurological:  Positive for headaches. Negative for dizziness and weakness.   Hematological:  Negative for adenopathy.   Psychiatric/Behavioral:  Negative for behavioral problems and sleep disturbance. The patient is not nervous/anxious.        Objective   /76 (BP Location: Left arm, Patient Position: Sitting, Cuff Size: Standard)   Pulse 68   Temp 97.8 °F (36.6 °C) (Tympanic)   Resp 20   Ht 5' 3\" (1.6 m)   Wt 86.2 kg (190 lb)   LMP  (LMP Unknown)   SpO2 99%   BMI 33.66 kg/m²      Physical Exam  Vitals and nursing note reviewed.   Constitutional:       General: She is not in acute distress.     Appearance: Normal appearance. She is well-developed.   HENT:      Head: Normocephalic and atraumatic.      Right Ear: Tympanic membrane and external ear normal.      Left Ear: Tympanic membrane and external ear normal.      Nose: Nose normal.      Mouth/Throat:      Mouth: Mucous membranes are moist.      Pharynx: Oropharynx is clear. No oropharyngeal exudate.   Eyes:      General: No scleral icterus.        Right eye: No discharge.         Left eye: No discharge.      Conjunctiva/sclera: Conjunctivae normal.      Pupils: Pupils are equal, round, and reactive to light.   Neck:      Thyroid: No thyromegaly.      Vascular: No JVD.   Cardiovascular:      Rate and Rhythm: Normal rate and regular rhythm.      Heart sounds: Normal heart sounds. No " murmur heard.  Pulmonary:      Effort: Pulmonary effort is normal.      Breath sounds: No wheezing or rales.   Chest:      Chest wall: No tenderness.   Abdominal:      General: Bowel sounds are normal. There is no distension.      Palpations: Abdomen is soft. There is no mass.      Tenderness: There is no abdominal tenderness.   Musculoskeletal:         General: No tenderness or deformity. Normal range of motion.      Cervical back: Normal range of motion.   Lymphadenopathy:      Cervical: No cervical adenopathy.   Skin:     General: Skin is warm and dry.      Findings: No rash.   Neurological:      General: No focal deficit present.      Mental Status: She is alert and oriented to person, place, and time.      Cranial Nerves: No cranial nerve deficit.      Coordination: Coordination normal.      Deep Tendon Reflexes: Reflexes are normal and symmetric. Reflexes normal.   Psychiatric:         Mood and Affect: Mood normal.         Behavior: Behavior normal.         Thought Content: Thought content normal.         Judgment: Judgment normal.     OMT    Performed by: Lenny Zamarripa DO  Authorized by: Lenny Zamarripa DO    Universal Protocol:  Consent: Verbal consent obtained.  Consent given by: patient  Patient understanding: patient states understanding of the procedure being performed  Patient identity confirmed: verbally with patient      Procedure Details:     Region evaluated and treated:  Cervical    Cervical Details:     Examination Method:  Tissue Texture Change, Stability, Laxity, Effusions, Tone, Asymmetry, Misalignment, Crepitation, Defects, Masses, Range of Motion, Contracture, Tenderness, Pain and Passive    Treatment Method:  Direct Treatment, Balanced Ligamentous Tension, Ligamentous Articular Strain Treatment, Myofascial Release Treatment and Soft Tissue Treatment    Response:  Improved - The somatic dysfunction is improved but not completely resolved.    Total Regions Treated:  1

## 2025-05-12 DIAGNOSIS — G43.009 MIGRAINE WITHOUT AURA AND WITHOUT STATUS MIGRAINOSUS, NOT INTRACTABLE: ICD-10-CM

## 2025-05-12 DIAGNOSIS — I82.401 ACUTE DEEP VEIN THROMBOSIS (DVT) OF RIGHT LOWER EXTREMITY, UNSPECIFIED VEIN (HCC): ICD-10-CM

## 2025-05-12 RX ORDER — BUTALBITAL, ACETAMINOPHEN AND CAFFEINE 50; 325; 40 MG/1; MG/1; MG/1
1 TABLET ORAL EVERY 4 HOURS PRN
Qty: 90 TABLET | Refills: 0 | OUTPATIENT
Start: 2025-05-12

## 2025-05-13 DIAGNOSIS — I82.401 ACUTE DEEP VEIN THROMBOSIS (DVT) OF RIGHT LOWER EXTREMITY, UNSPECIFIED VEIN (HCC): ICD-10-CM

## 2025-05-13 DIAGNOSIS — G43.009 MIGRAINE WITHOUT AURA AND WITHOUT STATUS MIGRAINOSUS, NOT INTRACTABLE: ICD-10-CM

## 2025-05-13 RX ORDER — BUTALBITAL, ACETAMINOPHEN AND CAFFEINE 50; 325; 40 MG/1; MG/1; MG/1
1 TABLET ORAL EVERY 4 HOURS PRN
Qty: 90 TABLET | Refills: 0 | Status: SHIPPED | OUTPATIENT
Start: 2025-05-13

## 2025-05-13 RX ORDER — BUTALBITAL, ACETAMINOPHEN AND CAFFEINE 50; 325; 40 MG/1; MG/1; MG/1
1 TABLET ORAL EVERY 4 HOURS PRN
Qty: 90 TABLET | Refills: 0 | OUTPATIENT
Start: 2025-05-13

## 2025-05-13 RX ORDER — RIVAROXABAN 10 MG/1
10 TABLET, FILM COATED ORAL DAILY
Qty: 90 TABLET | Refills: 0 | OUTPATIENT
Start: 2025-05-13

## 2025-05-13 NOTE — TELEPHONE ENCOUNTER
Patient called to request a refill for their Fioricet & xarelto advised a refill was requested on 05/13/25 and is pending approval. Patient verbalized understanding and is in agreement.     Does the patient have enough for 3 days?   [] Yes   [x] No - Send as HP to POD patient needs ASAP

## 2025-05-28 ENCOUNTER — OFFICE VISIT (OUTPATIENT)
Dept: URGENT CARE | Facility: CLINIC | Age: 70
End: 2025-05-28
Payer: COMMERCIAL

## 2025-05-28 ENCOUNTER — NURSE TRIAGE (OUTPATIENT)
Age: 70
End: 2025-05-28

## 2025-05-28 VITALS
RESPIRATION RATE: 18 BRPM | WEIGHT: 190 LBS | BODY MASS INDEX: 33.66 KG/M2 | HEART RATE: 74 BPM | SYSTOLIC BLOOD PRESSURE: 123 MMHG | TEMPERATURE: 98 F | OXYGEN SATURATION: 97 % | DIASTOLIC BLOOD PRESSURE: 64 MMHG

## 2025-05-28 DIAGNOSIS — J02.0 STREP PHARYNGITIS: Primary | ICD-10-CM

## 2025-05-28 LAB — S PYO AG THROAT QL: POSITIVE

## 2025-05-28 PROCEDURE — 87880 STREP A ASSAY W/OPTIC: CPT

## 2025-05-28 PROCEDURE — 99213 OFFICE O/P EST LOW 20 MIN: CPT

## 2025-05-28 RX ORDER — PENICILLIN V POTASSIUM 500 MG/1
500 TABLET, FILM COATED ORAL EVERY 8 HOURS SCHEDULED
Qty: 30 TABLET | Refills: 0 | Status: SHIPPED | OUTPATIENT
Start: 2025-05-28 | End: 2025-06-07

## 2025-05-28 NOTE — TELEPHONE ENCOUNTER
"REASON FOR CONVERSATION: Sore Throat and Cough    SYMPTOMS: Sore throat and non-productive cough    OTHER HEALTH INFORMATION: Patient started coughing and sore throat 24-hrs ago. Patient states there is some redness and no pus. Pt denies fever at this time since she hasn't checked it.     PROTOCOL DISPOSITION: See Today in Office    CARE ADVICE PROVIDED: gargle warm salt water, drink tea with honey and otc medications.     PRACTICE FOLLOW-UP: No appts available-advised to go to         Reason for Disposition   SEVERE sore throat pain   Patient wants to be seen    Answer Assessment - Initial Assessment Questions  1. ONSET: \"When did the throat start hurting?\" (Hours or days ago)       Yesterday   2. SEVERITY: \"How bad is the sore throat?\" (Scale 1-10; mild, moderate or severe)      8  3. STREP EXPOSURE: \"Has there been any exposure to strep within the past week?\" If Yes, ask: \"What type of contact occurred?\"       unknown  4.  VIRAL SYMPTOMS: \"Are there any symptoms of a cold, such as a runny nose, cough, hoarse voice or red eyes?\"       cough  5. FEVER: \"Do you have a fever?\" If Yes, ask: \"What is your temperature, how was it measured, and when did it start?\"      denies  6. PUS ON THE TONSILS: \"Is there pus on the tonsils in the back of your throat?\"      Red   7. OTHER SYMPTOMS: \"Do you have any other symptoms?\" (e.g., difficulty breathing, headache, rash)      headache  8. PREGNANCY: \"Is there any chance you are pregnant?\" \"When was your last menstrual period?\"      N/a    Protocols used: Sore Throat-Adult-[place]    "

## 2025-05-28 NOTE — PROGRESS NOTES
[facility]  Name: Ayesha Lockwood      : 1955      MRN: [id number]  Encounter Provider: Mariano Seals PA-C  Encounter Date: 2025   Encounter department: [facility] [place]  :  Assessment & Plan  Strep pharyngitis    Orders:    POCT rapid ANTIGEN strepA    penicillin V potassium (VEETID) 500 mg tablet; Take 1 tablet (500 mg total) by mouth every 8 (eight) hours for 10 days    Rapid strep positive    Will treat with 10-day course of penicillin VK.  Advised patient to use Chloraseptic spray, salt water gargles, menthol cough drops as needed for sore throat relief.  Advised patient to discard of toothbrush within 24 hours after initiation of antibiotics.      Patient Instructions  Take antibiotics for 10 days as prescribed.  Use OTC ibuprofen/tylenol as needed for symptomatic relief.  Replace toothbrush after 24 hours on antibiotic.  May return to school/ after 24 hours fever free without ibuprofen/tylenol.    Follow up with PCP in 3-5 days.  Proceed to  ER if symptoms worsen.    If tests are performed, our office will contact you with results only if changes need to made to the care plan discussed with you at the visit. You can review your full results on [facility].    Chief Complaint:   Chief Complaint   Patient presents with    Sore Throat     Increased sore throat with difficulty swallowing past 3 days.     History of Present Illness   69-year-old female with past medical history of DVT on Xarelto presenting with a sore throat and malaise X 2 days.  Patient notes that symptoms began yesterday morning, has had increasing tenderness to palpation of throat since this morning.  Currently denying fevers, chills, nausea, vomiting, difficulty swallowing, difficulty breathing.  Patient has no known sick contacts with strep/flu.          Review of Systems   Constitutional:  Positive for fatigue. Negative for chills and fever.   HENT:  Positive for sore throat.  Negative for ear pain.    Eyes:  Negative for pain and visual disturbance.   Respiratory:  Negative for cough and shortness of breath.    Cardiovascular:  Negative for chest pain and palpitations.   Gastrointestinal:  Negative for abdominal pain and vomiting.   Genitourinary:  Negative for dysuria and hematuria.   Musculoskeletal:  Negative for arthralgias and back pain.   Skin:  Negative for color change and rash.   Neurological:  Negative for seizures and syncope.   All other systems reviewed and are negative.    Past Medical History   Past Medical History[1]  Past Surgical History[2]  Family History[3]  she reports that she quit smoking about 46 years ago. Her smoking use included cigarettes. She started smoking about 54 years ago. She has a 3.7 pack-year smoking history. She has never been exposed to tobacco smoke. She has never used smokeless tobacco. She reports current alcohol use of about 1.0 - 2.0 standard drink of alcohol per week. She reports that she does not use drugs.  Current Outpatient Medications   Medication Instructions    acetaminophen (TYLENOL) 650 mg, Oral, Every 8 hours PRN    albuterol (PROVENTIL HFA,VENTOLIN HFA) 90 mcg/act inhaler INHALE 2 PUFFS EVERY 4-6 HOURS AS NEEDED    ALPRAZolam (XANAX) 0.25 mg tablet Take one tablet twice daily as needed for anxiety    butalbital-acetaminophen-caffeine (FIORICET,ESGIC) -40 mg per tablet 1 tablet, Oral, Every 4 hours PRN    calcium-vitamin D (OSCAL) 250-125 MG-UNIT per tablet 1 tablet, Daily    chlorzoxazone (PARAFON FORTE) 500 mg, Oral, 4 times daily PRN    cycloSPORINE (RESTASIS) 0.05 % ophthalmic emulsion 1 drop, Ophthalmic, 2 times daily    diazepam (VALIUM) 5 mg, Oral, Every 8 hours PRN    diphenhydrAMINE (BENADRYL) 25 mg capsule Daily at bedtime PRN    Elastic Bandages & Supports (Medical Compression Socks) MISC Does not apply, Daily    fexofenadine (ALLEGRA) 180 mg, Daily    fluticasone (Arnuity Ellipta) 100 MCG/ACT AEPB inhaler 1 puff,  Inhalation, Daily, Rinse mouth after use.    methocarbamol (ROBAXIN) 750 mg, Oral, Every 6 hours PRN    montelukast (SINGULAIR) 10 mg, Oral, Every evening    Multiple Vitamin (MULTIVITAMIN) capsule 1 capsule, Daily    naloxone (NARCAN) 4 mg/0.1 mL nasal spray Administer 1 spray into a nostril. If no response after 2-3 minutes, give another dose in the other nostril using a new spray.    oxyCODONE-acetaminophen (Percocet) 5-325 mg per tablet 1 tablet, Oral, Every 4 hours PRN    pantoprazole (PROTONIX) 40 mg, Oral, 2 times daily    penicillin V potassium (VEETID) 500 mg, Oral, Every 8 hours scheduled    rivaroxaban (XARELTO) 10 mg, Oral, Daily    TobraDex ophthalmic ointment ADMINISTER 0.5 INCHES TO THE RIGHT EYE 3 (THREE) TIMES A DAY    traMADol (ULTRAM) 50 mg, Oral, Every 6 hours PRN   Allergies[4]     Objective   /64   Pulse 74   Temp 98 °F (36.7 °C)   Resp 18   Wt 86.2 kg (190 lb)   LMP  (LMP Unknown)   SpO2 97%   BMI 33.66 kg/m²      Physical Exam  Vitals and nursing note reviewed.   Constitutional:       General: She is not in acute distress.     Appearance: She is well-developed.   HENT:      Head: Normocephalic and atraumatic.      Mouth/Throat:      Mouth: Mucous membranes are moist.      Pharynx: Oropharynx is clear. Uvula midline. Posterior oropharyngeal erythema present. No pharyngeal swelling, oropharyngeal exudate or uvula swelling.      Tonsils: No tonsillar exudate or tonsillar abscesses. 0 on the right. 0 on the left.     Eyes:      Conjunctiva/sclera: Conjunctivae normal.       Cardiovascular:      Rate and Rhythm: Normal rate and regular rhythm.      Heart sounds: No murmur heard.  Pulmonary:      Effort: Pulmonary effort is normal. No respiratory distress.      Breath sounds: Normal breath sounds.   Abdominal:      Palpations: Abdomen is soft.      Tenderness: There is no abdominal tenderness.     Musculoskeletal:         General: No swelling.      Cervical back: Neck supple.  "  Lymphadenopathy:      Cervical: Cervical adenopathy present.     Skin:     General: Skin is warm and dry.      Capillary Refill: Capillary refill takes less than 2 seconds.     Neurological:      Mental Status: She is alert.     Psychiatric:         Mood and Affect: Mood normal.         Portions of the record may have been created with voice recognition software.  Occasional wrong word or \"sound a like\" substitutions may have occurred due to the inherent limitations of voice recognition software.  Read the chart carefully and recognize, using context, where substitutions have occurred.       [1]   Past Medical History:  Diagnosis Date    Acid reflux     Allergic     Allergic rhinitis     Last Assessed: 2017    Anesthesia complication     HYPOTENSION    Arthritis     Asthma     Bigeminy     history    Cataract     DVT (deep venous thrombosis) ([facility]) 10/23/2018    DVT (deep venous thrombosis) ([facility])     DVT of leg (deep venous thrombosis) ([facility])     left    Fatty liver     Female pelvic pain     GERD (gastroesophageal reflux disease)     Hyperlipidemia     high trigrlycerides    Intermittent palpitations     Interstitial cystitis     Irregular heart beat     Migraines     Neck pain     Obesity     Obesity (BMI 30.0-34.9)     Palpitations     Pes planus     unspecified laterality; Last Assessed: 2014    Pleural effusion     At age 13 yrs    Pneumonia     Child and adolecence    Thyroid nodule     Tinnitus     Trigeminy     history    Wears glasses     Wears reading eyeglasses    [2]   Past Surgical History:  Procedure Laterality Date    APPENDECTOMY      BREAST BIOPSY Left 2019    CATARACT EXTRACTION Bilateral      SECTION      X 2    CHOLECYSTECTOMY      COLONOSCOPY      DILATION AND CURETTAGE OF UTERUS      EYE SURGERY  2019    HYSTERECTOMY      total    JOINT REPLACEMENT      left TKR    KNEE SURGERY Left     X 3    MO ESOPHAGOGASTRODUODENOSCOPY TRANSORAL DIAGNOSTIC N/A 2016    Procedure: " ESOPHAGOGASTRODUODENOSCOPY (EGD);  Surgeon: Jason Martinez MD;  Location: [facility];  Service: Gastroenterology    SC EXCISION GANGLION WRIST DORSAL/VOLAR PRIMARY Right 02/21/2023    Procedure: Right long finger mucoid cyst excision distal interphalangeal joint;  Surgeon: Олег Her MD;  Location: [facility];  Service: Orthopedics    SC LAPS TOTAL HYSTERECT 250 GM/< W/RMVL TUBE/OVARY Bilateral 07/08/2016    Procedure: HYSTERECTOMY LAPAROSCOPIC TOTAL ,BSO;  Surgeon: Hermes Moran MD;  Location: [facility];  Service: Gynecology Oncology    REPLACEMENT TOTAL KNEE  2014    TUBAL LIGATION  1988    US GUIDED BREAST BIOPSY LEFT COMPLETE Left 02/06/2019    WISDOM TOOTH EXTRACTION     [3]   Family History  Problem Relation Name Age of Onset    Endometrial cancer Mother Eri 68    Migraines Mother Eri     Cancer Mother Eri         Endometrial CA    Obesity Father Santhosh     Diabetes Father Santhosh     Heart disease Father Santhosh     Heart attack Father Santhosh     Prostate cancer Father Santhosh     Hypertension Father Santhosh     Vision loss Father Santhosh         Retinal detachment right eye    Diabetes Brother Rui     Thyroid cancer Brother Rui         medullary    Diabetes type II Brother Rui     ADD / ADHD Brother Rui     Growth hormone deficiency Daughter      No Known Problems Daughter      Alzheimer's disease Maternal Aunt Era     Thyroid cancer Maternal Aunt Era     Breast cancer Maternal Aunt Era 65    Thyroid cancer Maternal Aunt  85    No Known Problems Maternal Aunt      No Known Problems Maternal Aunt      No Known Problems Maternal Aunt      No Known Problems Maternal Aunt      Alzheimer's disease Maternal Uncle      Diabetes Paternal Aunt Debby     Hashimoto's thyroiditis Paternal Aunt Debby         Total Thyroidectomy    Hypertension Paternal Aunt Debby     No Known Problems Paternal Aunt      Stroke Paternal Uncle Eliezer [place]     Glaucoma Paternal Uncle Eliezer [place]     No  Known Problems Maternal Grandmother      No Known Problems Paternal Grandmother      Cancer Family      Prostate cancer Paternal Uncle Eliezer     Colon cancer Neg Hx      Cervical cancer Neg Hx      Ovarian cancer Neg Hx      Uterine cancer Neg Hx     [4]   Allergies  Allergen Reactions    Naproxen Shortness Of Breath    Prednisone Facial Swelling    Iv Contrast [Iodinated Contrast Media] Itching    Seasonal Ic [Cholestatin]     Phentermine Palpitations

## 2025-05-28 NOTE — PATIENT INSTRUCTIONS
Take antibiotics for 10 days as prescribed.  Use OTC ibuprofen/tylenol as needed for symptomatic relief.  Replace toothbrush after 24 hours on antibiotic.  May return to school/ after 24 hours fever free without ibuprofen/tylenol.

## 2025-05-30 ENCOUNTER — OFFICE VISIT (OUTPATIENT)
Dept: CARDIOLOGY CLINIC | Facility: CLINIC | Age: 70
End: 2025-05-30
Payer: COMMERCIAL

## 2025-05-30 VITALS
RESPIRATION RATE: 16 BRPM | HEIGHT: 63 IN | HEART RATE: 76 BPM | SYSTOLIC BLOOD PRESSURE: 124 MMHG | OXYGEN SATURATION: 98 % | BODY MASS INDEX: 33.66 KG/M2 | WEIGHT: 190 LBS | DIASTOLIC BLOOD PRESSURE: 74 MMHG

## 2025-05-30 DIAGNOSIS — E78.2 MIXED HYPERLIPIDEMIA: Primary | ICD-10-CM

## 2025-05-30 DIAGNOSIS — R06.02 SHORTNESS OF BREATH: ICD-10-CM

## 2025-05-30 PROCEDURE — 99213 OFFICE O/P EST LOW 20 MIN: CPT | Performed by: INTERNAL MEDICINE

## 2025-05-30 NOTE — ASSESSMENT & PLAN NOTE
No recurrence of shortness of breath.  Cardiac workup overall negative.  Patient does have history of pulmonary problems and takes inhalers as needed.  Patient also being treated for streptococcal sore throat.    Symptoms to watch out from cardiac standpoint which would indicate the need for further cardiac evaluation discussed.  Follow-up in 1 year or earlier as needed.

## 2025-05-30 NOTE — PROGRESS NOTES
[facility]  [address] [place] [address]  Cardiology Follow Up    Ayesha Lockwood  1955  [id number]      Assessment & Plan  Mixed hyperlipidemia  Send has elevated triglycerides.  Calcium score is 0.  Previous cardiac workup unremarkable.  Patient would like to continue dietary modification for the same.  Shortness of breath  No recurrence of shortness of breath.  Cardiac workup overall negative.  Patient does have history of pulmonary problems and takes inhalers as needed.  Patient also being treated for streptococcal sore throat.    Symptoms to watch out from cardiac standpoint which would indicate the need for further cardiac evaluation discussed.  Follow-up in 1 year or earlier as needed.       Chief Complaint   Patient presents with    Follow-up       Interval History:   Patient presents for follow-up visit.  Patient denies any history of chest pain shortness of breath.  Patient denies any history of leg edema or orthopnea PND.  No history of presyncope syncope.  Patient states compliance with the present list of medications.  Patient being treated for streptococcal sore throat.    Problem List[1]  Past Medical History[2]  Social History     Socioeconomic History    Marital status: /Civil Union     Spouse name: Not on file    Number of children: Not on file    Years of education: Not on file    Highest education level: Not on file   Occupational History    Occupation: Pediatrics   Tobacco Use    Smoking status: Former     Current packs/day: 0.00     Average packs/day: 0.5 packs/day for 7.5 years (3.7 ttl pk-yrs)     Types: Cigarettes     Start date: 1971     Quit date: 1978     Years since quittin.9     Passive exposure: Never    Smokeless tobacco: Never    Tobacco comments:     quit  42 years ago   Vaping Use    Vaping status: Never Used   Substance and Sexual Activity    Alcohol use: Yes     Alcohol/week: 1.0 - 2.0 standard drink of alcohol      Types: 1 - 2 Glasses of wine per week     Comment: socially    Drug use: No    Sexual activity: Not Currently     Partners: Male     Birth control/protection: Surgical   Other Topics Concern    Not on file   Social History Narrative    Not on file     Social Drivers of Health     Financial Resource Strain: Not on file   Food Insecurity: Not on file   Transportation Needs: Not on file   Physical Activity: Unknown (8/26/2022)    Exercise Vital Sign     Days of Exercise per Week: 0 days     Minutes of Exercise per Session: Not on file   Stress: Stress Concern Present (9/15/2020)    [employer] - Occupational Stress Questionnaire     Feeling of Stress : Very much   Social Connections: Not on file   Intimate Partner Violence: Not on file   Housing Stability: Not on file      Family History[3]  Past Surgical History[4]  Current Medications[5]  Allergies   Allergen Reactions    Naproxen Shortness Of Breath    Prednisone Facial Swelling    Iv Contrast [Iodinated Contrast Media] Itching    Seasonal Ic [Cholestatin]     Phentermine Palpitations       Labs:  Office Visit on 05/28/2025   Component Date Value     RAPID STREP A 05/28/2025 Positive (A)    Office Visit on 05/09/2025   Component Date Value    LEUKOCYTE ESTERASE,UA 05/09/2025 -     NITRITE,UA 05/09/2025 -     SL AMB POCT UROBILINOGEN 05/09/2025 -0.2     POCT URINE PROTEIN 05/09/2025 -      PH,UA 05/09/2025 6.0     BLOOD,UA 05/09/2025 -     SPECIFIC GRAVITY,UA 05/09/2025 1.030     KETONES,UA 05/09/2025 -     BILIRUBIN,UA 05/09/2025 1+     GLUCOSE, UA 05/09/2025 -      COLOR,UA 05/09/2025 yellow     CLARITY,UA 05/09/2025 clear      Imaging: XR elbow 3+ vw RIGHT  Result Date: 5/1/2025  Narrative: XR ELBOW 3+ VW RIGHT INDICATION: Injury. COMPARISON: None FINDINGS: No acute fracture or dislocation. No joint effusion. No significant degenerative changes. No lytic or blastic osseous lesion. Unremarkable soft tissues.     Impression: No acute  osseous abnormality. Computerized Assisted Algorithm (CAA) may have been used to analyze all applicable images. Workstation performed: [id number]     CT head without contrast  Result Date: 5/1/2025  Narrative: CT BRAIN - WITHOUT CONTRAST INDICATION:   Fall hit the right side of her head complains of head neck pain on Xarelto. COMPARISON: 7/22/2024 TECHNIQUE:  CT examination of the brain was performed.  Multiplanar 2D reformatted images were created from the source data. Radiation dose length product (DLP) for this visit:  837 mGy-cm. .  This examination, like all CT scans performed in the [facility], was performed utilizing techniques to minimize radiation dose exposure, including the use of iterative reconstruction and automated exposure control. IMAGE QUALITY:  Diagnostic. FINDINGS: PARENCHYMA:No intracranial mass, mass effect or midline shift. No CT signs of acute infarction.  No acute parenchymal hemorrhage. VENTRICLES AND EXTRA-AXIAL SPACES:  Normal for the patient's age. VISUALIZED ORBITS: Bilateral lens replacement surgery.  No acute abnormality involving the visualized orbits. PARANASAL SINUSES: Aerosolized secretions in the lateral recess of the left sphenoid sinus. CALVARIUM AND EXTRACRANIAL SOFT TISSUES: Normal.     Impression: No acute intracranial abnormality. No sphenoid sinus mucosal disease. Workstation performed: [id number]     CT spine cervical without contrast  Result Date: 5/1/2025  Narrative: CT CERVICAL SPINE - WITHOUT CONTRAST INDICATION:   Fall hit the right side of her head complains of head neck pain on Xarelto. COMPARISON: 7/22/2024 TECHNIQUE:  CT examination of the cervical spine was performed without intravenous contrast.  Contiguous axial images were obtained. Multiplanar 2D reformatted images were created from the source data. Radiation dose length product (DLP) for this visit:  414 mGy-cm. .  This examination, like all CT scans performed in the [facility], was performed utilizing techniques to minimize radiation dose exposure, including the use of iterative reconstruction and automated exposure control. IMAGE QUALITY:  Diagnostic. FINDINGS: ALIGNMENT: Straightening of the cervical spine. Grade 1 anterolisthesis at C3-4, C4-5, C6-7, C7-T1. VERTEBRAE:  No fracture. DEGENERATIVE CHANGES: Mild multilevel cervical degenerative changes are noted without critical central canal stenosis. PREVERTEBRAL AND PARASPINAL SOFT TISSUES: Unremarkable THORACIC INLET:  Normal.     Impression: No cervical spine fracture or traumatic malalignment. Workstation performed: [id number]       Review of Systems:  Review of Systems  REVIEW OF SYSTEMS:  Constitutional:  Denies fever or chills   Eyes:  Denies change in visual acuity   HENT: Being treated for strep sore throat  Respiratory:  Denies cough or shortness of breath   Cardiovascular:  Denies chest pain or edema   GI:  Denies abdominal pain, nausea, vomiting, bloody stools or diarrhea   :  Denies dysuria, frequency, difficulty in micturition and nocturia  Musculoskeletal:  Denies back pain or joint pain   Neurologic:  Denies headache, focal weakness or sensory changes   Endocrine:  Denies polyuria or polydipsia   Lymphatic:  Denies swollen glands   Psychiatric:  Denies depression or anxiety    Physical Exam:    /74 (BP Location: Left arm, Patient Position: Sitting, Cuff Size: Large)   Pulse 76   Resp 16   Ht 5' 3\" (1.6 m)   Wt 86.2 kg (190 lb)   LMP  (LMP Unknown)   SpO2 98%   BMI 33.66 kg/m²     Physical Exam  PHYSICAL EXAM:  General:  Patient is not in acute distress   Head: Normocephalic, Atraumatic.  HEENT:  Both pupils normal-size atraumatic, normocephalic, nonicteric  Neck:  JVP not raised. Trachea central. No carotid bruit  Respiratory:  normal breath sounds no crackles. no rhonchi  Cardiovascular:  Regular rate and rhythm no S3 no murmurs  GI:  Abdomen soft nontender. No organomegaly.   Lymphatic:  No " cervical or inguinal lymphadenopathy  Neurologic:  Patient is awake alert, oriented . Grossly nonfocal  Extremities no edema             [1]   Patient Active Problem List  Diagnosis    Arthralgia of hip, left    Arthritis of left knee    DVT of leg (deep venous thrombosis) ([facility])    Memory difficulty    Migraine, unspecified, not intractable, without status migrainosus    Mild intermittent asthma    Class 1 obesity    Osteopenia of multiple sites    Chronic pain of right knee    Primary osteoarthritis of right knee    Effusion of right knee    Shortness of breath    Thyroid nodule    Posterior tibial tendinitis of right lower extremity    History of deep venous thrombosis (DVT) of distal vein of right lower extremity    Hypertriglyceridemia    Palpitations    Gastroesophageal reflux disease without esophagitis    Moderate persistent asthma without complication    Neck pain    Cystitis, interstitial    Seasonal allergic rhinitis due to pollen    At increased risk of exposure to COVID-19 virus    Chest tightness    Myxoid cyst    COVID-19    Annual physical exam    Trapezius muscle strain, right, initial encounter    Plantar fasciitis, bilateral    Prediabetes    Finger pain, right    Finger wound, simple, open, initial encounter    Aphthous ulcer of mouth    Strain of left piriformis muscle    Chronic left-sided low back pain without sciatica    Acute pain of left shoulder    Acute neck sprain    Tick bite of right lower leg    Acute right-sided low back pain without sciatica    Contusion of scalp    Lower urinary tract symptoms    Cervical somatic dysfunction   [2]   Past Medical History:  Diagnosis Date    Acid reflux     Allergic     Allergic rhinitis     Last Assessed: 11/2/2017    Anesthesia complication     HYPOTENSION    Arthritis     Asthma     Bigeminy     history    Cataract     DVT (deep venous thrombosis) ([facility]) 10/23/2018    DVT (deep venous thrombosis) ([facility])     DVT of leg (deep venous thrombosis) ([facility])  2001    left    Fatty liver     Female pelvic pain     GERD (gastroesophageal reflux disease)     Hyperlipidemia     high trigrlycerides    Intermittent palpitations     Interstitial cystitis     Irregular heart beat     Migraines     Neck pain     Obesity     Obesity (BMI 30.0-34.9)     Palpitations     Pes planus     unspecified laterality; Last Assessed: 4/25/2014    Pleural effusion     At age 13 yrs    Pneumonia     Child and adolecence    Thyroid nodule     Tinnitus     Trigeminy     history    Wears glasses     Wears reading eyeglasses    [3]   Family History  Problem Relation Name Age of Onset    Endometrial cancer Mother Eri 68    Migraines Mother Eri     Cancer Mother Eri         Endometrial CA    Obesity Father Santhosh     Diabetes Father Santhosh     Heart disease Father Santhosh     Heart attack Father Santhosh     Prostate cancer Father Santhosh     Hypertension Father Santhosh     Vision loss Father Santhosh         Retinal detachment right eye    Diabetes Brother Rui     Thyroid cancer Brother Rui         medullary    Diabetes type II Brother Rui     ADD / ADHD Brother Rui     Growth hormone deficiency Daughter      No Known Problems Daughter      Alzheimer's disease Maternal Aunt Era     Thyroid cancer Maternal Aunt Era     Breast cancer Maternal Aunt Era 65    Thyroid cancer Maternal Aunt  85    No Known Problems Maternal Aunt      No Known Problems Maternal Aunt      No Known Problems Maternal Aunt      No Known Problems Maternal Aunt      Alzheimer's disease Maternal Uncle      Diabetes Paternal Aunt Debby     Hashimoto's thyroiditis Paternal Aunt Debby         Total Thyroidectomy    Hypertension Paternal Aunt Debby     No Known Problems Paternal Aunt      Stroke Paternal Uncle Eliezer [place]     Glaucoma Paternal Uncle Eliezer [place]     No Known Problems Maternal Grandmother      No Known Problems Paternal Grandmother      Cancer Family      Prostate cancer Paternal Uncle Eliezer      Colon cancer Neg Hx      Cervical cancer Neg Hx      Ovarian cancer Neg Hx      Uterine cancer Neg Hx     [4]   Past Surgical History:  Procedure Laterality Date    APPENDECTOMY      BREAST BIOPSY Left 2019    CATARACT EXTRACTION Bilateral      SECTION      X 2    CHOLECYSTECTOMY      COLONOSCOPY      DILATION AND CURETTAGE OF UTERUS      EYE SURGERY  2019    HYSTERECTOMY      total    JOINT REPLACEMENT      left TKR    KNEE SURGERY Left     X 3    MN ESOPHAGOGASTRODUODENOSCOPY TRANSORAL DIAGNOSTIC N/A 2016    Procedure: ESOPHAGOGASTRODUODENOSCOPY (EGD);  Surgeon: Jason Martinez MD;  Location: [facility];  Service: Gastroenterology    MN EXCISION GANGLION WRIST DORSAL/VOLAR PRIMARY Right 2023    Procedure: Right long finger mucoid cyst excision distal interphalangeal joint;  Surgeon: Олег Her MD;  Location: [facility];  Service: Orthopedics    MN LAPS TOTAL HYSTERECT 250 GM/< W/RMVL TUBE/OVARY Bilateral 2016    Procedure: HYSTERECTOMY LAPAROSCOPIC TOTAL ,BSO;  Surgeon: Hermes Moran MD;  Location: [facility];  Service: Gynecology Oncology    REPLACEMENT TOTAL KNEE      TUBAL LIGATION      US GUIDED BREAST BIOPSY LEFT COMPLETE Left 2019    WISDOM TOOTH EXTRACTION     [5]   Current Outpatient Medications:     acetaminophen (TYLENOL) 650 mg CR tablet, Take 1 tablet (650 mg total) by mouth every 8 (eight) hours as needed for mild pain, Disp: 30 tablet, Rfl: 0    albuterol (PROVENTIL HFA,VENTOLIN HFA) 90 mcg/act inhaler, INHALE 2 PUFFS EVERY 4-6 HOURS AS NEEDED, Disp: 108 g, Rfl: 3    ALPRAZolam (XANAX) 0.25 mg tablet, Take one tablet twice daily as needed for anxiety, Disp: 30 tablet, Rfl: 0    butalbital-acetaminophen-caffeine (FIORICET,ESGIC) -40 mg per tablet, Take 1 tablet by mouth every 4 (four) hours as needed for headaches, Disp: 90 tablet, Rfl: 0    calcium-vitamin D (OSCAL) 250-125 MG-UNIT per tablet, Take 1 tablet by mouth in the morning., Disp: , Rfl:      cycloSPORINE (RESTASIS) 0.05 % ophthalmic emulsion, Apply 1 drop to eye 2 (two) times a day, Disp: 0.4 mL, Rfl: 0    diazepam (VALIUM) 5 mg tablet, Take 1 tablet (5 mg total) by mouth every 8 (eight) hours as needed for anxiety, Disp: 30 tablet, Rfl: 0    diphenhydrAMINE (BENADRYL) 25 mg capsule, Take by mouth daily at bedtime as needed Taking 50mg at bedtime, Disp: , Rfl:     Elastic Bandages & Supports (Medical Compression Socks) MISC, Use daily, Disp: 5 each, Rfl: 2    fexofenadine (ALLEGRA) 180 MG tablet, Take 180 mg by mouth in the morning., Disp: , Rfl:     fluticasone (Arnuity Ellipta) 100 MCG/ACT AEPB inhaler, Inhale 1 puff daily Rinse mouth after use., Disp: 30 blister, Rfl: 2    methocarbamol (ROBAXIN) 750 mg tablet, Take 1 tablet (750 mg total) by mouth every 6 (six) hours as needed for muscle spasms, Disp: 30 tablet, Rfl: 0    montelukast (SINGULAIR) 10 mg tablet, Take 1 tablet (10 mg total) by mouth every evening, Disp: 90 tablet, Rfl: 1    Multiple Vitamin (MULTIVITAMIN) capsule, Take 1 capsule by mouth in the morning., Disp: , Rfl:     pantoprazole (PROTONIX) 40 mg tablet, Take 1 tablet (40 mg total) by mouth 2 (two) times a day, Disp: 180 tablet, Rfl: 0    penicillin V potassium (VEETID) 500 mg tablet, Take 1 tablet (500 mg total) by mouth every 8 (eight) hours for 10 days, Disp: 30 tablet, Rfl: 0    rivaroxaban (Xarelto) 10 mg tablet, Take 1 tablet (10 mg total) by mouth daily, Disp: 90 tablet, Rfl: 1    traMADol (Ultram) 50 mg tablet, Take 1 tablet (50 mg total) by mouth every 6 (six) hours as needed for moderate pain, Disp: 30 tablet, Rfl: 0

## 2025-06-03 ENCOUNTER — OFFICE VISIT (OUTPATIENT)
Dept: PULMONOLOGY | Facility: CLINIC | Age: 70
End: 2025-06-03
Payer: COMMERCIAL

## 2025-06-03 VITALS
HEIGHT: 63 IN | OXYGEN SATURATION: 97 % | SYSTOLIC BLOOD PRESSURE: 114 MMHG | TEMPERATURE: 97.1 F | RESPIRATION RATE: 12 BRPM | WEIGHT: 190 LBS | DIASTOLIC BLOOD PRESSURE: 72 MMHG | BODY MASS INDEX: 33.66 KG/M2 | HEART RATE: 71 BPM

## 2025-06-03 DIAGNOSIS — R06.02 SHORTNESS OF BREATH: Primary | ICD-10-CM

## 2025-06-03 DIAGNOSIS — J45.909 ALLERGIC BRONCHITIS: ICD-10-CM

## 2025-06-03 DIAGNOSIS — J45.909 MODERATE ASTHMA, UNSPECIFIED WHETHER COMPLICATED, UNSPECIFIED WHETHER PERSISTENT: ICD-10-CM

## 2025-06-03 DIAGNOSIS — Z86.718 HISTORY OF DEEP VENOUS THROMBOSIS (DVT) OF DISTAL VEIN OF RIGHT LOWER EXTREMITY: ICD-10-CM

## 2025-06-03 DIAGNOSIS — J02.0 STREP PHARYNGITIS: ICD-10-CM

## 2025-06-03 DIAGNOSIS — J30.1 SEASONAL ALLERGIC RHINITIS DUE TO POLLEN: ICD-10-CM

## 2025-06-03 DIAGNOSIS — J45.20 MILD INTERMITTENT ASTHMA, UNSPECIFIED WHETHER COMPLICATED: ICD-10-CM

## 2025-06-03 PROCEDURE — 99214 OFFICE O/P EST MOD 30 MIN: CPT | Performed by: INTERNAL MEDICINE

## 2025-06-03 RX ORDER — ALBUTEROL SULFATE 90 UG/1
INHALANT RESPIRATORY (INHALATION)
Qty: 54 G | Refills: 3 | Status: SHIPPED | OUTPATIENT
Start: 2025-06-03

## 2025-06-03 RX ORDER — MONTELUKAST SODIUM 10 MG/1
10 TABLET ORAL EVERY EVENING
Qty: 90 TABLET | Refills: 3 | Status: SHIPPED | OUTPATIENT
Start: 2025-06-03

## 2025-06-03 RX ORDER — FLUTICASONE FUROATE 100 UG/1
1 POWDER RESPIRATORY (INHALATION) DAILY
Qty: 90 BLISTER | Refills: 3 | Status: SHIPPED | OUTPATIENT
Start: 2025-06-03 | End: 2026-05-29

## 2025-06-03 NOTE — PROGRESS NOTES
"Pulmonary Follow Up Note   Ayesha Lockwood 69 y.o. female MRN: [id number]  6/3/2025      Name: Ayesha Lockwood      : 1955      MRN: [id number]  Encounter Provider: Lenny Bennett DO  Encounter Date: 6/3/2025   Encounter department: [facility]  :  Assessment & Plan  Shortness of breath  Overall improved  Currently most symptoms related to upper airway  See below for further treatment         Mild intermittent asthma, unspecified whether complicated  Continue Arnuity 100mcg 1puff daily  PRN JAIME inhaler  Yearly flu vaccine   Orders:    fluticasone (Arnuity Ellipta) 100 MCG/ACT AEPB inhaler; Inhale 1 puff daily Rinse mouth after use.      Seasonal allergic rhinitis due to pollen  Escalate upper airway regimen  Neilmed sinus rinse daily, if needed add flonase therapy  Continue sinuglair  Consider OTC antihistamines if needed       Strep pharyngitis  Complete course PenV K       History of deep venous thrombosis (DVT) of distal vein of right lower extremity  Persistently elevated DDimer  Has remained on Xarelto         Allergic bronchitis    Orders:    montelukast (SINGULAIR) 10 mg tablet; Take 1 tablet (10 mg total) by mouth every evening    Moderate asthma, unspecified whether complicated, unspecified whether persistent    Orders:    albuterol (PROVENTIL HFA,VENTOLIN HFA) 90 mcg/act inhaler; INHALE 2 PUFFS EVERY 4-6 HOURS AS NEEDED          Return in about 6 months (around 12/3/2025).    History of Present Illness   HPI:  Ayesha Lockwood is a 69 y.o. female who has asthma, migraine HAs, GERD, allergic rhinitis, prior DVT on Xarelto. She presented in  for asthma and dyspnea evaluation. At that time she reported the following: \"She reports having significant asthma as a child including frequent admissions as child for respiratory illnesses and infections.  She reports she improved in her 20's and as an adult only needed intermittent inhaler during " "respiratory infections, no OCS needs. She reports getting ill with viral syndrome in Jan 2025 and noted cough, dyspnea, fatigue and chest tightness. She had single COVID test (+) and completed paxlovid. PCP has now placed her on Arnuity 100mcg 1puff twice daily and intermittent JAIME use. She has persistent symptoms of NP cough and dyspnea with some chest tightness.\"  Plans at last visit were to continue Arnuity, check CXR and CBC with diff.  She presents today for follow up.    Last week diagnosed with (+) Strep throat.  She has noted increased sinus symptoms and post nasal drainage. She reports using JAIME twice in the past week. She denied nocturnal dyspnea, no pleurisy, no fevers or chills. Sore throat is improving with ABX. She feels improved on singulair.      Review of Systems   Constitutional:  Negative for activity change, appetite change and fever.   HENT:  Positive for postnasal drip, rhinorrhea, sneezing and sore throat. Negative for ear pain, mouth sores and trouble swallowing.    Respiratory:  Positive for cough and shortness of breath. Negative for chest tightness and wheezing.    Cardiovascular:  Negative for chest pain.   Gastrointestinal:  Negative for abdominal pain, diarrhea, nausea and vomiting.   Musculoskeletal:  Positive for myalgias.   Allergic/Immunologic: Positive for environmental allergies. Negative for immunocompromised state.   Neurological:  Positive for headaches.   Hematological:  Negative for adenopathy.   Psychiatric/Behavioral:  Negative for sleep disturbance.        Historical Information   Past Medical History[1]  Past Surgical History[2]  Family History[3]      Meds/Allergies   Current Medications[4]  Allergies[5]    Vitals: Blood pressure 114/72, pulse 71, temperature (!) 97.1 °F (36.2 °C), temperature source Temporal, resp. rate 12, height 5' 3\" (1.6 m), weight 86.2 kg (190 lb), SpO2 97%, not currently breastfeeding. Body mass index is 33.66 kg/m². Oxygen Therapy  SpO2: 97 " %      Physical Exam  Physical Exam  Vitals reviewed.   Constitutional:       General: She is not in acute distress.     Appearance: Normal appearance. She is well-developed and normal weight. She is not ill-appearing, toxic-appearing or diaphoretic.   HENT:      Head: Normocephalic and atraumatic.      Right Ear: External ear normal.      Left Ear: External ear normal.      Nose: Congestion and rhinorrhea present.      Comments: Nasal turbinate erythema, congestion, inflammation     Mouth/Throat:      Mouth: Mucous membranes are moist.      Pharynx: No oropharyngeal exudate.      Comments: Posterior cobblestoning, no exudates    Eyes:      General: No scleral icterus.        Right eye: No discharge.         Left eye: No discharge.      Conjunctiva/sclera: Conjunctivae normal.      Pupils: Pupils are equal, round, and reactive to light.     Neck:      Vascular: No JVD.      Trachea: No tracheal deviation.     Cardiovascular:      Rate and Rhythm: Normal rate and regular rhythm.      Heart sounds: Normal heart sounds. No murmur heard.     No gallop.   Pulmonary:      Effort: Pulmonary effort is normal. No respiratory distress.      Breath sounds: Normal breath sounds. No stridor. No wheezing, rhonchi or rales.   Abdominal:      General: Bowel sounds are normal. There is no distension.      Palpations: Abdomen is soft.      Tenderness: There is no abdominal tenderness. There is no guarding or rebound.     Musculoskeletal:         General: No deformity.      Right lower leg: No edema.      Left lower leg: No edema.   Lymphadenopathy:      Cervical: No cervical adenopathy.     Skin:     General: Skin is warm and dry.      Coloration: Skin is not jaundiced.      Findings: No erythema or rash.     Neurological:      General: No focal deficit present.      Mental Status: She is alert and oriented to person, place, and time. Mental status is at baseline.     Psychiatric:         Mood and Affect: Mood normal.          "Behavior: Behavior normal.         Thought Content: Thought content normal.         Labs: I have personally reviewed pertinent lab results.  Lab Results   Component Value Date    WBC 7.11 03/10/2025    HGB 14.3 03/10/2025    HCT 46.0 03/10/2025    MCV 87 03/10/2025     03/10/2025     Lab Results   Component Value Date    GLUCOSE 88 08/20/2015    CALCIUM 9.3 03/10/2025     08/20/2015    K 4.7 03/10/2025    CO2 29 03/10/2025     03/10/2025    BUN 15 03/10/2025    CREATININE 0.73 03/10/2025     No results found for: \"IGE\"  Lab Results   Component Value Date    ALT 32 03/10/2025    AST 26 03/10/2025    ALKPHOS 112 (H) 03/10/2025    BILITOT 0.3 08/20/2015       Abs Eos since 2019 ranged 130-220    3/10/2025 - Abs Eos 140, TSH 2.276     Imaging and other studies: New images and reports personally reviewed  CXR 3/10/2025 - no focal infiltrates, no effusions, no PTX     Pulmonary function testing:   3/7/2025 - Ratio 88%, FVC 2.30L (92%, Z -0.48), FEV1 2.02L (103%, Z 0.2) - normal spirometry     3/7/2025 - FeNO - 22 - low degree of lower respiratory tract inflammation     EKG, Pathology, and Other Studies:   TTE 5/2024 - EF 60%, normal RV size/function    Lenny Bennett DO, FACP  [facility]    Answers submitted by the patient for this visit:  Pulmonology Questionnaire (Submitted on 6/2/2025)  Chief Complaint: Primary symptoms  Do you have chest tightness?: Yes  Chronicity: recurrent  When did you first notice your symptoms?: in the past 7 days  How often do your symptoms occur?: daily  Since you first noticed this problem, how has it changed?: gradually improving  Do you have shortness of breath that occurs with effort or exertion?: Yes  Do you have ear congestion?: No  Do you have heartburn?: Yes  Do you have fatigue?: Yes  Do you have nasal congestion?: Yes  Do you have shortness of breath when lying flat?: No  Do you have shortness of breath when you wake up?: " No  Do you have sweats?: No  Have you experienced weight loss?: No  Which of the following makes your symptoms worse?: change in weather  Which of the following makes your symptoms better?: steroid inhaler         [1]   Past Medical History:  Diagnosis Date    Acid reflux     Allergic     Allergic rhinitis     Last Assessed: 2017    Anesthesia complication     HYPOTENSION    Arthritis     Asthma     Bigeminy     history    Cataract     DVT (deep venous thrombosis) (HCC) 10/23/2018    DVT (deep venous thrombosis) ([facility])     DVT of leg (deep venous thrombosis) ([facility])     left    Fatty liver     Female pelvic pain     GERD (gastroesophageal reflux disease)     Hyperlipidemia     high trigrlycerides    Intermittent palpitations     Interstitial cystitis     Irregular heart beat     Migraines     Neck pain     Obesity     Obesity (BMI 30.0-34.9)     Palpitations     Pes planus     unspecified laterality; Last Assessed: 2014    Pleural effusion     At age 13 yrs    Pneumonia     Child and adolecence    Thyroid nodule     Tinnitus     Trigeminy     history    Wears glasses     Wears reading eyeglasses    [2]   Past Surgical History:  Procedure Laterality Date    APPENDECTOMY      BREAST BIOPSY Left 2019    CATARACT EXTRACTION Bilateral      SECTION      X 2    CHOLECYSTECTOMY      COLONOSCOPY      DILATION AND CURETTAGE OF UTERUS      EYE SURGERY  2019    HYSTERECTOMY      total    JOINT REPLACEMENT      left TKR    KNEE SURGERY Left     X 3    NH ESOPHAGOGASTRODUODENOSCOPY TRANSORAL DIAGNOSTIC N/A 2016    Procedure: ESOPHAGOGASTRODUODENOSCOPY (EGD);  Surgeon: Jason Martinez MD;  Location: [facility];  Service: Gastroenterology    NH EXCISION GANGLION WRIST DORSAL/VOLAR PRIMARY Right 2023    Procedure: Right long finger mucoid cyst excision distal interphalangeal joint;  Surgeon: Олег Her MD;  Location: [facility];  Service: Orthopedics    NH LAPS TOTAL HYSTERECT 250 GM/< W/RMVL  TUBE/OVARY Bilateral 07/08/2016    Procedure: HYSTERECTOMY LAPAROSCOPIC TOTAL ,BSO;  Surgeon: Hermes Moran MD;  Location: [facility];  Service: Gynecology Oncology    REPLACEMENT TOTAL KNEE  2014    TUBAL LIGATION  1988    US GUIDED BREAST BIOPSY LEFT COMPLETE Left 02/06/2019    WISDOM TOOTH EXTRACTION     [3]   Family History  Problem Relation Name Age of Onset    Endometrial cancer Mother Eri 68    Migraines Mother Eri     Cancer Mother Eri         Endometrial CA    Obesity Father Santhosh     Diabetes Father Santhosh     Heart disease Father Santhosh     Heart attack Father Santhosh     Prostate cancer Father Santhosh     Hypertension Father Santhosh     Vision loss Father Santhosh         Retinal detachment right eye    Diabetes Brother Rui     Thyroid cancer Brother Rui         medullary    Diabetes type II Brother Rui     ADD / ADHD Brother Rui     Growth hormone deficiency Daughter      No Known Problems Daughter      Alzheimer's disease Maternal Aunt Era     Thyroid cancer Maternal Aunt Era     Breast cancer Maternal Aunt Era 65    Thyroid cancer Maternal Aunt  85    No Known Problems Maternal Aunt      No Known Problems Maternal Aunt      No Known Problems Maternal Aunt      No Known Problems Maternal Aunt      Alzheimer's disease Maternal Uncle      Diabetes Paternal Aunt Debby     Hashimoto's thyroiditis Paternal Aunt Debby         Total Thyroidectomy    Hypertension Paternal Aunt Debby     No Known Problems Paternal Aunt      Stroke Paternal Uncle Eliezer [place]     Glaucoma Paternal Uncle Eliezer [place]     No Known Problems Maternal Grandmother      No Known Problems Paternal Grandmother      Cancer Family      Prostate cancer Paternal Uncle Eliezer     Colon cancer Neg Hx      Cervical cancer Neg Hx      Ovarian cancer Neg Hx      Uterine cancer Neg Hx     [4]   Current Outpatient Medications:     acetaminophen (TYLENOL) 650 mg CR tablet, Take 1 tablet (650 mg total) by mouth every 8  (eight) hours as needed for mild pain, Disp: 30 tablet, Rfl: 0    albuterol (PROVENTIL HFA,VENTOLIN HFA) 90 mcg/act inhaler, INHALE 2 PUFFS EVERY 4-6 HOURS AS NEEDED, Disp: 54 g, Rfl: 3    ALPRAZolam (XANAX) 0.25 mg tablet, Take one tablet twice daily as needed for anxiety, Disp: 30 tablet, Rfl: 0    butalbital-acetaminophen-caffeine (FIORICET,ESGIC) -40 mg per tablet, Take 1 tablet by mouth every 4 (four) hours as needed for headaches, Disp: 90 tablet, Rfl: 0    calcium-vitamin D (OSCAL) 250-125 MG-UNIT per tablet, Take 1 tablet by mouth in the morning., Disp: , Rfl:     cycloSPORINE (RESTASIS) 0.05 % ophthalmic emulsion, Apply 1 drop to eye 2 (two) times a day, Disp: 0.4 mL, Rfl: 0    diazepam (VALIUM) 5 mg tablet, Take 1 tablet (5 mg total) by mouth every 8 (eight) hours as needed for anxiety, Disp: 30 tablet, Rfl: 0    diphenhydrAMINE (BENADRYL) 25 mg capsule, Take by mouth daily at bedtime as needed Taking 50mg at bedtime, Disp: , Rfl:     Elastic Bandages & Supports (Medical Compression Socks) MISC, Use daily, Disp: 5 each, Rfl: 2    fexofenadine (ALLEGRA) 180 MG tablet, Take 180 mg by mouth in the morning., Disp: , Rfl:     fluticasone (Arnuity Ellipta) 100 MCG/ACT AEPB inhaler, Inhale 1 puff daily Rinse mouth after use., Disp: 90 blister, Rfl: 3    methocarbamol (ROBAXIN) 750 mg tablet, Take 1 tablet (750 mg total) by mouth every 6 (six) hours as needed for muscle spasms, Disp: 30 tablet, Rfl: 0    montelukast (SINGULAIR) 10 mg tablet, Take 1 tablet (10 mg total) by mouth every evening, Disp: 90 tablet, Rfl: 3    Multiple Vitamin (MULTIVITAMIN) capsule, Take 1 capsule by mouth in the morning., Disp: , Rfl:     pantoprazole (PROTONIX) 40 mg tablet, Take 1 tablet (40 mg total) by mouth 2 (two) times a day, Disp: 180 tablet, Rfl: 0    penicillin V potassium (VEETID) 500 mg tablet, Take 1 tablet (500 mg total) by mouth every 8 (eight) hours for 10 days, Disp: 30 tablet, Rfl: 0    rivaroxaban (Xarelto) 10  mg tablet, Take 1 tablet (10 mg total) by mouth daily, Disp: 90 tablet, Rfl: 1    traMADol (Ultram) 50 mg tablet, Take 1 tablet (50 mg total) by mouth every 6 (six) hours as needed for moderate pain, Disp: 30 tablet, Rfl: 0  [5]   Allergies  Allergen Reactions    Naproxen Shortness Of Breath    Prednisone Facial Swelling    Iv Contrast [Iodinated Contrast Media] Itching    Seasonal Ic [Cholestatin]     Phentermine Palpitations

## 2025-06-03 NOTE — ASSESSMENT & PLAN NOTE
Continue Arnuity 100mcg 1puff daily  PRN JAIME inhaler  Yearly flu vaccine   Orders:    fluticasone (Arnuity Ellipta) 100 MCG/ACT AEPB inhaler; Inhale 1 puff daily Rinse mouth after use.

## 2025-06-03 NOTE — ASSESSMENT & PLAN NOTE
Overall improved  Currently most symptoms related to upper airway  See below for further treatment

## 2025-06-03 NOTE — ASSESSMENT & PLAN NOTE
Escalate upper airway regimen  Neilmed sinus rinse daily, if needed add flonase therapy  Continue sinuglair  Consider OTC antihistamines if needed

## 2025-06-03 NOTE — PATIENT INSTRUCTIONS
Continue Arnuity 1puff once daily, rinse mouth after use  Start Neilmed sinus rinse each nostril daily  If sinus symptoms persist, start flonase each nostril after sinus rinse  Continue Singulair  Use ventolin 2puffs every 6 hours as needed for shortness of breath  Get flu shot each fall

## 2025-06-19 ENCOUNTER — TELEPHONE (OUTPATIENT)
Dept: FAMILY MEDICINE CLINIC | Facility: CLINIC | Age: 70
End: 2025-06-19

## 2025-06-19 ENCOUNTER — TELEPHONE (OUTPATIENT)
Age: 70
End: 2025-06-19

## 2025-06-19 DIAGNOSIS — H93.13 TINNITUS OF BOTH EARS: Primary | ICD-10-CM

## 2025-06-19 NOTE — TELEPHONE ENCOUNTER
Tried to call pt to reschedule their appt on 8/14/25 due to provider not being in the office. Pt did not answer left VM to call office back.

## 2025-06-19 NOTE — TELEPHONE ENCOUNTER
Pt called and is wondering if Dr Zamarripa can re open the Audiology referral. Her tinnitus is really bothering her and she would like to see someone.     Please advise and notify pt, thanks.

## 2025-06-19 NOTE — TELEPHONE ENCOUNTER
Patient returning call to office.  Called office clerical and spoke with Sekou Reis transferred patient to Sekou for further assistance

## 2025-07-02 DIAGNOSIS — G43.009 MIGRAINE WITHOUT AURA AND WITHOUT STATUS MIGRAINOSUS, NOT INTRACTABLE: ICD-10-CM

## 2025-07-02 RX ORDER — BUTALBITAL, ACETAMINOPHEN AND CAFFEINE 50; 325; 40 MG/1; MG/1; MG/1
1 TABLET ORAL EVERY 4 HOURS PRN
Qty: 90 TABLET | Refills: 0 | Status: SHIPPED | OUTPATIENT
Start: 2025-07-02

## 2025-07-23 ENCOUNTER — TELEPHONE (OUTPATIENT)
Age: 70
End: 2025-07-23

## 2025-07-23 DIAGNOSIS — H00.016 HORDEOLUM EXTERNUM OF LEFT EYE, UNSPECIFIED EYELID: Primary | ICD-10-CM

## 2025-07-23 RX ORDER — TOBRAMYCIN AND DEXAMETHASONE 3; 1 MG/ML; MG/ML
1 SUSPENSION/ DROPS OPHTHALMIC
Qty: 5 ML | Refills: 0 | Status: SHIPPED | OUTPATIENT
Start: 2025-07-23

## 2025-07-23 NOTE — TELEPHONE ENCOUNTER
Patient called c/o left eye swelling and believes she has a stye. Patient requesting eye drops.  Patient offered an appointment today with PCP at 2:40 pm but does not want to wait that long.      CTS spoke with office clerical who will send a message to PCP and call patient back with recommendations.    Patient aware and agreeable.

## 2025-07-23 NOTE — TELEPHONE ENCOUNTER
Patient called in stating she has a stye in her left eye and she doesn't want to come in to be seen. Patient asking if you can send something into the pharmacy

## 2025-07-29 ENCOUNTER — OFFICE VISIT (OUTPATIENT)
Dept: FAMILY MEDICINE CLINIC | Facility: CLINIC | Age: 70
End: 2025-07-29
Payer: COMMERCIAL

## 2025-07-29 VITALS
HEIGHT: 63 IN | TEMPERATURE: 96.3 F | OXYGEN SATURATION: 97 % | RESPIRATION RATE: 18 BRPM | BODY MASS INDEX: 33.66 KG/M2 | WEIGHT: 190 LBS | DIASTOLIC BLOOD PRESSURE: 60 MMHG | HEART RATE: 64 BPM | SYSTOLIC BLOOD PRESSURE: 110 MMHG

## 2025-07-29 DIAGNOSIS — J45.20 MILD INTERMITTENT ASTHMA, UNSPECIFIED WHETHER COMPLICATED: ICD-10-CM

## 2025-07-29 DIAGNOSIS — R35.0 URINARY FREQUENCY: Primary | ICD-10-CM

## 2025-07-29 DIAGNOSIS — R00.2 PALPITATIONS: ICD-10-CM

## 2025-07-29 DIAGNOSIS — M54.50 ACUTE RIGHT-SIDED LOW BACK PAIN WITHOUT SCIATICA: ICD-10-CM

## 2025-07-29 DIAGNOSIS — R10.2 FEMALE PELVIC PAIN: ICD-10-CM

## 2025-07-29 PROCEDURE — 99214 OFFICE O/P EST MOD 30 MIN: CPT | Performed by: FAMILY MEDICINE

## 2025-07-29 PROCEDURE — 81003 URINALYSIS AUTO W/O SCOPE: CPT | Performed by: FAMILY MEDICINE

## 2025-07-30 LAB
BILIRUB UR QL STRIP: NEGATIVE
CLARITY UR: NORMAL
COLOR UR: YELLOW
GLUCOSE UR STRIP-MCNC: NEGATIVE MG/DL
HGB UR QL STRIP.AUTO: NEGATIVE
KETONES UR STRIP-MCNC: NEGATIVE MG/DL
LEUKOCYTE ESTERASE UR QL STRIP: NEGATIVE
NITRITE UR QL STRIP: NEGATIVE
PH UR STRIP.AUTO: 5 [PH]
PROT UR STRIP-MCNC: NEGATIVE MG/DL
SP GR UR STRIP.AUTO: 1.03 (ref 1–1.03)
UROBILINOGEN UR STRIP-ACNC: <2 MG/DL

## 2025-08-02 DIAGNOSIS — M25.461 EFFUSION OF RIGHT KNEE: ICD-10-CM

## 2025-08-04 RX ORDER — TRAMADOL HYDROCHLORIDE 50 MG/1
50 TABLET ORAL EVERY 6 HOURS PRN
Qty: 30 TABLET | Refills: 0 | Status: SHIPPED | OUTPATIENT
Start: 2025-08-04

## 2025-08-15 ENCOUNTER — APPOINTMENT (OUTPATIENT)
Dept: LAB | Facility: CLINIC | Age: 70
End: 2025-08-15

## 2025-08-15 DIAGNOSIS — G43.009 MIGRAINE WITHOUT AURA AND WITHOUT STATUS MIGRAINOSUS, NOT INTRACTABLE: ICD-10-CM

## 2025-08-18 RX ORDER — BUTALBITAL, ACETAMINOPHEN AND CAFFEINE 50; 325; 40 MG/1; MG/1; MG/1
1 TABLET ORAL EVERY 4 HOURS PRN
Qty: 30 TABLET | Refills: 0 | Status: SHIPPED | OUTPATIENT
Start: 2025-08-18

## (undated) DEVICE — OCCLUSIVE GAUZE STRIP,3% BISMUTH TRIBROMOPHENATE IN PETROLATUM BLEND: Brand: XEROFORM

## (undated) DEVICE — SUT PROLENE 4-0 PS-2 18 IN 8682G

## (undated) DEVICE — DISPOSABLE EQUIPMENT COVER: Brand: SMALL TOWEL DRAPE

## (undated) DEVICE — GLOVE INDICATOR PI UNDERGLOVE SZ 8 BLUE

## (undated) DEVICE — CUFF TOURNIQUET 18 X 4 IN QUICK CONNECT DISP 1 BLADDER

## (undated) DEVICE — STERILE BETHLEHEM PLASTIC HAND: Brand: CARDINAL HEALTH

## (undated) DEVICE — GAUZE SPONGES,16 PLY: Brand: CURITY

## (undated) DEVICE — GLOVE SRG BIOGEL 7.5

## (undated) DEVICE — SPONGE PVP SCRUB WING STERILE

## (undated) DEVICE — PREMIUM DRY TRAY LF: Brand: MEDLINE INDUSTRIES, INC.

## (undated) DEVICE — NEEDLE 25G X 1 1/2

## (undated) RX ORDER — PERFLUOROHEXYLOCTANE 1 MG/MG: 1 SOLUTION OPHTHALMIC

## (undated) RX ORDER — NEOMYCIN SULFATE, POLYMYXIN B SULFATE AND DEXAMETHASONE 3.5; 10000; 1 MG/ML; [USP'U]/ML; MG/ML: 1 SUSPENSION OPHTHALMIC

## (undated) RX ORDER — TOBRAMYCIN AND DEXAMETHASONE 1; 3 MG/ML; MG/ML
1 SUSPENSION/ DROPS OPHTHALMIC
Start: 2025-04-10